# Patient Record
Sex: MALE | Race: WHITE | Employment: OTHER | URBAN - METROPOLITAN AREA
[De-identification: names, ages, dates, MRNs, and addresses within clinical notes are randomized per-mention and may not be internally consistent; named-entity substitution may affect disease eponyms.]

---

## 2017-01-06 DIAGNOSIS — E03.9 HYPOTHYROIDISM, UNSPECIFIED TYPE: Primary | ICD-10-CM

## 2017-01-06 RX ORDER — LEVOTHYROXINE SODIUM 50 UG/1
50 TABLET ORAL DAILY
Qty: 30 TABLET | Refills: 3 | Status: SHIPPED | OUTPATIENT
Start: 2017-01-06 | End: 2017-04-19

## 2017-01-06 NOTE — TELEPHONE ENCOUNTER
levothyroxine     Last Written Prescription Date: 01/14/16  Last Quantity: 30, # refills: 11  Last Office Visit with G, P or [facility] prescribing provider: 11/25/16        TSH   Date Value Ref Range Status   04/19/2016 2.71 0.40 - 4.00 mU/L Final

## 2017-01-14 ENCOUNTER — OFFICE VISIT (OUTPATIENT)
Dept: URGENT CARE | Facility: URGENT CARE | Age: 82
End: 2017-01-14
Payer: COMMERCIAL

## 2017-01-14 VITALS
HEART RATE: 56 BPM | WEIGHT: 217.7 LBS | OXYGEN SATURATION: 98 % | SYSTOLIC BLOOD PRESSURE: 106 MMHG | BODY MASS INDEX: 29.49 KG/M2 | DIASTOLIC BLOOD PRESSURE: 60 MMHG | HEIGHT: 72 IN | TEMPERATURE: 97.7 F

## 2017-01-14 DIAGNOSIS — L29.9 ITCHING: ICD-10-CM

## 2017-01-14 DIAGNOSIS — L20.9 ATOPIC DERMATITIS, UNSPECIFIED TYPE: Primary | ICD-10-CM

## 2017-01-14 PROCEDURE — 99214 OFFICE O/P EST MOD 30 MIN: CPT | Performed by: PHYSICIAN ASSISTANT

## 2017-01-14 RX ORDER — METHYLPREDNISOLONE 4 MG
TABLET, DOSE PACK ORAL
Qty: 21 TABLET | Refills: 0 | Status: SHIPPED | OUTPATIENT
Start: 2017-01-14 | End: 2017-03-20

## 2017-01-14 RX ORDER — TRIAMCINOLONE ACETONIDE 5 MG/G
CREAM TOPICAL
Qty: 30 G | Refills: 0 | Status: SHIPPED | OUTPATIENT
Start: 2017-01-14 | End: 2017-05-30

## 2017-01-14 RX ORDER — CETIRIZINE HYDROCHLORIDE 10 MG/1
10 TABLET ORAL EVERY EVENING
Qty: 30 TABLET | Refills: 1 | Status: SHIPPED | OUTPATIENT
Start: 2017-01-14 | End: 2017-03-20

## 2017-01-14 NOTE — PROGRESS NOTES
SUBJECTIVE:  Oscar Terrazas is a 82 year old male who presents to the clinic today for a rash.  Onset of rash was 3 week(s) ago.   Rash is still present.  Location of the rash: upper arms, back area.  Quality/symptoms of rash: itching and redness   Symptoms are moderate and rash seems to be stable.  Previous history of a similar rash? No  Recent exposure history: nonee  Recent new medications: none  Associated symptoms include: itching and redness.    Past Medical History   Diagnosis Date     Pneumonia, organism unspecified      Allergic rhinitis, cause unspecified      Osteoarthrosis, unspecified whether generalized or localized, unspecified site      IDIOPATHIC CYCLIC EDEMA      Acute myocardial infarction, unspecified site, episode of care unspecified      Essential hypertension, benign      Degeneration of cervical intervertebral disc      Diaphragmatic hernia without mention of obstruction or gangrene      Esophageal reflux      Unspecified hereditary and idiopathic peripheral neuropathy      Sensorineural hearing loss, unspecified      Mild persistent asthma      Unspecified disorder resulting from impaired renal function      Hypothyroidism      Venous insufficiency      CTS (carpal tunnel syndrome)      bilateral     PVD (peripheral vascular disease) (H)      Subarachnoid bleed (H)      Anemia 3/6/2014     Hyperlipidemia LDL goal <70      CKD (chronic kidney disease) stage 3, GFR 30-59 ml/min 3/9/2014     CAD (coronary artery disease)      1996 CABG - LIMA to LAD, SVG to OM, 2007 Cath - KANU to Left main and ostial/prox LAD, 2012 Cath - 80-90% stenosis SVG to OM - KANU placed, EF 50%     Proteinuria 5/17/2014     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 disease (goal A1C<8) 10/17/2015     Hypothyroidism, unspecified hypothyroidism type 1/14/2016        Allergies   Allergen Reactions     Advair [Advair Diskus]      cough;  insomnia, nightmares     Amlodipine Besylate      edema       Azithromycin  GI Disturbance     Clarithromycin      gi     Hydrochlorothiazide      hctz + lisinopril-->inc creat, k+     Lisinopril      lisinopril + hctz --> inc creat, k+     Moxifloxacin Hydrochloride      clostridium diffile colitis     Penicillins      gen swelling     Vioxx      edema     Social History   Substance Use Topics     Smoking status: Former Smoker     Types: Cigarettes     Smokeless tobacco: Never Used      Comment: quit at age 32     Alcohol Use: No       ROS:  CONSTITUTIONAL:NEGATIVE for fever, chills, change in weight  INTEGUMENTARY/SKIN: POSITIVE for rash on arms and back  ENT/MOUTH: NEGATIVE for ear, mouth and throat problems  RESP:NEGATIVE for significant cough or SOB  CV: NEGATIVE for chest pain, palpitations or peripheral edema  MUSCULOSKELETAL: NEGATIVE for significant arthralgias or myalgia  NEURO: NEGATIVE for weakness, dizziness or paresthesias    EXAM:   /60 mmHg  Pulse 56  Temp(Src) 97.7  F (36.5  C) (Oral)  Ht 6' (1.829 m)  Wt 217 lb 11.2 oz (98.748 kg)  BMI 29.52 kg/m2  SpO2 98%  GENERAL: alert, no acute distress.  SKIN: Rash description:    Distribution: localized  Location: arms and back    Color: red,  Lesion type: macular, blotchy with itching  NECK: supple, non-tender to palpation, no adenopathy noted  RESP: lungs clear to auscultation - no rales, rhonchi or wheezes  CV: regular rates and rhythm, normal S1 S2, no murmur noted    ASSESSMENT/PLAN:      ICD-10-CM    1. Atopic dermatitis, unspecified type L20.9 cetirizine (ZYRTEC) 10 MG tablet     methylPREDNISolone (MEDROL DOSEPAK) 4 MG tablet   2. Itching L29.9 cetirizine (ZYRTEC) 10 MG tablet     triamcinolone (KENALOG) 0.5 % cream     methylPREDNISolone (MEDROL DOSEPAK) 4 MG tablet     Follow up with PCP for recheck next week

## 2017-01-14 NOTE — NURSING NOTE
Chief Complaint   Patient presents with     Derm Problem     rash on arm, back, are itchy 3x weeks        Initial /60 mmHg  Pulse 56  Temp(Src) 97.7  F (36.5  C) (Oral)  Ht 6' (1.829 m)  Wt 217 lb 11.2 oz (98.748 kg)  BMI 29.52 kg/m2  SpO2 98% Estimated body mass index is 29.52 kg/(m^2) as calculated from the following:    Height as of this encounter: 6' (1.829 m).    Weight as of this encounter: 217 lb 11.2 oz (98.748 kg).  BP completed using cuff size: large

## 2017-01-15 DIAGNOSIS — I10 ESSENTIAL HYPERTENSION, BENIGN: Primary | ICD-10-CM

## 2017-01-16 RX ORDER — HYDRALAZINE HYDROCHLORIDE 25 MG/1
50 TABLET, FILM COATED ORAL 2 TIMES DAILY
Qty: 360 TABLET | Refills: 2 | Status: SHIPPED | OUTPATIENT
Start: 2017-01-16 | End: 2017-10-09

## 2017-01-16 NOTE — TELEPHONE ENCOUNTER
hydrALAZINE (APRESOLINE) 25 MG tablet      Last Written Prescription Date: 01/14/16  Last Fill Quantity: 360tab, # refills: 3    Last Office Visit with [facility], [facility] or [facility] prescribing provider:  11/25/16   Future Office Visit:        BP Readings from Last 3 Encounters:   01/14/17 106/60   12/24/16 170/85   12/09/16 170/60

## 2017-01-25 DIAGNOSIS — N18.30 CKD (CHRONIC KIDNEY DISEASE) STAGE 3, GFR 30-59 ML/MIN (H): Primary | ICD-10-CM

## 2017-01-25 DIAGNOSIS — M10.9 GOUT, UNSPECIFIED: ICD-10-CM

## 2017-01-25 RX ORDER — CLOPIDOGREL BISULFATE 75 MG/1
75 TABLET ORAL DAILY
Qty: 90 TABLET | Refills: 3 | Status: SHIPPED | OUTPATIENT
Start: 2017-01-25 | End: 2018-01-15

## 2017-01-25 RX ORDER — ALLOPURINOL 100 MG/1
200 TABLET ORAL DAILY
Qty: 180 TABLET | Refills: 3 | Status: SHIPPED | OUTPATIENT
Start: 2017-01-25 | End: 2018-01-08

## 2017-01-25 NOTE — TELEPHONE ENCOUNTER
clopidogrel (PLAVIX) 75 MG tablet           Last Written Prescription Date: 1/04/2016  Last Fill Quantity: 90, # refills: 3    [place] Office Visit with [facility] or [facility] prescribing provider:  11/25/2016   Future Office Visit:       WBC      9.6   3/6/2014  RBC     4.26   3/6/2014  HGB     13.6   3/6/2014  HCT     39.5   3/6/2014  No components found with this name: mct  MCV       93   3/6/2014  MCH     31.9   3/6/2014  MCHC     34.4   3/6/2014  RDW     14.0   3/6/2014  PLT      184   3/6/2014  AST        8   8/23/2016  ALT       21   8/23/2016  CREATININE   Date Value Ref Range Status   12/02/2016 1.91* 0.66 - 1.25 mg/dL Final   ]

## 2017-02-10 DIAGNOSIS — I10 ESSENTIAL HYPERTENSION, BENIGN: Primary | ICD-10-CM

## 2017-02-10 RX ORDER — METOPROLOL TARTRATE 25 MG/1
25 TABLET, FILM COATED ORAL 2 TIMES DAILY
Qty: 180 TABLET | Refills: 2 | Status: SHIPPED | OUTPATIENT
Start: 2017-02-10 | End: 2017-11-13

## 2017-02-14 ENCOUNTER — TELEPHONE (OUTPATIENT)
Dept: CARDIOLOGY | Facility: CLINIC | Age: 82
End: 2017-02-14

## 2017-02-14 NOTE — TELEPHONE ENCOUNTER
"Received call from scheduling that pt's daughter has some concerns and wanted a nurse to call back. I contacted pt's daughter (Yasmin). She is concerned with her father not being compliant with his medications, especially his \"water pill\". He has lower extremity edema but not to the point where his legs have sore or weeping. He is suppose to be taking his Lasix 20 mg daily, however, he only takes it when his legs become more swollen than usual. He doesn't like the way it makes him feel, tired and frequently urinating. Dtr stated he does eat a banana everyday for potassium. He does not over eat or salt his foods. He does eat a small bowl of cottage cheese,fruit and a cheese sandwich daily. We discussed high sodium foods, all questions were answered.. He does wear his compression stockings when he is at home and when he goes bowling. He denies any shortness of breath at this time.    Daughter called today to make an appointment to see  on 3/20/17. She is hoping her father will listen to  about the importance of being compliant with his Lasix. She believes he is taking all his other medication and insulin as instructed. I advised her to take patient to the ED if symptoms worsen and are not relieved by his medications. She was in agreement with the plan of care. I gave her 's nurse line phone number if she should have any other questions or concerns.   "

## 2017-03-15 DIAGNOSIS — J45.30 MILD PERSISTENT ASTHMA WITHOUT COMPLICATION: ICD-10-CM

## 2017-03-15 NOTE — TELEPHONE ENCOUNTER
theophylline 400 MG CP24      Last Written Prescription Date:  6/26/2016  Last Fill Quantity: 90,   # refills: 2  Last Office Visit with [facility], [facility] or [facility] prescribing provider: 11/25/2016  Future Office visit:    Next 5 appointments (look out 90 days)     Mar 20, 2017  1:15 PM CDT   Return Visit with Sebastien Gibson MD   [facility] ([facility])    [address] [place] [id number] [phone]                   Routing refill request to provider for review/approval because:  Drug not on the [facility], [facility] or [facility] refill protocol or controlled substance

## 2017-03-20 ENCOUNTER — OFFICE VISIT (OUTPATIENT)
Dept: CARDIOLOGY | Facility: CLINIC | Age: 82
End: 2017-03-20
Attending: NURSE PRACTITIONER
Payer: COMMERCIAL

## 2017-03-20 VITALS
HEIGHT: 72 IN | SYSTOLIC BLOOD PRESSURE: 159 MMHG | HEART RATE: 71 BPM | BODY MASS INDEX: 30.3 KG/M2 | DIASTOLIC BLOOD PRESSURE: 68 MMHG | WEIGHT: 223.7 LBS

## 2017-03-20 DIAGNOSIS — L20.9 ATOPIC DERMATITIS, UNSPECIFIED TYPE: ICD-10-CM

## 2017-03-20 DIAGNOSIS — I25.10 ATHEROSCLEROSIS OF NATIVE CORONARY ARTERY OF NATIVE HEART WITHOUT ANGINA PECTORIS: Primary | ICD-10-CM

## 2017-03-20 DIAGNOSIS — I25.10 CARDIOVASCULAR DISEASE: ICD-10-CM

## 2017-03-20 DIAGNOSIS — I10 ESSENTIAL HYPERTENSION, BENIGN: ICD-10-CM

## 2017-03-20 DIAGNOSIS — E78.5 HYPERLIPIDEMIA LDL GOAL <70: ICD-10-CM

## 2017-03-20 DIAGNOSIS — L29.9 ITCHING: ICD-10-CM

## 2017-03-20 DIAGNOSIS — I87.2 VENOUS INSUFFICIENCY: ICD-10-CM

## 2017-03-20 PROCEDURE — 99214 OFFICE O/P EST MOD 30 MIN: CPT | Performed by: INTERNAL MEDICINE

## 2017-03-20 NOTE — MR AVS SNAPSHOT
After Visit Summary   3/20/2017    Oscar Terrazas    MRN: [id number]           Patient Information     Date Of Birth          2/2/1934        Visit Information        Provider Department      3/20/2017 1:15 PM Sebastien Gibson MD [facility] [place]        Today's Diagnoses     Atherosclerosis of native coronary artery of native heart without angina pectoris    -  1    Hyperlipidemia LDL goal <70        Essential hypertension, benign        Cardiovascular disease        Venous insufficiency          Care Instructions    If you have not heard from the scheduling office within 2 business days, please call [phone] all locations,        Follow-ups after your visit        Additional Services     LYMPHEDEMA THERAPY REFERRAL       *This therapy referral will be filtered to a centralized scheduling office at [facility] and the patient will receive a call to schedule an appointment at a [place] location most convenient for them. *   If you have not heard from the scheduling office within 2 business days, please call [phone] all locations, with the exception of [place], please call [phone].     Treatment: PT or OT Evaluation & Treatment  Special Instructions: lymphedema wraps and teaching  PT/OT Treatment Diagnosis: Edema    Please be aware that coverage of these services is subject to the terms and limitations of your health insurance plan.  Call member services at your health plan with any benefit or coverage questions.      **Note to Provider:  If you are referring outside of [place] for the therapy appointment, please list the name of the location in the  special instructions  above, print the referral and give to the patient to schedule the appointment.            Follow-Up with Cardiologist                 Future tests that were ordered for you today     Open Future Orders        Priority Expected Expires Ordered     LYMPHEDEMA THERAPY REFERRAL Routine 3/27/2017 3/20/2019 3/20/2017    Follow-Up with Cardiologist Routine 3/20/2018 8/2/2018 3/20/2017    Lipid Profile Routine 3/20/2018 4/24/2018 3/20/2017            Who to contact     If you have questions or need follow up information about today's clinic visit or your schedule please contact [facility] [place] directly at [phone].  Normal or non-critical lab and imaging results will be communicated to you by [employer], letter or phone within 4 business days after the clinic has received the results. If you do not hear from us within 7 days, please contact the clinic through [employer] or phone. If you have a critical or abnormal lab result, we will notify you by phone as soon as possible.  Submit refill requests through [employer] or call your pharmacy and they will forward the refill request to us. Please allow 3 business days for your refill to be completed.          Additional Information About Your Visit        [employer] Information     [employer] gives you secure access to your electronic health record. If you see a primary care provider, you can also send messages to your care team and make appointments. If you have questions, please call your primary care clinic.  If you do not have a primary care provider, please call [phone] and they will assist you.        Care EveryWhere ID     This is your Care EveryWhere ID. This could be used by other organizations to access your [place] medical records  [id number]        Your Vitals Were     Pulse Height BMI (Body Mass Index)             71 1.829 m (6') 30.34 kg/m2          Blood Pressure from Last 3 Encounters:   03/20/17 159/68   01/14/17 106/60   12/24/16 170/85    Weight from Last 3 Encounters:   03/20/17 101.5 kg (223 lb 11.2 oz)   01/14/17 98.7 kg (217 lb 11.2 oz)   12/09/16 99.8 kg (220 lb)              We Performed the Following     Follow-Up with Cardiologist        Primary Care Provider  "Office Phone # Fax #    Jorge Hillman [phone]       [facility] [address]  [facility] [address]        Thank you!     Thank you for choosing [facility] [place]  for your care. Our goal is always to provide you with excellent care. Hearing back from our patients is one way we can continue to improve our services. Please take a few minutes to complete the written survey that you may receive in the mail after your visit with us. Thank you!             Your Updated Medication List - Protect others around you: Learn how to safely use, store and throw away your medicines at www.disposemymeds.org.          This list is accurate as of: 3/20/17  2:01 PM.  Always use your most recent med list.                   Brand Name Dispense Instructions for use    allopurinol 100 MG tablet    ZYLOPRIM    180 tablet    Take 2 tablets (200 mg) by mouth daily       aspirin 81 MG tablet      Take 1 tablet by mouth daily. with food       blood glucose monitoring test strip    ACCU-CHEK COMPACT STRIPS    200 Box    Qd or as directed       cetirizine 10 MG tablet    zyrTEC    30 tablet    Take 1 tablet (10 mg) by mouth every evening       clopidogrel 75 MG tablet    PLAVIX    90 tablet    Take 1 tablet (75 mg) by mouth daily       COD LIVER OIL PO      Take 1 capsule by mouth daily       furosemide 20 MG tablet    LASIX    30 tablet    Take 1 tablet (20 mg) by mouth daily       hydrALAZINE 25 MG tablet    APRESOLINE    360 tablet    Take 2 tablets (50 mg) by mouth 2 times daily       insulin  UNIT/ML injection    HumuLIN N/NovoLIN N    3 Month    18 units at bedtime       insulin syringe-needle U-100 29G X 1/2\" 0.5 ML    BD insulin syringe    200 each    Use one syringe 2 daily or as directed.       levothyroxine 50 MCG tablet    SYNTHROID/LEVOTHROID    30 tablet    Take 1 tablet (50 mcg) by mouth daily       metoprolol 25 MG tablet    LOPRESSOR    180 tablet    Take " 1 tablet (25 mg) by mouth 2 times daily       PEPCID 20 MG tablet   Generic drug:  famotidine     3months    take one tablet daily if needed       pravastatin 80 MG tablet    PRAVACHOL    90 tablet    Take 1 tablet (80 mg) by mouth daily at bedtime.       theophylline 400 MG Cp24     90 capsule    Take 400 mg by mouth daily       triamcinolone 0.5 % cream    KENALOG    30 g    Apply sparingly to affected area three times daily.       VITAMIN B 12 PO      Take 500 mcg by mouth 2 times daily.

## 2017-03-20 NOTE — LETTER
3/20/2017    Jorge Hillman MD  [facility]   [address]  [facility] [address]    RE: Oscar Terrazas       Dear Colleague,    I had the pleasure of seeing Oscar Terrazas in the [facility].    HPI and Plan:   This 81-year-old gentleman is seen in followup of his coronary disease.  He is accompanied by his daughter.  He sounds and appears remaining very pale and active.  He is happy with his functional status.  He has remained stable this last year. He can blow snow and mow his lawn and get no unusual discomfort with activity or any dyspnea, unusual fatigue, arm, neck, or jaw discomfort. He continues to bowl in multiple [employer] leagues a week. He continues to be mainly limited by some carpal tunnel symptoms in his right hand. He denies orthopnea, shortness of breath or unusual limitations or fatigue. He has no palpitations, dizziness, syncope, new myalgias or muscle weakness, and denies claudication symptoms.   He does have chronic venous insufficiency.  I have strongly recommended pressure stockings in the past. He continues to have a fair amount of edema although he is asymptomatic from it.  However he continues to bump his legs and bruise easily and sometimes gets cuts that he'll slowly, and recently had of antibiotics because one of those appeared infected.     He notes his statin was stopped last year because he is having some nocturnal charley horses.  He was not having pain at other times.  He notes he continues to have occasional nocturnal charley horse cramps in his calves even after being off the statin.  I think his pattern is very very unlikely related to statin therapy.    This is a gentleman with a CABG in 1996. In 2007, he required multivessel drug-eluting stenting for recurrent angina. In 2012, he presented with an acute coronary syndrome after his Plavix was stopped for a dental procedure. At that time, his LAD and left main coronary stents were widely  patent. There was a lesion at one end of the stent in the vein graft to the OM that was severe, and possibly acute thrombus, and that was successfully stented with a drug-eluting stent. He has been maintained on Plavix since then.     On a vascular study in 2008, he did have some mild peripheral vascular disease, but his ABIs with exercise were still in the 0.85 to 0.88 range.      Exam -full exam below.  In summary:   Blood pressure-was elevated initially remained elevated.  This is relatively unusual for him.  He states he's been taking them at home and they have been controlled.  Cardiac-, regular rhythm, clear lungs, a soft systolic ejection murmur without gallop P for JVD, and no bruits peripherally.   Extremities-He does continue to have 2+ chronic edema two thirds the way to the knees. There are chronic bronzing and skin changes pretibially but no erythema ulceration skin breakdown.    Impression/plan  1-coronary artery disease. Status post CABG and subsequent multivessel stenting. He has had no recurrence of his previous ischemic symptoms and no new suggestive ischemic symptoms. Ejection fraction around 50-55%. No evidence of volume overload.  2-mild to moderate peripheral vascular disease. No claudication symptoms.  3-dyslipidemia, he has agreed to retry his statin therapy, he was previously an excellent goal on his pravastatin dose and he will retry that.  4-significant peripheral venous insufficiency of the lower extremities.  He has had some difficulty using pressure stockings.  He gave him the lymphedema consult to evaluate for using lymphedema wraps in stead.  I am concerned about his frequent bruising and occasional skin breakdown and slow healing.  5-hypertension.  Blood pressures are not well controlled today.  He is going to take a series of them at home over the next few weeks.  He should follow-up with Dr. Hillman with those results    Orders Placed This Encounter   Procedures     Lipid Profile      "Follow-Up with Cardiologist     LYMPHEDEMA THERAPY REFERRAL       No orders of the defined types were placed in this encounter.      Medications Discontinued During This Encounter   Medication Reason     HYDROcodone-acetaminophen (NORCO) 5-325 MG per tablet Therapy completed     methylPREDNISolone (MEDROL DOSEPAK) 4 MG tablet Therapy completed         Encounter Diagnoses   Name Primary?     Atherosclerosis of native coronary artery of native heart without angina pectoris Yes     Hyperlipidemia LDL goal <70      Essential hypertension, benign      Cardiovascular disease      Venous insufficiency        CURRENT MEDICATIONS:  Current Outpatient Prescriptions   Medication Sig Dispense Refill     theophylline 400 MG CP24 Take 400 mg by mouth daily 90 capsule 2     metoprolol (LOPRESSOR) 25 MG tablet Take 1 tablet (25 mg) by mouth 2 times daily 180 tablet 2     clopidogrel (PLAVIX) 75 MG tablet Take 1 tablet (75 mg) by mouth daily 90 tablet 3     allopurinol (ZYLOPRIM) 100 MG tablet Take 2 tablets (200 mg) by mouth daily 180 tablet 3     hydrALAZINE (APRESOLINE) 25 MG tablet Take 2 tablets (50 mg) by mouth 2 times daily 360 tablet 2     cetirizine (ZYRTEC) 10 MG tablet Take 1 tablet (10 mg) by mouth every evening 30 tablet 1     triamcinolone (KENALOG) 0.5 % cream Apply sparingly to affected area three times daily. 30 g 0     levothyroxine (SYNTHROID/LEVOTHROID) 50 MCG tablet Take 1 tablet (50 mcg) by mouth daily 30 tablet 3     furosemide (LASIX) 20 MG tablet Take 1 tablet (20 mg) by mouth daily 30 tablet 5     insulin NPH (HUMULIN N, NOVOLIN N) 100 UNIT/ML VIAL 18 units at bedtime 3 Month 3     pravastatin (PRAVACHOL) 80 MG tablet Take 1 tablet (80 mg) by mouth daily at bedtime. 90 tablet 1     blood glucose monitoring (ACCU-CHEK COMPACT STRIPS) test strip Qd or as directed 200 Box 3     insulin syringe-needle U-100 (BD INSULIN SYRINGE) 29G X 1/2\" 0.5 ML Use one syringe 2 daily or as directed. 200 each prn     COD LIVER " OIL PO Take 1 capsule by mouth daily        aspirin 81 MG tablet Take 1 tablet by mouth daily. with food       Cyanocobalamin (VITAMIN B 12 PO) Take 500 mcg by mouth 2 times daily.       PEPCID 20 MG OR TABS take one tablet daily if needed 3months 1       ALLERGIES     Allergies   Allergen Reactions     Advair [Advair Diskus]      cough;  insomnia, nightmares     Amlodipine Besylate      edema       Azithromycin GI Disturbance     Clarithromycin      gi     Hydrochlorothiazide      hctz + lisinopril-->inc creat, k+     Lisinopril      lisinopril + hctz --> inc creat, k+     Moxifloxacin Hydrochloride      clostridium diffile colitis     Penicillins      gen swelling     Vioxx      edema       PAST MEDICAL HISTORY:  Past Medical History   Diagnosis Date     Acute myocardial infarction, unspecified site, episode of care unspecified      Allergic rhinitis, cause unspecified      Anemia 3/6/2014     CAD (coronary artery disease)      1996 CABG - LIMA to LAD, SVG to OM, 2007 Cath - KANU to Left main and ostial/prox LAD, 2012 Cath - 80-90% stenosis SVG to OM - KANU placed, EF 50%     CKD (chronic kidney disease) stage 3, GFR 30-59 ml/min 3/9/2014     CTS (carpal tunnel syndrome)      bilateral     Degeneration of cervical intervertebral disc      Diaphragmatic hernia without mention of obstruction or gangrene      Esophageal reflux      Essential hypertension, benign      Hyperlipidemia LDL goal <70      Hypothyroidism      Hypothyroidism, unspecified hypothyroidism type 1/14/2016     IDIOPATHIC CYCLIC EDEMA      Mild persistent asthma      Osteoarthrosis, unspecified whether generalized or localized, unspecified site      Pneumonia, organism unspecified      Proteinuria 5/17/2014     PVD (peripheral vascular disease) (H)      Sensorineural hearing loss, unspecified      Subarachnoid bleed (H)      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 disease (goal A1C<8) 10/17/2015     Unspecified disorder resulting from  impaired renal function      Unspecified hereditary and idiopathic peripheral neuropathy      Venous insufficiency        PAST SURGICAL HISTORY:  Past Surgical History   Procedure Laterality Date     C nonspecific procedure       appendectomy     C nonspecific procedure       hemorrhoids     C nonspecific procedure       cervical strain     C nonspecific procedure       rotator cuff surgery, right shoulder     Coronary artery bypass  1996     LIMA to LAD, SVG to OM     Heart cath stent cor w/wo ptca  2007     lad, left main cor artery stents/drug eluting     C nonspecific procedure  2008     iol os     Nonspecific procedure       iol od     Heart cath stent cor w/wo ptca  2012     80-90% stenosis SVG to OM - KANU placed, EF 50%       FAMILY HISTORY:  Family History   Problem Relation Age of Onset     CANCER Daughter      lung cancer     Jaundice Father      Alcohol/Drug Father        SOCIAL HISTORY:  Social History     Social History     Marital status:      Spouse name: N/A     Number of children: N/A     Years of education: N/A     Social History Main Topics     Smoking status: Former Smoker     Types: Cigarettes     Smokeless tobacco: Never Used      Comment: quit at age 32     Alcohol use No     Drug use: No     Sexual activity: No     Other Topics Concern     Caffeine Concern No     Sleep Concern Yes     Stress Concern No     Weight Concern No     Special Diet No     Exercise Yes     bowling, 5 days week. yard work     Social History Narrative       Review of Systems:  Skin:  Positive for rash left leg   Eyes:  Positive for glasses reading  ENT:  Negative      Respiratory:  Positive for cough     Cardiovascular:    edema;Positive for    Gastroenterology: Negative      Genitourinary:  not assessed      Musculoskeletal:  Negative      Neurologic:  Positive for numbness or tingling of hands carpel tunnel  Psychiatric:  Positive for depression    Heme/Lymph/Imm:  Negative      Endocrine:  Positive for  diabetes;thyroid disorder      Physical Exam:  Vitals: /68  Pulse 71  Ht 1.829 m (6')  Wt 101.5 kg (223 lb 11.2 oz)  BMI 30.34 kg/m2    Constitutional:  cooperative, alert and oriented, well developed, well nourished, in no acute distress        Skin:  warm and dry to the touch, no apparent skin lesions or masses noted        Head:  normocephalic, no masses or lesions        Eyes:  pupils equal and round;sclera white;EOMS intact        ENT:  no pallor or cyanosis        Neck:  carotid pulses are full and equal bilaterally;JVP normal;no carotid bruit        Chest:  normal breath sounds, clear to auscultation, normal A-P diameter, normal symmetry, normal respiratory excursion, no use of accessory muscles;healed median sternotomy scar          Cardiac: regular rhythm;normal S1 and S2;no S3 or S4;apical impulse not displaced       systolic ejection murmur;RUSB;grade 1          Abdomen:  abdomen soft, non-tender, BS normoactive, no mass, no HSM, no bruits        Vascular: pulses full and equal, no bruits auscultated                                        Extremities and Back:      bilateral LE edema;2+          Neurological:  affect appropriate, oriented to time, person and place        Thank you for allowing me to participate in the care of your patient.    Sincerely,     Sebastien Gibson MD     [facility]

## 2017-03-20 NOTE — PROGRESS NOTES
HPI and Plan:   This 81-year-old gentleman is seen in followup of his coronary disease.  He is accompanied by his daughter.  He sounds and appears remaining very pale and active.  He is happy with his functional status.  He has remained stable this last year. He can blow snow and mow his lawn and get no unusual discomfort with activity or any dyspnea, unusual fatigue, arm, neck, or jaw discomfort. He continues to bowl in multiple bowling leagues a week. He continues to be mainly limited by some carpal tunnel symptoms in his right hand. He denies orthopnea, shortness of breath or unusual limitations or fatigue. He has no palpitations, dizziness, syncope, new myalgias or muscle weakness, and denies claudication symptoms.   He does have chronic venous insufficiency.  I have strongly recommended pressure stockings in the past. He continues to have a fair amount of edema although he is asymptomatic from it.  However he continues to bump his legs and bruise easily and sometimes gets cuts that he'll slowly, and recently had of antibiotics because one of those appeared infected.     He notes his statin was stopped last year because he is having some nocturnal charley horses.  He was not having pain at other times.  He notes he continues to have occasional nocturnal charley horse cramps in his calves even after being off the statin.  I think his pattern is very very unlikely related to statin therapy.    This is a gentleman with a CABG in 1996. In 2007, he required multivessel drug-eluting stenting for recurrent angina. In 2012, he presented with an acute coronary syndrome after his Plavix was stopped for a dental procedure. At that time, his LAD and left main coronary stents were widely patent. There was a lesion at one end of the stent in the vein graft to the OM that was severe, and possibly acute thrombus, and that was successfully stented with a drug-eluting stent. He has been maintained on Plavix since then.     On a  vascular study in 2008, he did have some mild peripheral vascular disease, but his ABIs with exercise were still in the 0.85 to 0.88 range.      Exam -full exam below.  In summary:   Blood pressure-was elevated initially remained elevated.  This is relatively unusual for him.  He states he's been taking them at home and they have been controlled.  Cardiac-, regular rhythm, clear lungs, a soft systolic ejection murmur without gallop P for JVD, and no bruits peripherally.   Extremities-He does continue to have 2+ chronic edema two thirds the way to the knees. There are chronic bronzing and skin changes pretibially but no erythema ulceration skin breakdown.    Impression/plan  1-coronary artery disease. Status post CABG and subsequent multivessel stenting. He has had no recurrence of his previous ischemic symptoms and no new suggestive ischemic symptoms. Ejection fraction around 50-55%. No evidence of volume overload.  2-mild to moderate peripheral vascular disease. No claudication symptoms.  3-dyslipidemia, he has agreed to retry his statin therapy, he was previously an excellent goal on his pravastatin dose and he will retry that.  4-significant peripheral venous insufficiency of the lower extremities.  He has had some difficulty using pressure stockings.  He gave him the lymphedema consult to evaluate for using lymphedema wraps in stead.  I am concerned about his frequent bruising and occasional skin breakdown and slow healing.  5-hypertension.  Blood pressures are not well controlled today.  He is going to take a series of them at home over the next few weeks.  He should follow-up with Dr. Hillman with those results    Orders Placed This Encounter   Procedures     Lipid Profile     Follow-Up with Cardiologist     LYMPHEDEMA THERAPY REFERRAL       No orders of the defined types were placed in this encounter.      Medications Discontinued During This Encounter   Medication Reason     HYDROcodone-acetaminophen (NORCO)  "5-325 MG per tablet Therapy completed     methylPREDNISolone (MEDROL DOSEPAK) 4 MG tablet Therapy completed         Encounter Diagnoses   Name Primary?     Atherosclerosis of native coronary artery of native heart without angina pectoris Yes     Hyperlipidemia LDL goal <70      Essential hypertension, benign      Cardiovascular disease      Venous insufficiency        CURRENT MEDICATIONS:  Current Outpatient Prescriptions   Medication Sig Dispense Refill     theophylline 400 MG CP24 Take 400 mg by mouth daily 90 capsule 2     metoprolol (LOPRESSOR) 25 MG tablet Take 1 tablet (25 mg) by mouth 2 times daily 180 tablet 2     clopidogrel (PLAVIX) 75 MG tablet Take 1 tablet (75 mg) by mouth daily 90 tablet 3     allopurinol (ZYLOPRIM) 100 MG tablet Take 2 tablets (200 mg) by mouth daily 180 tablet 3     hydrALAZINE (APRESOLINE) 25 MG tablet Take 2 tablets (50 mg) by mouth 2 times daily 360 tablet 2     cetirizine (ZYRTEC) 10 MG tablet Take 1 tablet (10 mg) by mouth every evening 30 tablet 1     triamcinolone (KENALOG) 0.5 % cream Apply sparingly to affected area three times daily. 30 g 0     levothyroxine (SYNTHROID/LEVOTHROID) 50 MCG tablet Take 1 tablet (50 mcg) by mouth daily 30 tablet 3     furosemide (LASIX) 20 MG tablet Take 1 tablet (20 mg) by mouth daily 30 tablet 5     insulin NPH (HUMULIN N, NOVOLIN N) 100 UNIT/ML VIAL 18 units at bedtime 3 Month 3     pravastatin (PRAVACHOL) 80 MG tablet Take 1 tablet (80 mg) by mouth daily at bedtime. 90 tablet 1     blood glucose monitoring (ACCU-CHEK COMPACT STRIPS) test strip Qd or as directed 200 Box 3     insulin syringe-needle U-100 (BD INSULIN SYRINGE) 29G X 1/2\" 0.5 ML Use one syringe 2 daily or as directed. 200 each prn     COD LIVER OIL PO Take 1 capsule by mouth daily        aspirin 81 MG tablet Take 1 tablet by mouth daily. with food       Cyanocobalamin (VITAMIN B 12 PO) Take 500 mcg by mouth 2 times daily.       PEPCID 20 MG OR TABS take one tablet daily if " needed 3months 1       ALLERGIES     Allergies   Allergen Reactions     Advair [Advair Diskus]      cough;  insomnia, nightmares     Amlodipine Besylate      edema       Azithromycin GI Disturbance     Clarithromycin      gi     Hydrochlorothiazide      hctz + lisinopril-->inc creat, k+     Lisinopril      lisinopril + hctz --> inc creat, k+     Moxifloxacin Hydrochloride      clostridium diffile colitis     Penicillins      gen swelling     Vioxx      edema       PAST MEDICAL HISTORY:  Past Medical History   Diagnosis Date     Acute myocardial infarction, unspecified site, episode of care unspecified      Allergic rhinitis, cause unspecified      Anemia 3/6/2014     CAD (coronary artery disease)      1996 CABG - LIMA to LAD, SVG to OM, 2007 Cath - KANU to Left main and ostial/prox LAD, 2012 Cath - 80-90% stenosis SVG to OM - KANU placed, EF 50%     CKD (chronic kidney disease) stage 3, GFR 30-59 ml/min 3/9/2014     CTS (carpal tunnel syndrome)      bilateral     Degeneration of cervical intervertebral disc      Diaphragmatic hernia without mention of obstruction or gangrene      Esophageal reflux      Essential hypertension, benign      Hyperlipidemia LDL goal <70      Hypothyroidism      Hypothyroidism, unspecified hypothyroidism type 1/14/2016     IDIOPATHIC CYCLIC EDEMA      Mild persistent asthma      Osteoarthrosis, unspecified whether generalized or localized, unspecified site      Pneumonia, organism unspecified      Proteinuria 5/17/2014     PVD (peripheral vascular disease) (H)      Sensorineural hearing loss, unspecified      Subarachnoid bleed (H)      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 disease (goal A1C<8) 10/17/2015     Unspecified disorder resulting from impaired renal function      Unspecified hereditary and idiopathic peripheral neuropathy      Venous insufficiency        PAST SURGICAL HISTORY:  Past Surgical History   Procedure Laterality Date     C nonspecific procedure        appendectomy     C nonspecific procedure       hemorrhoids     C nonspecific procedure       cervical strain     C nonspecific procedure       rotator cuff surgery, right shoulder     Coronary artery bypass  1996     LIMA to LAD, SVG to OM     Heart cath stent cor w/wo ptca  2007     lad, left main cor artery stents/drug eluting     C nonspecific procedure  2008     iol os     Nonspecific procedure       iol od     Heart cath stent cor w/wo ptca  2012     80-90% stenosis SVG to OM - KANU placed, EF 50%       FAMILY HISTORY:  Family History   Problem Relation Age of Onset     CANCER Daughter      lung cancer     Jaundice Father      Alcohol/Drug Father        SOCIAL HISTORY:  Social History     Social History     Marital status:      Spouse name: N/A     Number of children: N/A     Years of education: N/A     Social History Main Topics     Smoking status: Former Smoker     Types: Cigarettes     Smokeless tobacco: Never Used      Comment: quit at age 32     Alcohol use No     Drug use: No     Sexual activity: No     Other Topics Concern     Caffeine Concern No     Sleep Concern Yes     Stress Concern No     Weight Concern No     Special Diet No     Exercise Yes     bowling, 5 days week. yard work     Social History Narrative       Review of Systems:  Skin:  Positive for rash left leg   Eyes:  Positive for glasses reading  ENT:  Negative      Respiratory:  Positive for cough     Cardiovascular:    edema;Positive for    Gastroenterology: Negative      Genitourinary:  not assessed      Musculoskeletal:  Negative      Neurologic:  Positive for numbness or tingling of hands carpel tunnel  Psychiatric:  Positive for depression    Heme/Lymph/Imm:  Negative      Endocrine:  Positive for diabetes;thyroid disorder      Physical Exam:  Vitals: /68  Pulse 71  Ht 1.829 m (6')  Wt 101.5 kg (223 lb 11.2 oz)  BMI 30.34 kg/m2    Constitutional:  cooperative, alert and oriented, well developed, well nourished, in no acute  distress        Skin:  warm and dry to the touch, no apparent skin lesions or masses noted        Head:  normocephalic, no masses or lesions        Eyes:  pupils equal and round;sclera white;EOMS intact        ENT:  no pallor or cyanosis        Neck:  carotid pulses are full and equal bilaterally;JVP normal;no carotid bruit        Chest:  normal breath sounds, clear to auscultation, normal A-P diameter, normal symmetry, normal respiratory excursion, no use of accessory muscles;healed median sternotomy scar          Cardiac: regular rhythm;normal S1 and S2;no S3 or S4;apical impulse not displaced       systolic ejection murmur;RUSB;grade 1          Abdomen:  abdomen soft, non-tender, BS normoactive, no mass, no HSM, no bruits        Vascular: pulses full and equal, no bruits auscultated                                        Extremities and Back:      bilateral LE edema;2+          Neurological:  affect appropriate, oriented to time, person and place              AIDA Vasquez CNP   [facility]  [address]  [place] [id number]

## 2017-03-20 NOTE — TELEPHONE ENCOUNTER
cetirizine (ZYRTEC) 10 MG tablet      Last Written Prescription Date: 11/25/2016  Last Fill Quantity: 30,  # refills: 1   Last Office Visit with BROOKE, AUGUSTIN or [facility] prescribing provider: 01/14/2017                                         Next 5 appointments (look out 90 days)     Mar 20, 2017  1:15 PM CDT   Return Visit with Sebastien Gibson MD   [facility] [place] ([facility])    [address] [place] [id number] [phone]

## 2017-03-20 NOTE — PATIENT INSTRUCTIONS
If you have not heard from the scheduling office within 2 business days, please call [phone] all locations,

## 2017-03-21 RX ORDER — CETIRIZINE HYDROCHLORIDE 10 MG/1
10 TABLET ORAL EVERY EVENING
Qty: 30 TABLET | Refills: 9 | Status: SHIPPED | OUTPATIENT
Start: 2017-03-21 | End: 2018-01-13

## 2017-03-26 DIAGNOSIS — E11.22 TYPE 2 DIABETES MELLITUS WITH STAGE 3 CHRONIC KIDNEY DISEASE, WITH LONG-TERM CURRENT USE OF INSULIN (H): Primary | ICD-10-CM

## 2017-03-26 DIAGNOSIS — Z79.4 TYPE 2 DIABETES MELLITUS WITH STAGE 3 CHRONIC KIDNEY DISEASE, WITH LONG-TERM CURRENT USE OF INSULIN (H): Primary | ICD-10-CM

## 2017-03-26 DIAGNOSIS — N18.30 TYPE 2 DIABETES MELLITUS WITH STAGE 3 CHRONIC KIDNEY DISEASE, WITH LONG-TERM CURRENT USE OF INSULIN (H): Primary | ICD-10-CM

## 2017-03-26 NOTE — TELEPHONE ENCOUNTER
"insulin syringe-needle U-100 (BD INSULIN SYRINGE) 29G X 1/2\" 0.5 ML      Last Written Prescription Date: 01/14/16  Last Fill Quantity: 200 each,  # refills: prn   Last Office Visit with [facility], [facility] or [facility] prescribing provider: 11/25/16                                               "

## 2017-03-27 RX ORDER — SYRINGE-NEEDLE,INSULIN,0.5 ML 27GX1/2"
SYRINGE, EMPTY DISPOSABLE MISCELLANEOUS
Qty: 200 EACH | Refills: 4 | Status: SHIPPED | OUTPATIENT
Start: 2017-03-27

## 2017-04-19 DIAGNOSIS — E03.9 HYPOTHYROIDISM, UNSPECIFIED TYPE: ICD-10-CM

## 2017-04-19 RX ORDER — LEVOTHYROXINE SODIUM 50 UG/1
TABLET ORAL
Qty: 30 TABLET | Refills: 0 | Status: SHIPPED | OUTPATIENT
Start: 2017-04-19 | End: 2017-05-15

## 2017-04-19 NOTE — TELEPHONE ENCOUNTER
Levothyroxine     Last Written Prescription Date: 1/6/17  Last Quantity: 30, # refills: 3  Last Office Visit with G, P or [facility] prescribing provider: 11/25/16        TSH   Date Value Ref Range Status   04/19/2016 2.71 0.40 - 4.00 mU/L Final

## 2017-04-19 NOTE — TELEPHONE ENCOUNTER
Medication is being filled for 1 time refill only due to:  due for fasting labs and office visit

## 2017-05-08 DIAGNOSIS — E11.22 TYPE 2 DIABETES MELLITUS WITH DIABETIC CHRONIC KIDNEY DISEASE (H): ICD-10-CM

## 2017-05-15 DIAGNOSIS — E03.9 HYPOTHYROIDISM, UNSPECIFIED TYPE: ICD-10-CM

## 2017-05-16 DIAGNOSIS — N18.30 TYPE 2 DIABETES MELLITUS WITH STAGE 3 CHRONIC KIDNEY DISEASE, WITH LONG-TERM CURRENT USE OF INSULIN (H): ICD-10-CM

## 2017-05-16 DIAGNOSIS — E11.22 TYPE 2 DIABETES MELLITUS WITH STAGE 3 CHRONIC KIDNEY DISEASE, WITH LONG-TERM CURRENT USE OF INSULIN (H): ICD-10-CM

## 2017-05-16 DIAGNOSIS — Z79.4 TYPE 2 DIABETES MELLITUS WITH STAGE 3 CHRONIC KIDNEY DISEASE, WITH LONG-TERM CURRENT USE OF INSULIN (H): ICD-10-CM

## 2017-05-16 RX ORDER — LEVOTHYROXINE SODIUM 50 UG/1
TABLET ORAL
Qty: 30 TABLET | Refills: 0 | Status: SHIPPED | OUTPATIENT
Start: 2017-05-16 | End: 2017-06-13

## 2017-05-16 NOTE — TELEPHONE ENCOUNTER
Rx done for Novolin NPH insulin with same dosage. Rx called to pharmacy by MD. Pt has appt end of this month. Pt to have fasting labs done at least a couple days prior to the appt with me so can review results at appt. Check sugars twice a day (before breakfast and bedtime) for 4 days prior to the appointment with me and bring the results to the appointment.  Inform pt

## 2017-05-16 NOTE — TELEPHONE ENCOUNTER
Reason for call: Medication   If this is a refill request, has the caller requested the refill from the pharmacy already? No  Will the patient be using a [facility]? No  Name of the pharmacy and phone number for the current request: [facility]    Name of the medication requested: Insurance will not cover Humalin insulin, please call pharmacy with another RX. Pt has 2 days left of insulin then will be out    Other request: Please call patient when a rx has been called in    Phone Number Pt can be reached at: Home number on file [phone] (home)  Best Time: anytime  Can we leave a detailed message on this number? YES

## 2017-05-16 NOTE — TELEPHONE ENCOUNTER
Routing refill request to provider for review/approval because:  Edith given x1 and patient did not follow up, please advise  Labs not current:  TSH  Due for office visit          Synthroid     Last Written Prescription Date: 4/19/17  Last Quantity: 30, # refills: 0  Last Office Visit with [facility] or [facility] prescribing provider: 11/25/16        TSH   Date Value Ref Range Status   04/19/2016 2.71 0.40 - 4.00 mU/L Final

## 2017-05-24 DIAGNOSIS — N18.30 TYPE 2 DIABETES MELLITUS WITH STAGE 3 CHRONIC KIDNEY DISEASE, WITH LONG-TERM CURRENT USE OF INSULIN (H): ICD-10-CM

## 2017-05-24 DIAGNOSIS — N18.30 CKD (CHRONIC KIDNEY DISEASE) STAGE 3, GFR 30-59 ML/MIN (H): ICD-10-CM

## 2017-05-24 DIAGNOSIS — E11.22 TYPE 2 DIABETES MELLITUS WITH STAGE 3 CHRONIC KIDNEY DISEASE, WITH LONG-TERM CURRENT USE OF INSULIN (H): ICD-10-CM

## 2017-05-24 DIAGNOSIS — I25.10 ATHEROSCLEROSIS OF NATIVE CORONARY ARTERY OF NATIVE HEART WITHOUT ANGINA PECTORIS: ICD-10-CM

## 2017-05-24 DIAGNOSIS — Z79.4 TYPE 2 DIABETES MELLITUS WITH STAGE 3 CHRONIC KIDNEY DISEASE, WITH LONG-TERM CURRENT USE OF INSULIN (H): ICD-10-CM

## 2017-05-24 DIAGNOSIS — E78.5 HYPERLIPIDEMIA LDL GOAL <70: ICD-10-CM

## 2017-05-24 DIAGNOSIS — I87.2 VENOUS INSUFFICIENCY: ICD-10-CM

## 2017-05-24 LAB
ALBUMIN SERPL-MCNC: 3.6 G/DL (ref 3.4–5)
ALP SERPL-CCNC: 116 U/L (ref 40–150)
ALT SERPL W P-5'-P-CCNC: 17 U/L (ref 0–70)
ANION GAP SERPL CALCULATED.3IONS-SCNC: 10 MMOL/L (ref 3–14)
AST SERPL W P-5'-P-CCNC: 8 U/L (ref 0–45)
BILIRUB SERPL-MCNC: 0.3 MG/DL (ref 0.2–1.3)
BUN SERPL-MCNC: 46 MG/DL (ref 7–30)
CALCIUM SERPL-MCNC: 8.9 MG/DL (ref 8.5–10.1)
CHLORIDE SERPL-SCNC: 110 MMOL/L (ref 94–109)
CHOLEST SERPL-MCNC: 224 MG/DL
CO2 SERPL-SCNC: 22 MMOL/L (ref 20–32)
CREAT SERPL-MCNC: 2.29 MG/DL (ref 0.66–1.25)
CREAT UR-MCNC: 71 MG/DL
GFR SERPL CREATININE-BSD FRML MDRD: 27 ML/MIN/1.7M2
GLUCOSE SERPL-MCNC: 90 MG/DL (ref 70–99)
HBA1C MFR BLD: 6.1 % (ref 4.3–6)
HDLC SERPL-MCNC: 28 MG/DL
HGB BLD-MCNC: 11.6 G/DL (ref 13.3–17.7)
LDLC SERPL CALC-MCNC: 156 MG/DL
MICROALBUMIN UR-MCNC: 1250 MG/L
MICROALBUMIN/CREAT UR: 1770.54 MG/G CR (ref 0–17)
NONHDLC SERPL-MCNC: 196 MG/DL
POTASSIUM SERPL-SCNC: 4.2 MMOL/L (ref 3.4–5.3)
PROT SERPL-MCNC: 6.6 G/DL (ref 6.8–8.8)
SODIUM SERPL-SCNC: 142 MMOL/L (ref 133–144)
TRIGL SERPL-MCNC: 202 MG/DL
TSH SERPL DL<=0.005 MIU/L-ACNC: 2.81 MU/L (ref 0.4–4)

## 2017-05-24 PROCEDURE — 83036 HEMOGLOBIN GLYCOSYLATED A1C: CPT | Performed by: INTERNAL MEDICINE

## 2017-05-24 PROCEDURE — 36415 COLL VENOUS BLD VENIPUNCTURE: CPT | Performed by: INTERNAL MEDICINE

## 2017-05-24 PROCEDURE — 82043 UR ALBUMIN QUANTITATIVE: CPT | Performed by: INTERNAL MEDICINE

## 2017-05-24 PROCEDURE — 80061 LIPID PANEL: CPT | Performed by: INTERNAL MEDICINE

## 2017-05-24 PROCEDURE — 84443 ASSAY THYROID STIM HORMONE: CPT | Performed by: INTERNAL MEDICINE

## 2017-05-24 PROCEDURE — 80053 COMPREHEN METABOLIC PANEL: CPT | Performed by: INTERNAL MEDICINE

## 2017-05-24 PROCEDURE — 85018 HEMOGLOBIN: CPT | Performed by: INTERNAL MEDICINE

## 2017-05-30 ENCOUNTER — OFFICE VISIT (OUTPATIENT)
Dept: INTERNAL MEDICINE | Facility: CLINIC | Age: 82
End: 2017-05-30
Payer: COMMERCIAL

## 2017-05-30 VITALS
WEIGHT: 220 LBS | BODY MASS INDEX: 29.84 KG/M2 | DIASTOLIC BLOOD PRESSURE: 64 MMHG | SYSTOLIC BLOOD PRESSURE: 132 MMHG | HEART RATE: 55 BPM | TEMPERATURE: 97.7 F | OXYGEN SATURATION: 98 %

## 2017-05-30 DIAGNOSIS — N18.30 CKD (CHRONIC KIDNEY DISEASE) STAGE 3, GFR 30-59 ML/MIN (H): ICD-10-CM

## 2017-05-30 DIAGNOSIS — E11.22 TYPE 2 DIABETES MELLITUS WITH STAGE 3 CHRONIC KIDNEY DISEASE, WITH LONG-TERM CURRENT USE OF INSULIN (H): Primary | ICD-10-CM

## 2017-05-30 DIAGNOSIS — R60.9 EDEMA, UNSPECIFIED TYPE: ICD-10-CM

## 2017-05-30 DIAGNOSIS — E78.5 HYPERLIPIDEMIA LDL GOAL <70: ICD-10-CM

## 2017-05-30 DIAGNOSIS — N18.30 TYPE 2 DIABETES MELLITUS WITH STAGE 3 CHRONIC KIDNEY DISEASE, WITH LONG-TERM CURRENT USE OF INSULIN (H): Primary | ICD-10-CM

## 2017-05-30 DIAGNOSIS — L29.9 ITCHING: ICD-10-CM

## 2017-05-30 DIAGNOSIS — Z79.4 TYPE 2 DIABETES MELLITUS WITH STAGE 3 CHRONIC KIDNEY DISEASE, WITH LONG-TERM CURRENT USE OF INSULIN (H): Primary | ICD-10-CM

## 2017-05-30 DIAGNOSIS — R80.9 PROTEINURIA: ICD-10-CM

## 2017-05-30 PROCEDURE — 99214 OFFICE O/P EST MOD 30 MIN: CPT | Performed by: INTERNAL MEDICINE

## 2017-05-30 RX ORDER — TRIAMCINOLONE ACETONIDE 5 MG/G
CREAM TOPICAL
Qty: 45 G | Refills: 1 | Status: SHIPPED | OUTPATIENT
Start: 2017-05-30 | End: 2019-01-01

## 2017-05-30 NOTE — MR AVS SNAPSHOT
After Visit Summary   5/30/2017    Oscar Terrazas    MRN: [id number]           Patient Information     Date Of Birth          2/2/1934        Visit Information        Provider Department      5/30/2017 2:30 PM Jorge Hillman MD [facility]        Today's Diagnoses     Itching          Care Instructions     Try to use compression stockings (on AM and off later PM) daily as able.  Maintain low salt intake.   Try to start Pravastatin 80mg tab, 1/2 tab every other day (even days) for 2 weeks. If tolerate OK ,then increase to 1/2 tab daily for 2 weeks. If still tolerate, then try to increase to 1 tab daily  Fasting labs in late July if  tolerating Pravastatin OK. If muscle soreness comes back before then, then stop Pravastatin and contact clinic with update [phone]  For fasting labs, please refrain from eating for 8 hours or more.  Be sure to  drink water and take your  medications the day of the test.   Continue other medications. Refill done          Follow-ups after your visit        Who to contact     If you have questions or need follow up information about today's clinic visit or your schedule please contact [facility] directly at [phone].  Normal or non-critical lab and imaging results will be communicated to you by MyChart, letter or phone within 4 business days after the clinic has received the results. If you do not hear from us within 7 days, please contact the clinic through Progressush[employer] or phone. If you have a critical or abnormal lab result, we will notify you by phone as soon as possible.  Submit refill requests through [employer] or call your pharmacy and they will forward the refill request to us. Please allow 3 business days for your refill to be completed.          Additional Information About Your Visit        Progressushart Information     [employer] gives you secure access to your electronic health record. If you see a primary care provider,  you can also send messages to your care team and make appointments. If you have questions, please call your primary care clinic.  If you do not have a primary care provider, please call [phone] and they will assist you.        Care EveryWhere ID     This is your Care EveryWhere ID. This could be used by other organizations to access your [place] medical records  [id number]        Your Vitals Were     Pulse Temperature Pulse Oximetry BMI (Body Mass Index)          55 97.7  F (36.5  C) (Oral) 98% 29.84 kg/m2         Blood Pressure from Last 3 Encounters:   05/30/17 132/64   03/20/17 159/68   01/14/17 106/60    Weight from Last 3 Encounters:   05/30/17 220 lb (99.8 kg)   03/20/17 223 lb 11.2 oz (101.5 kg)   01/14/17 217 lb 11.2 oz (98.7 kg)              Today, you had the following     No orders found for display         Today's Medication Changes          These changes are accurate as of: 5/30/17  3:18 PM.  If you have any questions, ask your nurse or doctor.               These medicines have changed or have updated prescriptions.        Dose/Directions    triamcinolone 0.5 % cream   Commonly known as:  KENALOG   This may have changed:  additional instructions   Used for:  Itching   Changed by:  Jorge Hillman MD        Apply sparingly to affected area two  times daily as needed for rash.   Quantity:  45 g   Refills:  1            Where to get your medicines      These medications were sent to [facility] [id number] [place], [place] [address]E S AT [facility] [address] [facility] [address]     Phone:  [phone]     triamcinolone 0.5 % cream                Primary Care Provider Office Phone # Fax #    Jorge Hillman [phone]       [facility] [address]  [place] [address]        Thank you!     Thank you for choosing [facility]  for your care. Our goal is always to provide you with excellent care. Hearing  "back from our patients is one way we can continue to improve our services. Please take a few minutes to complete the written survey that you may receive in the mail after your visit with us. Thank you!             Your Updated Medication List - Protect others around you: Learn how to safely use, store and throw away your medicines at www.disposemymeds.org.          This list is accurate as of: 5/30/17  3:18 PM.  Always use your most recent med list.                   Brand Name Dispense Instructions for use    ACCU-CHEK COMPACT PLUS test strip   Generic drug:  blood glucose monitoring     204 strip    USE EVERY DAY OR AS DIRECTED       allopurinol 100 MG tablet    ZYLOPRIM    180 tablet    Take 2 tablets (200 mg) by mouth daily       aspirin 81 MG tablet      Take 1 tablet by mouth daily. with food       cetirizine 10 MG tablet    zyrTEC    30 tablet    Take 1 tablet (10 mg) by mouth every evening       clopidogrel 75 MG tablet    PLAVIX    90 tablet    Take 1 tablet (75 mg) by mouth daily       COD LIVER OIL PO      Take 1 capsule by mouth daily       furosemide 20 MG tablet    LASIX    30 tablet    Take 1 tablet (20 mg) by mouth daily       hydrALAZINE 25 MG tablet    APRESOLINE    360 tablet    Take 2 tablets (50 mg) by mouth 2 times daily       insulin  UNIT/ML injection    HumuLIN N/NovoLIN N    3 Month    18 units at bedtime       insulin syringe-needle U-100 29G X 1/2\" 0.5 ML    BD insulin syringe    200 each    Use one syringe 2 daily or as directed.       levothyroxine 50 MCG tablet    SYNTHROID/LEVOTHROID    30 tablet    TAKE 1 TABLET BY MOUTH DAILY       metoprolol 25 MG tablet    LOPRESSOR    180 tablet    Take 1 tablet (25 mg) by mouth 2 times daily       PEPCID 20 MG tablet   Generic drug:  famotidine     3months    take one tablet daily if needed       pravastatin 80 MG tablet    PRAVACHOL    90 tablet    Take 1 tablet (80 mg) by mouth daily at bedtime.       theophylline 400 MG Cp24     90 " capsule    Take 400 mg by mouth daily       triamcinolone 0.5 % cream    KENALOG    45 g    Apply sparingly to affected area two  times daily as needed for rash.       VITAMIN B 12 PO      Take 500 mcg by mouth 2 times daily.

## 2017-05-30 NOTE — PATIENT INSTRUCTIONS
Try to use compression stockings (on AM and off later PM) daily as able.  Maintain low salt intake.   Try to start Pravastatin 80mg tab, 1/2 tab every other day (even days) for 2 weeks. If tolerate OK ,then increase to 1/2 tab daily for 2 weeks. If still tolerate, then try to increase to 1 tab daily  Fasting labs in late July if  tolerating Pravastatin OK. If muscle soreness comes back before then, then stop Pravastatin and contact clinic with update [phone]  For fasting labs, please refrain from eating for 8 hours or more.  Be sure to  drink water and take your  medications the day of the test.   Continue other medications. Refill done

## 2017-05-30 NOTE — NURSING NOTE
Chief Complaint   Patient presents with     Diabetes       Initial /88  Pulse 55  Temp 97.7  F (36.5  C) (Oral)  Wt 220 lb (99.8 kg)  SpO2 98%  BMI 29.84 kg/m2 Estimated body mass index is 29.84 kg/(m^2) as calculated from the following:    Height as of 3/20/17: 6' (1.829 m).    Weight as of this encounter: 220 lb (99.8 kg).  Medication Reconciliation: complete

## 2017-05-30 NOTE — PROGRESS NOTES
SUBJECTIVE:                                                    Oscar Terrazas is a 83 year old male who presents to clinic today for the following health issues:      Diabetes Follow-up    Patient is checking blood sugars: twice daily.  121-158  Blood sugar testing frequency justification: Frequent hypoglycemia  Results are as follows:         am - 121, pm-158    Diabetic concerns: None     Symptoms of hypoglycemia (low blood sugar): none     Paresthesias (numbness or burning in feet) or sores: Yes stinging and cold, radiates to leg     Date of last diabetic eye exam: 2016 care taker will schedule appointment       Amount of exercise or physical activity: 6-7 days/week for an average of 30-45 minutes    Problems taking medications regularly: No    Medication side effects: none    Diet: low salt    Pt's past medical history, family history, habits, medications and allergies were reviewed with the patient today.  See snap shot for  HCM status. Most recent lab results reviewed with pt. Problem list and histories reviewed & adjusted, as indicated.  Additional history as below:    Denies CP, SOB, abdominal pain, polyuria, polydipsia, vision changes. Occ dry rash on LEs that response to  Topical  Steroid therapy. Mild sx now and requests RF. Stable mild distal bilateral lower extremity neuropathy   Patient has chronic lower extremity edema. Has not been using compression stockings recently. Moderate salt intake. Denies orthopnea or PND. Has been off of pravastatin and lipids elevated. Med was stopped because the patient had thought it was contributing to some muscle pains. However, patient lytic medication for a couple days prior to having the symptoms and had been doing a lot of outside yardwork about the same time so not clear if truly due to medication versus other circumstances. Hx CKD and proteinuria. Both a little worse as below. Not on ACEI due to hs elevated potassium with use    Lab Results   Component  Value Date    GLC 90 05/24/2017     Lab Results   Component Value Date    A1C 6.1 05/24/2017     Lab Results   Component Value Date    CHOL 224 05/24/2017     Lab Results   Component Value Date     05/24/2017     Lab Results   Component Value Date    HDL 28 05/24/2017     Lab Results   Component Value Date    TRIG 202 05/24/2017     Lab Results   Component Value Date    CR 2.29 05/24/2017     Lab Results   Component Value Date    ALT 17 05/24/2017     Lab Results   Component Value Date    AST 8 05/24/2017     Lab Results   Component Value Date    MICROL 1250 05/24/2017     Lab Results   Component Value Date    TSH 2.81 05/24/2017          Additional ROS:   Constitutional, HEENT, Cardiovascular, Pulmonary, GI and , Neuro, MSK and Psych review of systems/symptoms are otherwise negative or unchanged from previous, except as noted above.      OBJECTIVE:  /64  Pulse 55  Temp 97.7  F (36.5  C) (Oral)  Wt 220 lb (99.8 kg)  SpO2 98%  BMI 29.84 kg/m2   Estimated body mass index is 29.84 kg/(m^2) as calculated from the following:    Height as of 3/20/17: 6' (1.829 m).    Weight as of this encounter: 220 lb (99.8 kg).  Eye: PERRL, EOMI  HENT: ear canals and TM's normal and nose and mouth without ulcers or lesions   Neck: no adenopathy. Thyroid normal to palpation. No bruits  Pulm: Lungs clear to auscultation   CV: Regular rates and rhythm  GI: Soft, nontender, Normal active bowel sounds, No hepatosplenomegaly or masses palpable  Ext: Peripheral pulses intact. 1 plus BLE edema.  Neuro: Normal strength and tone, sensory exam  mildly reduced light touch sensation distal bilateral lower extremities  Skin: Few small scattered patches dry  nonerythematous skin on BLEs    Assessment/Plan: (See plan discussion below for further details)  1. 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 Future labs as ordered. Continue current medication  -  Hemoglobin A1c; Future  - Comprehensive metabolic panel; Future  - Lipid panel reflex to direct LDL; Future  - Albumin Random Urine Quantitative; Future    2. CKD (chronic kidney disease) stage 3, GFR 30-59 ml/min  Mildly worsened. Patient not using NSAIDs. On diuretic therapy every other day. Repeat future lab is ordered  - Comprehensive metabolic panel; Future  - Lipid panel reflex to direct LDL; Future  - Albumin Random Urine Quantitative; Future    3. Hyperlipidemia LDL goal <70  Restart trial of pravastatin as below. Future labs as ordered  - Comprehensive metabolic panel; Future  - Lipid panel reflex to direct LDL; Future    4. Edema, unspecified type  Continue every other day diuretic therapy. Future labs as ordered. Compression stockings as below  - Comprehensive metabolic panel; Future    5. Proteinuria  Unable to use ACE inhibitor. Future labs as ordered. Continue other medications  - Comprehensive metabolic panel; Future  - Lipid panel reflex to direct LDL; Future  - Albumin Random Urine Quantitative; Future    6. Itching  - triamcinolone (KENALOG) 0.5 % cream; Apply sparingly to affected area two  times daily as needed for rash.  Dispense: 45 g; Refill: 1    Plan discussion:   Try to use compression stockings (on AM and off later PM) daily as able.  Maintain low salt intake.   Try to start Pravastatin 80mg tab, 1/2 tab every other day (even days) for 2 weeks. If tolerate OK ,then increase to 1/2 tab daily for 2 weeks. If still tolerate, then try to increase to 1 tab daily  Fasting labs in late July if  tolerating Pravastatin OK. If muscle soreness comes back before then, then stop Pravastatin and contact clinic with update [phone]  For fasting labs, please refrain from eating for 8 hours or more.  Be sure to  drink water and take your  medications the day of the test.   Continue other medications. Refill done    Jorge Hillman MD  Internal Medicine Department  [facility]

## 2017-06-13 DIAGNOSIS — E03.9 HYPOTHYROIDISM, UNSPECIFIED TYPE: ICD-10-CM

## 2017-06-13 RX ORDER — LEVOTHYROXINE SODIUM 50 UG/1
TABLET ORAL
Qty: 30 TABLET | Refills: 10 | Status: SHIPPED | OUTPATIENT
Start: 2017-06-13 | End: 2018-04-27

## 2017-06-13 NOTE — TELEPHONE ENCOUNTER
levothyroxine     Last Written Prescription Date: 05/16/17  Last Quantity: 30, # refills: 0  Last Office Visit with G, P or [facility] prescribing provider: 05/30/17        TSH   Date Value Ref Range Status   05/24/2017 2.81 0.40 - 4.00 mU/L Final

## 2017-08-25 ENCOUNTER — RADIANT APPOINTMENT (OUTPATIENT)
Dept: GENERAL RADIOLOGY | Facility: CLINIC | Age: 82
End: 2017-08-25
Attending: FAMILY MEDICINE
Payer: COMMERCIAL

## 2017-08-25 ENCOUNTER — OFFICE VISIT (OUTPATIENT)
Dept: URGENT CARE | Facility: URGENT CARE | Age: 82
End: 2017-08-25
Payer: COMMERCIAL

## 2017-08-25 VITALS
WEIGHT: 209.5 LBS | RESPIRATION RATE: 20 BRPM | OXYGEN SATURATION: 96 % | BODY MASS INDEX: 28.41 KG/M2 | DIASTOLIC BLOOD PRESSURE: 80 MMHG | TEMPERATURE: 98.6 F | HEART RATE: 83 BPM | SYSTOLIC BLOOD PRESSURE: 186 MMHG

## 2017-08-25 DIAGNOSIS — J20.9 ACUTE BRONCHITIS WITH COEXISTING CONDITION REQUIRING PROPHYLACTIC TREATMENT: ICD-10-CM

## 2017-08-25 DIAGNOSIS — R09.89 CHEST CONGESTION: ICD-10-CM

## 2017-08-25 DIAGNOSIS — R05.9 COUGH: Primary | ICD-10-CM

## 2017-08-25 DIAGNOSIS — R06.02 SOB (SHORTNESS OF BREATH): ICD-10-CM

## 2017-08-25 LAB
BASOPHILS # BLD AUTO: 0.1 10E9/L (ref 0–0.2)
BASOPHILS NFR BLD AUTO: 0.5 %
DIFFERENTIAL METHOD BLD: ABNORMAL
EOSINOPHIL # BLD AUTO: 0.6 10E9/L (ref 0–0.7)
EOSINOPHIL NFR BLD AUTO: 4.5 %
ERYTHROCYTE [DISTWIDTH] IN BLOOD BY AUTOMATED COUNT: 14.9 % (ref 10–15)
HCT VFR BLD AUTO: 37.5 % (ref 40–53)
HGB BLD-MCNC: 12.2 G/DL (ref 13.3–17.7)
LYMPHOCYTES # BLD AUTO: 1.6 10E9/L (ref 0.8–5.3)
LYMPHOCYTES NFR BLD AUTO: 11.5 %
MCH RBC QN AUTO: 31 PG (ref 26.5–33)
MCHC RBC AUTO-ENTMCNC: 32.5 G/DL (ref 31.5–36.5)
MCV RBC AUTO: 95 FL (ref 78–100)
MONOCYTES # BLD AUTO: 1.1 10E9/L (ref 0–1.3)
MONOCYTES NFR BLD AUTO: 8.2 %
NEUTROPHILS # BLD AUTO: 10.2 10E9/L (ref 1.6–8.3)
NEUTROPHILS NFR BLD AUTO: 75.3 %
PLATELET # BLD AUTO: 193 10E9/L (ref 150–450)
RBC # BLD AUTO: 3.94 10E12/L (ref 4.4–5.9)
WBC # BLD AUTO: 13.5 10E9/L (ref 4–11)

## 2017-08-25 PROCEDURE — 85025 COMPLETE CBC W/AUTO DIFF WBC: CPT | Performed by: FAMILY MEDICINE

## 2017-08-25 PROCEDURE — 36415 COLL VENOUS BLD VENIPUNCTURE: CPT | Performed by: FAMILY MEDICINE

## 2017-08-25 PROCEDURE — 99213 OFFICE O/P EST LOW 20 MIN: CPT | Performed by: FAMILY MEDICINE

## 2017-08-25 PROCEDURE — 71020 XR CHEST 2 VW: CPT

## 2017-08-25 PROCEDURE — 83880 ASSAY OF NATRIURETIC PEPTIDE: CPT | Performed by: FAMILY MEDICINE

## 2017-08-25 RX ORDER — AZITHROMYCIN 250 MG/1
TABLET, FILM COATED ORAL
Qty: 6 TABLET | Refills: 0 | Status: CANCELLED | OUTPATIENT
Start: 2017-08-25

## 2017-08-25 RX ORDER — DOXYCYCLINE 100 MG/1
100 CAPSULE ORAL 2 TIMES DAILY
Qty: 14 CAPSULE | Refills: 0 | Status: SHIPPED | OUTPATIENT
Start: 2017-08-25 | End: 2017-09-07

## 2017-08-25 NOTE — PROGRESS NOTES
Chief Complaint   Patient presents with     Cough     cough, stuffy nose, chest congestion, difficulty breathing   x 4-5 days which seems to get worse.      SUBJECTIVE:  Oscar Terrazas is a 83 year old male who presents to the clinic today with a chief complaint of cough  and chest congestion  for 5 day(s).  His cough is described as persistent and productive of yellow sputum.    The patient's symptoms are moderate and worsening.  Associated symptoms include congestion and shortness of breath. The patient's symptoms are exacerbated by lying down  Patient has been using OTC cough suppressants  to improve symptoms.    Past Medical History:   Diagnosis Date     Acute myocardial infarction, unspecified site, episode of care unspecified      Allergic rhinitis, cause unspecified      Anemia 3/6/2014     CAD (coronary artery disease)     1996 CABG - LIMA to LAD, SVG to OM, 2007 Cath - KANU to Left main and ostial/prox LAD, 2012 Cath - 80-90% stenosis SVG to OM - KANU placed, EF 50%     CKD (chronic kidney disease) stage 3, GFR 30-59 ml/min 3/9/2014     CTS (carpal tunnel syndrome)     bilateral     Degeneration of cervical intervertebral disc      Diaphragmatic hernia without mention of obstruction or gangrene      Esophageal reflux      Essential hypertension, benign      Hyperlipidemia LDL goal <70      Hypothyroidism      Hypothyroidism, unspecified hypothyroidism type 1/14/2016     IDIOPATHIC CYCLIC EDEMA      Mild persistent asthma      Osteoarthrosis, unspecified whether generalized or localized, unspecified site      Pneumonia, organism      Proteinuria 5/17/2014     PVD (peripheral vascular disease) (H)      Sensorineural hearing loss, unspecified      Subarachnoid bleed (H)      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 disease (goal A1C<8) 10/17/2015     Unspecified disorder resulting from impaired renal function      Unspecified hereditary and idiopathic peripheral neuropathy      Venous  "insufficiency        Current Outpatient Prescriptions   Medication Sig Dispense Refill     levothyroxine (SYNTHROID/LEVOTHROID) 50 MCG tablet TAKE 1 TABLET BY MOUTH DAILY 30 tablet 10     triamcinolone (KENALOG) 0.5 % cream Apply sparingly to affected area two  times daily as needed for rash. 45 g 1     cetirizine (ZYRTEC) 10 MG tablet Take 1 tablet (10 mg) by mouth every evening 30 tablet 9     theophylline 400 MG CP24 Take 400 mg by mouth daily 90 capsule 2     metoprolol (LOPRESSOR) 25 MG tablet Take 1 tablet (25 mg) by mouth 2 times daily 180 tablet 2     clopidogrel (PLAVIX) 75 MG tablet Take 1 tablet (75 mg) by mouth daily 90 tablet 3     allopurinol (ZYLOPRIM) 100 MG tablet Take 2 tablets (200 mg) by mouth daily 180 tablet 3     hydrALAZINE (APRESOLINE) 25 MG tablet Take 2 tablets (50 mg) by mouth 2 times daily 360 tablet 2     furosemide (LASIX) 20 MG tablet Take 1 tablet (20 mg) by mouth daily 30 tablet 5     insulin NPH (HUMULIN N, NOVOLIN N) 100 UNIT/ML VIAL 18 units at bedtime 3 Month 3     pravastatin (PRAVACHOL) 80 MG tablet Take 1 tablet (80 mg) by mouth daily at bedtime. 90 tablet 1     COD LIVER OIL PO Take 1 capsule by mouth daily        aspirin 81 MG tablet Take 1 tablet by mouth daily. with food       Cyanocobalamin (VITAMIN B 12 PO) Take 500 mcg by mouth 2 times daily.       PEPCID 20 MG OR TABS take one tablet daily if needed 3months 1     ACCU-CHEK COMPACT PLUS test strip USE EVERY DAY OR AS DIRECTED 204 strip 0     insulin syringe-needle U-100 (BD INSULIN SYRINGE) 29G X 1/2\" 0.5 ML Use one syringe 2 daily or as directed. 200 each 4       Social History   Substance Use Topics     Smoking status: Former Smoker     Types: Cigarettes     Smokeless tobacco: Never Used      Comment: quit at age 32     Alcohol use No       ROS  Review of systems negative except as stated above.    OBJECTIVE:  /80  Pulse 83  Temp 98.6  F (37  C) (Oral)  Resp 20  Wt 209 lb 8 oz (95 kg)  SpO2 96%  BMI 28.41 " kg/m2  GENERAL APPEARANCE: healthy, alert and no distress  EYES: EOMI,  PERRL, conjunctiva clear  HENT: ear canals and TM's normal.  Nose and mouth without ulcers, erythema or lesions  NECK: supple, nontender, no lymphadenopathy  RESP: lungs clear to auscultation - on the left lung , rales noted on the right side with wheezes  CV: regular rates and rhythm, normal S1 S2, no murmur noted  ABDOMEN:  soft, nontender, no HSM or masses and bowel sounds normal  SKIN: no suspicious lesions or rashes  Ext - bilateral pedal edema which is not new     Results for orders placed or performed in visit on 08/25/17   XR Chest 2 Views    Narrative    CHEST TWO VIEWS  8/25/2017 5:45 PM     COMPARISON: Two-view chest x-ray 2/27/2012    HISTORY: Cough. Other specified symptoms and signs involving the  circulatory and respiratory systems.    FINDINGS: Median sternotomy changes again noted. The cardiac  silhouette, pulmonary vasculature, lungs and pleural spaces are within  normal limits.      Impression    IMPRESSION: Clear lungs.    HILARIO CHUNG MD   CBC with platelets differential   Result Value Ref Range    WBC 13.5 (H) 4.0 - 11.0 10e9/L    RBC Count 3.94 (L) 4.4 - 5.9 10e12/L    Hemoglobin 12.2 (L) 13.3 - 17.7 g/dL    Hematocrit 37.5 (L) 40.0 - 53.0 %    MCV 95 78 - 100 fl    MCH 31.0 26.5 - 33.0 pg    MCHC 32.5 31.5 - 36.5 g/dL    RDW 14.9 10.0 - 15.0 %    Platelet Count 193 150 - 450 10e9/L    Diff Method Automated Method     % Neutrophils 75.3 %    % Lymphocytes 11.5 %    % Monocytes 8.2 %    % Eosinophils 4.5 %    % Basophils 0.5 %    Absolute Neutrophil 10.2 (H) 1.6 - 8.3 10e9/L    Absolute Lymphocytes 1.6 0.8 - 5.3 10e9/L    Absolute Monocytes 1.1 0.0 - 1.3 10e9/L    Absolute Eosinophils 0.6 0.0 - 0.7 10e9/L    Absolute Basophils 0.1 0.0 - 0.2 10e9/L       ASSESSMENT:    Oscar was seen today for cough.    Diagnoses and all orders for this visit:    Cough  -     XR Chest 2 Views  -     doxycycline (VIBRAMYCIN) 100 MG  capsule; Take 1 capsule (100 mg) by mouth 2 times daily    Chest congestion  -     XR Chest 2 Views  -     CBC with platelets differential  -     doxycycline (VIBRAMYCIN) 100 MG capsule; Take 1 capsule (100 mg) by mouth 2 times daily    SOB (shortness of breath)  -     BNP-N terminal pro    Acute bronchitis with coexisting condition requiring prophylactic treatment  -     doxycycline (VIBRAMYCIN) 100 MG capsule; Take 1 capsule (100 mg) by mouth 2 times daily    Other orders  -     Cancel: azithromycin (ZITHROMAX) 250 MG tablet; Two tablets first day, then one tablet daily for four days.           PLAN:  See orders in Epic  Symptomatic measures encouraged, humidified air, plenty of fluids.  Follow up if  symptoms fail to improve or worsens   Pt understood and agreed with plan

## 2017-08-25 NOTE — MR AVS SNAPSHOT
After Visit Summary   8/25/2017    Oscar Terrazas    MRN: [id number]           Patient Information     Date Of Birth          2/2/1934        Visit Information        Provider Department      8/25/2017 5:10 PM Justina Morales MD [facility]        Today's Diagnoses     Cough    -  1    Chest congestion        SOB (shortness of breath)        Acute bronchitis with coexisting condition requiring prophylactic treatment           Follow-ups after your visit        Who to contact     If you have questions or need follow up information about today's clinic visit or your schedule please contact [facility] directly at [phone].  Normal or non-critical lab and imaging results will be communicated to you by discoapihart, letter or phone within 4 business days after the clinic has received the results. If you do not hear from us within 7 days, please contact the clinic through discoapihart or phone. If you have a critical or abnormal lab result, we will notify you by phone as soon as possible.  Submit refill requests through KnowRe or call your pharmacy and they will forward the refill request to us. Please allow 3 business days for your refill to be completed.          Additional Information About Your Visit        MyChart Information     KnowRe gives you secure access to your electronic health record. If you see a primary care provider, you can also send messages to your care team and make appointments. If you have questions, please call your primary care clinic.  If you do not have a primary care provider, please call [phone] and they will assist you.        Care EveryWhere ID     This is your Care EveryWhere ID. This could be used by other organizations to access your [facility] medical records  [id number]        Your Vitals Were     Pulse Temperature Respirations Pulse Oximetry BMI (Body Mass Index)       83 98.6  F (37  C) (Oral) 20 96% 28.41 kg/m2         Blood Pressure from Last 3 Encounters:   08/25/17 186/80   05/30/17 132/64   03/20/17 159/68    Weight from Last 3 Encounters:   08/25/17 209 lb 8 oz (95 kg)   05/30/17 220 lb (99.8 kg)   03/20/17 223 lb 11.2 oz (101.5 kg)              We Performed the Following     BNP-N terminal pro     CBC with platelets differential     XR Chest 2 Views          Today's Medication Changes          These changes are accurate as of: 8/25/17  8:45 PM.  If you have any questions, ask your nurse or doctor.               Start taking these medicines.        Dose/Directions    doxycycline 100 MG capsule   Commonly known as:  VIBRAMYCIN   Used for:  Cough, Chest congestion, Acute bronchitis with coexisting condition requiring prophylactic treatment   Started by:  Justina Morales MD        Dose:  100 mg   Take 1 capsule (100 mg) by mouth 2 times daily   Quantity:  14 capsule   Refills:  0            Where to get your medicines      These medications were sent to [facility] [address] [address]     Phone:  [phone]     doxycycline 100 MG capsule                Primary Care Provider Office Phone # Fax #    Jorge Hillman [phone]       [address] [place] [address]        Equal Access to Services     RANJAN JONES AH: Vladimir aldridgeo Sojayjayali, waaxda luqadaha, qaybta kaalmada adeegyada, nicole lord. So [facility] [phone].    ATENCIÓN: Si habla español, tiene a fernandez disposición servicios gratuitos de asistencia lingüística. Llame al [phone].    We comply with applicable federal civil rights laws and [place] laws. We do not discriminate on the basis of race, color, national origin, age, disability sex, sexual orientation or gender identity.            Thank you!     Thank you for choosing [facility]  for your care. Our goal is always to provide you with excellent care.  Hearing back from our patients is one way we can continue to improve our services. Please take a few minutes to complete the written survey that you may receive in the mail after your visit with us. Thank you!             Your Updated Medication List - Protect others around you: Learn how to safely use, store and throw away your medicines at www.disposemymeds.org.          This list is accurate as of: 8/25/17  8:45 PM.  Always use your most recent med list.                   Brand Name Dispense Instructions for use Diagnosis    ACCU-CHEK COMPACT PLUS test strip   Generic drug:  blood glucose monitoring     204 strip    USE EVERY DAY OR AS DIRECTED    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 disease (H)       allopurinol 100 MG tablet    ZYLOPRIM    180 tablet    Take 2 tablets (200 mg) by mouth daily    Gout, unspecified       aspirin 81 MG tablet      Take 1 tablet by mouth daily. with food        cetirizine 10 MG tablet    zyrTEC    30 tablet    Take 1 tablet (10 mg) by mouth every evening    Atopic dermatitis, unspecified type, Itching       clopidogrel 75 MG tablet    PLAVIX    90 tablet    Take 1 tablet (75 mg) by mouth daily    CKD (chronic kidney disease) stage 3, GFR 30-59 ml/min       COD LIVER OIL PO      Take 1 capsule by mouth daily        doxycycline 100 MG capsule    VIBRAMYCIN    14 capsule    Take 1 capsule (100 mg) by mouth 2 times daily    Cough, Chest congestion, Acute bronchitis with coexisting condition requiring prophylactic treatment       furosemide 20 MG tablet    LASIX    30 tablet    Take 1 tablet (20 mg) by mouth daily    Edema, unspecified type       hydrALAZINE 25 MG tablet    APRESOLINE    360 tablet    Take 2 tablets (50 mg) by mouth 2 times daily    Essential hypertension, benign       insulin  UNIT/ML injection    HumuLIN N/NovoLIN N    3 Month    18 units at bedtime    Type 2 diabetes mellitus with stage 3 chronic kidney disease, with long-term current use of  "insulin (H)       insulin syringe-needle U-100 29G X 1/2\" 0.5 ML    BD insulin syringe    200 each    Use one syringe 2 daily or as directed.    Type 2 diabetes mellitus with stage 3 chronic kidney disease, with long-term current use of insulin (H)       levothyroxine 50 MCG tablet    SYNTHROID/LEVOTHROID    30 tablet    TAKE 1 TABLET BY MOUTH DAILY    Hypothyroidism, unspecified type       metoprolol 25 MG tablet    LOPRESSOR    180 tablet    Take 1 tablet (25 mg) by mouth 2 times daily    Essential hypertension, benign       PEPCID 20 MG tablet   Generic drug:  famotidine     3months    take one tablet daily if needed    Esophageal reflux       pravastatin 80 MG tablet    PRAVACHOL    90 tablet    Take 1 tablet (80 mg) by mouth daily at bedtime.    Hyperlipidemia LDL goal <70       theophylline 400 MG Cp24     90 capsule    Take 400 mg by mouth daily    Mild persistent asthma without complication       triamcinolone 0.5 % cream    KENALOG    45 g    Apply sparingly to affected area two  times daily as needed for rash.    Itching       VITAMIN B 12 PO      Take 500 mcg by mouth 2 times daily.          "

## 2017-08-25 NOTE — NURSING NOTE
Chief Complaint   Patient presents with     Cough     cough, stuffy nose, chest congestion, difficulty breathing   x 4-5 days which seems to get worse.        Initial /80  Pulse 83  Temp 98.6  F (37  C) (Oral)  Resp 20  Wt 209 lb 8 oz (95 kg)  SpO2 96%  BMI 28.41 kg/m2 Estimated body mass index is 28.41 kg/(m^2) as calculated from the following:    Height as of 3/20/17: 6' (1.829 m).    Weight as of this encounter: 209 lb 8 oz (95 kg).  Medication Reconciliation: complete

## 2017-08-26 LAB — NT-PROBNP SERPL-MCNC: 1903 PG/ML (ref 0–450)

## 2017-09-07 ENCOUNTER — OFFICE VISIT (OUTPATIENT)
Dept: INTERNAL MEDICINE | Facility: CLINIC | Age: 82
End: 2017-09-07
Payer: COMMERCIAL

## 2017-09-07 DIAGNOSIS — N18.30 TYPE 2 DIABETES MELLITUS WITH STAGE 3 CHRONIC KIDNEY DISEASE, WITH LONG-TERM CURRENT USE OF INSULIN (H): ICD-10-CM

## 2017-09-07 DIAGNOSIS — G56.03 BILATERAL CARPAL TUNNEL SYNDROME: ICD-10-CM

## 2017-09-07 DIAGNOSIS — Z79.4 TYPE 2 DIABETES MELLITUS WITH STAGE 3 CHRONIC KIDNEY DISEASE, WITH LONG-TERM CURRENT USE OF INSULIN (H): ICD-10-CM

## 2017-09-07 DIAGNOSIS — G62.9 NEUROPATHY: ICD-10-CM

## 2017-09-07 DIAGNOSIS — E11.22 TYPE 2 DIABETES MELLITUS WITH STAGE 3 CHRONIC KIDNEY DISEASE, WITH LONG-TERM CURRENT USE OF INSULIN (H): ICD-10-CM

## 2017-09-07 DIAGNOSIS — L30.9 ECZEMA, UNSPECIFIED TYPE: ICD-10-CM

## 2017-09-07 DIAGNOSIS — E78.5 HYPERLIPIDEMIA LDL GOAL <70: ICD-10-CM

## 2017-09-07 PROCEDURE — 99214 OFFICE O/P EST MOD 30 MIN: CPT | Performed by: INTERNAL MEDICINE

## 2017-09-07 ASSESSMENT — PATIENT HEALTH QUESTIONNAIRE - PHQ9
SUM OF ALL RESPONSES TO PHQ QUESTIONS 1-9: 6
SUM OF ALL RESPONSES TO PHQ QUESTIONS 1-9: 6

## 2017-09-07 ASSESSMENT — ANXIETY QUESTIONNAIRES: GAD7 TOTAL SCORE: INCOMPLETE

## 2017-09-07 NOTE — PROGRESS NOTES
"  SUBJECTIVE:   Oscar Terrazas is a 83 year old male who presents to clinic today for the following health issues:      ED/UC Followup:    Facility:  [facility]  Date of visit: 087/25/2017  Reason for visit: Cold   Current Status: Stable     Chief Complaint   Patient presents with     NEUROPATHY     in feet and legs     Follow up for UC visit     Check feet       Answers for HPI/ROS submitted by the patient on 9/7/2017   PHQ9 TOTAL SCORE: 6  LAURA 7 TOTAL SCORE: Incomplete    Pt's past medical history, family history, habits, medications and allergies were reviewed with the patient today.  See snap shot for  HCM status. Most recent lab results reviewed with pt. Problem list and histories reviewed & adjusted, as indicated.  Additional history as below:    Was treated for URI with Doxycycline. Cough very rare now. CXR was negative. No F/C. No shortness of breath.   Itching in  Legs and back low. Not on chest. Slight on distal arms but much less there. Has been puitting on moisturizer that helps a little  Pt is taking Pravastatin 80mg tab, 1/2 tab every other day. No myalgias with that dose.  Patient is not tolerated higher doses.  Due for repeat FLP with that dose  Had eye exam in Feb 2017.  Not checking blood sugars recently. History of chronic neuropathy. Previous EMGs have shown both bilateral carpal tunnel issues and peripheral neuropathy thought secondary to diabetes. Patient has declined surgery for carpal tunnel    Pt had PHQ done. Answered a \"1\" on question of suicidal ideation. MD discussed this with pt further.  Pt has pleasure in doing things but feels down at times due to chronic medical issues. Pt denies actually feeling depressed to me and states it is due to having issues that will not get better like his chronic neuropathy and has had the thought that things would be easier if not having to live with chronic medical issues  but pt denies any actual suicidal ideation/plan and states he " would never kill himself and declines need for any counselling or medication therapy    Lab Results   Component Value Date    GLC 90 05/24/2017     Lab Results   Component Value Date    A1C 6.1 05/24/2017     Lab Results   Component Value Date    CHOL 224 05/24/2017     Lab Results   Component Value Date     05/24/2017     Lab Results   Component Value Date    HDL 28 05/24/2017     Lab Results   Component Value Date    TRIG 202 05/24/2017     Lab Results   Component Value Date    CR 2.29 05/24/2017     Lab Results   Component Value Date    ALT 17 05/24/2017     Lab Results   Component Value Date    AST 8 05/24/2017     Lab Results   Component Value Date    MICROL 1250 05/24/2017     Lab Results   Component Value Date    TSH 2.81 05/24/2017          Additional ROS:   Constitutional, HEENT, Cardiovascular, Pulmonary, GI and , Neuro, MSK and Psych review of systems/symptoms are otherwise negative or unchanged from previous, except as noted above.      OBJECTIVE:  /84  Pulse 80  Temp 97.9  F (36.6  C) (Oral)  Wt 207 lb (93.9 kg)  SpO2 95%  BMI 28.07 kg/m2   Estimated body mass index is 28.07 kg/(m^2) as calculated from the following:    Height as of 3/20/17: 6' (1.829 m).    Weight as of this encounter: 207 lb (93.9 kg).  Eye: PERRL, EOMI  HENT: ear canals and TM's normal and nose and mouth without ulcers or lesions   Neck: no adenopathy. Thyroid normal to palpation. No bruits  Pulm: Lungs clear to auscultation   CV: Regular rates and rhythm  GI: Soft, nontender, Normal active bowel sounds, No hepatosplenomegaly or masses palpable  Ext: Peripheral pulses intact. Minimal chronic edema. Arches of feet flat bilaterally  Neuro: Normal strength and tone, sensory exam reduced to LTS distal BLE. Positive Phalen's bilaterally. Equivocal Tinel's  Skin: Few scratch scabs on forearms  From where got scratched from  Puppy toenails. No erythema or excess warmth. Dry eczematous patches on  BLEs. Fungal thickening  changes to toenails.   No foot ulcerations    Assessment/Plan: (See plan discussion below for further details)  1. Neuropathy (H)  Secondary to DM along with CTS. Pt declines CTS surgery. Counselled to observe feet daily to be sure no infection/callus build-up, etc    2. Hyperlipidemia LDL goal <70  Due for FLP. LDL likely will be above goal but does not tolerate higher statin dosage    3. Eczema, unspecified type  See plan below    4. Type 2 diabetes mellitus with stage 3 chronic kidney disease, with long-term current use of insulin (H)  Controlled. Future nonfasting lab in November    5. Bilateral carpal tunnel syndrome  Pt declines surgery. Will inform MD in future if willing. Strength in hands OK    Plan discussion:  Put triamcinolone steroid cream on rash patches at bedtime. Use moisturizer like Vanicream, or Lubriderm daily in AM to keep skin moist.  Use DOVE soap  for bathing. Stop [place] Spring soap as too drying  Fasting blood test in the next 1-2 weeks for lipids  Nonfasting A1C  lab for diabetes in late November  Flu shot in early October  Good arch support with shoes like new tennis shoe or stiffer leather shoes  Pt to inform MD if mood worsens in future       Jorge Hillman MD  Internal Medicine Department  [facility]

## 2017-09-07 NOTE — NURSING NOTE
Chief Complaint   Patient presents with     NEUROPATHY     in feet and legs     Follow up for UC visit     Check feet       Initial /88  Pulse 80  Temp 97.9  F (36.6  C) (Oral)  Wt 207 lb (93.9 kg)  SpO2 95%  BMI 28.07 kg/m2 Estimated body mass index is 28.07 kg/(m^2) as calculated from the following:    Height as of 3/20/17: 6' (1.829 m).    Weight as of this encounter: 207 lb (93.9 kg).  Medication Reconciliation: complete

## 2017-09-07 NOTE — PATIENT INSTRUCTIONS
Put triamcinolone steroid cream on rash patches at bedtime. Use moisturizer like Vanicream, or Lubriderm daily in AM to keep skin moist.  Use DOVE soap  for bathing. Stop [place] Spring soap as too drying  Fasting blood test in the next 1-2 weeks for lipids  Nonfasting A1C  lab for diabetes in late November  Flu shot in early October  Good arch support with shoes like new tennis shoe or stiffer leather shoes  Pt to inform MD if mood worsens in future

## 2017-09-07 NOTE — MR AVS SNAPSHOT
After Visit Summary   9/7/2017    Oscar Terrazas    MRN: [id number]           Patient Information     Date Of Birth          2/2/1934        Visit Information        Provider Department      9/7/2017 3:30 PM Jorge Hillman MD [facility]        Care Instructions     Put some antibiotic ointment on right 1st toes skin near nail if  redness. If worsens, inform MD  Put triamcinolone steroid cream on rash patches at bedtime. Use moisturizer like Vanicream, or Lubriderm daily in AM to keep skin moist.  Use DOVE soap  for bathing. Stop [facility] Spring soap as too drying  Fasting blood test in the next 1-2 weeks  Flu shot in early October  Good arch support with shoes like new tennis shoe or stiffer leather shoes               Follow-ups after your visit        Who to contact     If you have questions or need follow up information about today's clinic visit or your schedule please contact [facility] directly at [phone].  Normal or non-critical lab and imaging results will be communicated to you by PolyActivahart, letter or phone within 4 business days after the clinic has received the results. If you do not hear from us within 7 days, please contact the clinic through [employer] or phone. If you have a critical or abnormal lab result, we will notify you by phone as soon as possible.  Submit refill requests through [employer] or call your pharmacy and they will forward the refill request to us. Please allow 3 business days for your refill to be completed.          Additional Information About Your Visit        PolyActivahart Information     [employer] gives you secure access to your electronic health record. If you see a primary care provider, you can also send messages to your care team and make appointments. If you have questions, please call your primary care clinic.  If you do not have a primary care provider, please call [phone] and they will assist you.        Care  EveryWhere ID     This is your Care EveryWhere ID. This could be used by other organizations to access your [facility] medical records  [id number]        Your Vitals Were     Pulse Temperature Pulse Oximetry BMI (Body Mass Index)          80 97.9  F (36.6  C) (Oral) 95% 28.07 kg/m2         Blood Pressure from Last 3 Encounters:   09/07/17 144/88   08/25/17 186/80   05/30/17 132/64    Weight from Last 3 Encounters:   09/07/17 207 lb (93.9 kg)   08/25/17 209 lb 8 oz (95 kg)   05/30/17 220 lb (99.8 kg)              Today, you had the following     No orders found for display       Primary Care Provider Office Phone # Fax #    Jorge Hillman [phone]       [address] [facility] [address]        Equal Access to Services     FRANKIE [facility] HospitalCHERRY : Hadii narayan tuttle hadasho Soomaali, waaxda luqadaha, qaybta kaalmada adeegyada, nicole mccain haydomenica pantoja . So [facility] [phone].    ATENCIÓN: Si habla español, tiene a fernandez disposición servicios gratuitos de asistencia lingüística. Llame al [phone].    We comply with applicable federal civil rights laws and [place] laws. We do not discriminate on the basis of race, color, national origin, age, disability sex, sexual orientation or gender identity.            Thank you!     Thank you for choosing [facility]  for your care. Our goal is always to provide you with excellent care. Hearing back from our patients is one way we can continue to improve our services. Please take a few minutes to complete the written survey that you may receive in the mail after your visit with us. Thank you!             Your Updated Medication List - Protect others around you: Learn how to safely use, store and throw away your medicines at www.disposemymeds.org.          This list is accurate as of: 9/7/17  4:12 PM.  Always use your most recent med list.                   Brand Name Dispense Instructions for use Diagnosis    ACCU-CHEK COMPACT PLUS  "test strip   Generic drug:  blood glucose monitoring     204 strip    USE EVERY DAY OR AS DIRECTED    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 disease (H)       allopurinol 100 MG tablet    ZYLOPRIM    180 tablet    Take 2 tablets (200 mg) by mouth daily    Gout, unspecified       aspirin 81 MG tablet      Take 1 tablet by mouth daily. with food        cetirizine 10 MG tablet    zyrTEC    30 tablet    Take 1 tablet (10 mg) by mouth every evening    Atopic dermatitis, unspecified type, Itching       clopidogrel 75 MG tablet    PLAVIX    90 tablet    Take 1 tablet (75 mg) by mouth daily    CKD (chronic kidney disease) stage 3, GFR 30-59 ml/min       COD LIVER OIL PO      Take 1 capsule by mouth daily        furosemide 20 MG tablet    LASIX    30 tablet    Take 1 tablet (20 mg) by mouth daily    Edema, unspecified type       hydrALAZINE 25 MG tablet    APRESOLINE    360 tablet    Take 2 tablets (50 mg) by mouth 2 times daily    Essential hypertension, benign       insulin  UNIT/ML injection    HumuLIN N/NovoLIN N    3 Month    18 units at bedtime    Type 2 diabetes mellitus with stage 3 chronic kidney disease, with long-term current use of insulin (H)       insulin syringe-needle U-100 29G X 1/2\" 0.5 ML    BD insulin syringe    200 each    Use one syringe 2 daily or as directed.    Type 2 diabetes mellitus with stage 3 chronic kidney disease, with long-term current use of insulin (H)       levothyroxine 50 MCG tablet    SYNTHROID/LEVOTHROID    30 tablet    TAKE 1 TABLET BY MOUTH DAILY    Hypothyroidism, unspecified type       metoprolol 25 MG tablet    LOPRESSOR    180 tablet    Take 1 tablet (25 mg) by mouth 2 times daily    Essential hypertension, benign       PEPCID 20 MG tablet   Generic drug:  famotidine     3months    take one tablet daily if needed    Esophageal reflux       pravastatin 80 MG tablet    PRAVACHOL    90 tablet    Take 1 tablet (80 mg) by mouth daily at bedtime.    " Hyperlipidemia LDL goal <70       theophylline 400 MG Cp24     90 capsule    Take 400 mg by mouth daily    Mild persistent asthma without complication       triamcinolone 0.5 % cream    KENALOG    45 g    Apply sparingly to affected area two  times daily as needed for rash.    Itching       VITAMIN B 12 PO      Take 500 mcg by mouth 2 times daily.

## 2017-09-08 VITALS
HEART RATE: 80 BPM | TEMPERATURE: 97.9 F | OXYGEN SATURATION: 95 % | WEIGHT: 207 LBS | BODY MASS INDEX: 28.07 KG/M2 | SYSTOLIC BLOOD PRESSURE: 136 MMHG | DIASTOLIC BLOOD PRESSURE: 84 MMHG

## 2017-09-08 PROBLEM — G62.9 NEUROPATHY: Status: ACTIVE | Noted: 2017-09-08

## 2017-09-08 ASSESSMENT — PATIENT HEALTH QUESTIONNAIRE - PHQ9: SUM OF ALL RESPONSES TO PHQ QUESTIONS 1-9: 6

## 2017-10-09 DIAGNOSIS — I10 ESSENTIAL HYPERTENSION, BENIGN: ICD-10-CM

## 2017-10-10 RX ORDER — HYDRALAZINE HYDROCHLORIDE 25 MG/1
TABLET, FILM COATED ORAL
Qty: 360 TABLET | Refills: 3 | Status: SHIPPED | OUTPATIENT
Start: 2017-10-10 | End: 2018-03-29

## 2017-10-10 NOTE — TELEPHONE ENCOUNTER
hydrALAZINE (APRESOLINE) 25 MG tablet      Last Written Prescription Date: 1/06/2017  Last Fill Quantity: 360, # refills: 2    Last Office Visit with [facility], [facility] or [facility] prescribing provider:  9/07/2017   Future Office Visit:        BP Readings from Last 3 Encounters:   09/07/17 136/84   08/25/17 186/80   05/30/17 132/64

## 2017-10-27 DIAGNOSIS — E11.22 TYPE 2 DIABETES MELLITUS WITH STAGE 3 CHRONIC KIDNEY DISEASE, WITH LONG-TERM CURRENT USE OF INSULIN (H): Primary | ICD-10-CM

## 2017-10-27 DIAGNOSIS — N18.30 TYPE 2 DIABETES MELLITUS WITH STAGE 3 CHRONIC KIDNEY DISEASE, WITH LONG-TERM CURRENT USE OF INSULIN (H): Primary | ICD-10-CM

## 2017-10-27 DIAGNOSIS — Z79.4 TYPE 2 DIABETES MELLITUS WITH STAGE 3 CHRONIC KIDNEY DISEASE, WITH LONG-TERM CURRENT USE OF INSULIN (H): Primary | ICD-10-CM

## 2017-10-27 NOTE — TELEPHONE ENCOUNTER
LAST OFFICE VISIT: 9/7/17.    ACCU-CHEK COMPACT PLUS test strip      Last Written Prescription Date:  5/9/17  Last Fill Quantity: 204,   # refills: 0  Future Office visit:       Routing refill request to provider for review/approval because:  Drug not on the [facility], [facility] or [facility] refill protocol or controlled substance

## 2017-11-03 ENCOUNTER — TRANSFERRED RECORDS (OUTPATIENT)
Dept: HEALTH INFORMATION MANAGEMENT | Facility: CLINIC | Age: 82
End: 2017-11-03

## 2017-11-06 ENCOUNTER — TELEPHONE (OUTPATIENT)
Dept: INTERNAL MEDICINE | Facility: CLINIC | Age: 82
End: 2017-11-06

## 2017-11-06 NOTE — TELEPHONE ENCOUNTER
Reason for call:  Form   Our goal is to have forms completed within 72 hours, however some forms may require a visit or additional information.     Who is the form from? [place] [employer]  Where did the form come from? Patient or family brought in     What clinic location was the form placed at? [facility]  Where was the form placed? Pcp's Folder  What number is listed as a contact on the form? [phone] Pts home number    Phone call message - patient request for a letter, form or note:     Date needed: as soon as possible  Please fax to [phone]  Has the patient signed a consent form for release of information? Not Applicable    Additional comments:     Type of letter, form or note: Insulin-Treated Diabetes Mellitus Report      Phone number to reach patient:  Home number on file [phone] (home)    Best Time:  Anytime    Can we leave a detailed message on this number?  YES

## 2017-11-09 NOTE — TELEPHONE ENCOUNTER
Form faxed to [place] [employer] @ [phone]. Form sent to be scanned into pts chart.

## 2017-11-13 DIAGNOSIS — I10 ESSENTIAL HYPERTENSION, BENIGN: ICD-10-CM

## 2017-11-14 RX ORDER — METOPROLOL TARTRATE 25 MG/1
TABLET, FILM COATED ORAL
Qty: 180 TABLET | Refills: 2 | Status: ON HOLD | OUTPATIENT
Start: 2017-11-14 | End: 2018-03-16

## 2017-12-04 DIAGNOSIS — J45.30 MILD PERSISTENT ASTHMA WITHOUT COMPLICATION: ICD-10-CM

## 2017-12-05 RX ORDER — THEOPHYLLINE 400 MG/1
TABLET, EXTENDED RELEASE ORAL
Qty: 90 TABLET | Refills: 3 | Status: SHIPPED | OUTPATIENT
Start: 2017-12-05 | End: 2018-11-30

## 2017-12-05 NOTE — TELEPHONE ENCOUNTER
theophyline      Last Written Prescription Date:  3/15/17  Last Fill Quantity: 90,   # refills: 2  Last Office Visit: 9/7/17  Future Office visit:       Routing refill request to provider for review/approval because:  Drug not on the [facility], [facility] or [facility] refill protocol or controlled substance

## 2017-12-13 DIAGNOSIS — N18.30 TYPE 2 DIABETES MELLITUS WITH STAGE 3 CHRONIC KIDNEY DISEASE, WITH LONG-TERM CURRENT USE OF INSULIN (H): ICD-10-CM

## 2017-12-13 DIAGNOSIS — R60.9 EDEMA, UNSPECIFIED TYPE: ICD-10-CM

## 2017-12-13 DIAGNOSIS — E11.22 TYPE 2 DIABETES MELLITUS WITH STAGE 3 CHRONIC KIDNEY DISEASE, WITH LONG-TERM CURRENT USE OF INSULIN (H): ICD-10-CM

## 2017-12-13 DIAGNOSIS — R80.9 PROTEINURIA: ICD-10-CM

## 2017-12-13 DIAGNOSIS — E78.5 HYPERLIPIDEMIA LDL GOAL <70: ICD-10-CM

## 2017-12-13 DIAGNOSIS — N18.30 CKD (CHRONIC KIDNEY DISEASE) STAGE 3, GFR 30-59 ML/MIN (H): ICD-10-CM

## 2017-12-13 DIAGNOSIS — Z79.4 TYPE 2 DIABETES MELLITUS WITH STAGE 3 CHRONIC KIDNEY DISEASE, WITH LONG-TERM CURRENT USE OF INSULIN (H): ICD-10-CM

## 2017-12-13 LAB
ALBUMIN SERPL-MCNC: 3.4 G/DL (ref 3.4–5)
ALP SERPL-CCNC: 96 U/L (ref 40–150)
ALT SERPL W P-5'-P-CCNC: 16 U/L (ref 0–70)
ANION GAP SERPL CALCULATED.3IONS-SCNC: 6 MMOL/L (ref 3–14)
AST SERPL W P-5'-P-CCNC: 3 U/L (ref 0–45)
BILIRUB SERPL-MCNC: 0.4 MG/DL (ref 0.2–1.3)
BUN SERPL-MCNC: 47 MG/DL (ref 7–30)
CALCIUM SERPL-MCNC: 9.1 MG/DL (ref 8.5–10.1)
CHLORIDE SERPL-SCNC: 113 MMOL/L (ref 94–109)
CHOLEST SERPL-MCNC: 209 MG/DL
CO2 SERPL-SCNC: 24 MMOL/L (ref 20–32)
CREAT SERPL-MCNC: 2.4 MG/DL (ref 0.66–1.25)
CREAT UR-MCNC: 84 MG/DL
GFR SERPL CREATININE-BSD FRML MDRD: 26 ML/MIN/1.7M2
GLUCOSE SERPL-MCNC: 101 MG/DL (ref 70–99)
HBA1C MFR BLD: 6.5 % (ref 4.3–6)
HDLC SERPL-MCNC: 33 MG/DL
LDLC SERPL CALC-MCNC: 144 MG/DL
MICROALBUMIN UR-MCNC: 1580 MG/L
MICROALBUMIN/CREAT UR: 1889.95 MG/G CR (ref 0–17)
NONHDLC SERPL-MCNC: 176 MG/DL
POTASSIUM SERPL-SCNC: 4.7 MMOL/L (ref 3.4–5.3)
PROT SERPL-MCNC: 6.9 G/DL (ref 6.8–8.8)
SODIUM SERPL-SCNC: 143 MMOL/L (ref 133–144)
TRIGL SERPL-MCNC: 160 MG/DL

## 2017-12-13 PROCEDURE — 83036 HEMOGLOBIN GLYCOSYLATED A1C: CPT | Performed by: INTERNAL MEDICINE

## 2017-12-13 PROCEDURE — 36415 COLL VENOUS BLD VENIPUNCTURE: CPT | Performed by: INTERNAL MEDICINE

## 2017-12-13 PROCEDURE — 80053 COMPREHEN METABOLIC PANEL: CPT | Performed by: INTERNAL MEDICINE

## 2017-12-13 PROCEDURE — 82043 UR ALBUMIN QUANTITATIVE: CPT | Performed by: INTERNAL MEDICINE

## 2017-12-13 PROCEDURE — 80061 LIPID PANEL: CPT | Performed by: INTERNAL MEDICINE

## 2017-12-18 NOTE — TELEPHONE ENCOUNTER
Form was never received, patient was mailed a letter stating his license would be revoked by 12/21/2017 if form is not sent in. A new form has been printed out for patient and placed in doctors folder. Please call patient ASAP when form is completed so that he could come up here to sign his portion, and we will fax it to them after.

## 2017-12-19 ENCOUNTER — TELEPHONE (OUTPATIENT)
Dept: INTERNAL MEDICINE | Facility: CLINIC | Age: 82
End: 2017-12-19

## 2017-12-20 NOTE — TELEPHONE ENCOUNTER
Form was redone again 2 days ago and has been faxed. See other tele message from yesterday in chart

## 2017-12-20 NOTE — TELEPHONE ENCOUNTER
Faxed form to [place] [employer] at [phone], sent to be scanned into pts chart.

## 2018-01-08 DIAGNOSIS — M1A.9XX0 CHRONIC GOUT WITHOUT TOPHUS, UNSPECIFIED CAUSE, UNSPECIFIED SITE: Primary | ICD-10-CM

## 2018-01-08 NOTE — TELEPHONE ENCOUNTER
"Requested Prescriptions   Pending Prescriptions Disp Refills     allopurinol (ZYLOPRIM) 100 MG tablet [Pharmacy Med Name: ALLOPURINOL 100MG TABLETS]  Last Written Prescription Date:  1/25/2017  Last Fill Quantity: 180,  # refills: 3   Last Office Visit with G, P or [facility] prescribing provider:  9/07/2017   Future Office Visit:      180 tablet 0     Sig: TAKE 2 TABLETS(200 MG) BY MOUTH DAILY    Gout Agents Protocol Failed    1/8/2018 12:32 PM       Failed - Uric acid greater than or equal to 6 on file in past 12 months    Recent Labs   Lab Test  01/08/16   1005   URIC  5.8     If level is 6mg/dL or greater, ok to refill one time and refer to provider.          Failed - Normal serum creatinine on file in the past 12 months    Recent Labs   Lab Test  12/13/17   0910   CR  2.40*            Passed - CBC on file in past 12 months    Recent Labs   Lab Test  08/25/17   1758   WBC  13.5*   RBC  3.94*   HGB  12.2*   HCT  37.5*   PLT  193            Passed - ALT on file in past 12 months    Recent Labs   Lab Test  12/13/17   0910   ALT  16            Passed - Recent or future visit with authorizing provider's specialty    Patient had office visit in the last year or has a visit in the next 30 days with authorizing provider.  See \"Choose Columns\" in Meds & Orders section of the refill encounter.              Passed - Patient is age 18 or older          "

## 2018-01-09 NOTE — TELEPHONE ENCOUNTER
Routing refill request to provider for review/approval because:  Labs out of range:  Creat, CBC  Labs not current: uric acid

## 2018-01-10 PROBLEM — M1A.9XX0 CHRONIC GOUT WITHOUT TOPHUS, UNSPECIFIED CAUSE, UNSPECIFIED SITE: Status: ACTIVE | Noted: 2018-01-10

## 2018-01-10 RX ORDER — ALLOPURINOL 100 MG/1
TABLET ORAL
Qty: 180 TABLET | Refills: 3 | Status: SHIPPED | OUTPATIENT
Start: 2018-01-10 | End: 2018-12-29

## 2018-01-13 DIAGNOSIS — L29.9 ITCHING: ICD-10-CM

## 2018-01-13 DIAGNOSIS — L20.9 ATOPIC DERMATITIS, UNSPECIFIED TYPE: ICD-10-CM

## 2018-01-14 NOTE — TELEPHONE ENCOUNTER
"Requested Prescriptions   Pending Prescriptions Disp Refills     cetirizine (ZYRTEC) 10 MG tablet [Pharmacy Med Name: CETIRIZINE 10MG TABLETS]  Last Written Prescription Date:  3/21/17  Last Fill Quantity: 30 TABLET,  # refills: 9   Last Office Visit with G, P or [facility] prescribing provider:  9/7/17   Future Office Visit:      30 tablet 0     Sig: TAKE 1 TABLET(10 MG) BY MOUTH EVERY EVENING    Antihistamines Protocol Passed    1/13/2018  8:47 AM       Passed - Recent or future visit with authorizing provider's specialty    Patient had office visit in the last year or has a visit in the next 30 days with authorizing provider.  See \"Patient Info\" tab in inbasket, or \"Choose Columns\" in Meds & Orders section of the refill encounter.              Passed - Patient is age 3 or older    Apply age and/or weight-based dosing for peds patients age 3 and older.    Forward request to provider for patients under the age of 3.            "

## 2018-01-15 DIAGNOSIS — N18.30 CKD (CHRONIC KIDNEY DISEASE) STAGE 3, GFR 30-59 ML/MIN (H): ICD-10-CM

## 2018-01-15 DIAGNOSIS — I25.10 CARDIOVASCULAR DISEASE: Primary | ICD-10-CM

## 2018-01-15 NOTE — TELEPHONE ENCOUNTER
"Requested Prescriptions   Pending Prescriptions Disp Refills     clopidogrel (PLAVIX) 75 MG tablet [Pharmacy Med Name: CLOPIDOGREL 75MG TABLETS]  Last Written Prescription Date:  1/25/2017  Last Fill Quantity: 90,  # refills: 3   Last Office Visit with G, P or [facility] prescribing provider:  9/07/2017   Future Office Visit:      90 tablet 0     Sig: TAKE 1 TABLET(75 MG) BY MOUTH DAILY    Plavix Passed    1/15/2018 11:00 AM       Passed - No active PPI on record unless is Protonix       Passed - Normal HGB on file in past 12 months    Recent Labs   Lab Test  08/25/17   1758   HGB  12.2*              Passed - Normal Platelets on file in past 12 months    Recent Labs   Lab Test  08/25/17   1758   PLT  193              Passed - Recent or future visit with authorizing provider's specialty    Patient had office visit in the last year or has a visit in the next 30 days with authorizing provider.  See \"Patient Info\" tab in inbasket, or \"Choose Columns\" in Meds & Orders section of the refill encounter.              Passed - Patient is age 18 or older          "

## 2018-01-16 RX ORDER — CLOPIDOGREL BISULFATE 75 MG/1
TABLET ORAL
Qty: 90 TABLET | Refills: 3 | Status: SHIPPED | OUTPATIENT
Start: 2018-01-16 | End: 2019-01-25

## 2018-01-16 RX ORDER — CETIRIZINE HYDROCHLORIDE 10 MG/1
TABLET ORAL
Qty: 30 TABLET | Refills: 8 | Status: SHIPPED | OUTPATIENT
Start: 2018-01-16 | End: 2019-01-13

## 2018-02-16 ENCOUNTER — TRANSFERRED RECORDS (OUTPATIENT)
Dept: HEALTH INFORMATION MANAGEMENT | Facility: CLINIC | Age: 83
End: 2018-02-16

## 2018-03-13 ENCOUNTER — APPOINTMENT (OUTPATIENT)
Dept: GENERAL RADIOLOGY | Facility: CLINIC | Age: 83
DRG: 291 | End: 2018-03-13
Attending: EMERGENCY MEDICINE
Payer: MEDICARE

## 2018-03-13 ENCOUNTER — HOSPITAL ENCOUNTER (INPATIENT)
Facility: CLINIC | Age: 83
LOS: 3 days | Discharge: HOME | DRG: 291 | End: 2018-03-16
Attending: EMERGENCY MEDICINE | Admitting: INTERNAL MEDICINE
Payer: MEDICARE

## 2018-03-13 DIAGNOSIS — I21.4 NSTEMI (NON-ST ELEVATED MYOCARDIAL INFARCTION) (H): ICD-10-CM

## 2018-03-13 DIAGNOSIS — N18.4 CKD (CHRONIC KIDNEY DISEASE) STAGE 4, GFR 15-29 ML/MIN (H): Primary | ICD-10-CM

## 2018-03-13 PROBLEM — I24.9 ACS (ACUTE CORONARY SYNDROME) (H): Status: ACTIVE | Noted: 2018-03-13

## 2018-03-13 LAB
ANION GAP SERPL CALCULATED.3IONS-SCNC: 8 MMOL/L (ref 3–14)
BASOPHILS # BLD AUTO: 0.1 10E9/L (ref 0–0.2)
BASOPHILS NFR BLD AUTO: 0.7 %
BUN SERPL-MCNC: 51 MG/DL (ref 7–30)
CALCIUM SERPL-MCNC: 8.8 MG/DL (ref 8.5–10.1)
CHLORIDE SERPL-SCNC: 113 MMOL/L (ref 94–109)
CO2 SERPL-SCNC: 22 MMOL/L (ref 20–32)
CREAT SERPL-MCNC: 2.57 MG/DL (ref 0.66–1.25)
DIFFERENTIAL METHOD BLD: ABNORMAL
EOSINOPHIL # BLD AUTO: 0.4 10E9/L (ref 0–0.7)
EOSINOPHIL NFR BLD AUTO: 4 %
ERYTHROCYTE [DISTWIDTH] IN BLOOD BY AUTOMATED COUNT: 15.2 % (ref 10–15)
ERYTHROCYTE [DISTWIDTH] IN BLOOD BY AUTOMATED COUNT: 15.3 % (ref 10–15)
GFR SERPL CREATININE-BSD FRML MDRD: 24 ML/MIN/1.7M2
GLUCOSE BLDC GLUCOMTR-MCNC: 164 MG/DL (ref 70–99)
GLUCOSE SERPL-MCNC: 135 MG/DL (ref 70–99)
HBA1C MFR BLD: 6.3 % (ref 4.3–6)
HCT VFR BLD AUTO: 31.3 % (ref 40–53)
HCT VFR BLD AUTO: 32.3 % (ref 40–53)
HGB BLD-MCNC: 10.4 G/DL (ref 13.3–17.7)
HGB BLD-MCNC: 10.7 G/DL (ref 13.3–17.7)
IMM GRANULOCYTES # BLD: 0 10E9/L (ref 0–0.4)
IMM GRANULOCYTES NFR BLD: 0.3 %
INTERPRETATION ECG - MUSE: NORMAL
INTERPRETATION ECG - MUSE: NORMAL
LMWH PPP CHRO-ACNC: 0.25 IU/ML
LYMPHOCYTES # BLD AUTO: 1.8 10E9/L (ref 0.8–5.3)
LYMPHOCYTES NFR BLD AUTO: 18.5 %
MCH RBC QN AUTO: 30.7 PG (ref 26.5–33)
MCH RBC QN AUTO: 31 PG (ref 26.5–33)
MCHC RBC AUTO-ENTMCNC: 33.1 G/DL (ref 31.5–36.5)
MCHC RBC AUTO-ENTMCNC: 33.2 G/DL (ref 31.5–36.5)
MCV RBC AUTO: 93 FL (ref 78–100)
MCV RBC AUTO: 93 FL (ref 78–100)
MONOCYTES # BLD AUTO: 0.7 10E9/L (ref 0–1.3)
MONOCYTES NFR BLD AUTO: 7.4 %
NEUTROPHILS # BLD AUTO: 6.6 10E9/L (ref 1.6–8.3)
NEUTROPHILS NFR BLD AUTO: 69.1 %
NRBC # BLD AUTO: 0 10*3/UL
NRBC BLD AUTO-RTO: 0 /100
NT-PROBNP SERPL-MCNC: ABNORMAL PG/ML (ref 0–1800)
PLATELET # BLD AUTO: 213 10E9/L (ref 150–450)
PLATELET # BLD AUTO: 216 10E9/L (ref 150–450)
POTASSIUM SERPL-SCNC: 4.2 MMOL/L (ref 3.4–5.3)
RBC # BLD AUTO: 3.36 10E12/L (ref 4.4–5.9)
RBC # BLD AUTO: 3.48 10E12/L (ref 4.4–5.9)
SODIUM SERPL-SCNC: 143 MMOL/L (ref 133–144)
TROPONIN I SERPL-MCNC: 0.65 UG/L (ref 0–0.04)
TROPONIN I SERPL-MCNC: 0.77 UG/L (ref 0–0.04)
WBC # BLD AUTO: 9.1 10E9/L (ref 4–11)
WBC # BLD AUTO: 9.5 10E9/L (ref 4–11)

## 2018-03-13 PROCEDURE — 00000146 ZZHCL STATISTIC GLUCOSE BY METER IP

## 2018-03-13 PROCEDURE — 25000131 ZZH RX MED GY IP 250 OP 636 PS 637: Mod: GY | Performed by: EMERGENCY MEDICINE

## 2018-03-13 PROCEDURE — 99223 1ST HOSP IP/OBS HIGH 75: CPT | Mod: AI | Performed by: INTERNAL MEDICINE

## 2018-03-13 PROCEDURE — 93005 ELECTROCARDIOGRAM TRACING: CPT | Mod: 76

## 2018-03-13 PROCEDURE — 84484 ASSAY OF TROPONIN QUANT: CPT | Performed by: EMERGENCY MEDICINE

## 2018-03-13 PROCEDURE — 25000132 ZZH RX MED GY IP 250 OP 250 PS 637: Mod: GY | Performed by: INTERNAL MEDICINE

## 2018-03-13 PROCEDURE — 85025 COMPLETE CBC W/AUTO DIFF WBC: CPT | Performed by: EMERGENCY MEDICINE

## 2018-03-13 PROCEDURE — 96366 THER/PROPH/DIAG IV INF ADDON: CPT

## 2018-03-13 PROCEDURE — 84484 ASSAY OF TROPONIN QUANT: CPT | Performed by: INTERNAL MEDICINE

## 2018-03-13 PROCEDURE — 83036 HEMOGLOBIN GLYCOSYLATED A1C: CPT | Performed by: INTERNAL MEDICINE

## 2018-03-13 PROCEDURE — 21000001 ZZH R&B HEART CARE

## 2018-03-13 PROCEDURE — 71046 X-RAY EXAM CHEST 2 VIEWS: CPT

## 2018-03-13 PROCEDURE — 96365 THER/PROPH/DIAG IV INF INIT: CPT

## 2018-03-13 PROCEDURE — 85027 COMPLETE CBC AUTOMATED: CPT | Performed by: INTERNAL MEDICINE

## 2018-03-13 PROCEDURE — 25000128 H RX IP 250 OP 636: Performed by: EMERGENCY MEDICINE

## 2018-03-13 PROCEDURE — 99285 EMERGENCY DEPT VISIT HI MDM: CPT | Mod: 25

## 2018-03-13 PROCEDURE — 80048 BASIC METABOLIC PNL TOTAL CA: CPT | Performed by: EMERGENCY MEDICINE

## 2018-03-13 PROCEDURE — 25000131 ZZH RX MED GY IP 250 OP 636 PS 637: Mod: GY | Performed by: INTERNAL MEDICINE

## 2018-03-13 PROCEDURE — A9270 NON-COVERED ITEM OR SERVICE: HCPCS | Mod: GY | Performed by: INTERNAL MEDICINE

## 2018-03-13 PROCEDURE — 83880 ASSAY OF NATRIURETIC PEPTIDE: CPT | Performed by: EMERGENCY MEDICINE

## 2018-03-13 PROCEDURE — 36415 COLL VENOUS BLD VENIPUNCTURE: CPT | Performed by: INTERNAL MEDICINE

## 2018-03-13 PROCEDURE — 85520 HEPARIN ASSAY: CPT | Performed by: INTERNAL MEDICINE

## 2018-03-13 PROCEDURE — 93005 ELECTROCARDIOGRAM TRACING: CPT

## 2018-03-13 PROCEDURE — 96375 TX/PRO/DX INJ NEW DRUG ADDON: CPT

## 2018-03-13 RX ORDER — HYDRALAZINE HYDROCHLORIDE 50 MG/1
50 TABLET, FILM COATED ORAL 2 TIMES DAILY
Status: DISCONTINUED | OUTPATIENT
Start: 2018-03-13 | End: 2018-03-16 | Stop reason: HOSPADM

## 2018-03-13 RX ORDER — ACETAMINOPHEN 325 MG/1
650 TABLET ORAL EVERY 4 HOURS PRN
Status: DISCONTINUED | OUTPATIENT
Start: 2018-03-13 | End: 2018-03-16 | Stop reason: HOSPADM

## 2018-03-13 RX ORDER — ACETAMINOPHEN 650 MG/1
650 SUPPOSITORY RECTAL EVERY 4 HOURS PRN
Status: DISCONTINUED | OUTPATIENT
Start: 2018-03-13 | End: 2018-03-16 | Stop reason: HOSPADM

## 2018-03-13 RX ORDER — ALUMINA, MAGNESIA, AND SIMETHICONE 2400; 2400; 240 MG/30ML; MG/30ML; MG/30ML
30 SUSPENSION ORAL EVERY 4 HOURS PRN
Status: DISCONTINUED | OUTPATIENT
Start: 2018-03-13 | End: 2018-03-16 | Stop reason: HOSPADM

## 2018-03-13 RX ORDER — ONDANSETRON 2 MG/ML
4 INJECTION INTRAMUSCULAR; INTRAVENOUS EVERY 6 HOURS PRN
Status: DISCONTINUED | OUTPATIENT
Start: 2018-03-13 | End: 2018-03-16 | Stop reason: HOSPADM

## 2018-03-13 RX ORDER — PROCHLORPERAZINE MALEATE 5 MG
5 TABLET ORAL EVERY 6 HOURS PRN
Status: DISCONTINUED | OUTPATIENT
Start: 2018-03-13 | End: 2018-03-16 | Stop reason: HOSPADM

## 2018-03-13 RX ORDER — NALOXONE HYDROCHLORIDE 0.4 MG/ML
.1-.4 INJECTION, SOLUTION INTRAMUSCULAR; INTRAVENOUS; SUBCUTANEOUS
Status: DISCONTINUED | OUTPATIENT
Start: 2018-03-13 | End: 2018-03-16 | Stop reason: HOSPADM

## 2018-03-13 RX ORDER — UREA 10 %
500 LOTION (ML) TOPICAL 2 TIMES DAILY
Status: DISCONTINUED | OUTPATIENT
Start: 2018-03-13 | End: 2018-03-16 | Stop reason: HOSPADM

## 2018-03-13 RX ORDER — NICOTINE POLACRILEX 4 MG
15-30 LOZENGE BUCCAL
Status: DISCONTINUED | OUTPATIENT
Start: 2018-03-13 | End: 2018-03-16 | Stop reason: HOSPADM

## 2018-03-13 RX ORDER — PRAVASTATIN SODIUM 40 MG
80 TABLET ORAL AT BEDTIME
Status: DISCONTINUED | OUTPATIENT
Start: 2018-03-13 | End: 2018-03-16 | Stop reason: HOSPADM

## 2018-03-13 RX ORDER — ONDANSETRON 4 MG/1
4 TABLET, ORALLY DISINTEGRATING ORAL EVERY 6 HOURS PRN
Status: DISCONTINUED | OUTPATIENT
Start: 2018-03-13 | End: 2018-03-16 | Stop reason: HOSPADM

## 2018-03-13 RX ORDER — ASPIRIN 81 MG/1
81 TABLET ORAL DAILY
Status: DISCONTINUED | OUTPATIENT
Start: 2018-03-14 | End: 2018-03-16 | Stop reason: HOSPADM

## 2018-03-13 RX ORDER — AMOXICILLIN 250 MG
2 CAPSULE ORAL 2 TIMES DAILY PRN
Status: DISCONTINUED | OUTPATIENT
Start: 2018-03-13 | End: 2018-03-16 | Stop reason: HOSPADM

## 2018-03-13 RX ORDER — POLYETHYLENE GLYCOL 3350 17 G/17G
17 POWDER, FOR SOLUTION ORAL DAILY PRN
Status: DISCONTINUED | OUTPATIENT
Start: 2018-03-13 | End: 2018-03-16 | Stop reason: HOSPADM

## 2018-03-13 RX ORDER — DEXTROSE MONOHYDRATE 25 G/50ML
25-50 INJECTION, SOLUTION INTRAVENOUS
Status: DISCONTINUED | OUTPATIENT
Start: 2018-03-13 | End: 2018-03-16 | Stop reason: HOSPADM

## 2018-03-13 RX ORDER — PROCHLORPERAZINE 25 MG
12.5 SUPPOSITORY, RECTAL RECTAL EVERY 12 HOURS PRN
Status: DISCONTINUED | OUTPATIENT
Start: 2018-03-13 | End: 2018-03-16 | Stop reason: HOSPADM

## 2018-03-13 RX ORDER — ASPIRIN 81 MG/1
324 TABLET, CHEWABLE ORAL ONCE
Status: DISCONTINUED | OUTPATIENT
Start: 2018-03-13 | End: 2018-03-15

## 2018-03-13 RX ORDER — LABETALOL HYDROCHLORIDE 5 MG/ML
20 INJECTION, SOLUTION INTRAVENOUS ONCE
Status: COMPLETED | OUTPATIENT
Start: 2018-03-13 | End: 2018-03-13

## 2018-03-13 RX ORDER — LIDOCAINE 40 MG/G
CREAM TOPICAL
Status: DISCONTINUED | OUTPATIENT
Start: 2018-03-13 | End: 2018-03-16 | Stop reason: HOSPADM

## 2018-03-13 RX ORDER — THEOPHYLLINE 400 MG/1
400 TABLET, EXTENDED RELEASE ORAL DAILY
Status: DISCONTINUED | OUTPATIENT
Start: 2018-03-14 | End: 2018-03-13

## 2018-03-13 RX ORDER — BISACODYL 10 MG
10 SUPPOSITORY, RECTAL RECTAL DAILY PRN
Status: DISCONTINUED | OUTPATIENT
Start: 2018-03-13 | End: 2018-03-16 | Stop reason: HOSPADM

## 2018-03-13 RX ORDER — METOPROLOL TARTRATE 25 MG/1
25 TABLET, FILM COATED ORAL 2 TIMES DAILY
Status: DISCONTINUED | OUTPATIENT
Start: 2018-03-13 | End: 2018-03-14

## 2018-03-13 RX ORDER — CLOPIDOGREL BISULFATE 75 MG/1
75 TABLET ORAL DAILY
Status: DISCONTINUED | OUTPATIENT
Start: 2018-03-14 | End: 2018-03-16 | Stop reason: HOSPADM

## 2018-03-13 RX ORDER — AMOXICILLIN 250 MG
1 CAPSULE ORAL 2 TIMES DAILY PRN
Status: DISCONTINUED | OUTPATIENT
Start: 2018-03-13 | End: 2018-03-16 | Stop reason: HOSPADM

## 2018-03-13 RX ORDER — NITROGLYCERIN 0.4 MG/1
0.4 TABLET SUBLINGUAL EVERY 5 MIN PRN
Status: DISCONTINUED | OUTPATIENT
Start: 2018-03-13 | End: 2018-03-16 | Stop reason: HOSPADM

## 2018-03-13 RX ORDER — LEVOTHYROXINE SODIUM 50 UG/1
50 TABLET ORAL DAILY
Status: DISCONTINUED | OUTPATIENT
Start: 2018-03-14 | End: 2018-03-16 | Stop reason: HOSPADM

## 2018-03-13 RX ADMIN — METOPROLOL TARTRATE 25 MG: 25 TABLET ORAL at 20:41

## 2018-03-13 RX ADMIN — Medication 1 LOZENGE: at 22:51

## 2018-03-13 RX ADMIN — HYDRALAZINE HYDROCHLORIDE 50 MG: 50 TABLET ORAL at 20:41

## 2018-03-13 RX ADMIN — CYANOCOBALAMIN TAB 500 MCG 500 MCG: 500 TAB at 20:41

## 2018-03-13 RX ADMIN — HEPARIN SODIUM 1000 UNITS/HR: 10000 INJECTION, SOLUTION INTRAVENOUS at 16:28

## 2018-03-13 RX ADMIN — Medication 5000 UNITS: at 16:28

## 2018-03-13 RX ADMIN — PRAVASTATIN SODIUM 80 MG: 40 TABLET ORAL at 20:42

## 2018-03-13 RX ADMIN — INSULIN HUMAN 18 UNITS: 100 INJECTION, SUSPENSION SUBCUTANEOUS at 22:42

## 2018-03-13 RX ADMIN — LABETALOL HYDROCHLORIDE 20 MG: 5 INJECTION, SOLUTION INTRAVENOUS at 16:56

## 2018-03-13 ASSESSMENT — ENCOUNTER SYMPTOMS
WOUND: 1
SHORTNESS OF BREATH: 1
CHEST TIGHTNESS: 1

## 2018-03-13 NOTE — ED NOTES
DATE:  3/13/2018   TIME OF RECEIPT FROM LAB:  9999  LAB TEST:  Troponin  LAB VALUE:  0.772  RESULTS GIVEN WITH READ-BACK TO (PROVIDER):  Dr. Trierweiler  TIME LAB VALUE REPORTED TO PROVIDER:   [id number]

## 2018-03-13 NOTE — ED NOTES
"[facility]  ED Nurse Handoff Report    ED Chief complaint: Chest Pain (chest pressure and sob started 3-4 days ago, \"couldn't even lay flat because i couldn't breathe\". BLE edema \"not new\". )      ED Diagnosis:   Final diagnoses:   NSTEMI (non-ST elevated myocardial infarction) (H)       Code Status: Full Code    Allergies:   Allergies   Allergen Reactions     Advair [Advair Diskus]      cough;  insomnia, nightmares     Amlodipine Besylate      edema       Azithromycin GI Disturbance     Clarithromycin      gi     Hydrochlorothiazide      hctz + lisinopril-->inc creat, k+     Lisinopril      lisinopril + hctz --> inc creat, k+     Moxifloxacin Hydrochloride      clostridium diffile colitis     Penicillins      gen swelling     Vioxx      edema       Activity level - Baseline/Home:  Independent    Activity Level - Current:   Stand with Assist     Needed?: No    Isolation: No  Infection: Not Applicable    Bariatric?: No    Vital Signs:   Vitals:    03/13/18 1530 03/13/18 1545 03/13/18 1600 03/13/18 1628   BP: 183/83 (!) 175/94 (!) 184/98 173/80   Pulse:       Resp: 15 14 17 16   Temp:       TempSrc:       SpO2: 95% 96%     Weight:       Height:           Cardiac Rhythm: ,   Cardiac  Cardiac Rhythm: Normal sinus rhythm    Pain level:      Is this patient confused?: No     Patient Report: Initial Complaint: Patient presents to ED via EMS from home after having increase chest pressure and sob especially while laying flat. Patient states that 2 nights ago it was so bad, had to get up because he couldn't breathe laying down. Patient states that the edema in BLE is normal for him but LLE is greater than RLE. Reddened and wound on L shin from the dog. Draining serous drainage.   Focused Assessment: patient having small amount of chest pressure just moving in bed but no SOB. NSTEMI on labs  Tests Performed: Labs, EKG, CXR  Abnormal Results: Trop 0.779, BNP 28765  Treatments provided: Heparin gtt " started    Family Comments: Daughter at bedside.     OBS brochure/video discussed/provided to patient: N/A    ED Medications:   Medications   aspirin chewable tablet 324 mg (324 mg Oral Not Given 3/13/18 1526)   heparin infusion 25,000 units in 0.45% NaCl 250 mL (1,000 Units/hr Intravenous New Bag 3/13/18 1628)   heparin Loading Dose bolus dose from infusion pump 5,000 Units (5,000 Units Intravenous Given 3/13/18 1628)       Drips infusing?:  Yes    For the majority of the shift this patient was Green.   Interventions performed were .    Severe Sepsis OR Septic Shock Diagnosis Present: No      ED NURSE PHONE NUMBER: [phone]

## 2018-03-13 NOTE — IP AVS SNAPSHOT
MRN:[id number]                      After Visit Summary   3/13/2018    Oscar Terrazas    MRN: [id number]           Thank you!     Thank you for choosing [facility] for your care. Our goal is always to provide you with excellent care. Hearing back from our patients is one way we can continue to improve our services. Please take a few minutes to complete the written survey that you may receive in the mail after you visit with us. Thank you!        Patient Information     Date Of Birth          2/2/1934        Designated Caregiver       Most Recent Value    Caregiver    Will someone help with your care after discharge? no      About your hospital stay     You were admitted on:  March 13, 2018 You last received care in the:  [facility]    You were discharged on:  March 16, 2018        Reason for your hospital stay       Acute diastolic heart failure exacerbation                  Who to Call     For medical emergencies, please call 911.  For non-urgent questions about your medical care, please call your primary care provider or clinic, [phone]          Attending Provider     Provider Specialty    Trierweiler, Chad A, MD Emergency Medicine    Lorri Cat DO Internal Medicine       Primary Care Provider Office Phone # Fax #    Jorge Hillman [phone]      After Care Instructions     Activity       Your activity upon discharge: activity as tolerated            Diet       Follow this diet upon discharge: Orders Placed This Encounter      Combination Diet Low Saturated Fat Na <2400mg Diet            Monitor and record       blood glucose 4 times a day, before meals and at bedtime and report findings to PCP  blood pressure/heart rate daily and readings to PCP for any medication adjustment  fluid intake and output daily  Limit intake to 2 L per day   weight every day and inform PCP/cardiology if weight gain more than 2 pounds per day and/or  more than 5 pounds per week                  Follow-up Appointments     Follow-up and recommended labs and tests        Follow up with primary care provider, Jorge Hillman, within 7 days for hospital follow- up.  The following labs/tests are recommended: BMP.  Follow-up with cardiology ALVARO in 1 week next available.                  Your next 10 appointments already scheduled     Mar 20, 2018  2:00 PM CDT   Office Visit with Jorge Hillman MD   [facility]    [address] [place] [id number] [phone]           Bring a current list of meds and any records pertaining to this visit. For Physicals, please bring immunization records and any forms needing to be filled out. Please arrive 10 minutes early to complete paperwork.            Mar 29, 2018  1:40 PM CDT   LAB with [facility]   [facility] [place] ([facility])    [address] [place] [id number] [phone]           Please do not eat 10-12 hours before your appointment if you are coming in fasting for labs on lipids, cholesterol, or glucose (sugar). This does not apply to pregnant women. Water, hot tea and black coffee (with nothing added) are okay. Do not drink other fluids, diet soda or chew gum.            Mar 29, 2018  2:40 PM CDT   CORE Enrollment with AIDA Santacruz CNP   [facility] ([facility])    [address] [place] [id number] [phone]            May 03, 2018  2:45 PM CDT   Return Visit with Sebastien Gibson MD   [facility] ([facility])    [address] [place] [id number] [phone]              Future tests that were ordered for you     Basic metabolic panel                 General Recommendations To Control Heart Failure When You Get Home       Heart Failure  Instructions for Patients and Families: Please read and check off each of these important instructions as you do them when you get home.          Weight and Symptoms       ___ Put a scale in your bathroom.    ___ Post a weight chart or calendar next to your scale.    ___ Weigh yourself everyday as soon as you get up in the morning (before breakfast).  You should only be wearing your pajamas.  Write your weight on the chart/calendar.  ___ Bring your weight chart/calendar with you to all appointments.  ___ Call your doctor or nurse practitioner if you gain 2 pounds (in 1 day) or 5 pounds in (1 week) from your goal  good  weight.  Your good weight is also called your  dry  weight.  Your doctor or nurse will tell you what your good weight should be.    ___ Call your doctor or nurse practitioner if you have shortness of breath that gets worse over time, leg swelling or fatigue.       Medications and Diet       ___ Make sure to take your medication as prescribed.    ___ Bring a current list of your medication and all of your medicine bottles with you to all appointments.    ___ Limit fluids if you still have swelling or shortness of breath, or if your doctor tells you to do so.    ___ Eat less than 2000 mg of sodium (salt) every day. Read food labels, and do not add salt to meals. Remember, if you eat less salt you retain less fluid.  ___ Follow a heart healthy diet that is low in saturated fat.        Activity and Suggested Lifestyle Changes      ___ Stay active. Talk to your doctor about an exercise program that is safe for your heart.  ___ Stop smoking. Reduce alcohol use.      ___ Lose weight if you are overweight. Extra weight puts a lot of stress on the heart.                 Control for Leg Swelling     ___ Keep your legs elevated (raised) as needed for swelling. If swelling is uncomfortable or elevation doesn t help, ask your doctor about using ACE wraps or support stockings.        What is the C.O.R.E.  Clinic?  Cardiomyopathy  Optimization  Rehabilitation  Education    The [facility].O.R.E. Clinic is a heart failure specialty clinic within [facility].  It is an outpatient disease management program that is based on a phase-by-phase approach, which is tailored to each patient s individual needs.  The cardiologist, nurse practitioner, physician assistant and nurses provide an ongoing outpatient care and treatment plan that guides heart failure and cardiomyopathy patients from evaluation and education to stabilization. This team works with your current primary care doctor and cardiologist to help you:    - Avoid hospitalizations  - Slow the progression of the disease  - Improve length and quality of life  - Know who and when to call if heart failure symptoms appear  - Receive easy access to quality health care and advice  - Better understand your condition and treatment  - Decrease the tremendous cost burden of heart failure care  - Detect future heart problems before they become life threatening                                 Follow-up Appointments     ___ An appointment with the [facility] Clinic may already have been made for you (see section   Your next appointments already scheduled ).  If you have a question about your appointment, would like to speak with a C.O.R.E. nurse, or would like to become a [facility].O.R.E. Clinic patient, please call one of the following locations:     - [facility]):                                             [phone]  - [facility]):                                            [phone]  - [facility] ([place]):                 [phone]      ___ Your C.O.R.E. Clinic Team will continue to educate you on your heart failure and may adjust medications based on your vital signs, lab work, and how you are feeling.  Therefore, it is very important to bring the following to all C.O.R.E. appointments:    - An  accurate list of your medications  - Your medicine bottles  - Your weight chart/calendar                   Pending Results     No orders found from 3/11/2018 to 3/14/2018.            Statement of Approval     Ordered          03/16/18 1329  I have reviewed and agree with all the recommendations and orders detailed in this document.  EFFECTIVE NOW     Approved and electronically signed by:  Brittany Stanley MD             Admission Information     Date & Time Provider Department Dept. Phone    3/13/2018 Lorri Cat DO [facility] [phone]      Your Vitals Were     Blood Pressure Pulse Temperature Respirations Height Weight    125/61 (BP Location: Left arm) 87 98.4  F (36.9  C) (Oral) 18 1.829 m (6') 93.1 kg (205 lb 3.2 oz)    Pulse Oximetry BMI (Body Mass Index)                98% 27.83 kg/m2          MyChart Information     [employer] gives you secure access to your electronic health record. If you see a primary care provider, you can also send messages to your care team and make appointments. If you have questions, please call your primary care clinic.  If you do not have a primary care provider, please call [phone] and they will assist you.        Care EveryWhere ID     This is your Care EveryWhere ID. This could be used by other organizations to access your [facility] medical records  [id number]        Equal Access to Services     RANJAN JONES : Hadii narayan Plata, waaxda luqadaha, qaybta kaalmckinley carmona, nicole lord. So [facility] [phone].    ATENCIÓN: Si habla español, tiene a fernandez disposición servicios gratuitos de asistencia lingüística. Llame al [phone].    We comply with applicable federal civil rights laws and [place] laws. We do not discriminate on the basis of race, color, national origin, age, disability, sex, sexual orientation, or gender identity.               Review of your medicines      START taking         Dose / Directions    furosemide 20 MG tablet   Commonly known as:  LASIX   Used for:  NSTEMI (non-ST elevated myocardial infarction) (H)        Dose:  20 mg   Take 1 tablet (20 mg) by mouth daily   Quantity:  30 tablet   Refills:  0       isosorbide mononitrate 60 MG 24 hr tablet   Commonly known as:  IMDUR   Used for:  NSTEMI (non-ST elevated myocardial infarction) (H)        Dose:  60 mg   Start taking on:  3/17/2018   Take 1 tablet (60 mg) by mouth daily   Quantity:  30 tablet   Refills:  0         CONTINUE these medicines which may have CHANGED, or have new prescriptions. If we are uncertain of the size of tablets/capsules you have at home, strength may be listed as something that might have changed.        Dose / Directions    metoprolol tartrate 50 MG tablet   Commonly known as:  LOPRESSOR   This may have changed:  See the new instructions.   Used for:  NSTEMI (non-ST elevated myocardial infarction) (H)        Dose:  50 mg   Take 1 tablet (50 mg) by mouth 2 times daily   Quantity:  60 tablet   Refills:  0         CONTINUE these medicines which have NOT CHANGED        Dose / Directions    allopurinol 100 MG tablet   Commonly known as:  ZYLOPRIM   Used for:  Chronic gout without tophus, unspecified cause, unspecified site        TAKE 2 TABLETS(200 MG) BY MOUTH DAILY   Quantity:  180 tablet   Refills:  3       aspirin 81 MG tablet        Dose:  81 mg   Take 1 tablet by mouth daily. with food   Refills:  0       blood glucose monitoring test strip   Commonly known as:  ACCU-CHEK COMPACT PLUS   Used for:  Type 2 diabetes mellitus with stage 3 chronic kidney disease, with long-term current use of insulin (H)        Check blood sugar 2-3 times a day   Quantity:  200 strip   Refills:  3       cetirizine 10 MG tablet   Commonly known as:  zyrTEC   Used for:  Atopic dermatitis, unspecified type, Itching        TAKE 1 TABLET(10 MG) BY MOUTH EVERY EVENING   Quantity:  30 tablet   Refills:  8       clopidogrel 75 MG  "tablet   Commonly known as:  PLAVIX   Used for:  CKD (chronic kidney disease) stage 3, GFR 30-59 ml/min, Cardiovascular disease        TAKE 1 TABLET(75 MG) BY MOUTH DAILY   Quantity:  90 tablet   Refills:  3       COD LIVER OIL PO        Dose:  1 capsule   Take 1 capsule by mouth daily   Refills:  0       hydrALAZINE 25 MG tablet   Commonly known as:  APRESOLINE   Used for:  Essential hypertension, benign        TAKE 2 TABLETS(50 MG) BY MOUTH TWICE DAILY   Quantity:  360 tablet   Refills:  3       insulin  UNIT/ML injection   Commonly known as:  HumuLIN N/NovoLIN N   Used for:  Type 2 diabetes mellitus with stage 3 chronic kidney disease, with long-term current use of insulin (H)        18 units at bedtime   Quantity:  3 Month   Refills:  3       insulin syringe-needle U-100 29G X 1/2\" 0.5 ML   Commonly known as:  BD insulin syringe   Used for:  Type 2 diabetes mellitus with stage 3 chronic kidney disease, with long-term current use of insulin (H)        Use one syringe 2 daily or as directed.   Quantity:  200 each   Refills:  4       levothyroxine 50 MCG tablet   Commonly known as:  SYNTHROID/LEVOTHROID   Used for:  Hypothyroidism, unspecified type        TAKE 1 TABLET BY MOUTH DAILY   Quantity:  30 tablet   Refills:  10       pravastatin 80 MG tablet   Commonly known as:  PRAVACHOL   Used for:  Hyperlipidemia LDL goal <70        Dose:  80 mg   Take 1 tablet (80 mg) by mouth daily at bedtime.   Quantity:  90 tablet   Refills:  1       theophylline 400 MG 24 hr tablet   Commonly known as:  UNIPHYL   Used for:  Mild persistent asthma without complication        TAKE 1 TABLET BY MOUTH EVERY DAY   Quantity:  90 tablet   Refills:  3       triamcinolone 0.5 % cream   Commonly known as:  KENALOG   Used for:  Itching        Apply sparingly to affected area two  times daily as needed for rash.   Quantity:  45 g   Refills:  1       VITAMIN B 12 PO        Dose:  500 mcg   Take 500 mcg by mouth 2 times daily.   Refills: "  0            Where to get your medicines      These medications were sent to [facility] [place], [place] - [address]  [address], [place] [address]     Phone:  [phone]     furosemide 20 MG tablet    isosorbide mononitrate 60 MG 24 hr tablet    metoprolol tartrate 50 MG tablet                Protect others around you: Learn how to safely use, store and throw away your medicines at www.disposemymeds.org.             Medication List: This is a list of all your medications and when to take them. Check marks below indicate your daily home schedule. Keep this list as a reference.      Medications           Morning Afternoon Evening Bedtime As Needed    allopurinol 100 MG tablet   Commonly known as:  ZYLOPRIM   TAKE 2 TABLETS(200 MG) BY MOUTH DAILY   Next Dose Due:  3/17/2018 9 am                                   aspirin 81 MG tablet   Take 1 tablet by mouth daily. with food   Next Dose Due:  3/17/2018 9 am                                     blood glucose monitoring test strip   Commonly known as:  ACCU-CHEK COMPACT PLUS   Check blood sugar 2-3 times a day                                cetirizine 10 MG tablet   Commonly known as:  zyrTEC   TAKE 1 TABLET(10 MG) BY MOUTH EVERY EVENING   Next Dose Due:  3/16/2018 9 pm                                     clopidogrel 75 MG tablet   Commonly known as:  PLAVIX   TAKE 1 TABLET(75 MG) BY MOUTH DAILY   Last time this was given:  75 mg on 3/16/2018  8:53 AM   Next Dose Due:  3/17/2018 9 am                                     COD LIVER OIL PO   Take 1 capsule by mouth daily   Next Dose Due:  3/17/2018 9 am                                     furosemide 20 MG tablet   Commonly known as:  LASIX   Take 1 tablet (20 mg) by mouth daily   Last time this was given:  40 mg on 3/16/2018  8:53 AM   Next Dose Due:  3/17/2018 9 am                                     hydrALAZINE 25 MG tablet   Commonly known as:  APRESOLINE   TAKE 2 TABLETS(50 MG) BY MOUTH  "TWICE DAILY   Last time this was given:  50 mg on 3/16/2018  8:52 AM   Next Dose Due:  3/16/2018 9 pm                                        insulin  UNIT/ML injection   Commonly known as:  HumuLIN N/NovoLIN N   18 units at bedtime   Next Dose Due:  3/16/2018 10 pm                                     insulin syringe-needle U-100 29G X 1/2\" 0.5 ML   Commonly known as:  BD insulin syringe   Use one syringe 2 daily or as directed.                                isosorbide mononitrate 60 MG 24 hr tablet   Commonly known as:  IMDUR   Take 1 tablet (60 mg) by mouth daily   Start taking on:  3/17/2018   Last time this was given:  60 mg on 3/16/2018  8:53 AM   Next Dose Due:  3/17/2018 9 am                                     levothyroxine 50 MCG tablet   Commonly known as:  SYNTHROID/LEVOTHROID   TAKE 1 TABLET BY MOUTH DAILY   Last time this was given:  50 mcg on 3/16/2018  8:52 AM   Next Dose Due:  3/17/2018 9 am                                     metoprolol tartrate 50 MG tablet   Commonly known as:  LOPRESSOR   Take 1 tablet (50 mg) by mouth 2 times daily   Last time this was given:  50 mg on 3/16/2018  8:53 AM   Next Dose Due:  3/16/2018 9 pm                                        pravastatin 80 MG tablet   Commonly known as:  PRAVACHOL   Take 1 tablet (80 mg) by mouth daily at bedtime.   Last time this was given:  80 mg on 3/15/2018  9:10 PM   Next Dose Due:  3/16/2018 10 pm                                     theophylline 400 MG 24 hr tablet   Commonly known as:  UNIPHYL   TAKE 1 TABLET BY MOUTH EVERY DAY   Last time this was given:  400 mg on 3/16/2018  8:52 AM   Next Dose Due:  3/17/2018 9 am                                     triamcinolone 0.5 % cream   Commonly known as:  KENALOG   Apply sparingly to affected area two  times daily as needed for rash.   Last time this was given:  3/14/2018  5:20 PM                                   VITAMIN B 12 PO   Take 500 mcg by mouth 2 times daily.   Last time this " was given:  3/16/2018 9 am   Next Dose Due:  3/16/2018 9 pm                                                  More Information        Patient Education    Isosorbide Mononitrate Oral tablet    Isosorbide Mononitrate Oral tablet, extended-release  Isosorbide Mononitrate Oral tablet  What is this medicine?  ISOSORBIDE MONONITRATE (eye luis a SOR bide mon oh LETICIA trate) is a type of vasodilator. It relaxes blood vessels, increasing the blood and oxygen supply to your heart. This medicine is used to prevent chest pain caused by angina. It will not help to stop an episode of chest pain.  This medicine may be used for other purposes; ask your health care provider or pharmacist if you have questions.  What should I tell my health care provider before I take this medicine?  They need to know if you have any of these conditions:    previous heart attack or heart failure    an unusual or allergic reaction to isosorbide mononitrate, nitrates, other medicines, foods, dyes, or preservatives    pregnant or trying to get pregnant    breast-feeding  How should I use this medicine?  Take this medicine by mouth with a glass of water. Follow the directions on the prescription label. Take your medicine at regular intervals. Do not take your medicine more often than directed. Do not stop taking this medicine suddenly or your symptoms may get worse. Ask your doctor or health care professional how to gradually reduce the dose.  Talk to your pediatrician regarding the use of this medicine in children. Special care may be needed.  Overdosage: If you think you have taken too much of this medicine contact a poison control center or emergency room at once.  NOTE: This medicine is only for you. Do not share this medicine with others.  What if I miss a dose?  If you miss a dose, take it as soon as you can. If it is almost time for your next dose, take only that dose. Do not take double or extra doses.  What may interact with this medicine?  Do not  take this medicine with any of the following medications:    medicines used to treat erectile dysfunction (ED) like avanafil, sildenafil, tadalafil, and vardenafil    riociguat  This medicine may also interact with the following medications:    medicines for high blood pressure    other medicines for angina or heart failure  This list may not describe all possible interactions. Give your health care provider a list of all the medicines, herbs, non-prescription drugs, or dietary supplements you use. Also tell them if you smoke, drink alcohol, or use illegal drugs. Some items may interact with your medicine.  What should I watch for while using this medicine?  Check your heart rate and blood pressure regularly while you are taking this medicine. Ask your doctor or health care professional what your heart rate and blood pressure should be and when you should contact him or her. Tell your doctor or health care professional if you feel your medicine is no longer working.  You may get dizzy. Do not drive, use machinery, or do anything that needs mental alertness until you know how this medicine affects you. To reduce the risk of dizzy or fainting spells, do not sit or stand up quickly, especially if you are an older patient. Alcohol can make you more dizzy, and increase flushing and rapid heartbeats. Avoid alcoholic drinks.  Do not treat yourself for coughs, colds, or pain while you are taking this medicine without asking your doctor or health care professional for advice. Some ingredients may increase your blood pressure.  What side effects may I notice from receiving this medicine?  Side effects that you should report to your doctor or health care professional as soon as possible:    bluish discoloration of lips, fingernails, or palms of hands    irregular heartbeat, palpitations    low blood pressure    nausea, vomiting    persistent headache    unusually weak or tired  Side effects that usually do not require medical  attention (report to your doctor or health care professional if they continue or are bothersome):    flushing of the face or neck    rash  This list may not describe all possible side effects. Call your doctor for medical advice about side effects. You may report side effects to FDA at 1-685-KWN-7545.  Where should I keep my medicine?  Keep out of the reach of children.  Store between 15 and 30 degrees C (59 and 86 degrees F). Keep container tightly closed. Throw away any unused medicine after the expiration date.  NOTE:This sheet is a summary. It may not cover all possible information. If you have questions about this medicine, talk to your doctor, pharmacist, or health care provider. Copyright  2016 [employer]              Weight Chart  For Patients with Heart Failure  Instructions: Weigh yourself every morning at the same time, on the same scale and in the same clothing. Write the amount on the weight chart. Bring the chart with you to your clinic visits.  Call your doctor if you:    Gain more than 2 pounds in one day or 5 pounds in one week.    Have increased shortness of breath.    Wake up short of breath or cannot sleep lying down.    Have increased swelling in your legs, ankles or abdomen (belly).   SUN MON TUES WED THURS FRI SAT   Date Weight                    Date Weight                    Date Weight                    Date Weight                    Date Weight                    Date Weight                    Date Weight                    Date Weight                    Date Weight                    Date Weight                    Date Weight                    Instructions: Weigh yourself every morning at the same time, on the same scale and in the same clothing. Write the amount on the weight chart. Bring the chart with you to your clinic visits.   SUN MON TUES WED THURS FRI SAT   Date Weight                    Date Weight                    Date Weight                    Date Weight                     Date Weight                    Date Weight                    Date Weight                    Date Weight                    Date Weight                    Date Weight                    Date Weight                    Date Weight                    Date Weight                    Date Weight                    Date Weight                    For informational purposes only. Not to replace the advice of your health care provider. Copyright   2014 [facility]. All rights reserved. [employer] 058300 - REV 03/16.            Discharge Instructions for Heart Failure  The heart is a muscle that pumps oxygen-rich blood to all parts of the body. When you have heart failure, the heart is not able to pump as well as it should. Blood and fluid may back up into the lungs (congestive heart failure), and some parts of the body don t get enough oxygen-rich blood to work normally. These problems lead to the symptoms of heart failure. Heart failure can occur due to an injury to the heart or from natural processes.  You can control symptoms of heart failure with some lifestyle changes and by following your doctor's advice.  Home care  Activity  Ask your healthcare provider about an exercise program. You can benefit from simple activities such as walking or gardening. Exercising most days of the week can make you feel better. Don't be discouraged if your progress is slow at first. Rest as needed. Stop activity if you develop symptoms such as chest pain, lightheadedness, or significant shortness of breath. Find activities that you enjoy, such as brisk walking, dancing, swimming, or gardening. These will help you stay active and strengthen your heart.  Diet  Follow a heart healthy diet. And make sure to limit the salt (sodium) in your diet. Salt causes your body to hold water. This makes your heart work harder as there is more fluid for the heart to pump. Limit your salt by doing the following:    Limit canned, dried,  packaged, and fast foods.    Don't add salt to your food.    Season foods with herbs instead of salt.    Watch how much liquids you drink. Drinking too much can make heart failure worse. Talk with your health care provider about how much you should drink each day.    Limit the amount of alcohol you drink. It may harm your heart. Women should have no more than 1 drink a day and men should have no more than 2.    When you eat out, request that your meals have no added salt.  Tobacco  If you smoke, it's very important to quit. Smoking increases your chances of having a heart attack by harming the blood vessels that provide oxygen to your heart. This makes heart failure worse. Quitting smoking is the number one thing you can do to improve your health. Enroll in a stop-smoking program to improve your chances of success. Talk with your healthcare provider about medicines or nicotine replacement therapy to help you quit smoking. Ask your healthcare provider about smoking cessation support groups.  Medicine  Take your medicines exactly as prescribed. Learn the names and purpose of each of your medicines. Keep an accurate medicine list and current dosages with you at all times. Don't skip doses. If you miss a dose of your medicine, take it as soon as you remember. If you miss a dose and it's almost time for your next dose, just wait and take your next dose at the normal time. Don't take a double dose. If you are unsure, call your doctor's office. Make sure not to mix up your medicines or forget what you've taken the same day.  Weight monitoring  Weigh yourself every day. A sudden weight gain can mean your heart failure is getting worse. Weigh yourself at the same time of day and in the same kind of clothes. Ideally, weigh yourself first thing in the morning after you empty your bladder, but before you eat breakfast. Your healthcare provider will show you how to track your weight. He or she will also discuss with you when you  should call if you have a sudden, unexpected increase in your weight.  In general, your healthcare provider may ask you to report if your weight goes up by more than 2 pounds in 1 day,  5 pounds in 1 week, or whatever weight gain you were told by your doctor. This is a sign that you are retaining more fluid than you should be. Clues to weight gain include checking your ankles for swelling, or noticing you are short of breath when you lie down.  Follow-up care  Make a follow-up appointment as directed. Depending on the type and severity of heart failure you have, you may need follow-up as early as 7 days from hospital discharge. Keep appointments for checkups and lab tests that are needed to check your medicines and condition.  Recognize that your health and even survival depend on your following medical recommendations.  Symptoms  Heart failure can cause a variety of symptoms, including:    Shortness of breath    Trouble breathing at night, especially when you lie down    Swelling in the legs and feet or in the belly (abdomen)    Becoming easily fatigued    Irregular or rapid heartbeat    Weakness or lightheadedness    Swelling of the neck veins  It is important to know what to do if symptoms get worse or if you develop signs of worsening heart failure.     When to see your healthcare provider  Call your doctor right away if you have any of these signs of worsening heart failure:    Sudden weight gain (more than 2 pounds in 1 day or 5 pounds in 1 week, or whatever weight gain you were told to report by your doctor)    Trouble breathing not related to being active    New or increased swelling of your legs or ankles    Swelling or pain in your abdomen    Breathing trouble at night (waking up short of breath, needing more pillows to breathe)    Frequent coughing that doesn't go away    Feeling much more tired than usual  Call 911  Call 911 right away if you have:    Severe shortness of breath, such that you can't catch  your breath even while resting    Severe chest pain that does not resolve with rest or nitroglycerin    Pink, foamy mucus with cough and shortness of breath    A continuous rapid or irregular heartbeat    Passing out or fainting    Stroke symptoms such as sudden numbness or weakness on one side of your face, arm, or leg or sudden confusion, trouble speaking or vision changes   Date Last Reviewed: 3/21/2016    9922-6931 [employer]. [address] [place] [address], [place] [id number]. All rights reserved. This information is not intended as a substitute for professional medical care. Always follow your healthcare professional's instructions.                Heart Failure: Being Active  You have a condition called heart failure. Being active doesn t mean that you have to wear yourself out. Even a little movement each day helps to strengthen your heart. If you can t get out to exercise, you can do simple stretching and strengthening exercises at home. These are good ways to keep you well-conditioned and prevent you and your heart from becoming excessively weak.    Ideas to get you started    Add a little movement to things you do now. Walk to mail letters. Park your car at the far end of the parking lot and walk to the store. Walk up a flight of stairs instead of taking the elevator.    Choose activities you enjoy. You might walk, swim, or ride an exercise bike. Things like gardening and washing the car count, too. Other possibilities include: washing dishes, walking the dog, walking around the mall, and doing aerobic activities with friends.    Join a group exercise program at a [facility] or [facility], a senior center, or a community center. Or look into a hospital cardiac rehabilitation program. Ask your doctor if you qualify.  Tips to keep you going    Get up and get dressed each day. Go to a coffee shop and read a newspaper or go somewhere that you'll be in the presence of other active people. You ll feel more like being  active.    Make a plan. Choose one or more activities that you enjoy and that you can easily do. Then plan to do at least one each day. You might write your plan on a calendar.    Go with a friend or a group if you like company. This can help you feel supported and stay motivated, too.    Plan social events that you enjoy. This will keep you mentally engaged as well as physically motivated to do things you find pleasure in.  For your safety    Talk with your healthcare provider before starting an exercise program.    Exercise indoors when it s too hot or too cold outside, or when the air quality is poor. Try walking at a shopping mall.    Wear socks and sturdy shoes to maintain your balance and prevent falls.    Start slowly. Do a few minutes several times a day at first. Increase your time and speed little by little.    Stop and rest whenever you feel tired or get short of breath.    Don t push yourself on days when you don t feel well.  Date Last Reviewed: 3/20/2016    2223-4692 [employer]. [address] [place] [address]. All rights reserved. This information is not intended as a substitute for professional medical care. Always follow your healthcare professional's instructions.                Heart Failure: Making Changes to Your Diet  You have a condition called heart failure. When you have heart failure, excess fluid is more likely to build up in your body because your heart isn't working well. This makes the heart work harder to pump blood. Fluid buildup causes symptoms such as shortness of breath and swelling (edema). This is often referred to as congestive heart failure or CHF. Controlling the amount of salt (sodium) you eat may help stop fluid from building up. Your doctor may also tell you to reduce the amount of fluid you drink.  Reading food labels    Your healthcare provider will tell you how much sodium you can eat each day. Read food labels to keep track. Keep in mind that  certain foods are high in salt. These include canned, frozen, and processed foods. Check the amount of sodium in each serving. Watch out for high-sodium ingredients. These include MSG (monosodium glutamate), baking soda, and sodium phosphate.   Eating less salt  Give yourself time to get used to eating less salt. It may take a little while. Here are some tips to help:    Take the saltshaker off the table. Replace it with salt-free herb mixes and spices.    Eat fresh or plain frozen vegetables. These have much less salt than canned vegetables.    Choose low-sodium snacks like sodium-free pretzels, crackers, or air-popped popcorn.    Don t add salt to your food when you re cooking. Instead, season your foods with pepper, lemon, garlic, or onion.    When you eat out, ask that your food be cooked without added salt.    Avoid eating fried foods as these often have a great deal of salt.  If you re told to limit fluids  You may need to limit how much fluid you have to help prevent swelling. This includes anything that is liquid at room temperature, such as ice cream and soup. If your doctor tells you to limit fluid, try these tips:    Measure drinks in a measuring cup before you drink them. This will help you meet daily goals.    Chill drinks to make them more refreshing.    Suck on frozen lemon wedges to quench thirst.    Only drink when you re thirsty.    Chew sugarless gum or suck on hard candy to keep your mouth moist.    Weigh yourself daily to know if your body's fluid content is rising.  My sodium goal  Your healthcare provider may give you a sodium goal to meet each day. This includes sodium found in food as well as salt that you add. My goal is to eat no more than ___________ mg of sodium per day.     When to call your doctor  Call your doctor right away if you have any symptoms of worsening heart failure. These can include:    Sudden weight gain    Increased swelling of your legs or ankles    Trouble breathing  when you re resting or at night    Increase in the number of pillows you have to sleep on    Chest pain, pressure, discomfort, or pain in the jaw, neck, or back   Date Last Reviewed: 3/21/2016    6841-7201 [employer]. [address] [place] [address], [place] [address]. All rights reserved. This information is not intended as a substitute for professional medical care. Always follow your healthcare professional's instructions.                Heart Failure: Tracking Your Weight  You have a condition called heart failure. When you have heart failure, a sudden weight gain or a steady rise in weight is a warning sign that your body is retaining too much water and salt. This could mean your heart failure is getting worse. If left untreated, it can cause problems for your lungs and result in shortness of breath. Weighing yourself each day is the best way to know if you re retaining water. If your weight goes up quickly, call your doctor. You will be given instructions on how to get rid of the excess water. You will likely need medicines and to avoid salt. This will help your heart work better.  Call your doctor if you gain more than 2 pounds in 1 day, more than 5 pounds in 1 week, or whatever weight gain you were told to report by your doctor. This is often a sign of worsening heart failure and needs to be evaluated and treated. Your doctor will tell you what to do next.   Tips for weighing yourself      Weigh yourself at the same time each morning, wearing the same clothes. Weigh yourself after urinating and before eating.    Use the same scale each day. Make sure the numbers are easy to read. Put the scale on a flat, hard surface -- not on a rug or carpet.    Do not stop weighing yourself. If you forget one day, weigh again the next morning.  How to use your weight chart    Keep your weight chart near the scale. Write your weight on the chart as soon as you get off the scale.    Fill in the month and the start date  on the chart. Then write down your weight each day. Your chart will look like this:      If you miss a day, leave the space blank. Weigh yourself the next day and write your weight in the next space.    Take your weight chart with you when you go to see your doctor.  Date Last Reviewed: 3/20/2016    0656-9491 [employer]. [address] [place] [address], [place] [id number]. All rights reserved. This information is not intended as a substitute for professional medical care. Always follow your healthcare professional's instructions.                Heart Failure: Warning Signs of a Flare-Up  You have a condition called heart failure. Once you have heart failure, flare-ups can happen. Below are signs that can mean your heart failure is getting worse. If you notice any of these warning signs, call your healthcare provider.  Swelling      Your feet, ankles, or lower legs get puffier.    You notice skin changes on your lower legs.    Your shoes feel too tight.    Your clothes are tighter in the waist.    You have trouble getting rings on or off your fingers.  Shortness of breath    You have to breathe harder even when you re doing your normal activities or when you re resting.    You are short of breath walking up stairs or even short distances.    You wake up at night short of breath or coughing.    You need to use more pillows or sit up to sleep.    You wake up tired or restless.  Other warning signs    You feel weaker, dizzy, or more tired.    You have chest pain or changes in your heartbeat.    You have a cough that won t go away.    You can t remember things or don t feel like eating.  Tracking your weight  Gaining weight is often the first warning sign that heart failure is getting worse. Gaining even a few pounds can be a sign that your body is retaining excess water and salt. Weighing yourself each day in the morning after you urinate and before you eat, is the best way to know if you're retaining water. Get a  scale that is easy to read and make sure you wear the same clothes and use the same scale every time you weigh. Your healthcare provider will show you how to track your weight. Call your doctor if you gain more than 2 pounds in 1 day, 5 pounds in 1 week, or whatever weight gain you were told to report by your doctor. This is often a sign of worsening heart failure and needs to be evaluated and treated before it compromises your breathing. Your doctor will tell you what to do next.    Date Last Reviewed: 3/15/2016    7445-6050 [employer]. [address] [place] [id number]. All rights reserved. This information is not intended as a substitute for professional medical care. Always follow your healthcare professional's instructions.

## 2018-03-13 NOTE — PHARMACY-ADMISSION MEDICATION HISTORY
Admission medication history interview status for the 3/13/2018  admission is complete. See EPIC admission navigator for prior to admission medications     Medication history source reliability:Good    Actions taken by pharmacist (provider contacted, etc):Reviewed Sure script    Additional medication history information not noted on PTA med list :None    Medication reconciliation/reorder completed by provider prior to medication history? No    Time spent in this activity: 15 min    Prior to Admission medications    Medication Sig Last Dose Taking? Auth Provider   cetirizine (ZYRTEC) 10 MG tablet TAKE 1 TABLET(10 MG) BY MOUTH EVERY EVENING 3/12/2018 at Unknown time Yes Jorge Hillman MD   clopidogrel (PLAVIX) 75 MG tablet TAKE 1 TABLET(75 MG) BY MOUTH DAILY 3/13/2018 at Unknown time Yes Jorge Hillman MD   allopurinol (ZYLOPRIM) 100 MG tablet TAKE 2 TABLETS(200 MG) BY MOUTH DAILY 3/13/2018 at Unknown time Yes Jorge Hillman MD   theophylline (UNIPHYL) 400 MG 24 hr tablet TAKE 1 TABLET BY MOUTH EVERY DAY 3/13/2018 at Unknown time Yes Jorge Hillman MD   metoprolol (LOPRESSOR) 25 MG tablet TAKE 1 TABLET(25 MG) BY MOUTH TWICE DAILY 3/13/2018 at am Yes Jorge Hillman MD   hydrALAZINE (APRESOLINE) 25 MG tablet TAKE 2 TABLETS(50 MG) BY MOUTH TWICE DAILY 3/13/2018 at am Yes Jorge Hillman MD   levothyroxine (SYNTHROID/LEVOTHROID) 50 MCG tablet TAKE 1 TABLET BY MOUTH DAILY 3/13/2018 at Unknown time Yes Jorge Hillman MD   triamcinolone (KENALOG) 0.5 % cream Apply sparingly to affected area two  times daily as needed for rash.  Yes Jorge Hillman MD   insulin NPH (HUMULIN N, NOVOLIN N) 100 UNIT/ML VIAL 18 units at bedtime 3/12/2018 at Unknown time Yes Jorge Hillman MD   pravastatin (PRAVACHOL) 80 MG tablet Take 1 tablet (80 mg) by mouth daily at bedtime. 3/12/2018 at Unknown time Yes Jorge Hillman MD   COD LIVER OIL PO Take 1 capsule by mouth daily  3/13/2018 at Unknown time Yes Reported, Patient   aspirin 81 MG tablet Take 1 tablet  "by mouth daily. with food 3/13/2018 at Unknown time Yes Joe Aguilar MD   Cyanocobalamin (VITAMIN B 12 PO) Take 500 mcg by mouth 2 times daily. 3/13/2018 at am Yes Unknown, Entered By History   blood glucose monitoring (ACCU-CHEK COMPACT PLUS) test strip Check blood sugar 2-3 times a day   Jorge Hillman MD   insulin syringe-needle U-100 (BD INSULIN SYRINGE) 29G X 1/2\" 0.5 ML Use one syringe 2 daily or as directed.   Jorge Hillman MD         "

## 2018-03-13 NOTE — IP AVS SNAPSHOT
[facility]    [address] [place], [place] [address]., [address]    [place] [address]    Phone:  [phone]                                       After Visit Summary   3/13/2018    Oscar Terrazas    MRN: [id number]           After Visit Summary Signature Page     I have received my discharge instructions, and my questions have been answered. I have discussed any challenges I see with this plan with the nurse or doctor.    ..........................................................................................................................................  Patient/Patient Representative Signature      ..........................................................................................................................................  Patient Representative Print Name and Relationship to Patient    ..................................................               ................................................  Date                                            Time    ..........................................................................................................................................  Reviewed by Signature/Title    ...................................................              ..............................................  Date                                                            Time

## 2018-03-13 NOTE — ED NOTES
Bed: ED30  Expected date:   Expected time:   Means of arrival:   Comments:  patience - 511 - 84 M diff. Breathing/chest pressure eta 1500

## 2018-03-13 NOTE — PROGRESS NOTES
RECEIVING UNIT ED HANDOFF REVIEW    ED Nurse Handoff Report was reviewed by: Karla Platt on March 13, 2018 at 5:14 PM

## 2018-03-13 NOTE — H&P
Admitted:     03/13/2018      DATE OF SERVICE:   03/13/2018.      PRIMARY CARE PHYSICIAN:  Jorge Hillman MD      CHIEF COMPLAINT:  Chest pressure and shortness of breath.      HISTORY OF PRESENT ILLNESS:  Oscar Terrazas is a very pleasant 84-year-old male with complex past medical history which includes coronary artery disease with remote history of CABG and subsequent stent placement, hypertension, hyperlipidemia, peripheral vascular disease, stage III CKD, type 2 diabetes, hypothyroidism, peripheral neuropathy and GERD among other medical problems who presents to the emergency room today for evaluation of progressive chest discomfort.  History is obtained per discussions with the patient, his daughter Yasmin at bedside and ED physician, Dr. Trierweiler.      The patient states he was in his usual state of health up until the other week.  His daughter notes he began to complain of some mild chest discomfort with exertional activities including shoveling the driveway.  Since that time he has continued to endorse intermittent episodes of chest discomfort and associated shortness of breath.  Onset was initially noted after periods of exertion, though patient states in the preceding few days he has began to notice symptoms with minimal exertion including walking around his house.  He describes feelings of chest pressure and heaviness in the middle of his chest without radiation with associated shortness of breath.  He has no dizziness or lightheadedness.  He says his symptoms have typically resolved with rest.  He endorsed having had some orthopnea several nights ago but does not relayed that complaint today.  He has been compliant with his home medications.  He has chronic bilateral lower extremity edema and has been intolerant to use of Jonathan hose.  Per his and his daughter's report, his chronic lower extremity edema is no worse than usual.  He denies any recent illness including fevers or chills.  He has had no cough.  He  does endorse some chronic constipation but no other abdominal complaints.  He has been eating and drinking normally and compliant with his medications.      In regard to his cardiac history, he has a history of 2 vessel CABG in 1996 and then subsequent stent placement in 2007 and 2012.  His last echocardiogram that I can see was in December 2010 which showed normal left ventricular systolic function.  He follows with [facility] and was last seen by Dr. Gibson on 03/30/2017.  At that time he did not endorse any symptoms concerning for ischemia.  No changes were made to his medications at that time.  He is due for his annual followup exam this coming May.       In the emergency room, he was seen and evaluated by Dr. Trierweiler.  He was initially hypertensive with a blood pressure of 175/94, his heart rate was mildly tachycardic at 107.  He is afebrile.  O2 sats were in the upper 90s on room air.  His physical exam was notable for bilateral lower extremity edema to the knees.  Labs were notable for creatinine of 2.57 which is essentially around his baseline.  His ProBNP was 10,000.  His troponin was initially elevated at 0.772.  His initial EKG showed no acute ischemic changes.  His chest x-ray showed some mixed interstitial and airspace opacities which were more pronounced in the bases on either concerning for an atypical infection versus edema.  In the emergency room, he was initiated on treatment for an NSTEMI.  He was given a full-dose aspirin and placed on a heparin drip.  He was also given 20 mg of IV labetalol for management of his hypertension.  Throughout the duration of his stay in the emergency room he has not endorsed any chest discomfort or shortness of breath.  He will be admitted to the [facility] for ongoing evaluation and care.      PAST MEDICAL HISTORY:   1.  Coronary artery disease with history of CABG in 1996 and multivessel stenting in 2007 and again in 2012.    2.  Hypertension.   3.   Hyperlipidemia.   4.  Peripheral vascular disease.   5.  Stage III chronic kidney disease.  His baseline creatinine over the course of the past year seems to be around 2.3-2.5.  6.  Type 2 diabetes.  Well managed with insulin.  Hemoglobin A1c was 6.5 in December 2017.       7.  Hypothyroidism.     8.  GERD.     9.  Peripheral neuropathy.     10.  Chronic constipation.     11.  Gout.   12.  Mild persistent asthma.      PAST SURGICAL HISTORY:     1.  Two-vessel CABG in 1996.   2.  Remote appendectomy.   3.  Hemorrhoid surgery.   4.  Surgery following a cervical strain.   5.  Right shoulder rotator cuff repair.   6.  Eye surgery, not further specified.      FAMILY HISTORY:  His father had a history of alcohol and drug use.  There was no mention of a family history of cardiovascular disease.      SOCIAL HISTORY:  The patient has a remote history of tobacco use, though quit smoking cigarettes at the age of 32.  He does not drink alcohol.  He lives at home with his daughter, Yasmin.      HOME MEDICATIONS:    Prior to Admission medications    Medication Sig Last Dose Taking? Auth Provider   cetirizine (ZYRTEC) 10 MG tablet TAKE 1 TABLET(10 MG) BY MOUTH EVERY EVENING 3/12/2018 at Unknown time Yes Jorge Hillman MD   clopidogrel (PLAVIX) 75 MG tablet TAKE 1 TABLET(75 MG) BY MOUTH DAILY 3/13/2018 at Unknown time Yes Jorge Hillman MD   allopurinol (ZYLOPRIM) 100 MG tablet TAKE 2 TABLETS(200 MG) BY MOUTH DAILY 3/13/2018 at Unknown time Yes Jorge Hillman MD   theophylline (UNIPHYL) 400 MG 24 hr tablet TAKE 1 TABLET BY MOUTH EVERY DAY 3/13/2018 at Unknown time Yes Jorge Hillman MD   metoprolol (LOPRESSOR) 25 MG tablet TAKE 1 TABLET(25 MG) BY MOUTH TWICE DAILY 3/13/2018 at am Yes Jorge Hillman MD   hydrALAZINE (APRESOLINE) 25 MG tablet TAKE 2 TABLETS(50 MG) BY MOUTH TWICE DAILY 3/13/2018 at am Yes Jorge Hillman MD   levothyroxine (SYNTHROID/LEVOTHROID) 50 MCG tablet TAKE 1 TABLET BY MOUTH DAILY 3/13/2018 at Unknown time Yes Kady  "Jorge GIL MD   triamcinolone (KENALOG) 0.5 % cream Apply sparingly to affected area two  times daily as needed for rash.   Yes Jorge Hillman MD   insulin NPH (HUMULIN N, NOVOLIN N) 100 UNIT/ML VIAL 18 units at bedtime 3/12/2018 at Unknown time Yes Jorge Hillman MD   pravastatin (PRAVACHOL) 80 MG tablet Take 1 tablet (80 mg) by mouth daily at bedtime. 3/12/2018 at Unknown time Yes Jorge Hillman MD   COD LIVER OIL PO Take 1 capsule by mouth daily  3/13/2018 at Unknown time Yes Reported, Patient   aspirin 81 MG tablet Take 1 tablet by mouth daily. with food 3/13/2018 at Unknown time Yes Joe Aguilar MD   Cyanocobalamin (VITAMIN B 12 PO) Take 500 mcg by mouth 2 times daily. 3/13/2018 at am Yes Unknown, Entered By History   blood glucose monitoring (ACCU-CHEK COMPACT PLUS) test strip Check blood sugar 2-3 times a day     Jorge Hillman MD   insulin syringe-needle U-100 (BD INSULIN SYRINGE) 29G X 1/2\" 0.5 ML Use one syringe 2 daily or as directed.     Jorge Hillman MD       ALLERGIES:  Advair, reportedly causes a cough, insomnia and nightmares.  Amlodipine causes edema, azithromycin and clarithromycin cause a GI disturbance, lisinopril and hydrochlorothiazide have resulted in worsening renal function.  Moxifloxacin caused C. diff.  Penicillin causes generalized swelling and Vioxx results and edema.      REVIEW OF SYSTEMS:  A full 10-point review of systems was discussed with the patient and negative unless otherwise stated per HPI.      PHYSICAL EXAMINATION:   VITAL SIGNS:  Temperature 97.7, pulse is 61, respirations 16, blood pressure 149/98, O2 sat is 96% on room air.   GENERAL:  The patient is a well-nourished, well-developed male who appears stated age, is lying quietly on gurney.  Alert, conversing appropriately.  Appears to be in no acute distress.   HEENT:  Pupils equal, round and accommodate, extra movements are intact.  Mucous membranes are moist.   CARDIOVASCULAR:  Heart rhythm regular, no murmurs, gallops or " rubs.  Pulses are +2 and symmetric in bilateral upper extremities.  There is +1 pitting edema of the bilateral lower extremities to the level of the knee.   RESPIRATORY:  Faint crackles are appreciated at the bilateral lung bases.  I do not appreciate any wheezes or rhonchi.  No increased work of breathing or accessory muscle use.   ABDOMEN:  Soft, nontender, nondistended.  Positive bowel sounds.   INTEGUMENTARY:  There is a superficial skin tear in his left anterior shin with serous drainage.  No purulence or erythema or signs of infection.  He also has chronic venous stasis changes bilateral lower extremities.      LABORATORY DATA AND IMAGING:  BMP shows sodium of 143, potassium 4.2, creatinine 2.57, calcium of 8.8, glucose 135.  ProBNP was 10,026.  Troponin 0.772.  CBC showed a white count 9.5, hemoglobin 10.7, platelet count 216.      IMAGING:  Chest x-ray showed mixed interstitial airspace opacities over both lungs, most pronounced in lung bases concerning for atypical infection versus edema and trace bilateral pleural effusions.      EKG:  Normal sinus rhythm with no acute ST or T-wave changes.      ASSESSMENT AND PLAN:  Oscar Terrazas is a very pleasant 84-year-old male with complex past medical history significant for coronary artery disease with history of 2 vessel CABG and multivessel disease requiring subsequent stent placement, hypertension, hyperlipidemia, peripheral vascular disease, stage III CKD, type 2 diabetes, peripheral neuropathy, GERD, hypothyroidism and asthma who presents to the emergency room today for progressive symptoms of exertional chest pain and shortness of breath with clinical picture concerning for unstable angina.  He was noted to have an elevated troponin.  He is being admitted tonight for management of an NSTEMI.   1.  Coronary artery disease, now with NSTEMI.  The patient's complex cardiac history is outlined as above.  He presents with progressive exertional chest discomfort  and dyspnea over the preceding weeks.  He notes he notes his symptoms are now developing after periods of minimal exertion.  That being said, he does not specifically endorse any chest discomfort or shortness of breath while in the emergency room today.  His initial troponin was mildly elevated at 0.772.  His EKG shows no acute ischemic changes.  In the emergency room he was given a full-dose aspirin and started on heparin drip.  Will continue heparin drip.  He is on a good regimen of medications given his history of coronary artery disease and at this juncture I will have him continue his metoprolol, aspirin, Plavix and his statin.  Will monitor on telemetry and obtain serial troponins.  An echocardiogram has been ordered.  Cardiology has been consulted for ongoing assistance with evaluation and their help is greatly appreciated.   2.  Hypertension.  Blood pressures were initially elevated on arrival despite having taken his antihypertensives this morning.  He was given 20 mg of IV labetalol x1 in the emergency room with noted improvement.  Will have him continue his metoprolol and hydralazine.  He is not on an ACE or ARB given his history of renal dysfunction.   3.  Hyperlipidemia.  His pravastatin has been continued.  We will check a repeat fasting lipid panel in the morning.   4.  Stage III chronic kidney disease.  His renal function today appears to be at his baseline.  Will monitor closely during his hospitalization.   5.  Peripheral edema.  Per his daughter's report, this is a chronic problem for him.  It sounds as though he may have been managed with diuretics in the past but these were ultimately stopped, I presume secondary to issues with his chronic renal insufficiency.  He also is noncompliant with use of Jonathan hose per his daughter's report.  He does exhibit notable peripheral edema on today's exam.  Additionally, he does endorse some orthopnea and has crackles on exam with findings of mild pulmonary  edema on his chest x-ray today.  That being said, he is not presently requiring any supplemental O2 and denies feeling short of breath.  I think we are okay to hold on initiating Lasix for now so as to avoid possibly exacerbating his renal function in the event that he needs evaluation with an angiogram, though would consider diuresis if he endorses increased work of breathing, persistent orthopnea or if he requires supplemental O2.     6.  Type 2 diabetes.  Well managed, his A1c was 6.5 in 2017.  Will have him continue his NPH 18 units at bedtime.  I have also added medium dose sliding scale insulin.   7.  Hypothyroidism.  Chronic and stable on levothyroxine which has been continued.   8.  Gout.  Will hold his allopurinol for now.  He does not exhibit any signs of a flare at this time.   9.  Asthma.  Chronic and stable.  Will hold his theophylline for now.   10.  Deep venous thrombosis prophylaxis.  He has been initiated on a heparin drip as above.      CODE STATUS:  I discussed this with patient and his daughter Yasmin at bedside today.  Per their conversations patient has expressed his desire to be DNR/DNI.  These orders have been placed.         ANAY DUMAS DO             D: 2018   T: 2018   MT: AURELIO      Name:     AKILAH BARILLAS   MRN:      [id number]        Account:      [id number]   :      1934        Admitted:     2018                   Document: [id number]       cc: Jorge Hillman MD

## 2018-03-13 NOTE — PLAN OF CARE
Problem: Cardiac: Heart Failure (Adult)  Goal: Signs and Symptoms of Listed Potential Problems Will be Absent, Minimized or Managed (Cardiac: Heart Failure)  Signs and symptoms of listed potential problems will be absent, minimized or managed by discharge/transition of care (reference Cardiac: Heart Failure (Adult) CPG).   Heart Failure Care Pathway  GOALS TO BE MET BEFORE DISCHARGE:    1. Decrease congestion and/or edema with diuretic therapy to achieve near      optimal volume status.            Dyspnea improved:  No, please explain: pt new admit             Edema improved:     No, please explain: new admit        Net I/O and Weights since admission:                       Vitals:    03/13/18 1512   Weight: 92.5 kg (204 lb)       2.  O2 sats > 92% on RA or at prior home O2 therapy level.          Current oxygenation status:       SpO2: 96 %          ,            Able to wean O2 this shift to keep sats > 92%:  Yes       Does patient use Home O2? No    3.  Tolerates ambulation and mobility near baseline: No, please explain: new admit, very weak and fatigued        How many times did the patient ambulate with nursing staff this shift? 1    Please review the Heart Failure Care Pathway for additional HF goal outcomes.    Pt newly admitted this evening with SOB and chest pressure-heparin infusing, trops elevated, BNP elevated, no c/o pain currently, left shin wound noted, pre-existing pressure ulcer noted on buttocks--WOC consulted, plan for cards to see in am, continue to monitor closely.     Karla Platt RN  3/13/2018

## 2018-03-13 NOTE — ED PROVIDER NOTES
History     Chief Complaint:    Chest Pressure    HPI   Oscar Terrazas is a 84 year old male with a complex history including coronary artery disease, acute myocardial infarction, multiple stent placements, hypertension, and diabetes, who presents to the emergency department today for evaluation of chest pressure. The patient states that a couple nights ago, he noticed some chest pressure and shortness of breath when he was getting ready for bed. He notes that he got up, walked around and then had some relief of his chest pressure. He states that when he got up this morning, he had an incredibly amount of chest pressure and tightness. He states that this pressure and tightness did not radiate anywhere else, but he did have some shortness of breath with this. He reports that when laying down, and here in the ED, he is having no chest pressure or shortness of breath. The patient is also concerned about a wound that he received to his left lower extremity about 3 days ago. He states that his daughter's new puppy jumped on him and caused a small wound on his leg that seems to be seeping more. He is concerned about the fact that this wound is not healing well.     Allergies:  Advair  Amlodipine Besylate  Azithromycin  Clarithromycin  Hydrochlorothiazide  Lisinopril  Moxifloxacin Hydrochloride  Penicillins  Vioxx      Medications:    cetirizine   clopidogrel   allopurinol   theophylline   metoprolol   hydralazine   levothyroxine   triamcinolone  Furosemide  insulin NPH   pravastatin     Past Medical History:    Acute myocardial infarction  Allergic rhinitis  Anemia   CAD   CKD   CTS   Degeneration of cervical intervertebral disc   Diaphragmatic hernia without mention of obstruction or gangrene   Esophageal reflux   Essential hypertension, benign   IDIOPATHIC CYCLIC EDEMA   Mild persistent asthma   Osteoarthrosis  Pneumonia, organism   Proteinuria   PVD    Sensorineural hearing loss  Subarachnoid bleed    Type 2  diabetes mellitus  Unspecified disorder resulting from impaired renal function   Unspecified hereditary and idiopathic peripheral neuropathy   Venous insufficiency     Past Surgical History:    Appendectomy  Hemorrhoids  Cervical Strain  Right shoulder rotator cuff surgery    Family History:    Lung Cancer - Daughter  Jaundice - Father  Alcohol/Drug - Father    Social History:  Smoking Status: Former Smoker  Smokeless Tobacco: Never Used  Alcohol Use: Negative   Marital Status:       Review of Systems   Respiratory: Positive for chest tightness and shortness of breath.    Skin: Positive for wound.   All other systems reviewed and are negative.      Physical Exam     Patient Vitals for the past 24 hrs:   BP Temp Temp src Pulse Heart Rate Resp SpO2 Height Weight   03/13/18 1730 (!) 149/98 - - - 67 - - - -   03/13/18 1715 147/74 - - - 63 - - - -   03/13/18 1700 141/64 - - - 69 - - - -   03/13/18 1645 (!) 173/98 - - - 73 - - - -   03/13/18 1628 173/80 - - - 67 16 - - -   03/13/18 1600 (!) 184/98 - - - 68 17 - - -   03/13/18 1545 (!) 175/94 - - - 107 14 96 % - -   03/13/18 1530 183/83 - - - 71 15 95 % - -   03/13/18 1515 (!) 188/94 - - - - - - - -   03/13/18 1512 (!) 188/94 97.7  F (36.5  C) Oral 77 75 18 97 % 1.829 m (6') 92.5 kg (204 lb)      Physical Exam    Eye:  Pupils are equal, round, and reactive.  Extraocular movements intact.    ENT:  No rhinorrhea.  Moist mucus membranes.  Normal tongue and tonsil.    Cardiac:  Regular rate and rhythm.  No murmurs, gallops, or rubs.    Pulmonary:  Clear to auscultation bilaterally.  No wheezes, rales, or rhonchi.    Abdomen:  Positive bowel sounds.  Abdomen is soft and non-distended, without focal tenderness.    Musculoskeletal:  Normal movement of all extremities without evidence for deficit. There is 3+ pitting edema bilaterally and symmetrically.     Skin:  Warm and dry without rashes. There is a 4 cm by 3 cm skin tear over the left shin that is weeping serous fluid.  There is mild pink blanching and surround discoloration without overt redness, heat, or purulent drainage.     Neurologic:  Non-focal exam without asymmetric weakness or numbness.     Psychiatric:  Normal affect with appropriate interaction with examiner.   Emergency Department Course   ECG:  @ 1523  Indication: shortness of breath, chest pressure   Vent. Rate 73 bpm. ME interval 152 ms. QRS duration 110 ms. QT/QTc 428/471 ms. P-R-T axis 0 -54 60.   Normal sinus rhythm. Left axis deviation. Incomplete right bundle branch block. Anteroseptal infarct, age undermined. Abnormal ECG.  No significant change when compared to previous ECG from 2/29/12   Read @ 1536 by Dr. Trierweiler.     @ 1653  Indication: Repeat EKG   Vent. Rate 109 bpm. ME interval 194 ms. QRS duration 116 ms. QT/QTc 360/484 ms. P-R-T axis * -54 93.   Sinus tachycardia with occasional premature ventricular complexes. Inferior infarct, age undetermined. Anteroseptal infarct, age undetermined Abnormal ECG.    Read @ 1653 by Dr. Trierweiler.     Imaging:  Xray chest, PA & LAT  Mixed interstitial and airspace opacities over both lungs,  most pronounced in the bases, concerning for atypical infection or  edema. Trace bilateral pleural effusions. No pneumothorax seen on  either side. Median sternotomy wires appear intact.  Report per radiology.   Radiology findings were communicated with the patient who voiced understanding of the findings.    Laboratory:  CBC:  WBC 9.5, HGB 10.7 (L),   BMP: chloride 113 (H), glucose 135 (H), BUN 51 (H), creatinine 2.57 (H), GFR estimate 24 (L), otherwise WNL  Troponin I: 0.772 (HH)  BNP: 04299 (H)    Interventions:  (1626) Heparin loading dose 5,000 units, IV    (1626) Heparin infusion 1,000 units/hr, IV   (1656) Labetalol 20 mg, IV     Emergency Department Course:  Nursing notes and vitals reviewed.  (7211) I performed an exam of the patient as documented above.    EKG was done, interpretation as above.   A  peripheral IV was established. Blood was drawn from the patient. This was sent for laboratory testing, findings above.    The patient was sent for a chest xray while in the emergency department, findings above.    (1637) I rechecked on the patient and updated them on results.   Findings and plan explained to the patient who consents to admission.   (1637) I discussed the patient with Dr. Cat of the hospitalist service, who will admit the patient to a [facility] bed for further monitoring, evaluation, and treatment.    Repeat EKG was done, interpretation as above.   Impression & Plan    Medical Decision Making:  Oscar Terrazas is a 84 year old male who presents to the emergency department today for evaluation of exertional chest pain.  The patient has a significant history of coronary artery disease.  He notes he felt well yesterday, able to go bowling.  However, he describes having chest pressure and increasing shortness of breath with any exertion today, completely resolving when he rests.  He took a full strength aspirin before coming in.  He has no complaints of discomfort while resting on the bed.  EKG was obtained which does not show any evidence of obvious ischemia.  However, his troponin does return elevated.  He was started on a heparin drip.  Chest x-ray shows some increased vasculature, but otherwise no evidence of acute intrathoracic pathology.  I feel this is unlikely to represent pulmonary embolism.  At this juncture, I spoke with Dr. Cat of the hospitalist service who agrees to admit the patient for cardiology involvement.  We are in agreement that this is most worrisome for exertional and crescendo angina and that angiogram will likely need to be performed.  The patient's questions are answered and he is comfortable with the plan for admission.    Diagnosis:    ICD-10-CM    1. NSTEMI (non-ST elevated myocardial infarction) (H) I21.4         Disposition:   Admitted to [facility].     Susanna  Disclosure:  I, Ana Laura Santana, am serving as a scribe on 3/13/2018 at 3:44 PM to personally document services performed by Dr. Trierweiler based on my observations and the provider's statements to me.      EMERGENCY DEPARTMENT       Trierweiler, Chad A, MD  03/14/18 0800

## 2018-03-14 ENCOUNTER — APPOINTMENT (OUTPATIENT)
Dept: CARDIOLOGY | Facility: CLINIC | Age: 83
DRG: 291 | End: 2018-03-14
Attending: INTERNAL MEDICINE
Payer: MEDICARE

## 2018-03-14 LAB
ANION GAP SERPL CALCULATED.3IONS-SCNC: 5 MMOL/L (ref 3–14)
BUN SERPL-MCNC: 50 MG/DL (ref 7–30)
CALCIUM SERPL-MCNC: 8.1 MG/DL (ref 8.5–10.1)
CHLORIDE SERPL-SCNC: 116 MMOL/L (ref 94–109)
CHOLEST SERPL-MCNC: 167 MG/DL
CO2 SERPL-SCNC: 24 MMOL/L (ref 20–32)
CREAT SERPL-MCNC: 2.58 MG/DL (ref 0.66–1.25)
GFR SERPL CREATININE-BSD FRML MDRD: 24 ML/MIN/1.7M2
GLUCOSE BLDC GLUCOMTR-MCNC: 124 MG/DL (ref 70–99)
GLUCOSE BLDC GLUCOMTR-MCNC: 74 MG/DL (ref 70–99)
GLUCOSE BLDC GLUCOMTR-MCNC: 90 MG/DL (ref 70–99)
GLUCOSE SERPL-MCNC: 89 MG/DL (ref 70–99)
HDLC SERPL-MCNC: 32 MG/DL
LDLC SERPL CALC-MCNC: 115 MG/DL
LMWH PPP CHRO-ACNC: 0.15 IU/ML
NONHDLC SERPL-MCNC: 135 MG/DL
POTASSIUM SERPL-SCNC: 4.2 MMOL/L (ref 3.4–5.3)
SODIUM SERPL-SCNC: 145 MMOL/L (ref 133–144)
TRIGL SERPL-MCNC: 99 MG/DL
TROPONIN I SERPL-MCNC: 0.72 UG/L (ref 0–0.04)

## 2018-03-14 PROCEDURE — 93306 TTE W/DOPPLER COMPLETE: CPT | Mod: 26 | Performed by: INTERNAL MEDICINE

## 2018-03-14 PROCEDURE — A9270 NON-COVERED ITEM OR SERVICE: HCPCS | Mod: GY | Performed by: INTERNAL MEDICINE

## 2018-03-14 PROCEDURE — 25500064 ZZH RX 255 OP 636: Performed by: INTERNAL MEDICINE

## 2018-03-14 PROCEDURE — 80061 LIPID PANEL: CPT | Performed by: INTERNAL MEDICINE

## 2018-03-14 PROCEDURE — 85520 HEPARIN ASSAY: CPT | Performed by: INTERNAL MEDICINE

## 2018-03-14 PROCEDURE — 80048 BASIC METABOLIC PNL TOTAL CA: CPT | Performed by: INTERNAL MEDICINE

## 2018-03-14 PROCEDURE — 25000128 H RX IP 250 OP 636: Performed by: INTERNAL MEDICINE

## 2018-03-14 PROCEDURE — 93306 TTE W/DOPPLER COMPLETE: CPT

## 2018-03-14 PROCEDURE — 99232 SBSQ HOSP IP/OBS MODERATE 35: CPT | Performed by: INTERNAL MEDICINE

## 2018-03-14 PROCEDURE — 99222 1ST HOSP IP/OBS MODERATE 55: CPT | Mod: 25 | Performed by: INTERNAL MEDICINE

## 2018-03-14 PROCEDURE — 25000132 ZZH RX MED GY IP 250 OP 250 PS 637: Mod: GY | Performed by: INTERNAL MEDICINE

## 2018-03-14 PROCEDURE — G0463 HOSPITAL OUTPT CLINIC VISIT: HCPCS

## 2018-03-14 PROCEDURE — 00000146 ZZHCL STATISTIC GLUCOSE BY METER IP

## 2018-03-14 PROCEDURE — 21000001 ZZH R&B HEART CARE

## 2018-03-14 PROCEDURE — 36415 COLL VENOUS BLD VENIPUNCTURE: CPT | Performed by: INTERNAL MEDICINE

## 2018-03-14 PROCEDURE — 84484 ASSAY OF TROPONIN QUANT: CPT | Performed by: INTERNAL MEDICINE

## 2018-03-14 RX ORDER — METOPROLOL TARTRATE 25 MG/1
25 TABLET, FILM COATED ORAL ONCE
Status: COMPLETED | OUTPATIENT
Start: 2018-03-14 | End: 2018-03-14

## 2018-03-14 RX ORDER — THEOPHYLLINE 400 MG/1
400 TABLET, EXTENDED RELEASE ORAL DAILY
Status: DISCONTINUED | OUTPATIENT
Start: 2018-03-14 | End: 2018-03-16 | Stop reason: HOSPADM

## 2018-03-14 RX ORDER — METOPROLOL TARTRATE 50 MG
50 TABLET ORAL 2 TIMES DAILY
Status: DISCONTINUED | OUTPATIENT
Start: 2018-03-14 | End: 2018-03-16 | Stop reason: HOSPADM

## 2018-03-14 RX ORDER — FUROSEMIDE 10 MG/ML
40 INJECTION INTRAMUSCULAR; INTRAVENOUS ONCE
Status: COMPLETED | OUTPATIENT
Start: 2018-03-14 | End: 2018-03-14

## 2018-03-14 RX ORDER — ISOSORBIDE MONONITRATE 30 MG/1
30 TABLET, EXTENDED RELEASE ORAL DAILY
Status: DISCONTINUED | OUTPATIENT
Start: 2018-03-14 | End: 2018-03-15

## 2018-03-14 RX ORDER — TRIAMCINOLONE ACETONIDE 5 MG/G
CREAM TOPICAL 2 TIMES DAILY PRN
Status: DISCONTINUED | OUTPATIENT
Start: 2018-03-14 | End: 2018-03-16 | Stop reason: HOSPADM

## 2018-03-14 RX ORDER — HYDRALAZINE HYDROCHLORIDE 20 MG/ML
10 INJECTION INTRAMUSCULAR; INTRAVENOUS EVERY 6 HOURS PRN
Status: DISCONTINUED | OUTPATIENT
Start: 2018-03-14 | End: 2018-03-16 | Stop reason: HOSPADM

## 2018-03-14 RX ADMIN — ISOSORBIDE MONONITRATE 30 MG: 30 TABLET, EXTENDED RELEASE ORAL at 13:18

## 2018-03-14 RX ADMIN — METOPROLOL TARTRATE 25 MG: 25 TABLET ORAL at 16:47

## 2018-03-14 RX ADMIN — LEVOTHYROXINE SODIUM 50 MCG: 50 TABLET ORAL at 08:24

## 2018-03-14 RX ADMIN — TRIAMCINOLONE ACETONIDE: 5 CREAM TOPICAL at 17:20

## 2018-03-14 RX ADMIN — HYDRALAZINE HYDROCHLORIDE 50 MG: 50 TABLET ORAL at 21:37

## 2018-03-14 RX ADMIN — SULFUR HEXAFLUORIDE 5 ML: KIT at 14:06

## 2018-03-14 RX ADMIN — HEPARIN SODIUM 1000 UNITS/HR: 10000 INJECTION, SOLUTION INTRAVENOUS at 10:11

## 2018-03-14 RX ADMIN — PRAVASTATIN SODIUM 80 MG: 40 TABLET ORAL at 21:37

## 2018-03-14 RX ADMIN — CYANOCOBALAMIN TAB 500 MCG 500 MCG: 500 TAB at 08:24

## 2018-03-14 RX ADMIN — METOPROLOL TARTRATE 50 MG: 50 TABLET ORAL at 21:37

## 2018-03-14 RX ADMIN — SENNOSIDES AND DOCUSATE SODIUM 2 TABLET: 8.6; 5 TABLET ORAL at 21:37

## 2018-03-14 RX ADMIN — ASPIRIN 81 MG: 81 TABLET, COATED ORAL at 08:24

## 2018-03-14 RX ADMIN — HYDRALAZINE HYDROCHLORIDE 10 MG: 20 INJECTION INTRAMUSCULAR; INTRAVENOUS at 16:47

## 2018-03-14 RX ADMIN — METOPROLOL TARTRATE 25 MG: 25 TABLET ORAL at 08:24

## 2018-03-14 RX ADMIN — CYANOCOBALAMIN TAB 500 MCG 500 MCG: 500 TAB at 21:37

## 2018-03-14 RX ADMIN — CLOPIDOGREL 75 MG: 75 TABLET, FILM COATED ORAL at 08:24

## 2018-03-14 RX ADMIN — Medication 1 LOZENGE: at 06:56

## 2018-03-14 RX ADMIN — HYDRALAZINE HYDROCHLORIDE 50 MG: 50 TABLET ORAL at 08:24

## 2018-03-14 RX ADMIN — FUROSEMIDE 40 MG: 10 INJECTION, SOLUTION INTRAVENOUS at 13:17

## 2018-03-14 NOTE — CONSULTS
[facility]    Cardiology Consultation     Date of Admission:  3/13/2018  Date of Consult (When I saw the patient): 03/14/18    Assessment & Plan   Oscar Terrazas is a 84 year old male who was admitted on 3/13/2018.    1-chest pressure and dyspnea mainly with exertion.  Unstable angina/acute coronary syndrome versus possible heart failure component.  He has extensive coronary disease history.  He has had multiple stenting was done following his CABG in 1997, last time was in 2012.   However, he has progressive chronic renal insufficiency, now stage IV and is therefore high risk candidate for any contrast associated imaging studies.  After long discussion with him and his daughter I have recommended initially trying medical therapy with several days of anticoagulation on top of his dual antiplatelet therapy, better control of blood pressure and heart rate, and addition further antianginals.  They are in agreement with this.  We will also do trial of diuresis, as his complaints are really more shortness of breath and chest discomfort.  Ejection fraction in 2012 was 50%.  Not been recently checked as he has been stable.    Plan-  -I will increase his beta-blocker  -Hydralazine has been added for blood pressure control and should be continued  -I will add isosorbide mononitrate  -We will give him some IV diuretics today and see his response to that.    2-stage IV chronic renal insufficiency.  Creatinine fairly stable the last year but is definitely higher in the last 2-3 years.    3-hypertension.  Not adequately controlled at presentation and spiking again here today.  This is sometimes aggravated by his anxiety.  We are escalating blood pressure control as noted above.    4-chronic venous insufficiency and lymphedema.  He recently got a scratch from his daughter's dog on his left heel area and that has broken down and is weeping.  Bandages in place.  He denies spreading erythema fevers or chills or  new pain there.    Sebastien Gibson M.D.    Primary Care Physician   Jorge Hillman    Reason for Consult   Reason for consult: I was asked by Dr. Cat to evaluate this patient for chest pressure and shortness of breath.    History of Present Illness   Oscar Terrazas is a 84 year old male who presents with a week of worsening chest pressure and dyspnea on exertion.  He states he is not really having this a month ago.  He is mainly noticed at the last week and it is gotten worse so it is occurring even just going to the bathroom at home.  He describes it as upper chest pressure but not discomfort, no radiation, no diaphoresis or nausea or other associated symptoms other than shortness of breath.  Resolves with rest.  Has not had at rest.  Has been without orthopnea or PND.  Has chronic venous insufficiency and does not use pressure stockings because he has difficult time with them.  He thinks that swelling has been worse.  He has not had fever chills or cough.  No flulike symptoms.  Denies palpitations dizziness or syncope.  No bleeding episodes on dual antiplatelet therapy and no falls.  His daughter is with him to help confirm the history.  A year ago he was able to work in the yard and do some lawn mowing and similar activities without dyspnea.  Troponin was abnormal initially 0.77, then fell to 0.65, but back up to 0.71 this morning without any intervening pain.  He has been hypertensive.  His flat pattern may be less specific therefore    He has chronic and worsening renal insufficiency, now stage IV, hypertension which has not always been under good control and was quite significant when he arrived here yesterday requiring IV labetalol, history of asthma for which he takes theophylline and will not use inhalers.  Does not really describe wheezing or tightness right now with his current symptoms.  He does have chronic venous insufficiency.  I have strongly recommended pressure stockings in the past. He continues  to have a fair amount of edema although he is asymptomatic from it.  However he continues to bump his legs and bruise easily and sometimes gets cuts that heal slowly.         This is a gentleman with a CABG in 1996. In 2007, he required multivessel drug-eluting stenting for recurrent angina. In 2012, he presented with an acute coronary syndrome after his Plavix was stopped for a dental procedure. At that time, his LAD and left main coronary stents were widely patent. There was a lesion at one end of the stent in the vein graft to the OM that was severe, and possibly acute thrombus, and that was successfully stented with a drug-eluting stent. He has been maintained on Plavix since then.      On a vascular study in 2008, he did have some mild peripheral vascular disease, but his ABIs with exercise were still in the 0.85 to 0.88 range.       Patient Active Problem List   Diagnosis     Allergic rhinitis     Osteoarthritis     IDIOPATHIC CYCLIC EDEMA     Essential hypertension, benign     Degeneration of cervical intervertebral disc     Diaphragmatic hernia     Esophageal reflux     Sensorineural hearing loss     Mild persistent asthma     Cardiovascular disease     Hypothyroidism     Venous insufficiency     ACP (advance care planning)     CTS (carpal tunnel syndrome)     PVD (peripheral vascular disease) (H)     TBI (traumatic brain injury) (H)     Hyperlipidemia LDL goal <70     CKD (chronic kidney disease) stage 4, GFR 15-29 ml/min (H)     CAD (coronary artery disease)     Proteinuria     Hypothyroidism, unspecified hypothyroidism type     Type 2 diabetes mellitus with stage 3 chronic kidney disease, with long-term current use of insulin (H)     Neuropathy     Chronic gout without tophus, unspecified cause, unspecified site     ACS (acute coronary syndrome) (H)       Past Medical History   I have reviewed this patient's medical history and updated it with pertinent information if needed.   Past Medical History:    Diagnosis Date     Acute myocardial infarction, unspecified site, episode of care unspecified      Allergic rhinitis, cause unspecified      Anemia 3/6/2014     CAD (coronary artery disease)     1996 CABG - LIMA to LAD, SVG to OM, 2007 Cath - KANU to Left main and ostial/prox LAD, 2012 Cath - 80-90% stenosis SVG to OM - KANU placed, EF 50%     CKD (chronic kidney disease) stage 3, GFR 30-59 ml/min 3/9/2014     CTS (carpal tunnel syndrome)     bilateral     Degeneration of cervical intervertebral disc      Diaphragmatic hernia without mention of obstruction or gangrene      Esophageal reflux      Essential hypertension, benign      Hyperlipidemia LDL goal <70      Hypothyroidism      Hypothyroidism, unspecified hypothyroidism type 1/14/2016     IDIOPATHIC CYCLIC EDEMA      Mild persistent asthma      Osteoarthrosis, unspecified whether generalized or localized, unspecified site      Pneumonia, organism unspecified(486)      Proteinuria 5/17/2014     PVD (peripheral vascular disease) (H)      Sensorineural hearing loss, unspecified      Subarachnoid bleed (H)      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 disease (goal A1C<8) 10/17/2015     Unspecified disorder resulting from impaired renal function      Unspecified hereditary and idiopathic peripheral neuropathy      Venous insufficiency        Past Surgical History   I have reviewed this patient's surgical history and updated it with pertinent information if needed.  Past Surgical History:   Procedure Laterality Date     C NONSPECIFIC PROCEDURE      appendectomy     C NONSPECIFIC PROCEDURE      hemorrhoids     C NONSPECIFIC PROCEDURE      cervical strain     C NONSPECIFIC PROCEDURE      rotator cuff surgery, right shoulder     C NONSPECIFIC PROCEDURE  2008    iol os     CORONARY ARTERY BYPASS  1996    LIMA to LAD, SVG to OM     HEART CATH STENT COR W/WO PTCA  2007    lad, left main cor artery stents/drug eluting     HEART CATH STENT COR W/WO PTCA  2012  "   80-90% stenosis SVG to OM - KANU placed, EF 50%     NONSPECIFIC PROCEDURE      iol od       Prior to Admission Medications   Prior to Admission Medications   Prescriptions Last Dose Informant Patient Reported? Taking?   COD LIVER OIL PO 3/13/2018 at Unknown time Self Yes Yes   Sig: Take 1 capsule by mouth daily    Cyanocobalamin (VITAMIN B 12 PO) 3/13/2018 at am Self Yes Yes   Sig: Take 500 mcg by mouth 2 times daily.   allopurinol (ZYLOPRIM) 100 MG tablet 3/13/2018 at Unknown time Self No Yes   Sig: TAKE 2 TABLETS(200 MG) BY MOUTH DAILY   aspirin 81 MG tablet 3/13/2018 at Unknown time Self Yes Yes   Sig: Take 1 tablet by mouth daily. with food   blood glucose monitoring (ACCU-CHEK COMPACT PLUS) test strip  Self No No   Sig: Check blood sugar 2-3 times a day   cetirizine (ZYRTEC) 10 MG tablet 3/12/2018 at Unknown time Self No Yes   Sig: TAKE 1 TABLET(10 MG) BY MOUTH EVERY EVENING   clopidogrel (PLAVIX) 75 MG tablet 3/13/2018 at Unknown time Self No Yes   Sig: TAKE 1 TABLET(75 MG) BY MOUTH DAILY   hydrALAZINE (APRESOLINE) 25 MG tablet 3/13/2018 at am Self No Yes   Sig: TAKE 2 TABLETS(50 MG) BY MOUTH TWICE DAILY   insulin NPH (HUMULIN N, NOVOLIN N) 100 UNIT/ML VIAL 3/12/2018 at Unknown time Self No Yes   Si units at bedtime   insulin syringe-needle U-100 (BD INSULIN SYRINGE) 29G X 1/2\" 0.5 ML  Self No No   Sig: Use one syringe 2 daily or as directed.   levothyroxine (SYNTHROID/LEVOTHROID) 50 MCG tablet 3/13/2018 at Unknown time Self No Yes   Sig: TAKE 1 TABLET BY MOUTH DAILY   metoprolol (LOPRESSOR) 25 MG tablet 3/13/2018 at am Self No Yes   Sig: TAKE 1 TABLET(25 MG) BY MOUTH TWICE DAILY   pravastatin (PRAVACHOL) 80 MG tablet 3/12/2018 at Unknown time Self No Yes   Sig: Take 1 tablet (80 mg) by mouth daily at bedtime.   theophylline (UNIPHYL) 400 MG 24 hr tablet 3/13/2018 at Unknown time Self No Yes   Sig: TAKE 1 TABLET BY MOUTH EVERY DAY   triamcinolone (KENALOG) 0.5 % cream  Self No Yes   Sig: Apply sparingly " to affected area two  times daily as needed for rash.      Facility-Administered Medications: None     Current Facility-Administered Medications   Medication Dose Route Frequency     theophylline  400 mg Oral Daily     metoprolol tartrate  50 mg Oral BID     isosorbide mononitrate  30 mg Oral Daily     furosemide  40 mg Intravenous Once     aspirin  324 mg Oral Once     pravastatin  80 mg Oral At Bedtime     levothyroxine  50 mcg Oral Daily     hydrALAZINE  50 mg Oral BID     clopidogrel  75 mg Oral Daily     insulin isophane human  18 Units Subcutaneous At Bedtime     cyanocolbalamin  500 mcg Oral BID     sodium chloride (PF)  3 mL Intracatheter Q8H     aspirin EC  81 mg Oral Daily     insulin aspart  1-7 Units Subcutaneous TID AC     insulin aspart  1-5 Units Subcutaneous At Bedtime     Current Facility-Administered Medications   Medication Last Rate     - MEDICATION INSTRUCTIONS -       - MEDICATION INSTRUCTIONS -       - MEDICATION INSTRUCTIONS -       Reason ACE/ARB/ARNI order not selected       - MEDICATION INSTRUCTIONS -       - MEDICATION INSTRUCTIONS -       HEParin 1,000 Units/hr (03/14/18 1011)     Allergies   Allergies   Allergen Reactions     Advair [Advair Diskus]      cough;  insomnia, nightmares     Amlodipine Besylate      edema       Azithromycin GI Disturbance     Clarithromycin      gi     Hydrochlorothiazide      hctz + lisinopril-->inc creat, k+     Lisinopril      lisinopril + hctz --> inc creat, k+     Moxifloxacin Hydrochloride      clostridium diffile colitis     Penicillins      gen swelling     Vioxx      edema       Social History    reports that he has quit smoking. His smoking use included Cigarettes. He has never used smokeless tobacco. He reports that he does not drink alcohol or use illicit drugs.    Family History   Family History   Problem Relation Age of Onset     CANCER Daughter      lung cancer     Jaundice Father      Alcohol/Drug Father        Review of Systems   The 10 point  Review of Systems is negative other than noted in the HPI or here.       Physical Exam   Vital Signs with Ranges  Temp:  [97.5  F (36.4  C)-98.1  F (36.7  C)] 98.1  F (36.7  C)  Pulse:  [77] 77  Heart Rate:  [] 103  Resp:  [14-21] 18  BP: (129-188)/(63-99) 154/94  SpO2:  [92 %-97 %] 97 %  Vitals:    03/13/18 1512 03/14/18 0630   Weight: 92.5 kg (204 lb) 95.7 kg (211 lb)     I/O last 3 completed shifts:  In: 280 [P.O.:280]  Out: 700 [Urine:700]    Vitals: BP (!) 154/94 (BP Location: Left arm)  Pulse 77  Temp 98.1  F (36.7  C) (Oral)  Resp 18  Ht 1.829 m (6')  Wt 95.7 kg (211 lb)  SpO2 97%  BMI 28.62 kg/m2    Constitutional: Normally developed, no acute distress, normal weight and appearance    Skin: There is a bandage over the erythematous area in his left pretibial region.  There are chronic stasis changes left and right pretibial region.  Surrounding the bandage there is no evidence of cellulitis.  Otherwise skin clear    Head/Eyes/ENT:   No trauma, cyanosis, pallor.  Throat clear.  No icterus    Neck:   Supple.  Grossly normal carotid upstrokes without bruits.  No masses    Chest:   Moderately diffusely decreased air movement.  A few basilar crackles mainly on the right.  No wheeze.  No increased effort    Cardiac: Regular rhythm.  Faint systolic murmur base and left sternal border.  JVD is elevated at 7 cm with prominent HJR.  No heave    Abdomen:   Nondistended, nontender, no masses or bruit    Vascular: Carotid and femoral pulses are normal without bruits.  Pedal pulses somewhat diminished but there is edema present overlying    Extremities and Back:   No marked deformities.  Chronic stasis changes almost to the knee bilaterally.  Pitting as well as hard 2+ edema three quarters of the way to the knees bilaterally.    Neurological:   Alert and oriented ×3.  Extra ocular motions intact.  Symmetrical feces.  Good historian and fluent speech.  Upper and lower motor strength and tone symmetrical and  grossly intact without focal findings      Recent Labs  Lab 03/14/18  0300 03/13/18  2210 03/13/18  1515   TROPI 0.716* 0.653* 0.772*         Recent Labs  Lab 03/14/18  0525 03/14/18  0300 03/13/18  2210 03/13/18  1925 03/13/18  1515   WBC  --   --   --  9.1 9.5   HGB  --   --   --  10.4* 10.7*   MCV  --   --   --  93 93   PLT  --   --   --  213 216   *  --   --   --  143   POTASSIUM 4.2  --   --   --  4.2   CHLORIDE 116*  --   --   --  113*   CO2 24  --   --   --  22   BUN 50*  --   --   --  51*   CR 2.58*  --   --   --  2.57*   GFRESTIMATED 24*  --   --   --  24*   GFRESTBLACK 29*  --   --   --  29*   ANIONGAP 5  --   --   --  8   LEV 8.1*  --   --   --  8.8   GLC 89  --   --   --  135*   TROPI  --  0.716* 0.653*  --  0.772*       Imaging:  Recent Results (from the past 48 hour(s))   Chest XR,  PA & LAT    Narrative    XR CHEST 2 VW 3/13/2018 4:10 PM    COMPARISON: 8/25/2017    HISTORY: Chest pain.       Impression    IMPRESSION: Mixed interstitial and airspace opacities over both lungs,  most pronounced in the bases, concerning for atypical infection or  edema. Trace bilateral pleural effusions. No pneumothorax seen on  either side. Median sternotomy wires appear intact.    FRANCINE MEADOWS MD       Echo:  No results found for this or any previous visit (from the past 4320 hour(s)).

## 2018-03-14 NOTE — PLAN OF CARE
Problem: Patient Care Overview  Goal: Plan of Care/Patient Progress Review  Outcome: Improving  Vs overnight recorded, hypertensive in the early evening complaining of sob. Repositioned and stated he felt better. bp returned to normal limits and breathing was fine. No o2 required sats above 94%. Hep gtt continues at 10ml/hr no adjustments made overnight after 10 a. Due recheck this am again. WOC ordered for pressure ulcer on left shin and coccyx, both pre existing. Tele overnight sr with bbb, will continue to monitor. Plan to be seen by cards today and an echo. trops 0.772, 0.653, 0.716. Dr Webb contacted regarding elevated trop, continue to monitor pt asymptomatic. NPO as a precaution.

## 2018-03-14 NOTE — PLAN OF CARE
Problem: Cardiac: Heart Failure (Adult)  Goal: Signs and Symptoms of Listed Potential Problems Will be Absent, Minimized or Managed (Cardiac: Heart Failure)  Signs and symptoms of listed potential problems will be absent, minimized or managed by discharge/transition of care (reference Cardiac: Heart Failure (Adult) CPG).   Outcome: No Change  C/o SOB with activity. Received IV lasix this afternoon with good diuresis. Cont to monitor

## 2018-03-14 NOTE — PROVIDER NOTIFICATION
Dr Webb notified of rise in troponin 0.716  Pt asymptomatic.    Spoke to DR Mena , continue to monitor.

## 2018-03-14 NOTE — PROGRESS NOTES
[facility] Nurse Inpatient Adult Pressure Injury (PI) Assessment     Initial Assessment of PI(s) on pt's:   Medial, right gluteal tissue, just above gluteal fold    Data:   Patient History:      per MD note(s): -year-old male with complex past medical history which includes coronary artery disease with remote history of CABG and subsequent stent placement, hypertension, hyperlipidemia, peripheral vascular disease, stage III CKD, type 2 diabetes, hypothyroidism, peripheral neuropathy and GERD among other medical problems who presents to the emergency room today for evaluation of progressive chest discomfort     Naman Assessment and sub scores:   Naman Score  Av  Min: 19  Max: 19    Positioning: Pillows,     Mattress:  Standard , Atmos Air mattress    Moisture Management:  N/a, pt continent of urine and stool    Current Diet / Nutrition:           Active Diet Order      Combination Diet Low Saturated Fat Na <2400mg Diet    Labs:   Recent Labs   Lab Test  18   1925   17   0910   13   1305   ALBUMIN   --    --   3.4   < >   --    HGB  10.4*   < >   --    < >  13.9   INR   --    --    --    --   1.06   WBC  9.1   < >   --    < >  9.1   A1C  6.3*   --   6.5*   < >   --     < > = values in this interval not displayed.                                                                                                                          Pressure Injury Assessment  (location):    Medial, right gluteal tissue, just above gluteal fold    Wound History:   Pt reports has had a little wound on his butt for years.   Said he tries to lay on his right side in bed, when I told him the wound was on the right side he was surprised    Wound Base:  Small oval of dry , red dermal tissue, 0.7cm x 0.3cm x 0.2cm    Tunneling:  N/A    Undermining: N/A    Palpation of the wound bed:  normal    Slough appearance:  none    Eschar appearance:  none    Periwound Skin: intact,      Color: normal and  "consistent with surrounding tissue    Temperature  normal     Drainage:  none         Odor: none    Pain:  absent ,          Intervention:     Patient's chart evaluated.      Naman Interventions:  Current Naman Interventions and Care Plan reviewed and updated, appropriate at this time.    Wound assessed as noted above    Orders  Written    Discussed plan of care with Patient, Family and Nurse           Assessment:     Pressure Injury (PI) located on right fleshy buttock just above gluteal fold, near midline: community acquired stage 2 pressure injury appears clean without s/s of infection.  No dressing necessary.  Discussed with pt importance of vigilant PIP measures.            Plan:     Nursing to notify the Provider(s) and re-consult the [facility] Nurse if wound(s) deteriorate(s)or if the wound care plan needs reevaluation.    If pt is refusing to turn or reposition they must be educated on the  potential injury from not off loading pressure.  Then this \"educated refusal\" needs to be documented as an \"educated refusal to turn/ reposition\" and document if alert, etc.  Pressure INJURY Prevention Measures (PIP):  If pt is refusing to turn or reposition they must be educated on the  potential injury from not off loading pressure.  Then this \"educated refusal\" needs to be documented as an \"educated refusal to turn/ reposition\" and document if alert, etc.    1. Repositioning:  Pt must be repositioned for good skin health.  If pt refusing or this is not being done then the charge nurse, nurse manager and md need to be notified to help intervene and to know that there is an issue    Bed:  Reposition pt MINIMALLY every 1-2 hours in bed to relieve pressure and promote perfusion to tissue. Also try to position to get airflow to pt s backside, etc. If necessary consider use of Fluidized Positioner Pads ((259435 small/ [id number] med/ [id number] large) to help maintain pt in good offloading-position.               Use pillows in the " "lower back and upper thighs to support postioning but keep pillows off butt to minimize any pressure.    Chair:  When up to chair pt should not sit for longer than one hour total before either standing or returning to bed for at least 10 minutes, again to relieve pressure and promote perfusion to tissue.     o Pt should also sit on a chair cushion (#[id number]) when up to the chair.  o Do NOT use a donut to sit on.  This concentrates pressure in a smaller area and creates a higher potential for injury where the cushion is.   o When pt returns to bed he/she should be positioned on side  o Do not sit on a Z-Flow Pad.  This is not a chair cushion, it is for positioning  If pt is refusing to turn or reposition they must be educated on the  potential injury from not off loading pressure.  Then this \"educated refusal\" needs to be documented as an \"educated refusal to turn/ reposition\" and document if alert, etc.  2.  Patient's skin must be kept clean and dry- the areas are only clean when you see the base of the skin fold, especially the perineal area.  Moist, macerated tissue is more susceptible to injury.           Follow Incontinence Protocol found in [employer].  Dust with baby powder BID to help with chaffing, decrease warmth from friction and increase comfort.         NO BRIEFS WITH CATHETERS  3.  Follow the bed algorithm to consider a specialty mattress  4.  If able keep head of bed below 30 degrees.   5.  Follow Naman Risk recommendations  6.  Be aware of the bony prominences!    Elevate heels at all times, consider using heel lift boots, Jozef or Rooke    Apply skin prep to bony prominences such as elbows, heels, hips- and consider wearing long sleeves and/or pants at al times  7.  Remember to perform full skin inspection 2 x / day- unless ordered NOT to order an area skin must be assessed.  If not able to do a full skin inspection then document full inspection along with the exception of what was not assessed. "         WOC Nurse will return: weekly and prn

## 2018-03-14 NOTE — PROGRESS NOTES
[facility]    Hospitalist Progress Note    Assessment & Plan   Oscar Terrazas is a 84 year old male who was admitted on 3/13/2018.      Oscar Terrazas is a very pleasant 84-year-old male with complex past medical history significant for coronary artery disease with history of 2 vessel CABG and multivessel disease requiring subsequent stent placement, hypertension, hyperlipidemia, peripheral vascular disease, stage III CKD, type 2 diabetes, peripheral neuropathy, GERD, hypothyroidism and asthma who presented to the emergency room 3/13/18  for progressive symptoms of exertional chest pain and shortness of breath with clinical picture concerning for unstable angina.  He was noted to have an elevated troponin    Coronary artery disease, with exertional chest pressure and dyspnea  -appreciate cardiology seeing pt.  Pt is followed by Dr. Rand and has a history of 2v Cabg in 1996, stents in 2007, 2012  -   progressive exertional chest discomfort and dyspnea over the preceding weeks.  He notes he notes his symptoms are now developing after periods of minimal exertion.     initial troponin was mildly elevated at 0.772, follow-up troponins 0.716<0.653   -EKG shows no acute ischemic changes.  In the emergency room he was given a full-dose aspirin and started on heparin drip.  Will continue heparin drip.    - continue his metoprolol, aspirin, Plavix and his statin.    -cards has seen and given his renal function is planning on a few days of medical management, including better blood pressure control  -cards has increasedbeta blocker, added hydralazine and isosorbid mononitrate  -cards has also added iv diuretics in case CHF is also contributing  -cards is also continuing the heparin for now  -echo result pending    2.  Hypertension.  Blood pressures were initially elevated on arrival despite having taken his antihypertensives in the morning.  He was given 20 mg of IV labetalol x1 in the emergency room with  noted improvement.  Will have him continue his metoprolol and hydralazine.  He is not on an ACE or ARB given his history of renal dysfunction.  -as above cardiology has inc beta blocker and added hydralazine     3.  Hyperlipidemia.  His pravastatin has been continued.   -cholesterol 167, HDL 32, , trig 99    4.  Stage III chronic kidney disease.  His renal function today appears to be at his baseline.  Will monitor closely during his hospitalization.   -  5.  Peripheral edema.  Per his daughter's report, this is a chronic problem for him.  It sounds as though he may have been managed with diuretics in the past but these were ultimately stopped,  presume secondary to issues with his chronic renal insufficiency.  He also is noncompliant with use of Jonathan hose per his daughter's report.     6.  Type 2 diabetes.  Well managed, his A1c was 6.5 in December 2017.  Will have him continue his NPH 18 units at bedtime.    -medium dose sliding scale insulin.   -glucoses today 74, 90    7.  Hypothyroidism.  Chronic and stable on levothyroxine which has been continued.     8.  Gout.   hold  allopurinol for now.  He does not exhibit any signs of a flare at this time.   9.  Asthma.  Chronic and stable  -pt asked that theophylline be continued and cardiology is ok with restarting this  10.  Deep venous thrombosis prophylaxis.  He has been initiated on a heparin drip as above.         # Pain Assessment:   No pain currently       Code Status: DNR/DNI    Disposition: Expected discharge next 2-3 days    Text Page (7am - 6pm)  Loretta Culp MD    Interval History   Pt wants theophylline started.  No chest pain currently, resting comfortably.    -Data reviewed today: I reviewed all new labs and imaging results over the last 24 hours. I personally reviewed no images or EKG's today.    Physical Exam   Temp: 98.1  F (36.7  C) Temp src: Oral BP: (!) 154/94 Pulse: 77 Heart Rate: 103 Resp: 18 SpO2: 97 % O2 Device: None (Room air)     Vitals:    03/13/18 1512 03/14/18 0630   Weight: 92.5 kg (204 lb) 95.7 kg (211 lb)     Vital Signs with Ranges  Temp:  [97.5  F (36.4  C)-98.1  F (36.7  C)] 98.1  F (36.7  C)  Pulse:  [77] 77  Heart Rate:  [] 103  Resp:  [14-21] 18  BP: (129-188)/(63-99) 154/94  SpO2:  [92 %-97 %] 97 %  I/O last 3 completed shifts:  In: 280 [P.O.:280]  Out: 700 [Urine:700]    Constitutional: alert  Respiratory: clear to auscultation, no wheezing or rhonchi   Cardiovascular: regular rate and rhythm without murmer or rub   GI:positive bowel sounds, non-tender   Skin/Integumen: no rashes    Other:        Medications     - MEDICATION INSTRUCTIONS -       - MEDICATION INSTRUCTIONS -       - MEDICATION INSTRUCTIONS -       Reason ACE/ARB/ARNI order not selected       - MEDICATION INSTRUCTIONS -       - MEDICATION INSTRUCTIONS -       HEParin 1,000 Units/hr (03/14/18 0654)       aspirin  324 mg Oral Once     pravastatin  80 mg Oral At Bedtime     metoprolol tartrate  25 mg Oral BID     levothyroxine  50 mcg Oral Daily     hydrALAZINE  50 mg Oral BID     clopidogrel  75 mg Oral Daily     insulin isophane human  18 Units Subcutaneous At Bedtime     cyanocolbalamin  500 mcg Oral BID     sodium chloride (PF)  3 mL Intracatheter Q8H     aspirin EC  81 mg Oral Daily     insulin aspart  1-7 Units Subcutaneous TID AC     insulin aspart  1-5 Units Subcutaneous At Bedtime       Data     Recent Labs  Lab 03/14/18  0525 03/14/18  0300 03/13/18  2210 03/13/18  1925 03/13/18  1515   WBC  --   --   --  9.1 9.5   HGB  --   --   --  10.4* 10.7*   MCV  --   --   --  93 93   PLT  --   --   --  213 216   *  --   --   --  143   POTASSIUM 4.2  --   --   --  4.2   CHLORIDE 116*  --   --   --  113*   CO2 24  --   --   --  22   BUN 50*  --   --   --  51*   CR 2.58*  --   --   --  2.57*   ANIONGAP 5  --   --   --  8   LEV 8.1*  --   --   --  8.8   GLC 89  --   --   --  135*   TROPI  --  0.716* 0.653*  --  0.772*       Recent Results (from the past 24  hour(s))   Chest XR,  PA & LAT    Narrative    XR CHEST 2 VW 3/13/2018 4:10 PM    COMPARISON: 8/25/2017    HISTORY: Chest pain.       Impression    IMPRESSION: Mixed interstitial and airspace opacities over both lungs,  most pronounced in the bases, concerning for atypical infection or  edema. Trace bilateral pleural effusions. No pneumothorax seen on  either side. Median sternotomy wires appear intact.    FRANCINE MEADOWS MD

## 2018-03-14 NOTE — PLAN OF CARE
Problem: Patient Care Overview  Goal: Plan of Care/Patient Progress Review  OT/CR: Orders received and chart reviewed. Pt yet to see cardiologist, Will hold evaluation until after and until plan of care established.

## 2018-03-15 ENCOUNTER — APPOINTMENT (OUTPATIENT)
Dept: OCCUPATIONAL THERAPY | Facility: CLINIC | Age: 83
DRG: 291 | End: 2018-03-15
Attending: INTERNAL MEDICINE
Payer: MEDICARE

## 2018-03-15 LAB
ANION GAP SERPL CALCULATED.3IONS-SCNC: 8 MMOL/L (ref 3–14)
BUN SERPL-MCNC: 46 MG/DL (ref 7–30)
CALCIUM SERPL-MCNC: 8.7 MG/DL (ref 8.5–10.1)
CHLORIDE SERPL-SCNC: 108 MMOL/L (ref 94–109)
CO2 SERPL-SCNC: 24 MMOL/L (ref 20–32)
CREAT SERPL-MCNC: 2.53 MG/DL (ref 0.66–1.25)
GFR SERPL CREATININE-BSD FRML MDRD: 24 ML/MIN/1.7M2
GLUCOSE BLDC GLUCOMTR-MCNC: 125 MG/DL (ref 70–99)
GLUCOSE BLDC GLUCOMTR-MCNC: 125 MG/DL (ref 70–99)
GLUCOSE BLDC GLUCOMTR-MCNC: 165 MG/DL (ref 70–99)
GLUCOSE BLDC GLUCOMTR-MCNC: 173 MG/DL (ref 70–99)
GLUCOSE BLDC GLUCOMTR-MCNC: 197 MG/DL (ref 70–99)
GLUCOSE SERPL-MCNC: 148 MG/DL (ref 70–99)
LMWH PPP CHRO-ACNC: 0.18 IU/ML
POTASSIUM SERPL-SCNC: 4.3 MMOL/L (ref 3.4–5.3)
SODIUM SERPL-SCNC: 140 MMOL/L (ref 133–144)

## 2018-03-15 PROCEDURE — 25000132 ZZH RX MED GY IP 250 OP 250 PS 637: Mod: GY | Performed by: INTERNAL MEDICINE

## 2018-03-15 PROCEDURE — 25000131 ZZH RX MED GY IP 250 OP 636 PS 637: Mod: GY | Performed by: INTERNAL MEDICINE

## 2018-03-15 PROCEDURE — 97535 SELF CARE MNGMENT TRAINING: CPT | Mod: GO | Performed by: OCCUPATIONAL THERAPIST

## 2018-03-15 PROCEDURE — 21000001 ZZH R&B HEART CARE

## 2018-03-15 PROCEDURE — 99232 SBSQ HOSP IP/OBS MODERATE 35: CPT | Performed by: INTERNAL MEDICINE

## 2018-03-15 PROCEDURE — A9270 NON-COVERED ITEM OR SERVICE: HCPCS | Mod: GY | Performed by: INTERNAL MEDICINE

## 2018-03-15 PROCEDURE — 36415 COLL VENOUS BLD VENIPUNCTURE: CPT | Performed by: INTERNAL MEDICINE

## 2018-03-15 PROCEDURE — 85520 HEPARIN ASSAY: CPT | Performed by: INTERNAL MEDICINE

## 2018-03-15 PROCEDURE — 97110 THERAPEUTIC EXERCISES: CPT | Mod: GO | Performed by: OCCUPATIONAL THERAPIST

## 2018-03-15 PROCEDURE — 80048 BASIC METABOLIC PNL TOTAL CA: CPT | Performed by: INTERNAL MEDICINE

## 2018-03-15 PROCEDURE — 25000128 H RX IP 250 OP 636: Performed by: INTERNAL MEDICINE

## 2018-03-15 PROCEDURE — 99207 ZZC CDG-CODE CATEGORY CHANGED: CPT | Performed by: INTERNAL MEDICINE

## 2018-03-15 PROCEDURE — 00000146 ZZHCL STATISTIC GLUCOSE BY METER IP

## 2018-03-15 PROCEDURE — 97165 OT EVAL LOW COMPLEX 30 MIN: CPT | Mod: GO | Performed by: OCCUPATIONAL THERAPIST

## 2018-03-15 PROCEDURE — 40000133 ZZH STATISTIC OT WARD VISIT: Performed by: OCCUPATIONAL THERAPIST

## 2018-03-15 RX ORDER — ISOSORBIDE MONONITRATE 60 MG/1
60 TABLET, EXTENDED RELEASE ORAL DAILY
Status: DISCONTINUED | OUTPATIENT
Start: 2018-03-16 | End: 2018-03-16 | Stop reason: HOSPADM

## 2018-03-15 RX ORDER — ISOSORBIDE MONONITRATE 30 MG/1
30 TABLET, EXTENDED RELEASE ORAL ONCE
Status: COMPLETED | OUTPATIENT
Start: 2018-03-15 | End: 2018-03-15

## 2018-03-15 RX ORDER — FUROSEMIDE 40 MG
40 TABLET ORAL DAILY
Status: DISCONTINUED | OUTPATIENT
Start: 2018-03-15 | End: 2018-03-16 | Stop reason: HOSPADM

## 2018-03-15 RX ADMIN — ISOSORBIDE MONONITRATE 30 MG: 30 TABLET, EXTENDED RELEASE ORAL at 15:44

## 2018-03-15 RX ADMIN — HYDRALAZINE HYDROCHLORIDE 50 MG: 50 TABLET ORAL at 21:17

## 2018-03-15 RX ADMIN — CLOPIDOGREL 75 MG: 75 TABLET, FILM COATED ORAL at 08:00

## 2018-03-15 RX ADMIN — PRAVASTATIN SODIUM 80 MG: 40 TABLET ORAL at 21:10

## 2018-03-15 RX ADMIN — METOPROLOL TARTRATE 50 MG: 50 TABLET ORAL at 08:00

## 2018-03-15 RX ADMIN — Medication 2.5 MG: at 22:42

## 2018-03-15 RX ADMIN — ASPIRIN 81 MG: 81 TABLET, COATED ORAL at 08:00

## 2018-03-15 RX ADMIN — METOPROLOL TARTRATE 50 MG: 50 TABLET ORAL at 21:17

## 2018-03-15 RX ADMIN — HYDRALAZINE HYDROCHLORIDE 50 MG: 50 TABLET ORAL at 08:00

## 2018-03-15 RX ADMIN — Medication 2.5 MG: at 01:53

## 2018-03-15 RX ADMIN — ISOSORBIDE MONONITRATE 30 MG: 30 TABLET, EXTENDED RELEASE ORAL at 08:00

## 2018-03-15 RX ADMIN — CYANOCOBALAMIN TAB 500 MCG 500 MCG: 500 TAB at 21:10

## 2018-03-15 RX ADMIN — ACETAMINOPHEN 650 MG: 325 TABLET, FILM COATED ORAL at 22:42

## 2018-03-15 RX ADMIN — LEVOTHYROXINE SODIUM 50 MCG: 50 TABLET ORAL at 08:00

## 2018-03-15 RX ADMIN — INSULIN ASPART 2 UNITS: 100 INJECTION, SOLUTION INTRAVENOUS; SUBCUTANEOUS at 16:58

## 2018-03-15 RX ADMIN — HEPARIN SODIUM 1000 UNITS/HR: 10000 INJECTION, SOLUTION INTRAVENOUS at 11:05

## 2018-03-15 RX ADMIN — CYANOCOBALAMIN TAB 500 MCG 500 MCG: 500 TAB at 08:01

## 2018-03-15 RX ADMIN — THEOPHYLLINE 400 MG: 400 TABLET, EXTENDED RELEASE ORAL at 08:00

## 2018-03-15 RX ADMIN — FUROSEMIDE 40 MG: 40 TABLET ORAL at 15:43

## 2018-03-15 NOTE — PROGRESS NOTES
[facility]    Hospitalist Progress Note    Assessment & Plan        Oscar Terrazas is a very pleasant 84-year-old male with complex past medical history significant for coronary artery disease with history of 2 vessel CABG and multivessel disease requiring subsequent stent placement, hypertension, hyperlipidemia, peripheral vascular disease, stage III CKD, type 2 diabetes, peripheral neuropathy, GERD, hypothyroidism and asthma who presented to the emergency room 3/13/18  for progressive symptoms of exertional chest pain and shortness of breath with clinical picture concerning for unstable angina.  He was noted to have an elevated troponin     Acute coronary syndrome :   acute on chronic diastolic congestive heart failure  -Patient has extensive cardiac history with history of coronary artery bypass graft in 1996 and stents in 2007 and 2012.  Being followed by Dr. Rand   -   Presented with progressive exertional chest discomfort and dyspnea over the preceding weeks.    initial troponin was mildly elevated at 0.772, follow-up troponins 0.716<0.653   -EKG shows no acute ischemic changes.  Initiated on  heparin drip.    - Currently on Imdur, metoprolol, aspirin, Plavix and his statin.  Oral Lasix   -Cardiology following and planning for medical management given his renal dysfunction and risk of his cardiac medication.  - the heparin for now.   - Echo with EF of 55 -60% and moderate hypokinesis of the basal and inferior lateral wall  -Further recommendation per cardiology     Hypertension.    Blood pressures were initially elevated   Currently on beta-blocker,  Imdur , Oral Lasix.  Currently holding ACE or ARB given his history of renal dysfunction.  Blood pressure reasonable       Hyperlipidemia.   -Continue PTA pravastatin     Stage III chronic kidney disease.  -Renal function stable.  Continue to monitor while diuresing.  Avoid nephrotoxins       Peripheral edema.    Per his daughter's report, this  is a chronic problem for him.  It sounds as though he may have been managed with diuretics in the past but these were ultimately stopped,  presume secondary to issues with his chronic renal insufficiency.  He also is noncompliant with use of Jonathan hose per his daughter's report.       Type 2 diabetes.     A1c was 6.5 in December 2017.     PTA on NPH 18 units at bedtime Which is continued.  Continue with medium dose sliding scale.  Blood sugar mainly less than 180   Monitor and adjust medication as needed       Hypothyroidism.  Chronic and stable on levothyroxine which has been continued.      Gout.    PTA allopurinol currently on hold   -.  No evidence of flare at this point     Asthma.  Chronic and stable  -PTA theophylline resumed once cleared by cardiology    Decubitus ulcer present on admission  -Wound care following, he also has a nonpressure/venous stasis ulcer on his lower extremity which is being monitored  Pain assessment :  Current Pain Score 3/15/2018 3/15/2018 3/14/2018   Patient currently in pain? denies denies denies   Pain score (0-10) - - -   Pain location - - -   Oscar ogden pain level was assessed and he currently denies pain.      DVT Prophylaxis: Heparin drip  Code Status: DNR/DNI    Disposition: Expected discharge in 2-3 days once cleared by cardiology patient reports feeling better.  No new nursing concerns..    Brittany Stanley MD  Text page (7am - 6pm)    Interval History   Patient reports feeling better.  No new nursing concerns.    -Data reviewed today: I reviewed all new labs and imaging results over the last 24 hours. I personally reviewed no images or EKG's today.    Physical Exam   Temp: 98.4  F (36.9  C) Temp src: Oral BP: 144/82 Pulse: 74 Heart Rate: 68 Resp: 18 SpO2: 95 % O2 Device: None (Room air)    Vitals:    03/13/18 1512 03/14/18 0630 03/15/18 0500   Weight: 92.5 kg (204 lb) 95.7 kg (211 lb) 94.4 kg (208 lb 3.2 oz)     Vital Signs with Ranges  Temp:  [97.7  F (36.5  C)-98.6  F (37  C)]  98.4  F (36.9  C)  Pulse:  [74] 74  Heart Rate:  [68-87] 68  Resp:  [18] 18  BP: (144-182)/(71-92) 144/82  SpO2:  [93 %-98 %] 95 %  I/O last 3 completed shifts:  In: 440 [P.O.:440]  Out: 2500 [Urine:2500]    Exam:  Constitutional: Awake, alert and no distress. Appears comfortable  Head: Normocephalic. No masses, lesions, tenderness or abnormalities  ENT: no neck nodes or sinus tenderness  Cardiovascular: RRR.  2+ murmurs, no rub or gallop no JVD  Respiratory: Normal WOB, no wheezes or crackles   Gastrointestinal: Abdomen soft, non-tender. BS normal. No masses, organomegaly  : Deferred  Extremities: 1-2+ edema, there is about 3 cm lesion on his left seen, no clubbing /cyanosis   Skin: Warm and dry, no rash        Medications     - MEDICATION INSTRUCTIONS -       - MEDICATION INSTRUCTIONS -       - MEDICATION INSTRUCTIONS -       Reason ACE/ARB/ARNI order not selected       - MEDICATION INSTRUCTIONS -       - MEDICATION INSTRUCTIONS -       HEParin 1,000 Units/hr (03/15/18 1105)       furosemide  40 mg Oral Daily     [START ON 3/16/2018] isosorbide mononitrate  60 mg Oral Daily     isosorbide mononitrate  30 mg Oral Once     theophylline  400 mg Oral Daily     metoprolol tartrate  50 mg Oral BID     pravastatin  80 mg Oral At Bedtime     levothyroxine  50 mcg Oral Daily     hydrALAZINE  50 mg Oral BID     clopidogrel  75 mg Oral Daily     insulin isophane human  18 Units Subcutaneous At Bedtime     cyanocolbalamin  500 mcg Oral BID     sodium chloride (PF)  3 mL Intracatheter Q8H     aspirin EC  81 mg Oral Daily     insulin aspart  1-7 Units Subcutaneous TID AC     insulin aspart  1-5 Units Subcutaneous At Bedtime       Data     Recent Labs  Lab 03/15/18  1309 03/14/18  0525 03/14/18  0300 03/13/18  2210 03/13/18  1925 03/13/18  1515   WBC  --   --   --   --  9.1 9.5   HGB  --   --   --   --  10.4* 10.7*   MCV  --   --   --   --  93 93   PLT  --   --   --   --  213 216    145*  --   --   --  143   POTASSIUM  4.3 4.2  --   --   --  4.2   CHLORIDE 108 116*  --   --   --  113*   CO2 24 24  --   --   --  22   BUN 46* 50*  --   --   --  51*   CR 2.53* 2.58*  --   --   --  2.57*   ANIONGAP 8 5  --   --   --  8   LEV 8.7 8.1*  --   --   --  8.8   * 89  --   --   --  135*   TROPI  --   --  0.716* 0.653*  --  0.772*       Imaging:  No results found for this or any previous visit (from the past 24 hour(s)).

## 2018-03-15 NOTE — PROGRESS NOTES
[facility]    Cardiology Progress Note    Date of Service (when I saw the patient): 03/15/2018     Assessment & Plan   Oscar Terrazas is a 84 year old male who was admitted on 3/13/2018.     1-chest pressure and dyspnea mainly with exertion.  Unstable angina/acute coronary syndrome versus possible heart failure component.   Symptoms significantly improved after chest diuresis and better blood pressure control.  Therefore we'll continue this approach to see if we can resolve his symptoms.       He has extensive coronary disease history.  He has had multiple stenting was done following his CABG in 1997, last time was in 2012.   However, he has progressive chronic renal insufficiency, now stage IV and is therefore high risk candidate for any contrast associated imaging studies.  After long discussion with him and his daughter I have recommended initially trying medical therapy with several days of anticoagulation on top of his dual antiplatelet therapy, better control of blood pressure and heart rate, and addition further antianginals.  They are in agreement with this.  We will also do trial of diuresis, as his complaints are really more shortness of breath and chest discomfort.  Ejection fraction in 2012 was 50%.  Not been recently checked as he has been stable.     Plan-  -changed to oral furosemide and continue diuresis  -Continue physical therapy  -Escalate blood pressure medications as needed control his blood pressure.     2-stage IV chronic renal insufficiency.  Creatinine fairly stable the last year but is definitely higher in the last 2-3 years.  Renal function today after substantial overnight diuresis is a same to slightly improved.     3-hypertension.   Better, not at optimal.  However changes medication just made yesterday.   This is sometimes aggravated by his anxiety.  We are escalating blood pressure control as noted above.     4-chronic venous insufficiency and lymphedema.  Left shin  scratch site stable.  Slightly better edema but still significant lymphedema of the lower third of the legs.     He recently got a scratch from his daughter's dog on his left heel area and that has broken down and is weeping.  Bandages in place.      Sebastien Gibson M.D.    Interval History   He had a lot of diuresis with his IV Lasix dose.  He was able to ambulate 300 feet and rehabilitation this morning and only stopped because of shortness of breath.  He and he had no chest discomfort with this.  He states he is almost back to where [facility], as were his day of admission he cannot even consider that.  No chest pain today.  No palpitations orthostasis or new symptoms.  Weight down about 1-1/2 kg this admission.    Physical Exam   Vital Signs with Ranges  Temp:  [97.7  F (36.5  C)-98.6  F (37  C)] 98.4  F (36.9  C)  Pulse:  [74] 74  Heart Rate:  [68-87] 68  Resp:  [18] 18  BP: (144-182)/(71-92) 144/82  SpO2:  [93 %-98 %] 95 %  Vitals:    03/13/18 1512 03/14/18 0630 03/15/18 0500   Weight: 92.5 kg (204 lb) 95.7 kg (211 lb) 94.4 kg (208 lb 3.2 oz)     I/O last 3 completed shifts:  In: 1040 [P.O.:1040]  Out: 2750 [Urine:2750]    Vitals: /82 (BP Location: Left arm)  Pulse 74  Temp 98.4  F (36.9  C) (Oral)  Resp 18  Ht 1.829 m (6')  Wt 94.4 kg (208 lb 3.2 oz)  SpO2 95%  BMI 28.24 kg/m2    Constitutional: Looks comfortable, no respiratory distress.    Chest:   Better air movement.  Still an occasional crackle at the base but improved.    Cardiac: Regular rhythm.  A grade 2 to 3/6 systolic murmur at the base and sternal border.  JVD now normal still slight HJR.    Abdomen:  Obese but nondistended nontender    Vascular/Extremities: Angina place left shin.  Lower third of left and right shin still have 2+ pitting edema and lymphedema.  Trace or not above that.  Stasis changes otherwise without change.  No erythema      Recent Labs  Lab 03/14/18  0300 03/13/18  2210 03/13/18  1515   TROPI 0.716*  0.653* 0.772*         Recent Labs  Lab 03/15/18  1309 03/14/18  0525 03/14/18  0300 03/13/18  2210 03/13/18  1925 03/13/18  1515   WBC  --   --   --   --  9.1 9.5   HGB  --   --   --   --  10.4* 10.7*   MCV  --   --   --   --  93 93   PLT  --   --   --   --  213 216    145*  --   --   --  143   POTASSIUM 4.3 4.2  --   --   --  4.2   CHLORIDE 108 116*  --   --   --  113*   CO2 24 24  --   --   --  22   BUN 46* 50*  --   --   --  51*   CR 2.53* 2.58*  --   --   --  2.57*   GFRESTIMATED 24* 24*  --   --   --  24*   GFRESTBLACK 30* 29*  --   --   --  29*   ANIONGAP 8 5  --   --   --  8   LEV 8.7 8.1*  --   --   --  8.8   * 89  --   --   --  135*   TROPI  --   --  0.716* 0.653*  --  0.772*       Recent Results (from the past 48 hour(s))   Chest XR,  PA & LAT    Narrative    XR CHEST 2 VW 3/13/2018 4:10 PM    COMPARISON: 8/25/2017    HISTORY: Chest pain.       Impression    IMPRESSION: Mixed interstitial and airspace opacities over both lungs,  most pronounced in the bases, concerning for atypical infection or  edema. Trace bilateral pleural effusions. No pneumothorax seen on  either side. Median sternotomy wires appear intact.    FRANCINE MEADOWS MD       No results found for this or any previous visit (from the past 4320 hour(s)).    Medications     Current Facility-Administered Medications   Medication Dose Route Frequency     furosemide  40 mg Oral Daily     [START ON 3/16/2018] isosorbide mononitrate  60 mg Oral Daily     isosorbide mononitrate  30 mg Oral Once     theophylline  400 mg Oral Daily     metoprolol tartrate  50 mg Oral BID     pravastatin  80 mg Oral At Bedtime     levothyroxine  50 mcg Oral Daily     hydrALAZINE  50 mg Oral BID     clopidogrel  75 mg Oral Daily     insulin isophane human  18 Units Subcutaneous At Bedtime     cyanocolbalamin  500 mcg Oral BID     sodium chloride (PF)  3 mL Intracatheter Q8H     aspirin EC  81 mg Oral Daily     insulin aspart  1-7 Units Subcutaneous TID AC      insulin aspart  1-5 Units Subcutaneous At Bedtime         Current Facility-Administered Medications   Medication Last Rate     - MEDICATION INSTRUCTIONS -       - MEDICATION INSTRUCTIONS -       - MEDICATION INSTRUCTIONS -       Reason ACE/ARB/ARNI order not selected       - MEDICATION INSTRUCTIONS -       - MEDICATION INSTRUCTIONS -       HEParin 1,000 Units/hr (03/15/18 7085)

## 2018-03-15 NOTE — PROGRESS NOTES
03/15/18 0923   Quick Adds   Type of Visit Initial Occupational Therapy Evaluation   Living Environment   Lives With child(ligia), adult  (dtr)   Living Arrangements house   Number of Stairs Within Home 1   Transportation Available car   Self-Care   Dominant Hand right   Usual Activity Tolerance good   Current Activity Tolerance moderate   Regular Exercise (does some walking with the dog, yardwork, snowblowing)   Activity/Exercise/Self-Care Comment enjoys bowling 5x/wk   Functional Level Prior   Ambulation 0-->independent   Transferring 0-->independent   Toileting 0-->independent   Bathing 0-->independent   Dressing 0-->independent   Eating 0-->independent   Communication 0-->understands/communicates without difficulty   Swallowing 0-->swallows foods/liquids without difficulty   Cognition 0 - no cognition issues reported   Prior Functional Level Comment Pt I with ADL/IADL's. pt manages own breakfast, lunch and dtr makes dinner, pt drives and manages own meds.    General Information   Onset of Illness/Injury or Date of Surgery - Date 03/13/18   Referring Physician Lorri Cat,    Patient/Family Goals Statement return home   Additional Occupational Profile Info/Pertinent History of Current Problem chest pressure and dyspnea mainly with exertion.  Unstable angina/acute coronary syndrome versus possible heart failure component.  He has extensive coronary disease history.  He has had multiple stenting was done following his CABG in 1997, last time was in 2012.   However, he has progressive chronic renal insufficiency, now stage IV and is therefore high risk candidate for any contrast associated imaging studies.     Precautions/Limitations fall precautions   Heart Disease Risk Factors Diabetes;High blood pressure;Dislipidemia;Overweight;Family history;Medical history;Gender;Age   Cognitive Status Examination   Orientation orientation to person, place and time   Level of Consciousness alert   Able to Follow  Commands WNL/WFL   Personal Safety (Cognitive) WNL/WFL   Cognitive Comment Will continue to monitor cognition, appears WFL.   Sensory Examination   Sensory Quick Adds (some tingling in L hand due to carpal tunnel, pt reports)   Pain Assessment   Patient Currently in Pain (wound on L shin area irriated.)   Range of Motion (ROM)   ROM Comment B UE AROM WFL   Transfer Skill: Bed to Chair/Chair to Bed   Level of [place]: Bed to Chair stand-by assist   Transfer Skill: Sit to Stand   Level of [place]: Sit/Stand stand-by assist   Upper Body Dressing   Level of [place]: Dress Upper Body independent   Lower Body Dressing   Physical Assist/Nonphysical Assist: Dress Lower Body supervision   Eating/Self Feeding   Level of [place]: Eating independent   Instrumental Activities of Daily Living (IADL)   Previous Responsibilities meal prep;medication management;yardwork;driving;finances   Activities of Daily Living Analysis   Impairments Contributing to Impaired Activities of Daily Living sensation decreased  (baseline balance deficits, decreased activity tolerance)   General Therapy Interventions   Planned Therapy Interventions home program guidelines;progressive activity/exercise;risk factor education;transfer training   Clinical Impression   Criteria for Skilled Therapeutic Interventions Met yes, treatment indicated   OT Diagnosis decreased ADL/IADL's   Influenced by the following impairments impaired activity tolerance, SOB with exertion   Assessment of Occupational Performance 1-3 Performance Deficits   Identified Performance Deficits impaired IADL's ie yard work, cooking, bowling   Clinical Decision Making (Complexity) Low complexity   Therapy Frequency daily   Predicted Duration of Therapy Intervention (days/wks) 3 days   Anticipated Discharge Disposition Home with Assist  (A with IADL's as needed ie  yardwork, etec)   Risks and Benefits of Treatment have been explained. Yes   Patient, Family & other staff  "in agreement with plan of care Yes   [facility] AM-PAC  \"6 Clicks\" Daily Activity Inpatient Short Form   1. Putting on and taking off regular lower body clothing? 4 - None   2. Bathing (including washing, rinsing, drying)? 3 - A Little   3. Toileting, which includes using toilet, bedpan or urinal? 4 - None   4. Putting on and taking off regular upper body clothing? 4 - None   5. Taking care of personal grooming such as brushing teeth? 4 - None   6. Eating meals? 4 - None   Daily Activity Raw Score (Score out of 24.Lower scores equate to lower levels of function) 23   Total Evaluation Time   Total Evaluation Time (Minutes) 10     "

## 2018-03-15 NOTE — PLAN OF CARE
Problem: Patient Care Overview  Goal: Plan of Care/Patient Progress Review  OT/CR: eval completed and treatment initiated. Pt lives in a house with his adult daughter. Pt I with ADL/IADL's and does not use an AD for ambulation. Pt admitted due to chest pressure and dyspnea on exertion, diagnosed with unstable angina.  Discharge Planner OT   Patient plan for discharge: home  Current status: pt ambulated approx 300 ft but needed a seated rest break at half way point due to fatigue and dyspnea, cues for PLB, O2 sats 94% upon return from walk RA, BP hypertensive, see vital sign flow sheet for details. Pt and dtr educated in CAD risk factors with focus on low cholesterol and sodium diet, exercise. Pt enjoys bowling 5x/wk and yardwork and feels that this is enough exercise.   Barriers to return to prior living situation: decreased activity tolerance, SOB with exertion.   Recommendations for discharge: home with dtr A with IADL's as needed ie yard work, etc.  Rationale for recommendations: home with walking program and dtr A with IADl's as needed yard work, etc.        Entered by: Teresita Carver 03/15/2018 10:04 AM

## 2018-03-16 ENCOUNTER — APPOINTMENT (OUTPATIENT)
Dept: OCCUPATIONAL THERAPY | Facility: CLINIC | Age: 83
DRG: 291 | End: 2018-03-16
Payer: MEDICARE

## 2018-03-16 ENCOUNTER — TELEPHONE (OUTPATIENT)
Dept: INTERNAL MEDICINE | Facility: CLINIC | Age: 83
End: 2018-03-16

## 2018-03-16 VITALS
HEIGHT: 72 IN | DIASTOLIC BLOOD PRESSURE: 61 MMHG | RESPIRATION RATE: 18 BRPM | OXYGEN SATURATION: 98 % | BODY MASS INDEX: 27.79 KG/M2 | HEART RATE: 87 BPM | WEIGHT: 205.2 LBS | SYSTOLIC BLOOD PRESSURE: 125 MMHG | TEMPERATURE: 98.4 F

## 2018-03-16 LAB
ANION GAP SERPL CALCULATED.3IONS-SCNC: 7 MMOL/L (ref 3–14)
BUN SERPL-MCNC: 53 MG/DL (ref 7–30)
CALCIUM SERPL-MCNC: 8.6 MG/DL (ref 8.5–10.1)
CHLORIDE SERPL-SCNC: 109 MMOL/L (ref 94–109)
CO2 SERPL-SCNC: 25 MMOL/L (ref 20–32)
CREAT SERPL-MCNC: 2.6 MG/DL (ref 0.66–1.25)
ERYTHROCYTE [DISTWIDTH] IN BLOOD BY AUTOMATED COUNT: 14.9 % (ref 10–15)
GFR SERPL CREATININE-BSD FRML MDRD: 24 ML/MIN/1.7M2
GLUCOSE BLDC GLUCOMTR-MCNC: 129 MG/DL (ref 70–99)
GLUCOSE BLDC GLUCOMTR-MCNC: 133 MG/DL (ref 70–99)
GLUCOSE BLDC GLUCOMTR-MCNC: 138 MG/DL (ref 70–99)
GLUCOSE BLDC GLUCOMTR-MCNC: 138 MG/DL (ref 70–99)
GLUCOSE BLDC GLUCOMTR-MCNC: 145 MG/DL (ref 70–99)
GLUCOSE BLDC GLUCOMTR-MCNC: 169 MG/DL (ref 70–99)
GLUCOSE SERPL-MCNC: 126 MG/DL (ref 70–99)
HCT VFR BLD AUTO: 29.7 % (ref 40–53)
HGB BLD-MCNC: 9.9 G/DL (ref 13.3–17.7)
LMWH PPP CHRO-ACNC: 0.17 IU/ML
MCH RBC QN AUTO: 30.6 PG (ref 26.5–33)
MCHC RBC AUTO-ENTMCNC: 33.3 G/DL (ref 31.5–36.5)
MCV RBC AUTO: 92 FL (ref 78–100)
PLATELET # BLD AUTO: 193 10E9/L (ref 150–450)
POTASSIUM SERPL-SCNC: 4.1 MMOL/L (ref 3.4–5.3)
RBC # BLD AUTO: 3.24 10E12/L (ref 4.4–5.9)
SODIUM SERPL-SCNC: 141 MMOL/L (ref 133–144)
WBC # BLD AUTO: 8.7 10E9/L (ref 4–11)

## 2018-03-16 PROCEDURE — 25000128 H RX IP 250 OP 636: Performed by: INTERNAL MEDICINE

## 2018-03-16 PROCEDURE — 99232 SBSQ HOSP IP/OBS MODERATE 35: CPT | Performed by: INTERNAL MEDICINE

## 2018-03-16 PROCEDURE — 85027 COMPLETE CBC AUTOMATED: CPT | Performed by: INTERNAL MEDICINE

## 2018-03-16 PROCEDURE — 00000146 ZZHCL STATISTIC GLUCOSE BY METER IP

## 2018-03-16 PROCEDURE — A9270 NON-COVERED ITEM OR SERVICE: HCPCS | Mod: GY | Performed by: INTERNAL MEDICINE

## 2018-03-16 PROCEDURE — 80048 BASIC METABOLIC PNL TOTAL CA: CPT | Performed by: INTERNAL MEDICINE

## 2018-03-16 PROCEDURE — 25000132 ZZH RX MED GY IP 250 OP 250 PS 637: Mod: GY | Performed by: INTERNAL MEDICINE

## 2018-03-16 PROCEDURE — 85520 HEPARIN ASSAY: CPT | Performed by: INTERNAL MEDICINE

## 2018-03-16 PROCEDURE — 97110 THERAPEUTIC EXERCISES: CPT | Mod: GO | Performed by: OCCUPATIONAL THERAPIST

## 2018-03-16 PROCEDURE — 99239 HOSP IP/OBS DSCHRG MGMT >30: CPT | Performed by: INTERNAL MEDICINE

## 2018-03-16 PROCEDURE — 40000133 ZZH STATISTIC OT WARD VISIT: Performed by: OCCUPATIONAL THERAPIST

## 2018-03-16 PROCEDURE — 36415 COLL VENOUS BLD VENIPUNCTURE: CPT | Performed by: INTERNAL MEDICINE

## 2018-03-16 RX ORDER — FUROSEMIDE 20 MG
20 TABLET ORAL DAILY
Qty: 30 TABLET | Refills: 0 | Status: SHIPPED | OUTPATIENT
Start: 2018-03-16 | End: 2018-03-29

## 2018-03-16 RX ORDER — METOPROLOL TARTRATE 50 MG
50 TABLET ORAL 2 TIMES DAILY
Qty: 60 TABLET | Refills: 0 | Status: SHIPPED | OUTPATIENT
Start: 2018-03-16 | End: 2018-08-09

## 2018-03-16 RX ORDER — ISOSORBIDE MONONITRATE 60 MG/1
60 TABLET, EXTENDED RELEASE ORAL DAILY
Qty: 30 TABLET | Refills: 0 | Status: SHIPPED | OUTPATIENT
Start: 2018-03-17 | End: 2018-04-16

## 2018-03-16 RX ADMIN — FUROSEMIDE 40 MG: 40 TABLET ORAL at 08:53

## 2018-03-16 RX ADMIN — ISOSORBIDE MONONITRATE 60 MG: 60 TABLET, EXTENDED RELEASE ORAL at 08:53

## 2018-03-16 RX ADMIN — METOPROLOL TARTRATE 50 MG: 50 TABLET ORAL at 08:53

## 2018-03-16 RX ADMIN — HEPARIN SODIUM 1000 UNITS/HR: 10000 INJECTION, SOLUTION INTRAVENOUS at 10:05

## 2018-03-16 RX ADMIN — HYDRALAZINE HYDROCHLORIDE 50 MG: 50 TABLET ORAL at 08:52

## 2018-03-16 RX ADMIN — CLOPIDOGREL 75 MG: 75 TABLET, FILM COATED ORAL at 08:53

## 2018-03-16 RX ADMIN — ACETAMINOPHEN 650 MG: 325 TABLET, FILM COATED ORAL at 10:43

## 2018-03-16 RX ADMIN — CYANOCOBALAMIN TAB 500 MCG 500 MCG: 500 TAB at 08:52

## 2018-03-16 RX ADMIN — ASPIRIN 81 MG: 81 TABLET, COATED ORAL at 08:51

## 2018-03-16 RX ADMIN — THEOPHYLLINE 400 MG: 400 TABLET, EXTENDED RELEASE ORAL at 08:52

## 2018-03-16 RX ADMIN — LEVOTHYROXINE SODIUM 50 MCG: 50 TABLET ORAL at 08:52

## 2018-03-16 ASSESSMENT — PAIN DESCRIPTION - DESCRIPTORS
DESCRIPTORS: BURNING
DESCRIPTORS: BURNING

## 2018-03-16 NOTE — DISCHARGE SUMMARY
[facility]    Discharge Summary  Hospitalist    Date of Admission:  3/13/2018  Date of Discharge:  3/16/2018  Discharging Provider: Brittany Stanley MD    Discharge Diagnoses    Acute on chronic diastolic congestive heart failure  Elevated troponin in the setting of coronary artery disease  Hypertension  Hypertension  Stage III chronic kidney disease  Peripheral edema  Type 2 diabetes  Hypothyroidism  Gout  Asthma    History of Present Illness    Oscar Terrazas is a very pleasant 84-year-old male with complex past medical history significant for coronary artery disease with history of 2 vessel CABG and multivessel disease requiring subsequent stent placement, hypertension, hyperlipidemia, peripheral vascular disease, stage III CKD, type 2 diabetes, peripheral neuropathy, GERD, hypothyroidism and asthma who presented to the emergency room 3/13/18  for progressive symptoms of exertional chest pain and shortness of breath .  He was noted to have an elevated troponin.  Please see admission H&P for details    Hospital Course   Oscar Terrazas was admitted on 3/13/2018.  The following problems were addressed during his hospitalization:    Acute coronary syndrome :   acute on chronic diastolic congestive heart failure  -Patient has extensive cardiac history with history of coronary artery bypass graft in 1996 and stents in 2007 and 2012.  Being followed by Dr. Rand who is his primary cardiologist  -   Presented with progressive exertional chest discomfort and dyspnea over the preceding weeks.   - initial troponin was mildly elevated at 0.772, follow-up troponins 0.716<0.653   -EKG shows no acute ischemic changes.  Initiated on  heparin drip.    - And started on Imdur, metoprolol, aspirin, Plavix and his statin.    IV Lasix.  Given his renal dysfunction and possibility of CHF contributing to his symptoms medical management and aggressive diuresis was initiated.  Gradually he continued to improve.  He was  almost back to baseline.  Cardiology recommended medical management for now and holding off on coronary angiogram.  Patient and family agreeable and so will be discharged with oral Lasix and his new medication  - Echo with EF of 55 -60% and moderate hypokinesis of the basal and inferior lateral wall      Hypertension.    Blood pressures were initially elevated   PTA beta-blocker dose changed,  Imdur and oral Lasix initiated.  Blood pressure reasonable prior to discharge. Currently holding ACE or ARB given his history of renal dysfunction.       Hyperlipidemia.   -Continued PTA pravastatin with no changes     Stage III chronic kidney disease.  -Renal function stable.    Monitored while diuresing.          Peripheral edema.    Per his daughter's report, this is a chronic problem for him.  It sounds as though he may have been managed with diuretics in the past but these were ultimately stopped,  presume secondary to issues with his chronic renal insufficiency.  He also is noncompliant with use of Jonathan hose per his daughter's report.        Type 2 diabetes.     A1c was 6.5 in December 2017.     PTA on NPH 18 units at bedtime Which is continued.  Continue with medium dose sliding scale.  Blood sugar mainly less than 180   Monitored while in-house        Hypothyroidism.  Chronic and stable on levothyroxine which has been continued.       Gout.    PTA allopurinol currently on hold   -.  No evidence of flare at this point      Asthma.  Chronic and stable  -PTA theophylline resumed once cleared by cardiology     Decubitus ulcer present on admission  -Wound care following, he also has a nonpressure/venous stasis ulcer on his lower extremity which is being monitored  # Discharge Pain Plan:   - Patient currently has NO PAIN and is not being prescribed pain medications on discharge.    Active Problems:    ACS (acute coronary syndrome) (H)      Brittany Stanley MD    Significant Results and Procedures   Echocardiogram    Pending  Results   These results will be followed up by none   Unresulted Labs Ordered in the Past 30 Days of this Admission     No orders found from 1/12/2018 to 3/14/2018.          Code Status   DNR / DNI       Primary Care Physician   Jorge Hillman    Physical Exam   Temp: 98.4  F (36.9  C) Temp src: Oral BP: 125/61 Pulse: 87 Heart Rate: 62 Resp: 18 SpO2: 98 % O2 Device: None (Room air)    Vitals:    03/14/18 0630 03/15/18 0500 03/16/18 0515   Weight: 95.7 kg (211 lb) 94.4 kg (208 lb 3.2 oz) 93.1 kg (205 lb 3.2 oz)     Vital Signs with Ranges  Temp:  [98  F (36.7  C)-98.4  F (36.9  C)] 98.4  F (36.9  C)  Pulse:  [87] 87  Heart Rate:  [56-79] 62  Resp:  [18] 18  BP: (122-192)/(61-89) 125/61  SpO2:  [96 %-98 %] 98 %  I/O last 3 completed shifts:  In: 950 [P.O.:900; I.V.:50]  Out: 1895 [Urine:1895]    Constitutional: Awake, alert and no distress. Appears comfortable  Head: Normocephalic. No masses, lesions, tenderness or abnormalities  ENT: no neck nodes or sinus tenderness  Cardiovascular: RRR.  2+ murmurs, no rub or gallop no JVD  Respiratory: Normal WOB, no wheezes or crackles   Gastrointestinal: Abdomen soft, non-tender. BS normal. No masses, organomegaly  : Deferred  Extremities: 1-2+ edema, there is about 3 cm lesion on his left shin, no clubbing /cyanosis   Skin: Warm and dry, no rash    Discharge Disposition   Discharged to home  Condition at discharge: Stable    Consultations This Hospital Stay   PHARMACY TO DOSE HEPARIN  CARDIAC REHAB IP CONSULT  CARDIOLOGY IP CONSULT  PHARMACY TO DOSE HEPARIN  WOUND OSTOMY CONTINENCE NURSE  IP CONSULT  SMOKING CESSATION PROGRAM IP CONSULT    Time Spent on this Encounter   Brittany HERNANDEZ, personally saw the patient today and spent greater than 30 minutes discharging this patient.    Discharge Orders     Basic metabolic panel     Reason for your hospital stay   Acute systolic heart failure exacerbation     Follow-up and recommended labs and tests    Follow up with primary care  provider, Jorge Hillman, within 7 days for hospital follow- up.  The following labs/tests are recommended: BMP.  Follow-up with cardiology ALVARO in 1 week next available.     Activity   Your activity upon discharge: activity as tolerated     Monitor and record   blood glucose 4 times a day, before meals and at bedtime and report findings to PCP  blood pressure/heart rate daily and readings to PCP for any medication adjustment  fluid intake and output daily  Limit intake to 2 L per day   weight every day and inform PCP/cardiology if weight gain more than 2 pounds per day and/or more than 5 pounds per week     DNR/DNI     Diet   Follow this diet upon discharge: Orders Placed This Encounter     Combination Diet Low Saturated Fat Na <2400mg Diet       Discharge Medications   Current Discharge Medication List      START taking these medications    Details   isosorbide mononitrate (IMDUR) 60 MG 24 hr tablet Take 1 tablet (60 mg) by mouth daily  Qty: 30 tablet, Refills: 0    Associated Diagnoses: NSTEMI (non-ST elevated myocardial infarction) (H)      furosemide (LASIX) 20 MG tablet Take 1 tablet (20 mg) by mouth daily  Qty: 30 tablet, Refills: 0    Associated Diagnoses: NSTEMI (non-ST elevated myocardial infarction) (H)         CONTINUE these medications which have CHANGED    Details   metoprolol tartrate (LOPRESSOR) 50 MG tablet Take 1 tablet (50 mg) by mouth 2 times daily  Qty: 60 tablet, Refills: 0    Associated Diagnoses: NSTEMI (non-ST elevated myocardial infarction) (H)         CONTINUE these medications which have NOT CHANGED    Details   cetirizine (ZYRTEC) 10 MG tablet TAKE 1 TABLET(10 MG) BY MOUTH EVERY EVENING  Qty: 30 tablet, Refills: 8    Associated Diagnoses: Atopic dermatitis, unspecified type; Itching      clopidogrel (PLAVIX) 75 MG tablet TAKE 1 TABLET(75 MG) BY MOUTH DAILY  Qty: 90 tablet, Refills: 3    Associated Diagnoses: CKD (chronic kidney disease) stage 3, GFR 30-59 ml/min; Cardiovascular disease     "  allopurinol (ZYLOPRIM) 100 MG tablet TAKE 2 TABLETS(200 MG) BY MOUTH DAILY  Qty: 180 tablet, Refills: 3    Associated Diagnoses: Chronic gout without tophus, unspecified cause, unspecified site      theophylline (UNIPHYL) 400 MG 24 hr tablet TAKE 1 TABLET BY MOUTH EVERY DAY  Qty: 90 tablet, Refills: 3    Associated Diagnoses: Mild persistent asthma without complication      hydrALAZINE (APRESOLINE) 25 MG tablet TAKE 2 TABLETS(50 MG) BY MOUTH TWICE DAILY  Qty: 360 tablet, Refills: 3    Associated Diagnoses: Essential hypertension, benign      levothyroxine (SYNTHROID/LEVOTHROID) 50 MCG tablet TAKE 1 TABLET BY MOUTH DAILY  Qty: 30 tablet, Refills: 10    Associated Diagnoses: Hypothyroidism, unspecified type      triamcinolone (KENALOG) 0.5 % cream Apply sparingly to affected area two  times daily as needed for rash.  Qty: 45 g, Refills: 1    Associated Diagnoses: Itching      insulin NPH (HUMULIN N, NOVOLIN N) 100 UNIT/ML VIAL 18 units at bedtime  Qty: 3 Month, Refills: 3    Comments: Profile only for dosage change. Pt doesn't require refill at this time  Associated Diagnoses: Type 2 diabetes mellitus with stage 3 chronic kidney disease, with long-term current use of insulin (H)      pravastatin (PRAVACHOL) 80 MG tablet Take 1 tablet (80 mg) by mouth daily at bedtime.  Qty: 90 tablet, Refills: 1    Associated Diagnoses: Hyperlipidemia LDL goal <70      COD LIVER OIL PO Take 1 capsule by mouth daily       aspirin 81 MG tablet Take 1 tablet by mouth daily. with food      Cyanocobalamin (VITAMIN B 12 PO) Take 500 mcg by mouth 2 times daily.      blood glucose monitoring (ACCU-CHEK COMPACT PLUS) test strip Check blood sugar 2-3 times a day  Qty: 200 strip, Refills: 3    Associated Diagnoses: Type 2 diabetes mellitus with stage 3 chronic kidney disease, with long-term current use of insulin (H)      insulin syringe-needle U-100 (BD INSULIN SYRINGE) 29G X 1/2\" 0.5 ML Use one syringe 2 daily or as directed.  Qty: 200 each, " Refills: 4    Associated Diagnoses: Type 2 diabetes mellitus with stage 3 chronic kidney disease, with long-term current use of insulin (H)           Allergies   Allergies   Allergen Reactions     Advair [Advair Diskus]      cough;  insomnia, nightmares     Amlodipine Besylate      edema       Azithromycin GI Disturbance     Clarithromycin      gi     Hydrochlorothiazide      hctz + lisinopril-->inc creat, k+     Lisinopril      lisinopril + hctz --> inc creat, k+     Moxifloxacin Hydrochloride      clostridium diffile colitis     Penicillins      gen swelling     Vioxx      edema     Data   Most Recent 3 CBC's:  Recent Labs   Lab Test  03/16/18   0544  03/13/18   1925  03/13/18   1515   WBC  8.7  9.1  9.5   HGB  9.9*  10.4*  10.7*   MCV  92  93  93   PLT  193  213  216      Most Recent 3 BMP's:  Recent Labs   Lab Test  03/16/18   0544  03/15/18   1309  03/14/18   0525   NA  141  140  145*   POTASSIUM  4.1  4.3  4.2   CHLORIDE  109  108  116*   CO2  25  24  24   BUN  53*  46*  50*   CR  2.60*  2.53*  2.58*   ANIONGAP  7  8  5   LEV  8.6  8.7  8.1*   GLC  126*  148*  89     Most Recent 2 LFT's:  Recent Labs   Lab Test  12/13/17   0910  05/24/17   0844   AST  3  8   ALT  16  17   ALKPHOS  96  116   BILITOTAL  0.4  0.3     Most Recent INR's and Anticoagulation Dosing History:  Anticoagulation Dose History     Recent Dosing and Labs Latest Ref Rng & Units 4/13/2005 11/24/2006 1/23/2007 7/16/2007 12/8/2008 3/12/2013    INR 0.86 - 1.14 1.10 1.09 1.08 1.13 1.19(H) 1.06        Most Recent 3 Troponin's:  Recent Labs   Lab Test  03/14/18   0300  03/13/18   2210  03/13/18   1515   TROPI  0.716*  0.653*  0.772*     Most Recent Cholesterol Panel:  Recent Labs   Lab Test  03/14/18   0525   CHOL  167   LDL  115*   HDL  32*   TRIG  99     Most Recent 6 Bacteria Isolates From Any Culture (See EPIC Reports for Culture Details):No lab results found.  Most Recent TSH, T4 and A1c Labs:  Recent Labs   Lab Test  03/13/18   9791    05/24/17   0844   01/08/16   1005   TSH   --    --   2.81   < >  7.01*   T4   --    --    --    --   0.77   A1C  6.3*   < >  6.1*   < >  6.8*    < > = values in this interval not displayed.     Results for orders placed or performed during the hospital encounter of 03/13/18   Chest XR,  PA & LAT    Narrative    XR CHEST 2 VW 3/13/2018 4:10 PM    COMPARISON: 8/25/2017    HISTORY: Chest pain.       Impression    IMPRESSION: Mixed interstitial and airspace opacities over both lungs,  most pronounced in the bases, concerning for atypical infection or  edema. Trace bilateral pleural effusions. No pneumothorax seen on  either side. Median sternotomy wires appear intact.    FRANCINE MEADOWS MD

## 2018-03-16 NOTE — DISCHARGE SUMMARY
VS stable. Tele monitor shows patient is in SR, HR of 84. Medications discussed, information reviewed, questions and concerns addressed, follow-up instructions reviewed. Pt belongings accounted for and sent home with them. Pt left via wheel chair and driven by his daughter.

## 2018-03-16 NOTE — PLAN OF CARE
Problem: Cardiac: Heart Failure (Adult)  Goal: Signs and Symptoms of Listed Potential Problems Will be Absent, Minimized or Managed (Cardiac: Heart Failure)  Signs and symptoms of listed potential problems will be absent, minimized or managed by discharge/transition of care (reference Cardiac: Heart Failure (Adult) CPG).   Outcome: Improving  A/o x4. VSS ex slight HTN, no PRNS needed. RA. IVF Heparin. Tele NSR. Denies chest pain. BLE edematous +3. Elevated while up in chair. Cyr wound dressing CDI. Pressure injury monitored, continue to wt shift. PRN tylenol and ambien given at bedtime to help sleep. Up SBA to bathroom. Voiding in urinal. Plan pending progress.   Heart Failure Care Pathway  GOALS TO BE MET BEFORE DISCHARGE:    1. Decrease congestion and/or edema with diuretic therapy to achieve near      optimal volume status.            Dyspnea improved:  Yes            Edema improved:     No, please explain:  Continue to elevate extremities        Net I/O and Weights since admission:          03/10 2300 - 03/15 2259  In: 2270 [P.O.:2220; I.V.:50]  Out: 5345 [Urine:5345]  Net: -3075            Vitals:    03/13/18 1512 03/14/18 0630 03/15/18 0500   Weight: 92.5 kg (204 lb) 95.7 kg (211 lb) 94.4 kg (208 lb 3.2 oz)       2.  O2 sats > 92% on RA or at prior home O2 therapy level.          Current oxygenation status:       SpO2: 96 %         O2 Device: None (Room air),            Able to wean O2 this shift to keep sats > 92%:  Yes       Does patient use Home O2? No    3.  Tolerates ambulation and mobility near baseline: Yes        How many times did the patient ambulate with nursing staff this shift? 1    Please review the Heart Failure Care Pathway for additional HF goal outcomes.    Eunice Madrid RN  3/15/2018

## 2018-03-16 NOTE — TELEPHONE ENCOUNTER
For some reason he is no longer on your schedule at this time.  Made appt for him in this slot, Tuesday 3/20 at 2pm.  Left message for pt to call back to advise him of appt.

## 2018-03-16 NOTE — TELEPHONE ENCOUNTER
Then scheduled  Pt in the hospita f/u appt slot in my schedule next week on Tuesday. Not sure why this message needs to be sent to me

## 2018-03-16 NOTE — PLAN OF CARE
Problem: Patient Care Overview  Goal: Plan of Care/Patient Progress Review  Discharge Planner OT   Patient plan for discharge: home  Current status: pt ambulated approx 300 ft with supervision pt reports B feet bothering him and pt crossing is R foot inwardly, reports fatigue and some SOB with walk, pt's CV response stable, see vital sign flow sheet for details. Pt educated in OP PT for balance, but declines, reports he has a ww and quad cane at home if needed. Pt also declined AE for LE dress as reports some difficulty with LE dress but is able to do without AE.   Barriers to return to prior living situation: none with dtr A with IADl's as needed. Decreased activity tolerance and balance (baseline balance).   Recommendations for discharge: home with dtr A as needed ie yardwork, heavier cleaning, etc. Recommend OP PT for balance but pt declines.  Rationale for recommendations: dtr A with IADL's as above to insure safety and risk for falls.        Entered by: Teresita Carver 03/16/2018 9:57 AM         Occupational Therapy Discharge Summary    Reason for therapy discharge:    Discharged to home.    Progress towards therapy goal(s). See goals on Care Plan in [facility] health record for goal details.  Goals partially met.  Barriers to achieving goals:   discharge from facility.    Therapy recommendation(s):    No further therapy is recommended. recommend OP PT but pt declines at this time, recommend dtr A with IADL's as needed ie yardwork, heavier cleaning, etc.

## 2018-03-16 NOTE — PROGRESS NOTES
Pt's plan is to discharge to home today. Pt's PCP Dr. Hillman is booked out until April. Requested the  contact Dr. Hillman to see if he can fit him in on his schedule. The pt has only seen Dr. Hillman.

## 2018-03-16 NOTE — TELEPHONE ENCOUNTER
Patient is being discharge from the hospital today and needs a hospital follow up 7-10 days with his PCP. Please follow up with patient.

## 2018-03-19 ENCOUNTER — CARE COORDINATION (OUTPATIENT)
Dept: CARDIOLOGY | Facility: CLINIC | Age: 83
End: 2018-03-19

## 2018-03-19 NOTE — PROGRESS NOTES
Patient was evaluated by cardiology while inpatient for CHF. Called patient to discuss any post hospital d/c questions he may have, review medication changes, and confirm f/u appts.Patient denied any questions regarding new medications or changes to some of his current medications that he was taking prior to admission. Patient denied any increased SOB, chest pain, or light headedness. RN reminded patient to weigh himself daily, record weight, and bring record of weights to apt. RN also reminded patient to call clinic with a weight gain of >2lb overnight or 5lb in a week. RN confirmed with patient that he has an apt scheduled on 3/29/18 with ALVARO Preethi Smiley in our [facility] (1:40pm lab and 2:40pm with ALVARO). Patient advised to call clinic with any cardiac related questions or concerns prior to his apt on 3/29/18. Patient verbalized understanding and agreed with plan.

## 2018-03-20 ENCOUNTER — OFFICE VISIT (OUTPATIENT)
Dept: INTERNAL MEDICINE | Facility: CLINIC | Age: 83
End: 2018-03-20
Payer: COMMERCIAL

## 2018-03-20 VITALS
HEART RATE: 65 BPM | SYSTOLIC BLOOD PRESSURE: 136 MMHG | OXYGEN SATURATION: 99 % | WEIGHT: 209.2 LBS | BODY MASS INDEX: 28.37 KG/M2 | TEMPERATURE: 98.3 F | RESPIRATION RATE: 16 BRPM | DIASTOLIC BLOOD PRESSURE: 60 MMHG

## 2018-03-20 DIAGNOSIS — E11.22 TYPE 2 DIABETES MELLITUS WITH STAGE 3 CHRONIC KIDNEY DISEASE, WITH LONG-TERM CURRENT USE OF INSULIN (H): ICD-10-CM

## 2018-03-20 DIAGNOSIS — I25.110 CORONARY ARTERY DISEASE INVOLVING NATIVE CORONARY ARTERY OF NATIVE HEART WITH UNSTABLE ANGINA PECTORIS (H): ICD-10-CM

## 2018-03-20 DIAGNOSIS — Z79.4 TYPE 2 DIABETES MELLITUS WITH STAGE 3 CHRONIC KIDNEY DISEASE, WITH LONG-TERM CURRENT USE OF INSULIN (H): ICD-10-CM

## 2018-03-20 DIAGNOSIS — R60.9 EDEMA, UNSPECIFIED TYPE: ICD-10-CM

## 2018-03-20 DIAGNOSIS — N18.30 TYPE 2 DIABETES MELLITUS WITH STAGE 3 CHRONIC KIDNEY DISEASE, WITH LONG-TERM CURRENT USE OF INSULIN (H): ICD-10-CM

## 2018-03-20 DIAGNOSIS — R25.2 CRAMP OF LIMB: ICD-10-CM

## 2018-03-20 DIAGNOSIS — L98.491 SKIN ULCER, LIMITED TO BREAKDOWN OF SKIN (H): ICD-10-CM

## 2018-03-20 LAB
ANION GAP SERPL CALCULATED.3IONS-SCNC: 9 MMOL/L (ref 3–14)
BUN SERPL-MCNC: 71 MG/DL (ref 7–30)
CALCIUM SERPL-MCNC: 8.8 MG/DL (ref 8.5–10.1)
CHLORIDE SERPL-SCNC: 107 MMOL/L (ref 94–109)
CO2 SERPL-SCNC: 23 MMOL/L (ref 20–32)
CREAT SERPL-MCNC: 3.56 MG/DL (ref 0.66–1.25)
FERRITIN SERPL-MCNC: 107 NG/ML (ref 26–388)
GFR SERPL CREATININE-BSD FRML MDRD: 16 ML/MIN/1.7M2
GLUCOSE SERPL-MCNC: 157 MG/DL (ref 70–99)
IRON SATN MFR SERPL: 11 % (ref 15–46)
IRON SERPL-MCNC: 25 UG/DL (ref 35–180)
MAGNESIUM SERPL-MCNC: 2.5 MG/DL (ref 1.6–2.3)
POTASSIUM SERPL-SCNC: 4.4 MMOL/L (ref 3.4–5.3)
SODIUM SERPL-SCNC: 139 MMOL/L (ref 133–144)
TIBC SERPL-MCNC: 230 UG/DL (ref 240–430)

## 2018-03-20 PROCEDURE — 83540 ASSAY OF IRON: CPT | Performed by: INTERNAL MEDICINE

## 2018-03-20 PROCEDURE — 83550 IRON BINDING TEST: CPT | Performed by: INTERNAL MEDICINE

## 2018-03-20 PROCEDURE — 99214 OFFICE O/P EST MOD 30 MIN: CPT | Performed by: INTERNAL MEDICINE

## 2018-03-20 PROCEDURE — 80048 BASIC METABOLIC PNL TOTAL CA: CPT | Performed by: INTERNAL MEDICINE

## 2018-03-20 PROCEDURE — 36415 COLL VENOUS BLD VENIPUNCTURE: CPT | Performed by: INTERNAL MEDICINE

## 2018-03-20 PROCEDURE — 82728 ASSAY OF FERRITIN: CPT | Performed by: INTERNAL MEDICINE

## 2018-03-20 PROCEDURE — 83735 ASSAY OF MAGNESIUM: CPT | Performed by: INTERNAL MEDICINE

## 2018-03-20 ASSESSMENT — PAIN SCALES - GENERAL: PAINLEVEL: MODERATE PAIN (5)

## 2018-03-20 NOTE — NURSING NOTE
Chief Complaint   Patient presents with     Hospital F/U       Initial /60  Pulse 65  Temp 98.3  F (36.8  C) (Oral)  Resp 16  Wt 209 lb 3.2 oz (94.9 kg)  SpO2 99%  BMI 28.37 kg/m2 Estimated body mass index is 28.37 kg/(m^2) as calculated from the following:    Height as of 3/13/18: 6' (1.829 m).    Weight as of this encounter: 209 lb 3.2 oz (94.9 kg).  Medication Reconciliation: complete   Linda Lynn, CMA

## 2018-03-20 NOTE — PROGRESS NOTES
SUBJECTIVE:   Oscar Terrazas is a 84 year old male who presents to clinic today for the following health issues:    Chief Complaint   Patient presents with     Hospital F/U     Hospital Follow-up Visit:    Hospital/Nursing Home/IP Rehab Facility: [facility]  Date of Admission: 03/13/2018  Date of Discharge: 03/16/2018  Tele contact 3/19/18 (first business day after discharge)  Reason(s) for Admission: Acute Coronary Syndrome            Problems taking medications regularly:  None       Medication changes since discharge: None       Problems adhering to non-medication therapy:  None      Summary of hospitalization:  [facility] discharge summary reviewed  Diagnostic Tests/Treatments reviewed.  Follow up needed: labs  Other Healthcare Providers Involved in Patient s Care:         cardiology  Update since discharge: improved.     Post Discharge Medication Reconciliation: discharge medications reconciled, continue medications without change.  Plan of care communicated with patient and daughter     Coding guidelines for this visit:  Type of Medical   Decision Making Face-to-Face Visit       within 7 Days of discharge Face-to-Face Visit        within 14 days of discharge   Moderate Complexity [id number]   High Complexity [id number]            Hospital discharge summary reviewed.  Part of the summary as below:    Discharge Summary  Hospitalist     Date of Admission:  3/13/2018  Date of Discharge:  3/16/2018  Discharging Provider: Brittany Stanley MD        Discharge Diagnoses      Acute on chronic diastolic congestive heart failure  Elevated troponin in the setting of coronary artery disease  Hypertension  Hypertension  Stage III chronic kidney disease  Peripheral edema  Type 2 diabetes  Hypothyroidism  Gout  Asthma        History of Present Illness       Oscar Terrazas is a very pleasant 84-year-old male with complex past medical history significant for coronary artery disease with history of 2  vessel CABG and multivessel disease requiring subsequent stent placement, hypertension, hyperlipidemia, peripheral vascular disease, stage III CKD, type 2 diabetes, peripheral neuropathy, GERD, hypothyroidism and asthma who presented to the emergency room 3/13/18  for progressive symptoms of exertional chest pain and shortness of breath .  He was noted to have an elevated troponin.  Please see admission H&P for details        Hospital Course     Oscar Terrazas was admitted on 3/13/2018.  The following problems were addressed during his hospitalization:     Acute coronary syndrome :   acute on chronic diastolic congestive heart failure  -Patient has extensive cardiac history with history of coronary artery bypass graft in 1996 and stents in 2007 and 2012.  Being followed by Dr. Rand who is his primary cardiologist  -   Presented with progressive exertional chest discomfort and dyspnea over the preceding weeks.   - initial troponin was mildly elevated at 0.772, follow-up troponins 0.716<0.653   -EKG shows no acute ischemic changes.  Initiated on  heparin drip.    - And started on Imdur, metoprolol, aspirin, Plavix and his statin.    IV Lasix.  Given his renal dysfunction and possibility of CHF contributing to his symptoms medical management and aggressive diuresis was initiated.  Gradually he continued to improve.  He was almost back to baseline.  Cardiology recommended medical management for now and holding off on coronary angiogram.  Patient and family agreeable and so will be discharged with oral Lasix and his new medication  - Echo with EF of 55 -60% and moderate hypokinesis of the basal and inferior lateral wall      Hypertension.    Blood pressures were initially elevated   PTA beta-blocker dose changed,  Imdur and oral Lasix initiated.  Blood pressure reasonable prior to discharge. Currently holding ACE or ARB given his history of renal dysfunction.        Hyperlipidemia.   -Continued PTA pravastatin with no  changes      Stage III chronic kidney disease.  -Renal function stable.    Monitored while diuresing.            Peripheral edema.    Per his daughter's report, this is a chronic problem for him.  It sounds as though he may have been managed with diuretics in the past but these were ultimately stopped,  presume secondary to issues with his chronic renal insufficiency.  He also is noncompliant with use of Jonathan hose per his daughter's report.        Type 2 diabetes.     A1c was 6.5 in December 2017.     PTA on NPH 18 units at bedtime Which is continued.  Continue with medium dose sliding scale.  Blood sugar mainly less than 180   Monitored while in-house        Hypothyroidism.  Chronic and stable on levothyroxine which has been continued.       Gout.    PTA allopurinol currently on hold   -.  No evidence of flare at this point       Asthma.  Chronic and stable  -PTA theophylline resumed once cleared by cardiology      Decubitus ulcer present on admission  -Wound care following, he also has a nonpressure/venous stasis ulcer on his lower extremity which is being monitored  # Discharge Pain Plan:   - Patient currently has NO PAIN and is not being prescribed pain medications on discharge.         Pt's past medical history, family history, habits, medications and allergies were reviewed with the patient today.  See snap shot for  HCM status. Most recent lab results reviewed with pt. Problem list and histories reviewed & adjusted, as indicated.  Additional history as below:    Pt seen with his daughter. Refuses to wear compression stockings. Has small healing skin ulcer left calf. Improved per pt. Denies chest pain since addition of Imdur and change BB. Now on Lasix also. Weight still up 4 pounds from admission. Breathing comfortably and denies chest pain or abd pain. Not checking sugars at home. Overall DM control good as above. No orthopnea, PND  Getting some leg cramps. Wonders if related to his statin vs other. Sx  bilateral     Additional ROS:   Constitutional, HEENT, Cardiovascular, Pulmonary, GI and , Neuro, MSK and Psych review of systems/symptoms are otherwise negative or unchanged from previous, except as noted above.      OBJECTIVE:  /60  Pulse 65  Temp 98.3  F (36.8  C) (Oral)  Resp 16  Wt 209 lb 3.2 oz (94.9 kg)  SpO2 99%  BMI 28.37 kg/m2   Estimated body mass index is 28.37 kg/(m^2) as calculated from the following:    Height as of 3/13/18: 6' (1.829 m).    Weight as of this encounter: 209 lb 3.2 oz (94.9 kg).  \Eye: PERRL, EOMI  HENT: ear canals and TM's normal and nose and mouth without ulcers or lesions   Neck: no adenopathy. Thyroid normal to palpation. No bruits  Pulm: Lungs clear to auscultation   CV: Regular rates and rhythm  GI: Soft, nontender, Normal active bowel sounds, No hepatosplenomegaly or masses palpable  Ext: Peripheral pulses intact. 1 plus BLE edema. LLE calf with minimal depth skin ulceration approx 2cm diameter with good granulation tissue. No drainage or erythema/warmth. Neg Max's  Neuro: Normal strength and tone, sensory exam grossly normal    Assessment/Plan: (See plan discussion below for further details)  1. Coronary artery disease involving native coronary artery of native heart with unstable angina pectoris (H)  Without chest pain currently since adding Imdur and BB dose adjustment. Continue current management and f/u with cardiology as scheduled    2. Type 2 diabetes mellitus with stage 3 chronic kidney disease, with long-term current use of insulin (H)  Controlled. A1C 6.3 on 3/13/18. Repeat A1C 6 mos  - Hemoglobin A1c; Future  - Basic metabolic panel; Future    3. Edema, unspecified type  Continue diuretic therapy. Refuses compression stockings. Has some ACE wraps at home that encouraged pt to use daily in AM. Off later PM when not out and about. Recheck BMP with hx CKD  - Basic metabolic panel    4. Skin ulcer, limited to breakdown of skin (H)  Related to #3.  Previous  Albumin levels OK. Compression and diuretic therapy. If increases or has drainage/warmth or not healed in 3 weeks, then pt to inform MD    5. Cramp of limb  ? Related to statin vs IDALMIS vs other. Cramps occur at night and not with ambulation. Labs as ordered. If neg, will consider holding statin but, with recent ACS episode, prefer to keep statin going, even if need lower dose  - Basic metabolic panel  - Iron and iron binding capacity  - Ferritin  - Magnesium           Jorge Hillman MD  Internal Medicine Department  [facility]

## 2018-03-20 NOTE — MR AVS SNAPSHOT
After Visit Summary   3/20/2018    Oscar Terrazas    MRN: [id number]           Patient Information     Date Of Birth          2/2/1934        Visit Information        Provider Department      3/20/2018 2:00 PM Jorge Hillman MD [facility]        Today's Diagnoses     Coronary artery disease involving native coronary artery of native heart with unstable angina pectoris (H)        Type 2 diabetes mellitus with stage 3 chronic kidney disease, with long-term current use of insulin (H)        Edema, unspecified type        Skin ulcer, limited to breakdown of skin (H)        Cramp of limb           Follow-ups after your visit        Your next 10 appointments already scheduled     May 03, 2018  2:45 PM CDT   Return Visit with Sebastien Gibson MD   [facility] ([facility])    [address] [place] [id number] [phone] OPT 2              Who to contact     If you have questions or need follow up information about today's clinic visit or your schedule please contact [facility] directly at [phone].  Normal or non-critical lab and imaging results will be communicated to you by Couplehart, letter or phone within 4 business days after the clinic has received the results. If you do not hear from us within 7 days, please contact the clinic through Couplehart or phone. If you have a critical or abnormal lab result, we will notify you by phone as soon as possible.  Submit refill requests through CloudShare or call your pharmacy and they will forward the refill request to us. Please allow 3 business days for your refill to be completed.          Additional Information About Your Visit        MyChart Information     CloudShare gives you secure access to your electronic health record. If you see a primary care provider, you can also send messages to your care team and make appointments. If you have  questions, please call your primary care clinic.  If you do not have a primary care provider, please call [phone] and they will assist you.        Care EveryWhere ID     This is your Care EveryWhere ID. This could be used by other organizations to access your [place] medical records  [id number]        Your Vitals Were     Pulse Temperature Respirations Pulse Oximetry BMI (Body Mass Index)       65 98.3  F (36.8  C) (Oral) 16 99% 28.37 kg/m2        Blood Pressure from Last 3 Encounters:   03/29/18 144/58   03/20/18 136/60   03/16/18 125/61    Weight from Last 3 Encounters:   03/29/18 211 lb 3.2 oz (95.8 kg)   03/20/18 209 lb 3.2 oz (94.9 kg)   03/16/18 205 lb 3.2 oz (93.1 kg)              We Performed the Following     Basic metabolic panel     Ferritin     Iron and iron binding capacity     Magnesium        Primary Care Provider Office Phone # Fax #    Jorge Hillman [phone]       [address] [place] [id number]        Equal Access to Services     FRANKIE JONES : Hadii aad ku hadasho Soomaali, waaxda luqadaha, qaybta kaalmada kristine, nicole pantoja . So [facility] [phone].    ATENCIÓN: Si habla español, tiene a fernandez disposición servicios gratuitos de asistencia lingüística. Ll[facility] [phone].    We comply with applicable federal civil rights laws and [place] laws. We do not discriminate on the basis of race, color, national origin, age, disability, sex, sexual orientation, or gender identity.            Thank you!     Thank you for choosing [facility]  for your care. Our goal is always to provide you with excellent care. Hearing back from our patients is one way we can continue to improve our services. Please take a few minutes to complete the written survey that you may receive in the mail after your visit with us. Thank you!             Your Updated Medication List - Protect others around you: Learn how to safely use, store and  "throw away your medicines at www.disposemymeds.org.          This list is accurate as of 3/20/18 11:59 PM.  Always use your most recent med list.                   Brand Name Dispense Instructions for use Diagnosis    allopurinol 100 MG tablet    ZYLOPRIM    180 tablet    TAKE 2 TABLETS(200 MG) BY MOUTH DAILY    Chronic gout without tophus, unspecified cause, unspecified site       aspirin 81 MG tablet      Take 1 tablet by mouth daily. with food        blood glucose monitoring test strip    ACCU-CHEK COMPACT PLUS    200 strip    Check blood sugar 2-3 times a day    Type 2 diabetes mellitus with stage 3 chronic kidney disease, with long-term current use of insulin (H)       cetirizine 10 MG tablet    zyrTEC    30 tablet    TAKE 1 TABLET(10 MG) BY MOUTH EVERY EVENING    Atopic dermatitis, unspecified type, Itching       clopidogrel 75 MG tablet    PLAVIX    90 tablet    TAKE 1 TABLET(75 MG) BY MOUTH DAILY    CKD (chronic kidney disease) stage 3, GFR 30-59 ml/min, Cardiovascular disease       COD LIVER OIL PO      Take 1 capsule by mouth daily        furosemide 20 MG tablet    LASIX    30 tablet    Take 1 tablet (20 mg) by mouth daily    NSTEMI (non-ST elevated myocardial infarction) (H)       insulin  UNIT/ML injection    HumuLIN N/NovoLIN N    3 Month    18 units at bedtime    Type 2 diabetes mellitus with stage 3 chronic kidney disease, with long-term current use of insulin (H)       insulin syringe-needle U-100 29G X 1/2\" 0.5 ML    BD insulin syringe    200 each    Use one syringe 2 daily or as directed.    Type 2 diabetes mellitus with stage 3 chronic kidney disease, with long-term current use of insulin (H)       isosorbide mononitrate 60 MG 24 hr tablet    IMDUR    30 tablet    Take 1 tablet (60 mg) by mouth daily    NSTEMI (non-ST elevated myocardial infarction) (H)       levothyroxine 50 MCG tablet    SYNTHROID/LEVOTHROID    30 tablet    TAKE 1 TABLET BY MOUTH DAILY    Hypothyroidism, unspecified type "       metoprolol tartrate 50 MG tablet    LOPRESSOR    60 tablet    Take 1 tablet (50 mg) by mouth 2 times daily    NSTEMI (non-ST elevated myocardial infarction) (H)       theophylline 400 MG 24 hr tablet    UNIPHYL    90 tablet    TAKE 1 TABLET BY MOUTH EVERY DAY    Mild persistent asthma without complication       triamcinolone 0.5 % cream    KENALOG    45 g    Apply sparingly to affected area two  times daily as needed for rash.    Itching       VITAMIN B 12 PO      Take 500 mcg by mouth 2 times daily.

## 2018-03-29 ENCOUNTER — OFFICE VISIT (OUTPATIENT)
Dept: CARDIOLOGY | Facility: CLINIC | Age: 83
End: 2018-03-29
Payer: COMMERCIAL

## 2018-03-29 VITALS
HEIGHT: 72 IN | SYSTOLIC BLOOD PRESSURE: 144 MMHG | HEART RATE: 62 BPM | WEIGHT: 211.2 LBS | BODY MASS INDEX: 28.61 KG/M2 | DIASTOLIC BLOOD PRESSURE: 58 MMHG | OXYGEN SATURATION: 100 %

## 2018-03-29 DIAGNOSIS — N18.4 CKD (CHRONIC KIDNEY DISEASE) STAGE 4, GFR 15-29 ML/MIN (H): ICD-10-CM

## 2018-03-29 DIAGNOSIS — I10 ESSENTIAL HYPERTENSION, BENIGN: ICD-10-CM

## 2018-03-29 DIAGNOSIS — E78.5 HYPERLIPIDEMIA LDL GOAL <70: ICD-10-CM

## 2018-03-29 LAB
ANION GAP SERPL CALCULATED.3IONS-SCNC: 16.7 MMOL/L (ref 6–17)
BUN SERPL-MCNC: 68 MG/DL (ref 7–30)
CALCIUM SERPL-MCNC: 8.9 MG/DL (ref 8.5–10.5)
CHLORIDE SERPL-SCNC: 105 MMOL/L (ref 98–107)
CO2 SERPL-SCNC: 23 MMOL/L (ref 23–29)
CREAT SERPL-MCNC: 3.49 MG/DL (ref 0.7–1.3)
GFR SERPL CREATININE-BSD FRML MDRD: 17 ML/MIN/1.7M2
GLUCOSE SERPL-MCNC: 162 MG/DL (ref 70–105)
POTASSIUM SERPL-SCNC: 4.7 MMOL/L (ref 3.5–5.1)
SODIUM SERPL-SCNC: 140 MMOL/L (ref 136–145)

## 2018-03-29 PROCEDURE — 99214 OFFICE O/P EST MOD 30 MIN: CPT | Performed by: NURSE PRACTITIONER

## 2018-03-29 PROCEDURE — 36415 COLL VENOUS BLD VENIPUNCTURE: CPT | Performed by: NURSE PRACTITIONER

## 2018-03-29 PROCEDURE — 80048 BASIC METABOLIC PNL TOTAL CA: CPT | Performed by: NURSE PRACTITIONER

## 2018-03-29 RX ORDER — PRAVASTATIN SODIUM 80 MG/1
40 TABLET ORAL EVERY OTHER DAY
Qty: 90 TABLET | Refills: 1 | Status: SHIPPED | OUTPATIENT
Start: 2018-03-29 | End: 2018-06-06

## 2018-03-29 RX ORDER — HYDRALAZINE HYDROCHLORIDE 25 MG/1
75 TABLET, FILM COATED ORAL 3 TIMES DAILY
Qty: 360 TABLET | Refills: 3 | Status: ON HOLD | COMMUNITY
Start: 2018-03-29 | End: 2018-06-15

## 2018-03-29 NOTE — NURSING NOTE
The After Visit Summary was reviewed with the patient following their office visit with Preethi Smiley CNP. Patient was educated about any medication changes, the importance of following a low sodium diet, importance of recording daily weights, and when to call [facility]. Reiterated to patient the importance of restarting his Pravastatin 1/2 tab (40 mg) every other day, discussed the benefits. Patient verbalized understanding of the information and agrees to call with any questions or concerns.     Labs: Lab results from today were reviewed with the patient during the office visit. A copy of the results were provided on the After Visit Summary. Pt will have BMP done in one week at [facility] office. Daughter to schedule appointment.     Return appointment: Patient was given instructions on when and how to schedule their next office visit with the [facility] clinic.     Medication changes: 1. Stop furosemide 2. Increase HYdralazine to three times per day (2) 25 mg tablets three times per day.3.Restart pravastatin 80 mg size tablet, 1/2 tablet every other day. 4. BMP in one week.      Loretta Donovna RN 3:22 PM 03/29/18  [facility] RN Care Coordinator  [facility]  [phone]

## 2018-03-29 NOTE — PROGRESS NOTES
HPI and Plan:   See dictation    Orders Placed This Encounter   Procedures     Basic metabolic panel     Orders Placed This Encounter   Medications     hydrALAZINE (APRESOLINE) 25 MG tablet     Sig: Take 2 tablets (50 mg) by mouth 3 times daily     Dispense:  360 tablet     Refill:  3     pravastatin (PRAVACHOL) 80 MG tablet     Sig: Take 0.5 tablets (40 mg) by mouth every other day at bedtime.     Dispense:  90 tablet     Refill:  1     Medications Discontinued During This Encounter   Medication Reason     pravastatin (PRAVACHOL) 80 MG tablet Discontinued by another Health Care Provider     furosemide (LASIX) 20 MG tablet      hydrALAZINE (APRESOLINE) 25 MG tablet Reorder         Encounter Diagnoses   Name Primary?     BENIGN HYPERTENSION      Hyperlipidemia LDL goal <70        CURRENT MEDICATIONS:  Current Outpatient Prescriptions   Medication Sig Dispense Refill     hydrALAZINE (APRESOLINE) 25 MG tablet Take 2 tablets (50 mg) by mouth 3 times daily 360 tablet 3     pravastatin (PRAVACHOL) 80 MG tablet Take 0.5 tablets (40 mg) by mouth every other day at bedtime. 90 tablet 1     isosorbide mononitrate (IMDUR) 60 MG 24 hr tablet Take 1 tablet (60 mg) by mouth daily 30 tablet 0     metoprolol tartrate (LOPRESSOR) 50 MG tablet Take 1 tablet (50 mg) by mouth 2 times daily 60 tablet 0     cetirizine (ZYRTEC) 10 MG tablet TAKE 1 TABLET(10 MG) BY MOUTH EVERY EVENING 30 tablet 8     clopidogrel (PLAVIX) 75 MG tablet TAKE 1 TABLET(75 MG) BY MOUTH DAILY 90 tablet 3     allopurinol (ZYLOPRIM) 100 MG tablet TAKE 2 TABLETS(200 MG) BY MOUTH DAILY 180 tablet 3     theophylline (UNIPHYL) 400 MG 24 hr tablet TAKE 1 TABLET BY MOUTH EVERY DAY 90 tablet 3     blood glucose monitoring (ACCU-CHEK COMPACT PLUS) test strip Check blood sugar 2-3 times a day 200 strip 3     levothyroxine (SYNTHROID/LEVOTHROID) 50 MCG tablet TAKE 1 TABLET BY MOUTH DAILY 30 tablet 10     triamcinolone (KENALOG) 0.5 % cream Apply sparingly to affected area  "two  times daily as needed for rash. 45 g 1     insulin syringe-needle U-100 (BD INSULIN SYRINGE) 29G X 1/2\" 0.5 ML Use one syringe 2 daily or as directed. 200 each 4     insulin NPH (HUMULIN N, NOVOLIN N) 100 UNIT/ML VIAL 18 units at bedtime 3 Month 3     COD LIVER OIL PO Take 1 capsule by mouth daily        aspirin 81 MG tablet Take 1 tablet by mouth daily. with food       Cyanocobalamin (VITAMIN B 12 PO) Take 500 mcg by mouth 2 times daily.       [DISCONTINUED] hydrALAZINE (APRESOLINE) 25 MG tablet TAKE 2 TABLETS(50 MG) BY MOUTH TWICE DAILY 360 tablet 3     [DISCONTINUED] pravastatin (PRAVACHOL) 80 MG tablet Take 1 tablet (80 mg) by mouth daily at bedtime. 90 tablet 1       ALLERGIES     Allergies   Allergen Reactions     Advair [Advair Diskus]      cough;  insomnia, nightmares     Amlodipine Besylate      edema       Azithromycin GI Disturbance     Clarithromycin      gi     Hydrochlorothiazide      hctz + lisinopril-->inc creat, k+     Lisinopril      lisinopril + hctz --> inc creat, k+     Moxifloxacin Hydrochloride      clostridium diffile colitis     Penicillins      gen swelling     Pravastatin Cramps     Muscle cramps/spasm.  Stopped 2017     Vioxx      edema       PAST MEDICAL HISTORY:  Past Medical History:   Diagnosis Date     Acute myocardial infarction, unspecified site, episode of care unspecified      Allergic rhinitis, cause unspecified      Anemia 3/6/2014     CAD (coronary artery disease)     1996 CABG - LIMA to LAD, SVG to OM, 2007 Cath - KANU to Left main and ostial/prox LAD, 2012 Cath - 80-90% stenosis SVG to OM - KANU placed, EF 50%     CKD (chronic kidney disease) stage 3, GFR 30-59 ml/min 3/9/2014     CTS (carpal tunnel syndrome)     bilateral     Degeneration of cervical intervertebral disc      Diaphragmatic hernia without mention of obstruction or gangrene      Esophageal reflux      Essential hypertension, benign      Hyperlipidemia LDL goal <70      Hypothyroidism      Hypothyroidism, " unspecified hypothyroidism type 1/14/2016     IDIOPATHIC CYCLIC EDEMA      Mild persistent asthma      Osteoarthrosis, unspecified whether generalized or localized, unspecified site      Pneumonia, organism unspecified(486)      Proteinuria 5/17/2014     PVD (peripheral vascular disease) (H)      Sensorineural hearing loss, unspecified      Subarachnoid bleed (H)      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 disease (goal A1C<8) 10/17/2015     Unspecified disorder resulting from impaired renal function      Unspecified hereditary and idiopathic peripheral neuropathy      Venous insufficiency        PAST SURGICAL HISTORY:  Past Surgical History:   Procedure Laterality Date     C NONSPECIFIC PROCEDURE      appendectomy     C NONSPECIFIC PROCEDURE      hemorrhoids     C NONSPECIFIC PROCEDURE      cervical strain     C NONSPECIFIC PROCEDURE      rotator cuff surgery, right shoulder     C NONSPECIFIC PROCEDURE  2008    iol os     CORONARY ARTERY BYPASS  1996    LIMA to LAD, SVG to OM     HEART CATH STENT COR W/WO PTCA  2007    lad, left main cor artery stents/drug eluting     HEART CATH STENT COR W/WO PTCA  2012    80-90% stenosis SVG to OM - KANU placed, EF 50%     NONSPECIFIC PROCEDURE      iol od       FAMILY HISTORY:  Family History   Problem Relation Age of Onset     Lung Cancer Daughter      Family History Negative Mother      broken hip     Jaundice Father      Alcohol/Drug Father      Ovarian Cancer Daughter      Family History Negative Daughter      Family History Negative Son        SOCIAL HISTORY:  Social History     Social History     Marital status:      Spouse name: N/A     Number of children: N/A     Years of education: N/A     Social History Main Topics     Smoking status: Former Smoker     Types: Cigarettes     Smokeless tobacco: Never Used      Comment: quit at age 32     Alcohol use No     Drug use: No     Sexual activity: No     Other Topics Concern     Caffeine Concern No     Sleep  Concern Yes     Stress Concern No     Weight Concern No     Special Diet No     Exercise Yes     bowling, 5 days week. yard work     Social History Narrative       Review of Systems:  Skin:  Positive for rash   Eyes:  Positive for glasses  ENT:  Negative    Respiratory:  Positive for dyspnea on exertion;cough;wheezing  Cardiovascular:    edema;Positive for  Gastroenterology: Negative    Genitourinary:  Negative    Musculoskeletal:  Negative    Neurologic:  Positive for numbness or tingling of hands;numbness or tingling of feet  Psychiatric:  Positive for depression  Heme/Lymph/Imm:       Endocrine:         Physical Exam:  Vitals: /58  Pulse 62  Ht 1.829 m (6')  Wt 95.8 kg (211 lb 3.2 oz)  SpO2 100%  BMI 28.64 kg/m2    Constitutional:  cooperative, alert and oriented, well developed, well nourished, in no acute distress        Skin:  warm and dry to the touch, no apparent skin lesions or masses noted          Head:  normocephalic, no masses or lesions        Eyes:  pupils equal and round;sclera white;EOMS intact        Lymph:      ENT:  no pallor or cyanosis        Neck:  carotid pulses are full and equal bilaterally;JVP normal;no carotid bruit        Respiratory:  normal breath sounds, clear to auscultation, normal A-P diameter, normal symmetry, normal respiratory excursion, no use of accessory muscles;healed median sternotomy scar         Cardiac: regular rhythm;normal S1 and S2;no S3 or S4;apical impulse not displaced       systolic ejection murmur;RUSB;grade 1        pulses full and equal, no bruits auscultated                               right femoral bruit (-) left femoral bruit (-)      GI:  abdomen soft, non-tender, BS normoactive, no mass, no HSM, no bruits surgical scars      Extremities and Muscular Skeletal:      bilateral LE edema;2+          Neurological:  no gross motor deficits        Psych:  Alert and Oriented x 3        Recent Lab Results:  LIPID RESULTS:  Lab Results   Component Value  Date    CHOL 167 03/14/2018    HDL 32 (L) 03/14/2018     (H) 03/14/2018    TRIG 99 03/14/2018    CHOLHDLRATIO 3.8 09/11/2014       LIVER ENZYME RESULTS:  Lab Results   Component Value Date    AST 3 12/13/2017    ALT 16 12/13/2017       CBC RESULTS:  Lab Results   Component Value Date    WBC 8.7 03/16/2018    RBC 3.24 (L) 03/16/2018    HGB 9.9 (L) 03/16/2018    HCT 29.7 (L) 03/16/2018    MCV 92 03/16/2018    MCH 30.6 03/16/2018    MCHC 33.3 03/16/2018    RDW 14.9 03/16/2018     03/16/2018       BMP RESULTS:  Lab Results   Component Value Date     03/29/2018    POTASSIUM 4.7 03/29/2018    CHLORIDE 105 03/29/2018    CO2 23 03/29/2018    ANIONGAP 16.7 03/29/2018     (H) 03/29/2018    BUN 68 (H) 03/29/2018    CR 3.49 (H) 03/29/2018    GFRESTIMATED 17 (L) 03/29/2018    GFRESTBLACK 20 (L) 03/29/2018    LEV 8.9 03/29/2018        A1C RESULTS:  Lab Results   Component Value Date    A1C 6.3 (H) 03/13/2018       INR RESULTS:  Lab Results   Component Value Date    INR 1.06 03/12/2013    INR 1.19 (H) 12/08/2008           CC  No referring provider defined for this encounter.

## 2018-03-29 NOTE — LETTER
3/29/2018      Jorge Hillman MD  [address] [facility] [address]      RE: Oscar Terrazas       Dear Colleague,    I had the pleasure of seeing Oscar Terrazas in the [facility].    Service Date: 03/29/2018      HISTORY OF PRESENT ILLNESS:  Oscar Terrazas is an 84-year-old gentleman whom I am seeing for post-hospital followup.  He was recently in the hospital for acute on chronic heart failure.  He has an extensive cardiac history that includes coronary artery bypass surgery in 1999 with multiple stenting done following this.  The last time was in 2012.  His past medical history includes progressive chronic renal insufficiency stage IV, therefore, avoidance of the Cath Lab as he is a high risk for nephrotoxicity.  When he was admitted to the hospital, he had elevated troponins and medical management was recommended.  He was started on isosorbide mononitrate and hydralazine.  His symptoms improved after IV diuretics and a lower blood pressure.  He was changed from IV diuretic to p.o. Lasix 40 mg, and prior to discharge his furosemide dose was decreased to 20 mg per day.  The patient also has a history of chronic venous insufficiency and lymphedema.  He uses Ace wraps as his chronic venous insufficiency makes wearing compression stockings difficult.  His admitting weight to the hospital was 211 pounds, and he was discharged at 205 pounds.  He was instructed to check his weight daily.  Today he reports he has done that, but he has not recorded his weight, nor did he bring in a weight chart.  He says his weight varies at home.  Some days he can lose 2 pounds.  His weight at home today, he reported, was 205.  Blood pressure is elevated at 144/58 today.      This gentleman has a history of hyperlipidemia and in the past has not tolerated statins because of muscle cramps.  His last statin was pravastatin 80 mg per day.  He did reduce that dose to 40 mg per day, but because of some  leg cramping he stopped the pravastatin altogether.      While he was in the hospital, his creatinine was closely monitored.  At discharge it was 2.6.   Today's Labs:  Glucose 162 (H) 70 - 105   Comment:   Non Fasting   Urea Nitrogen 68 (H) 7 - 30   Creatinine 3.49 (H) 0.70 - 1.30   GFR Estimate 17 (L) >60   GFR Estimate If Black 20 (L) >60   Calcium 8.9  8.5 - 10.5       PHYSICAL EXAMINATION:   GENERAL:  The patient is alert and oriented.   SKIN:  Warm and dry.  He is in no acute distress.   CARDIAC:  Heart tones are regular with an S1, S2 without an S3, S4.   LUNGS:  Clear without crackles or wheezes.   ABDOMEN:  Soft.   EXTREMITIES:  Lower extremities with 2 to 3+ pitting edema without wearing DESTINI stockings today.  He has some evidence of chronic stasis with brownish skin tones especially on his left leg.      IMPRESSION AND PLAN:   1.  This is an 84-year-old gentleman with extensive coronary artery disease with recent hospitalization for acute coronary syndrome.  He did have elevated troponins.  Due to his history of chronic renal insufficiency, medical management was recommended.  He was started on isosorbide mononitrate and hydralazine.  Today he reports no recurrence of angina.  He has returned to his regular activities to include bowling.   2.  Chronic renal insufficiency with evidence of worsening kidney function.  Today I did review with him over-the-counter medications.  He does occasionally take ibuprofen a couple times a week.  I have asked him to stop that completely.  Today I am also going to stop his furosemide.  We will repeat a BMP next week.  The C.O.R.E. nurse will follow up with his lab, with his daily weights, and we will add back for furosemide if he has any weight gain.   3.  Chronic diastolic heart failure with a preserved ejection fraction.  His home weight is down 3 pounds from his discharge weight.  He appears euvolemic.  Because of his worsening kidney function, I am stopping his  furosemide today.  Today we spent time talking about the importance of daily weights, signs and symptoms to call the clinic, and a 2-gram sodium diet.  He has a followup appointment in 1 month with Dr. Gibson.  We will bring him in sooner if we have to add on furosemide.   4.  Peripheral vascular disease.  He is going to start using his Ace wraps again.  We discussed elevating his legs a couple times a day.   5.  Hypertension.  His blood pressure is elevated.  I have asked him to increase his hydralazine, 50 mg 3 times a day.      It has been my pleasure to see Oscar again.      Preethi Smiley, MS, ANP         AIDA TAVERAS, CNP             D: 2018   T: 2018   MT: BRUNO      Name:     OSCAR BARILLAS   MRN:      [id number]        Account:      [id number]   :      1934           Service Date: 2018      Document: [id number]           Outpatient Encounter Prescriptions as of 3/29/2018   Medication Sig Dispense Refill     hydrALAZINE (APRESOLINE) 25 MG tablet Take 2 tablets (50 mg) by mouth 3 times daily 360 tablet 3     pravastatin (PRAVACHOL) 80 MG tablet Take 0.5 tablets (40 mg) by mouth every other day at bedtime. 90 tablet 1     isosorbide mononitrate (IMDUR) 60 MG 24 hr tablet Take 1 tablet (60 mg) by mouth daily 30 tablet 0     metoprolol tartrate (LOPRESSOR) 50 MG tablet Take 1 tablet (50 mg) by mouth 2 times daily 60 tablet 0     cetirizine (ZYRTEC) 10 MG tablet TAKE 1 TABLET(10 MG) BY MOUTH EVERY EVENING 30 tablet 8     clopidogrel (PLAVIX) 75 MG tablet TAKE 1 TABLET(75 MG) BY MOUTH DAILY 90 tablet 3     allopurinol (ZYLOPRIM) 100 MG tablet TAKE 2 TABLETS(200 MG) BY MOUTH DAILY 180 tablet 3     theophylline (UNIPHYL) 400 MG 24 hr tablet TAKE 1 TABLET BY MOUTH EVERY DAY 90 tablet 3     blood glucose monitoring (ACCU-CHEK COMPACT PLUS) test strip Check blood sugar 2-3 times a day 200 strip 3     levothyroxine (SYNTHROID/LEVOTHROID) 50 MCG tablet TAKE 1 TABLET BY MOUTH DAILY 30 tablet  "10     triamcinolone (KENALOG) 0.5 % cream Apply sparingly to affected area two  times daily as needed for rash. 45 g 1     insulin syringe-needle U-100 (BD INSULIN SYRINGE) 29G X 1/2\" 0.5 ML Use one syringe 2 daily or as directed. 200 each 4     insulin NPH (HUMULIN N, NOVOLIN N) 100 UNIT/ML VIAL 18 units at bedtime 3 Month 3     COD LIVER OIL PO Take 1 capsule by mouth daily        aspirin 81 MG tablet Take 1 tablet by mouth daily. with food       Cyanocobalamin (VITAMIN B 12 PO) Take 500 mcg by mouth 2 times daily.       [DISCONTINUED] furosemide (LASIX) 20 MG tablet Take 1 tablet (20 mg) by mouth daily 30 tablet 0     [DISCONTINUED] hydrALAZINE (APRESOLINE) 25 MG tablet TAKE 2 TABLETS(50 MG) BY MOUTH TWICE DAILY 360 tablet 3     [DISCONTINUED] pravastatin (PRAVACHOL) 80 MG tablet Take 1 tablet (80 mg) by mouth daily at bedtime. 90 tablet 1     No facility-administered encounter medications on file as of 3/29/2018.        Again, thank you for allowing me to participate in the care of your patient.      Sincerely,    AIDA Yee CNP     [facility]    "

## 2018-03-29 NOTE — PATIENT INSTRUCTIONS
Call [facility] nurse for any questions or concerns:  [phone]   *If you have concerns after hours, please call [phone], option 2 to speak with on call Cardiologist.    1. Medication changes from today:    1. Stop furosemide  2. Increase HYdralazine to three times per day (2) 25 mg tablets three times per day  3.Restart pravastatin 80 mg size tablet, 1/2 tablet every other day.     2. Weigh yourself daily and write it down.     3. Call [facility] nurse if your weight is up more than 2 pounds in one day or 5 pounds in one week.     4. Call [facility] nurse if you feel more short of breath, have more abdominal bloating, or leg swelling.     5. Continue low sodium diet (less than 2000 mg daily). If you eat less salt, you will retain less fluid.     6. Alcohol can weaken your heart further. You should avoid alcohol or limit its use to special times, such as a holiday or birthday.      7. Do NOT take Aleve or ibuprofen without talking to your doctor first.      8. Lab Results: **     Component      Latest Ref Rng & Units 3/20/2018 3/29/2018   Sodium      136 - 145 mmol/L 139 140   Potassium      3.5 - 5.1 mmol/L 4.4 4.7   Chloride      98 - 107 mmol/L 107 105   Carbon Dioxide      23 - 29 mmol/L 23 23   Anion Gap      6 - 17 mmol/L 9 16.7   Glucose      70 - 105 mg/dL 157 (H) 162 (H)   Urea Nitrogen      7 - 30 mg/dL 71 (H) 68 (H)   Creatinine      0.70 - 1.30 mg/dL 3.56 (H) 3.49 (H)   GFR Estimate      >60 mL/min/1.7m2 16 (L) 17 (L)   GFR Estimate If Black      >60 mL/min/1.7m2 20 (L) 20 (L)   Calcium      8.5 - 10.5 mg/dL 8.8 8.9     [facility]: Cardiomyopathy, Optimization, Rehabilitation, Education  The [facility] is a heart failure specialty clinic within the [facility] where you will work with specialized nurse practitioners, physician assistants, doctors, and registered nurses. They are dedicated to helping patients with heart failure to carefully adjust medications, receive education, and learn who and when  to call if symptoms develop. They specialize in helping you better understand your condition, slow the progression of your disease, improve the length and quality of your life, help you detect future heart problems before they become life threatening, and avoid hospitalizations.

## 2018-03-29 NOTE — MR AVS SNAPSHOT
After Visit Summary   3/29/2018    Oscar Terrazas    MRN: [id number]           Patient Information     Date Of Birth          2/2/1934        Visit Information        Provider Department      3/29/2018 2:40 PM Preethi Smiley, [facility]        Today's Diagnoses     BENIGN HYPERTENSION        Hyperlipidemia LDL goal <70          Care Instructions    Call CORE nurse for any questions or concerns:  [phone]   *If you have concerns after hours, please call [phone], option 2 to speak with on call Cardiologist.    1. Medication changes from today:    1. Stop furosemide  2. Increase HYdralazine to three times per day (2) 25 mg tablets three times per day  3.Restart pravastatin 80 mg size tablet, 1/2 tablet every other day.     2. Weigh yourself daily and write it down.     3. Call CORE nurse if your weight is up more than 2 pounds in one day or 5 pounds in one week.     4. Call CORE nurse if you feel more short of breath, have more abdominal bloating, or leg swelling.     5. Continue low sodium diet (less than 2000 mg daily). If you eat less salt, you will retain less fluid.     6. Alcohol can weaken your heart further. You should avoid alcohol or limit its use to special times, such as a holiday or birthday.      7. Do NOT take Aleve or ibuprofen without talking to your doctor first.      8. Lab Results: **     Component      Latest Ref Rng & Units 3/20/2018 3/29/2018   Sodium      136 - 145 mmol/L 139 140   Potassium      3.5 - 5.1 mmol/L 4.4 4.7   Chloride      98 - 107 mmol/L 107 105   Carbon Dioxide      23 - 29 mmol/L 23 23   Anion Gap      6 - 17 mmol/L 9 16.7   Glucose      70 - 105 mg/dL 157 (H) 162 (H)   Urea Nitrogen      7 - 30 mg/dL 71 (H) 68 (H)   Creatinine      0.70 - 1.30 mg/dL 3.56 (H) 3.49 (H)   GFR Estimate      >60 mL/min/1.7m2 16 (L) 17 (L)   GFR Estimate If Black      >60 mL/min/1.7m2 20 (L) 20 (L)   Calcium      8.5 - 10.5  mg/dL 8.8 8.9     [facility]: Cardiomyopathy, Optimization, Rehabilitation, Education  The [facility] is a heart failure specialty clinic within the [facility] where you will work with specialized nurse practitioners, physician assistants, doctors, and registered nurses. They are dedicated to helping patients with heart failure to carefully adjust medications, receive education, and learn who and when to call if symptoms develop. They specialize in helping you better understand your condition, slow the progression of your disease, improve the length and quality of your life, help you detect future heart problems before they become life threatening, and avoid hospitalizations.              Follow-ups after your visit        Your next 10 appointments already scheduled     May 03, 2018  2:45 PM CDT   Return Visit with Sebastien Gibson MD   [facility] ([facility])    [address] [place] [address] [id number] [phone] OPT 2              Future tests that were ordered for you today     Open Future Orders        Priority Expected Expires Ordered    Basic metabolic panel Routine 4/5/2018 3/29/2019 3/29/2018            Who to contact     If you have questions or need follow up information about today's clinic visit or your schedule please contact [facility] directly at [phone].  Normal or non-critical lab and imaging results will be communicated to you by MyChart, letter or phone within 4 business days after the clinic has received the results. If you do not hear from us within 7 days, please contact the clinic through MyChart or phone. If you have a critical or abnormal lab result, we will notify you by phone as soon as possible.  Submit refill requests through [employer] or call your pharmacy and they will forward the refill request to us. Please allow 3 business days for your refill to be completed.           Additional Information About Your Visit        [employer] Information     [employer] gives you secure access to your electronic health record. If you see a primary care provider, you can also send messages to your care team and make appointments. If you have questions, please call your primary care clinic.  If you do not have a primary care provider, please call [phone] and they will assist you.        Care EveryWhere ID     This is your Care EveryWhere ID. This could be used by other organizations to access your [facility] medical records  [id number]        Your Vitals Were     Pulse Height Pulse Oximetry BMI (Body Mass Index)          62 1.829 m (6') 100% 28.64 kg/m2         Blood Pressure from Last 3 Encounters:   03/29/18 144/58   03/20/18 136/60   03/16/18 125/61    Weight from Last 3 Encounters:   03/29/18 95.8 kg (211 lb 3.2 oz)   03/20/18 94.9 kg (209 lb 3.2 oz)   03/16/18 93.1 kg (205 lb 3.2 oz)                 Today's Medication Changes          These changes are accurate as of 3/29/18  3:29 PM.  If you have any questions, ask your nurse or doctor.               These medicines have changed or have updated prescriptions.        Dose/Directions    hydrALAZINE 25 MG tablet   Commonly known as:  APRESOLINE   This may have changed:  See the new instructions.   Used for:  Essential hypertension, benign   Changed by:  Preethi Smiley APRN CNP        Dose:  50 mg   Take 2 tablets (50 mg) by mouth 3 times daily   Quantity:  360 tablet   Refills:  3       pravastatin 80 MG tablet   Commonly known as:  PRAVACHOL   This may have changed:    - how much to take  - when to take this   Used for:  Hyperlipidemia LDL goal <70   Changed by:  Preethi Smiley APRN CNP        Dose:  40 mg   Take 0.5 tablets (40 mg) by mouth every other day at bedtime.   Quantity:  90 tablet   Refills:  1         Stop taking these medicines if you haven't already. Please contact your care team if you have questions.     furosemide 20  MG tablet   Commonly known as:  LASIX   Stopped by:  Preethi Smiley, APRN CNP                Where to get your medicines      [facility] [place] [id number] AT [facility] [address] [facility] [address]     Phone:  [phone]     pravastatin 80 MG tablet                Primary Care Provider Office Phone # Fax #    Jorge Hillman [phone]       [address] [facility] [address]        Equal Access to Services     [facility]: Hadii aad ku hadasho Soomaali, waaxda luqadaha, qaybta kaalmada adeegyada, waxay idiin hayaan adeeg jett pantoja . So [facility] [phone].    ATENCIÓN: Si habla español, tiene a fernandez disposición servicios gratuitos de asistencia lingüística. Llame al [phone].    We comply with applicable federal civil rights laws and [place] laws. We do not discriminate on the basis of race, color, national origin, age, disability, sex, sexual orientation, or gender identity.            Thank you!     Thank you for choosing [facility]  for your care. Our goal is always to provide you with excellent care. Hearing back from our patients is one way we can continue to improve our services. Please take a few minutes to complete the written survey that you may receive in the mail after your visit with us. Thank you!             Your Updated Medication List - Protect others around you: Learn how to safely use, store and throw away your medicines at www.disposemymeds.org.          This list is accurate as of 3/29/18  3:29 PM.  Always use your most recent med list.                   Brand Name Dispense Instructions for use Diagnosis    allopurinol 100 MG tablet    ZYLOPRIM    180 tablet    TAKE 2 TABLETS(200 MG) BY MOUTH DAILY    Chronic gout without tophus, unspecified cause, unspecified site       aspirin 81 MG tablet      Take 1 tablet by mouth daily. with  "food        blood glucose monitoring test strip    ACCU-CHEK COMPACT PLUS    200 strip    Check blood sugar 2-3 times a day    Type 2 diabetes mellitus with stage 3 chronic kidney disease, with long-term current use of insulin (H)       cetirizine 10 MG tablet    zyrTEC    30 tablet    TAKE 1 TABLET(10 MG) BY MOUTH EVERY EVENING    Atopic dermatitis, unspecified type, Itching       clopidogrel 75 MG tablet    PLAVIX    90 tablet    TAKE 1 TABLET(75 MG) BY MOUTH DAILY    CKD (chronic kidney disease) stage 3, GFR 30-59 ml/min, Cardiovascular disease       COD LIVER OIL PO      Take 1 capsule by mouth daily        hydrALAZINE 25 MG tablet    APRESOLINE    360 tablet    Take 2 tablets (50 mg) by mouth 3 times daily    Essential hypertension, benign       insulin  UNIT/ML injection    HumuLIN N/NovoLIN N    3 Month    18 units at bedtime    Type 2 diabetes mellitus with stage 3 chronic kidney disease, with long-term current use of insulin (H)       insulin syringe-needle U-100 29G X 1/2\" 0.5 ML    BD insulin syringe    200 each    Use one syringe 2 daily or as directed.    Type 2 diabetes mellitus with stage 3 chronic kidney disease, with long-term current use of insulin (H)       isosorbide mononitrate 60 MG 24 hr tablet    IMDUR    30 tablet    Take 1 tablet (60 mg) by mouth daily    NSTEMI (non-ST elevated myocardial infarction) (H)       levothyroxine 50 MCG tablet    SYNTHROID/LEVOTHROID    30 tablet    TAKE 1 TABLET BY MOUTH DAILY    Hypothyroidism, unspecified type       metoprolol tartrate 50 MG tablet    LOPRESSOR    60 tablet    Take 1 tablet (50 mg) by mouth 2 times daily    NSTEMI (non-ST elevated myocardial infarction) (H)       pravastatin 80 MG tablet    PRAVACHOL    90 tablet    Take 0.5 tablets (40 mg) by mouth every other day at bedtime.    Hyperlipidemia LDL goal <70       theophylline 400 MG 24 hr tablet    UNIPHYL    90 tablet    TAKE 1 TABLET BY MOUTH EVERY DAY    Mild persistent asthma " without complication       triamcinolone 0.5 % cream    KENALOG    45 g    Apply sparingly to affected area two  times daily as needed for rash.    Itching       VITAMIN B 12 PO      Take 500 mcg by mouth 2 times daily.

## 2018-03-29 NOTE — LETTER
3/29/2018    Jorge Hillman MD  [address] [facility] [address]    RE: Oscar Terrazas       Dear Colleague,    I had the pleasure of seeing Oscar Terrazas in the [facility].    HPI and Plan:   See dictation    Orders Placed This Encounter   Procedures     Basic metabolic panel     Orders Placed This Encounter   Medications     hydrALAZINE (APRESOLINE) 25 MG tablet     Sig: Take 2 tablets (50 mg) by mouth 3 times daily     Dispense:  360 tablet     Refill:  3     pravastatin (PRAVACHOL) 80 MG tablet     Sig: Take 0.5 tablets (40 mg) by mouth every other day at bedtime.     Dispense:  90 tablet     Refill:  1     Medications Discontinued During This Encounter   Medication Reason     pravastatin (PRAVACHOL) 80 MG tablet Discontinued by another Health Care Provider     furosemide (LASIX) 20 MG tablet      hydrALAZINE (APRESOLINE) 25 MG tablet Reorder         Encounter Diagnoses   Name Primary?     BENIGN HYPERTENSION      Hyperlipidemia LDL goal <70        CURRENT MEDICATIONS:  Current Outpatient Prescriptions   Medication Sig Dispense Refill     hydrALAZINE (APRESOLINE) 25 MG tablet Take 2 tablets (50 mg) by mouth 3 times daily 360 tablet 3     pravastatin (PRAVACHOL) 80 MG tablet Take 0.5 tablets (40 mg) by mouth every other day at bedtime. 90 tablet 1     isosorbide mononitrate (IMDUR) 60 MG 24 hr tablet Take 1 tablet (60 mg) by mouth daily 30 tablet 0     metoprolol tartrate (LOPRESSOR) 50 MG tablet Take 1 tablet (50 mg) by mouth 2 times daily 60 tablet 0     cetirizine (ZYRTEC) 10 MG tablet TAKE 1 TABLET(10 MG) BY MOUTH EVERY EVENING 30 tablet 8     clopidogrel (PLAVIX) 75 MG tablet TAKE 1 TABLET(75 MG) BY MOUTH DAILY 90 tablet 3     allopurinol (ZYLOPRIM) 100 MG tablet TAKE 2 TABLETS(200 MG) BY MOUTH DAILY 180 tablet 3     theophylline (UNIPHYL) 400 MG 24 hr tablet TAKE 1 TABLET BY MOUTH EVERY DAY 90 tablet 3     blood glucose monitoring (ACCU-CHEK COMPACT PLUS) test strip  "Check blood sugar 2-3 times a day 200 strip 3     levothyroxine (SYNTHROID/LEVOTHROID) 50 MCG tablet TAKE 1 TABLET BY MOUTH DAILY 30 tablet 10     triamcinolone (KENALOG) 0.5 % cream Apply sparingly to affected area two  times daily as needed for rash. 45 g 1     insulin syringe-needle U-100 (BD INSULIN SYRINGE) 29G X 1/2\" 0.5 ML Use one syringe 2 daily or as directed. 200 each 4     insulin NPH (HUMULIN N, NOVOLIN N) 100 UNIT/ML VIAL 18 units at bedtime 3 Month 3     COD LIVER OIL PO Take 1 capsule by mouth daily        aspirin 81 MG tablet Take 1 tablet by mouth daily. with food       Cyanocobalamin (VITAMIN B 12 PO) Take 500 mcg by mouth 2 times daily.       [DISCONTINUED] hydrALAZINE (APRESOLINE) 25 MG tablet TAKE 2 TABLETS(50 MG) BY MOUTH TWICE DAILY 360 tablet 3     [DISCONTINUED] pravastatin (PRAVACHOL) 80 MG tablet Take 1 tablet (80 mg) by mouth daily at bedtime. 90 tablet 1       ALLERGIES     Allergies   Allergen Reactions     Advair [Advair Diskus]      cough;  insomnia, nightmares     Amlodipine Besylate      edema       Azithromycin GI Disturbance     Clarithromycin      gi     Hydrochlorothiazide      hctz + lisinopril-->inc creat, k+     Lisinopril      lisinopril + hctz --> inc creat, k+     Moxifloxacin Hydrochloride      clostridium diffile colitis     Penicillins      gen swelling     Pravastatin Cramps     Muscle cramps/spasm.  Stopped 2017     Vioxx      edema       PAST MEDICAL HISTORY:  Past Medical History:   Diagnosis Date     Acute myocardial infarction, unspecified site, episode of care unspecified      Allergic rhinitis, cause unspecified      Anemia 3/6/2014     CAD (coronary artery disease)     1996 CABG - LIMA to LAD, SVG to OM, 2007 Cath - KANU to Left main and ostial/prox LAD, 2012 Cath - 80-90% stenosis SVG to OM - KANU placed, EF 50%     CKD (chronic kidney disease) stage 3, GFR 30-59 ml/min 3/9/2014     CTS (carpal tunnel syndrome)     bilateral     Degeneration of cervical " intervertebral disc      Diaphragmatic hernia without mention of obstruction or gangrene      Esophageal reflux      Essential hypertension, benign      Hyperlipidemia LDL goal <70      Hypothyroidism      Hypothyroidism, unspecified hypothyroidism type 1/14/2016     IDIOPATHIC CYCLIC EDEMA      Mild persistent asthma      Osteoarthrosis, unspecified whether generalized or localized, unspecified site      Pneumonia, organism unspecified(486)      Proteinuria 5/17/2014     PVD (peripheral vascular disease) (H)      Sensorineural hearing loss, unspecified      Subarachnoid bleed (H)      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 disease (goal A1C<8) 10/17/2015     Unspecified disorder resulting from impaired renal function      Unspecified hereditary and idiopathic peripheral neuropathy      Venous insufficiency        PAST SURGICAL HISTORY:  Past Surgical History:   Procedure Laterality Date     C NONSPECIFIC PROCEDURE      appendectomy     C NONSPECIFIC PROCEDURE      hemorrhoids     C NONSPECIFIC PROCEDURE      cervical strain     C NONSPECIFIC PROCEDURE      rotator cuff surgery, right shoulder     C NONSPECIFIC PROCEDURE  2008    iol os     CORONARY ARTERY BYPASS  1996    LIMA to LAD, SVG to OM     HEART CATH STENT COR W/WO PTCA  2007    lad, left main cor artery stents/drug eluting     HEART CATH STENT COR W/WO PTCA  2012    80-90% stenosis SVG to OM - KANU placed, EF 50%     NONSPECIFIC PROCEDURE      iol od       FAMILY HISTORY:  Family History   Problem Relation Age of Onset     Lung Cancer Daughter      Family History Negative Mother      broken hip     Jaundice Father      Alcohol/Drug Father      Ovarian Cancer Daughter      Family History Negative Daughter      Family History Negative Son        SOCIAL HISTORY:  Social History     Social History     Marital status:      Spouse name: N/A     Number of children: N/A     Years of education: N/A     Social History Main Topics     Smoking  status: Former Smoker     Types: Cigarettes     Smokeless tobacco: Never Used      Comment: quit at age 32     Alcohol use No     Drug use: No     Sexual activity: No     Other Topics Concern     Caffeine Concern No     Sleep Concern Yes     Stress Concern No     Weight Concern No     Special Diet No     Exercise Yes     bowling, 5 days week. yard work     Social History Narrative       Review of Systems:  Skin:  Positive for rash   Eyes:  Positive for glasses  ENT:  Negative    Respiratory:  Positive for dyspnea on exertion;cough;wheezing  Cardiovascular:    edema;Positive for  Gastroenterology: Negative    Genitourinary:  Negative    Musculoskeletal:  Negative    Neurologic:  Positive for numbness or tingling of hands;numbness or tingling of feet  Psychiatric:  Positive for depression  Heme/Lymph/Imm:       Endocrine:         Physical Exam:  Vitals: /58  Pulse 62  Ht 1.829 m (6')  Wt 95.8 kg (211 lb 3.2 oz)  SpO2 100%  BMI 28.64 kg/m2    Constitutional:  cooperative, alert and oriented, well developed, well nourished, in no acute distress        Skin:  warm and dry to the touch, no apparent skin lesions or masses noted          Head:  normocephalic, no masses or lesions        Eyes:  pupils equal and round;sclera white;EOMS intact        Lymph:      ENT:  no pallor or cyanosis        Neck:  carotid pulses are full and equal bilaterally;JVP normal;no carotid bruit        Respiratory:  normal breath sounds, clear to auscultation, normal A-P diameter, normal symmetry, normal respiratory excursion, no use of accessory muscles;healed median sternotomy scar         Cardiac: regular rhythm;normal S1 and S2;no S3 or S4;apical impulse not displaced       systolic ejection murmur;RUSB;grade 1        pulses full and equal, no bruits auscultated                               right femoral bruit (-) left femoral bruit (-)      GI:  abdomen soft, non-tender, BS normoactive, no mass, no HSM, no bruits surgical scars       Extremities and Muscular Skeletal:      bilateral LE edema;2+          Neurological:  no gross motor deficits        Psych:  Alert and Oriented x 3        Recent Lab Results:  LIPID RESULTS:  Lab Results   Component Value Date    CHOL 167 03/14/2018    HDL 32 (L) 03/14/2018     (H) 03/14/2018    TRIG 99 03/14/2018    CHOLHDLRATIO 3.8 09/11/2014       LIVER ENZYME RESULTS:  Lab Results   Component Value Date    AST 3 12/13/2017    ALT 16 12/13/2017       CBC RESULTS:  Lab Results   Component Value Date    WBC 8.7 03/16/2018    RBC 3.24 (L) 03/16/2018    HGB 9.9 (L) 03/16/2018    HCT 29.7 (L) 03/16/2018    MCV 92 03/16/2018    MCH 30.6 03/16/2018    MCHC 33.3 03/16/2018    RDW 14.9 03/16/2018     03/16/2018       BMP RESULTS:  Lab Results   Component Value Date     03/29/2018    POTASSIUM 4.7 03/29/2018    CHLORIDE 105 03/29/2018    CO2 23 03/29/2018    ANIONGAP 16.7 03/29/2018     (H) 03/29/2018    BUN 68 (H) 03/29/2018    CR 3.49 (H) 03/29/2018    GFRESTIMATED 17 (L) 03/29/2018    GFRESTBLACK 20 (L) 03/29/2018    LEV 8.9 03/29/2018        A1C RESULTS:  Lab Results   Component Value Date    A1C 6.3 (H) 03/13/2018       INR RESULTS:  Lab Results   Component Value Date    INR 1.06 03/12/2013    INR 1.19 (H) 12/08/2008           CC  No referring provider defined for this encounter.                  Thank you for allowing me to participate in the care of your patient.      Sincerely,     AIDA [facility]ee [facility]    cc:   No referring provider defined for this encounter.

## 2018-03-30 NOTE — PROGRESS NOTES
Service Date: 03/29/2018      HISTORY OF PRESENT ILLNESS:  Oscar Terrazas is an 84-year-old gentleman whom I am seeing for post-hospital followup.  He was recently in the hospital for acute on chronic heart failure.  He has an extensive cardiac history that includes coronary artery bypass surgery in 1999 with multiple stenting done following this.  The last time was in 2012.  His past medical history includes progressive chronic renal insufficiency stage IV, therefore, avoidance of the Cath Lab as he is a high risk for nephrotoxicity.  When he was admitted to the hospital, he had elevated troponins and medical management was recommended.  He was started on isosorbide mononitrate and hydralazine.  His symptoms improved after IV diuretics and a lower blood pressure.  He was changed from IV diuretic to p.o. Lasix 40 mg, and prior to discharge his furosemide dose was decreased to 20 mg per day.  The patient also has a history of chronic venous insufficiency and lymphedema.  He uses Ace wraps as his chronic venous insufficiency makes wearing compression stockings difficult.  His admitting weight to the hospital was 211 pounds, and he was discharged at 205 pounds.  He was instructed to check his weight daily.  Today he reports he has done that, but he has not recorded his weight, nor did he bring in a weight chart.  He says his weight varies at home.  Some days he can lose 2 pounds.  His weight at home today, he reported, was 205.  Blood pressure is elevated at 144/58 today.      This gentleman has a history of hyperlipidemia and in the past has not tolerated statins because of muscle cramps.  His last statin was pravastatin 80 mg per day.  He did reduce that dose to 40 mg per day, but because of some leg cramping he stopped the pravastatin altogether.      While he was in the hospital, his creatinine was closely monitored.  At discharge it was 2.6.   Today's Labs:  Glucose 162 (H) 70 - 105   Comment:   Non Fasting    Urea Nitrogen 68 (H) 7 - 30   Creatinine 3.49 (H) 0.70 - 1.30   GFR Estimate 17 (L) >60   GFR Estimate If Black 20 (L) >60   Calcium 8.9  8.5 - 10.5       PHYSICAL EXAMINATION:   GENERAL:  The patient is alert and oriented.   SKIN:  Warm and dry.  He is in no acute distress.   CARDIAC:  Heart tones are regular with an S1, S2 without an S3, S4.   LUNGS:  Clear without crackles or wheezes.   ABDOMEN:  Soft.   EXTREMITIES:  Lower extremities with 2 to 3+ pitting edema without wearing DESTINI stockings today.  He has some evidence of chronic stasis with brownish skin tones especially on his left leg.      IMPRESSION AND PLAN:   1.  This is an 84-year-old gentleman with extensive coronary artery disease with recent hospitalization for acute coronary syndrome.  He did have elevated troponins.  Due to his history of chronic renal insufficiency, medical management was recommended.  He was started on isosorbide mononitrate and hydralazine.  Today he reports no recurrence of angina.  He has returned to his regular activities to include bowling.   2.  Chronic renal insufficiency with evidence of worsening kidney function.  Today I did review with him over-the-counter medications.  He does occasionally take ibuprofen a couple times a week.  I have asked him to stop that completely.  Today I am also going to stop his furosemide.  We will repeat a BMP next week.  The C.O.R.E. nurse will follow up with his lab, with his daily weights, and we will add back for furosemide if he has any weight gain.   3.  Chronic diastolic heart failure with a preserved ejection fraction.  His home weight is down 3 pounds from his discharge weight.  He appears euvolemic.  Because of his worsening kidney function, I am stopping his furosemide today.  Today we spent time talking about the importance of daily weights, signs and symptoms to call the clinic, and a 2-gram sodium diet.  He has a followup appointment in 1 month with Dr. Gibson.  We will bring  him in sooner if we have to add on furosemide.   4.  Peripheral vascular disease.  He is going to start using his Ace wraps again.  We discussed elevating his legs a couple times a day.   5.  Hypertension.  His blood pressure is elevated.  I have asked him to increase his hydralazine, 50 mg 3 times a day.      It has been my pleasure to see Oscar again.      Preethi Smiley MS, ANP         AIDA TAVERAS, CNP             D: 2018   T: 2018   MT: BRUNO      Name:     OSCAR BARILLAS   MRN:      [id number]        Account:      [id number]   :      1934           Service Date: 2018      Document: [id number]

## 2018-04-05 DIAGNOSIS — I10 ESSENTIAL HYPERTENSION, BENIGN: ICD-10-CM

## 2018-04-05 DIAGNOSIS — E78.5 HYPERLIPIDEMIA LDL GOAL <70: ICD-10-CM

## 2018-04-05 LAB
ANION GAP SERPL CALCULATED.3IONS-SCNC: 6 MMOL/L (ref 3–14)
BUN SERPL-MCNC: 55 MG/DL (ref 7–30)
CALCIUM SERPL-MCNC: 9 MG/DL (ref 8.5–10.1)
CHLORIDE SERPL-SCNC: 111 MMOL/L (ref 94–109)
CO2 SERPL-SCNC: 25 MMOL/L (ref 20–32)
CREAT SERPL-MCNC: 3.09 MG/DL (ref 0.66–1.25)
GFR SERPL CREATININE-BSD FRML MDRD: 19 ML/MIN/1.7M2
GLUCOSE SERPL-MCNC: 108 MG/DL (ref 70–99)
POTASSIUM SERPL-SCNC: 4.2 MMOL/L (ref 3.4–5.3)
SODIUM SERPL-SCNC: 142 MMOL/L (ref 133–144)

## 2018-04-05 PROCEDURE — 36415 COLL VENOUS BLD VENIPUNCTURE: CPT | Performed by: INTERNAL MEDICINE

## 2018-04-05 PROCEDURE — 80048 BASIC METABOLIC PNL TOTAL CA: CPT | Performed by: INTERNAL MEDICINE

## 2018-04-06 ENCOUNTER — CARE COORDINATION (OUTPATIENT)
Dept: CARDIOLOGY | Facility: CLINIC | Age: 83
End: 2018-04-06

## 2018-04-06 NOTE — PROGRESS NOTES
Daughter and pt returned call. Reviewed improved lab results off diuretics. Pt is weighing daily. Wt's are relatively stable per pt, wt's as follows:   3/27- 203#  3/28- 204#   3/29- 205#  4/1- 205#  4/2- 205#  4/3- 201#  4/4- 204#  4/5- 203#  4/6- 204#  Pt reports he is feeling well without concerns. Denies SOB or swelling. Reviewed with pt to continue of diuretics and follow up as planned in May with Dr. Gibson. Asked pt to call if wt gain of >2# in a day or 5# in a week. Update sent to [facility]. ANDRE Desai 3:09 PM 04/06/18

## 2018-04-06 NOTE — PROGRESS NOTES
"    Per GERMAINE Kerr's OV note from 3/29/18:  \"Chronic diastolic heart failure with a preserved ejection fraction.  His home weight is down 3 pounds from his discharge weight.  He appears euvolemic.  Because of his worsening kidney function, I am stopping his furosemide today.  Today we spent time talking about the importance of daily weights, signs and symptoms to call the clinic, and a 2-gram sodium diet.  He has a followup appointment in 1 month with Dr. Gibson.  We will bring him in sooner if we have to add on furosemide.\"    Home wt on 3/29/18 was 205#    Called pt, no answer, LVM requesting return call to review BMP results from 4/5 and to get wt/sx update, per GERMAINE Kerr's request.      ANDRE Lagunas 9:03 AM 4/6/2018    "

## 2018-04-12 PROBLEM — I24.9 ACS (ACUTE CORONARY SYNDROME) (H): Status: RESOLVED | Noted: 2018-03-13 | Resolved: 2018-04-12

## 2018-04-16 DIAGNOSIS — I21.4 NSTEMI (NON-ST ELEVATED MYOCARDIAL INFARCTION) (H): ICD-10-CM

## 2018-04-16 RX ORDER — ISOSORBIDE MONONITRATE 60 MG/1
60 TABLET, EXTENDED RELEASE ORAL DAILY
Qty: 90 TABLET | Refills: 3 | Status: ON HOLD | OUTPATIENT
Start: 2018-04-16 | End: 2018-06-15

## 2018-04-16 RX ORDER — ISOSORBIDE MONONITRATE 60 MG/1
TABLET, EXTENDED RELEASE ORAL
Qty: 30 TABLET | Refills: 0 | Status: CANCELLED | OUTPATIENT
Start: 2018-04-16

## 2018-04-27 DIAGNOSIS — E03.9 HYPOTHYROIDISM, UNSPECIFIED TYPE: ICD-10-CM

## 2018-04-28 NOTE — TELEPHONE ENCOUNTER
"Requested Prescriptions   Pending Prescriptions Disp Refills     levothyroxine (SYNTHROID/LEVOTHROID) 50 MCG tablet [Pharmacy Med Name: LEVOTHYROXINE 0.05MG (50MCG) TAB]  Last Written Prescription Date:  6/13/17  Last Fill Quantity: 30 TABLET,  # refills: 10   Last office visit: 3/20/2018 with prescribing provider:  TERRY   Future Office Visit:   Next 5 appointments (look out 90 days)     May 03, 2018  2:45 PM CDT   Return Visit with Sebastien Gibson MD   [facility] ([facility])    [address] [place] [id number]   [phone] OPT 2                  30 tablet 0     Sig: TAKE 1 TABLET BY MOUTH DAILY    Thyroid Protocol Passed    4/27/2018  7:00 PM       Passed - Patient is 12 years or older       Passed - Recent (12 mo) or future (30 days) visit within the authorizing provider's specialty    Patient had office visit in the last 12 months or has a visit in the next 30 days with authorizing provider or within the authorizing provider's specialty.  See \"Patient Info\" tab in inbasket, or \"Choose Columns\" in Meds & Orders section of the refill encounter.           Passed - Normal TSH on file in past 12 months    Recent Labs   Lab Test  05/24/17   0844   TSH  2.81                "

## 2018-04-30 RX ORDER — LEVOTHYROXINE SODIUM 50 UG/1
TABLET ORAL
Qty: 30 TABLET | Refills: 0 | Status: SHIPPED | OUTPATIENT
Start: 2018-04-30 | End: 2018-05-28

## 2018-05-03 ENCOUNTER — OFFICE VISIT (OUTPATIENT)
Dept: CARDIOLOGY | Facility: CLINIC | Age: 83
End: 2018-05-03
Attending: INTERNAL MEDICINE
Payer: COMMERCIAL

## 2018-05-03 VITALS
DIASTOLIC BLOOD PRESSURE: 56 MMHG | SYSTOLIC BLOOD PRESSURE: 150 MMHG | HEIGHT: 72 IN | WEIGHT: 211.1 LBS | HEART RATE: 64 BPM | BODY MASS INDEX: 28.59 KG/M2

## 2018-05-03 DIAGNOSIS — I21.4 NSTEMI (NON-ST ELEVATED MYOCARDIAL INFARCTION) (H): Primary | ICD-10-CM

## 2018-05-03 DIAGNOSIS — I50.31 ACUTE DIASTOLIC CONGESTIVE HEART FAILURE (H): ICD-10-CM

## 2018-05-03 DIAGNOSIS — N18.4 CKD (CHRONIC KIDNEY DISEASE) STAGE 4, GFR 15-29 ML/MIN (H): ICD-10-CM

## 2018-05-03 DIAGNOSIS — I25.10 ATHEROSCLEROSIS OF NATIVE CORONARY ARTERY OF NATIVE HEART WITHOUT ANGINA PECTORIS: ICD-10-CM

## 2018-05-03 DIAGNOSIS — I87.2 VENOUS INSUFFICIENCY: ICD-10-CM

## 2018-05-03 DIAGNOSIS — I10 ESSENTIAL HYPERTENSION, BENIGN: ICD-10-CM

## 2018-05-03 PROCEDURE — 99214 OFFICE O/P EST MOD 30 MIN: CPT | Performed by: INTERNAL MEDICINE

## 2018-05-03 NOTE — LETTER
5/3/2018    Jorge Hillman MD  [address] [address]    RE: Oscar Terrazas       Dear Colleague,    I had the pleasure of seeing Oscar Terrazas in the [facility].    HPI and Plan:   This 84-year-old gentleman is seen, accompanied by his daughter, in followup of his recent acute diastolic heart failure episode, with background history of coronary disease, CABG, subsequent multivessel stenting, stage IV chronic renal insufficiency, and resistant hypertension .   He was hospitalized in March of this year because of acute heart failure.  He diuresed about 6 pounds with resolution of symptoms.  He went from being short of breath at rest and hardly able to walk to the bathroom to be able to walk over 300 feet without desaturating.  Since returning home his weight himself daily and his weights have been stable at home without significant change.  A month ago it was noted that his creatinine had increased significantly and his diuretic was stopped.  Since that time his renal function is improved on re-evaluation.  In addition his weight has remained stable and he's had no new dyspnea on exertion, new/worsening edema, orthopnea, PND.  He works hard in the yard he does get some shortness of breath and chest pressure that goes away with resting and he feels like his functional status is acceptable.  He still bowls 3 times a week and does not get any dyspnea or chest pressure with bowling.   He has no palpitations, dizziness, syncope, new myalgias or muscle weakness, and denies claudication symptoms.  He again has been unable to tolerate statins due to cramping and has declined to continue them and is back off of them.  He does have chronic venous insufficiency.  . He continues to have a fair amount of edema although he is asymptomatic from it.  He declined previous lymphedema clinic referral.  He states they are stable, he is having no skin breakdown bleeding or cellulitis and  wants to continue using his pressure stockings which he is doing reliably.     This is a gentleman with a CABG in 1996. In 2007, he required multivessel drug-eluting stenting for recurrent angina. In 2012, he presented with an acute coronary syndrome after his Plavix was stopped for a dental procedure. At that time, his LAD and left main coronary stents were widely patent. There was a lesion at one end of the stent in the vein graft to the OM that was severe, and possibly acute thrombus, and that was successfully stented with a drug-eluting stent. He has been maintained on Plavix since then.     Exam -full exam below.  In summary:   Blood pressure-was elevated initially remained elevated-he actually had not taken his antihypertensive until less than hour ago today..    Cardiac-, regular rhythm, clear lungs, a soft systolic ejection murmur without gallop.  JVD is 6-7 cm without HJR and no bruits peripherally.   Extremities-He does continue to have 2+ chronic edema two thirds the way to the knees. There are chronic bronzing and skin changes pretibially but no erythema ulceration skin breakdown.     Impression/plan  1-recent acute diastolic heart failure episode.  Normal ejection fraction on echo in March.  This resolved with diuresis.  It has remained resolve even off diuretics as month.  He is watching his sodium much more carefully.  He will continue to weigh himself daily and let us know if there is a change in symptoms or weight.  Therefore I'll keep him off the diuretic, as was becoming actually prerenal from it.    2-coronary artery disease. Status post CABG and subsequent multivessel stenting. He has had no recurrence of his previous ischemic symptoms and no new suggestive ischemic symptoms. Ejection fraction  normal at recent echo.   2-mild to moderate peripheral vascular disease. No claudication symptoms.  3-dyslipidemia, -however he is convinced against statin side effects and this not wanting to take these  at this time.  4-significant peripheral venous insufficiency of the lower extremities.    now using pressure stockings reliably and is willing to continue this, but no further evaluation or intervention currently.  5-hypertension, resistant.  Blood pressures are not well controlled today.    I'm going to have him taken daily for the next month varying between early in the day and late day.  I discussed hepatitis properly with him and his daughter.  I'll have him back in a month to reevaluate his control and any need for additional antihypertensive therapy   6-stage IV chronic renal insufficiency.  Has some improvement in GFR off of diuretics.        Orders Placed This Encounter   Procedures     Follow-Up with Cardiac Advanced Practice Provider       No orders of the defined types were placed in this encounter.      There are no discontinued medications.      Encounter Diagnoses   Name Primary?     Atherosclerosis of native coronary artery of native heart without angina pectoris      Venous insufficiency      NSTEMI (non-ST elevated myocardial infarction) (H) Yes     Acute diastolic congestive heart failure (H)      Essential hypertension, benign      CKD (chronic kidney disease) stage 4, GFR 15-29 ml/min (H)        CURRENT MEDICATIONS:  Current Outpatient Prescriptions   Medication Sig Dispense Refill     aspirin 81 MG tablet Take 1 tablet by mouth daily. with food       COD LIVER OIL PO Take 1 capsule by mouth daily        Cyanocobalamin (VITAMIN B 12 PO) Take 500 mcg by mouth 2 times daily.       insulin NPH (HUMULIN N, NOVOLIN N) 100 UNIT/ML VIAL 18 units at bedtime 3 Month 3     allopurinol (ZYLOPRIM) 100 MG tablet TAKE 2 TABLETS(200 MG) BY MOUTH DAILY 180 tablet 3     blood glucose monitoring (ACCU-CHEK COMPACT PLUS) test strip Check blood sugar 2-3 times a day 200 strip 3     cetirizine (ZYRTEC) 10 MG tablet TAKE 1 TABLET(10 MG) BY MOUTH EVERY EVENING 30 tablet 8     clopidogrel (PLAVIX) 75 MG tablet TAKE 1  "TABLET(75 MG) BY MOUTH DAILY 90 tablet 3     hydrALAZINE (APRESOLINE) 25 MG tablet Take 2 tablets (50 mg) by mouth 3 times daily 360 tablet 3     insulin syringe-needle U-100 (BD INSULIN SYRINGE) 29G X 1/2\" 0.5 ML Use one syringe 2 daily or as directed. 200 each 4     isosorbide mononitrate (IMDUR) 60 MG 24 hr tablet Take 1 tablet (60 mg) by mouth daily 90 tablet 3     levothyroxine (SYNTHROID/LEVOTHROID) 50 MCG tablet TAKE 1 TABLET BY MOUTH DAILY 30 tablet 0     metoprolol tartrate (LOPRESSOR) 50 MG tablet Take 1 tablet (50 mg) by mouth 2 times daily 60 tablet 0     pravastatin (PRAVACHOL) 80 MG tablet Take 0.5 tablets (40 mg) by mouth every other day at bedtime. (Patient not taking: Reported on 5/3/2018) 90 tablet 1     theophylline (UNIPHYL) 400 MG 24 hr tablet TAKE 1 TABLET BY MOUTH EVERY DAY 90 tablet 3     triamcinolone (KENALOG) 0.5 % cream Apply sparingly to affected area two  times daily as needed for rash. 45 g 1       ALLERGIES     Allergies   Allergen Reactions     Advair [Advair Diskus]      cough;  insomnia, nightmares     Amlodipine Besylate      edema       Azithromycin GI Disturbance     Clarithromycin      gi     Hydrochlorothiazide      hctz + lisinopril-->inc creat, k+     Lisinopril      lisinopril + hctz --> inc creat, k+     Moxifloxacin Hydrochloride      clostridium diffile colitis     Penicillins      gen swelling     Pravastatin Cramps     Muscle cramps/spasm.  Stopped 2017     Vioxx      edema       PAST MEDICAL HISTORY:  Past Medical History:   Diagnosis Date     Acute myocardial infarction, unspecified site, episode of care unspecified      Allergic rhinitis, cause unspecified      Anemia 3/6/2014     CAD (coronary artery disease)     1996 CABG - LIMA to LAD, SVG to OM, 2007 Cath - KANU to Left main and ostial/prox LAD, 2012 Cath - 80-90% stenosis SVG to OM - KANU placed, EF 50%     CKD (chronic kidney disease) stage 3, GFR 30-59 ml/min 3/9/2014     CTS (carpal tunnel syndrome)     " bilateral     Degeneration of cervical intervertebral disc      Diaphragmatic hernia without mention of obstruction or gangrene      Esophageal reflux      Essential hypertension, benign      Hyperlipidemia LDL goal <70      Hypothyroidism      Hypothyroidism, unspecified hypothyroidism type 1/14/2016     IDIOPATHIC CYCLIC EDEMA      Mild persistent asthma      Osteoarthrosis, unspecified whether generalized or localized, unspecified site      Pneumonia, organism unspecified(486)      Proteinuria 5/17/2014     PVD (peripheral vascular disease) (H)      Sensorineural hearing loss, unspecified      Subarachnoid bleed (H)      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 disease (goal A1C<8) 10/17/2015     Unspecified disorder resulting from impaired renal function      Unspecified hereditary and idiopathic peripheral neuropathy      Venous insufficiency        PAST SURGICAL HISTORY:  Past Surgical History:   Procedure Laterality Date     C NONSPECIFIC PROCEDURE      appendectomy     C NONSPECIFIC PROCEDURE      hemorrhoids     C NONSPECIFIC PROCEDURE      cervical strain     C NONSPECIFIC PROCEDURE      rotator cuff surgery, right shoulder     C NONSPECIFIC PROCEDURE  2008    iol os     CORONARY ARTERY BYPASS  1996    LIMA to LAD, SVG to OM     HEART CATH STENT COR W/WO PTCA  2007    lad, left main cor artery stents/drug eluting     HEART CATH STENT COR W/WO PTCA  2012    80-90% stenosis SVG to OM - KANU placed, EF 50%     NONSPECIFIC PROCEDURE      iol od       FAMILY HISTORY:  Family History   Problem Relation Age of Onset     Lung Cancer Daughter      Family History Negative Mother      broken hip     Jaundice Father      Alcohol/Drug Father      Ovarian Cancer Daughter      Family History Negative Daughter      Family History Negative Son        SOCIAL HISTORY:  Social History     Social History     Marital status:      Spouse name: N/A     Number of children: N/A     Years of education: N/A  "    Social History Main Topics     Smoking status: Former Smoker     Packs/day: 1.00     Years: 14.00     Types: Cigarettes     Start date: 1952     Quit date: 1966     Smokeless tobacco: Never Used     Alcohol use No     Drug use: No     Sexual activity: No     Other Topics Concern     Caffeine Concern No     Sleep Concern Yes     Stress Concern No     Weight Concern No     Special Diet No     Exercise Yes     bowling, 5 days week. yard work     Social History Narrative       Review of Systems:  Skin:  Negative       Eyes:  Positive for glasses reading  ENT:  Positive for nasal congestion    Respiratory:  Positive for dyspnea on exertion     Cardiovascular:    Positive for chest tightness with exertion  Gastroenterology: Negative      Genitourinary:  Negative      Musculoskeletal:  Positive for arthritis;joint pain    Neurologic:  Positive for numbness or tingling of hands;numbness or tingling of feet carpal tunnel, feet feel \"funny\" occ.  Psychiatric:  Negative      Heme/Lymph/Imm:  Negative      Endocrine:  Positive for diabetes;thyroid disorder      Physical Exam:  Vitals: /56  Pulse 64  Ht 1.829 m (6')  Wt 95.8 kg (211 lb 1.6 oz)  BMI 28.63 kg/m2    Constitutional:  cooperative, alert and oriented, well developed, well nourished, in no acute distress        Skin:  warm and dry to the touch, no apparent skin lesions or masses noted          Head:  normocephalic, no masses or lesions        Eyes:  pupils equal and round;sclera white;EOMS intact        Lymph:      ENT:  no pallor or cyanosis        Neck:  carotid pulses are full and equal bilaterally;JVP normal;no carotid bruit        Respiratory:  normal breath sounds, clear to auscultation, normal A-P diameter, normal symmetry, normal respiratory excursion, no use of accessory muscles;healed median sternotomy scar         Cardiac: regular rhythm;normal S1 and S2;no S3 or S4;apical impulse not displaced       systolic ejection murmur;RUSB;grade 1      "   not assessed this visit                               right femoral bruit (-) left femoral bruit (-)      GI:  abdomen soft, non-tender, BS normoactive, no mass, no HSM, no bruits surgical scars      Extremities and Muscular Skeletal:    stasis pigmentation bilateral LE edema;2+          Neurological:  no gross motor deficits        Psych:  Alert and Oriented x 3        Thank you for allowing me to participate in the care of your patient.    Sincerely,     Sebastien Gibson MD     [facility]

## 2018-05-03 NOTE — LETTER
5/3/2018    Jorge Hillman MD  [address] [facility] [address]    RE: Oscar Terrazas       Dear Colleague,    I had the pleasure of seeing Oscar Terrazas in the [facility].    HPI and Plan:   This 84-year-old gentleman is seen, accompanied by his daughter, in followup of his recent acute diastolic heart failure episode, with background history of coronary disease, CABG, subsequent multivessel stenting, stage IV chronic renal insufficiency, and resistant hypertension .   He was hospitalized in March of this year because of acute heart failure.  He diuresed about 6 pounds with resolution of symptoms.  He went from being short of breath at rest and hardly able to walk to the bathroom to be able to walk over 300 feet without desaturating.  Since returning home his weight himself daily and his weights have been stable at home without significant change.  A month ago it was noted that his creatinine had increased significantly and his diuretic was stopped.  Since that time his renal function is improved on re-evaluation.  In addition his weight has remained stable and he's had no new dyspnea on exertion, new/worsening edema, orthopnea, PND.  He works hard in the yard he does get some shortness of breath and chest pressure that goes away with resting and he feels like his functional status is acceptable.  He still bowls 3 times a week and does not get any dyspnea or chest pressure with bowling.   He has no palpitations, dizziness, syncope, new myalgias or muscle weakness, and denies claudication symptoms.  He again has been unable to tolerate statins due to cramping and has declined to continue them and is back off of them.  He does have chronic venous insufficiency.  . He continues to have a fair amount of edema although he is asymptomatic from it.  He declined previous lymphedema clinic referral.  He states they are stable, he is having no skin breakdown bleeding or cellulitis and  wants to continue using his pressure stockings which he is doing reliably.     This is a gentleman with a CABG in 1996. In 2007, he required multivessel drug-eluting stenting for recurrent angina. In 2012, he presented with an acute coronary syndrome after his Plavix was stopped for a dental procedure. At that time, his LAD and left main coronary stents were widely patent. There was a lesion at one end of the stent in the vein graft to the OM that was severe, and possibly acute thrombus, and that was successfully stented with a drug-eluting stent. He has been maintained on Plavix since then.     Exam -full exam below.  In summary:   Blood pressure-was elevated initially remained elevated-he actually had not taken his antihypertensive until less than hour ago today..    Cardiac-, regular rhythm, clear lungs, a soft systolic ejection murmur without gallop.  JVD is 6-7 cm without HJR and no bruits peripherally.   Extremities-He does continue to have 2+ chronic edema two thirds the way to the knees. There are chronic bronzing and skin changes pretibially but no erythema ulceration skin breakdown.     Impression/plan  1-recent acute diastolic heart failure episode.  Normal ejection fraction on echo in March.  This resolved with diuresis.  It has remained resolve even off diuretics as month.  He is watching his sodium much more carefully.  He will continue to weigh himself daily and let us know if there is a change in symptoms or weight.  Therefore I'll keep him off the diuretic, as was becoming actually prerenal from it.    2-coronary artery disease. Status post CABG and subsequent multivessel stenting. He has had no recurrence of his previous ischemic symptoms and no new suggestive ischemic symptoms. Ejection fraction  normal at recent echo.   2-mild to moderate peripheral vascular disease. No claudication symptoms.  3-dyslipidemia, -however he is convinced against statin side effects and this not wanting to take these  at this time.  4-significant peripheral venous insufficiency of the lower extremities.    now using pressure stockings reliably and is willing to continue this, but no further evaluation or intervention currently.  5-hypertension, resistant.  Blood pressures are not well controlled today.    I'm going to have him taken daily for the next month varying between early in the day and late day.  I discussed hepatitis properly with him and his daughter.  I'll have him back in a month to reevaluate his control and any need for additional antihypertensive therapy   6-stage IV chronic renal insufficiency.  Has some improvement in GFR off of diuretics.        Orders Placed This Encounter   Procedures     Follow-Up with Cardiac Advanced Practice Provider       No orders of the defined types were placed in this encounter.      There are no discontinued medications.      Encounter Diagnoses   Name Primary?     Atherosclerosis of native coronary artery of native heart without angina pectoris      Venous insufficiency      NSTEMI (non-ST elevated myocardial infarction) (H) Yes     Acute diastolic congestive heart failure (H)      Essential hypertension, benign      CKD (chronic kidney disease) stage 4, GFR 15-29 ml/min (H)        CURRENT MEDICATIONS:  Current Outpatient Prescriptions   Medication Sig Dispense Refill     aspirin 81 MG tablet Take 1 tablet by mouth daily. with food       COD LIVER OIL PO Take 1 capsule by mouth daily        Cyanocobalamin (VITAMIN B 12 PO) Take 500 mcg by mouth 2 times daily.       insulin NPH (HUMULIN N, NOVOLIN N) 100 UNIT/ML VIAL 18 units at bedtime 3 Month 3     allopurinol (ZYLOPRIM) 100 MG tablet TAKE 2 TABLETS(200 MG) BY MOUTH DAILY 180 tablet 3     blood glucose monitoring (ACCU-CHEK COMPACT PLUS) test strip Check blood sugar 2-3 times a day 200 strip 3     cetirizine (ZYRTEC) 10 MG tablet TAKE 1 TABLET(10 MG) BY MOUTH EVERY EVENING 30 tablet 8     clopidogrel (PLAVIX) 75 MG tablet TAKE 1  "TABLET(75 MG) BY MOUTH DAILY 90 tablet 3     hydrALAZINE (APRESOLINE) 25 MG tablet Take 2 tablets (50 mg) by mouth 3 times daily 360 tablet 3     insulin syringe-needle U-100 (BD INSULIN SYRINGE) 29G X 1/2\" 0.5 ML Use one syringe 2 daily or as directed. 200 each 4     isosorbide mononitrate (IMDUR) 60 MG 24 hr tablet Take 1 tablet (60 mg) by mouth daily 90 tablet 3     levothyroxine (SYNTHROID/LEVOTHROID) 50 MCG tablet TAKE 1 TABLET BY MOUTH DAILY 30 tablet 0     metoprolol tartrate (LOPRESSOR) 50 MG tablet Take 1 tablet (50 mg) by mouth 2 times daily 60 tablet 0     pravastatin (PRAVACHOL) 80 MG tablet Take 0.5 tablets (40 mg) by mouth every other day at bedtime. (Patient not taking: Reported on 5/3/2018) 90 tablet 1     theophylline (UNIPHYL) 400 MG 24 hr tablet TAKE 1 TABLET BY MOUTH EVERY DAY 90 tablet 3     triamcinolone (KENALOG) 0.5 % cream Apply sparingly to affected area two  times daily as needed for rash. 45 g 1       ALLERGIES     Allergies   Allergen Reactions     Advair [Advair Diskus]      cough;  insomnia, nightmares     Amlodipine Besylate      edema       Azithromycin GI Disturbance     Clarithromycin      gi     Hydrochlorothiazide      hctz + lisinopril-->inc creat, k+     Lisinopril      lisinopril + hctz --> inc creat, k+     Moxifloxacin Hydrochloride      clostridium diffile colitis     Penicillins      gen swelling     Pravastatin Cramps     Muscle cramps/spasm.  Stopped 2017     Vioxx      edema       PAST MEDICAL HISTORY:  Past Medical History:   Diagnosis Date     Acute myocardial infarction, unspecified site, episode of care unspecified      Allergic rhinitis, cause unspecified      Anemia 3/6/2014     CAD (coronary artery disease)     1996 CABG - LIMA to LAD, SVG to OM, 2007 Cath - KANU to Left main and ostial/prox LAD, 2012 Cath - 80-90% stenosis SVG to OM - KANU placed, EF 50%     CKD (chronic kidney disease) stage 3, GFR 30-59 ml/min 3/9/2014     CTS (carpal tunnel syndrome)     " bilateral     Degeneration of cervical intervertebral disc      Diaphragmatic hernia without mention of obstruction or gangrene      Esophageal reflux      Essential hypertension, benign      Hyperlipidemia LDL goal <70      Hypothyroidism      Hypothyroidism, unspecified hypothyroidism type 1/14/2016     IDIOPATHIC CYCLIC EDEMA      Mild persistent asthma      Osteoarthrosis, unspecified whether generalized or localized, unspecified site      Pneumonia, organism unspecified(486)      Proteinuria 5/17/2014     PVD (peripheral vascular disease) (H)      Sensorineural hearing loss, unspecified      Subarachnoid bleed (H)      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 disease (goal A1C<8) 10/17/2015     Unspecified disorder resulting from impaired renal function      Unspecified hereditary and idiopathic peripheral neuropathy      Venous insufficiency        PAST SURGICAL HISTORY:  Past Surgical History:   Procedure Laterality Date     C NONSPECIFIC PROCEDURE      appendectomy     C NONSPECIFIC PROCEDURE      hemorrhoids     C NONSPECIFIC PROCEDURE      cervical strain     C NONSPECIFIC PROCEDURE      rotator cuff surgery, right shoulder     C NONSPECIFIC PROCEDURE  2008    iol os     CORONARY ARTERY BYPASS  1996    LIMA to LAD, SVG to OM     HEART CATH STENT COR W/WO PTCA  2007    lad, left main cor artery stents/drug eluting     HEART CATH STENT COR W/WO PTCA  2012    80-90% stenosis SVG to OM - KANU placed, EF 50%     NONSPECIFIC PROCEDURE      iol od       FAMILY HISTORY:  Family History   Problem Relation Age of Onset     Lung Cancer Daughter      Family History Negative Mother      broken hip     Jaundice Father      Alcohol/Drug Father      Ovarian Cancer Daughter      Family History Negative Daughter      Family History Negative Son        SOCIAL HISTORY:  Social History     Social History     Marital status:      Spouse name: N/A     Number of children: N/A     Years of education: N/A  "    Social History Main Topics     Smoking status: Former Smoker     Packs/day: 1.00     Years: 14.00     Types: Cigarettes     Start date: 1952     Quit date: 1966     Smokeless tobacco: Never Used     Alcohol use No     Drug use: No     Sexual activity: No     Other Topics Concern     Caffeine Concern No     Sleep Concern Yes     Stress Concern No     Weight Concern No     Special Diet No     Exercise Yes     bowling, 5 days week. yard work     Social History Narrative       Review of Systems:  Skin:  Negative       Eyes:  Positive for glasses reading  ENT:  Positive for nasal congestion    Respiratory:  Positive for dyspnea on exertion     Cardiovascular:    Positive for chest tightness with exertion  Gastroenterology: Negative      Genitourinary:  Negative      Musculoskeletal:  Positive for arthritis;joint pain    Neurologic:  Positive for numbness or tingling of hands;numbness or tingling of feet carpal tunnel, feet feel \"funny\" occ.  Psychiatric:  Negative      Heme/Lymph/Imm:  Negative      Endocrine:  Positive for diabetes;thyroid disorder      Physical Exam:  Vitals: /56  Pulse 64  Ht 1.829 m (6')  Wt 95.8 kg (211 lb 1.6 oz)  BMI 28.63 kg/m2    Constitutional:  cooperative, alert and oriented, well developed, well nourished, in no acute distress        Skin:  warm and dry to the touch, no apparent skin lesions or masses noted          Head:  normocephalic, no masses or lesions        Eyes:  pupils equal and round;sclera white;EOMS intact        Lymph:      ENT:  no pallor or cyanosis        Neck:  carotid pulses are full and equal bilaterally;JVP normal;no carotid bruit        Respiratory:  normal breath sounds, clear to auscultation, normal A-P diameter, normal symmetry, normal respiratory excursion, no use of accessory muscles;healed median sternotomy scar         Cardiac: regular rhythm;normal S1 and S2;no S3 or S4;apical impulse not displaced       systolic ejection murmur;RUSB;grade 1      "   not assessed this visit                               right femoral bruit (-) left femoral bruit (-)      GI:  abdomen soft, non-tender, BS normoactive, no mass, no HSM, no bruits surgical scars      Extremities and Muscular Skeletal:    stasis pigmentation bilateral LE edema;2+          Neurological:  no gross motor deficits        Psych:  Alert and Oriented x 3        CC  Sebastien Gibson MD  [address]  [place] [address]                Thank you for allowing me to participate in the care of your patient.      Sincerely,     Sebastien Gibson MD     [facility]    cc:   Sebastien Gibson MD  [address] [place]  [place] [address]

## 2018-05-03 NOTE — MR AVS SNAPSHOT
After Visit Summary   5/3/2018    Oscar Terrazas    MRN: [id number]           Patient Information     Date Of Birth          2/2/1934        Visit Information        Provider Department      5/3/2018 2:45 PM Sebastien Gibson MD [facility]        Today's Diagnoses     NSTEMI (non-ST elevated myocardial infarction) (H)    -  1    Atherosclerosis of native coronary artery of native heart without angina pectoris        Venous insufficiency        Acute diastolic congestive heart failure (H)        Essential hypertension, benign        CKD (chronic kidney disease) stage 4, GFR 15-29 ml/min (H)           Follow-ups after your visit        Additional Services     Follow-Up with Cardiac Advanced Practice Provider                 Your next 10 appointments already scheduled     Jun 06, 2018  1:10 PM CDT   Return Visit with AIDA Santacruz CNP   [facility] ([facility])    [address] [place] [id number] [phone] OPT 2              Future tests that were ordered for you today     Open Future Orders        Priority Expected Expires Ordered    Follow-Up with Cardiac Advanced Practice Provider Routine 6/2/2018 5/3/2019 5/3/2018            Who to contact     If you have questions or need follow up information about today's clinic visit or your schedule please contact [facility] directly at [phone].  Normal or non-critical lab and imaging results will be communicated to you by MyChart, letter or phone within 4 business days after the clinic has received the results. If you do not hear from us within 7 days, please contact the clinic through MyChart or phone. If you have a critical or abnormal lab result, we will notify you by phone as soon as possible.  Submit refill requests through [facility] or call your pharmacy and they will forward the refill  request to us. Please allow 3 business days for your refill to be completed.          Additional Information About Your Visit        MyChart Information     [employer] gives you secure access to your electronic health record. If you see a primary care provider, you can also send messages to your care team and make appointments. If you have questions, please call your primary care clinic.  If you do not have a primary care provider, please call [phone] and they will assist you.        Care EveryWhere ID     This is your Care EveryWhere ID. This could be used by other organizations to access your [place] medical records  [id number]        Your Vitals Were     Pulse Height BMI (Body Mass Index)             64 1.829 m (6') 28.63 kg/m2          Blood Pressure from Last 3 Encounters:   05/03/18 150/56   03/29/18 144/58   03/20/18 136/60    Weight from Last 3 Encounters:   05/03/18 95.8 kg (211 lb 1.6 oz)   03/29/18 95.8 kg (211 lb 3.2 oz)   03/20/18 94.9 kg (209 lb 3.2 oz)              We Performed the Following     Follow-Up with Cardiologist        Primary Care Provider Office Phone # Fax #    Jorge Hillman [phone]       [address] [place] [id number]        Equal Access to Services     RANJAN JONES AH: Hadii aad ku hadasho Soomaali, waaxda luqadaha, qaybta kaalmada adeegyada, waxay idiin hayaan skip lord. So [facility] [phone].    ATENCIÓN: Si habla español, tiene a fernandez disposición servicios gratuitos de asistencia lingüística. Llame al [phone].    We comply with applicable federal civil rights laws and [place] laws. We do not discriminate on the basis of race, color, national origin, age, disability, sex, sexual orientation, or gender identity.            Thank you!     Thank you for choosing [facility]  for your care. Our goal is always to provide you with excellent care. Hearing back from our patients is one way we can continue to  "improve our services. Please take a few minutes to complete the written survey that you may receive in the mail after your visit with us. Thank you!             Your Updated Medication List - Protect others around you: Learn how to safely use, store and throw away your medicines at www.disposemymeds.org.          This list is accurate as of 5/3/18  3:39 PM.  Always use your most recent med list.                   Brand Name Dispense Instructions for use Diagnosis    allopurinol 100 MG tablet    ZYLOPRIM    180 tablet    TAKE 2 TABLETS(200 MG) BY MOUTH DAILY    Chronic gout without tophus, unspecified cause, unspecified site       aspirin 81 MG tablet      Take 1 tablet by mouth daily. with food        blood glucose monitoring test strip    ACCU-CHEK COMPACT PLUS    200 strip    Check blood sugar 2-3 times a day    Type 2 diabetes mellitus with stage 3 chronic kidney disease, with long-term current use of insulin (H)       cetirizine 10 MG tablet    zyrTEC    30 tablet    TAKE 1 TABLET(10 MG) BY MOUTH EVERY EVENING    Atopic dermatitis, unspecified type, Itching       clopidogrel 75 MG tablet    PLAVIX    90 tablet    TAKE 1 TABLET(75 MG) BY MOUTH DAILY    CKD (chronic kidney disease) stage 3, GFR 30-59 ml/min, Cardiovascular disease       COD LIVER OIL PO      Take 1 capsule by mouth daily        hydrALAZINE 25 MG tablet    APRESOLINE    360 tablet    Take 2 tablets (50 mg) by mouth 3 times daily    Essential hypertension, benign       insulin  UNIT/ML injection    HumuLIN N/NovoLIN N    3 Month    18 units at bedtime    Type 2 diabetes mellitus with stage 3 chronic kidney disease, with long-term current use of insulin (H)       insulin syringe-needle U-100 29G X 1/2\" 0.5 ML    BD insulin syringe    200 each    Use one syringe 2 daily or as directed.    Type 2 diabetes mellitus with stage 3 chronic kidney disease, with long-term current use of insulin (H)       isosorbide mononitrate 60 MG 24 hr tablet    " IMDUR    90 tablet    Take 1 tablet (60 mg) by mouth daily    NSTEMI (non-ST elevated myocardial infarction) (H)       levothyroxine 50 MCG tablet    SYNTHROID/LEVOTHROID    30 tablet    TAKE 1 TABLET BY MOUTH DAILY    Hypothyroidism, unspecified type       metoprolol tartrate 50 MG tablet    LOPRESSOR    60 tablet    Take 1 tablet (50 mg) by mouth 2 times daily    NSTEMI (non-ST elevated myocardial infarction) (H)       pravastatin 80 MG tablet    PRAVACHOL    90 tablet    Take 0.5 tablets (40 mg) by mouth every other day at bedtime.    Hyperlipidemia LDL goal <70       theophylline 400 MG 24 hr tablet    UNIPHYL    90 tablet    TAKE 1 TABLET BY MOUTH EVERY DAY    Mild persistent asthma without complication       triamcinolone 0.5 % cream    KENALOG    45 g    Apply sparingly to affected area two  times daily as needed for rash.    Itching       VITAMIN B 12 PO      Take 500 mcg by mouth 2 times daily.

## 2018-05-03 NOTE — PROGRESS NOTES
HPI and Plan:   This 84-year-old gentleman is seen, accompanied by his daughter, in followup of his recent acute diastolic heart failure episode, with background history of coronary disease, CABG, subsequent multivessel stenting, stage IV chronic renal insufficiency, and resistant hypertension .   He was hospitalized in March of this year because of acute heart failure.  He diuresed about 6 pounds with resolution of symptoms.  He went from being short of breath at rest and hardly able to walk to the bathroom to be able to walk over 300 feet without desaturating.  Since returning home his weight himself daily and his weights have been stable at home without significant change.  A month ago it was noted that his creatinine had increased significantly and his diuretic was stopped.  Since that time his renal function is improved on re-evaluation.  In addition his weight has remained stable and he's had no new dyspnea on exertion, new/worsening edema, orthopnea, PND.  He works hard in the yard he does get some shortness of breath and chest pressure that goes away with resting and he feels like his functional status is acceptable.  He still bowls 3 times a week and does not get any dyspnea or chest pressure with bowling.   He has no palpitations, dizziness, syncope, new myalgias or muscle weakness, and denies claudication symptoms.  He again has been unable to tolerate statins due to cramping and has declined to continue them and is back off of them.  He does have chronic venous insufficiency.  . He continues to have a fair amount of edema although he is asymptomatic from it.  He declined previous lymphedema clinic referral.  He states they are stable, he is having no skin breakdown bleeding or cellulitis and wants to continue using his pressure stockings which he is doing reliably.     This is a gentleman with a CABG in 1996. In 2007, he required multivessel drug-eluting stenting for recurrent angina. In 2012, he  presented with an acute coronary syndrome after his Plavix was stopped for a dental procedure. At that time, his LAD and left main coronary stents were widely patent. There was a lesion at one end of the stent in the vein graft to the OM that was severe, and possibly acute thrombus, and that was successfully stented with a drug-eluting stent. He has been maintained on Plavix since then.     Exam -full exam below.  In summary:   Blood pressure-was elevated initially remained elevated-he actually had not taken his antihypertensive until less than hour ago today..    Cardiac-, regular rhythm, clear lungs, a soft systolic ejection murmur without gallop.  JVD is 6-7 cm without HJR and no bruits peripherally.   Extremities-He does continue to have 2+ chronic edema two thirds the way to the knees. There are chronic bronzing and skin changes pretibially but no erythema ulceration skin breakdown.     Impression/plan  1-recent acute diastolic heart failure episode.  Normal ejection fraction on echo in March.  This resolved with diuresis.  It has remained resolve even off diuretics as month.  He is watching his sodium much more carefully.  He will continue to weigh himself daily and let us know if there is a change in symptoms or weight.  Therefore I'll keep him off the diuretic, as was becoming actually prerenal from it.    2-coronary artery disease. Status post CABG and subsequent multivessel stenting. He has had no recurrence of his previous ischemic symptoms and no new suggestive ischemic symptoms. Ejection fraction  normal at recent echo.   2-mild to moderate peripheral vascular disease. No claudication symptoms.  3-dyslipidemia, -however he is convinced against statin side effects and this not wanting to take these at this time.  4-significant peripheral venous insufficiency of the lower extremities.   now using pressure stockings reliably and is willing to continue this, but no further evaluation or intervention  "currently.  5-hypertension, resistant.  Blood pressures are not well controlled today.   I'm going to have him taken daily for the next month varying between early in the day and late day.  I discussed hepatitis properly with him and his daughter.  I'll have him back in a month to reevaluate his control and any need for additional antihypertensive therapy   6-stage IV chronic renal insufficiency.  Has some improvement in GFR off of diuretics.        Orders Placed This Encounter   Procedures     Follow-Up with Cardiac Advanced Practice Provider       No orders of the defined types were placed in this encounter.      There are no discontinued medications.      Encounter Diagnoses   Name Primary?     Atherosclerosis of native coronary artery of native heart without angina pectoris      Venous insufficiency      NSTEMI (non-ST elevated myocardial infarction) (H) Yes     Acute diastolic congestive heart failure (H)      Essential hypertension, benign      CKD (chronic kidney disease) stage 4, GFR 15-29 ml/min (H)        CURRENT MEDICATIONS:  Current Outpatient Prescriptions   Medication Sig Dispense Refill     aspirin 81 MG tablet Take 1 tablet by mouth daily. with food       COD LIVER OIL PO Take 1 capsule by mouth daily        Cyanocobalamin (VITAMIN B 12 PO) Take 500 mcg by mouth 2 times daily.       insulin NPH (HUMULIN N, NOVOLIN N) 100 UNIT/ML VIAL 18 units at bedtime 3 Month 3     allopurinol (ZYLOPRIM) 100 MG tablet TAKE 2 TABLETS(200 MG) BY MOUTH DAILY 180 tablet 3     blood glucose monitoring (ACCU-CHEK COMPACT PLUS) test strip Check blood sugar 2-3 times a day 200 strip 3     cetirizine (ZYRTEC) 10 MG tablet TAKE 1 TABLET(10 MG) BY MOUTH EVERY EVENING 30 tablet 8     clopidogrel (PLAVIX) 75 MG tablet TAKE 1 TABLET(75 MG) BY MOUTH DAILY 90 tablet 3     hydrALAZINE (APRESOLINE) 25 MG tablet Take 2 tablets (50 mg) by mouth 3 times daily 360 tablet 3     insulin syringe-needle U-100 (BD INSULIN SYRINGE) 29G X 1/2\" " 0.5 ML Use one syringe 2 daily or as directed. 200 each 4     isosorbide mononitrate (IMDUR) 60 MG 24 hr tablet Take 1 tablet (60 mg) by mouth daily 90 tablet 3     levothyroxine (SYNTHROID/LEVOTHROID) 50 MCG tablet TAKE 1 TABLET BY MOUTH DAILY 30 tablet 0     metoprolol tartrate (LOPRESSOR) 50 MG tablet Take 1 tablet (50 mg) by mouth 2 times daily 60 tablet 0     pravastatin (PRAVACHOL) 80 MG tablet Take 0.5 tablets (40 mg) by mouth every other day at bedtime. (Patient not taking: Reported on 5/3/2018) 90 tablet 1     theophylline (UNIPHYL) 400 MG 24 hr tablet TAKE 1 TABLET BY MOUTH EVERY DAY 90 tablet 3     triamcinolone (KENALOG) 0.5 % cream Apply sparingly to affected area two  times daily as needed for rash. 45 g 1       ALLERGIES     Allergies   Allergen Reactions     Advair [Advair Diskus]      cough;  insomnia, nightmares     Amlodipine Besylate      edema       Azithromycin GI Disturbance     Clarithromycin      gi     Hydrochlorothiazide      hctz + lisinopril-->inc creat, k+     Lisinopril      lisinopril + hctz --> inc creat, k+     Moxifloxacin Hydrochloride      clostridium diffile colitis     Penicillins      gen swelling     Pravastatin Cramps     Muscle cramps/spasm.  Stopped 2017     Vioxx      edema       PAST MEDICAL HISTORY:  Past Medical History:   Diagnosis Date     Acute myocardial infarction, unspecified site, episode of care unspecified      Allergic rhinitis, cause unspecified      Anemia 3/6/2014     CAD (coronary artery disease)     1996 CABG - LIMA to LAD, SVG to OM, 2007 Cath - KANU to Left main and ostial/prox LAD, 2012 Cath - 80-90% stenosis SVG to OM - KANU placed, EF 50%     CKD (chronic kidney disease) stage 3, GFR 30-59 ml/min 3/9/2014     CTS (carpal tunnel syndrome)     bilateral     Degeneration of cervical intervertebral disc      Diaphragmatic hernia without mention of obstruction or gangrene      Esophageal reflux      Essential hypertension, benign      Hyperlipidemia LDL  goal <70      Hypothyroidism      Hypothyroidism, unspecified hypothyroidism type 1/14/2016     IDIOPATHIC CYCLIC EDEMA      Mild persistent asthma      Osteoarthrosis, unspecified whether generalized or localized, unspecified site      Pneumonia, organism unspecified(486)      Proteinuria 5/17/2014     PVD (peripheral vascular disease) (H)      Sensorineural hearing loss, unspecified      Subarachnoid bleed (H)      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 disease (goal A1C<8) 10/17/2015     Unspecified disorder resulting from impaired renal function      Unspecified hereditary and idiopathic peripheral neuropathy      Venous insufficiency        PAST SURGICAL HISTORY:  Past Surgical History:   Procedure Laterality Date     C NONSPECIFIC PROCEDURE      appendectomy     C NONSPECIFIC PROCEDURE      hemorrhoids     C NONSPECIFIC PROCEDURE      cervical strain     C NONSPECIFIC PROCEDURE      rotator cuff surgery, right shoulder     C NONSPECIFIC PROCEDURE  2008    iol os     CORONARY ARTERY BYPASS  1996    LIMA to LAD, SVG to OM     HEART CATH STENT COR W/WO PTCA  2007    lad, left main cor artery stents/drug eluting     HEART CATH STENT COR W/WO PTCA  2012    80-90% stenosis SVG to OM - KANU placed, EF 50%     NONSPECIFIC PROCEDURE      iol od       FAMILY HISTORY:  Family History   Problem Relation Age of Onset     Lung Cancer Daughter      Family History Negative Mother      broken hip     Jaundice Father      Alcohol/Drug Father      Ovarian Cancer Daughter      Family History Negative Daughter      Family History Negative Son        SOCIAL HISTORY:  Social History     Social History     Marital status:      Spouse name: N/A     Number of children: N/A     Years of education: N/A     Social History Main Topics     Smoking status: Former Smoker     Packs/day: 1.00     Years: 14.00     Types: Cigarettes     Start date: 1952     Quit date: 1966     Smokeless tobacco: Never Used     Alcohol use No  "    Drug use: No     Sexual activity: No     Other Topics Concern     Caffeine Concern No     Sleep Concern Yes     Stress Concern No     Weight Concern No     Special Diet No     Exercise Yes     bowling, 5 days week. yard work     Social History Narrative       Review of Systems:  Skin:  Negative       Eyes:  Positive for glasses reading  ENT:  Positive for nasal congestion    Respiratory:  Positive for dyspnea on exertion     Cardiovascular:    Positive for chest tightness with exertion  Gastroenterology: Negative      Genitourinary:  Negative      Musculoskeletal:  Positive for arthritis;joint pain    Neurologic:  Positive for numbness or tingling of hands;numbness or tingling of feet carpal tunnel, feet feel \"funny\" occ.  Psychiatric:  Negative      Heme/Lymph/Imm:  Negative      Endocrine:  Positive for diabetes;thyroid disorder      Physical Exam:  Vitals: /56  Pulse 64  Ht 1.829 m (6')  Wt 95.8 kg (211 lb 1.6 oz)  BMI 28.63 kg/m2    Constitutional:  cooperative, alert and oriented, well developed, well nourished, in no acute distress        Skin:  warm and dry to the touch, no apparent skin lesions or masses noted          Head:  normocephalic, no masses or lesions        Eyes:  pupils equal and round;sclera white;EOMS intact        Lymph:      ENT:  no pallor or cyanosis        Neck:  carotid pulses are full and equal bilaterally;JVP normal;no carotid bruit        Respiratory:  normal breath sounds, clear to auscultation, normal A-P diameter, normal symmetry, normal respiratory excursion, no use of accessory muscles;healed median sternotomy scar         Cardiac: regular rhythm;normal S1 and S2;no S3 or S4;apical impulse not displaced       systolic ejection murmur;RUSB;grade 1        not assessed this visit                               right femoral bruit (-) left femoral bruit (-)      GI:  abdomen soft, non-tender, BS normoactive, no mass, no HSM, no bruits surgical scars      Extremities and " Muscular Skeletal:    stasis pigmentation bilateral LE edema;2+          Neurological:  no gross motor deficits        Psych:  Alert and Oriented x 3        CC  Sebastien Gibson MD  [id number]  [place] [id number]

## 2018-05-28 DIAGNOSIS — E03.9 HYPOTHYROIDISM, UNSPECIFIED TYPE: ICD-10-CM

## 2018-05-28 NOTE — LETTER
[facility]  [address] [id number] [phone]            Oscar Terrazas  [address] [place] [id number]        May 30, 2018    Dear Oscar,    While refilling your prescription today, we noticed that you are due to have labs drawn.  We will refill your prescription for 30 days, but a follow-up appointment must be made before any additional refills can be approved.     Taking care of your health is important to us and we look forward to seeing you in the near future.  Please call us at [phone] or [phone] (or use [facility]) to schedule an appointment.     Please disregard this notice if you have already made an appointment.    Sincerely,        [facility]

## 2018-05-30 DIAGNOSIS — E03.9 HYPOTHYROIDISM, UNSPECIFIED TYPE: ICD-10-CM

## 2018-05-30 RX ORDER — LEVOTHYROXINE SODIUM 50 UG/1
TABLET ORAL
Qty: 90 TABLET | Refills: 0 | OUTPATIENT
Start: 2018-05-30

## 2018-05-30 RX ORDER — LEVOTHYROXINE SODIUM 50 UG/1
TABLET ORAL
Qty: 30 TABLET | Refills: 0 | Status: ON HOLD | OUTPATIENT
Start: 2018-05-30 | End: 2018-06-15

## 2018-05-30 NOTE — TELEPHONE ENCOUNTER
"Requested Prescriptions   Pending Prescriptions Disp Refills     levothyroxine (SYNTHROID/LEVOTHROID) 50 MCG tablet [Pharmacy Med Name: LEVOTHYROXINE 0.05MG (50MCG) TAB] 30 tablet 0     Sig: TAKE 1 TABLET BY MOUTH DAILY    Thyroid Protocol Failed    5/28/2018  6:04 PM       Failed - Normal TSH on file in past 12 months    Recent Labs   Lab Test  05/24/17   0844   TSH  2.81             Passed - Patient is 12 years or older       Passed - Recent (12 mo) or future (30 days) visit within the authorizing provider's specialty    Patient had office visit in the last 12 months or has a visit in the next 30 days with authorizing provider or within the authorizing provider's specialty.  See \"Patient Info\" tab in inbasket, or \"Choose Columns\" in Meds & Orders section of the refill encounter.              Last Written Prescription Date:  4/30/2018  Last Fill Quantity: 30,  # refills: 0   Last office visit: 3/20/2018 with prescribing provider:  3/20/2018   Future Office Visit:   Next 5 appointments (look out 90 days)     Jun 06, 2018  1:10 PM CDT   Return Visit with AIDA Santacruz CNP   [facility] ([facility])    [address] [place] [id number] [phone] OPT 2                   "

## 2018-06-05 DIAGNOSIS — E03.9 HYPOTHYROIDISM, UNSPECIFIED TYPE: ICD-10-CM

## 2018-06-05 LAB
T4 FREE SERPL-MCNC: 0.9 NG/DL (ref 0.76–1.46)
TSH SERPL DL<=0.005 MIU/L-ACNC: 5.24 MU/L (ref 0.4–4)

## 2018-06-05 PROCEDURE — 36415 COLL VENOUS BLD VENIPUNCTURE: CPT | Performed by: INTERNAL MEDICINE

## 2018-06-05 PROCEDURE — 84443 ASSAY THYROID STIM HORMONE: CPT | Performed by: INTERNAL MEDICINE

## 2018-06-05 PROCEDURE — 84439 ASSAY OF FREE THYROXINE: CPT | Performed by: INTERNAL MEDICINE

## 2018-06-06 ENCOUNTER — OFFICE VISIT (OUTPATIENT)
Dept: CARDIOLOGY | Facility: CLINIC | Age: 83
End: 2018-06-06
Attending: INTERNAL MEDICINE
Payer: COMMERCIAL

## 2018-06-06 VITALS
WEIGHT: 209.4 LBS | DIASTOLIC BLOOD PRESSURE: 68 MMHG | HEART RATE: 56 BPM | BODY MASS INDEX: 28.36 KG/M2 | HEIGHT: 72 IN | SYSTOLIC BLOOD PRESSURE: 164 MMHG

## 2018-06-06 DIAGNOSIS — I25.10 CORONARY ARTERY DISEASE INVOLVING NATIVE CORONARY ARTERY OF NATIVE HEART WITHOUT ANGINA PECTORIS: ICD-10-CM

## 2018-06-06 DIAGNOSIS — I73.9 PVD (PERIPHERAL VASCULAR DISEASE) (H): ICD-10-CM

## 2018-06-06 DIAGNOSIS — I10 ESSENTIAL HYPERTENSION, BENIGN: ICD-10-CM

## 2018-06-06 DIAGNOSIS — I25.10 CARDIOVASCULAR DISEASE: Primary | ICD-10-CM

## 2018-06-06 DIAGNOSIS — I50.31 ACUTE DIASTOLIC CONGESTIVE HEART FAILURE (H): ICD-10-CM

## 2018-06-06 PROCEDURE — 99214 OFFICE O/P EST MOD 30 MIN: CPT | Performed by: NURSE PRACTITIONER

## 2018-06-06 RX ORDER — PRAVASTATIN SODIUM 20 MG
10 TABLET ORAL
Qty: 30 TABLET | Refills: 1 | COMMUNITY
Start: 2018-06-06 | End: 2019-06-21 | Stop reason: SINTOL

## 2018-06-06 NOTE — PROGRESS NOTES
Service Date: 06/06/2018      HISTORY OF PRESENT ILLNESS:  Mr. Terrazas is a pleasant 84-year-old gentleman who is here today for hypertension management.  He has a history of coronary artery disease status post coronary artery bypass surgery followed up with multivessel stenting.  He also has hypertension, stage IV chronic renal insufficiency, chronic diastolic heart failure with preserved ejection fraction, peripheral vascular disease and diabetes mellitus.  He was seen 1 month ago by his primary cardiologist for an annual exam.  At that time, his blood pressure was elevated at 150/56.  Due to Mr. Terrazas's renal insufficiency, we are limited with blood pressure medications.  Currently he is on hydralazine 50 mg t.i.d., metoprolol tartrate 50 mg b.i.d., isosorbide mononitrate 60 mg per day.  In the past, he has not tolerated amlodipine, hydralazine, lisinopril.  This gentleman has a history of chronic renal insufficiency.  His creatinine back in 2014 was in the 1.8 range.  In 2018, he has had several creatinines in the 3 range.  He remains off of any diuretic.  His weight has been very stable, however, his kidney function has not improved since he has come off of diuretics.  Today, he brings in a weight chart.  His weight at home has been stable, anywhere from 203 to 205 pounds.  He also brings in a record of blood pressures done at home.  He uses a wrist blood pressure machine.  His systolic blood pressures have been at home generally in the 130s up to the 150s.  Today he reports he has a good urine stream.  He feels he voids adequately.  He has persistent lower leg swelling.  He does report he gets tired if he does repetitive work like yard work, but he is able to bowl several games in a row without any chest pain, fatigue or shortness of breath.      He has a history of intolerance of statin due to cramping.  In the past, he has been on 40 mg of pravastatin.      In the past, he has declined Lymphedema Clinic  referral.  He continues to use his pressure stockings.  He says his legs feel better when he wears them.  The patient is on Plavix indefinitely due to his history of multivessel drug-eluting stenting.      PHYSICAL EXAMINATION:   GENERAL:  The patient is alert and oriented.  Skin warm and dry.  He is in no acute distress.   VITAL SIGNS:  Blood pressure 164/68, pulse is 56.  Weight in our clinic today is 209 pounds.   CARDIAC:  Heart tones are regular with a soft systolic ejection murmur.  JVP is elevated.   LUNGS:  Clear without crackles or wheezes.     NECK:  Negative for bruits.   EXTREMITIES:  1-2+ chronic edema two-thirds of the way to the knees.  He has chronic bronzing and skin changes, but no ulcerations or skin breakdown.      IMPRESSION AND PLAN:     1.  Hypertension.  The patient's blood pressure remains elevated.  Today I am going to increase his hydralazine to 75 mg 3 times a day.  Because of his pulse rate in the 50s, I am unable to increase his metoprolol.  He will come back and see me in 6 weeks for another blood pressure check.   2.  Chronic kidney insufficiency.  His creatinine remains elevated with an elevated GFR.  I did review lab work from the last 4 years.  He does not recall if he has ever seen a kidney specialist.  He says he would never want to be on dialysis.  I have asked him to follow up with Dr. Hillman.  The last time his creatinine was checked was 2 months ago.   3.  Coronary artery disease, status post coronary artery bypass surgery, subsequent multivessel stenting.  He has no recurrence of his previous ischemic symptoms.  His ejection fraction is normal.     4.  Hyperlipidemia.  Today we talked about trying to reintroduce statins, but low dose pravastatin 1 tablet weekly.  The patient is in agreement to do this.  We will re-evaluate his tolerance when he comes back to see me in 6 weeks.        It has been my pleasure to work with Oscar today.         AIDA TAVERAS, CNP              D: 2018   T: 2018   MT: THIEN      Name:     AKILAH BARILLAS   MRN:      [id number]        Account:      [id number]   :      1934           Service Date: 2018      Document: [id number]

## 2018-06-06 NOTE — LETTER
6/6/2018      Jorge Hillman MD  [address] [facility] [address]      RE: Oscar Terrazas       Dear Colleague,    I had the pleasure of seeing Oscar Terrazas in the [facility].    Service Date: 06/06/2018      HISTORY OF PRESENT ILLNESS:  Mr. Terrazas is a pleasant 84-year-old gentleman who is here today for hypertension management.  He has a history of coronary artery disease status post coronary artery bypass surgery followed up with multivessel stenting.  He also has hypertension, stage IV chronic renal insufficiency, chronic diastolic heart failure with preserved ejection fraction, peripheral vascular disease and diabetes mellitus.  He was seen 1 month ago by his primary cardiologist for an annual exam.  At that time, his blood pressure was elevated at 150/56.  Due to Mr. Terrazas's renal insufficiency, we are limited with blood pressure medications.  Currently he is on hydralazine 50 mg t.i.d., metoprolol tartrate 50 mg b.i.d., isosorbide mononitrate 60 mg per day.  In the past, he has not tolerated amlodipine, hydralazine, lisinopril.  This gentleman has a history of chronic renal insufficiency.  His creatinine back in 2014 was in the 1.8 range.  In 2018, he has had several creatinines in the 3 range.  He remains off of any diuretic.  His weight has been very stable, however, his kidney function has not improved since he has come off of diuretics.  Today, he brings in a weight chart.  His weight at home has been stable, anywhere from 203 to 205 pounds.  He also brings in a record of blood pressures done at home.  He uses a wrist blood pressure machine.  His systolic blood pressures have been at home generally in the 130s up to the 150s.  Today he reports he has a good urine stream.  He feels he voids adequately.  He has persistent lower leg swelling.  He does report he gets tired if he does repetitive work like yard work, but he is able to bowl several games in a row without  any chest pain, fatigue or shortness of breath.      He has a history of intolerance of statin due to cramping.  In the past, he has been on 40 mg of pravastatin.      In the past, he has declined [facility] referral.  He continues to use his pressure stockings.  He says his legs feel better when he wears them.  The patient is on Plavix indefinitely due to his history of multivessel drug-eluting stenting.      PHYSICAL EXAMINATION:   GENERAL:  The patient is alert and oriented.  Skin warm and dry.  He is in no acute distress.   VITAL SIGNS:  Blood pressure 164/68, pulse is 56.  Weight in our clinic today is 209 pounds.   CARDIAC:  Heart tones are regular with a soft systolic ejection murmur.  JVP is elevated.   LUNGS:  Clear without crackles or wheezes.     NECK:  Negative for bruits.   EXTREMITIES:  1-2+ chronic edema two-thirds of the way to the knees.  He has chronic bronzing and skin changes, but no ulcerations or skin breakdown.      IMPRESSION AND PLAN:     1.  Hypertension.  The patient's blood pressure remains elevated.  Today I am going to increase his hydralazine to 75 mg 3 times a day.  Because of his pulse rate in the 50s, I am unable to increase his metoprolol.  He will come back and see me in 6 weeks for another blood pressure check.   2.  Chronic kidney insufficiency.  His creatinine remains elevated with an elevated GFR.  I did review lab work from the last 4 years.  He does not recall if he has ever seen a kidney specialist.  He says he would never want to be on dialysis.  I have asked him to follow up with Dr. Hillman.  The last time his creatinine was checked was 2 months ago.   3.  Coronary artery disease, status post coronary artery bypass surgery, subsequent multivessel stenting.  He has no recurrence of his previous ischemic symptoms.  His ejection fraction is normal.     4.  Hyperlipidemia.  Today we talked about trying to reintroduce statins, but low dose pravastatin 1 tablet weekly.   "The patient is in agreement to do this.  We will re-evaluate his tolerance when he comes back to see me in 6 weeks.        It has been my pleasure to work with Akilah today.         AIDA TAVERAS CNP             D: 2018   T: 2018   MT: THIEN      Name:     AKILAH BARILLAS   MRN:      [id number]        Account:      [id number]   :      1934           Service Date: 2018      Document: [id number]           Outpatient Encounter Prescriptions as of 2018   Medication Sig Dispense Refill     allopurinol (ZYLOPRIM) 100 MG tablet TAKE 2 TABLETS(200 MG) BY MOUTH DAILY 180 tablet 3     aspirin 81 MG tablet Take 1 tablet by mouth daily. with food       blood glucose monitoring (ACCU-CHEK COMPACT PLUS) test strip Check blood sugar 2-3 times a day 200 strip 3     cetirizine (ZYRTEC) 10 MG tablet TAKE 1 TABLET(10 MG) BY MOUTH EVERY EVENING 30 tablet 8     clopidogrel (PLAVIX) 75 MG tablet TAKE 1 TABLET(75 MG) BY MOUTH DAILY 90 tablet 3     COD LIVER OIL PO Take 1 capsule by mouth daily        Cyanocobalamin (VITAMIN B 12 PO) Take 500 mcg by mouth 2 times daily.       diphenhydrAMINE-acetaminophen (TYLENOL PM)  MG tablet Take 1 tablet by mouth nightly as needed for sleep       hydrALAZINE (APRESOLINE) 25 MG tablet Take 75 mg by mouth 3 times daily 360 tablet 3     insulin NPH (HUMULIN N, NOVOLIN N) 100 UNIT/ML VIAL 18 units at bedtime 3 Month 3     insulin syringe-needle U-100 (BD INSULIN SYRINGE) 29G X 1/2\" 0.5 ML Use one syringe 2 daily or as directed. 200 each 4     isosorbide mononitrate (IMDUR) 60 MG 24 hr tablet Take 1 tablet (60 mg) by mouth daily 90 tablet 3     levothyroxine (SYNTHROID/LEVOTHROID) 50 MCG tablet TAKE 1 TABLET BY MOUTH DAILY 30 tablet 0     metoprolol tartrate (LOPRESSOR) 50 MG tablet Take 1 tablet (50 mg) by mouth 2 times daily 60 tablet 0     pravastatin (PRAVACHOL) 20 MG tablet Take 1 tablet (20 mg) by mouth once a week 30 tablet 1     theophylline (UNIPHYL) 400 MG " 24 hr tablet TAKE 1 TABLET BY MOUTH EVERY DAY 90 tablet 3     triamcinolone (KENALOG) 0.5 % cream Apply sparingly to affected area two  times daily as needed for rash. 45 g 1     [DISCONTINUED] pravastatin (PRAVACHOL) 80 MG tablet Take 0.5 tablets (40 mg) by mouth every other day at bedtime. (Patient not taking: Reported on 5/3/2018) 90 tablet 1     No facility-administered encounter medications on file as of 6/6/2018.        Again, thank you for allowing me to participate in the care of your patient.      Sincerely,    AIDA Yee [facility]

## 2018-06-06 NOTE — LETTER
6/6/2018    Jorge Hillman MD  [address] [facility] [address]    RE: Oscar Terrazas       Dear Colleague,    I had the pleasure of seeing Oscar Terrazas in the [facility].    HPI and Plan:   See dictation    Orders Placed This Encounter   Procedures     Follow-Up with Cardiac Advanced Practice Provider     Orders Placed This Encounter   Medications     diphenhydrAMINE-acetaminophen (TYLENOL PM)  MG tablet     Sig: Take 1 tablet by mouth nightly as needed for sleep     pravastatin (PRAVACHOL) 20 MG tablet     Sig: Take 1 tablet (20 mg) by mouth once a week     Dispense:  30 tablet     Refill:  1     Medications Discontinued During This Encounter   Medication Reason     pravastatin (PRAVACHOL) 80 MG tablet Stopped by Patient         Encounter Diagnoses   Name Primary?     Acute diastolic congestive heart failure (H)      Essential hypertension, benign      Cardiovascular disease Yes     PVD (peripheral vascular disease) (H)      Coronary artery disease involving native coronary artery of native heart without angina pectoris        CURRENT MEDICATIONS:  Current Outpatient Prescriptions   Medication Sig Dispense Refill     allopurinol (ZYLOPRIM) 100 MG tablet TAKE 2 TABLETS(200 MG) BY MOUTH DAILY 180 tablet 3     aspirin 81 MG tablet Take 1 tablet by mouth daily. with food       blood glucose monitoring (ACCU-CHEK COMPACT PLUS) test strip Check blood sugar 2-3 times a day 200 strip 3     cetirizine (ZYRTEC) 10 MG tablet TAKE 1 TABLET(10 MG) BY MOUTH EVERY EVENING 30 tablet 8     clopidogrel (PLAVIX) 75 MG tablet TAKE 1 TABLET(75 MG) BY MOUTH DAILY 90 tablet 3     COD LIVER OIL PO Take 1 capsule by mouth daily        Cyanocobalamin (VITAMIN B 12 PO) Take 500 mcg by mouth 2 times daily.       diphenhydrAMINE-acetaminophen (TYLENOL PM)  MG tablet Take 1 tablet by mouth nightly as needed for sleep       hydrALAZINE (APRESOLINE) 25 MG tablet Take 75 mg by mouth 3 times daily  "360 tablet 3     insulin NPH (HUMULIN N, NOVOLIN N) 100 UNIT/ML VIAL 18 units at bedtime 3 Month 3     insulin syringe-needle U-100 (BD INSULIN SYRINGE) 29G X 1/2\" 0.5 ML Use one syringe 2 daily or as directed. 200 each 4     isosorbide mononitrate (IMDUR) 60 MG 24 hr tablet Take 1 tablet (60 mg) by mouth daily 90 tablet 3     levothyroxine (SYNTHROID/LEVOTHROID) 50 MCG tablet TAKE 1 TABLET BY MOUTH DAILY 30 tablet 0     metoprolol tartrate (LOPRESSOR) 50 MG tablet Take 1 tablet (50 mg) by mouth 2 times daily 60 tablet 0     pravastatin (PRAVACHOL) 20 MG tablet Take 1 tablet (20 mg) by mouth once a week 30 tablet 1     theophylline (UNIPHYL) 400 MG 24 hr tablet TAKE 1 TABLET BY MOUTH EVERY DAY 90 tablet 3     triamcinolone (KENALOG) 0.5 % cream Apply sparingly to affected area two  times daily as needed for rash. 45 g 1     [DISCONTINUED] pravastatin (PRAVACHOL) 80 MG tablet Take 0.5 tablets (40 mg) by mouth every other day at bedtime. (Patient not taking: Reported on 5/3/2018) 90 tablet 1       ALLERGIES     Allergies   Allergen Reactions     Advair [Fluticasone-Salmeterol]      cough;  insomnia, nightmares     Amlodipine Besylate      edema       Azithromycin GI Disturbance     Clarithromycin      gi     Hydrochlorothiazide      hctz + lisinopril-->inc creat, k+     Lisinopril      lisinopril + hctz --> inc creat, k+     Moxifloxacin Hydrochloride      clostridium diffile colitis     Penicillins      gen swelling     Pravastatin Cramps     Muscle cramps/spasm.  Stopped 2017     Vioxx      edema       PAST MEDICAL HISTORY:  Past Medical History:   Diagnosis Date     Acute myocardial infarction, unspecified site, episode of care unspecified      Allergic rhinitis, cause unspecified      Anemia 3/6/2014     CAD (coronary artery disease)     1996 CABG - LIMA to LAD, SVG to OM, 2007 Cath - KANU to Left main and ostial/prox LAD, 2012 Cath - 80-90% stenosis SVG to OM - KANU placed, EF 50%     CKD (chronic kidney disease) " stage 3, GFR 30-59 ml/min 3/9/2014     CTS (carpal tunnel syndrome)     bilateral     Degeneration of cervical intervertebral disc      Diaphragmatic hernia without mention of obstruction or gangrene      Esophageal reflux      Essential hypertension, benign      Hyperlipidemia LDL goal <70      Hypothyroidism      Hypothyroidism, unspecified hypothyroidism type 1/14/2016     IDIOPATHIC CYCLIC EDEMA      Mild persistent asthma      Osteoarthrosis, unspecified whether generalized or localized, unspecified site      Pneumonia, organism unspecified(486)      Proteinuria 5/17/2014     PVD (peripheral vascular disease) (H)      Sensorineural hearing loss, unspecified      Subarachnoid bleed (H)      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 disease (goal A1C<8) 10/17/2015     Unspecified disorder resulting from impaired renal function      Unspecified hereditary and idiopathic peripheral neuropathy      Venous insufficiency        PAST SURGICAL HISTORY:  Past Surgical History:   Procedure Laterality Date     C NONSPECIFIC PROCEDURE      appendectomy     C NONSPECIFIC PROCEDURE      hemorrhoids     C NONSPECIFIC PROCEDURE      cervical strain     C NONSPECIFIC PROCEDURE      rotator cuff surgery, right shoulder     C NONSPECIFIC PROCEDURE  2008    iol os     CORONARY ARTERY BYPASS  1996    LIMA to LAD, SVG to OM     HEART CATH STENT COR W/WO PTCA  2007    lad, left main cor artery stents/drug eluting     HEART CATH STENT COR W/WO PTCA  2012    80-90% stenosis SVG to OM - KANU placed, EF 50%     NONSPECIFIC PROCEDURE      iol od       FAMILY HISTORY:  Family History   Problem Relation Age of Onset     Lung Cancer Daughter      Family History Negative Mother      broken hip     Jaundice Father      Alcohol/Drug Father      Ovarian Cancer Daughter      Family History Negative Daughter      Family History Negative Son        SOCIAL HISTORY:  Social History     Social History     Marital status:      Spouse  name: N/A     Number of children: N/A     Years of education: N/A     Social History Main Topics     Smoking status: Former Smoker     Packs/day: 1.00     Years: 14.00     Types: Cigarettes     Start date: 1952     Quit date: 1966     Smokeless tobacco: Never Used     Alcohol use No     Drug use: No     Sexual activity: No     Other Topics Concern     Caffeine Concern No     Sleep Concern Yes     Stress Concern No     Weight Concern No     Special Diet No     Exercise Yes     bowling, 5 days week. yard work     Social History Narrative       Review of Systems:  Skin:  Positive for bruising   Eyes:  Negative    ENT:  Negative    Respiratory:  Positive for    Cardiovascular:  Negative;syncope or near-syncope;exercise intolerance;cyanosis;lightheadedness;dizziness Positive for;edema  Gastroenterology: Positive for constipation  Genitourinary:  Positive for urinary frequency  Musculoskeletal:  Positive for joint pain  Neurologic:  Positive for numbness or tingling of hands  Psychiatric:  Negative    Heme/Lymph/Imm:  Positive for easy bruising  Endocrine:  Positive for diabetes;thyroid disorder    Physical Exam:  Vitals: /68  Pulse 56  Ht 1.829 m (6')  Wt 95 kg (209 lb 6.4 oz)  BMI 28.4 kg/m2    Constitutional:           Skin:             Head:           Eyes:           Lymph:      ENT:           Neck:           Respiratory:            Cardiac:                                                           GI:           Extremities and Muscular Skeletal:                 Neurological:           Psych:           Recent Lab Results:  LIPID RESULTS:  Lab Results   Component Value Date    CHOL 167 03/14/2018    HDL 32 (L) 03/14/2018     (H) 03/14/2018    TRIG 99 03/14/2018    CHOLHDLRATIO 3.8 09/11/2014       LIVER ENZYME RESULTS:  Lab Results   Component Value Date    AST 3 12/13/2017    ALT 16 12/13/2017       CBC RESULTS:  Lab Results   Component Value Date    WBC 8.7 03/16/2018    RBC 3.24 (L) 03/16/2018     HGB 9.9 (L) 03/16/2018    HCT 29.7 (L) 03/16/2018    MCV 92 03/16/2018    MCH 30.6 03/16/2018    MCHC 33.3 03/16/2018    RDW 14.9 03/16/2018     03/16/2018       BMP RESULTS:  Lab Results   Component Value Date     04/05/2018    POTASSIUM 4.2 04/05/2018    CHLORIDE 111 (H) 04/05/2018    CO2 25 04/05/2018    ANIONGAP 6 04/05/2018     (H) 04/05/2018    BUN 55 (H) 04/05/2018    CR 3.09 (H) 04/05/2018    GFRESTIMATED 19 (L) 04/05/2018    GFRESTBLACK 23 (L) 04/05/2018    LEV 9.0 04/05/2018        A1C RESULTS:  Lab Results   Component Value Date    A1C 6.3 (H) 03/13/2018       INR RESULTS:  Lab Results   Component Value Date    INR 1.06 03/12/2013    INR 1.19 (H) 12/08/2008           CC  Sebastien Gibson MD  [address]  [place] [address]                  Thank you for allowing me to participate in the care of your patient.      Sincerely,     AIDA Yee [facility]    cc:   Sebastien Gibson MD  [id number]  [place] [address]

## 2018-06-06 NOTE — PATIENT INSTRUCTIONS
Increase the hydralazine to 75 mg three times per day    Monitor your blood pressure once a week  Restart pravastatin 20 mg once a week  Continue with daily weights  Follow-up with Dr. Hillman about your kidney function    I will see you back in 6 weeks

## 2018-06-12 ENCOUNTER — APPOINTMENT (OUTPATIENT)
Dept: GENERAL RADIOLOGY | Facility: CLINIC | Age: 83
DRG: 280 | End: 2018-06-12
Attending: EMERGENCY MEDICINE
Payer: MEDICARE

## 2018-06-12 ENCOUNTER — HOSPITAL ENCOUNTER (INPATIENT)
Facility: CLINIC | Age: 83
LOS: 2 days | Discharge: HOME | DRG: 280 | End: 2018-06-15
Attending: EMERGENCY MEDICINE | Admitting: INTERNAL MEDICINE
Payer: MEDICARE

## 2018-06-12 DIAGNOSIS — I50.9 ACUTE ON CHRONIC CONGESTIVE HEART FAILURE, UNSPECIFIED CONGESTIVE HEART FAILURE TYPE: ICD-10-CM

## 2018-06-12 DIAGNOSIS — I21.4 NSTEMI (NON-ST ELEVATED MYOCARDIAL INFARCTION) (H): Primary | ICD-10-CM

## 2018-06-12 DIAGNOSIS — R79.89 ELEVATED TROPONIN: ICD-10-CM

## 2018-06-12 DIAGNOSIS — I10 ESSENTIAL HYPERTENSION, BENIGN: ICD-10-CM

## 2018-06-12 DIAGNOSIS — E03.9 HYPOTHYROIDISM, UNSPECIFIED TYPE: ICD-10-CM

## 2018-06-12 DIAGNOSIS — R07.89 CHEST PRESSURE: ICD-10-CM

## 2018-06-12 DIAGNOSIS — R06.02 SHORTNESS OF BREATH: ICD-10-CM

## 2018-06-12 LAB
ALBUMIN SERPL-MCNC: 4 G/DL (ref 3.4–5)
ALP SERPL-CCNC: 117 U/L (ref 40–150)
ALT SERPL W P-5'-P-CCNC: 20 U/L (ref 0–70)
ANION GAP SERPL CALCULATED.3IONS-SCNC: 7 MMOL/L (ref 3–14)
AST SERPL W P-5'-P-CCNC: 9 U/L (ref 0–45)
BASOPHILS # BLD AUTO: 0.1 10E9/L (ref 0–0.2)
BASOPHILS NFR BLD AUTO: 0.8 %
BILIRUB SERPL-MCNC: 0.3 MG/DL (ref 0.2–1.3)
BUN SERPL-MCNC: 48 MG/DL (ref 7–30)
CALCIUM SERPL-MCNC: 9 MG/DL (ref 8.5–10.1)
CHLORIDE SERPL-SCNC: 111 MMOL/L (ref 94–109)
CO2 SERPL-SCNC: 24 MMOL/L (ref 20–32)
CREAT SERPL-MCNC: 2.95 MG/DL (ref 0.66–1.25)
DIFFERENTIAL METHOD BLD: ABNORMAL
EOSINOPHIL # BLD AUTO: 0.5 10E9/L (ref 0–0.7)
EOSINOPHIL NFR BLD AUTO: 5.5 %
ERYTHROCYTE [DISTWIDTH] IN BLOOD BY AUTOMATED COUNT: 15.8 % (ref 10–15)
GFR SERPL CREATININE-BSD FRML MDRD: 20 ML/MIN/1.7M2
GLUCOSE SERPL-MCNC: 250 MG/DL (ref 70–99)
HCT VFR BLD AUTO: 32.4 % (ref 40–53)
HGB BLD-MCNC: 10.5 G/DL (ref 13.3–17.7)
IMM GRANULOCYTES # BLD: 0 10E9/L (ref 0–0.4)
IMM GRANULOCYTES NFR BLD: 0.2 %
LYMPHOCYTES # BLD AUTO: 1.4 10E9/L (ref 0.8–5.3)
LYMPHOCYTES NFR BLD AUTO: 16.6 %
MCH RBC QN AUTO: 30 PG (ref 26.5–33)
MCHC RBC AUTO-ENTMCNC: 32.4 G/DL (ref 31.5–36.5)
MCV RBC AUTO: 93 FL (ref 78–100)
MONOCYTES # BLD AUTO: 0.7 10E9/L (ref 0–1.3)
MONOCYTES NFR BLD AUTO: 8.4 %
NEUTROPHILS # BLD AUTO: 5.7 10E9/L (ref 1.6–8.3)
NEUTROPHILS NFR BLD AUTO: 68.5 %
NRBC # BLD AUTO: 0 10*3/UL
NRBC BLD AUTO-RTO: 0 /100
NT-PROBNP SERPL-MCNC: ABNORMAL PG/ML (ref 0–1800)
PLATELET # BLD AUTO: 184 10E9/L (ref 150–450)
POTASSIUM SERPL-SCNC: 4.4 MMOL/L (ref 3.4–5.3)
PROT SERPL-MCNC: 7.7 G/DL (ref 6.8–8.8)
RBC # BLD AUTO: 3.5 10E12/L (ref 4.4–5.9)
SODIUM SERPL-SCNC: 142 MMOL/L (ref 133–144)
TROPONIN I SERPL-MCNC: 0.37 UG/L (ref 0–0.04)
WBC # BLD AUTO: 8.4 10E9/L (ref 4–11)

## 2018-06-12 PROCEDURE — 99285 EMERGENCY DEPT VISIT HI MDM: CPT | Mod: 25

## 2018-06-12 PROCEDURE — 84484 ASSAY OF TROPONIN QUANT: CPT | Performed by: EMERGENCY MEDICINE

## 2018-06-12 PROCEDURE — 71046 X-RAY EXAM CHEST 2 VIEWS: CPT

## 2018-06-12 PROCEDURE — 80053 COMPREHEN METABOLIC PANEL: CPT | Performed by: EMERGENCY MEDICINE

## 2018-06-12 PROCEDURE — 96375 TX/PRO/DX INJ NEW DRUG ADDON: CPT

## 2018-06-12 PROCEDURE — 25000128 H RX IP 250 OP 636: Performed by: EMERGENCY MEDICINE

## 2018-06-12 PROCEDURE — 85025 COMPLETE CBC W/AUTO DIFF WBC: CPT | Performed by: EMERGENCY MEDICINE

## 2018-06-12 PROCEDURE — 83880 ASSAY OF NATRIURETIC PEPTIDE: CPT | Performed by: EMERGENCY MEDICINE

## 2018-06-12 PROCEDURE — 25000132 ZZH RX MED GY IP 250 OP 250 PS 637: Mod: GY | Performed by: EMERGENCY MEDICINE

## 2018-06-12 PROCEDURE — 93005 ELECTROCARDIOGRAM TRACING: CPT

## 2018-06-12 PROCEDURE — 96374 THER/PROPH/DIAG INJ IV PUSH: CPT

## 2018-06-12 PROCEDURE — 25000125 ZZHC RX 250: Performed by: EMERGENCY MEDICINE

## 2018-06-12 PROCEDURE — A9270 NON-COVERED ITEM OR SERVICE: HCPCS | Mod: GY | Performed by: EMERGENCY MEDICINE

## 2018-06-12 RX ORDER — FUROSEMIDE 10 MG/ML
40 INJECTION INTRAMUSCULAR; INTRAVENOUS ONCE
Status: COMPLETED | OUTPATIENT
Start: 2018-06-12 | End: 2018-06-12

## 2018-06-12 RX ORDER — METOPROLOL TARTRATE 25 MG/1
50 TABLET, FILM COATED ORAL ONCE
Status: COMPLETED | OUTPATIENT
Start: 2018-06-12 | End: 2018-06-12

## 2018-06-12 RX ORDER — NITROGLYCERIN 0.4 MG/1
0.4 TABLET SUBLINGUAL EVERY 5 MIN PRN
Status: DISCONTINUED | OUTPATIENT
Start: 2018-06-12 | End: 2018-06-15 | Stop reason: HOSPADM

## 2018-06-12 RX ORDER — METOPROLOL TARTRATE 1 MG/ML
5 INJECTION, SOLUTION INTRAVENOUS ONCE
Status: COMPLETED | OUTPATIENT
Start: 2018-06-12 | End: 2018-06-12

## 2018-06-12 RX ADMIN — NITROGLYCERIN 0.4 MG: 0.4 TABLET SUBLINGUAL at 23:14

## 2018-06-12 RX ADMIN — METOPROLOL TARTRATE 5 MG: 5 INJECTION, SOLUTION INTRAVENOUS at 22:45

## 2018-06-12 RX ADMIN — FUROSEMIDE 40 MG: 10 INJECTION, SOLUTION INTRAVENOUS at 23:15

## 2018-06-12 RX ADMIN — METOPROLOL TARTRATE 50 MG: 25 TABLET ORAL at 22:45

## 2018-06-12 ASSESSMENT — ENCOUNTER SYMPTOMS
VOMITING: 0
NAUSEA: 0
SHORTNESS OF BREATH: 1
ABDOMINAL PAIN: 1
DIARRHEA: 0

## 2018-06-12 NOTE — IP AVS SNAPSHOT
[facility]    [address] [place], [place] [address]., [address]    [place] [address]    Phone:  [phone]                                       After Visit Summary   6/12/2018    Oscar Terrazas    MRN: [id number]           After Visit Summary Signature Page     I have received my discharge instructions, and my questions have been answered. I have discussed any challenges I see with this plan with the nurse or doctor.    ..........................................................................................................................................  Patient/Patient Representative Signature      ..........................................................................................................................................  Patient Representative Print Name and Relationship to Patient    ..................................................               ................................................  Date                                            Time    ..........................................................................................................................................  Reviewed by Signature/Title    ...................................................              ..............................................  Date                                                            Time

## 2018-06-12 NOTE — LETTER
Transition Communication Hand-off for Care Transitions to Next Level of Care Provider    Name: Oscar Terrazas  : 1934  MRN #: [id number]  Primary Care Provider: Jorge Hillman     Primary Clinic: [address] [facility] [address]     Reason for Hospitalization:  Shortness of breath [R06.02]  Chest pressure [R07.89]  Elevated troponin [R74.8]  Acute on chronic congestive heart failure, unspecified congestive heart failure type (H) [I50.9]  Admit Date/Time: 2018 10:09 PM  Discharge Date: 6/15/18  Payor Source: Payor: BCBS / Plan: BCBS PLATINUM BLUE / Product Type: PPO /     Readmission Assessment Measure (LUAN) Risk Score/category: Average         Reason for Communication Hand-off Referral: Admission diagnoses: CHF  Avoidable readmission within 30 days    Discharge Plan: Discharge to home.       Concern for non-adherence with plan of care:   Y/N No  Discharge Needs Assessment:  Needs       Most Recent Value    Equipment Currently Used at Home none    Transportation Available car        Follow-up specialty is recommended: Yes    Follow-up plan:  Future Appointments  Date Time Provider Department Center   2018 12:10 PM [facility]   2018 1:10 PM Preethi Smiley APRN CNP [facility]   2018 2:15 PM Sebastien Gibson MD [facility]   2018 1:00 PM Jorge Hillman MD [facility]   2018 12:30 PM Preethi Smiley APRN CNP [facility]       Any outstanding tests or procedures:        Referrals     Future Labs/Procedures    Cardiac Rehab Referral     Comments:    *This therapy referral will be filtered to a centralized scheduling office at [facility] and the patient will receive a call to schedule an appointment at a [place] location most convenient for them.*     If you have not heard from the scheduling office within 2 business days, please call [phone] for all locations, with the exception of [place], please call  "[phone].    Please be aware that coverage of these services is subject to the terms and limitations of your health insurance plan.  Call member services at your health plan with any benefit or coverage questions.      **Note to Provider:  If you are referring outside of [place] for the therapy appointment, please list the name of the location in the \"special instructions\" above, print the referral and give to the patient to schedule the appointment.           CARDIAC REHAB REFERRAL     Comments:    *This therapy referral will be filtered to a centralized scheduling office at [facility] and the patient will receive a call to schedule an appointment at a [place] location most convenient for them.*     If you have not heard from the scheduling office within 2 business days, please call [phone] for all locations, with the exception of [place], please call [phone].    Please be aware that coverage of these services is subject to the terms and limitations of your health insurance plan.  Call member services at your health plan with any benefit or coverage questions.      **Note to Provider:  If you are referring outside of [place] for the therapy appointment, please list the name of the location in the \"special instructions\" above, print the referral and give to the patient to schedule the appointment.                   Key Recommendations:  Pt was admitted for NSTEMI and CHF.  He has follow ups with cardiology.  He was started on torsemide.  Will also follow up with Dr. Ryder, Nephrology on 6/18/18 for CKD stage IV.    Leslee Jasso    AVS/Discharge Summary is the source of truth; this is a helpful guide for improved communication of patient story          "

## 2018-06-12 NOTE — IP AVS SNAPSHOT
MRN:[id number]                      After Visit Summary   6/12/2018    Oscar Terrazas    MRN: [id number]           Thank you!     Thank you for choosing [facility] for your care. Our goal is always to provide you with excellent care. Hearing back from our patients is one way we can continue to improve our services. Please take a few minutes to complete the written survey that you may receive in the mail after you visit with us. Thank you!        Patient Information     Date Of Birth          2/2/1934        About your hospital stay     You were admitted on:  June 13, 2018 You last received care in the:  [facility]    You were discharged on:  Cordelia 15, 2018        Reason for your hospital stay       Chest pain and shortness of breath due to possible heart attack - we avoided an angiogram, and you were treated with medications alone                  Who to Call     For medical emergencies, please call 911.  For non-urgent questions about your medical care, please call your primary care provider or clinic, [phone]          Attending Provider     Provider Specialty    Nick Scott MD Emergency Medicine    Rodriguez, Dillon Murillo MD Internal Medicine       Primary Care Provider Office Phone # Fax #    Jorge Hillman [phone]      After Care Instructions     Activity       Your activity upon discharge: activity as tolerated            Diet       Follow this diet upon discharge: Low carb, low cholesterol, low saturated fat, low salt                  Follow-up Appointments     Follow-up and recommended labs and tests        Follow up with primary care provider, Jorge Hillman, within 2 weeks for hospital follow- up.  The following labs/tests are recommended: repeat thyroid studies in 4-6 weeks.      Follow up with Cardiology as scheduled    Follow up with Nephrology (Dr. Ryder) next month as scheduled  Wednesday July 18 at 1:30 PM  Alberto Canseco  [facility]  [address], [address]  [place] [address]  [phone]                  Your next 10 appointments already scheduled     Jun 19, 2018 12:10 PM CDT   LAB with [facility]   [facility] [place] ([facility])    [address] [place], [place] [id number]  [facility] [id number] [phone]           Please do not eat 10-12 hours before your appointment if you are coming in fasting for labs on lipids, cholesterol, or glucose (sugar). This does not apply to pregnant women. Water, hot tea and black coffee (with nothing added) are okay. Do not drink other fluids, diet soda or chew gum.            Jun 19, 2018  1:10 PM CDT   CORE Enrollment with AIDA Santacruz CNP   [facility] ([facility])    [address] [place] [id number] [phone] OPT 2            Jun 26, 2018  2:15 PM CDT   Return Visit with Sebastien [place] MD   [facility] ([facility])    [address] [place] [id number] [phone] OPT 2            Jun 28, 2018  1:00 PM CDT   Office Visit with Jorge Hillman MD   [facility] ([facility]    [address] [place] [id number] [phone]           Bring a current list of meds and any records pertaining to this visit. For Physicals, please bring immunization records and any forms needing to be filled out. Please arrive 10 minutes early to complete paperwork.            Jul 18, 2018 12:30 PM CDT   Return Visit with AIDA Santacruz CNP   [facility] ([facility])    [address] [place] [id number] [phone] OPT 2              Additional Services     Cardiac Rehab Referral       *This therapy referral will be filtered to a centralized scheduling office at [place]  [facility] and the patient will receive a call to schedule an appointment at a [place] location most convenient for them.*     If you have not heard from the scheduling office within 2 business days, please call [phone] for all locations, with the exception of [place], please call [phone].    Please be aware that coverage of these services is subject to the terms and limitations of your health insurance plan.  Call member services at your health plan with any benefit or coverage questions.      **Note to Provider:  If you are referring outside of [place] for the therapy appointment, please list the name of the location in the \"special instructions\" above, print the referral and give to the patient to schedule the appointment.                         General Recommendations To Control Heart Failure When You Get Home       Heart Failure Instructions for Patients and Families: Please read and check off each of these important instructions as you do them when you get home.          Weight and Symptoms       ___ Put a scale in your bathroom.    ___ Post a weight chart or calendar next to your scale.    ___ Weigh yourself everyday as soon as you get up in the morning (before breakfast).  You should only be wearing your pajamas.  Write your weight on the chart/calendar.  ___ Bring your weight chart/calendar with you to all appointments.  ___ Call your doctor or nurse practitioner if you gain 2 pounds (in 1 day) or 5 pounds in (1 week) from your goal  good  weight.  Your good weight is also called your  dry  weight.  Your doctor or nurse will tell you what your good weight should be.    ___ Call your doctor or nurse practitioner if you have shortness of breath that gets worse over time, leg swelling or fatigue.       Medications and Diet       ___ Make sure to take your medication as prescribed.    ___ Bring a current list of your medication and all of your medicine bottles with you to all " appointments.    ___ Limit fluids if you still have swelling or shortness of breath, or if your doctor tells you to do so.    ___ Eat less than 2000 mg of sodium (salt) every day. Read food labels, and do not add salt to meals. Remember, if you eat less salt you retain less fluid.  ___ Follow a heart healthy diet that is low in saturated fat.        Activity and Suggested Lifestyle Changes      ___ Stay active. Talk to your doctor about an exercise program that is safe for your heart.  ___ Stop smoking. Reduce alcohol use.      ___ Lose weight if you are overweight. Extra weight puts a lot of stress on the heart.                 Control for Leg Swelling     ___ Keep your legs elevated (raised) as needed for swelling. If swelling is uncomfortable or elevation doesn t help, ask your doctor about using ACE wraps or support stockings.        What is the [facility]?  Cardiomyopathy  Optimization  Rehabilitation  Education    The [facility] Clinic is a heart failure specialty clinic within [facility].  It is an outpatient disease management program that is based on a phase-by-phase approach, which is tailored to each patient s individual needs.  The cardiologist, nurse practitioner, physician assistant and nurses provide an ongoing outpatient care and treatment plan that guides heart failure and cardiomyopathy patients from evaluation and education to stabilization. This team works with your current primary care doctor and cardiologist to help you:    - Avoid hospitalizations  - Slow the progression of the disease  - Improve length and quality of life  - Know who and when to call if heart failure symptoms appear  - Receive easy access to quality health care and advice  - Better understand your condition and treatment  - Decrease the tremendous cost burden of heart failure care  - Detect future heart problems before they become life threatening                                 Follow-up Appointments     ___ An  appointment with the [facility] may already have been made for you (see section   Your next appointments already scheduled ).  If you have a question about your appointment, would like to speak with a C.O.R.E. nurse, or would like to become a [facility] Clinic patient, please call one of the following locations:     - [facility] ([place]):                                             [phone]  - [facility] ([place]):                                            [phone]  - [facility] ([place]):                 [phone]      ___ Your C.O.R.E. Clinic Team will continue to educate you on your heart failure and may adjust medications based on your vital signs, lab work, and how you are feeling.  Therefore, it is very important to bring the following to all C.O.R.E. appointments:    - An accurate list of your medications  - Your medicine bottles  - Your weight chart/calendar                   Pending Results     Date and Time Order Name Status Description    6/14/2018 1358 EKG 12-lead, tracing only Preliminary     6/14/2018 0700 EKG 12-lead, tracing only Preliminary             Statement of Approval     Ordered          06/15/18 0947  I have reviewed and agree with all the recommendations and orders detailed in this document.  EFFECTIVE NOW     Approved and electronically signed by:  Josefina Rodriguez MD             Admission Information     Date & Time Provider Department Dept. Phone    6/12/2018 Diloln Rodriguez MD [facility] [phone]      Your Vitals Were     Blood Pressure Pulse Temperature Respirations Height Weight    148/68 71 97.8  F (36.6  C) (Oral) 18 1.829 m (6') 91.4 kg (201 lb 8 oz)    Pulse Oximetry BMI (Body Mass Index)                99% 27.33 kg/m2          MyChart Information     [employer] gives you secure access to your electronic health record. If you see a primary care provider, you can  also send messages to your care team and make appointments. If you have questions, please call your primary care clinic.  If you do not have a primary care provider, please call [phone] and they will assist you.        Care EveryWhere ID     This is your Care EveryWhere ID. This could be used by other organizations to access your [facility] medical records  [id number]        Equal Access to Services     [facility] : Hadii aad ku hadasho Soomaali, waaxda luqadaha, qaybta kaalmada adeegyada, waxay idiin hayricardoshantell freemansunupur pantoja . So [facility] [phone].    ATENCIÓN: Si habla español, tiene a fernandez disposición servicios gratuitos de asistencia lingüística. Alex al [phone].    We comply with applicable federal civil rights laws and [place] laws. We do not discriminate on the basis of race, color, national origin, age, disability, sex, sexual orientation, or gender identity.               Review of your medicines      START taking        Dose / Directions    nitroGLYcerin 0.4 MG sublingual tablet   Commonly known as:  NITROSTAT   Used for:  NSTEMI (non-ST elevated myocardial infarction) (H)        For chest pain place 1 tablet under the tongue every 5 minutes for 3 doses. If symptoms persist 5 minutes after 1st dose call 911.   Quantity:  25 tablet   Refills:  1       torsemide 20 MG tablet   Commonly known as:  DEMADEX   Used for:  Acute on chronic congestive heart failure, unspecified congestive heart failure type (H)        Dose:  40 mg   Start taking on:  6/16/2018   Take 2 tablets (40 mg) by mouth daily   Quantity:  30 tablet   Refills:  1         CONTINUE these medicines which may have CHANGED, or have new prescriptions. If we are uncertain of the size of tablets/capsules you have at home, strength may be listed as something that might have changed.        Dose / Directions    hydrALAZINE 25 MG tablet   Commonly known as:  APRESOLINE   This may have changed:  how much to take   Used for:  Essential  hypertension, benign        Dose:  25 mg   Take 1 tablet (25 mg) by mouth 3 times daily   Quantity:  90 tablet   Refills:  1       Isosorbide Mononitrate  MG Tb24   This may have changed:    - medication strength  - how much to take  - These instructions start on 6/17/2018. If you are unsure what to do until then, ask your doctor or other care provider.   Used for:  NSTEMI (non-ST elevated myocardial infarction) (H)        Dose:  120 mg   Start taking on:  6/17/2018   Take 1 tablet (120 mg) by mouth daily   Quantity:  30 tablet   Refills:  1       levothyroxine 75 MCG tablet   Commonly known as:  SYNTHROID/LEVOTHROID   This may have changed:    - medication strength  - See the new instructions.   Used for:  Hypothyroidism, unspecified type        Dose:  75 mcg   Take 1 tablet (75 mcg) by mouth daily   Quantity:  30 tablet   Refills:  1         CONTINUE these medicines which have NOT CHANGED        Dose / Directions    allopurinol 100 MG tablet   Commonly known as:  ZYLOPRIM   Used for:  Chronic gout without tophus, unspecified cause, unspecified site        TAKE 2 TABLETS(200 MG) BY MOUTH DAILY   Quantity:  180 tablet   Refills:  3       aspirin 81 MG tablet        Dose:  81 mg   Take 1 tablet by mouth daily. with food   Refills:  0       blood glucose monitoring test strip   Commonly known as:  ACCU-CHEK COMPACT PLUS   Used for:  Type 2 diabetes mellitus with stage 3 chronic kidney disease, with long-term current use of insulin (H)        Check blood sugar 2-3 times a day   Quantity:  200 strip   Refills:  3       cetirizine 10 MG tablet   Commonly known as:  zyrTEC   Used for:  Atopic dermatitis, unspecified type, Itching        TAKE 1 TABLET(10 MG) BY MOUTH EVERY EVENING   Quantity:  30 tablet   Refills:  8       clopidogrel 75 MG tablet   Commonly known as:  PLAVIX   Used for:  CKD (chronic kidney disease) stage 3, GFR 30-59 ml/min, Cardiovascular disease        TAKE 1 TABLET(75 MG) BY MOUTH DAILY  "  Quantity:  90 tablet   Refills:  3       COD LIVER OIL PO        Dose:  1 capsule   Take 1 capsule by mouth daily   Refills:  0       diphenhydrAMINE-acetaminophen  MG tablet   Commonly known as:  TYLENOL PM        Dose:  1 tablet   Take 1 tablet by mouth nightly as needed for sleep   Refills:  0       insulin  UNIT/ML injection   Commonly known as:  HumuLIN N/NovoLIN N   Used for:  Type 2 diabetes mellitus with stage 3 chronic kidney disease, with long-term current use of insulin (H)        18 units at bedtime   Quantity:  3 Month   Refills:  3       insulin syringe-needle U-100 29G X 1/2\" 0.5 ML   Commonly known as:  BD insulin syringe   Used for:  Type 2 diabetes mellitus with stage 3 chronic kidney disease, with long-term current use of insulin (H)        Use one syringe 2 daily or as directed.   Quantity:  200 each   Refills:  4       metoprolol tartrate 50 MG tablet   Commonly known as:  LOPRESSOR   Used for:  NSTEMI (non-ST elevated myocardial infarction) (H)        Dose:  50 mg   Take 1 tablet (50 mg) by mouth 2 times daily   Quantity:  60 tablet   Refills:  0       pravastatin 20 MG tablet   Commonly known as:  PRAVACHOL   Used for:  Essential hypertension, benign, Cardiovascular disease   Notes to Patient:  Resume as before admission to hospital        Dose:  20 mg   Take 1 tablet (20 mg) by mouth once a week   Quantity:  30 tablet   Refills:  1       theophylline 400 MG 24 hr tablet   Commonly known as:  UNIPHYL   Used for:  Mild persistent asthma without complication        TAKE 1 TABLET BY MOUTH EVERY DAY   Quantity:  90 tablet   Refills:  3       triamcinolone 0.5 % cream   Commonly known as:  KENALOG   Used for:  Itching        Apply sparingly to affected area two  times daily as needed for rash.   Quantity:  45 g   Refills:  1       VITAMIN B 12 PO        Dose:  500 mcg   Take 500 mcg by mouth 2 times daily.   Refills:  0            Where to get your medicines      These medications " were sent to [email or web address] - [place], [place] - [address] AT [facility] [address], [facility] [address]     Phone:  [phone]     hydrALAZINE 25 MG tablet    Isosorbide Mononitrate  MG Tb24    levothyroxine 75 MCG tablet    nitroGLYcerin 0.4 MG sublingual tablet    torsemide 20 MG tablet                Protect others around you: Learn how to safely use, store and throw away your medicines at www.disposemymeds.org.             Medication List: This is a list of all your medications and when to take them. Check marks below indicate your daily home schedule. Keep this list as a reference.      Medications           Morning Afternoon Evening Bedtime As Needed    allopurinol 100 MG tablet   Commonly known as:  ZYLOPRIM   TAKE 2 TABLETS(200 MG) BY MOUTH DAILY   Last time this was given:  200 mg on 6/15/2018  8:51 AM                                   aspirin 81 MG tablet   Take 1 tablet by mouth daily. with food                                   blood glucose monitoring test strip   Commonly known as:  ACCU-CHEK COMPACT PLUS   Check blood sugar 2-3 times a day                                         cetirizine 10 MG tablet   Commonly known as:  zyrTEC   TAKE 1 TABLET(10 MG) BY MOUTH EVERY EVENING   Last time this was given:  10 mg on 6/14/2018  8:55 PM                                   clopidogrel 75 MG tablet   Commonly known as:  PLAVIX   TAKE 1 TABLET(75 MG) BY MOUTH DAILY   Last time this was given:  75 mg on 6/15/2018  8:51 AM                                   COD LIVER OIL PO   Take 1 capsule by mouth daily                                   diphenhydrAMINE-acetaminophen  MG tablet   Commonly known as:  TYLENOL PM   Take 1 tablet by mouth nightly as needed for sleep                                   hydrALAZINE 25 MG tablet   Commonly known as:  APRESOLINE   Take 1 tablet (25 mg) by mouth 3 times daily   Last time this was given:  25 mg on 6/15/2018   "9:55 AM                                         insulin  UNIT/ML injection   Commonly known as:  HumuLIN N/NovoLIN N   18 units at bedtime   Last time this was given:  10 Units on 6/14/2018  8:54 PM                                   insulin syringe-needle U-100 29G X 1/2\" 0.5 ML   Commonly known as:  BD insulin syringe   Use one syringe 2 daily or as directed.                                Isosorbide Mononitrate  MG Tb24   Take 1 tablet (120 mg) by mouth daily   Start taking on:  6/17/2018   Last time this was given:  120 mg on 6/15/2018  8:51 AM                                   levothyroxine 75 MCG tablet   Commonly known as:  SYNTHROID/LEVOTHROID   Take 1 tablet (75 mcg) by mouth daily   Last time this was given:  75 mcg on 6/15/2018  8:51 AM                                   metoprolol tartrate 50 MG tablet   Commonly known as:  LOPRESSOR   Take 1 tablet (50 mg) by mouth 2 times daily   Last time this was given:  50 mg on 6/15/2018  8:51 AM                                      nitroGLYcerin 0.4 MG sublingual tablet   Commonly known as:  NITROSTAT   For chest pain place 1 tablet under the tongue every 5 minutes for 3 doses. If symptoms persist 5 minutes after 1st dose call 911.   Last time this was given:  0.4 mg on 6/12/2018 11:14 PM                                   pravastatin 20 MG tablet   Commonly known as:  PRAVACHOL   Take 1 tablet (20 mg) by mouth once a week   Notes to Patient:  Resume as before admission to hospital                                theophylline 400 MG 24 hr tablet   Commonly known as:  UNIPHYL   TAKE 1 TABLET BY MOUTH EVERY DAY   Last time this was given:  400 mg on 6/15/2018  8:51 AM                                   torsemide 20 MG tablet   Commonly known as:  DEMADEX   Take 2 tablets (40 mg) by mouth daily   Start taking on:  6/16/2018   Last time this was given:  40 mg on 6/15/2018  8:51 AM                                   triamcinolone 0.5 % cream   Commonly known as: "  KENALOG   Apply sparingly to affected area two  times daily as needed for rash.                                   VITAMIN B 12 PO   Take 500 mcg by mouth 2 times daily.

## 2018-06-13 PROBLEM — I50.9 CHF (CONGESTIVE HEART FAILURE) (H): Status: ACTIVE | Noted: 2018-06-13

## 2018-06-13 LAB
ANION GAP SERPL CALCULATED.3IONS-SCNC: 8 MMOL/L (ref 3–14)
BUN SERPL-MCNC: 48 MG/DL (ref 7–30)
CALCIUM SERPL-MCNC: 8.8 MG/DL (ref 8.5–10.1)
CHLORIDE SERPL-SCNC: 109 MMOL/L (ref 94–109)
CO2 SERPL-SCNC: 24 MMOL/L (ref 20–32)
CREAT SERPL-MCNC: 2.8 MG/DL (ref 0.66–1.25)
GFR SERPL CREATININE-BSD FRML MDRD: 22 ML/MIN/1.7M2
GLUCOSE BLDC GLUCOMTR-MCNC: 101 MG/DL (ref 70–99)
GLUCOSE BLDC GLUCOMTR-MCNC: 116 MG/DL (ref 70–99)
GLUCOSE BLDC GLUCOMTR-MCNC: 119 MG/DL (ref 70–99)
GLUCOSE BLDC GLUCOMTR-MCNC: 142 MG/DL (ref 70–99)
GLUCOSE BLDC GLUCOMTR-MCNC: 179 MG/DL (ref 70–99)
GLUCOSE BLDC GLUCOMTR-MCNC: 79 MG/DL (ref 70–99)
GLUCOSE SERPL-MCNC: 165 MG/DL (ref 70–99)
HBA1C MFR BLD: 5.9 % (ref 0–5.6)
INTERPRETATION ECG - MUSE: NORMAL
LMWH PPP CHRO-ACNC: 0.29 IU/ML
LMWH PPP CHRO-ACNC: 0.35 IU/ML
POTASSIUM SERPL-SCNC: 4.5 MMOL/L (ref 3.4–5.3)
SODIUM SERPL-SCNC: 141 MMOL/L (ref 133–144)
TROPONIN I SERPL-MCNC: 1.14 UG/L (ref 0–0.04)
TROPONIN I SERPL-MCNC: 1.72 UG/L (ref 0–0.04)
TROPONIN I SERPL-MCNC: 1.86 UG/L (ref 0–0.04)

## 2018-06-13 PROCEDURE — 99207 ZZC APP CREDIT; MD BILLING SHARED VISIT: CPT | Performed by: INTERNAL MEDICINE

## 2018-06-13 PROCEDURE — 99223 1ST HOSP IP/OBS HIGH 75: CPT | Mod: AI | Performed by: INTERNAL MEDICINE

## 2018-06-13 PROCEDURE — 80048 BASIC METABOLIC PNL TOTAL CA: CPT | Performed by: INTERNAL MEDICINE

## 2018-06-13 PROCEDURE — 36415 COLL VENOUS BLD VENIPUNCTURE: CPT | Performed by: INTERNAL MEDICINE

## 2018-06-13 PROCEDURE — 25000128 H RX IP 250 OP 636: Performed by: INTERNAL MEDICINE

## 2018-06-13 PROCEDURE — 25000132 ZZH RX MED GY IP 250 OP 250 PS 637: Mod: GY | Performed by: NURSE PRACTITIONER

## 2018-06-13 PROCEDURE — 84484 ASSAY OF TROPONIN QUANT: CPT | Performed by: INTERNAL MEDICINE

## 2018-06-13 PROCEDURE — 83036 HEMOGLOBIN GLYCOSYLATED A1C: CPT | Performed by: INTERNAL MEDICINE

## 2018-06-13 PROCEDURE — 00000146 ZZHCL STATISTIC GLUCOSE BY METER IP

## 2018-06-13 PROCEDURE — 25000132 ZZH RX MED GY IP 250 OP 250 PS 637: Mod: GY | Performed by: INTERNAL MEDICINE

## 2018-06-13 PROCEDURE — A9270 NON-COVERED ITEM OR SERVICE: HCPCS | Mod: GY | Performed by: NURSE PRACTITIONER

## 2018-06-13 PROCEDURE — A9270 NON-COVERED ITEM OR SERVICE: HCPCS | Mod: GY | Performed by: INTERNAL MEDICINE

## 2018-06-13 PROCEDURE — 25000131 ZZH RX MED GY IP 250 OP 636 PS 637: Mod: GY | Performed by: INTERNAL MEDICINE

## 2018-06-13 PROCEDURE — 21000001 ZZH R&B HEART CARE

## 2018-06-13 PROCEDURE — 85520 HEPARIN ASSAY: CPT | Performed by: INTERNAL MEDICINE

## 2018-06-13 PROCEDURE — 99223 1ST HOSP IP/OBS HIGH 75: CPT | Performed by: INTERNAL MEDICINE

## 2018-06-13 RX ORDER — NICOTINE POLACRILEX 4 MG
15-30 LOZENGE BUCCAL
Status: DISCONTINUED | OUTPATIENT
Start: 2018-06-13 | End: 2018-06-15 | Stop reason: HOSPADM

## 2018-06-13 RX ORDER — ASPIRIN 81 MG/1
81 TABLET ORAL DAILY
Status: DISCONTINUED | OUTPATIENT
Start: 2018-06-13 | End: 2018-06-13

## 2018-06-13 RX ORDER — METOPROLOL TARTRATE 50 MG
50 TABLET ORAL 2 TIMES DAILY
Status: DISCONTINUED | OUTPATIENT
Start: 2018-06-13 | End: 2018-06-15 | Stop reason: HOSPADM

## 2018-06-13 RX ORDER — NALOXONE HYDROCHLORIDE 0.4 MG/ML
.1-.4 INJECTION, SOLUTION INTRAMUSCULAR; INTRAVENOUS; SUBCUTANEOUS
Status: DISCONTINUED | OUTPATIENT
Start: 2018-06-13 | End: 2018-06-15 | Stop reason: HOSPADM

## 2018-06-13 RX ORDER — LORAZEPAM 2 MG/ML
0.5 INJECTION INTRAMUSCULAR
Status: DISCONTINUED | OUTPATIENT
Start: 2018-06-14 | End: 2018-06-15 | Stop reason: HOSPADM

## 2018-06-13 RX ORDER — CETIRIZINE HYDROCHLORIDE 10 MG/1
10 TABLET ORAL AT BEDTIME
Status: DISCONTINUED | OUTPATIENT
Start: 2018-06-13 | End: 2018-06-15 | Stop reason: HOSPADM

## 2018-06-13 RX ORDER — POTASSIUM CHLORIDE 1500 MG/1
20 TABLET, EXTENDED RELEASE ORAL
Status: DISCONTINUED | OUTPATIENT
Start: 2018-06-14 | End: 2018-06-15 | Stop reason: HOSPADM

## 2018-06-13 RX ORDER — LABETALOL HYDROCHLORIDE 5 MG/ML
10 INJECTION, SOLUTION INTRAVENOUS
Status: DISCONTINUED | OUTPATIENT
Start: 2018-06-13 | End: 2018-06-15 | Stop reason: HOSPADM

## 2018-06-13 RX ORDER — SODIUM CHLORIDE 9 MG/ML
INJECTION, SOLUTION INTRAVENOUS CONTINUOUS
Status: DISCONTINUED | OUTPATIENT
Start: 2018-06-14 | End: 2018-06-14

## 2018-06-13 RX ORDER — PROCHLORPERAZINE MALEATE 5 MG
5 TABLET ORAL EVERY 6 HOURS PRN
Status: DISCONTINUED | OUTPATIENT
Start: 2018-06-13 | End: 2018-06-15 | Stop reason: HOSPADM

## 2018-06-13 RX ORDER — ONDANSETRON 4 MG/1
4 TABLET, ORALLY DISINTEGRATING ORAL EVERY 6 HOURS PRN
Status: DISCONTINUED | OUTPATIENT
Start: 2018-06-13 | End: 2018-06-15 | Stop reason: HOSPADM

## 2018-06-13 RX ORDER — LEVOTHYROXINE SODIUM 50 UG/1
50 TABLET ORAL DAILY
Status: DISCONTINUED | OUTPATIENT
Start: 2018-06-13 | End: 2018-06-14

## 2018-06-13 RX ORDER — AMOXICILLIN 250 MG
1 CAPSULE ORAL 2 TIMES DAILY PRN
Status: DISCONTINUED | OUTPATIENT
Start: 2018-06-13 | End: 2018-06-15 | Stop reason: HOSPADM

## 2018-06-13 RX ORDER — LIDOCAINE 40 MG/G
CREAM TOPICAL
Status: DISCONTINUED | OUTPATIENT
Start: 2018-06-14 | End: 2018-06-15 | Stop reason: HOSPADM

## 2018-06-13 RX ORDER — AMOXICILLIN 250 MG
2 CAPSULE ORAL 2 TIMES DAILY PRN
Status: DISCONTINUED | OUTPATIENT
Start: 2018-06-13 | End: 2018-06-15 | Stop reason: HOSPADM

## 2018-06-13 RX ORDER — THEOPHYLLINE 400 MG/1
400 TABLET, EXTENDED RELEASE ORAL DAILY
Status: DISCONTINUED | OUTPATIENT
Start: 2018-06-13 | End: 2018-06-15 | Stop reason: HOSPADM

## 2018-06-13 RX ORDER — ALLOPURINOL 100 MG/1
200 TABLET ORAL DAILY
Status: DISCONTINUED | OUTPATIENT
Start: 2018-06-13 | End: 2018-06-15 | Stop reason: HOSPADM

## 2018-06-13 RX ORDER — ACETAMINOPHEN 325 MG/1
650 TABLET ORAL EVERY 4 HOURS PRN
Status: DISCONTINUED | OUTPATIENT
Start: 2018-06-13 | End: 2018-06-15 | Stop reason: HOSPADM

## 2018-06-13 RX ORDER — DEXTROSE MONOHYDRATE 25 G/50ML
25-50 INJECTION, SOLUTION INTRAVENOUS
Status: DISCONTINUED | OUTPATIENT
Start: 2018-06-13 | End: 2018-06-15 | Stop reason: HOSPADM

## 2018-06-13 RX ORDER — BISACODYL 10 MG
10 SUPPOSITORY, RECTAL RECTAL DAILY PRN
Status: DISCONTINUED | OUTPATIENT
Start: 2018-06-13 | End: 2018-06-15 | Stop reason: HOSPADM

## 2018-06-13 RX ORDER — ONDANSETRON 2 MG/ML
4 INJECTION INTRAMUSCULAR; INTRAVENOUS EVERY 6 HOURS PRN
Status: DISCONTINUED | OUTPATIENT
Start: 2018-06-13 | End: 2018-06-15 | Stop reason: HOSPADM

## 2018-06-13 RX ORDER — TORSEMIDE 20 MG/1
20 TABLET ORAL DAILY
Status: DISCONTINUED | OUTPATIENT
Start: 2018-06-13 | End: 2018-06-14

## 2018-06-13 RX ORDER — HYDRALAZINE HYDROCHLORIDE 50 MG/1
100 TABLET, FILM COATED ORAL 3 TIMES DAILY
Status: DISCONTINUED | OUTPATIENT
Start: 2018-06-13 | End: 2018-06-14

## 2018-06-13 RX ORDER — AMOXICILLIN 250 MG
2 CAPSULE ORAL 2 TIMES DAILY
Status: DISCONTINUED | OUTPATIENT
Start: 2018-06-13 | End: 2018-06-15 | Stop reason: HOSPADM

## 2018-06-13 RX ORDER — HYDRALAZINE HYDROCHLORIDE 20 MG/ML
10 INJECTION INTRAMUSCULAR; INTRAVENOUS EVERY 4 HOURS PRN
Status: DISCONTINUED | OUTPATIENT
Start: 2018-06-13 | End: 2018-06-15 | Stop reason: HOSPADM

## 2018-06-13 RX ORDER — LORAZEPAM 0.5 MG/1
0.5 TABLET ORAL
Status: DISCONTINUED | OUTPATIENT
Start: 2018-06-14 | End: 2018-06-15 | Stop reason: HOSPADM

## 2018-06-13 RX ORDER — POLYETHYLENE GLYCOL 3350 17 G/17G
17 POWDER, FOR SOLUTION ORAL DAILY PRN
Status: DISCONTINUED | OUTPATIENT
Start: 2018-06-13 | End: 2018-06-15 | Stop reason: HOSPADM

## 2018-06-13 RX ORDER — AMOXICILLIN 250 MG
1 CAPSULE ORAL 2 TIMES DAILY
Status: DISCONTINUED | OUTPATIENT
Start: 2018-06-13 | End: 2018-06-15 | Stop reason: HOSPADM

## 2018-06-13 RX ORDER — CLOPIDOGREL BISULFATE 75 MG/1
75 TABLET ORAL DAILY
Status: DISCONTINUED | OUTPATIENT
Start: 2018-06-13 | End: 2018-06-15 | Stop reason: HOSPADM

## 2018-06-13 RX ORDER — PROCHLORPERAZINE 25 MG
12.5 SUPPOSITORY, RECTAL RECTAL EVERY 12 HOURS PRN
Status: DISCONTINUED | OUTPATIENT
Start: 2018-06-13 | End: 2018-06-15 | Stop reason: HOSPADM

## 2018-06-13 RX ADMIN — THEOPHYLLINE 400 MG: 400 TABLET, EXTENDED RELEASE ORAL at 08:25

## 2018-06-13 RX ADMIN — CLOPIDOGREL 75 MG: 75 TABLET, FILM COATED ORAL at 08:25

## 2018-06-13 RX ADMIN — METOPROLOL TARTRATE 50 MG: 50 TABLET ORAL at 08:25

## 2018-06-13 RX ADMIN — Medication 5000 UNITS: at 04:09

## 2018-06-13 RX ADMIN — ACETAMINOPHEN 650 MG: 325 TABLET ORAL at 02:44

## 2018-06-13 RX ADMIN — METOPROLOL TARTRATE 50 MG: 50 TABLET ORAL at 20:17

## 2018-06-13 RX ADMIN — CETIRIZINE HYDROCHLORIDE 10 MG: 10 TABLET, FILM COATED ORAL at 21:37

## 2018-06-13 RX ADMIN — SENNOSIDES AND DOCUSATE SODIUM 1 TABLET: 8.6; 5 TABLET ORAL at 20:17

## 2018-06-13 RX ADMIN — Medication 75 MG: at 08:25

## 2018-06-13 RX ADMIN — ASPIRIN 81 MG: 81 TABLET, COATED ORAL at 08:25

## 2018-06-13 RX ADMIN — HYDRALAZINE HYDROCHLORIDE 100 MG: 50 TABLET ORAL at 08:26

## 2018-06-13 RX ADMIN — HYDRALAZINE HYDROCHLORIDE 100 MG: 50 TABLET ORAL at 15:30

## 2018-06-13 RX ADMIN — ALLOPURINOL 200 MG: 100 TABLET ORAL at 08:26

## 2018-06-13 RX ADMIN — INSULIN HUMAN 10 UNITS: 100 INJECTION, SUSPENSION SUBCUTANEOUS at 21:38

## 2018-06-13 RX ADMIN — TORSEMIDE 20 MG: 20 TABLET ORAL at 15:30

## 2018-06-13 RX ADMIN — LEVOTHYROXINE SODIUM 50 MCG: 50 TABLET ORAL at 08:25

## 2018-06-13 RX ADMIN — HYDRALAZINE HYDROCHLORIDE 100 MG: 50 TABLET ORAL at 21:37

## 2018-06-13 RX ADMIN — Medication 1 LOZENGE: at 20:17

## 2018-06-13 RX ADMIN — SENNOSIDES AND DOCUSATE SODIUM 1 TABLET: 8.6; 5 TABLET ORAL at 08:25

## 2018-06-13 RX ADMIN — HEPARIN SODIUM 1000 UNITS/HR: 10000 INJECTION, SOLUTION INTRAVENOUS at 04:09

## 2018-06-13 NOTE — CONSULTS
"REASON FOR ASSESSMENT:  CHF Consult for 2 gm NA Diet Education    NUTRITION HISTORY:  Visited with pt this morning    Living situation:   Pt lives with his dtr    Grocery shopping:  Pt and his dtr will grocery shop together  Pt doesn't really read food labels, but dtr will    Meal preparation:  Pt makes breakfast and lunch, dtr makes dinner  Pt does not add salt  Notes that his dtr is aware that he needs to limit Na    Breakfast:  Frozen EGGO-type waffle or pancake with sugar-free syrup  Glass of cran/pom juice    ** 2 waffles = ~360 mg Na      3 pancakes = ~590 mg Na    Lunch:  PB or cheese sandwich, stacia cracker, glass of juice    Dinner:   Dtr usually cooks meat and plain rice with margarine  \"My dtr is gluten-free, so we eat a lot of rice\"    Not much of a snacker - might have a few orange bottle caps or a few puffed popcorn    \"My one bad habit is orange chicken\"  Pt states he knows that he needs to limit Na and his dtr tells him that he shouldn't be eating the orange chicken - \"but it is my favorite food\"  He buys the [employer] Orange Chicken (850 mg Na per meal)  Pt eats this ~2-3 times per week      CURRENT DIET:  2400 mg Na, LSF    NUTRITION DIAGNOSIS:  No nutrition diagnosis at this time    INTERVENTIONS:    Nutrition Prescription:  2gm na    Implementation:    Assessed learning needs, learning preferences, and willingness to learn    Nutrition Education (Content):  a) Provided handouts:  1) Tips for Low Na Diet  2) Low Na Foods/Drinks  3) Low Na Recipe Booklet  b) Discussed rational for limiting Na for CHF and stressed importance of following 2 gm Na guidelines   c) Encouraged patient to keep a daily food record - told pt that he has to \"budget\" his Na and keep it under 2000 mg per day.  He is not interested in giving up orange chicken, so told him he needs to balance the rest of the day's intake.    Nutrition Education (Application):  a) Discussed current eating habits and recommended alternative " food choices    Anticipated good compliance    Diet Education - refer to Education Flowsheet    Goals:    Patient verbalizes understanding of diet     All of the above goals met during the education session    Follow Up:    Provided RD contact information for future questions.    Recommend Out-Patient Nutrition Referral, if further diet instructions are needed.

## 2018-06-13 NOTE — PROGRESS NOTES
RECEIVING UNIT ED HANDOFF REVIEW    ED Nurse Handoff Report was reviewed by: Laverne Mcnamara on June 13, 2018 at 12:28 AM

## 2018-06-13 NOTE — H&P
[facility]    History and Physical  Hospitalist       Date of Admission:  6/12/2018    Assessment & Plan   Oscar Terrazas is a 84 year old male who presents with orthopnea, dyspnea, central chest discomfort     Acute diastolic heart failure exacerbation secondary to hypertensive urgency versus malignant hypertension: Suspect patient with flash pulmonary edema/acute diastolic heart failure exacerbation in the setting of significant salt load via diet earlier today (orange chicken from Chinese restaurant for both lunch and dinner today).  Known grade 2 diastolic heart failure.  At home, with onset of chest discomfort and increased shortness of breath, patient noted that his systolic blood pressure was 212.  Baseline blood pressure is typically in the 140-160 range.  Approximately 1 week ago follows up with cardiology clinic and hydralazine was increased from 50 mg 3 times daily to 75 mg 3 times daily.  Symptom improvement following Lasix administration in the emergency department and improvement in hypertension.  -Imdur increased to 75 mg daily from 60 daily  -Hydralazine increased from 75 mg 3 times daily to 100 milligrams 3 times daily  -IV hydralazine and labetalol available as needed for systolic blood pressure greater than 180  -Received 1 dose IV Lasix in the emergency department.  Repeat creatinine in a.m.  Patient historically had been on Lasix, though this was discontinued for chronic renal failure.  -Last TTE in March 2018 with normal ejection fraction, grade 2 diastolic dysfunction.  No repeat echocardiogram ordered at this time  -Core clinic referral.  Stressed importance of daily weights to patient, though he states that his weights have not changed  -Stressed importance of low-salt diet to patient.  Cardiac diet currently.    Non-ST elevation myocardial infarction: At this time, uncertain if patient had transient hypoxia or tachycardia resulting in troponin elevation.  Has a  persistently elevated troponin in the setting of chronic renal failure, though also with known coronary artery disease and what appears to be in acute diastolic heart failure exacerbation.  Coronary artery disease: Last coronary artery in the angiography appears to have taken place in 2012 with patent SUAREZ to LAD, patent left main and LAD stenting, restenosis and probable thrombosis of circumflex vein graft to stent.  KANU placed to proximal left circumflex vein graft at that time.  More historically with coronary artery bypass grafting in 1996, 2007 multivessel stenting.  -Heparin drip initiated  -Cardiology consulted  -Defer repeat TTE to cardiology.  Last study March 2018 with moderate hypokinesis of basal inferior and inferior lateral walls as well as diastolic dysfunction and proximal septal thickening.  -Completing troponin trend  -Continue aspirin 81 mg daily as well as Plavix 75 milligrams daily.  Maintained on lifelong Plavix in the setting of NSTEMI following discontinuation for dental procedure in 2012.  -As an outpatient, continue pravastatin weekly.  Otherwise of history of statin myopathy.  -Imdur increased to 75 mg daily from 60 daily  -Hydralazine increased from 75 mg 3 times daily to 100 mill grams 3 times daily  -Continue metoprolol 50 mg twice daily    Type 2 diabetes:  -Hemoglobin A1c pending  -Medium dose sliding scale insulin with 1 unit per 20 g carbohydrate prandial insulin  -Humulin 10 units at bedtime.  This is a dose reduction from prior to admission 18 units at bedtime.    # Pain Assessment:  Current Pain Score 6/13/2018   Patient currently in pain? denies   Pain score (0-10) 0   Pain location -   Pain descriptors -   Oscar ogden pain level was assessed and he currently denies pain.        DVT Prophylaxis: Heparin drip at this time    Code Status: Full Code.  Confirmed with the patient on admission    Disposition: Expected discharge in 1-2 days pending cardiology evaluation, improved blood  "pressure controlled.    [facility]    Primary Care Physician   Jorge Hillman    Chief Complaint   Shortness of breath, central chest pressure    History is obtained from the patient, chart review, discussion with Dr. Scott in the emergency department.    History of Present Illness   Oscar Terrazas is a 84 year old male who presents with chest pressure, dyspnea at rest, hypertensive into the 200 systolic range.    Patient with a known history of coronary artery disease status post two-vessel coronary bypass grafting in , multiple drug-eluting stent placement since with most recent angiogram in .  Patient with chronic renal failure with creatinine in the 3 range.  Does not tolerate ACE inhibitor and had recently been taken off of diuretic therapy in the past 3 months given chronic renal failure with increasing creatinine.  Discussion regarding dialysis has been had in the past, and patient declines as his wife  on dialysis.    Patient has close follow-up with his cardiologist, was seen in early May as well as early .  Most recently was noted to have blood pressures in the 140s range and hydralazine was increased to 75 3 times daily from 50 3 times daily.  Patient denies any orthostatic symptoms.    Patient has a history of stable angina.  States that he does not take any nitroglycerin, though it appears that he is on 60 mg of Imdur daily.  Patient does not know the names of his medications necessarily, I believe he describes this as \"the white pill.\"    patient describes the onset of his symptoms late this afternoon as Feeling unwell, developing a central chest pressure, but most notably, feeling as though he was having trouble breathing.  Patient states that He checked his blood pressure when he was feeling generally unwell and noted his blood pressure to be elevated into the 200 systolic range.  Subsequently noted he was feeling more short of breath and asked his daughter to transport him " "to the emergency department.  Patient states that he has shortness of breath intermittently which he attributes to chronic sinus congestion.  Occasionally, patient does describe orthopnea/PND at night and find himself getting out of bed to try to catch his breath.  Symptoms resolving after a few minutes of standing or sitting.  Patient states that his weight has been stable.  Has chronic lower extremity edema which he also believes is stable.  As above, historically on Lasix, though this was discontinued given chronic renal failure.  Patient had not taken his evening antihypertensive medications prior to arrival in the emergency department, though significant systolic blood pressure elevation at home occurred prior to when he typically would be scheduled to take his medications.    States that his typical blood pressures in the 140-160 range.  This is consistent with recent care visits.  Has not noted palpitations. chest discomfort is now resolved by arrival to floor with resolution following improved control of blood pressure.  Chest pressure was a secondary symptom as compared to patient's shortness of breath.  Shortness of breath is also improved.  Patient believes that IV Lasix administered in the emergency department was most palliative.    Other than recent increase in hydralazine, patient notes no other significant events.  Was not exerting himself significantly this afternoon, and bowls 3 times weekly without any difficulty or chest discomfort other than his chronic hip pain associated with arthritis.  In further discussion with patient, however, it appears that he has had a significant salt load with his diet.  States that he \"love[s] orange chicken,\" and had Chinese food/shortness chicken for both lunch and dinner today.  Discussed patient's risk of diastolic heart failure exacerbation in the setting of known diastolic dysfunction, significantly impaired renal function, chronic edema,  Chronic " hypertension.  I suspect that patient's abrupt hypertension was actually secondary to an acute salt load related to dietary indiscretion.    Initial troponin noted to be elevated in the emergency department, though lower than historical values in the setting of renal failure.  As above, unfortunately,  subsequent troponin elevated to greater than 1.    Past Medical History    I have reviewed this patient's medical history and updated it with pertinent information if needed.   Past Medical History:   Diagnosis Date     Acute myocardial infarction, unspecified site, episode of care unspecified      Allergic rhinitis, cause unspecified      Anemia 3/6/2014     CAD (coronary artery disease)     1996 CABG - LIMA to LAD, SVG to OM, 2007 Cath - KANU to Left main and ostial/prox LAD, 2012 Cath - 80-90% stenosis SVG to OM - KANU placed, EF 50%     CKD (chronic kidney disease) stage 3, GFR 30-59 ml/min 3/9/2014     CTS (carpal tunnel syndrome)     bilateral     Degeneration of cervical intervertebral disc      Diaphragmatic hernia without mention of obstruction or gangrene      Esophageal reflux      Essential hypertension, benign      Hyperlipidemia LDL goal <70      Hypothyroidism      Hypothyroidism, unspecified hypothyroidism type 1/14/2016     IDIOPATHIC CYCLIC EDEMA      Mild persistent asthma      Osteoarthrosis, unspecified whether generalized or localized, unspecified site      Pneumonia, organism unspecified(486)      Proteinuria 5/17/2014     PVD (peripheral vascular disease) (H)      Sensorineural hearing loss, unspecified      Subarachnoid bleed (H)      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 disease (goal A1C<8) 10/17/2015     Unspecified disorder resulting from impaired renal function      Unspecified hereditary and idiopathic peripheral neuropathy      Venous insufficiency        Past Surgical History   I have reviewed this patient's surgical history and updated it with pertinent information if  "needed.  Past Surgical History:   Procedure Laterality Date     C NONSPECIFIC PROCEDURE      appendectomy     C NONSPECIFIC PROCEDURE      hemorrhoids     C NONSPECIFIC PROCEDURE      cervical strain     C NONSPECIFIC PROCEDURE      rotator cuff surgery, right shoulder     C NONSPECIFIC PROCEDURE  2008    iol os     CORONARY ARTERY BYPASS      LIMA to LAD, SVG to OM     HEART CATH STENT COR W/WO PTCA      lad, left main cor artery stents/drug eluting     HEART CATH STENT COR W/WO PTCA      80-90% stenosis SVG to OM - KANU placed, EF 50%     NONSPECIFIC PROCEDURE      iol od       Prior to Admission Medications   Prior to Admission Medications   Prescriptions Last Dose Informant Patient Reported? Taking?   COD LIVER OIL PO  Self Yes No   Sig: Take 1 capsule by mouth daily    Cyanocobalamin (VITAMIN B 12 PO)  Self Yes No   Sig: Take 500 mcg by mouth 2 times daily.   allopurinol (ZYLOPRIM) 100 MG tablet  Self No No   Sig: TAKE 2 TABLETS(200 MG) BY MOUTH DAILY   aspirin 81 MG tablet  Self Yes No   Sig: Take 1 tablet by mouth daily. with food   blood glucose monitoring (ACCU-CHEK COMPACT PLUS) test strip  Self No No   Sig: Check blood sugar 2-3 times a day   cetirizine (ZYRTEC) 10 MG tablet  Self No No   Sig: TAKE 1 TABLET(10 MG) BY MOUTH EVERY EVENING   clopidogrel (PLAVIX) 75 MG tablet  Self No No   Sig: TAKE 1 TABLET(75 MG) BY MOUTH DAILY   diphenhydrAMINE-acetaminophen (TYLENOL PM)  MG tablet   Yes No   Sig: Take 1 tablet by mouth nightly as needed for sleep   hydrALAZINE (APRESOLINE) 25 MG tablet   Yes No   Sig: Take 75 mg by mouth 3 times daily   insulin NPH (HUMULIN N, NOVOLIN N) 100 UNIT/ML VIAL  Self No No   Si units at bedtime   insulin syringe-needle U-100 (BD INSULIN SYRINGE) 29G X 1/2\" 0.5 ML  Self No No   Sig: Use one syringe 2 daily or as directed.   isosorbide mononitrate (IMDUR) 60 MG 24 hr tablet   No No   Sig: Take 1 tablet (60 mg) by mouth daily   levothyroxine " (SYNTHROID/LEVOTHROID) 50 MCG tablet   No No   Sig: TAKE 1 TABLET BY MOUTH DAILY   metoprolol tartrate (LOPRESSOR) 50 MG tablet   No No   Sig: Take 1 tablet (50 mg) by mouth 2 times daily   pravastatin (PRAVACHOL) 20 MG tablet   Yes No   Sig: Take 1 tablet (20 mg) by mouth once a week   theophylline (UNIPHYL) 400 MG 24 hr tablet  Self No No   Sig: TAKE 1 TABLET BY MOUTH EVERY DAY   triamcinolone (KENALOG) 0.5 % cream  Self No No   Sig: Apply sparingly to affected area two  times daily as needed for rash.      Facility-Administered Medications: None     Allergies   Allergies   Allergen Reactions     Advair [Fluticasone-Salmeterol]      cough;  insomnia, nightmares     Amlodipine Besylate      edema       Azithromycin GI Disturbance     Clarithromycin      gi     Hydrochlorothiazide      hctz + lisinopril-->inc creat, k+     Lisinopril      lisinopril + hctz --> inc creat, k+     Moxifloxacin Hydrochloride      clostridium diffile colitis     Penicillins      gen swelling     Pravastatin Cramps     Muscle cramps/spasm.  Stopped 2017     Vioxx      edema       Social History   I have reviewed this patient's social history and updated it with pertinent information if needed. Oscar Terrazas  reports that he quit smoking about 52 years ago. His smoking use included Cigarettes. He started smoking about 66 years ago. He has a 14.00 pack-year smoking history. He has never used smokeless tobacco. He reports that he does not drink alcohol or use illicit drugs.      Family History   I have reviewed this patient's family history and updated it with pertinent information if needed.   Family History   Problem Relation Age of Onset     Lung Cancer Daughter      Family History Negative Mother      broken hip     Jaundice Father      Alcohol/Drug Father      Ovarian Cancer Daughter      Family History Negative Daughter      Family History Negative Son        Review of Systems   The 10 point Review of Systems is negative other than  noted in the HPI or here.   No fevers or chills    Physical Exam   Temp: 98  F (36.7  C) Temp src: Oral BP: 155/69 Pulse: 85 Heart Rate: 59 Resp: 18 SpO2: 97 % O2 Device: Nasal cannula Oxygen Delivery: 4 LPM  Vital Signs with Ranges  Temp:  [98  F (36.7  C)-98.4  F (36.9  C)] 98  F (36.7  C)  Pulse:  [85] 85  Heart Rate:  [56-90] 59  Resp:  [16-24] 18  BP: (155-212)/() 155/69  SpO2:  [93 %-99 %] 97 %  205 lbs 3.2 oz    Constitutional: no acute distress, alert, conversant, Robust appearing 84-year-old male  Eyes: no scleral icterus or injection  HEENT: moist mucous membranes  Respiratory: breath sounds clear bilaterally to auscultation, no wheezes, no crackles.  I believe this is an improvement from auscultation noted in the emergency department.  Cardiovascular: regular rate and rhythm, no murmur.  Heart sounds are somewhat distant.  GI: abdomen soft, non-tender, normoactive bowel sounds, no masses, obese  Lymph/Hematologic 2+ pitting bilateral lower extremity edema to the knee.  Patient states that this is stable and unchanged  Genitourinary: not examined  Skin: Chronic hyperpigmentation of bilateral lower extremities associated with venous stasis.  Neurologic: mental status grossly intact, no focal deficits, alert  Psychiatric: normal affect, very pleasant    Data   Data reviewed today:  I personally reviewed the chest x-ray image(s) showing Vascular prominence.    Recent Labs  Lab 06/13/18  0200 06/12/18  2203   WBC  --  8.4   HGB  --  10.5*   MCV  --  93   PLT  --  184   NA  --  142   POTASSIUM  --  4.4   CHLORIDE  --  111*   CO2  --  24   BUN  --  48*   CR  --  2.95*   ANIONGAP  --  7   LEV  --  9.0   GLC  --  250*   ALBUMIN  --  4.0   PROTTOTAL  --  7.7   BILITOTAL  --  0.3   ALKPHOS  --  117   ALT  --  20   AST  --  9   TROPI 1.143* 0.372*       Recent Results (from the past 24 hour(s))   XR Chest 2 Views    Narrative    CHEST 2 VIEWS  6/12/2018 11:04 PM     HISTORY: Shortness of breath.    COMPARISON:  3/13/2018.    FINDINGS: Hyperinflated lungs. Mildly increased interstitial opacities  throughout bilateral mid and lower lungs. Cardiomegaly. Prior median  sternotomy.      Impression    IMPRESSION:  1. Mildly increased interstitial opacities within both lungs. This  could relate to interstitial pulmonary edema or an atypical infection.  2. No other findings suspicious for active cardiopulmonary disease.  3. Hyperinflated lungs, suggestive of chronic obstructive pulmonary  disease.    REBEKAH BRIGGS MD

## 2018-06-13 NOTE — PROGRESS NOTES
The patient was seen and examined.  He is resting comfortably in bed.  Awaiting recommendations from Cardiology.

## 2018-06-13 NOTE — CONSULTS
"[facility]    RENAL CONSULTATION NOTE    REFERRING MD:  Preethi Smiley CNP    REASON FOR CONSULTATION:  CKD, NSTEMI, need for angio?    DATE OF CONSULTATION: 06/13/18    SHORTHAND KEY FOR MY NOTES:  c = with, s = without, p = after, a = before, x = except, asx = asymptomatic, tx = transplant or treatment, sx = symptoms or symptomatic, cx = canceled or culture, rxn = reaction, yday = yesterday, nl = normal, abx = antibiotics, fxn = function, dx = diagnosis, dz = disease, m/h = melena/hematochezia, c/d/l/ha = cramping/dizziness/lightheadedness/headache, d/c = discharge or diarrhea/constipation, f/c/n/v = fevers/chills/nausea/vomiting, cp/sob = chest pain/shortness of breath.    HPI: Oscar Terrazas is a 84 year old male c CKD IV 2 multifactorial etiology (DM2/HTN/cardiorenal), CAD who was admitted on 6/12/2018 c cp/sob and found to have an NSTEMI.     Pt has an extensive cardiac hx and follows closely c Dr. Gibson at [facility].  He was doing fine all day Monday and did yard work s probs.  He slept well on Monday night and didn't have any cp/sob/orthopnea/PND.  Tuesday morning he was ok.  However, on Tuesday afternoon he developed sudden onset chest pressure and sob.  Earlier in the day, he ate a high salt meal.  He checked his BP and it was very high.  As a result, he woke up his dtr and EMS was activated.  As soon as EMS gave him o2, he felt better.  He did not have any radiation of the sx to his jaw or arm.  No f/c/n/v/itching.    In the ER, the pt's BP was quite high.  He was anxious, which may have played a role.  His labs showed an elevated troponin, which has peaked at 1.8.  His cr was stable ~3.  A CXR showed some pulm edema and he was given IV furos.  He responded nicely to it and has urinated multiple times as a result.  His breathing has improved and he feels better now.  No chest pressure or sob at this time.      Pt has known re his CKD for a while.  \"Dr. Hillman and Arthur have told me that my " "kidneys are a bit weak.\"  His baseline cr is now ~3.  No uremic sx of n/v/itching.  Appetite has been ok at home, no metallic taste.    ROS:  A complete review of systems was performed and is x as noted above.    PMH:    Past Medical History:   Diagnosis Date     Acute myocardial infarction, unspecified site, episode of care unspecified      Allergic rhinitis, cause unspecified      Anemia 3/6/2014     CAD (coronary artery disease)     1996 CABG - LIMA to LAD, SVG to OM, 2007 Cath - KANU to Left main and ostial/prox LAD, 2012 Cath - 80-90% stenosis SVG to OM - KANU placed, EF 50%     CKD (chronic kidney disease) stage 3, GFR 30-59 ml/min 3/9/2014     CTS (carpal tunnel syndrome)     bilateral     Degeneration of cervical intervertebral disc      Diaphragmatic hernia without mention of obstruction or gangrene      Esophageal reflux      Essential hypertension, benign      Hyperlipidemia LDL goal <70      Hypothyroidism      Hypothyroidism, unspecified hypothyroidism type 1/14/2016     IDIOPATHIC CYCLIC EDEMA      Mild persistent asthma      Osteoarthrosis, unspecified whether generalized or localized, unspecified site      Pneumonia, organism unspecified(486)      Proteinuria 5/17/2014     PVD (peripheral vascular disease) (H)      Sensorineural hearing loss, unspecified      Subarachnoid bleed (H)      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 disease (goal A1C<8) 10/17/2015     Unspecified disorder resulting from impaired renal function      Unspecified hereditary and idiopathic peripheral neuropathy      Venous insufficiency      PSH:    Past Surgical History:   Procedure Laterality Date     C NONSPECIFIC PROCEDURE      appendectomy     C NONSPECIFIC PROCEDURE      hemorrhoids     C NONSPECIFIC PROCEDURE      cervical strain     C NONSPECIFIC PROCEDURE      rotator cuff surgery, right shoulder     C NONSPECIFIC PROCEDURE  2008    iol os     CORONARY ARTERY BYPASS  1996    LIMA to LAD, SVG to OM     HEART " CATH STENT COR W/WO PTCA  2007    lad, left main cor artery stents/drug eluting     HEART CATH STENT COR W/WO PTCA  2012    80-90% stenosis SVG to OM - KANU placed, EF 50%     NONSPECIFIC PROCEDURE      iol od     MEDICATIONS:      allopurinol  200 mg Oral Daily     aspirin  81 mg Oral Daily     cetirizine  10 mg Oral At Bedtime     clopidogrel  75 mg Oral Daily     hydrALAZINE  100 mg Oral TID     insulin aspart  1-7 Units Subcutaneous TID AC     insulin aspart  1-5 Units Subcutaneous At Bedtime     insulin aspart   Subcutaneous TID w/meals     insulin isophane human  10 Units Subcutaneous At Bedtime     isosorbide mononitrate  75 mg Oral Daily     levothyroxine  50 mcg Oral Daily     metoprolol tartrate  50 mg Oral BID     senna-docusate  1 tablet Oral BID    Or     senna-docusate  2 tablet Oral BID     theophylline  400 mg Oral Daily     torsemide  20 mg Oral Daily     ALLERGIES:    Allergies as of 06/12/2018 - Beny as Reviewed 06/12/2018   Allergen Reaction Noted     Advair [fluticasone-salmeterol]  01/04/2007     Amlodipine besylate  10/31/2012     Azithromycin GI Disturbance 04/11/2012     Clarithromycin  08/16/2002     Hydrochlorothiazide  05/13/2006     Lisinopril  05/13/2006     Moxifloxacin hydrochloride  02/26/2004     Penicillins  08/16/2002     Pravastatin Cramps 03/29/2018     Vioxx  08/16/2002     FH:    Family History   Problem Relation Age of Onset     Lung Cancer Daughter      Family History Negative Mother      broken hip     Jaundice Father      Alcohol/Drug Father      Ovarian Cancer Daughter      Family History Negative Daughter      Family History Negative Son      SH:    Social History     Social History     Marital status:      Spouse name: N/A     Number of children: N/A     Years of education: N/A     Occupational History     Not on file.     Social History Main Topics     Smoking status: Former Smoker     Packs/day: 1.00     Years: 14.00     Types: Cigarettes     Start date: 1952     " Quit date: 1966     Smokeless tobacco: Never Used     Alcohol use No     Drug use: No     Sexual activity: No     Other Topics Concern     Caffeine Concern No     Sleep Concern Yes     Stress Concern No     Weight Concern No     Special Diet No     Exercise Yes     bowling, 5 days week. yard work     Social History Narrative     PHYSICAL EXAM:    /49 (BP Location: Right arm)  Pulse 85  Temp 98.2  F (36.8  C) (Oral)  Resp 18  Ht 1.829 m (6')  Wt 93.1 kg (205 lb 3.2 oz)  SpO2 96%  BMI 27.83 kg/m2    GENERAL: awake, alert, NAD, sitting up in bed  HEENT:  Normocephalic. No gross abnormalities.  MMM.  Dentition - missing multiple bottom teeth; edentulous on top.  Pupils equal.  EOMI.  No scleral icterus.  CV: RRR c 2/6 murmur, no clicks, gallops, or rubs, 1+ ble edema.  RESP: Few crackles at bases bilaterally with good efforts  GI: Abdomen o/s/nt/nd, BS present. No masses, organomegaly  MUSCULOSKELETAL: extremities nl - no gross deformities noted  SKIN: no suspicious lesions or rashes, dry to touch  NEURO:  Strength normal and symmetric.   PSYCH: mood good, affect appropriate  LYMPH: No palpable ant/post cervical and supraclavicular adenopathy    LABS:      CBC RESULTS:     Recent Labs  Lab 06/12/18  2203   WBC 8.4   RBC 3.50*   HGB 10.5*   HCT 32.4*        BMP RESULTS:    Recent Labs  Lab 06/12/18  2203      POTASSIUM 4.4   CHLORIDE 111*   CO2 24   BUN 48*   CR 2.95*   *   LEV 9.0     INRNo lab results found in last 7 days.     DIAGNOSTICS:  Personally reviewed CXR - cardiomegaly, looks wet    A/P:  Oscar Terrazas is a 84 year old male c CKD IV, CHF and NSTEMI.    1.  CKD IV.  Pt's baseline cr is ~3-3.5.  He has been off diuretics bc the cr den, but will need them moving forward to manage his CHF.  The focus of diuretic adjustment should be on good sx (breathing ok, less swelling, etc) rather than the cr number since his \"dry\" cr is the important one.  He does not have any uremic sx, " but is TBV up.  He is very clear that he does not want dialysis ever.  We discussed acute instances, and he still said no.  His wife was on HD, so he is very familiar c it.  His dtr is also aware of his choice, per the pt.  A.  Follow labs, uo, sx.  Check BMP today.  B.  Avoid nephrotoxics, incl dye given his decision re not having HD.  C.  Continue oral tors 20 qd - first dose was just given.    2.  CHF/STEMI.  Pt responded well to IV furos and is now on oral tors.  He does not want dialysis ever, so would not do an angiogram on him.  He is at high risk for dye nephropathy or atheroemboli.    A.  Agree c stress test and med mgmt.  B.  No angiogram.  C.  Diuretics as above.    3.  HTN.  Pt's initial BP was quite elevated, but it has improved c med adjustments and diuresis.  He remains TBV up, so will benefit from scheduled oral diuretics.  Tolerating all of the meds.  A.  Continue same meds/doses.    4.  DM2.  Pt is on insulin.  Sugars are ok.  If his renal fxn worsens, insulin requirements may decrease.  A.  Continue insulin.    5.  Fe def anemia.  Pt has a h/o fe def.  He will benefit from replacement if he remains fe def today.  Hb is 10.5 and should improve c diuresis.  A.  Check fe studies.  B.  Follow hb.    6.  FEN.  Electrolytes are ok.  On low Na diet.    Thank you for this consultation. We will follow c you.  Please call if any questions.    Attestation:   I have reviewed today's relevant vital signs, notes, medications, labs and imaging.    Jese Ryder MD  [facility] - Nephrology  [phone]

## 2018-06-13 NOTE — ED PROVIDER NOTES
History     Chief Complaint:    Shortness of Breath  and Chest Pain      HPI   Oscar Terrazas is an 84 year old male who with a history of myocardial infarction, coronary artery disease, and idiopathic cyclic edema who has had coronary artery bypass surgery and a heart cath stent placed who presents to the ED today with shortness of breath and chest pain. The patient states that he started to feel short of breath earlier this evening. He notes that this is exacerbated with movement. He reports that he called EMS because of this and felt better after receiving oxygen from them. The patient also reports to having some chest pain that he describes as a heaviness. He notes that he has had this heaviness for a while and this is usually only present with strenuous activity. He notes that today, he did not have any strenuous activity, but still had this chest pain and notes that it was worse than it usually is. He also reports that he has had a lot of phlegm and congestion, which is why he believes he feels so short of breath. The patient reports to having some abdominal discomfort, but denies any nausea, vomiting, diarrhea, or fever. The patient does have some leg swelling, but states that this is not a new symptom for him.  The patient is on Plavix and his wife notes that she gave him 4 baby Asprins before coming in to the ED.      CARDIAC RISK FACTORS  SEX:                  Male  Tobacco:             Positive - former smoker   Hypertension:        Positive   Diabetes:             Positive - Type 2   Lipids:                Positive - hyperlipidemia   Personal History:  Positive - CAD and myocardial infarction   Family History:       Negative       Allergies:  Advair   Amlodipine   Azithromycin   Clarithromycin   Hydrochlorothiazide   Lisinopril   Moxifloxacin + Hydrochloride   Penicillins   Pravastatin   Vioxx      Medications:    Zyloprim   Asprin   Plavix   Apresoline   Humulin, Novolin   Imdur   Levothyroxine    Metoprolol tartrate   Pravachol   Uniphyl   Kenalog      Past Medical History:    Acute MI   Anemia   CAD   CKD   CTS   Degeneration of cervical disk   Diaphragmatic hernia   Esophageal reflux   Hypertension   Hyperlipidemia   Hypothyroidism   Idiopathic cyclic edema   Asthma   osteoarthroses   pneumonia   proteuria   PVD   Subarachnoid bleed   Type 2 diabetes   Impaired renal function   Venous insufficiency     Past Surgical History:    Appendectomy   Hemorrhoidectomy   Rotator cuff surgery   Coronary artery bypass   Heart cath stent     Family History:    Jaundice   Alcohol/drug   Cancer     Social History:  Marital Status:    Presents to the ED with wife   Tobacco Use: former smoker   Alcohol Use: no   PCP: Jorge Hillman     Review of Systems   HENT: Positive for congestion.    Respiratory: Positive for shortness of breath.    Cardiovascular: Positive for chest pain.   Gastrointestinal: Positive for abdominal pain. Negative for diarrhea, nausea and vomiting.   All other systems reviewed and are negative.      Physical Exam     Patient Vitals for the past 24 hrs:   BP Temp Temp src Pulse Heart Rate Resp SpO2 Height Weight   06/13/18 0059 - - - - - - - - 93.1 kg (205 lb 3.2 oz)   06/13/18 0030 176/83 - - - 56 16 99 % - -   06/13/18 0015 (!) 180/92 - - - 63 20 99 % - -   06/13/18 0005 (!) 195/95 - - - 79 22 98 % - -   06/12/18 2345 188/75 - - - 65 16 99 % - -   06/12/18 2330 195/89 - - - 73 23 97 % - -   06/12/18 2321 - - - - 73 19 97 % - -   06/12/18 2315 (!) 197/101 - - - 78 21 98 % - -   06/12/18 2311 (!) 197/93 - - - 82 24 95 % - -   06/12/18 2253 - - - - 79 20 96 % - -   06/12/18 2245 (!) 212/101 - - - 90 19 96 % - -   06/12/18 2230 (!) 206/98 - - - 84 - 93 % - -   06/12/18 2151 (!) 194/108 98.4  F (36.9  C) Oral 85 - 19 95 % 1.829 m (6') 92.1 kg (203 lb)        Physical Exam  Nursing note and vitals reviewed.  Constitutional:  Oriented to person, place, and time. Cooperative.   HENT:   Nose:    Nose  normal.   Mouth/Throat:   Mucous membranes are normal.   Eyes:    Conjunctivae normal and EOM are normal.      Pupils are equal, round, and reactive to light.   Neck:    Trachea normal.   Cardiovascular:  Normal rate, regular rhythm, normal heart sounds and normal pulses. No murmur heard.  Pulmonary/Chest:  Diminished breath sounds throughout.  Abdominal:   Soft. Normal appearance and bowel sounds are normal.      There is no tenderness.      There is no rebound and no CVA tenderness.   Musculoskeletal:  Extremities atraumatic x 4. 2+ bilateral lower extremity pitting edema.  Lymphadenopathy:  No cervical adenopathy.   Neurological:   Alert and oriented to person, place, and time. Normal strength.      No cranial nerve deficit or sensory deficit. GCS eye subscore is 4. GCS verbal subscore is 5. GCS motor subscore is 6.   Skin:    Skin is intact. No rash noted.   Psychiatric:   Normal mood and affect.     Emergency Department Course   ECG:  @ 2154  Indication: shortness of breath and chest pain   Vent. Rate 84 bpm. WV interval 174 ms. QRS duration 134 ms. QT/QTc 412/486 ms. P-R-T axis 3 -50 77.   Normal sinus rhythm. Left axis deviation. Nonspecific intraventricular block. Cannot rule out anteroseptal infarct, age undetermined. Abnormal ECG.  No significant change when compared to previous ECG from 3/13/18   Read @ 6807 by Dr. Scott.     Imaging:  Xray chest, 2 views   1. Mildly increased interstitial opacities within both lungs. This  could relate to interstitial pulmonary edema or an atypical infection.  2. No other findings suspicious for active cardiopulmonary disease.  3. Hyperinflated lungs, suggestive of chronic obstructive pulmonary  disease.  Preliminary radiology read.     Radiographic findings were communicated with the patient and family who voiced understanding of the findings.    Laboratory:  Troponin I: 0.372 ()    CBC:  WBC 8.4, HGB 10.5 (L),   CMP: chloride 111 (H), glucose 250 (H), BUN  48 (H), creatinine 2.95 (H), GFR estimate 20 (L), otherwise WNL   BNP: 03646 (H)    Interventions:  (2245) Metoprolol 5 mg, IV   (2245) Metoprolol tartrate 50 mg, PO  (2314) Nitrostat 0.4 mg, sublingual   (2315) Lasix 40 mg, IV      Emergency Department Course:  Nursing notes and vitals reviewed.  (2229) I performed an exam of the patient as documented above.    EKG was done, interpretation as above.   A peripheral IV was established. Blood was drawn from the patient. This was sent for laboratory testing, findings above.    The patient was sent for a chest xray while in the emergency department, findings above.    (2311) I rechecked on the patient and updated them on results.    Findings and plan explained to the patient who consents to admission.   (2332) I discussed the patient with Dr. Rodriguez of the hospitalist service, who will admit the patient to a [facility] bed for further monitoring, evaluation, and treatment.   Impression & Plan    Medical Decision Making:  Oscar Terrazas is an 84 year old male who presents with chest discomfort and shortness of breath.  He was extremely hypertensive here as well.  However his EKG was unchanged from previous.  I proceeded with the above workup, including the blood work and chest x-ray.  He was provided IV and oral metoprolol followed by IV Lasix and sublingual nitroglycerin.  I suspect that he has congestive heart failure as the main etiology of his symptoms.  However acute coronary syndrome also is a possibility, as well as a hypertensive urgency.  He was not hypoxic here, and not in any respiratory distress.  However he does appear to be worse with regards to his CHF than previous, as compared with his previous chest x-ray.  His troponin is actually better than it was in March, and I suspect that might be chronically elevated due to his renal insufficiency and cardiac disease.  Regardless, I think it is best that he be admitted to the hospital for further evaluation and  management.  I subsequently spoke with Dr. Rodriguez, who will be admitting him.      Diagnosis:    ICD-10-CM    1. Chest pressure R07.89    2. Shortness of breath R06.02    3. Acute on chronic congestive heart failure, unspecified congestive heart failure type (H) I50.9    4. Elevated troponin R74.8        Disposition:  Admitted to hospitalist.       I, Ana Laura Santana, am serving as a scribe on 6/12/2018 at 10:29 PM to personally document services performed by Dr. Scott based on my observations and the provider's statements to me.  6/12/2018    EMERGENCY DEPARTMENT       Nick Scott MD  06/13/18 0613

## 2018-06-13 NOTE — PHARMACY-ADMISSION MEDICATION HISTORY
Admission medication history interview status for the 6/12/2018  admission is complete. See EPIC admission navigator for prior to admission medications     Medication history source reliability:Good    Actions taken by pharmacist (provider contacted, etc):None     Additional medication history information not noted on PTA med list :None    Medication reconciliation/reorder completed by provider prior to medication history? No    Time spent in this activity: 20min    Prior to Admission medications    Medication Sig Last Dose Taking? Auth Provider   allopurinol (ZYLOPRIM) 100 MG tablet TAKE 2 TABLETS(200 MG) BY MOUTH DAILY 6/12/2018 at Unknown time Yes Jorge Hillman MD   aspirin 81 MG tablet Take 1 tablet by mouth daily. with food 6/12/2018 at Unknown time Yes Joe Aguilar MD   cetirizine (ZYRTEC) 10 MG tablet TAKE 1 TABLET(10 MG) BY MOUTH EVERY EVENING 6/12/2018 at Unknown time Yes Jorge Hillman MD   clopidogrel (PLAVIX) 75 MG tablet TAKE 1 TABLET(75 MG) BY MOUTH DAILY 6/12/2018 at Unknown time Yes Jorge Hillman MD   COD LIVER OIL PO Take 1 capsule by mouth daily  6/12/2018 at Unknown time Yes Reported, Patient   Cyanocobalamin (VITAMIN B 12 PO) Take 500 mcg by mouth 2 times daily. 6/12/2018 at Unknown time Yes Unknown, Entered By History   diphenhydrAMINE-acetaminophen (TYLENOL PM)  MG tablet Take 1 tablet by mouth nightly as needed for sleep prn Yes Reported, Patient   hydrALAZINE (APRESOLINE) 25 MG tablet Take 75 mg by mouth 3 times daily 6/12/2018 at Unknown time Yes Preethi Smiley, APRN CNP   insulin NPH (HUMULIN N, NOVOLIN N) 100 UNIT/ML VIAL 18 units at bedtime 6/12/2018 at Unknown time Yes Jorge Hillman MD   isosorbide mononitrate (IMDUR) 60 MG 24 hr tablet Take 1 tablet (60 mg) by mouth daily 6/12/2018 at Unknown time Yes Sebastien Gibson MD   levothyroxine (SYNTHROID/LEVOTHROID) 50 MCG tablet TAKE 1 TABLET BY MOUTH DAILY 6/12/2018 at Unknown time Yes Jorge Hillman MD   metoprolol tartrate  "(LOPRESSOR) 50 MG tablet Take 1 tablet (50 mg) by mouth 2 times daily 6/12/2018 at Unknown time Yes Brittany Stanley MD   pravastatin (PRAVACHOL) 20 MG tablet Take 1 tablet (20 mg) by mouth once a week 6/6/2018 Yes Preethi Smiley, APRN CNP   theophylline (UNIPHYL) 400 MG 24 hr tablet TAKE 1 TABLET BY MOUTH EVERY DAY 6/12/2018 at Unknown time Yes Jorge Hillman MD   triamcinolone (KENALOG) 0.5 % cream Apply sparingly to affected area two  times daily as needed for rash. prn Yes Jorge Hillman MD   blood glucose monitoring (ACCU-CHEK COMPACT PLUS) test strip Check blood sugar 2-3 times a day   Jorge Hillman MD   insulin syringe-needle U-100 (BD INSULIN SYRINGE) 29G X 1/2\" 0.5 ML Use one syringe 2 daily or as directed.   Jorge Hillman MD         "

## 2018-06-13 NOTE — PLAN OF CARE
Problem: Diabetes Comorbidity  Goal: Diabetes  Patient comorbidity will be monitored for signs and symptoms of hyperglycemia or hypoglycemia. Problems will be absent, minimized or managed by discharge/transition of care.   Outcome: No Change  Blood sugars stable

## 2018-06-13 NOTE — CONSULTS
[facility]    Cardiology Consultation     Date of Admission:  6/12/2018  Date of Service (when I saw the patient): 06/13/18    Reason for Consultation= Acute Decompensated heart failure     ASSESSMENT  1. Acute Diastolic Heart Failure-due to high sodium diet and cessation of diuretics due to his chronic kidney disease. Admitting pro BNP equals 10,378  2. Chest pressure-in the setting of known coronary artery disease. Previously he only had chest discomfort with activity however now it occurred at rest prior to admission. Troponins upon admission equal 0.372, 1.864, 1.7-0  3 Hypertension recent medication changes with better control  4.Peripheral venous insufficiency stable  5. Dietary non compliance   6. Chronic renal insufficiency stage IV admitting creatinine 2.95    PLAN  1. Start torsemide 20  mg daily   2. Monitor BMP closely  3. Consider renal consultation  4. Nutrition consultation for 2 g sodium diet  5. May benefit from coronary angiogram however his current renal function may prohibit  this. Input from nephrology would be helpful. In the meanwhile consideration for a Lexiscan.  6. Continue with Plavix 75 mg daily,  aspirin 81 mg daily,  hydralazine 100 mg t.i.d. and Imdur 75 mg daily      AIDA Yee,CNP        Primary Care Physician   Jorge Hillman    Chief Complaint   Acute onset of dyspnea and chest pressure    History is obtained from the patient    History of Present Illness   Oscar Terrazas is a 84 year old male who presents with Oscar Terrazas is a 84 year old male who presents with chest pressure orthopnea and dyspnea at rest. He was at home alone sitting after having a high sodium meal  of Orange chicken. He developed central chest pressure rated at 6 out of a scale of 0-10. He also had discomfort towards his neck shoulders and high back area. He developed shortness of breath. He admitted some diaphoresis. He took his blood pressure at home and it was 129/94. He called  his daughter who drove him to the emergency room. He received 2 doses of furosemide 40 mg IV  in the hospital and now reports his breathing is back at baseline.    Patient is followed at the Cardiology office by Dr. Sebastien Gibson and Preethi Smiley nurse practitioner. He has remained off diuretic for several months with stable home weights running between 203-205 pounds.  He reports chest discomfort doing yard work which resolves with rest. He is able to bowl several games in a row without any chest pain, fatigue or shortness of breath.   Recently we have seen him for hypertension management.     His past medical history includes the following;    1. Coronary Artery Disease- with a CABG with saphenous vein graft to the OM and a LIMAto the distal LAD. in 1996. In 2007, he required multivessel drug-eluting stenting for recurrent angina. In 2012, he presented with an acute coronary syndrome after his Plavix was stopped for a dental procedure. At that time, his LAD and left main coronary stents were widely patent. There was a lesion at one end of the stent in the vein graft to the OM that was severe, and possibly acute thrombus, and that was successfully stented with a drug-eluting stent. He has been maintained on Plavix since then.  2. Hypertension resistant suboptimal control with recent  medication changes. Multiple allergies to medication makes it challenging.  3. Chronic renal insufficiency stage IV-recent creatinines in the 3 range with improvement when diuretics are held.  4. Chronic diastolic heart failure with preserved ejection fraction-with one hospitalization in March 2018 for acute diastolic failure.   5. Peripheral venous insufficiency of the lower extremities-uses compression stockings-declined [facility] referrals in the past.  6. Diabetes mellitus -present A1c equals 5.9           Past Medical History    I have reviewed this patient's medical history and updated it with pertinent information if  needed.   Past Medical History:   Diagnosis Date     Acute myocardial infarction, unspecified site, episode of care unspecified      Allergic rhinitis, cause unspecified      Anemia 3/6/2014     CAD (coronary artery disease)     1996 CABG - LIMA to LAD, SVG to OM, 2007 Cath - KANU to Left main and ostial/prox LAD, 2012 Cath - 80-90% stenosis SVG to OM - KANU placed, EF 50%     CKD (chronic kidney disease) stage 3, GFR 30-59 ml/min 3/9/2014     CTS (carpal tunnel syndrome)     bilateral     Degeneration of cervical intervertebral disc      Diaphragmatic hernia without mention of obstruction or gangrene      Esophageal reflux      Essential hypertension, benign      Hyperlipidemia LDL goal <70      Hypothyroidism      Hypothyroidism, unspecified hypothyroidism type 1/14/2016     IDIOPATHIC CYCLIC EDEMA      Mild persistent asthma      Osteoarthrosis, unspecified whether generalized or localized, unspecified site      Pneumonia, organism unspecified(486)      Proteinuria 5/17/2014     PVD (peripheral vascular disease) (H)      Sensorineural hearing loss, unspecified      Subarachnoid bleed (H)      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 disease (goal A1C<8) 10/17/2015     Unspecified disorder resulting from impaired renal function      Unspecified hereditary and idiopathic peripheral neuropathy      Venous insufficiency        Past Surgical History   I have reviewed this patient's surgical history and updated it with pertinent information if needed.  Past Surgical History:   Procedure Laterality Date     C NONSPECIFIC PROCEDURE      appendectomy     C NONSPECIFIC PROCEDURE      hemorrhoids     C NONSPECIFIC PROCEDURE      cervical strain     C NONSPECIFIC PROCEDURE      rotator cuff surgery, right shoulder     C NONSPECIFIC PROCEDURE  2008    iol os     CORONARY ARTERY BYPASS  1996    LIMA to LAD, SVG to OM     HEART CATH STENT COR W/WO PTCA  2007    lad, left main cor artery stents/drug eluting     HEART  "CATH STENT COR W/WO PTCA      80-90% stenosis SVG to OM - KANU placed, EF 50%     NONSPECIFIC PROCEDURE      iol od       Prior to Admission Medications   Prior to Admission Medications   Prescriptions Last Dose Informant Patient Reported? Taking?   COD LIVER OIL PO 2018 at Unknown time Self Yes Yes   Sig: Take 1 capsule by mouth daily    Cyanocobalamin (VITAMIN B 12 PO) 2018 at Unknown time Self Yes Yes   Sig: Take 500 mcg by mouth 2 times daily.   allopurinol (ZYLOPRIM) 100 MG tablet 2018 at Unknown time Self No Yes   Sig: TAKE 2 TABLETS(200 MG) BY MOUTH DAILY   aspirin 81 MG tablet 2018 at Unknown time Self Yes Yes   Sig: Take 1 tablet by mouth daily. with food   blood glucose monitoring (ACCU-CHEK COMPACT PLUS) test strip  Self No No   Sig: Check blood sugar 2-3 times a day   cetirizine (ZYRTEC) 10 MG tablet 2018 at Unknown time Self No Yes   Sig: TAKE 1 TABLET(10 MG) BY MOUTH EVERY EVENING   clopidogrel (PLAVIX) 75 MG tablet 2018 at Unknown time Self No Yes   Sig: TAKE 1 TABLET(75 MG) BY MOUTH DAILY   diphenhydrAMINE-acetaminophen (TYLENOL PM)  MG tablet prn  Yes Yes   Sig: Take 1 tablet by mouth nightly as needed for sleep   hydrALAZINE (APRESOLINE) 25 MG tablet 2018 at Unknown time  Yes Yes   Sig: Take 75 mg by mouth 3 times daily   insulin NPH (HUMULIN N, NOVOLIN N) 100 UNIT/ML VIAL 2018 at Unknown time Self No Yes   Si units at bedtime   insulin syringe-needle U-100 (BD INSULIN SYRINGE) 29G X 1/2\" 0.5 ML  Self No No   Sig: Use one syringe 2 daily or as directed.   isosorbide mononitrate (IMDUR) 60 MG 24 hr tablet 2018 at Unknown time  No Yes   Sig: Take 1 tablet (60 mg) by mouth daily   levothyroxine (SYNTHROID/LEVOTHROID) 50 MCG tablet 2018 at Unknown time  No Yes   Sig: TAKE 1 TABLET BY MOUTH DAILY   metoprolol tartrate (LOPRESSOR) 50 MG tablet 2018 at Unknown time  No Yes   Sig: Take 1 tablet (50 mg) by mouth 2 times daily   pravastatin " (PRAVACHOL) 20 MG tablet 6/6/2018  Yes Yes   Sig: Take 1 tablet (20 mg) by mouth once a week   theophylline (UNIPHYL) 400 MG 24 hr tablet 6/12/2018 at Unknown time Self No Yes   Sig: TAKE 1 TABLET BY MOUTH EVERY DAY   triamcinolone (KENALOG) 0.5 % cream prn Self No Yes   Sig: Apply sparingly to affected area two  times daily as needed for rash.      Facility-Administered Medications: None     Allergies   Allergies   Allergen Reactions     Advair [Fluticasone-Salmeterol]      cough;  insomnia, nightmares     Amlodipine Besylate      edema       Azithromycin GI Disturbance     Clarithromycin      gi     Hydrochlorothiazide      hctz + lisinopril-->inc creat, k+     Lisinopril      lisinopril + hctz --> inc creat, k+     Moxifloxacin Hydrochloride      clostridium diffile colitis     Penicillins      gen swelling     Pravastatin Cramps     Muscle cramps/spasm.  Stopped 2017     Vioxx      edema       Social History   I have reviewed this patient's social history and updated it with pertinent information if needed. Oscar Terrazas  reports that he quit smoking about 52 years ago. His smoking use included Cigarettes. He started smoking about 66 years ago. He has a 14.00 pack-year smoking history. He has never used smokeless tobacco. He reports that he does not drink alcohol or use illicit drugs.    Family History   I have reviewed this patient's family history and updated it with pertinent information if needed.   Family History   Problem Relation Age of Onset     Lung Cancer Daughter      Family History Negative Mother      broken hip     Jaundice Father      Alcohol/Drug Father      Ovarian Cancer Daughter      Family History Negative Daughter      Family History Negative Son        Review of Systems   The 10 point Review of Systems is negative other than noted in the HPI:    Physical Exam   Temp: 98.2  F (36.8  C) Temp src: Oral BP: 119/49 Pulse: 85 Heart Rate: 59 Resp: 18 SpO2: 96 % O2 Device: Nasal cannula Oxygen  Delivery: 1 LPM  Vital Signs with Ranges  Temp:  [98  F (36.7  C)-98.4  F (36.9  C)] 98.2  F (36.8  C)  Pulse:  [85] 85  Heart Rate:  [56-90] 59  Resp:  [16-24] 18  BP: (119-212)/() 119/49  SpO2:  [93 %-99 %] 96 %  205 lbs 3.2 oz      Constitutional:   Alert and oriented, well developed, in no acute distress.   Skin:   Warm and dry to touch; no apparent skin lesions.   Head:   Normocephalic, atraumatic.   Eyes:   Pupils equal and round, conjunctivae and lids unremarkable, no xanthalasma.   Neck:   cartoid pulses are full and equalbilaterally, JVP normal, no carotid bruit.   Chest:   No tenderness to palpation.   Lungs:   No increased work of breathing, good air exchange, clear to auscultation bilaterally.   Cardiovascular:   Regular rhythm, S1 normal, S2 normal, No S3 or S4, no murmurs or rubs detected.   Abdomen:   Abdomen soft, bowel sounds normoactive, no hepatosplenomegaly, non-tender.   Peripheral Pulses:   Pulses full and equal in all extremities, no bruits auscultated.   Extremities and Back:   1+ pitting edema to knees with chronic hyperpigmentation associated with venous stasis    Neurological:   No gross motor deficits noted, affect appropriate, oriented to time, person and place. Mental status grossly intact       Data   I personally reviewed Sinus rhythmLeft axis deviationNon-specific intra-ventricular conduction block  Results for orders placed or performed during the hospital encounter of 06/12/18 (from the past 24 hour(s))   EKG 12 lead   Result Value Ref Range    Interpretation ECG Click View Image link to view waveform and result    Comprehensive metabolic panel   Result Value Ref Range    Sodium 142 133 - 144 mmol/L    Potassium 4.4 3.4 - 5.3 mmol/L    Chloride 111 (H) 94 - 109 mmol/L    Carbon Dioxide 24 20 - 32 mmol/L    Anion Gap 7 3 - 14 mmol/L    Glucose 250 (H) 70 - 99 mg/dL    Urea Nitrogen 48 (H) 7 - 30 mg/dL    Creatinine 2.95 (H) 0.66 - 1.25 mg/dL    GFR Estimate 20 (L) >60  mL/min/1.7m2    GFR Estimate If Black 25 (L) >60 mL/min/1.7m2    Calcium 9.0 8.5 - 10.1 mg/dL    Bilirubin Total 0.3 0.2 - 1.3 mg/dL    Albumin 4.0 3.4 - 5.0 g/dL    Protein Total 7.7 6.8 - 8.8 g/dL    Alkaline Phosphatase 117 40 - 150 U/L    ALT 20 0 - 70 U/L    AST 9 0 - 45 U/L   Troponin I   Result Value Ref Range    Troponin I ES 0.372 (HH) 0.000 - 0.045 ug/L   CBC with platelets differential   Result Value Ref Range    WBC 8.4 4.0 - 11.0 10e9/L    RBC Count 3.50 (L) 4.4 - 5.9 10e12/L    Hemoglobin 10.5 (L) 13.3 - 17.7 g/dL    Hematocrit 32.4 (L) 40.0 - 53.0 %    MCV 93 78 - 100 fl    MCH 30.0 26.5 - 33.0 pg    MCHC 32.4 31.5 - 36.5 g/dL    RDW 15.8 (H) 10.0 - 15.0 %    Platelet Count 184 150 - 450 10e9/L    Diff Method Automated Method     % Neutrophils 68.5 %    % Lymphocytes 16.6 %    % Monocytes 8.4 %    % Eosinophils 5.5 %    % Basophils 0.8 %    % Immature Granulocytes 0.2 %    Nucleated RBCs 0 0 /100    Absolute Neutrophil 5.7 1.6 - 8.3 10e9/L    Absolute Lymphocytes 1.4 0.8 - 5.3 10e9/L    Absolute Monocytes 0.7 0.0 - 1.3 10e9/L    Absolute Eosinophils 0.5 0.0 - 0.7 10e9/L    Absolute Basophils 0.1 0.0 - 0.2 10e9/L    Abs Immature Granulocytes 0.0 0 - 0.4 10e9/L    Absolute Nucleated RBC 0.0    Nt probnp inpatient   Result Value Ref Range    N-Terminal Pro BNP Inpatient 60265 (H) 0 - 1800 pg/mL   XR Chest 2 Views    Narrative    CHEST 2 VIEWS  6/12/2018 11:04 PM     HISTORY: Shortness of breath.    COMPARISON: 3/13/2018.    FINDINGS: Hyperinflated lungs. Mildly increased interstitial opacities  throughout bilateral mid and lower lungs. Cardiomegaly. Prior median  sternotomy.      Impression    IMPRESSION:  1. Mildly increased interstitial opacities within both lungs. This  could relate to interstitial pulmonary edema or an atypical infection.  2. No other findings suspicious for active cardiopulmonary disease.  3. Hyperinflated lungs, suggestive of chronic obstructive pulmonary  disease.    REBEKAH  MD BRIGITTE   Glucose by meter   Result Value Ref Range    Glucose 179 (H) 70 - 99 mg/dL   Troponin I   Result Value Ref Range    Troponin I ES 1.143 (HH) 0.000 - 0.045 ug/L   Troponin I   Result Value Ref Range    Troponin I ES 1.864 (HH) 0.000 - 0.045 ug/L   Hemoglobin A1c   Result Value Ref Range    Hemoglobin A1C 5.9 (H) 0 - 5.6 %   Glucose by meter   Result Value Ref Range    Glucose 101 (H) 70 - 99 mg/dL   Troponin I   Result Value Ref Range    Troponin I ES 1.720 (HH) 0.000 - 0.045 ug/L   Heparin 10a Level   Result Value Ref Range    Heparin 10A Level 0.35 IU/mL

## 2018-06-13 NOTE — PLAN OF CARE
Problem: Patient Care Overview  Goal: Plan of Care/Patient Progress Review  Outcome: No Change  Heart Failure Care Pathway  GOALS TO BE MET BEFORE DISCHARGE:    1. Decrease congestion and/or edema with diuretic therapy to achieve near      optimal volume status.            Dyspnea improved:  Yes            Edema improved:     Yes        Net I/O and Weights since admission:          05/14 0700 - 06/13 0659  In: -   Out: 1225 [Urine:1225]  Net: -1225            Vitals:    06/12/18 2151 06/13/18 0059   Weight: 92.1 kg (203 lb) 93.1 kg (205 lb 3.2 oz)       2.  O2 sats > 92% on RA or at prior home O2 therapy level.          Current oxygenation status:       SpO2: 97 %         O2 Device: Nasal cannula,  Oxygen Delivery: 1 LPM         Able to wean O2 this shift to keep sats > 92%:  No, please explain: on 4L       Does patient use Home O2? No    3.  Tolerates ambulation and mobility near baseline: Yes        How many times did the patient ambulate with nursing staff this shift? 4    Please review the Heart Failure Care Pathway for additional HF goal outcomes.    Laverne Mcnamara RN  6/13/2018     A&O. Up SBA. VSS, except elevated BP. 1L O2 NC for comfort. Heparin gtt. No complaints of pain or SOB. Tele shows SR/SB HR 50-60s. Pt slept. Plan for cardiology consult.

## 2018-06-13 NOTE — ED NOTES
[facility]  ED Nurse Handoff Report    ED Chief complaint: Shortness of Breath (pt. reports SOB, progressing tonight with chest pressure.  admits to weakness the past few days.) and Chest Pain      ED Diagnosis:   Final diagnoses:   Chest pressure   Shortness of breath   Acute on chronic congestive heart failure, unspecified congestive heart failure type (H)   Elevated troponin       Code Status: Full Code    Allergies:   Allergies   Allergen Reactions     Advair [Fluticasone-Salmeterol]      cough;  insomnia, nightmares     Amlodipine Besylate      edema       Azithromycin GI Disturbance     Clarithromycin      gi     Hydrochlorothiazide      hctz + lisinopril-->inc creat, k+     Lisinopril      lisinopril + hctz --> inc creat, k+     Moxifloxacin Hydrochloride      clostridium diffile colitis     Penicillins      gen swelling     Pravastatin Cramps     Muscle cramps/spasm.  Stopped 2017     Vioxx      edema       Activity level - Baseline/Home:  Independent    Activity Level - Current:   Stand with Assist     Needed?: No    Isolation: No  Infection: Not Applicable    Bariatric?: No    Vital Signs:   Vitals:    06/12/18 2151 06/12/18 2230 06/12/18 2245 06/12/18 2253   BP: (!) 194/108 (!) 206/98 (!) 212/101    Pulse: 85      Resp: 19  19 20   Temp: 98.4  F (36.9  C)      TempSrc: Oral      SpO2: 95% 93% 96% 96%   Weight: 92.1 kg (203 lb)      Height: 1.829 m (6')          Cardiac Rhythm: ,        Pain level:      Is this patient confused?: No   Suicidality: Screened negative    Patient Report: Initial Complaint: Shortness of breath and chest pain  Focused Assessment: Patient with a history of myocardial infarction, coronary artery disease, and idiopathic cyclic edema who has had coronary artery bypass surgery and a heart cath stent placed presents to ED for worsening shortness of breath from baseline and chest pain and pressure. Patient reports shortness of breath without exertion  prompted him to seek help.  Tests Performed: labs, CXR  Abnormal Results:   Results for orders placed or performed during the hospital encounter of 06/12/18   XR Chest 2 Views    Narrative    CHEST 2 VIEWS  6/12/2018 11:04 PM     HISTORY: Shortness of breath.    COMPARISON: 3/13/2018.    FINDINGS: Hyperinflated lungs. Mildly increased interstitial opacities  throughout bilateral mid and lower lungs. Cardiomegaly. Prior median  sternotomy.      Impression    IMPRESSION:  1. Mildly increased interstitial opacities within both lungs. This  could relate to interstitial pulmonary edema or an atypical infection.  2. No other findings suspicious for active cardiopulmonary disease.  3. Hyperinflated lungs, suggestive of chronic obstructive pulmonary  disease.   Comprehensive metabolic panel   Result Value Ref Range    Sodium 142 133 - 144 mmol/L    Potassium 4.4 3.4 - 5.3 mmol/L    Chloride 111 (H) 94 - 109 mmol/L    Carbon Dioxide 24 20 - 32 mmol/L    Anion Gap 7 3 - 14 mmol/L    Glucose 250 (H) 70 - 99 mg/dL    Urea Nitrogen 48 (H) 7 - 30 mg/dL    Creatinine 2.95 (H) 0.66 - 1.25 mg/dL    GFR Estimate 20 (L) >60 mL/min/1.7m2    GFR Estimate If Black 25 (L) >60 mL/min/1.7m2    Calcium 9.0 8.5 - 10.1 mg/dL    Bilirubin Total 0.3 0.2 - 1.3 mg/dL    Albumin 4.0 3.4 - 5.0 g/dL    Protein Total 7.7 6.8 - 8.8 g/dL    Alkaline Phosphatase 117 40 - 150 U/L    ALT 20 0 - 70 U/L    AST 9 0 - 45 U/L   Troponin I   Result Value Ref Range    Troponin I ES 0.372 (HH) 0.000 - 0.045 ug/L   CBC with platelets differential   Result Value Ref Range    WBC 8.4 4.0 - 11.0 10e9/L    RBC Count 3.50 (L) 4.4 - 5.9 10e12/L    Hemoglobin 10.5 (L) 13.3 - 17.7 g/dL    Hematocrit 32.4 (L) 40.0 - 53.0 %    MCV 93 78 - 100 fl    MCH 30.0 26.5 - 33.0 pg    MCHC 32.4 31.5 - 36.5 g/dL    RDW 15.8 (H) 10.0 - 15.0 %    Platelet Count 184 150 - 450 10e9/L    Diff Method Automated Method     % Neutrophils 68.5 %    % Lymphocytes 16.6 %    % Monocytes 8.4 %    %  Eosinophils 5.5 %    % Basophils 0.8 %    % Immature Granulocytes 0.2 %    Nucleated RBCs 0 0 /100    Absolute Neutrophil 5.7 1.6 - 8.3 10e9/L    Absolute Lymphocytes 1.4 0.8 - 5.3 10e9/L    Absolute Monocytes 0.7 0.0 - 1.3 10e9/L    Absolute Eosinophils 0.5 0.0 - 0.7 10e9/L    Absolute Basophils 0.1 0.0 - 0.2 10e9/L    Abs Immature Granulocytes 0.0 0 - 0.4 10e9/L    Absolute Nucleated RBC 0.0    Nt probnp inpatient   Result Value Ref Range    N-Terminal Pro BNP Inpatient 12590 (H) 0 - 1800 pg/mL       Treatments provided: Metoprolol IV, Metoprolol po, Lasix, Nitro x1.     Family Comments: daughter at bedside    OBS brochure/video discussed/provided to patient: N/A    ED Medications:   Medications   nitroGLYcerin (NITROSTAT) sublingual tablet 0.4 mg (0.4 mg Sublingual Given 6/12/18 2314)   metoprolol (LOPRESSOR) injection 5 mg (5 mg Intravenous Given 6/12/18 2245)   metoprolol tartrate (LOPRESSOR) tablet 50 mg (50 mg Oral Given 6/12/18 2245)   furosemide (LASIX) injection 40 mg (40 mg Intravenous Given 6/12/18 2315)       Drips infusing?:  No    For the majority of the shift this patient was Green.   Interventions performed were monitor and evaluate.    Severe Sepsis OR Septic Shock Diagnosis Present: No      ED NURSE PHONE NUMBER: [phone]

## 2018-06-14 ENCOUNTER — APPOINTMENT (OUTPATIENT)
Dept: CARDIOLOGY | Facility: CLINIC | Age: 83
DRG: 280 | End: 2018-06-14
Attending: NURSE PRACTITIONER
Payer: MEDICARE

## 2018-06-14 LAB
ANION GAP SERPL CALCULATED.3IONS-SCNC: 8 MMOL/L (ref 3–14)
BUN SERPL-MCNC: 52 MG/DL (ref 7–30)
CALCIUM SERPL-MCNC: 8.2 MG/DL (ref 8.5–10.1)
CHLORIDE SERPL-SCNC: 112 MMOL/L (ref 94–109)
CO2 SERPL-SCNC: 24 MMOL/L (ref 20–32)
CREAT SERPL-MCNC: 2.95 MG/DL (ref 0.66–1.25)
ERYTHROCYTE [DISTWIDTH] IN BLOOD BY AUTOMATED COUNT: 15.7 % (ref 10–15)
FERRITIN SERPL-MCNC: 98 NG/ML (ref 26–388)
FOLATE SERPL-MCNC: 39.5 NG/ML
GFR SERPL CREATININE-BSD FRML MDRD: 20 ML/MIN/1.7M2
GLUCOSE BLDC GLUCOMTR-MCNC: 113 MG/DL (ref 70–99)
GLUCOSE BLDC GLUCOMTR-MCNC: 126 MG/DL (ref 70–99)
GLUCOSE BLDC GLUCOMTR-MCNC: 208 MG/DL (ref 70–99)
GLUCOSE BLDC GLUCOMTR-MCNC: 74 MG/DL (ref 70–99)
GLUCOSE BLDC GLUCOMTR-MCNC: 91 MG/DL (ref 70–99)
GLUCOSE BLDC GLUCOMTR-MCNC: 99 MG/DL (ref 70–99)
GLUCOSE SERPL-MCNC: 79 MG/DL (ref 70–99)
HCT VFR BLD AUTO: 28.3 % (ref 40–53)
HGB BLD-MCNC: 9.4 G/DL (ref 13.3–17.7)
IRON SATN MFR SERPL: 45 % (ref 15–46)
IRON SERPL-MCNC: 77 UG/DL (ref 35–180)
LMWH PPP CHRO-ACNC: 0.16 IU/ML
MCH RBC QN AUTO: 30.5 PG (ref 26.5–33)
MCHC RBC AUTO-ENTMCNC: 33.2 G/DL (ref 31.5–36.5)
MCV RBC AUTO: 92 FL (ref 78–100)
PLATELET # BLD AUTO: 167 10E9/L (ref 150–450)
POTASSIUM SERPL-SCNC: 4.1 MMOL/L (ref 3.4–5.3)
RBC # BLD AUTO: 3.08 10E12/L (ref 4.4–5.9)
SODIUM SERPL-SCNC: 144 MMOL/L (ref 133–144)
TIBC SERPL-MCNC: 170 UG/DL (ref 240–430)
VIT B12 SERPL-MCNC: 1460 PG/ML (ref 193–986)
WBC # BLD AUTO: 8.8 10E9/L (ref 4–11)

## 2018-06-14 PROCEDURE — 82746 ASSAY OF FOLIC ACID SERUM: CPT | Performed by: INTERNAL MEDICINE

## 2018-06-14 PROCEDURE — 99233 SBSQ HOSP IP/OBS HIGH 50: CPT | Performed by: INTERNAL MEDICINE

## 2018-06-14 PROCEDURE — 36415 COLL VENOUS BLD VENIPUNCTURE: CPT | Performed by: INTERNAL MEDICINE

## 2018-06-14 PROCEDURE — 93010 ELECTROCARDIOGRAM REPORT: CPT | Mod: 76 | Performed by: INTERNAL MEDICINE

## 2018-06-14 PROCEDURE — 93005 ELECTROCARDIOGRAM TRACING: CPT

## 2018-06-14 PROCEDURE — 85520 HEPARIN ASSAY: CPT | Performed by: INTERNAL MEDICINE

## 2018-06-14 PROCEDURE — 83550 IRON BINDING TEST: CPT | Performed by: INTERNAL MEDICINE

## 2018-06-14 PROCEDURE — 82607 VITAMIN B-12: CPT | Performed by: INTERNAL MEDICINE

## 2018-06-14 PROCEDURE — A9270 NON-COVERED ITEM OR SERVICE: HCPCS | Mod: GY | Performed by: NURSE PRACTITIONER

## 2018-06-14 PROCEDURE — 80048 BASIC METABOLIC PNL TOTAL CA: CPT | Performed by: INTERNAL MEDICINE

## 2018-06-14 PROCEDURE — 25000132 ZZH RX MED GY IP 250 OP 250 PS 637: Mod: GY | Performed by: INTERNAL MEDICINE

## 2018-06-14 PROCEDURE — 21000001 ZZH R&B HEART CARE

## 2018-06-14 PROCEDURE — 85027 COMPLETE CBC AUTOMATED: CPT | Performed by: INTERNAL MEDICINE

## 2018-06-14 PROCEDURE — A9270 NON-COVERED ITEM OR SERVICE: HCPCS | Mod: GY | Performed by: INTERNAL MEDICINE

## 2018-06-14 PROCEDURE — 25000128 H RX IP 250 OP 636

## 2018-06-14 PROCEDURE — 25000132 ZZH RX MED GY IP 250 OP 250 PS 637: Mod: GY | Performed by: NURSE PRACTITIONER

## 2018-06-14 PROCEDURE — 82728 ASSAY OF FERRITIN: CPT | Performed by: INTERNAL MEDICINE

## 2018-06-14 PROCEDURE — 83540 ASSAY OF IRON: CPT | Performed by: INTERNAL MEDICINE

## 2018-06-14 PROCEDURE — 00000146 ZZHCL STATISTIC GLUCOSE BY METER IP

## 2018-06-14 RX ORDER — TORSEMIDE 20 MG/1
40 TABLET ORAL DAILY
Status: DISCONTINUED | OUTPATIENT
Start: 2018-06-15 | End: 2018-06-15 | Stop reason: HOSPADM

## 2018-06-14 RX ORDER — ISOSORBIDE MONONITRATE 30 MG/1
30 TABLET, EXTENDED RELEASE ORAL ONCE
Status: DISCONTINUED | OUTPATIENT
Start: 2018-06-14 | End: 2018-06-15

## 2018-06-14 RX ORDER — TORSEMIDE 20 MG/1
20 TABLET ORAL ONCE
Status: COMPLETED | OUTPATIENT
Start: 2018-06-14 | End: 2018-06-14

## 2018-06-14 RX ORDER — ANALGESIC BALM 1.74; 4.06 G/29G; G/29G
28 OINTMENT TOPICAL EVERY 6 HOURS PRN
Status: DISCONTINUED | OUTPATIENT
Start: 2018-06-14 | End: 2018-06-15 | Stop reason: HOSPADM

## 2018-06-14 RX ORDER — FLUORIDE TOOTHPASTE
15 TOOTHPASTE DENTAL 4 TIMES DAILY PRN
Status: DISCONTINUED | OUTPATIENT
Start: 2018-06-14 | End: 2018-06-15 | Stop reason: HOSPADM

## 2018-06-14 RX ORDER — LEVOTHYROXINE SODIUM 75 UG/1
75 TABLET ORAL
Status: DISCONTINUED | OUTPATIENT
Start: 2018-06-15 | End: 2018-06-15 | Stop reason: HOSPADM

## 2018-06-14 RX ORDER — ASPIRIN 81 MG/1
81 TABLET, CHEWABLE ORAL DAILY
Status: DISCONTINUED | OUTPATIENT
Start: 2018-06-15 | End: 2018-06-15 | Stop reason: HOSPADM

## 2018-06-14 RX ORDER — ISOSORBIDE MONONITRATE 60 MG/1
120 TABLET, EXTENDED RELEASE ORAL DAILY
Status: DISCONTINUED | OUTPATIENT
Start: 2018-06-15 | End: 2018-06-15 | Stop reason: HOSPADM

## 2018-06-14 RX ADMIN — HEPARIN SODIUM 1000 UNITS/HR: 10000 INJECTION, SOLUTION INTRAVENOUS at 00:17

## 2018-06-14 RX ADMIN — TORSEMIDE 20 MG: 20 TABLET ORAL at 09:35

## 2018-06-14 RX ADMIN — HYDRALAZINE HYDROCHLORIDE 100 MG: 50 TABLET ORAL at 09:35

## 2018-06-14 RX ADMIN — ASPIRIN 325 MG: 325 TABLET, DELAYED RELEASE ORAL at 09:35

## 2018-06-14 RX ADMIN — LEVOTHYROXINE SODIUM 50 MCG: 50 TABLET ORAL at 09:34

## 2018-06-14 RX ADMIN — TORSEMIDE 20 MG: 20 TABLET ORAL at 12:45

## 2018-06-14 RX ADMIN — THEOPHYLLINE 400 MG: 400 TABLET, EXTENDED RELEASE ORAL at 12:45

## 2018-06-14 RX ADMIN — CLOPIDOGREL 75 MG: 75 TABLET, FILM COATED ORAL at 09:35

## 2018-06-14 RX ADMIN — SENNOSIDES AND DOCUSATE SODIUM 1 TABLET: 8.6; 5 TABLET ORAL at 20:55

## 2018-06-14 RX ADMIN — SENNOSIDES AND DOCUSATE SODIUM 1 TABLET: 8.6; 5 TABLET ORAL at 09:35

## 2018-06-14 RX ADMIN — ACETAMINOPHEN 650 MG: 325 TABLET ORAL at 00:17

## 2018-06-14 RX ADMIN — ANALGESIC BALM 28 G: 1.74; 4.06 OINTMENT TOPICAL at 04:05

## 2018-06-14 RX ADMIN — METOPROLOL TARTRATE 50 MG: 50 TABLET ORAL at 09:34

## 2018-06-14 RX ADMIN — Medication 1 MG: at 00:17

## 2018-06-14 RX ADMIN — ISOSORBIDE MONONITRATE 120 MG: 60 TABLET, EXTENDED RELEASE ORAL at 12:44

## 2018-06-14 RX ADMIN — METOPROLOL TARTRATE 50 MG: 50 TABLET ORAL at 20:55

## 2018-06-14 RX ADMIN — SENNOSIDES AND DOCUSATE SODIUM 1 TABLET: 8.6; 5 TABLET ORAL at 12:42

## 2018-06-14 RX ADMIN — CETIRIZINE HYDROCHLORIDE 10 MG: 10 TABLET, FILM COATED ORAL at 20:55

## 2018-06-14 RX ADMIN — INSULIN HUMAN 10 UNITS: 100 INJECTION, SUSPENSION SUBCUTANEOUS at 20:54

## 2018-06-14 RX ADMIN — ALLOPURINOL 200 MG: 100 TABLET ORAL at 09:34

## 2018-06-14 ASSESSMENT — PAIN DESCRIPTION - DESCRIPTORS
DESCRIPTORS: TINGLING

## 2018-06-14 NOTE — PLAN OF CARE
Problem: Patient Care Overview  Goal: Plan of Care/Patient Progress Review  OT/CR: orders rec'd and chart reviewed. Cardiology is recommending angio today, however, pt is very hesitant to proceed with angiogram due to the risk of need dialysis. Will hold OT/CR today, until pt and cardiologist decide POC to proceed with angio or not.

## 2018-06-14 NOTE — PLAN OF CARE
Problem: Cardiac: Heart Failure (Adult)  Goal: Signs and Symptoms of Listed Potential Problems Will be Absent, Minimized or Managed (Cardiac: Heart Failure)  Signs and symptoms of listed potential problems will be absent, minimized or managed by discharge/transition of care (reference Cardiac: Heart Failure (Adult) CPG).   Outcome: No Change  VSS, no pain but does continue to have numbness/tingling in hands and feet. Could be neuropathy or related to thyroid. Synthroid dose adjusted. Heparin continues to infuse. Angio declined today but pt is not 100% confident in his decision. Blood sugar stable, up in room and halls with SBA. RA. Pt likes to color in his free time which helps keep him calm and relaxed. Medication dosages adjusted by cardiology today. Pt has had many short frequent runs of narrow regular tachycardia, perhaps A tach. Cardiology is aware-asymptomatic, monitor.

## 2018-06-14 NOTE — PLAN OF CARE
"Problem: Patient Care Overview  Goal: Plan of Care/Patient Progress Review  Outcome: No Change  A/o4. VSS on 1L. LS Bi-lat fine crackles. BLE edema +2. NPO at 000. C/o \"tingling\" in left hand, feet.  Dr Michael pham, ordered/given Bengay. Hep 1000/hr. Tyl & melatonin given HS, pt did NOT sleep most of night. Plan possible angio 6/14.       "

## 2018-06-14 NOTE — PROGRESS NOTES
Pt is having frequent, short runs of Atach vs SVT rate in the 110s-120's. Pt is asymptomatic. Hospitalist and cardiology PA notified. No changes at this time. Continue close monitoring.

## 2018-06-14 NOTE — CONSULTS
[facility] referral received from .     Patient not currently established in the [facility], but has seen Preethi Smiley ([facility]) previously. Pt's primary cardiologist is       [facility] appointment made for:  6/19 [facility] enrollment appointment with Preethi Smiley @ 1:10, with labs prior to appointment @ 12:10  6/26 Appointment with           We look forward to seeing Oscar in the clinic.   Please call with questions.     Willow Nelson RN, BSN  [facility] Care Coordinator  [phone]      [facility]: Cardiomyopathy, Optimization, Rehabilitation, Education   The [facility] is a heart failure specialty clinic within the [facility] where you will work with your cardiologist, nurse practitioners, physician assistants and registered nurses who specialize in heart failure care. They are dedicated to helping patients with heart failure to carefully adjust medications, receive education, and learn who and when to call if symptoms develop. They specialize in helping you better understand your condition, slow the progression of your disease, improve the length and quality of your life, help you detect future heart problems before they become life threatening, and avoid hospitalizations.

## 2018-06-14 NOTE — PROGRESS NOTES
[facility]  Cardiology Progress Note    Outpatient cardiologist: Dr. Gibson    Date of Service (when I saw the patient): 06/14/2018    Summary: Oscar Terrazas is a 84 year old male with history of chronic diastolic congestive heart failure, CAD with two vessel CABG in 1996 and multiple drug eluting stents placed since then, CKD stage III who was admitted on 6/12/2018 with worsening shortness of breath and chest discomfort.   Interval History   No chest discomfort. He feel his breathing has improved. We discussed coronary angiography. His daughter is at the bedside. He is hesitant to proceed. Weight is down 1 lb from yesterday.  Assessment & Plan   1.  NSTEMI. Troponin elevated, 1.8. He has had ongoing chest discomfort with exertion the past several weeks.   2.  Acute on chronic diastolic congestive heart failure. Last echocardiogram in March showed LVEF of 55 - 60%, grade II diastolic dysfunction, moderate inferior hypokinesis.   -  Diuretics have been held the last several months due to rising creatinine.   -  Diuresed with IV lasix on admission.   3.  CAD with prior CABG in 1996 and multiple stents placed since then. Last coronary angiogram was in 2012. Continues on aspirin and statin.    4.  CKD stage IV. Baseline creatinine now around 3.   5.  Hypertension. This has historically not been well controlled. Currently on hydralazine 100 mg TID, metoprolol 50 mg twice daily, and imdur 75 mg daily.   6.  Chronic anemia. Hemoglobin 9.    Plan.   1.  Discussed coronary angiography today. Appreciate nephrology consult yesterday. He is at a high risk for contrast nephropathy with angiogram. He is understandably very hesitant to proceed with angiogram due to the risk of need dialysis. If he remains unsure, will cancel coronary angiogram. Will continue to discuss this today.   2.  Continue Torsemide. May need higher dose given his GFR.   3.  PT consult.  4.  Will need close follow up in the cardiology  clinic.    Marie Gil PA-C    Physical Exam   Temp: 97.6  F (36.4  C) Temp src: Oral BP: 145/74 Pulse: 71 Heart Rate: 64 Resp: 18 SpO2: 99 % O2 Device: Nasal cannula Oxygen Delivery: 1 LPM  Vitals:    06/12/18 2151 06/13/18 0059 06/14/18 0135   Weight: 92.1 kg (203 lb) 93.1 kg (205 lb 3.2 oz) 92.7 kg (204 lb 6.4 oz)     Vital Signs with Ranges  Temp:  [97.6  F (36.4  C)-98.2  F (36.8  C)] 97.6  F (36.4  C)  Pulse:  [71] 71  Heart Rate:  [59-66] 64  Resp:  [18-20] 18  BP: (110-158)/(49-74) 145/74  SpO2:  [93 %-99 %] 99 %  06/09 0700 - 06/14 0659  In: 480 [P.O.:480]  Out: 3295 [Urine:3295]  Net: -2815    Constitutional: NAD.  Respiratory: breath sounds with few crackles at the bases otherwise clear.  Cardiovascular: RRR, s1s2,  Soft systolic murmur.  GI: soft, BS+  Skin: warm, no rashes  Musculoskeletal: Moving all extremities. No significant bilateral LE edema. Chronic venous stasis changes.  Neurologic: Alert, oriented x 3.   Neuropsychiatric: Normal affect       Data     Recent Labs  Lab 06/14/18  0555 06/13/18  1805 06/13/18  1015 06/13/18  0600 06/13/18  0200 06/12/18  2203   WBC 8.8  --   --   --   --  8.4   HGB 9.4*  --   --   --   --  10.5*   MCV 92  --   --   --   --  93     --   --   --   --  184    141  --   --   --  142   POTASSIUM 4.1 4.5  --   --   --  4.4   CHLORIDE 112* 109  --   --   --  111*   CO2 24 24  --   --   --  24   BUN 52* 48*  --   --   --  48*   CR 2.95* 2.80*  --   --   --  2.95*   ANIONGAP 8 8  --   --   --  7   LEV 8.2* 8.8  --   --   --  9.0   GLC 79 165*  --   --   --  250*   ALBUMIN  --   --   --   --   --  4.0   PROTTOTAL  --   --   --   --   --  7.7   BILITOTAL  --   --   --   --   --  0.3   ALKPHOS  --   --   --   --   --  117   ALT  --   --   --   --   --  20   AST  --   --   --   --   --  9   TROPI  --   --  1.720* 1.864* 1.143* 0.372*     Tele NSR    Medications     - MEDICATION INSTRUCTIONS -       HEParin 1,000 Units/hr (06/14/18 0017)     - MEDICATION  INSTRUCTIONS -       Reason ACE/ARB/ARNI order not selected       sodium chloride         allopurinol  200 mg Oral Daily     aspirin  325 mg Oral Daily     cetirizine  10 mg Oral At Bedtime     clopidogrel  75 mg Oral Daily     hydrALAZINE  100 mg Oral TID     insulin aspart  1-7 Units Subcutaneous TID AC     insulin aspart  1-5 Units Subcutaneous At Bedtime     insulin aspart   Subcutaneous TID w/meals     insulin isophane human  10 Units Subcutaneous At Bedtime     isosorbide mononitrate  75 mg Oral Daily     levothyroxine  50 mcg Oral Daily     metoprolol tartrate  50 mg Oral BID     senna-docusate  1 tablet Oral BID    Or     senna-docusate  2 tablet Oral BID     sodium chloride (PF)  3 mL Intracatheter Q8H     theophylline  400 mg Oral Daily     torsemide  20 mg Oral Daily

## 2018-06-14 NOTE — PROGRESS NOTES
[facility]     Renal Progress Note       SHORTHAND KEY FOR MY NOTES:  c = with, s = without, p = after, a = before, x = except, asx = asymptomatic, tx = transplant or treatment, sx = symptoms or symptomatic, cx = canceled or culture, rxn = reaction, yday = yesterday, nl = normal, abx = antibiotics, fxn = function, dx = diagnosis, dz = disease, m/h = melena/hematochezia, c/d/l/ha = cramping/dizziness/lightheadedness/headache, d/c = discharge or diarrhea/constipation, f/c/n/v = fevers/chills/nausea/vomiting, cp/sob = chest pain/shortness of breath.         Assessment/Plan:     1.  CKD IV.  Pt's cr is stable.  He is still TBV up, so diuretics were increased today.  A.  Follow labs, uo, sx.  B.  F/u c me in a month.    2.  CAD.  Pt's sx have improved.  Iso mono was increased. Asx at present.  A.  Agree c med mgmt.    3.  FEN.  Electrolytes are ok.    4.  Code Status.  Pt told me he doesn't want resuscitation, and states his dtr and he discussed it c someone today.  He is still listed as Full Code.  A.  Clarify code status c pt/dtr.        Interval History:     Pt is feeling well.  No cp/sob.  Swelling in legs has improved.  He states that his dtr and he discussed code status and the decision was made to be DNR/DNI, however, he is still listed as Full Code here.          Medications and Allergies:       allopurinol  200 mg Oral Daily     [START ON 6/15/2018] aspirin  81 mg Oral Daily     cetirizine  10 mg Oral At Bedtime     clopidogrel  75 mg Oral Daily     insulin aspart  1-7 Units Subcutaneous TID AC     insulin aspart  1-5 Units Subcutaneous At Bedtime     insulin aspart   Subcutaneous TID w/meals     insulin isophane human  10 Units Subcutaneous At Bedtime     [START ON 6/15/2018] isosorbide mononitrate  120 mg Oral Daily     isosorbide mononitrate  30 mg Oral Once     [START ON 6/15/2018] levothyroxine  75 mcg Oral QAM AC     metoprolol tartrate  50 mg Oral BID     senna-docusate  1 tablet Oral BID     Or     senna-docusate  2 tablet Oral BID     sodium chloride (PF)  3 mL Intracatheter Q8H     theophylline  400 mg Oral Daily     [START ON 6/15/2018] torsemide  40 mg Oral Daily      Allergies   Allergen Reactions     Advair [Fluticasone-Salmeterol]      cough;  insomnia, nightmares     Amlodipine Besylate      edema       Azithromycin GI Disturbance     Clarithromycin      gi     Hydrochlorothiazide      hctz + lisinopril-->inc creat, k+     Lisinopril      lisinopril + hctz --> inc creat, k+     Moxifloxacin Hydrochloride      clostridium diffile colitis     Penicillins      gen swelling     Pravastatin Cramps     Muscle cramps/spasm.  Stopped 2017     Vioxx      edema          Physical Exam:     Vitals were reviewed    Heart Rate: 64, Blood pressure 156/78, pulse 71, temperature 97.4  F (36.3  C), temperature source Oral, resp. rate 16, height 1.829 m (6'), weight 92.7 kg (204 lb 6.4 oz), SpO2 98 %.  Wt Readings from Last 3 Encounters:   06/14/18 92.7 kg (204 lb 6.4 oz)   06/06/18 95 kg (209 lb 6.4 oz)   05/03/18 95.8 kg (211 lb 1.6 oz)     Intake/Output Summary (Last 24 hours) at 06/14/18 1624  Last data filed at 06/14/18 1612   Gross per 24 hour   Intake              490 ml   Output             2295 ml   Net            -1805 ml     GENERAL APPEARANCE: pleasant, NAD, alert  HEENT:  Eyes/ears/nose/neck grossly normal  RESP: lungs cta b c good efforts, no crackles  CV: RRR c 2/6 m, nl S1/S2   ABDOMEN: o/s/nt/nd  EXTREMITIES/SKIN: tr  ble edema         Data:     CBC RESULTS:     Recent Labs  Lab 06/14/18  0555 06/12/18  2203   WBC 8.8 8.4   RBC 3.08* 3.50*   HGB 9.4* 10.5*   HCT 28.3* 32.4*    184     Basic Metabolic Panel:    Recent Labs  Lab 06/14/18  0555 06/13/18  1805 06/12/18  2203    141 142   POTASSIUM 4.1 4.5 4.4   CHLORIDE 112* 109 111*   CO2 24 24 24   BUN 52* 48* 48*   CR 2.95* 2.80* 2.95*   GLC 79 165* 250*   LEV 8.2* 8.8 9.0     INRNo lab results found in last 7 days.   Attestation:    I have reviewed today's relevant vital signs, notes, medications, labs and imaging.    Jese Ryder MD  [facility] - Nephrology  [phone]

## 2018-06-14 NOTE — PROGRESS NOTES
[facility]    Hospitalist Progress Note    Date of Service (when I saw the patient): 06/14/2018    Assessment & Plan   Oscar Terrazas is a 84 year old male with hx of CAD s/p CABG/PCI, dCHF, HTN, DM2, and CKD who was admitted on 6/12/2018 for NSTEMI and acute CHF exacerbation.     NSTEMI  History of CAD s/p 1-v CABG and subsequent PCI  Presented with exertional chest pain and shortness of breath. EKG on arrival showed old LBBB and serial troponins were elevated from 1.143>1.864>1.720. On admission, he was loaded with aspirin and started on a heparin infusion. Coronary angiogram was considered, but was precluded by patient's renal function. Nephrology was consulted, and recommended against angiogram given high risk for contrast nephropathy and patient's wishes NOT to pursue dialysis if this were ever indicated. Stress test was also deferred because it would not . Because the patient has remained chest pain free, and is otherwise clinically improving, the decision was made to optimize medical treatment.    - Continues on heparin gtt through today  - Continues on PTA aspirin, Plavix, and metoprolol  - PTA Imdur has been increased from 60 to 120 mg daily  - Holding PTA hydralazine to allow room to titrate Imdur, per Cardiology  - Cardiac rehab  - Cardiology following, appreciate assistance    Acute exacerbation of chronic diastolic CHF  Hypertensive emergency  CXR on admission showed pulmonary edema. NTproBNP >10,000 in the context of dietary indiscretion and marked hypertension to 212/101 on arrival. TTE (3/14) showed LVEF 55-60% with grade II diastolic dysfunction and hypokinesis of the basal inferior and inferolateral walls. On admission, he was initiated on IV Lasix  - Net neg 3L since admission  - Torsemide increased from 20 to 40 mg daily today by Cardiology  - BP remains acceptable over last 24 hours    CKD stage IV  Baseline Cr ~2.9-3.5. He does not have a primary  "nephrologist. Nephrology was consulted on admission.  - Cr 2.95 today, will follow  - He will establish care with Dr. Ryder at [facility], and has an appt next month    Chronic anemia  Hgb 9-10 range. Likely due to renal disease. 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 May be paresthesias from loop diuretic vs diabetic neuropathy vs hypothyroidism.  - Check B12  - See hypothyroidism below  - Patient has been advised to monitor his symptoms, and if persistent, could consider empiric initiation of renally-dosed gabapentin as per PCP    Hypothyroidism  [PTA: levothyroxine 50 mcg daily] TSH 5.24, T4 0.90 on 6/5  - Increase levothyroxine to 75 mcg daily  - Repeat thyroid studies in 4-6 weeks    Gout  Chronic and stable on allopurinol    # Pain Assessment:  Current Pain Score 6/14/2018   Patient currently in pain? yes   Pain score (0-10) 8   Pain location Hand   Pain descriptors Tingling   - Oscar is experiencing pain due to possible neuropathy. Pain management was discussed and the plan was created in a collaborative fashion.  Oscar's response to the current recommendations: engaged  - Pharmacologic adjuvants: Acetaminophen    FEN: Cardiac diet  DVT Prophylaxis: Heparin gtt  Code Status: Full Code    Disposition: Expected discharge tomorrow if he remains on room air and renal function is stable. Needs to work with PT/OT still    Josefina Rodriguez    Interval History   No events overnight. Feels better. Off oxygen today. Denies chest pain. Shortness of breath and edema improving. Cardiology recs reviewed with patient and daughter. Discussed concerns re:neuropathy with patient's daughter over the phone. Telemetry reviewed with RN: " has been having non-sustained atach vs SVT  - Medication adjustments by Cardiology reviewed  - Check B12    -Data reviewed today: I reviewed all new labs and imaging results over the last 24 hours. I personally reviewed no images or EKG's today.    Physical Exam   Temp: 97.2  F (36.2  C) Temp src: Oral BP: 115/55 Pulse: 71 Heart Rate: 63 Resp: 16 SpO2: 97 % O2 Device: None (Room air) Oxygen Delivery: 1 LPM  Vitals:    06/12/18 2151 06/13/18 0059 06/14/18 0135   Weight: 92.1 kg (203 lb) 93.1 kg (205 lb 3.2 oz) 92.7 kg (204 lb 6.4 oz)     Vital Signs with Ranges  Temp:  [97.2  F (36.2  C)-98.4  F (36.9  C)] 97.2  F (36.2  C)  Pulse:  [71] 71  Heart Rate:  [61-66] 63  Resp:  [16-20] 16  BP: (109-158)/(44-74) 115/55  SpO2:  [93 %-99 %] 97 %  I/O last 3 completed shifts:  In: 480 [P.O.:480]  Out: 1870 [Urine:1870]    Constitutional: Appears comfortable, NAD  Respiratory: Breathing non-labored. Lungs CTAB - no wheezes, crackles, or rhonchi  Cardiovascular: Heart RRR, no m/r/g. 1+ BLE edema  GI: +BS, abd soft/NT  Skin/Integumen: No rash  Neuro: Alert and appropriate, CHOUDHURY  Psych: Calm and cooperative    Medications     - MEDICATION INSTRUCTIONS -       HEParin 1,000 Units/hr (06/14/18 0017)     - MEDICATION INSTRUCTIONS -       Reason ACE/ARB/ARNI order not selected         allopurinol  200 mg Oral Daily     [START ON 6/15/2018] aspirin  81 mg Oral Daily     cetirizine  10 mg Oral At Bedtime     clopidogrel  75 mg Oral Daily     insulin aspart  1-7 Units Subcutaneous TID AC     insulin aspart  1-5 Units Subcutaneous At Bedtime     insulin aspart   Subcutaneous TID w/meals     insulin isophane human  10 Units Subcutaneous At Bedtime     [START ON 6/15/2018] isosorbide mononitrate  120 mg Oral Daily     isosorbide mononitrate  30 mg Oral Once     levothyroxine  50 mcg Oral Daily     metoprolol tartrate  50 mg Oral BID     senna-docusate  1 tablet Oral BID    Or     senna-docusate  2 tablet Oral BID     sodium chloride (PF)  3  mL Intracatheter Q8H     theophylline  400 mg Oral Daily     [START ON 6/15/2018] torsemide  40 mg Oral Daily     Data     Recent Labs  Lab 06/14/18  0555 06/13/18  1805 06/13/18  1015 06/13/18  0600 06/13/18  0200 06/12/18  2203   WBC 8.8  --   --   --   --  8.4   HGB 9.4*  --   --   --   --  10.5*   MCV 92  --   --   --   --  93     --   --   --   --  184    141  --   --   --  142   POTASSIUM 4.1 4.5  --   --   --  4.4   CHLORIDE 112* 109  --   --   --  111*   CO2 24 24  --   --   --  24   BUN 52* 48*  --   --   --  48*   CR 2.95* 2.80*  --   --   --  2.95*   ANIONGAP 8 8  --   --   --  7   LEV 8.2* 8.8  --   --   --  9.0   GLC 79 165*  --   --   --  250*   ALBUMIN  --   --   --   --   --  4.0   PROTTOTAL  --   --   --   --   --  7.7   BILITOTAL  --   --   --   --   --  0.3   ALKPHOS  --   --   --   --   --  117   ALT  --   --   --   --   --  20   AST  --   --   --   --   --  9   TROPI  --   --  1.720* 1.864* 1.143* 0.372*       No results found for this or any previous visit (from the past 24 hour(s)).

## 2018-06-14 NOTE — PROVIDER NOTIFICATION
Brief update:    Pt complains of neuropathic type tingling in LEs.     Menthol to LEs; could consider renal dosing of gabepentin, but unlikely to provide immediate symptom relief.     If persistent, consider ABG to rule out hypercarbia.    Dillon Rodriguez MD   3:33 AM

## 2018-06-15 ENCOUNTER — APPOINTMENT (OUTPATIENT)
Dept: OCCUPATIONAL THERAPY | Facility: CLINIC | Age: 83
DRG: 280 | End: 2018-06-15
Payer: MEDICARE

## 2018-06-15 VITALS
TEMPERATURE: 97.8 F | HEIGHT: 72 IN | OXYGEN SATURATION: 99 % | SYSTOLIC BLOOD PRESSURE: 148 MMHG | WEIGHT: 201.5 LBS | DIASTOLIC BLOOD PRESSURE: 68 MMHG | HEART RATE: 71 BPM | BODY MASS INDEX: 27.29 KG/M2 | RESPIRATION RATE: 18 BRPM

## 2018-06-15 LAB
ANION GAP SERPL CALCULATED.3IONS-SCNC: 9 MMOL/L (ref 3–14)
BUN SERPL-MCNC: 51 MG/DL (ref 7–30)
CALCIUM SERPL-MCNC: 8.4 MG/DL (ref 8.5–10.1)
CHLORIDE SERPL-SCNC: 110 MMOL/L (ref 94–109)
CO2 SERPL-SCNC: 25 MMOL/L (ref 20–32)
CREAT SERPL-MCNC: 3 MG/DL (ref 0.66–1.25)
GFR SERPL CREATININE-BSD FRML MDRD: 20 ML/MIN/1.7M2
GLUCOSE BLDC GLUCOMTR-MCNC: 104 MG/DL (ref 70–99)
GLUCOSE BLDC GLUCOMTR-MCNC: 162 MG/DL (ref 70–99)
GLUCOSE BLDC GLUCOMTR-MCNC: 90 MG/DL (ref 70–99)
GLUCOSE SERPL-MCNC: 83 MG/DL (ref 70–99)
LMWH PPP CHRO-ACNC: 0.2 IU/ML
POTASSIUM SERPL-SCNC: 3.8 MMOL/L (ref 3.4–5.3)
SODIUM SERPL-SCNC: 144 MMOL/L (ref 133–144)

## 2018-06-15 PROCEDURE — A9270 NON-COVERED ITEM OR SERVICE: HCPCS | Mod: GY | Performed by: INTERNAL MEDICINE

## 2018-06-15 PROCEDURE — 97166 OT EVAL MOD COMPLEX 45 MIN: CPT | Mod: GO | Performed by: OCCUPATIONAL THERAPIST

## 2018-06-15 PROCEDURE — 99232 SBSQ HOSP IP/OBS MODERATE 35: CPT | Performed by: NURSE PRACTITIONER

## 2018-06-15 PROCEDURE — A9270 NON-COVERED ITEM OR SERVICE: HCPCS | Mod: GY | Performed by: NURSE PRACTITIONER

## 2018-06-15 PROCEDURE — 99239 HOSP IP/OBS DSCHRG MGMT >30: CPT | Performed by: INTERNAL MEDICINE

## 2018-06-15 PROCEDURE — 25000132 ZZH RX MED GY IP 250 OP 250 PS 637: Mod: GY | Performed by: NURSE PRACTITIONER

## 2018-06-15 PROCEDURE — 97110 THERAPEUTIC EXERCISES: CPT | Mod: GO | Performed by: OCCUPATIONAL THERAPIST

## 2018-06-15 PROCEDURE — 97535 SELF CARE MNGMENT TRAINING: CPT | Mod: GO | Performed by: OCCUPATIONAL THERAPIST

## 2018-06-15 PROCEDURE — 25000132 ZZH RX MED GY IP 250 OP 250 PS 637: Mod: GY | Performed by: INTERNAL MEDICINE

## 2018-06-15 PROCEDURE — 80048 BASIC METABOLIC PNL TOTAL CA: CPT | Performed by: INTERNAL MEDICINE

## 2018-06-15 PROCEDURE — 25000128 H RX IP 250 OP 636: Performed by: INTERNAL MEDICINE

## 2018-06-15 PROCEDURE — 85520 HEPARIN ASSAY: CPT | Performed by: INTERNAL MEDICINE

## 2018-06-15 PROCEDURE — 36415 COLL VENOUS BLD VENIPUNCTURE: CPT | Performed by: INTERNAL MEDICINE

## 2018-06-15 PROCEDURE — 00000146 ZZHCL STATISTIC GLUCOSE BY METER IP

## 2018-06-15 PROCEDURE — 40000133 ZZH STATISTIC OT WARD VISIT: Performed by: OCCUPATIONAL THERAPIST

## 2018-06-15 RX ORDER — ISOSORBIDE MONONITRATE 120 MG/1
120 TABLET, EXTENDED RELEASE ORAL DAILY
Qty: 30 TABLET | Refills: 1 | Status: SHIPPED | OUTPATIENT
Start: 2018-06-17 | End: 2018-09-05

## 2018-06-15 RX ORDER — TORSEMIDE 20 MG/1
40 TABLET ORAL DAILY
Qty: 30 TABLET | Refills: 1 | Status: SHIPPED | OUTPATIENT
Start: 2018-06-16 | End: 2018-06-19

## 2018-06-15 RX ORDER — LEVOTHYROXINE SODIUM 75 UG/1
75 TABLET ORAL DAILY
Qty: 30 TABLET | Refills: 1 | Status: SHIPPED | OUTPATIENT
Start: 2018-06-15 | End: 2018-08-06

## 2018-06-15 RX ORDER — HYDRALAZINE HYDROCHLORIDE 25 MG/1
25 TABLET, FILM COATED ORAL EVERY 8 HOURS SCHEDULED
Status: DISCONTINUED | OUTPATIENT
Start: 2018-06-15 | End: 2018-06-15 | Stop reason: HOSPADM

## 2018-06-15 RX ORDER — NITROGLYCERIN 0.4 MG/1
TABLET SUBLINGUAL
Qty: 25 TABLET | Refills: 1 | Status: SHIPPED | OUTPATIENT
Start: 2018-06-15

## 2018-06-15 RX ORDER — HYDRALAZINE HYDROCHLORIDE 25 MG/1
25 TABLET, FILM COATED ORAL 3 TIMES DAILY
Qty: 90 TABLET | Refills: 1 | Status: SHIPPED | OUTPATIENT
Start: 2018-06-15 | End: 2018-12-19 | Stop reason: DRUGHIGH

## 2018-06-15 RX ADMIN — METOPROLOL TARTRATE 50 MG: 50 TABLET ORAL at 08:51

## 2018-06-15 RX ADMIN — HEPARIN SODIUM 1000 UNITS/HR: 10000 INJECTION, SOLUTION INTRAVENOUS at 00:15

## 2018-06-15 RX ADMIN — HYDRALAZINE HYDROCHLORIDE 25 MG: 25 TABLET ORAL at 09:55

## 2018-06-15 RX ADMIN — ALLOPURINOL 200 MG: 100 TABLET ORAL at 08:51

## 2018-06-15 RX ADMIN — TORSEMIDE 40 MG: 20 TABLET ORAL at 08:51

## 2018-06-15 RX ADMIN — SENNOSIDES AND DOCUSATE SODIUM 1 TABLET: 8.6; 5 TABLET ORAL at 08:51

## 2018-06-15 RX ADMIN — THEOPHYLLINE 400 MG: 400 TABLET, EXTENDED RELEASE ORAL at 08:51

## 2018-06-15 RX ADMIN — CLOPIDOGREL 75 MG: 75 TABLET, FILM COATED ORAL at 08:51

## 2018-06-15 RX ADMIN — ASPIRIN 81 MG 81 MG: 81 TABLET ORAL at 08:51

## 2018-06-15 RX ADMIN — ISOSORBIDE MONONITRATE 120 MG: 60 TABLET, EXTENDED RELEASE ORAL at 08:51

## 2018-06-15 RX ADMIN — LEVOTHYROXINE SODIUM 75 MCG: 75 TABLET ORAL at 08:51

## 2018-06-15 ASSESSMENT — ACTIVITIES OF DAILY LIVING (ADL): PREVIOUS_RESPONSIBILITIES: MEAL PREP;DRIVING;MEDICATION MANAGEMENT

## 2018-06-15 NOTE — PLAN OF CARE
Problem: Cardiac: Heart Failure (Adult)  Goal: Signs and Symptoms of Listed Potential Problems Will be Absent, Minimized or Managed (Cardiac: Heart Failure)  Signs and symptoms of listed potential problems will be absent, minimized or managed by discharge/transition of care (reference Cardiac: Heart Failure (Adult) CPG).   Outcome: Improving  Heart Failure Care Pathway  GOALS TO BE MET BEFORE DISCHARGE:    1. Decrease congestion and/or edema with diuretic therapy to achieve near      optimal volume status.            Dyspnea improved:  Yes            Edema improved:     Yes        Net I/O and Weights since admission:          05/16 1500 - 06/15 1459  In: 2788.5 [P.O.:1890; I.V.:898.5]  Out: 5695 [Urine:5695]  Net: -2906.5            Vitals:    06/12/18 2151 06/13/18 0059 06/14/18 0135 06/15/18 0155   Weight: 92.1 kg (203 lb) 93.1 kg (205 lb 3.2 oz) 92.7 kg (204 lb 6.4 oz) 91.4 kg (201 lb 8 oz)       2.  O2 sats > 92% on RA or at prior home O2 therapy level.          Current oxygenation status:       SpO2: 99 %         O2 Device: None (Room air),            Able to wean O2 this shift to keep sats > 92%:  Yes       Does patient use Home O2? No    3.  Tolerates ambulation and mobility near baseline: Yes        How many times did the patient ambulate with nursing staff this shift? 1    Please review the Heart Failure Care Pathway for additional HF goal outcomes.    A/O but forgetful, VSS ex /70 before meds, currently 148/68. O2sats 99% on RA. Tele SB c BBB, HR 50s-60s. Plan for discharge this afternoon if BP improved, goal at least 140s, on increased meds.     Anitra Mcguire RN  6/15/2018

## 2018-06-15 NOTE — PLAN OF CARE
Problem: Patient Care Overview  Goal: Plan of Care/Patient Progress Review  Discharge Planner PT   Patient plan for discharge: see OT note; patient discharged  Current status: Patient declining any need for PT intervention; see OT evaluation for current status; Patient noted to have discharged  Barriers to return to prior living situation: see OT note  Recommendations for discharge: defer to OT  Rationale for recommendations: defer to OT       Entered by: Gissel Paulino 06/15/2018 1:37 PM

## 2018-06-15 NOTE — PLAN OF CARE
"Problem: Patient Care Overview  Goal: Plan of Care/Patient Progress Review  Discharge Planner OT   Patient plan for discharge: home  Current status: Eval completed and treatment initiated. Pt lives in a house with his adult dtr who works during the day. Pt reports I with ADL/IADLs and does not use an AD for ambulation. Pt reports dtr A\"s with med set up and he does drive, pt does make his own breakfast and lunch and his dtr makes dinner. Pt admitted due to SOB and chest discomfort and diagnosed with acute CHF exacerbation and NSTEMI, pt and family decided on conservative approach and medically managed. Pt ambulated with SBA/supervision approx 400 ft, pt limping due to R hip pain and reports this is baseline and declines AD or PT while inpatient or outpatient, pt noted to have some SOB and fatigue, reports this it baseline, denies any chest pressure and BP hypertensive at rest and then blunted and just took BP meds, see vital sign flow sheet for details. Pt educated in CHF self care concepts and signs and symptoms of MI/angina and CAD risk factors, pt repetitive at times and oriented to self, place and current year and reports [place] of May. Pt denies any cognitive/memory impairments, however, would recommend further assessment as OP basis if pt willing.   Barriers to return to prior living situation: decreased balance, chronic R hip pain, noted pt to be repetitive during eval and decreased activity tolerance.   Recommendations for discharge: home with A with IADL's ie dari reyes MD to address community mobility and OP CR at [facility], PT for balance and OT for cognitive assessment. Pt not sure he will want to attend OP CR and declines OP PT, did not address OP OT with pt.  Rationale for recommendations: A with IADL's to insure safety with IADL's and OP CR for monitored progressive exercise and risk factor education and modification and PT for balance and OT for cognition.     Occupational Therapy Discharge Summary    Reason " for therapy discharge:    Discharged to home with outpatient therapy.    Progress towards therapy goal(s). See goals on Care Plan in [facility] electronic health record for goal details.  Goals not met.  Barriers to achieving goals:   discharge from facility and discharge on same date as initial evaluation.    Therapy recommendation(s):    Continued therapy is recommended.  Rationale/Recommendations:  home with A with IADL's to insure safety, OP CR for monitored progressive exercise and risk factor education and modification, OP PT for balance and OT for cognition/IADL safety. .           Entered by: Teresita Carver 06/15/2018 12:30 PM

## 2018-06-15 NOTE — PROGRESS NOTES
[facility]  Cardiology Progress Note    Outpatient cardiologist: Dr. Gibson    Date of Service (when I saw the patient): 06/15/2018    Summary: Oscar Terrazas is a 84 year old male with history of chronic diastolic congestive heart failure, CAD with two vessel CABG in  and multiple drug eluting stents placed since then, CKD stage III who was admitted on 2018 with worsening shortness of breath and chest discomfort.   Interval History   Weight down 4 pounds since admission, ambulated around the unit last night without SOB, legs feel better after keeping them elevated most of the day.  Assessment & Plan   1.  NSTEMI. Troponin elevated, 1.8. He has had ongoing chest discomfort with exertion the past several weeks- no further chest pain  2.  Acute on chronic diastolic congestive heart failure. Last echocardiogram in March showed LVEF of 55 - 60%, grade II diastolic dysfunction, moderate inferior hypokinesis.   -  Diuretics have been held the last several months due to rising creatinine.   -  Diuresed with IV lasix on admission   3.  CAD with prior CABG in  and multiple stents placed since then. Last coronary angiogram was in . Continues on aspirin and statin.    4.  CKD stage IV. Baseline creatinine now around 3.   5.  Hypertension. This has historically not been well controlled.  Imdur increased to 120 mg per day, Currently  hydralazine 100 mg TID is on hold to allow room for Blood pressure with increased imdur,  BP up into the 160's, metoprolol 50 mg twice daily continues.  6.  Chronic anemia. Hemoglobin 9.    Plan.   1. Arranged follow-up with primary Cardiologist Dr. Gibson-pt hesitate to undergo coronary angio as patient's wife  in  and prior to that she was on dialysis.  Mr. Terrazas did not think dialysis gave her a good quality of life and is very hesitant to undertake this possibility if he were to go for a coronary angiogram. The coronary angiogram for today has been  canceled.  This can be re-visited as an outpatient with his established cardiologist Dr. Gibson, if he continues to have symptoms concerning for angina after medication optimization.  Because we are not proceeding to an angiogram, I do not think there is much benefit in doing a stress test.  Optimize medical treatment for heart failure and angina. Scheduled to see Dr. Gibson 6/26,[facility] appt 6/19  2. Daily weights and close follow-up of BMP  3. Pt monitors BP at home, may need some amount of hydralazine for Blood pressure, will review add 25 mg TID today and evaluate prior to discharge.    Preethi Smiley, APRN     Physical Exam   Temp: 97.7  F (36.5  C) Temp src: Oral BP: 151/74   Heart Rate: 62 Resp: 18 SpO2: 98 % O2 Device: None (Room air) Oxygen Delivery: 1 LPM  Vitals:    06/12/18 2151 06/13/18 0059 06/14/18 0135 06/15/18 0155   Weight: 92.1 kg (203 lb) 93.1 kg (205 lb 3.2 oz) 92.7 kg (204 lb 6.4 oz) 91.4 kg (201 lb 8 oz)     Vital Signs with Ranges  Temp:  [97.2  F (36.2  C)-98.4  F (36.9  C)] 97.7  F (36.5  C)  Heart Rate:  [61-74] 62  Resp:  [16-18] 18  BP: (109-156)/(44-78) 151/74  SpO2:  [95 %-99 %] 98 %  06/10 0700 - 06/15 0659  In: 2428.5 [P.O.:1530; I.V.:898.5]  Out: 5395 [Urine:5395]  Net: -2966.5    Constitutional: NAD.  Respiratory: breath sounds clear   Cardiovascular: RRR, s1s2,  Soft systolic murmur.  GI: soft, BS+  Skin: warm, no rashes  Musculoskeletal: Moving all extremities. No significant bilateral LE edema. Chronic venous stasis changes.  Neurologic: Alert, oriented x 3.   Neuropsychiatric: Normal affect       Data     Recent Labs  Lab 06/15/18  0540 06/14/18  0555 06/13/18  1805 06/13/18  1015 06/13/18  0600 06/13/18  0200 06/12/18  2203   WBC  --  8.8  --   --   --   --  8.4   HGB  --  9.4*  --   --   --   --  10.5*   MCV  --  92  --   --   --   --  93   PLT  --  167  --   --   --   --  184    144 141  --   --   --  142   POTASSIUM 3.8 4.1 4.5  --   --   --  4.4   CHLORIDE 110* 112*  109  --   --   --  111*   CO2 25 24 24  --   --   --  24   BUN 51* 52* 48*  --   --   --  48*   CR 3.00* 2.95* 2.80*  --   --   --  2.95*   ANIONGAP 9 8 8  --   --   --  7   LEV 8.4* 8.2* 8.8  --   --   --  9.0   GLC 83 79 165*  --   --   --  250*   ALBUMIN  --   --   --   --   --   --  4.0   PROTTOTAL  --   --   --   --   --   --  7.7   BILITOTAL  --   --   --   --   --   --  0.3   ALKPHOS  --   --   --   --   --   --  117   ALT  --   --   --   --   --   --  20   AST  --   --   --   --   --   --  9   TROPI  --   --   --  1.720* 1.864* 1.143* 0.372*     Tele NSR    Medications     - MEDICATION INSTRUCTIONS -       HEParin 1,000 Units/hr (06/15/18 0015)     - MEDICATION INSTRUCTIONS -       Reason ACE/ARB/ARNI order not selected         allopurinol  200 mg Oral Daily     aspirin  81 mg Oral Daily     cetirizine  10 mg Oral At Bedtime     clopidogrel  75 mg Oral Daily     insulin aspart  1-7 Units Subcutaneous TID AC     insulin aspart  1-5 Units Subcutaneous At Bedtime     insulin aspart   Subcutaneous TID w/meals     insulin isophane human  10 Units Subcutaneous At Bedtime     isosorbide mononitrate  120 mg Oral Daily     isosorbide mononitrate  30 mg Oral Once     levothyroxine  75 mcg Oral QAM AC     metoprolol tartrate  50 mg Oral BID     senna-docusate  1 tablet Oral BID    Or     senna-docusate  2 tablet Oral BID     sodium chloride (PF)  3 mL Intracatheter Q8H     theophylline  400 mg Oral Daily     torsemide  40 mg Oral Daily

## 2018-06-15 NOTE — PROGRESS NOTES
[facility] DISCHARGE NOTE    Patient discharged to home at 1:49 PM via wheel chair. Accompanied by daughter and staff. Discharge instructions reviewed with patient, opportunity offered to ask questions. Prescriptions sent to patients preferred pharmacy. All belongings sent with patient.    Lila Lopez

## 2018-06-15 NOTE — DISCHARGE SUMMARY
[facility]    Discharge Summary  Hospitalist    Date of Admission:  6/12/2018  Date of Discharge:  6/15/2018  Discharging Provider: Josefina Rodriguez  Date of Service (when I saw the patient): 06/15/18    Discharge Diagnoses     1. NSTEMI  2. History of CAD s/p 1-v CABG and subsequent PCI  3. Acute exacerbation of chronic diastolic CHF  4. Hypertensive emergency  5. CKD stage IV  6. Chronic anemia  7. 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 Possible peripheral neuropathy  9. Hypothyroidism  10. Gout    History of Present Illness   Please see H&P by Dr. Rodriguez on 6/13/2018    Hospital Course   Oscar Terrazas is a 84 year old male with hx of CAD s/p CABG/PCI, dCHF, HTN, DM2, and CKD who was admitted on 6/12/2018 for NSTEMI and acute CHF exacerbation. He was treated medically with improvement, and is discharging home with outpatient cardiac rehab.  The following problems were addressed during his hospitalization:    NSTEMI  History of CAD s/p 1-v CABG and subsequent PCI  Presented with exertional chest pain and shortness of breath. EKG on arrival showed old LBBB and serial troponins were elevated from 1.143>1.864>1.720. On admission, he was loaded with aspirin and started on a heparin infusion. Coronary angiogram was considered, but was precluded by patient's renal function. Nephrology was consulted, and recommended against angiogram given high risk for contrast nephropathy and patient's wishes NOT to pursue dialysis if this were ever indicated. Stress test was also deferred because it would not . Because the patient has remained chest pain free, and was otherwise clinically improving, the decision was made to optimize medical treatment. He was treated with a heparin infusion for 48 hours and his cardiac medications were adjusted.  - PTA Imdur was increased from 60 to 120 mg daily  - PTA hydralazine was decreased from 75 to 25 mg TID  - He continues on his PTA aspirin, Plavix,  "and metoprolol at the time of discharge  - He will continue outpatient cardiac rehab and follow up in [facility] after discharge     Acute exacerbation of chronic diastolic CHF, Improving  Hypertensive emergency  CXR on admission showed pulmonary edema. NTproBNP >10,000 in the context of dietary indiscretion and marked hypertension to 212/101 on arrival. TTE (3/14) showed LVEF 55-60% with grade II diastolic dysfunction and hypokinesis of the basal inferior and inferolateral walls. On admission, he was initiated on IV Lasix and eventually transitioned to torsemide.  - Net neg ~3L since admission  - He will be discharged on torsemide 40 mg daily      CKD stage IV  Baseline Cr ~2.9-3.5. His renal function remained stable within baseline throughout his hospitalization. He does not have a primary nephrologist, and has an appointment to establish care with Dr. Ryder at [facility] next month     Chronic anemia  Hgb 9-10 range. Likely due to renal disease. 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 Continues on PTA NPH 18 units qhs at discharge.    Hypothyroidism  [PTA: levothyroxine 50 mcg daily]   - TSH 5.24, T4 0.90 on 6/5  - PTA levothyroxine was increased from 50 to 75 mcg daily during this hospitalization  - Recommend repeat thyroid studies in 4-6 weeks    Possible peripheral neuropathy  Patient reported \"pins and needles\" sensation in his hands and feet during admission. May be paresthesias from loop diuretic vs diabetic neuropathy vs hypothyroidism. B12 level during admission was normal  - Levothyroxine dose was increased as noted above  - Patient has been advised to monitor his symptoms, and if persistent over the next few weeks, could consider empiric initiation of renally-dosed gabapentin as per PCP     Gout  Chronic and stable on allopurinol    # Discharge Pain Plan  - Patient currently has NO PAIN and is not being prescribed pain medications on " discharge.    Josefina Ny Michael    Significant Results and Procedures   As noted above    Pending Results   None    Code Status   DNR / DNI       Primary Care Physician   Jorge Hillman    Physical Exam   Temp: 97.8  F (36.6  C) Temp src: Oral BP: 148/68 (post ex OT/CR)   Heart Rate: 110 Resp: 18 SpO2: 99 % O2 Device: None (Room air)    Vitals:    06/13/18 0059 06/14/18 0135 06/15/18 0155   Weight: 93.1 kg (205 lb 3.2 oz) 92.7 kg (204 lb 6.4 oz) 91.4 kg (201 lb 8 oz)     Vital Signs with Ranges  Temp:  [97.2  F (36.2  C)-98.4  F (36.9  C)] 97.8  F (36.6  C)  Heart Rate:  [] 110  Resp:  [16-18] 18  BP: (109-173)/(44-78) 148/68  SpO2:  [95 %-100 %] 99 %  I/O last 3 completed shifts:  In: 1948.5 [P.O.:1050; I.V.:898.5]  Out: 2100 [Urine:2100]    Constitutional: Appears comfortable, NAD  Respiratory: Breathing non-labored. Lungs CTAB - no wheezes/crackles/rhonchi  Cardiovascular: Heart RRR, no m/r/g. 1+ BLE edema  GI: +BS. Abd soft/NT  Skin: Warm and dry. No rash.  Musculoskeletal: Normal muscle bulk and tone  Neurologic: Alert and appropriate. CHOUDHURY  Psychiatric: Calm and cooperative    Discharge Disposition   Discharged to home  Condition at discharge: Satisfactory    Consultations This Hospital Stay     1. CARDIOLOGY IP CONSULT  2. NEPHROLOGY IP CONSULT  3. CARDIAC REHAB IP CONSULT  4. NUTRITION SERVICES ADULT IP CONSULT  5. [facility] EVALUATION IP CONSULT    Time Spent on this Encounter   I, Josefina Rodriguez, personally saw the patient today and spent 45 minutes discharging this patient.    Discharge Orders     Cardiac Rehab Referral     Reason for your hospital stay   Chest pain and shortness of breath due to possible heart attack - we avoided an angiogram, and you were treated with medications alone     Activity   Your activity upon discharge: activity as tolerated     Follow-up and recommended labs and tests    Follow up with primary care provider, Jorge Hillman, within 2 weeks for hospital follow- up.   The following labs/tests are recommended: repeat thyroid studies in 4-6 weeks.      Follow up with Cardiology as scheduled    Follow up with Nephrology (Dr. Ryder) next month as scheduled  Wednesday July 18 at 1:30 PM  ELIZABETH Perez  [facility]  [address], [address]  [place] [address]  [phone]     Diet   Follow this diet upon discharge: Low carb, low cholesterol, low saturated fat, low salt       Discharge Medications   Current Discharge Medication List      START taking these medications    Details   nitroGLYcerin (NITROSTAT) 0.4 MG sublingual tablet For chest pain place 1 tablet under the tongue every 5 minutes for 3 doses. If symptoms persist 5 minutes after 1st dose call 911.  Qty: 25 tablet, Refills: 1    Associated Diagnoses: NSTEMI (non-ST elevated myocardial infarction) (H)      torsemide (DEMADEX) 20 MG tablet Take 2 tablets (40 mg) by mouth daily  Qty: 30 tablet, Refills: 1    Associated Diagnoses: Acute on chronic congestive heart failure, unspecified congestive heart failure type (H)         CONTINUE these medications which have CHANGED    Details   hydrALAZINE (APRESOLINE) 25 MG tablet Take 1 tablet (25 mg) by mouth 3 times daily  Qty: 90 tablet, Refills: 1    Associated Diagnoses: Essential hypertension, benign      isosorbide mononitrate 120 MG TB24 Take 1 tablet (120 mg) by mouth daily  Qty: 30 tablet, Refills: 1    Comments: Future refills by PCP Dr. Jorge Hillman with phone number [phone].  Associated Diagnoses: NSTEMI (non-ST elevated myocardial infarction) (H)      levothyroxine (SYNTHROID/LEVOTHROID) 75 MCG tablet Take 1 tablet (75 mcg) by mouth daily  Qty: 30 tablet, Refills: 1    Associated Diagnoses: Hypothyroidism, unspecified type         CONTINUE these medications which have NOT CHANGED    Details   allopurinol (ZYLOPRIM) 100 MG tablet TAKE 2 TABLETS(200 MG) BY MOUTH DAILY  Qty: 180 tablet, Refills: 3    Associated Diagnoses: Chronic gout without tophus,  unspecified cause, unspecified site      aspirin 81 MG tablet Take 1 tablet by mouth daily. with food      cetirizine (ZYRTEC) 10 MG tablet TAKE 1 TABLET(10 MG) BY MOUTH EVERY EVENING  Qty: 30 tablet, Refills: 8    Associated Diagnoses: Atopic dermatitis, unspecified type; Itching      clopidogrel (PLAVIX) 75 MG tablet TAKE 1 TABLET(75 MG) BY MOUTH DAILY  Qty: 90 tablet, Refills: 3    Associated Diagnoses: CKD (chronic kidney disease) stage 3, GFR 30-59 ml/min; Cardiovascular disease      COD LIVER OIL PO Take 1 capsule by mouth daily       Cyanocobalamin (VITAMIN B 12 PO) Take 500 mcg by mouth 2 times daily.      diphenhydrAMINE-acetaminophen (TYLENOL PM)  MG tablet Take 1 tablet by mouth nightly as needed for sleep      insulin NPH (HUMULIN N, NOVOLIN N) 100 UNIT/ML VIAL 18 units at bedtime  Qty: 3 Month, Refills: 3    Comments: Profile only for dosage change. Pt doesn't require refill at this time  Associated Diagnoses: Type 2 diabetes mellitus with stage 3 chronic kidney disease, with long-term current use of insulin (H)      metoprolol tartrate (LOPRESSOR) 50 MG tablet Take 1 tablet (50 mg) by mouth 2 times daily  Qty: 60 tablet, Refills: 0    Associated Diagnoses: NSTEMI (non-ST elevated myocardial infarction) (H)      pravastatin (PRAVACHOL) 20 MG tablet Take 1 tablet (20 mg) by mouth once a week  Qty: 30 tablet, Refills: 1    Associated Diagnoses: Essential hypertension, benign; Cardiovascular disease      theophylline (UNIPHYL) 400 MG 24 hr tablet TAKE 1 TABLET BY MOUTH EVERY DAY  Qty: 90 tablet, Refills: 3    Associated Diagnoses: Mild persistent asthma without complication      triamcinolone (KENALOG) 0.5 % cream Apply sparingly to affected area two  times daily as needed for rash.  Qty: 45 g, Refills: 1    Associated Diagnoses: Itching      blood glucose monitoring (ACCU-CHEK COMPACT PLUS) test strip Check blood sugar 2-3 times a day  Qty: 200 strip, Refills: 3    Associated Diagnoses: Type 2  "diabetes mellitus with stage 3 chronic kidney disease, with long-term current use of insulin (H)      insulin syringe-needle U-100 (BD INSULIN SYRINGE) 29G X 1/2\" 0.5 ML Use one syringe 2 daily or as directed.  Qty: 200 each, Refills: 4    Associated Diagnoses: Type 2 diabetes mellitus with stage 3 chronic kidney disease, with long-term current use of insulin (H)           Allergies   Allergies   Allergen Reactions     Advair [Fluticasone-Salmeterol]      cough;  insomnia, nightmares     Amlodipine Besylate      edema       Azithromycin GI Disturbance     Clarithromycin      gi     Hydrochlorothiazide      hctz + lisinopril-->inc creat, k+     Lisinopril      lisinopril + hctz --> inc creat, k+     Moxifloxacin Hydrochloride      clostridium diffile colitis     Penicillins      gen swelling     Pravastatin Cramps     Muscle cramps/spasm.  Stopped 2017     Vioxx      edema     Data   Most Recent 3 CBC's:  Recent Labs   Lab Test  06/14/18   0555  06/12/18   2203  03/16/18   0544   WBC  8.8  8.4  8.7   HGB  9.4*  10.5*  9.9*   MCV  92  93  92   PLT  167  184  193      Most Recent 3 BMP's:  Recent Labs   Lab Test  06/15/18   0540  06/14/18   0555  06/13/18   1805   NA  144  144  141   POTASSIUM  3.8  4.1  4.5   CHLORIDE  110*  112*  109   CO2  25  24  24   BUN  51*  52*  48*   CR  3.00*  2.95*  2.80*   ANIONGAP  9  8  8   LEV  8.4*  8.2*  8.8   GLC  83  79  165*     Most Recent 2 LFT's:  Recent Labs   Lab Test  06/12/18   2203  12/13/17   0910   AST  9  3   ALT  20  16   ALKPHOS  117  96   BILITOTAL  0.3  0.4     Most Recent INR's and Anticoagulation Dosing History:  Anticoagulation Dose History     Recent Dosing and Labs Latest Ref Rng & Units 4/13/2005 11/24/2006 1/23/2007 7/16/2007 12/8/2008 3/12/2013    INR 0.86 - 1.14 1.10 1.09 1.08 1.13 1.19(H) 1.06        Most Recent 3 Troponin's:  Recent Labs   Lab Test  06/13/18   1015  06/13/18   0600  06/13/18   0200   TROPI  1.720*  1.864*  1.143*     Most Recent Cholesterol " Panel:  Recent Labs   Lab Test  03/14/18   0525   CHOL  167   LDL  115*   HDL  32*   TRIG  99     Most Recent 6 Bacteria Isolates From Any Culture (See EPIC Reports for Culture Details):No lab results found.  Most Recent TSH, T4 and A1c Labs:  Recent Labs   Lab Test  06/13/18   0600  06/05/18   1009   TSH   --   5.24*   T4   --   0.90   A1C  5.9*   --      Results for orders placed or performed during the hospital encounter of 06/12/18   XR Chest 2 Views    Narrative    CHEST 2 VIEWS  6/12/2018 11:04 PM     HISTORY: Shortness of breath.    COMPARISON: 3/13/2018.    FINDINGS: Hyperinflated lungs. Mildly increased interstitial opacities  throughout bilateral mid and lower lungs. Cardiomegaly. Prior median  sternotomy.      Impression    IMPRESSION:  1. Mildly increased interstitial opacities within both lungs. This  could relate to interstitial pulmonary edema or an atypical infection.  2. No other findings suspicious for active cardiopulmonary disease.  3. Hyperinflated lungs, suggestive of chronic obstructive pulmonary  disease.    REBEKAH BRIGGS MD

## 2018-06-15 NOTE — PROGRESS NOTES
06/15/18 1136   Quick Adds   Type of Visit Initial Occupational Therapy Evaluation   Living Environment   Lives With child(ligia), adult  (dtr)   Living Arrangements house   Home Accessibility no concerns   Number of Stairs Within Home 1   Transportation Available car   Self-Care   Dominant Hand right   Usual Activity Tolerance moderate   Current Activity Tolerance moderate   Regular Exercise yes   Activity/Exercise Type walking   Exercise Amount/Frequency (daily, walks the dog or if too hot or cold will walk inside)   Equipment Currently Used at Home none   Functional Level Prior   Ambulation 0-->independent   Transferring 0-->independent   Toileting 0-->independent   Bathing 0-->independent   Dressing 0-->independent   Eating 0-->independent   Communication 0-->understands/communicates without difficulty   Swallowing 0-->swallows foods/liquids without difficulty   Fall history within last six months no   Prior Functional Level Comment Pt I with ADL/IADL's. pt manages own breakfast, lunch and dtr makes dinner, pt drives and reports dtr helps with med set up.    General Information   Onset of Illness/Injury or Date of Surgery - Date 06/12/18   Referring Physician Rodriguez, Dillon Murillo MD   Patient/Family Goals Statement return home   Additional Occupational Profile Info/Pertinent History of Current Problem Oscar Terrazas is a 84 year old male with history of chronic diastolic congestive heart failure, CAD with two vessel CABG in 1996 and multiple drug eluting stents placed since then, CKD stage III who was admitted on 6/12/2018 with worsening shortness of breath and chest discomfort. NSTEMI. Troponin elevated, 1.8. decided on medical mgmt vs angio and Acute on chronic diastolic congestive heart failure. Last echocardiogram in March showed LVEF of 55 - 60%   Precautions/Limitations fall precautions  (MI precautions)   Heart Disease Risk Factors Diabetes;High blood pressure;Medical history;Age;Gender   Cognitive  Status Examination   Orientation person;place  (oriented to correct year, off by 1 month)   Level of Consciousness alert   Able to Follow Commands WNL/WFL   Personal Safety (Cognitive) mild impairment   Cognitive Comment Pt denies any memory impairments, but did notice some repetition.   Sensory Examination   Sensory Quick Adds No deficits were identified   Pain Assessment   Patient Currently in Pain Yes, see Vital Sign flowsheet  (3/10 chronic R hip pain)   Range of Motion (ROM)   ROM Comment B UE AROM WFL   Transfer Skills   Transfer Comments Pt superivison with functional mobility and I with ADL's   Instrumental Activities of Daily Living (IADL)   Previous Responsibilities meal prep;driving;medication management   Activities of Daily Living Analysis   Impairments Contributing to Impaired Activities of Daily Living balance impaired;cognition impaired;pain  (decreased activity tolerance, MI precautions)   General Therapy Interventions   Planned Therapy Interventions risk factor education;progressive activity/exercise;home program guidelines;cognition;transfer training;ADL retraining   Clinical Impression   Criteria for Skilled Therapeutic Interventions Met yes, treatment indicated   OT Diagnosis decreased IADL's and functional mobility   Influenced by the following impairments impaired balance, R hip pain, decreased activity tolerance, MI precautions   Assessment of Occupational Performance 3-5 Performance Deficits   Identified Performance Deficits impaired I with IADL's and functional mobility, ie med mgmt, driving, shower/tub transfer   Clinical Decision Making (Complexity) Moderate complexity   Therapy Frequency (Eval and treatment, pt discharging today)   Anticipated Discharge Disposition Home with Assist;Home with Outpatient Therapy  (A with IADL's ie med mgmt, etc and OP CR, PT, OT)   Risks and Benefits of Treatment have been explained. Yes   Patient, Family & other staff in agreement with plan of care Yes  "  [facility] AM-PAC  \"6 Clicks\" Daily Activity Inpatient Short Form   1. Putting on and taking off regular lower body clothing? 4 - None   2. Bathing (including washing, rinsing, drying)? 3 - A Little   3. Toileting, which includes using toilet, bedpan or urinal? 4 - None   4. Putting on and taking off regular upper body clothing? 4 - None   5. Taking care of personal grooming such as brushing teeth? 4 - None   6. Eating meals? 4 - None   Daily Activity Raw Score (Score out of 24.Lower scores equate to lower levels of function) 23   Total Evaluation Time   Total Evaluation Time (Minutes) 10     "

## 2018-06-15 NOTE — PLAN OF CARE
Problem: Cardiac: Heart Failure (Adult)  Goal: Signs and Symptoms of Listed Potential Problems Will be Absent, Minimized or Managed (Cardiac: Heart Failure)  Signs and symptoms of listed potential problems will be absent, minimized or managed by discharge/transition of care (reference Cardiac: Heart Failure (Adult) CPG).   Outcome: Improving  VSS. Pt on RA. Denies pain. Tele SB with BBB. Transfers/ambulating with SBA. Heparin gtt continues to infuse. Diurestics given. Continue to monitor.     Heart Failure Care Pathway  GOALS TO BE MET BEFORE DISCHARGE:    1. Decrease congestion and/or edema with diuretic therapy to achieve near      optimal volume status.            Dyspnea improved:  Yes            Edema improved:     Yes        Net I/O and Weights since admission:          05/16 1500 - 06/15 1459  In: 2428.5 [P.O.:1530; I.V.:898.5]  Out: 5395 [Urine:5395]  Net: -2966.5            Vitals:    06/12/18 2151 06/13/18 0059 06/14/18 0135 06/15/18 0155   Weight: 92.1 kg (203 lb) 93.1 kg (205 lb 3.2 oz) 92.7 kg (204 lb 6.4 oz) 91.4 kg (201 lb 8 oz)       2.  O2 sats > 92% on RA or at prior home O2 therapy level.          Current oxygenation status:       SpO2: 98 %         O2 Device: None (Room air),  Oxygen Delivery: 1 LPM         Able to wean O2 this shift to keep sats > 92%:  Yes       Does patient use Home O2? No    3.  Tolerates ambulation and mobility near baseline: No, please explain: WEBB          How many times did the patient ambulate with nursing staff this shift? 2    Please review the Heart Failure Care Pathway for additional HF goal outcomes.    MARSHALL STRE[address] RN  6/15/2018

## 2018-06-15 NOTE — PROGRESS NOTES
Follow up scheduled with Nephrology for July 18 at 1:30 pm.  He will be seeing ELIZABETH Perez.   will check with Dr. Ryder to ensure it is ok that patient sees PA and will reschedule if needed.

## 2018-06-16 LAB
INTERPRETATION ECG - MUSE: NORMAL
INTERPRETATION ECG - MUSE: NORMAL

## 2018-06-18 ENCOUNTER — TELEPHONE (OUTPATIENT)
Dept: CARDIOLOGY | Facility: CLINIC | Age: 83
End: 2018-06-18

## 2018-06-18 ENCOUNTER — TELEPHONE (OUTPATIENT)
Dept: INTERNAL MEDICINE | Facility: CLINIC | Age: 83
End: 2018-06-18

## 2018-06-18 DIAGNOSIS — I50.9 CHF (CONGESTIVE HEART FAILURE) (H): Primary | ICD-10-CM

## 2018-06-18 DIAGNOSIS — I21.4 NSTEMI (NON-ST ELEVATED MYOCARDIAL INFARCTION) (H): Primary | ICD-10-CM

## 2018-06-18 NOTE — TELEPHONE ENCOUNTER
"NSTEMI. Troponin elevated, 1.8. He has had ongoing chest discomfort with exertion the past several weeks. Pt declined angiogram during this admission. Will be managed on outpatient basis. Called patient to discuss any post hospital d/c questions he may have, review medication changes, and confirm f/u appts. Patient denied any questions regarding new medications or changes to some of his current medications that he was taking prior to admission. Patient denied any SOB or light headedness. States has an intermittent \"twinge of hardness in my chest.\" States has not had to take any NTG. Reviewed how to take NTG and when to call 911. RN confirmed with patient that he has an apt scheduled with Preethi Smiley  on 6/19/18, with lab prior.  Instructed patient to take daily weights and call clinic for a weight gain of 2 lbs overnight or 5 lbs in a week. Patient advised to call clinic with any cardiac related questions or concerns prior to his appt. Patient verbalized understanding and agreed with plan. MEHDI Gonsalez RN.        "

## 2018-06-18 NOTE — TELEPHONE ENCOUNTER
ED / Discharge Outreach Protocol    Patient Contact    Attempt # 1    Was call answered?  No.  Left message on voicemail with information to call me back.  Pt has an appt on 6/28/18 .Samantha Stack RN

## 2018-06-19 ENCOUNTER — OFFICE VISIT (OUTPATIENT)
Dept: CARDIOLOGY | Facility: CLINIC | Age: 83
End: 2018-06-19
Payer: COMMERCIAL

## 2018-06-19 VITALS
WEIGHT: 200.6 LBS | BODY MASS INDEX: 27.17 KG/M2 | DIASTOLIC BLOOD PRESSURE: 64 MMHG | SYSTOLIC BLOOD PRESSURE: 126 MMHG | OXYGEN SATURATION: 100 % | HEIGHT: 72 IN | HEART RATE: 58 BPM

## 2018-06-19 DIAGNOSIS — I50.9 CHF (CONGESTIVE HEART FAILURE) (H): ICD-10-CM

## 2018-06-19 DIAGNOSIS — I50.9 ACUTE ON CHRONIC CONGESTIVE HEART FAILURE, UNSPECIFIED CONGESTIVE HEART FAILURE TYPE: ICD-10-CM

## 2018-06-19 LAB
ANION GAP SERPL CALCULATED.3IONS-SCNC: 16.1 MMOL/L (ref 6–17)
BUN SERPL-MCNC: 64 MG/DL (ref 7–30)
CALCIUM SERPL-MCNC: 9.5 MG/DL (ref 8.5–10.5)
CHLORIDE SERPL-SCNC: 104 MMOL/L (ref 98–107)
CO2 SERPL-SCNC: 24 MMOL/L (ref 23–29)
CREAT SERPL-MCNC: 3.22 MG/DL (ref 0.7–1.3)
GFR SERPL CREATININE-BSD FRML MDRD: 18 ML/MIN/1.7M2
GLUCOSE SERPL-MCNC: 185 MG/DL (ref 70–105)
NT-PROBNP SERPL-MCNC: 6858 PG/ML (ref 0–450)
POTASSIUM SERPL-SCNC: 4.1 MMOL/L (ref 3.5–5.1)
SODIUM SERPL-SCNC: 140 MMOL/L (ref 136–145)

## 2018-06-19 PROCEDURE — 99214 OFFICE O/P EST MOD 30 MIN: CPT | Performed by: NURSE PRACTITIONER

## 2018-06-19 PROCEDURE — 36415 COLL VENOUS BLD VENIPUNCTURE: CPT | Performed by: NURSE PRACTITIONER

## 2018-06-19 PROCEDURE — 80048 BASIC METABOLIC PNL TOTAL CA: CPT | Performed by: NURSE PRACTITIONER

## 2018-06-19 PROCEDURE — 83880 ASSAY OF NATRIURETIC PEPTIDE: CPT | Performed by: NURSE PRACTITIONER

## 2018-06-19 RX ORDER — TORSEMIDE 20 MG/1
20 TABLET ORAL DAILY
Qty: 60 TABLET | Refills: 1 | Status: SHIPPED | OUTPATIENT
Start: 2018-06-19 | End: 2018-07-01

## 2018-06-19 NOTE — LETTER
"6/19/2018    Jorge Hillman MD  [address] [facility] [address]    RE: Oscar Terrazas       Dear Colleague,    I had the pleasure of seeing Oscar Terrazas in the [facility].    HPI and Plan:   See dictation    Orders Placed This Encounter   Procedures     Basic metabolic panel     Orders Placed This Encounter   Medications     torsemide (DEMADEX) 20 MG tablet     Sig: Take 1 tablet (20 mg) by mouth daily     Dispense:  60 tablet     Refill:  1     Medications Discontinued During This Encounter   Medication Reason     torsemide (DEMADEX) 20 MG tablet Reorder         Encounter Diagnosis   Name Primary?     Acute on chronic congestive heart failure, unspecified congestive heart failure type (H)        CURRENT MEDICATIONS:  Current Outpatient Prescriptions   Medication Sig Dispense Refill     allopurinol (ZYLOPRIM) 100 MG tablet TAKE 2 TABLETS(200 MG) BY MOUTH DAILY 180 tablet 3     aspirin 81 MG tablet Take 1 tablet by mouth daily. with food       blood glucose monitoring (ACCU-CHEK COMPACT PLUS) test strip Check blood sugar 2-3 times a day 200 strip 3     clopidogrel (PLAVIX) 75 MG tablet TAKE 1 TABLET(75 MG) BY MOUTH DAILY 90 tablet 3     COD LIVER OIL PO Take 1 capsule by mouth daily        Cyanocobalamin (VITAMIN B 12 PO) Take 500 mcg by mouth 2 times daily.       diphenhydrAMINE-acetaminophen (TYLENOL PM)  MG tablet Take 1 tablet by mouth nightly as needed for sleep       hydrALAZINE (APRESOLINE) 25 MG tablet Take 1 tablet (25 mg) by mouth 3 times daily 90 tablet 1     insulin NPH (HUMULIN N, NOVOLIN N) 100 UNIT/ML VIAL 18 units at bedtime 3 Month 3     insulin syringe-needle U-100 (BD INSULIN SYRINGE) 29G X 1/2\" 0.5 ML Use one syringe 2 daily or as directed. 200 each 4     isosorbide mononitrate 120 MG TB24 Take 1 tablet (120 mg) by mouth daily 30 tablet 1     levothyroxine (SYNTHROID/LEVOTHROID) 75 MCG tablet Take 1 tablet (75 mcg) by mouth daily 30 tablet 1     " metoprolol tartrate (LOPRESSOR) 50 MG tablet Take 1 tablet (50 mg) by mouth 2 times daily 60 tablet 0     nitroGLYcerin (NITROSTAT) 0.4 MG sublingual tablet For chest pain place 1 tablet under the tongue every 5 minutes for 3 doses. If symptoms persist 5 minutes after 1st dose call 911. 25 tablet 1     pravastatin (PRAVACHOL) 20 MG tablet Take 1 tablet (20 mg) by mouth once a week 30 tablet 1     theophylline (UNIPHYL) 400 MG 24 hr tablet TAKE 1 TABLET BY MOUTH EVERY DAY 90 tablet 3     torsemide (DEMADEX) 20 MG tablet Take 1 tablet (20 mg) by mouth daily 60 tablet 1     triamcinolone (KENALOG) 0.5 % cream Apply sparingly to affected area two  times daily as needed for rash. 45 g 1     cetirizine (ZYRTEC) 10 MG tablet TAKE 1 TABLET(10 MG) BY MOUTH EVERY EVENING (Patient not taking: Reported on 6/19/2018) 30 tablet 8     [DISCONTINUED] torsemide (DEMADEX) 20 MG tablet Take 2 tablets (40 mg) by mouth daily 30 tablet 1       ALLERGIES     Allergies   Allergen Reactions     Advair [Fluticasone-Salmeterol]      cough;  insomnia, nightmares     Amlodipine Besylate      edema       Azithromycin GI Disturbance     Clarithromycin      gi     Hydrochlorothiazide      hctz + lisinopril-->inc creat, k+     Lisinopril      lisinopril + hctz --> inc creat, k+     Moxifloxacin Hydrochloride      clostridium diffile colitis     Penicillins      gen swelling     Pravastatin Cramps     Muscle cramps/spasm.  Stopped 2017     Vioxx      edema       PAST MEDICAL HISTORY:  Past Medical History:   Diagnosis Date     Acute myocardial infarction, unspecified site, episode of care unspecified      Allergic rhinitis, cause unspecified      Anemia 3/6/2014     CAD (coronary artery disease)     1996 CABG - LIMA to LAD, SVG to OM, 2007 Cath - KANU to Left main and ostial/prox LAD, 2012 Cath - 80-90% stenosis SVG to OM - KANU placed, EF 50%     CKD (chronic kidney disease) stage 3, GFR 30-59 ml/min 3/9/2014     CTS (carpal tunnel syndrome)      bilateral     Degeneration of cervical intervertebral disc      Diaphragmatic hernia without mention of obstruction or gangrene      Esophageal reflux      Essential hypertension, benign      Hyperlipidemia LDL goal <70      Hypothyroidism      Hypothyroidism, unspecified hypothyroidism type 1/14/2016     IDIOPATHIC CYCLIC EDEMA      Mild persistent asthma      Osteoarthrosis, unspecified whether generalized or localized, unspecified site      Pneumonia, organism unspecified(486)      Proteinuria 5/17/2014     PVD (peripheral vascular disease) (H)      Sensorineural hearing loss, unspecified      Subarachnoid bleed (H)      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 disease (goal A1C<8) 10/17/2015     Unspecified disorder resulting from impaired renal function      Unspecified hereditary and idiopathic peripheral neuropathy      Venous insufficiency        PAST SURGICAL HISTORY:  Past Surgical History:   Procedure Laterality Date     C NONSPECIFIC PROCEDURE      appendectomy     C NONSPECIFIC PROCEDURE      hemorrhoids     C NONSPECIFIC PROCEDURE      cervical strain     C NONSPECIFIC PROCEDURE      rotator cuff surgery, right shoulder     C NONSPECIFIC PROCEDURE  2008    iol os     CORONARY ARTERY BYPASS  1996    LIMA to LAD, SVG to OM     HEART CATH STENT COR W/WO PTCA  2007    lad, left main cor artery stents/drug eluting     HEART CATH STENT COR W/WO PTCA  2012    80-90% stenosis SVG to OM - KANU placed, EF 50%     NONSPECIFIC PROCEDURE      iol od       FAMILY HISTORY:  Family History   Problem Relation Age of Onset     Lung Cancer Daughter      Family History Negative Mother      broken hip     Jaundice Father      Alcohol/Drug Father      Ovarian Cancer Daughter      Family History Negative Daughter      Family History Negative Son        SOCIAL HISTORY:  Social History     Social History     Marital status:      Spouse name: N/A     Number of children: N/A     Years of education: N/A      Social History Main Topics     Smoking status: Former Smoker     Packs/day: 1.00     Years: 14.00     Types: Cigarettes     Start date: 1952     Quit date: 1966     Smokeless tobacco: Never Used     Alcohol use No     Drug use: No     Sexual activity: No     Other Topics Concern     Caffeine Concern No     Sleep Concern Yes     Stress Concern No     Weight Concern No     Special Diet No     Exercise Yes     bowling, 5 days week. yard work     Social History Narrative       Review of Systems:  Skin:  Positive for bruising   Eyes:  Negative    ENT:  Negative    Respiratory:  Negative    Cardiovascular:    edema;Positive for  Gastroenterology: Positive for constipation  Genitourinary:  Positive for urinary frequency  Musculoskeletal:  Positive for joint pain  Neurologic:  Positive for numbness or tingling of hands  Psychiatric:  Negative    Heme/Lymph/Imm:       Endocrine:         Physical Exam:  Vitals: /64  Pulse 58  Ht 1.829 m (6')  Wt 91 kg (200 lb 9.6 oz)  SpO2 100%  BMI 27.21 kg/m2    Constitutional:  cooperative, alert and oriented, well developed, well nourished, in no acute distress        Skin:  warm and dry to the touch, no apparent skin lesions or masses noted          Head:  normocephalic, no masses or lesions        Eyes:  pupils equal and round;sclera white;EOMS intact        Lymph:      ENT:  no pallor or cyanosis        Neck:  carotid pulses are full and equal bilaterally;JVP normal;no carotid bruit        Respiratory:  normal breath sounds, clear to auscultation, normal A-P diameter, normal symmetry, normal respiratory excursion, no use of accessory muscles;healed median sternotomy scar         Cardiac: regular rhythm;normal S1 and S2;no S3 or S4;apical impulse not displaced       systolic ejection murmur;RUSB;grade 1        not assessed this visit                               right femoral bruit (-) left femoral bruit (-)      GI:  abdomen soft, non-tender, BS normoactive, no mass, no  HSM, no bruits surgical scars      Extremities and Muscular Skeletal:    stasis pigmentation bilateral LE edema;1+          Neurological:  no gross motor deficits        Psych:  Alert and Oriented x 3        Recent Lab Results:  LIPID RESULTS:  Lab Results   Component Value Date    CHOL 167 03/14/2018    HDL 32 (L) 03/14/2018     (H) 03/14/2018    TRIG 99 03/14/2018    CHOLHDLRATIO 3.8 09/11/2014       LIVER ENZYME RESULTS:  Lab Results   Component Value Date    AST 9 06/12/2018    ALT 20 06/12/2018       CBC RESULTS:  Lab Results   Component Value Date    WBC 8.8 06/14/2018    RBC 3.08 (L) 06/14/2018    HGB 9.4 (L) 06/14/2018    HCT 28.3 (L) 06/14/2018    MCV 92 06/14/2018    MCH 30.5 06/14/2018    MCHC 33.2 06/14/2018    RDW 15.7 (H) 06/14/2018     06/14/2018       BMP RESULTS:  Lab Results   Component Value Date     06/19/2018    POTASSIUM 4.1 06/19/2018    CHLORIDE 104 06/19/2018    CO2 24 06/19/2018    ANIONGAP 16.1 06/19/2018     (H) 06/19/2018    BUN 64 (H) 06/19/2018    CR 3.22 (H) 06/19/2018    GFRESTIMATED 18 (L) 06/19/2018    GFRESTBLACK 22 (L) 06/19/2018    LEV 9.5 06/19/2018        A1C RESULTS:  Lab Results   Component Value Date    A1C 5.9 (H) 06/13/2018       INR RESULTS:  Lab Results   Component Value Date    INR 1.06 03/12/2013    INR 1.19 (H) 12/08/2008           CC  No referring provider defined for this encounter.                  Service Date: 06/19/2018      HISTORY OF PRESENT ILLNESS:  Mr. Terrazas is an 84-year-old gentleman who is here today in followup from a recent hospitalization where he suffered a non-ST elevation myocardial infarction and decompensated systolic heart failure.  He has a complicated cardiovascular history that includes:     1.  CAD with Coronary artery bypass surgery in 1996 with multivessel drug-eluting stents placed in his LAD and              left main in 2007.  He also underwent stenting to the vein graft of his OM in 2012.  He has been on  chronic dual  antiplatelet therapy with aspirin and Plavix without any bleeding issues.    2.  Chronic kidney disease with baseline Cr around 2-3.   3. HFpEF   4 Chronic Anemia from chronic diseases with baseline hemoglobin around 9-10.   5.Thyroid disease   6. Hypertension   7. Hyperlipidemia-intol of statins    When he presented to [facility], he experienced angina at rest with dyspnea, sitting and watching TV that lasted approximately 10 minutes.  His troponins elevated to 1.86.  .  His baseline troponins run about 0.6-0.7.  During his hospitalization, Nephrology was consulted, who recommended medical management of his CAD due to his chronic kidney disease.  His creatinine in the hospital ran around 3.  The patient's wife was on dialysis prior to her death and she did not feel well while she was on dialysis.  During his hospitalization, his medications were adjusted for tighter blood pressure control.  A cardiac echo was done which showed his left ventricle was normal in size.  There was proximal septal thickening.  The left ventricular systolic function was normal.  His ejection fraction was 55%-60%.  There was grade II or moderate diastolic dysfunction.  The diastolic Doppler findings E/E' suggested left ventricular filling pressures were increased.  His right ventricle was normal in size and function.  There was moderate hypokinesis of the basal inferior and inferolateral walls.  The patient's Imdur was increased from 60 mg per day to 120 mg per day.  The patient was admitted with an elevated NT-proBNP greater than 10,000.  He was treated with IV Lasix and transitioned to torsemide.  He lost 3 liters on admission.  The patient reported dietary indiscretion with food high in sodium.  His favorite foods include orange chicken.  He buys it premade and keeps it in his freezer and he believes there is a lot of sodium in it.      The patient also has chronic anemia.  His hemoglobin runs into the  9-10 range.  It is thought to be due to anemia from chronic diseases.  His iron studies have been normal.  His levothyroxine was adjusted during this hospitalization.  He reports no chest pain at rest or with exertion.  He has checked his blood pressure at home every day.  Generally, his systolic blood pressure runs around 130-135.  His weights at home have been very stable, running anywhere from 201 up to 204.  He denies lightheadedness or dizziness.  Blood pressure is 126/64.  Today, his pulse runs 58.  Today patient is with his daughter, Yasmin; they live together.  The patient does set of his own medications.      Please see physical exam.      LABWORK:  Sodium 140, potassium 4.1, BUN 64, creatinine 3.22, GFR 18, hemoglobin 9.4 (ran last week).      IMPRESSION AND PLAN:   1.  The patient is an 84-year-old gentleman with a history of heart failure with preserved ejection fraction, hypertension, coronary artery disease, and hyperlipidemia, who presented with chest pressure and progressive shortness of breath.  His troponins were positive for non-ST elevation myocardial infarction.  Coronary angiogram was presented to the patient.  Nephrology was also involved due to his chronic kidney disease.  A decision was made during his hospitalization to hold off on a coronary angiogram.  Dr. Izquierdo, who saw the patient in the hospital, requested that a coronary angiogram be discussed with the patient by his primary cardiologist, Dr. Gibson.  The patient has been set up to see Dr. Gibson.  Today, the patient appears stable.  He reports no new angina.  He denies any headache from the high doses of isosorbide mononitrate.  His blood pressure looks very stable today.  He and his daughter continue to work on a 2-gram sodium diet.  They now make their own Orange Chicken instead of buying it frozen.  Danny says it tastes pretty good.  He is doing an excellent job checking his weight every day and his blood pressure.   2.  Chronic  kidney disease.  His creatinine is higher today.  I am going to reduce his torsemide to 20 mg once a day.  We will recheck a BMP next week before he sees Dr. Gibson.  I reinforced signs and symptoms to call the [facility] to include 2-3 pound weight gain overnight, inability to lie flat or worsening lower leg swelling.      It was my pleasure to see Danny again.      Preethi Smiley MS, CNP         AIDA YEE, CNP             D: 2018   T: 2018   MT: al      Name:     AKILAH BARILLAS   MRN:      [id number]        Account:      [id number]   :      1934           Service Date: 2018      Document: [id number]        Thank you for allowing me to participate in the care of your patient.      Sincerely,     AIDA Yee CNP     [facility]    cc:   No referring provider defined for this encounter.

## 2018-06-19 NOTE — PROGRESS NOTES
Service Date: 06/19/2018      HISTORY OF PRESENT ILLNESS:  Mr. Terrazas is an 84-year-old gentleman who is here today in followup from a recent hospitalization where he suffered a non-ST elevation myocardial infarction and decompensated systolic heart failure.  He has a complicated cardiovascular history that includes:     1.  CAD with Coronary artery bypass surgery in 1996 with multivessel drug-eluting stents placed in his LAD and              left main in 2007.  He also underwent stenting to the vein graft of his OM in 2012.  He has been on chronic dual  antiplatelet therapy with aspirin and Plavix without any bleeding issues.    2.  Chronic kidney disease with baseline Cr around 2-3.   3. HFpEF   4 Chronic Anemia from chronic diseases with baseline hemoglobin around 9-10.   5.Thyroid disease   6. Hypertension   7. Hyperlipidemia-intol of statins    When he presented to [facility], he experienced angina at rest with dyspnea, sitting and watching TV that lasted approximately 10 minutes.  His troponins elevated to 1.86.  .  His baseline troponins run about 0.6-0.7.  During his hospitalization, Nephrology was consulted, who recommended medical management of his CAD due to his chronic kidney disease.  His creatinine in the hospital ran around 3.  The patient's wife was on dialysis prior to her death and she did not feel well while she was on dialysis.  During his hospitalization, his medications were adjusted for tighter blood pressure control.  A cardiac echo was done which showed his left ventricle was normal in size.  There was proximal septal thickening.  The left ventricular systolic function was normal.  His ejection fraction was 55%-60%.  There was grade II or moderate diastolic dysfunction.  The diastolic Doppler findings E/E' suggested left ventricular filling pressures were increased.  His right ventricle was normal in size and function.  There was moderate hypokinesis of the basal inferior  and inferolateral walls.  The patient's Imdur was increased from 60 mg per day to 120 mg per day.  The patient was admitted with an elevated NT-proBNP greater than 10,000.  He was treated with IV Lasix and transitioned to torsemide.  He lost 3 liters on admission.  The patient reported dietary indiscretion with food high in sodium.  His favorite foods include orange chicken.  He buys it premade and keeps it in his freezer and he believes there is a lot of sodium in it.      The patient also has chronic anemia.  His hemoglobin runs into the 9-10 range.  It is thought to be due to anemia from chronic diseases.  His iron studies have been normal.  His levothyroxine was adjusted during this hospitalization.  He reports no chest pain at rest or with exertion.  He has checked his blood pressure at home every day.  Generally, his systolic blood pressure runs around 130-135.  His weights at home have been very stable, running anywhere from 201 up to 204.  He denies lightheadedness or dizziness.  Blood pressure is 126/64.  Today, his pulse runs 58.  Today patient is with his daughter, Yasmin; they live together.  The patient does set of his own medications.      Please see physical exam.      LABWORK:  Sodium 140, potassium 4.1, BUN 64, creatinine 3.22, GFR 18, hemoglobin 9.4 (ran last week).      IMPRESSION AND PLAN:   1.  The patient is an 84-year-old gentleman with a history of heart failure with preserved ejection fraction, hypertension, coronary artery disease, and hyperlipidemia, who presented with chest pressure and progressive shortness of breath.  His troponins were positive for non-ST elevation myocardial infarction.  Coronary angiogram was presented to the patient.  Nephrology was also involved due to his chronic kidney disease.  A decision was made during his hospitalization to hold off on a coronary angiogram.  Dr. Izquierdo, who saw the patient in the hospital, requested that a coronary angiogram be discussed with the  patient by his primary cardiologist, Dr. Gibson.  The patient has been set up to see Dr. Gibson.  Today, the patient appears stable.  He reports no new angina.  He denies any headache from the high doses of isosorbide mononitrate.  His blood pressure looks very stable today.  He and his daughter continue to work on a 2-gram sodium diet.  They now make their own Orange Chicken instead of buying it frozen.  Danny says it tastes pretty good.  He is doing an excellent job checking his weight every day and his blood pressure.   2.  Chronic kidney disease.  His creatinine is higher today.  I am going to reduce his torsemide to 20 mg once a day.  We will recheck a BMP next week before he sees Dr. Gibson.  I reinforced signs and symptoms to call the [facility] to include 2-3 pound weight gain overnight, inability to lie flat or worsening lower leg swelling.      It was my pleasure to see Danny again.      Preethi Smiley MS, CNP         AIDA TAVERAS, CNP             D: 2018   T: 2018   MT: al      Name:     AKILAH BARILLAS   MRN:      [id number]        Account:      [id number]   :      1934           Service Date: 2018      Document: [id number]

## 2018-06-19 NOTE — TELEPHONE ENCOUNTER
"Hospital/TCU/ED for chronic condition Discharge Protocol    \"Hi, my name is Nolviaeleonora Sierra, a registered nurse, and I am calling from [facility].  I am calling to follow up and see how things are going for you after your recent emergency visit/hospital/TCU stay.\"    Tell me how you are doing now that you are home?\" \"doing real well\"       Discharge Instructions    \"Let's review your discharge instructions.  What is/are the follow-up recommendations?  Pt. Response: see Cardiology, [facility] for kidney issues and PCP    \"Has an appointment with your primary care provider been scheduled?\"   Yes. (confirm)    \"When you see the provider, I would recommend that you bring your medications with you.\"    Medications    \"Tell me what changed about your medicines when you discharged?\"    Changes to chronic meds?    0-1    \"What questions do you have about your medications?\"    None     New diagnoses of heart failure, COPD, diabetes, or MI?    Yes - Care Coordination Referral needed     On insulin: \"Did you start on insulin in the hospital or did you have your insulin dose changed?\"  No         Medication reconciliation completed? Yes  Was MTM referral placed (*Make sure to put transitions as reason for referral)?   No    Call Summary    \"What questions or concerns do you have about your recent visit and your follow-up care?\"     none    \"If you have questions or things don't continue to improve, we encourage you contact us through the main clinic number (give number).  Even if the clinic is not open, triage nurses are available 24/7 to help you.     We would like you to know that our clinic has extended hours (provide information).  We also have urgent care (provide details on closest location and hours/contact info)\"      \"Thank you for your time and take care!\"             "

## 2018-06-19 NOTE — PROGRESS NOTES
"HPI and Plan:   See dictation    Orders Placed This Encounter   Procedures     Basic metabolic panel     Orders Placed This Encounter   Medications     torsemide (DEMADEX) 20 MG tablet     Sig: Take 1 tablet (20 mg) by mouth daily     Dispense:  60 tablet     Refill:  1     Medications Discontinued During This Encounter   Medication Reason     torsemide (DEMADEX) 20 MG tablet Reorder         Encounter Diagnosis   Name Primary?     Acute on chronic congestive heart failure, unspecified congestive heart failure type (H)        CURRENT MEDICATIONS:  Current Outpatient Prescriptions   Medication Sig Dispense Refill     allopurinol (ZYLOPRIM) 100 MG tablet TAKE 2 TABLETS(200 MG) BY MOUTH DAILY 180 tablet 3     aspirin 81 MG tablet Take 1 tablet by mouth daily. with food       blood glucose monitoring (ACCU-CHEK COMPACT PLUS) test strip Check blood sugar 2-3 times a day 200 strip 3     clopidogrel (PLAVIX) 75 MG tablet TAKE 1 TABLET(75 MG) BY MOUTH DAILY 90 tablet 3     COD LIVER OIL PO Take 1 capsule by mouth daily        Cyanocobalamin (VITAMIN B 12 PO) Take 500 mcg by mouth 2 times daily.       diphenhydrAMINE-acetaminophen (TYLENOL PM)  MG tablet Take 1 tablet by mouth nightly as needed for sleep       hydrALAZINE (APRESOLINE) 25 MG tablet Take 1 tablet (25 mg) by mouth 3 times daily 90 tablet 1     insulin NPH (HUMULIN N, NOVOLIN N) 100 UNIT/ML VIAL 18 units at bedtime 3 Month 3     insulin syringe-needle U-100 (BD INSULIN SYRINGE) 29G X 1/2\" 0.5 ML Use one syringe 2 daily or as directed. 200 each 4     isosorbide mononitrate 120 MG TB24 Take 1 tablet (120 mg) by mouth daily 30 tablet 1     levothyroxine (SYNTHROID/LEVOTHROID) 75 MCG tablet Take 1 tablet (75 mcg) by mouth daily 30 tablet 1     metoprolol tartrate (LOPRESSOR) 50 MG tablet Take 1 tablet (50 mg) by mouth 2 times daily 60 tablet 0     nitroGLYcerin (NITROSTAT) 0.4 MG sublingual tablet For chest pain place 1 tablet under the tongue every 5 minutes " for 3 doses. If symptoms persist 5 minutes after 1st dose call 911. 25 tablet 1     pravastatin (PRAVACHOL) 20 MG tablet Take 1 tablet (20 mg) by mouth once a week 30 tablet 1     theophylline (UNIPHYL) 400 MG 24 hr tablet TAKE 1 TABLET BY MOUTH EVERY DAY 90 tablet 3     torsemide (DEMADEX) 20 MG tablet Take 1 tablet (20 mg) by mouth daily 60 tablet 1     triamcinolone (KENALOG) 0.5 % cream Apply sparingly to affected area two  times daily as needed for rash. 45 g 1     cetirizine (ZYRTEC) 10 MG tablet TAKE 1 TABLET(10 MG) BY MOUTH EVERY EVENING (Patient not taking: Reported on 6/19/2018) 30 tablet 8     [DISCONTINUED] torsemide (DEMADEX) 20 MG tablet Take 2 tablets (40 mg) by mouth daily 30 tablet 1       ALLERGIES     Allergies   Allergen Reactions     Advair [Fluticasone-Salmeterol]      cough;  insomnia, nightmares     Amlodipine Besylate      edema       Azithromycin GI Disturbance     Clarithromycin      gi     Hydrochlorothiazide      hctz + lisinopril-->inc creat, k+     Lisinopril      lisinopril + hctz --> inc creat, k+     Moxifloxacin Hydrochloride      clostridium diffile colitis     Penicillins      gen swelling     Pravastatin Cramps     Muscle cramps/spasm.  Stopped 2017     Vioxx      edema       PAST MEDICAL HISTORY:  Past Medical History:   Diagnosis Date     Acute myocardial infarction, unspecified site, episode of care unspecified      Allergic rhinitis, cause unspecified      Anemia 3/6/2014     CAD (coronary artery disease)     1996 CABG - LIMA to LAD, SVG to OM, 2007 Cath - KANU to Left main and ostial/prox LAD, 2012 Cath - 80-90% stenosis SVG to OM - KANU placed, EF 50%     CKD (chronic kidney disease) stage 3, GFR 30-59 ml/min 3/9/2014     CTS (carpal tunnel syndrome)     bilateral     Degeneration of cervical intervertebral disc      Diaphragmatic hernia without mention of obstruction or gangrene      Esophageal reflux      Essential hypertension, benign      Hyperlipidemia LDL goal <70       Hypothyroidism      Hypothyroidism, unspecified hypothyroidism type 1/14/2016     IDIOPATHIC CYCLIC EDEMA      Mild persistent asthma      Osteoarthrosis, unspecified whether generalized or localized, unspecified site      Pneumonia, organism unspecified(486)      Proteinuria 5/17/2014     PVD (peripheral vascular disease) (H)      Sensorineural hearing loss, unspecified      Subarachnoid bleed (H)      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 disease (goal A1C<8) 10/17/2015     Unspecified disorder resulting from impaired renal function      Unspecified hereditary and idiopathic peripheral neuropathy      Venous insufficiency        PAST SURGICAL HISTORY:  Past Surgical History:   Procedure Laterality Date     C NONSPECIFIC PROCEDURE      appendectomy     C NONSPECIFIC PROCEDURE      hemorrhoids     C NONSPECIFIC PROCEDURE      cervical strain     C NONSPECIFIC PROCEDURE      rotator cuff surgery, right shoulder     C NONSPECIFIC PROCEDURE  2008    iol os     CORONARY ARTERY BYPASS  1996    LIMA to LAD, SVG to OM     HEART CATH STENT COR W/WO PTCA  2007    lad, left main cor artery stents/drug eluting     HEART CATH STENT COR W/WO PTCA  2012    80-90% stenosis SVG to OM - KANU placed, EF 50%     NONSPECIFIC PROCEDURE      iol od       FAMILY HISTORY:  Family History   Problem Relation Age of Onset     Lung Cancer Daughter      Family History Negative Mother      broken hip     Jaundice Father      Alcohol/Drug Father      Ovarian Cancer Daughter      Family History Negative Daughter      Family History Negative Son        SOCIAL HISTORY:  Social History     Social History     Marital status:      Spouse name: N/A     Number of children: N/A     Years of education: N/A     Social History Main Topics     Smoking status: Former Smoker     Packs/day: 1.00     Years: 14.00     Types: Cigarettes     Start date: 1952     Quit date: 1966     Smokeless tobacco: Never Used     Alcohol use No     Drug use:  No     Sexual activity: No     Other Topics Concern     Caffeine Concern No     Sleep Concern Yes     Stress Concern No     Weight Concern No     Special Diet No     Exercise Yes     bowling, 5 days week. yard work     Social History Narrative       Review of Systems:  Skin:  Positive for bruising   Eyes:  Negative    ENT:  Negative    Respiratory:  Negative    Cardiovascular:    edema;Positive for  Gastroenterology: Positive for constipation  Genitourinary:  Positive for urinary frequency  Musculoskeletal:  Positive for joint pain  Neurologic:  Positive for numbness or tingling of hands  Psychiatric:  Negative    Heme/Lymph/Imm:       Endocrine:         Physical Exam:  Vitals: /64  Pulse 58  Ht 1.829 m (6')  Wt 91 kg (200 lb 9.6 oz)  SpO2 100%  BMI 27.21 kg/m2    Constitutional:  cooperative, alert and oriented, well developed, well nourished, in no acute distress        Skin:  warm and dry to the touch, no apparent skin lesions or masses noted          Head:  normocephalic, no masses or lesions        Eyes:  pupils equal and round;sclera white;EOMS intact        Lymph:      ENT:  no pallor or cyanosis        Neck:  carotid pulses are full and equal bilaterally;JVP normal;no carotid bruit        Respiratory:  normal breath sounds, clear to auscultation, normal A-P diameter, normal symmetry, normal respiratory excursion, no use of accessory muscles;healed median sternotomy scar         Cardiac: regular rhythm;normal S1 and S2;no S3 or S4;apical impulse not displaced       systolic ejection murmur;RUSB;grade 1        not assessed this visit                               right femoral bruit (-) left femoral bruit (-)      GI:  abdomen soft, non-tender, BS normoactive, no mass, no HSM, no bruits surgical scars      Extremities and Muscular Skeletal:    stasis pigmentation bilateral LE edema;1+          Neurological:  no gross motor deficits        Psych:  Alert and Oriented x 3        Recent Lab  Results:  LIPID RESULTS:  Lab Results   Component Value Date    CHOL 167 03/14/2018    HDL 32 (L) 03/14/2018     (H) 03/14/2018    TRIG 99 03/14/2018    CHOLHDLRATIO 3.8 09/11/2014       LIVER ENZYME RESULTS:  Lab Results   Component Value Date    AST 9 06/12/2018    ALT 20 06/12/2018       CBC RESULTS:  Lab Results   Component Value Date    WBC 8.8 06/14/2018    RBC 3.08 (L) 06/14/2018    HGB 9.4 (L) 06/14/2018    HCT 28.3 (L) 06/14/2018    MCV 92 06/14/2018    MCH 30.5 06/14/2018    MCHC 33.2 06/14/2018    RDW 15.7 (H) 06/14/2018     06/14/2018       BMP RESULTS:  Lab Results   Component Value Date     06/19/2018    POTASSIUM 4.1 06/19/2018    CHLORIDE 104 06/19/2018    CO2 24 06/19/2018    ANIONGAP 16.1 06/19/2018     (H) 06/19/2018    BUN 64 (H) 06/19/2018    CR 3.22 (H) 06/19/2018    GFRESTIMATED 18 (L) 06/19/2018    GFRESTBLACK 22 (L) 06/19/2018    LEV 9.5 06/19/2018        A1C RESULTS:  Lab Results   Component Value Date    A1C 5.9 (H) 06/13/2018       INR RESULTS:  Lab Results   Component Value Date    INR 1.06 03/12/2013    INR 1.19 (H) 12/08/2008           CC  No referring provider defined for this encounter.

## 2018-06-19 NOTE — TELEPHONE ENCOUNTER
ED / Discharge Outreach Protocol    Patient Contact    Attempt # 2    Was call answered?  Yes.  Patient on way to appointment at heart clinic. Requests to be called back later this afternoon.

## 2018-06-19 NOTE — PATIENT INSTRUCTIONS
Call [facility] nurse for any questions or concerns:  [phone]   *If you have concerns after hours, please call [phone], option 2 to speak with on call Cardiologist.    1. Medication changes from today:  Reduce the torsemide to 20 mg once a day.    Lab work next Tuesday before you see Dr. Gibson    Results for orders placed or performed in visit on 06/19/18 (from the past 24 hour(s))   Basic metabolic panel   Result Value Ref Range    Sodium 140 136 - 145 mmol/L    Potassium 4.1 3.5 - 5.1 mmol/L    Chloride 104 98 - 107 mmol/L    Carbon Dioxide 24 23 - 29 mmol/L    Anion Gap 16.1 6 - 17 mmol/L    Glucose 185 (H) 70 - 105 mg/dL    Urea Nitrogen 64 (H) 7 - 30 mg/dL    Creatinine 3.22 (H) 0.70 - 1.30 mg/dL    GFR Estimate 18 (L) >60 mL/min/1.7m2    GFR Estimate If Black 22 (L) >60 mL/min/1.7m2    Calcium 9.5 8.5 - 10.5 mg/dL        2. Weigh yourself daily and write it down.     3. Call [facility] nurse if your weight is up more than 2 pounds in one day or 5 pounds in one week.     4. Call [facility] nurse if you feel more short of breath, have more abdominal bloating, or leg swelling.     5. Continue low sodium diet (less than 2000 mg daily). If you eat less salt, you will retain less fluid.     6. Alcohol can weaken your heart further. You should avoid alcohol or limit its use to special times, such as a holiday or birthday.      7. Do NOT take Aleve or ibuprofen without talking to your doctor first.      8. Lab Results: **     [facility]: Cardiomyopathy, Optimization, Rehabilitation, Education  The [facility] is a heart failure specialty clinic within the [facility] where you will work with specialized nurse practitioners, physician assistants, doctors, and registered nurses. They are dedicated to helping patients with heart failure to carefully adjust medications, receive education, and learn who and when to call if symptoms develop. They specialize in helping you better understand your condition, slow the  progression of your disease, improve the length and quality of your life, help you detect future heart problems before they become life threatening, and avoid hospitalizations.

## 2018-06-19 NOTE — LETTER
6/19/2018      Jorge Hillman MD  [address] [facility] [address]      RE: Oscar Terrazas       Dear Colleague,    I had the pleasure of seeing Oscar Terrazas in the [facility].    Service Date: 06/19/2018      HISTORY OF PRESENT ILLNESS:  Mr. Terrazas is an 84-year-old gentleman who is here today in followup from a recent hospitalization where he suffered a non-ST elevation myocardial infarction and decompensated systolic heart failure.  He has a complicated cardiovascular history that includes:     1.  CAD with Coronary artery bypass surgery in 1996 with multivessel drug-eluting stents placed in his LAD and              left main in 2007.  He also underwent stenting to the vein graft of his OM in 2012.  He has been on chronic dual  antiplatelet therapy with aspirin and Plavix without any bleeding issues.    2.  Chronic kidney disease with baseline Cr around 2-3.   3. HFpEF   4 Chronic Anemia from chronic diseases with baseline hemoglobin around 9-10.   5.Thyroid disease   6. Hypertension   7. Hyperlipidemia-intol of statins    When he presented to [facility], he experienced angina at rest with dyspnea, sitting and watching TV that lasted approximately 10 minutes.  His troponins elevated to 1.86.  .  His baseline troponins run about 0.6-0.7.  During his hospitalization, Nephrology was consulted, who recommended medical management of his CAD due to his chronic kidney disease.  His creatinine in the hospital ran around 3.  The patient's wife was on dialysis prior to her death and she did not feel well while she was on dialysis.  During his hospitalization, his medications were adjusted for tighter blood pressure control.  A cardiac echo was done which showed his left ventricle was normal in size.  There was proximal septal thickening.  The left ventricular systolic function was normal.  His ejection fraction was 55%-60%.  There was grade II or moderate diastolic  dysfunction.  The diastolic Doppler findings E/E' suggested left ventricular filling pressures were increased.  His right ventricle was normal in size and function.  There was moderate hypokinesis of the basal inferior and inferolateral walls.  The patient's Imdur was increased from 60 mg per day to 120 mg per day.  The patient was admitted with an elevated NT-proBNP greater than 10,000.  He was treated with IV Lasix and transitioned to torsemide.  He lost 3 liters on admission.  The patient reported dietary indiscretion with food high in sodium.  His favorite foods include orange chicken.  He buys it premade and keeps it in his freezer and he believes there is a lot of sodium in it.      The patient also has chronic anemia.  His hemoglobin runs into the 9-10 range.  It is thought to be due to anemia from chronic diseases.  His iron studies have been normal.  His levothyroxine was adjusted during this hospitalization.  He reports no chest pain at rest or with exertion.  He has checked his blood pressure at home every day.  Generally, his systolic blood pressure runs around 130-135.  His weights at home have been very stable, running anywhere from 201 up to 204.  He denies lightheadedness or dizziness.  Blood pressure is 126/64.  Today, his pulse runs 58.  Today patient is with his daughter, Yasmin; they live together.  The patient does set of his own medications.      Please see physical exam.      LABWORK:  Sodium 140, potassium 4.1, BUN 64, creatinine 3.22, GFR 18, hemoglobin 9.4 (ran last week).      IMPRESSION AND PLAN:   1.  The patient is an 84-year-old gentleman with a history of heart failure with preserved ejection fraction, hypertension, coronary artery disease, and hyperlipidemia, who presented with chest pressure and progressive shortness of breath.  His troponins were positive for non-ST elevation myocardial infarction.  Coronary angiogram was presented to the patient.  Nephrology was also involved due to  his chronic kidney disease.  A decision was made during his hospitalization to hold off on a coronary angiogram.  Dr. Izquierdo, who saw the patient in the hospital, requested that a coronary angiogram be discussed with the patient by his primary cardiologist, Dr. Gibson.  The patient has been set up to see Dr. Gibson.  Today, the patient appears stable.  He reports no new angina.  He denies any headache from the high doses of isosorbide mononitrate.  His blood pressure looks very stable today.  He and his daughter continue to work on a 2-gram sodium diet.  They now make their own Orange Chicken instead of buying it frozen.  Danny says it tastes pretty good.  He is doing an excellent job checking his weight every day and his blood pressure.   2.  Chronic kidney disease.  His creatinine is higher today.  I am going to reduce his torsemide to 20 mg once a day.  We will recheck a BMP next week before he sees Dr. Gibson.  I reinforced signs and symptoms to call the [facility] to include 2-3 pound weight gain overnight, inability to lie flat or worsening lower leg swelling.      It was my pleasure to see Danny again.      Preethi Smiley MS, CNP         AIDA TAVERAS, PAGE             D: 2018   T: 2018   MT: al      Name:     AKILAH BARILLAS   MRN:      [id number]        Account:      [id number]   :      1934           Service Date: 2018      Document: [id number]           Outpatient Encounter Prescriptions as of 2018   Medication Sig Dispense Refill     allopurinol (ZYLOPRIM) 100 MG tablet TAKE 2 TABLETS(200 MG) BY MOUTH DAILY 180 tablet 3     aspirin 81 MG tablet Take 1 tablet by mouth daily. with food       blood glucose monitoring (ACCU-CHEK COMPACT PLUS) test strip Check blood sugar 2-3 times a day 200 strip 3     clopidogrel (PLAVIX) 75 MG tablet TAKE 1 TABLET(75 MG) BY MOUTH DAILY 90 tablet 3     COD LIVER OIL PO Take 1 capsule by mouth daily        Cyanocobalamin (VITAMIN B 12  "PO) Take 500 mcg by mouth 2 times daily.       diphenhydrAMINE-acetaminophen (TYLENOL PM)  MG tablet Take 1 tablet by mouth nightly as needed for sleep       hydrALAZINE (APRESOLINE) 25 MG tablet Take 1 tablet (25 mg) by mouth 3 times daily 90 tablet 1     insulin NPH (HUMULIN N, NOVOLIN N) 100 UNIT/ML VIAL 18 units at bedtime 3 Month 3     insulin syringe-needle U-100 (BD INSULIN SYRINGE) 29G X 1/2\" 0.5 ML Use one syringe 2 daily or as directed. 200 each 4     isosorbide mononitrate 120 MG TB24 Take 1 tablet (120 mg) by mouth daily 30 tablet 1     levothyroxine (SYNTHROID/LEVOTHROID) 75 MCG tablet Take 1 tablet (75 mcg) by mouth daily 30 tablet 1     metoprolol tartrate (LOPRESSOR) 50 MG tablet Take 1 tablet (50 mg) by mouth 2 times daily 60 tablet 0     nitroGLYcerin (NITROSTAT) 0.4 MG sublingual tablet For chest pain place 1 tablet under the tongue every 5 minutes for 3 doses. If symptoms persist 5 minutes after 1st dose call 911. 25 tablet 1     pravastatin (PRAVACHOL) 20 MG tablet Take 1 tablet (20 mg) by mouth once a week 30 tablet 1     theophylline (UNIPHYL) 400 MG 24 hr tablet TAKE 1 TABLET BY MOUTH EVERY DAY 90 tablet 3     torsemide (DEMADEX) 20 MG tablet Take 1 tablet (20 mg) by mouth daily 60 tablet 1     triamcinolone (KENALOG) 0.5 % cream Apply sparingly to affected area two  times daily as needed for rash. 45 g 1     cetirizine (ZYRTEC) 10 MG tablet TAKE 1 TABLET(10 MG) BY MOUTH EVERY EVENING (Patient not taking: Reported on 6/19/2018) 30 tablet 8     [DISCONTINUED] torsemide (DEMADEX) 20 MG tablet Take 2 tablets (40 mg) by mouth daily 30 tablet 1     No facility-administered encounter medications on file as of 6/19/2018.        Again, thank you for allowing me to participate in the care of your patient.      Sincerely,    AIDA Yee [facility]    "

## 2018-06-19 NOTE — MR AVS SNAPSHOT
After Visit Summary   6/19/2018    Oscar Terrazas    MRN: [id number]           Patient Information     Date Of Birth          2/2/1934        Visit Information        Provider Department      6/19/2018 1:10 PM Preethi Smiley, A[facility]        Today's Diagnoses     Acute on chronic congestive heart failure, unspecified congestive heart failure type (H)          Care Instructions    Call CORE nurse for any questions or concerns:  [phone]   *If you have concerns after hours, please call [phone], option 2 to speak with on call Cardiologist.    1. Medication changes from today:  Reduce the torsemide to 20 mg once a day.    Lab work next Tuesday before you see Dr. Gibson    Results for orders placed or performed in visit on 06/19/18 (from the past 24 hour(s))   Basic metabolic panel   Result Value Ref Range    Sodium 140 136 - 145 mmol/L    Potassium 4.1 3.5 - 5.1 mmol/L    Chloride 104 98 - 107 mmol/L    Carbon Dioxide 24 23 - 29 mmol/L    Anion Gap 16.1 6 - 17 mmol/L    Glucose 185 (H) 70 - 105 mg/dL    Urea Nitrogen 64 (H) 7 - 30 mg/dL    Creatinine 3.22 (H) 0.70 - 1.30 mg/dL    GFR Estimate 18 (L) >60 mL/min/1.7m2    GFR Estimate If Black 22 (L) >60 mL/min/1.7m2    Calcium 9.5 8.5 - 10.5 mg/dL        2. Weigh yourself daily and write it down.     3. Call CORE nurse if your weight is up more than 2 pounds in one day or 5 pounds in one week.     4. Call CORE nurse if you feel more short of breath, have more abdominal bloating, or leg swelling.     5. Continue low sodium diet (less than 2000 mg daily). If you eat less salt, you will retain less fluid.     6. Alcohol can weaken your heart further. You should avoid alcohol or limit its use to special times, such as a holiday or birthday.      7. Do NOT take Aleve or ibuprofen without talking to your doctor first.      8. Lab Results: **     [facility]: Cardiomyopathy, Optimization, Rehabilitation,  Education  The [facility] is a heart failure specialty clinic within the [facility] where you will work with specialized nurse practitioners, physician assistants, doctors, and registered nurses. They are dedicated to helping patients with heart failure to carefully adjust medications, receive education, and learn who and when to call if symptoms develop. They specialize in helping you better understand your condition, slow the progression of your disease, improve the length and quality of your life, help you detect future heart problems before they become life threatening, and avoid hospitalizations.              Follow-ups after your visit        Your next 10 appointments already scheduled     Jun 26, 2018  2:15 PM CDT   Return Visit with Sebastien Gibson MD   [facility] ([facility])    [address] [place] [address] [phone] OPT 2            Jun 28, 2018  1:00 PM CDT   Office Visit with Jorge Hillman MD   [facility]    [address] [address] [phone]           Bring a current list of meds and any records pertaining to this visit. For Physicals, please bring immunization records and any forms needing to be filled out. Please arrive 10 minutes early to complete paperwork.            Jul 18, 2018 12:30 PM CDT   Return Visit with AIDA Santacruz CNP   [facility] ([facility])    [address] [place], [place] [address]  [facility] [address]   [phone] OPT 2              Future tests that were ordered for you today     Open Future Orders        Priority Expected Expires Ordered    Basic metabolic panel Routine 6/26/2018 6/19/2019 6/19/2018            Who to contact     If you have questions or need follow up information about today's clinic visit or your schedule please contact [facility] directly at [phone].  Normal or non-critical lab and imaging results will be communicated to you by MyChart, letter or phone within 4 business days after the clinic has received the results. If you do not hear from us within 7 days, please contact the clinic through [email or web address] or phone. If you have a critical or abnormal lab result, we will notify you by phone as soon as possible.  Submit refill requests through Traxer or call your pharmacy and they will forward the refill request to us. Please allow 3 business days for your refill to be completed.          Additional Information About Your Visit        LOG607harContext Matters Information     Traxer gives you secure access to your electronic health record. If you see a primary care provider, you can also send messages to your care team and make appointments. If you have questions, please call your primary care clinic.  If you do not have a primary care provider, please call [phone] and they will assist you.        Care EveryWhere ID     This is your Care EveryWhere ID. This could be used by other organizations to access your [facility] medical records  [id number]        Your Vitals Were     Pulse Height Pulse Oximetry BMI (Body Mass Index)          58 1.829 m (6') 100% 27.21 kg/m2         Blood Pressure from Last 3 Encounters:   06/19/18 126/64   06/15/18 148/68   06/06/18 164/68    Weight from Last 3 Encounters:   06/19/18 91 kg (200 lb 9.6 oz)   06/15/18 91.4 kg (201 lb 8 oz)   06/06/18 95 kg (209 lb 6.4 oz)                 Today's Medication Changes          These changes are accurate as of 6/19/18  1:49 PM.  If you have any questions, ask your nurse or doctor.               These medicines have changed or have updated prescriptions.        Dose/Directions    torsemide 20 MG tablet   Commonly known as:  DEMADEX   This may have changed:  how much to take   Used for:  Acute on chronic congestive heart failure, unspecified congestive  heart failure type (H)   Changed by:  Preethi Smiley, APRN CNP        Dose:  20 mg   Take 1 tablet (20 mg) by mouth daily   Quantity:  60 tablet   Refills:  1            Where to get your medicines      These medications were sent to [facility] - [place], [place] - [address] AT [facility] [address], [place] [address]     Phone:  [phone]     torsemide 20 MG tablet                Primary Care Provider Office Phone # Fax #    Jorge Hillman [phone]       [address] [facility] [address]        Equal Access to Services     RANJAN JONES : Hadii narayan tuttle hadasho Soomaali, waaxda luqadaha, qaybta kaalmada adeegyada, nicole lord. So [facility] [phone].    ATENCIÓN: Si habla español, tiene a fernandez disposición servicios gratuitos de asistencia lingüística. Llame al [phone].    We comply with applicable federal civil rights laws and [place] laws. We do not discriminate on the basis of race, color, national origin, age, disability, sex, sexual orientation, or gender identity.            Thank you!     Thank you for choosing [facility]  for your care. Our goal is always to provide you with excellent care. Hearing back from our patients is one way we can continue to improve our services. Please take a few minutes to complete the written survey that you may receive in the mail after your visit with us. Thank you!             Your Updated Medication List - Protect others around you: Learn how to safely use, store and throw away your medicines at www.disposemymeds.org.          This list is accurate as of 6/19/18  1:49 PM.  Always use your most recent med list.                   Brand Name Dispense Instructions for use Diagnosis    allopurinol 100 MG tablet    ZYLOPRIM    180 tablet    TAKE 2 TABLETS(200 MG) BY MOUTH DAILY    Chronic gout without tophus, unspecified cause,  "unspecified site       aspirin 81 MG tablet      Take 1 tablet by mouth daily. with food        blood glucose monitoring test strip    ACCU-CHEK COMPACT PLUS    200 strip    Check blood sugar 2-3 times a day    Type 2 diabetes mellitus with stage 3 chronic kidney disease, with long-term current use of insulin (H)       cetirizine 10 MG tablet    zyrTEC    30 tablet    TAKE 1 TABLET(10 MG) BY MOUTH EVERY EVENING    Atopic dermatitis, unspecified type, Itching       clopidogrel 75 MG tablet    PLAVIX    90 tablet    TAKE 1 TABLET(75 MG) BY MOUTH DAILY    CKD (chronic kidney disease) stage 3, GFR 30-59 ml/min, Cardiovascular disease       COD LIVER OIL PO      Take 1 capsule by mouth daily        diphenhydrAMINE-acetaminophen  MG tablet    TYLENOL PM     Take 1 tablet by mouth nightly as needed for sleep        hydrALAZINE 25 MG tablet    APRESOLINE    90 tablet    Take 1 tablet (25 mg) by mouth 3 times daily    Essential hypertension, benign       insulin  UNIT/ML injection    HumuLIN N/NovoLIN N    3 Month    18 units at bedtime    Type 2 diabetes mellitus with stage 3 chronic kidney disease, with long-term current use of insulin (H)       insulin syringe-needle U-100 29G X 1/2\" 0.5 ML    BD insulin syringe    200 each    Use one syringe 2 daily or as directed.    Type 2 diabetes mellitus with stage 3 chronic kidney disease, with long-term current use of insulin (H)       Isosorbide Mononitrate  MG Tb24     30 tablet    Take 1 tablet (120 mg) by mouth daily    NSTEMI (non-ST elevated myocardial infarction) (H)       levothyroxine 75 MCG tablet    SYNTHROID/LEVOTHROID    30 tablet    Take 1 tablet (75 mcg) by mouth daily    Hypothyroidism, unspecified type       metoprolol tartrate 50 MG tablet    LOPRESSOR    60 tablet    Take 1 tablet (50 mg) by mouth 2 times daily    NSTEMI (non-ST elevated myocardial infarction) (H)       nitroGLYcerin 0.4 MG sublingual tablet    NITROSTAT    25 tablet    For " chest pain place 1 tablet under the tongue every 5 minutes for 3 doses. If symptoms persist 5 minutes after 1st dose call 911.    NSTEMI (non-ST elevated myocardial infarction) (H)       pravastatin 20 MG tablet    PRAVACHOL    30 tablet    Take 1 tablet (20 mg) by mouth once a week    Essential hypertension, benign, Cardiovascular disease       theophylline 400 MG 24 hr tablet    UNIPHYL    90 tablet    TAKE 1 TABLET BY MOUTH EVERY DAY    Mild persistent asthma without complication       torsemide 20 MG tablet    DEMADEX    60 tablet    Take 1 tablet (20 mg) by mouth daily    Acute on chronic congestive heart failure, unspecified congestive heart failure type (H)       triamcinolone 0.5 % cream    KENALOG    45 g    Apply sparingly to affected area two  times daily as needed for rash.    Itching       VITAMIN B 12 PO      Take 500 mcg by mouth 2 times daily.

## 2018-06-20 ENCOUNTER — PATIENT OUTREACH (OUTPATIENT)
Dept: CARE COORDINATION | Facility: CLINIC | Age: 83
End: 2018-06-20

## 2018-06-20 NOTE — LETTER
[facility] COORDIN[facility]  [address] [facility] [address]    June 20, 2018    Oscar SAUL Mk  [address] [facility] [address]      Dear Oscar,    I am a clinic care coordinator who works with Jorge Hillman MD at [facility]. I wanted to thank you for spending the time to talk with me.  I wanted to introduce myself and provide you with my contact information so that you can call me with questions or concerns about your health care. Below is a description of clinic care coordination and how I can further assist you.     The clinic care coordinator is a registered nurse and/or  who understand the health care system. The goal of clinic care coordination is to help you manage your health and improve access to the [facility] system in the most efficient manner. The registered nurse can assist you in meeting your health care goals by providing education, coordinating services, and strengthening the communication among your providers. The  can assist you with financial, behavioral, psychosocial, chemical dependency, counseling, and/or psychiatric resources.    Please feel free to contact me at [phone], with any questions or concerns. We at [facility] are focused on providing you with the highest-quality healthcare experience possible and that all starts with you.     Sincerely,     Isela Dawson    Enclosed: I have enclosed a copy of a 24 Hour Access Plan. This has helpful phone numbers for you to call when needed. Please keep this in an easy to access place to use as needed.

## 2018-06-20 NOTE — LETTER
Health Care Home - Access Care Plan    About Me  Patient Name:  Oscar Terrazas    YOB: 1934  Age:                             84 year old   [place] MRN:            [id number] Telephone Information:     Home Phone [phone]   Mobile Not on file.       Address:    [address] [place] [address] Email address:  [email or web address]      Emergency Contact(s)  Name Relationship Lgl Grd Work Phone Home Phone Mobile Phone   JAMEY FUCHS Daughter   [phone]             Health Maintenance:      My Access Plan  Medical Emergency 911   Questions or concerns during clinic hours Primary Clinic Line, I will call the clinic directly: [place] [phone]   24 Hour Appointment Line [phone] or  [phone]) (toll free)   24 Hour Nurse Line [phone] (toll free)   Questions or concerns outside clinic hours 24 Hour Appointment Line, I will call the after-hours on-call line:   [place] [phone] or [phone]) (toll-free)   Preferred Urgent Care [facility], [phone]   Preferred Hospital [facility]  [phone]   Preferred Pharmacy [facility] [place] [id number] AT [facility]     Behavioral Health Crisis Line The National Suicide Prevention Lifeline at 1-919.736.2706 or 911     My Care Team Members  Patient Care Team       Relationship Specialty Notifications Start End    Veum, Jorge GIL MD PCP - General Internal Medicine  3/[birth year]     Phone: [phone] Fax: [phone]         [address] [facility] [address]           My Medical and Care Information  Problem List   Patient Active Problem List   Diagnosis     Allergic rhinitis     Osteoarthritis     IDIOPATHIC CYCLIC EDEMA     Essential hypertension, benign     Degeneration of cervical intervertebral disc     Diaphragmatic hernia     Esophageal  reflux     Sensorineural hearing loss     Mild persistent asthma     Cardiovascular disease     Hypothyroidism     Venous insufficiency     ACP (advance care planning)     CTS (carpal tunnel syndrome)     PVD (peripheral vascular disease) (H)     TBI (traumatic brain injury) (H)     Hyperlipidemia LDL goal <70     CKD (chronic kidney disease) stage 4, GFR 15-29 ml/min (H)     CAD (coronary artery disease)     Proteinuria     Hypothyroidism, unspecified hypothyroidism type     Type 2 diabetes mellitus with stage 3 chronic kidney disease, with long-term current use of insulin (H)     Neuropathy     Chronic gout without tophus, unspecified cause, unspecified site     CHF (congestive heart failure) (H)      Current Medications and Allergies:  See printed Medication Report

## 2018-06-20 NOTE — PROGRESS NOTES
Clinic Care Coordination Contact    Clinic Care Coordination Contact  OUTREACH    Referral Information:  Referral Source: IP Handoff         Chief Complaint   Patient presents with     Clinic Care Coordination - [facility]     CTS handoff         Universal Utilization:      Utilization    Last refreshed: 6/20/2018  1:00 PM:  No Show Count (past year) 0       Last refreshed: 6/20/2018  1:00 PM:  ED visits 0       Last refreshed: 6/20/2018  1:00 PM:  Hospital admissions 2          Current as of: 6/20/2018  1:00 PM         Reason for Hospitalization:  Shortness of breath [R06.02]  Chest pressure [R07.89]  Elevated troponin [R74.8]  Acute on chronic congestive heart failure, unspecified congestive heart failure type (H) [I50.9]  Admit Date/Time: 6/12/2018 10:09 PM  Discharge Date: 6/15/18Discharge to home.    Clinical Concerns:  RN CC outreach to introduce self and care coordination.Spoke with daughter. Daughter notes that patient is followed with cardiac rehab for care coordination.   Daughter notes the medical complexity of patient and limiting factors for patient long term outcomes. Reviewed ACP and DNR/DNI status.    Daughter has had FMLA paperwork filled out by PCP to take patient to medical visit and notes that frequency of these visit had increased and would like PCP to consider revision of original orders to reflect the additional time needed. Daughter will bring this to follow up visit to discuss.         Patient/Caregiver understanding: Patient daughter verbalized understanding, engaged in AIDET communication behavior during encounter.         Future Appointments              In 6 days [facility] PHYSICI[facility] HEART AT [facility] PSA CLIN    In 6 days Sebastien Gibson MD [facility] PSA CLIN    In 1 week Jorge Hillman MD [facility],     In 4 weeks Preethi Smiley, APRN CNP [facility]  [facility] PSA CLIN          Plan: Patient will attend scheduled  evaluation and visit with specialist and follow up with PCP as recommended.Daughter will review FMLA paperwork with PCP if appropriate to consider update.   Cardiac rehab to take lead in care coordination. RN CC will be available in future if needed, contact information given, no further outreach at this time.       Isela Dawson RN  Clinic Care Coordinator  Mobile: [phone]  [email or web address]

## 2018-06-26 ENCOUNTER — OFFICE VISIT (OUTPATIENT)
Dept: CARDIOLOGY | Facility: CLINIC | Age: 83
End: 2018-06-26
Payer: COMMERCIAL

## 2018-06-26 VITALS
DIASTOLIC BLOOD PRESSURE: 66 MMHG | SYSTOLIC BLOOD PRESSURE: 139 MMHG | WEIGHT: 201.8 LBS | BODY MASS INDEX: 27.33 KG/M2 | HEIGHT: 72 IN | HEART RATE: 64 BPM

## 2018-06-26 DIAGNOSIS — I25.10 CORONARY ARTERY DISEASE INVOLVING NATIVE CORONARY ARTERY OF NATIVE HEART, ANGINA PRESENCE UNSPECIFIED: ICD-10-CM

## 2018-06-26 DIAGNOSIS — I50.9 ACUTE ON CHRONIC CONGESTIVE HEART FAILURE, UNSPECIFIED CONGESTIVE HEART FAILURE TYPE: ICD-10-CM

## 2018-06-26 DIAGNOSIS — I21.4 NSTEMI (NON-ST ELEVATED MYOCARDIAL INFARCTION) (H): Primary | ICD-10-CM

## 2018-06-26 DIAGNOSIS — N18.4 CKD (CHRONIC KIDNEY DISEASE) STAGE 4, GFR 15-29 ML/MIN (H): ICD-10-CM

## 2018-06-26 DIAGNOSIS — E78.5 HYPERLIPIDEMIA LDL GOAL <70: ICD-10-CM

## 2018-06-26 DIAGNOSIS — I50.31 ACUTE DIASTOLIC CONGESTIVE HEART FAILURE (H): ICD-10-CM

## 2018-06-26 LAB
ANION GAP SERPL CALCULATED.3IONS-SCNC: 15.4 MMOL/L (ref 6–17)
BUN SERPL-MCNC: 62 MG/DL (ref 7–30)
CALCIUM SERPL-MCNC: 9.5 MG/DL (ref 8.5–10.5)
CHLORIDE SERPL-SCNC: 107 MMOL/L (ref 98–107)
CO2 SERPL-SCNC: 26 MMOL/L (ref 23–29)
CREAT SERPL-MCNC: 3.39 MG/DL (ref 0.7–1.3)
GFR SERPL CREATININE-BSD FRML MDRD: 17 ML/MIN/1.7M2
GLUCOSE SERPL-MCNC: 197 MG/DL (ref 70–105)
POTASSIUM SERPL-SCNC: 4.4 MMOL/L (ref 3.5–5.1)
SODIUM SERPL-SCNC: 144 MMOL/L (ref 136–145)

## 2018-06-26 PROCEDURE — 99214 OFFICE O/P EST MOD 30 MIN: CPT | Performed by: INTERNAL MEDICINE

## 2018-06-26 PROCEDURE — 36415 COLL VENOUS BLD VENIPUNCTURE: CPT | Performed by: NURSE PRACTITIONER

## 2018-06-26 PROCEDURE — 80048 BASIC METABOLIC PNL TOTAL CA: CPT | Performed by: NURSE PRACTITIONER

## 2018-06-26 NOTE — LETTER
6/26/2018    Jorge Hillman MD  [address] [facility] [address]    RE: Oscar Terrazas       Dear Colleague,    I had the pleasure of seeing Oscar Terrazas in the [facility].    HPI and Plan:   This 84-year-old gentleman is seen, accompanied by his daughter, in followup of his recent non-ST elevation MI complicated by acute diastolic heart failure episode, with background history of coronary disease, CABG, subsequent multivessel stenting, stage IV chronic renal insufficiency, and resistant hypertension .   He was hospitalized in June of this year after he developed acute profound dyspnea which improved with oxygen.  He is found to have a mild troponin elevation and high NT proBNP.  Because of his very significant renal dysfunction we elected not to do angiography and treat medically.  He was diuresed about 9 pounds.   Since returning home his weight himself daily and his weights have been stable at home without significant change at around 198 pounds.    He has had no recurrence of the acute dyspnea or other ischemic symptoms.  He is able to get back to bowling and walking around the house and is not having significant dyspnea on exertion.  He denies orthopnea or PND.  He is wearing his pressure stockings every day and has only trace edema.   He has no palpitations, dizziness, syncope, new myalgias or muscle weakness, and denies claudication symptoms.     In 2017 he again had been unable to tolerate statins due to cramping and initially declined to continue them, but is now back on them once a week.  I have asked him to titrate this up to twice a week for a month and then 3 times a week for a month and we will see a is tolerating it.  He does have chronic venous insufficiency.  . He continues to have a fair amount of edema although he is asymptomatic from it.  He declined previous lymphedema clinic referral.  He states they are stable, he is having no skin breakdown bleeding or  cellulitis and wants to continue using his pressure stockings which he is doing reliably.      This is a gentleman with a CABG in 1996. In 2007, he required multivessel drug-eluting stenting for recurrent angina. In 2012, he presented with an acute coronary syndrome after his Plavix was stopped for a dental procedure. At that time, his LAD and left main coronary stents were widely patent. There was a lesion at one end of the stent in the vein graft to the OM that was severe, and possibly acute thrombus, and that was successfully stented with a drug-eluting stent. He has been maintained on Plavix since then.  He also has diabetes mellitus and severe renal insufficiency.      Exam -full exam below.  In summary:   Blood pressure-139/66.  Pulse 64.  Weight on our scales 201 pounds, 198 pounds at home.   Cardiac-, regular rhythm, a soft systolic ejection murmur without gallop.  JVD is 6-7 cm without HJR   Lungs-clear  Extremities-trace bilateral pretibial edema underneath pressure stockings.  This is significantly improved from earlier this spring      Impression/plan  1-recent non-STEMI possibly related to acute diastolic heart failure episode.  Normal ejection fraction on echo in March.     Episode symptoms are not recurred after increasing isosorbide and further diuresis.   At this time I would not consider pursuing angiography as he has been stable for a while, episode may be related to diastolic heart failure episode, and high risk of precipitating need for dialysis with angiography.   He will continue to weigh himself daily and let us know if there is a change in symptoms or weight.  When his isosorbide was increased and he was diuresed, his hydralazine was cut back.  If his blood pressure was up anymore at home we will need to increase his hydralazine dose.    2-acute diastolic heart failure episode - as per #1 above.     3-coronary artery disease. Status post CABG and subsequent multivessel stenting.  Ejection  fraction  normal at recent echo.  Continue dual antiplatelet therapy    4-dyslipidemia, -tolerating reduced dose statin so far.  I will have him increase to twice a week for a month and then 3 times a week for a month.    5-significant peripheral venous insufficiency of the lower extremities.   now using pressure stockings reliably and is willing to continue this, but no further evaluation or intervention currently.    6-hypertension, resistant.  He will continue daily blood pressures.  Goal blood pressures are peak systolics in the 130s.  We may need to increase his hydralazine to maintain this.  He will let us know how they are going.  His daughter will keep track with him as she does very good care for him.    7-stage IV chronic renal insufficiency-as above will not consider angiography at this time because of this.    I have asked him to return in 3 months.  They will also call with any issue of the blood pressure or weight gain or breathing.    Orders Placed This Encounter   Procedures     Basic metabolic panel     Follow-Up with Cardiac Advanced Practice Provider       No orders of the defined types were placed in this encounter.      There are no discontinued medications.      Encounter Diagnoses   Name Primary?     Coronary artery disease involving native coronary artery of native heart, angina presence unspecified      Acute diastolic congestive heart failure (H)      NSTEMI (non-ST elevated myocardial infarction) (H) Yes     CKD (chronic kidney disease) stage 4, GFR 15-29 ml/min (H)      Hyperlipidemia LDL goal <70        CURRENT MEDICATIONS:  Current Outpatient Prescriptions   Medication Sig Dispense Refill     allopurinol (ZYLOPRIM) 100 MG tablet TAKE 2 TABLETS(200 MG) BY MOUTH DAILY 180 tablet 3     aspirin 81 MG tablet Take 1 tablet by mouth daily. with food       clopidogrel (PLAVIX) 75 MG tablet TAKE 1 TABLET(75 MG) BY MOUTH DAILY 90 tablet 3     COD LIVER OIL PO Take 1 capsule by mouth daily         "Cyanocobalamin (VITAMIN B 12 PO) Take 500 mcg by mouth 2 times daily.       diphenhydrAMINE-acetaminophen (TYLENOL PM)  MG tablet Take 1 tablet by mouth nightly as needed for sleep       hydrALAZINE (APRESOLINE) 25 MG tablet Take 1 tablet (25 mg) by mouth 3 times daily 90 tablet 1     insulin NPH (HUMULIN N, NOVOLIN N) 100 UNIT/ML VIAL 18 units at bedtime 3 Month 3     isosorbide mononitrate 120 MG TB24 Take 1 tablet (120 mg) by mouth daily 30 tablet 1     levothyroxine (SYNTHROID/LEVOTHROID) 75 MCG tablet Take 1 tablet (75 mcg) by mouth daily 30 tablet 1     metoprolol tartrate (LOPRESSOR) 50 MG tablet Take 1 tablet (50 mg) by mouth 2 times daily 60 tablet 0     nitroGLYcerin (NITROSTAT) 0.4 MG sublingual tablet For chest pain place 1 tablet under the tongue every 5 minutes for 3 doses. If symptoms persist 5 minutes after 1st dose call 911. 25 tablet 1     pravastatin (PRAVACHOL) 20 MG tablet Take 1 tablet (20 mg) by mouth once a week 30 tablet 1     theophylline (UNIPHYL) 400 MG 24 hr tablet TAKE 1 TABLET BY MOUTH EVERY DAY 90 tablet 3     torsemide (DEMADEX) 20 MG tablet Take 1 tablet (20 mg) by mouth daily 60 tablet 1     triamcinolone (KENALOG) 0.5 % cream Apply sparingly to affected area two  times daily as needed for rash. 45 g 1     blood glucose monitoring (ACCU-CHEK COMPACT PLUS) test strip Check blood sugar 2-3 times a day 200 strip 3     cetirizine (ZYRTEC) 10 MG tablet TAKE 1 TABLET(10 MG) BY MOUTH EVERY EVENING (Patient not taking: Reported on 6/19/2018) 30 tablet 8     insulin syringe-needle U-100 (BD INSULIN SYRINGE) 29G X 1/2\" 0.5 ML Use one syringe 2 daily or as directed. 200 each 4       ALLERGIES     Allergies   Allergen Reactions     Advair [Fluticasone-Salmeterol]      cough;  insomnia, nightmares     Amlodipine Besylate      edema       Azithromycin GI Disturbance     Clarithromycin      gi     Hydrochlorothiazide      hctz + lisinopril-->inc creat, k+     Lisinopril      lisinopril + " hctz --> inc creat, k+     Moxifloxacin Hydrochloride      clostridium diffile colitis     Penicillins      gen swelling     Pravastatin Cramps     Muscle cramps/spasm.  Stopped 2017     Vioxx      edema       PAST MEDICAL HISTORY:  Past Medical History:   Diagnosis Date     Acute myocardial infarction, unspecified site, episode of care unspecified      Allergic rhinitis, cause unspecified      Anemia 3/6/2014     CAD (coronary artery disease)     1996 CABG - LIMA to LAD, SVG to OM, 2007 Cath - KANU to Left main and ostial/prox LAD, 2012 Cath - 80-90% stenosis SVG to OM - KANU placed, EF 50%     CKD (chronic kidney disease) stage 3, GFR 30-59 ml/min 3/9/2014     CTS (carpal tunnel syndrome)     bilateral     Degeneration of cervical intervertebral disc      Diaphragmatic hernia without mention of obstruction or gangrene      Esophageal reflux      Essential hypertension, benign      Hyperlipidemia LDL goal <70      Hypothyroidism      Hypothyroidism, unspecified hypothyroidism type 1/14/2016     IDIOPATHIC CYCLIC EDEMA      Mild persistent asthma      Osteoarthrosis, unspecified whether generalized or localized, unspecified site      Pneumonia, organism unspecified(486)      Proteinuria 5/17/2014     PVD (peripheral vascular disease) (H)      Sensorineural hearing loss, unspecified      Subarachnoid bleed (H)      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 disease (goal A1C<8) 10/17/2015     Unspecified disorder resulting from impaired renal function      Unspecified hereditary and idiopathic peripheral neuropathy      Venous insufficiency        PAST SURGICAL HISTORY:  Past Surgical History:   Procedure Laterality Date     C NONSPECIFIC PROCEDURE      appendectomy     C NONSPECIFIC PROCEDURE      hemorrhoids     C NONSPECIFIC PROCEDURE      cervical strain     C NONSPECIFIC PROCEDURE      rotator cuff surgery, right shoulder     C NONSPECIFIC PROCEDURE  2008    iol os     CORONARY ARTERY BYPASS  1996     "LIMA to LAD, SVG to OM     HEART CATH STENT COR W/WO PTCA  2007    lad, left main cor artery stents/drug eluting     HEART CATH STENT COR W/WO PTCA  2012    80-90% stenosis SVG to OM - KANU placed, EF 50%     NONSPECIFIC PROCEDURE      iol od       FAMILY HISTORY:  Family History   Problem Relation Age of Onset     Lung Cancer Daughter      Family History Negative Mother      broken hip     Jaundice Father      Alcohol/Drug Father      Ovarian Cancer Daughter      Family History Negative Daughter      Family History Negative Son        SOCIAL HISTORY:  Social History     Social History     Marital status:      Spouse name: N/A     Number of children: N/A     Years of education: N/A     Social History Main Topics     Smoking status: Former Smoker     Packs/day: 1.00     Years: 14.00     Types: Cigarettes     Start date: 1952     Quit date: 1966     Smokeless tobacco: Never Used     Alcohol use No     Drug use: No     Sexual activity: No     Other Topics Concern     Caffeine Concern No     Sleep Concern Yes     Stress Concern No     Weight Concern No     Special Diet No     Exercise Yes     bowling, 5 days week. yard work     Social History Narrative       Review of Systems:  Skin:  Negative       Eyes:  Negative      ENT:  Positive for   dry throat  Respiratory:  Negative       Cardiovascular:    Positive for;chest pain;edema chest pain with exertion  Gastroenterology: Negative      Genitourinary:  Positive for urinary frequency    Musculoskeletal:  Positive for joint pain    Neurologic:  Positive for numbness or tingling of hands carpal tunnel, feet feel \"funny\" occ.  Psychiatric:  Negative      Heme/Lymph/Imm:  Positive for allergies    Endocrine:  Positive for diabetes      Physical Exam:  Vitals: /66  Pulse 64  Ht 1.829 m (6')  Wt 91.5 kg (201 lb 12.8 oz)  BMI 27.37 kg/m2    Constitutional:  cooperative, alert and oriented, well developed, well nourished, in no acute distress        Skin:  warm " and dry to the touch, no apparent skin lesions or masses noted          Head:  normocephalic, no masses or lesions        Eyes:  pupils equal and round;sclera white;EOMS intact        Lymph:      ENT:  no pallor or cyanosis        Neck:  JVP normal        Respiratory:  normal breath sounds, clear to auscultation, normal A-P diameter, normal symmetry, normal respiratory excursion, no use of accessory muscles;healed median sternotomy scar         Cardiac: regular rhythm;normal S1 and S2;no S3 or S4;apical impulse not displaced       systolic ejection murmur;RUSB;grade 1        not assessed this visit                                        GI:  abdomen soft;non-tender        Extremities and Muscular Skeletal:    stasis pigmentation bilateral LE edema;trace     pressure stckings in place    Neurological:  no gross motor deficits        Psych:  affect appropriate, oriented to time, person and place        Thank you for allowing me to participate in the care of your patient.    Sincerely,     Sebastien Gibson MD     [facility] [place]

## 2018-06-26 NOTE — MR AVS SNAPSHOT
After Visit Summary   6/26/2018    Oscar Terrazas    MRN: [id number]           Patient Information     Date Of Birth          2/2/1934        Visit Information        Provider Department      6/26/2018 2:15 PM Sebastien Gibson MD [facility]        Today's Diagnoses     NSTEMI (non-ST elevated myocardial infarction) (H)    -  1    Coronary artery disease involving native coronary artery of native heart, angina presence unspecified        Acute diastolic congestive heart failure (H)        CKD (chronic kidney disease) stage 4, GFR 15-29 ml/min (H)        Hyperlipidemia LDL goal <70           Follow-ups after your visit        Additional Services     Follow-Up with Cardiac Advanced Practice Provider                 Your next 10 appointments already scheduled     Jun 28, 2018  1:00 PM CDT   Office Visit with Jorge Hillman MD   [facility] ([facility])    [address] [address] [phone]           Bring a current list of meds and any records pertaining to this visit. For Physicals, please bring immunization records and any forms needing to be filled out. Please arrive 10 minutes early to complete paperwork.            Jul 18, 2018 12:30 PM CDT   Return Visit with AIDA Santacruz CNP   [facility] ([facility])    [address] [place] [id number] [phone] OPT 2              Future tests that were ordered for you today     Open Future Orders        Priority Expected Expires Ordered    Follow-Up with Cardiac Advanced Practice Provider Routine 9/24/2018 6/26/2019 6/26/2018    Basic metabolic panel Routine 9/24/2018 6/26/2019 6/26/2018            Who to contact     If you have questions or need follow up information about today's clinic visit or your schedule please contact [facility]  [facility] directly at [phone].  Normal or non-critical lab and imaging results will be communicated to you by MyChart, letter or phone within 4 business days after the clinic has received the results. If you do not hear from us within 7 days, please contact the clinic through [facility] or phone. If you have a critical or abnormal lab result, we will notify you by phone as soon as possible.  Submit refill requests through [facility] [employer] [facility] or call your pharmacy and they will forward the refill request to us. Please allow 3 business days for your refill to be completed.          Additional Information About Your Visit        [facility] Information     [facility] gives you secure access to your electronic health record. If you see a primary care provider, you can also send messages to your care team and make appointments. If you have questions, please call your primary care clinic.  If you do not have a primary care provider, please call [phone] and they will assist you.        Care EveryWhere ID     This is your Care EveryWhere ID. This could be used by other organizations to access your [facility] medical records  [id number]        Your Vitals Were     Pulse Height BMI (Body Mass Index)             64 1.829 m (6') 27.37 kg/m2          Blood Pressure from Last 3 Encounters:   06/26/18 139/66   06/19/18 126/64   06/15/18 148/68    Weight from Last 3 Encounters:   06/26/18 91.5 kg (201 lb 12.8 oz)   06/19/18 91 kg (200 lb 9.6 oz)   06/15/18 91.4 kg (201 lb 8 oz)               Primary Care Provider Office Phone # Fax #    Jorge Hillman [phone]       [address] [facility] [address]        Equal Access to Services     RANJAN JONES : Hadii narayan Plata, wavikasda luqadaha, qaybta kaalmada nicole carmona. So [facility] [phone].    ATENCIÓN: Si habla español, tiene a fernandez disposición servicios gratuitos de asistencia lingüística. Llame al [phone].    We comply  with applicable federal civil rights laws and [place] laws. We do not discriminate on the basis of race, color, national origin, age, disability, sex, sexual orientation, or gender identity.            Thank you!     Thank you for choosing [facility]  for your care. Our goal is always to provide you with excellent care. Hearing back from our patients is one way we can continue to improve our services. Please take a few minutes to complete the written survey that you may receive in the mail after your visit with us. Thank you!             Your Updated Medication List - Protect others around you: Learn how to safely use, store and throw away your medicines at www.disposemymeds.org.          This list is accurate as of 6/26/18  2:30 PM.  Always use your most recent med list.                   Brand Name Dispense Instructions for use Diagnosis    allopurinol 100 MG tablet    ZYLOPRIM    180 tablet    TAKE 2 TABLETS(200 MG) BY MOUTH DAILY    Chronic gout without tophus, unspecified cause, unspecified site       aspirin 81 MG tablet      Take 1 tablet by mouth daily. with food        blood glucose monitoring test strip    ACCU-CHEK COMPACT PLUS    200 strip    Check blood sugar 2-3 times a day    Type 2 diabetes mellitus with stage 3 chronic kidney disease, with long-term current use of insulin (H)       cetirizine 10 MG tablet    zyrTEC    30 tablet    TAKE 1 TABLET(10 MG) BY MOUTH EVERY EVENING    Atopic dermatitis, unspecified type, Itching       clopidogrel 75 MG tablet    PLAVIX    90 tablet    TAKE 1 TABLET(75 MG) BY MOUTH DAILY    CKD (chronic kidney disease) stage 3, GFR 30-59 ml/min, Cardiovascular disease       COD LIVER OIL PO      Take 1 capsule by mouth daily        diphenhydrAMINE-acetaminophen  MG tablet    TYLENOL PM     Take 1 tablet by mouth nightly as needed for sleep        hydrALAZINE 25 MG tablet    APRESOLINE    90 tablet    Take 1 tablet (25 mg) by  "mouth 3 times daily    Essential hypertension, benign       insulin  UNIT/ML injection    HumuLIN N/NovoLIN N    3 Month    18 units at bedtime    Type 2 diabetes mellitus with stage 3 chronic kidney disease, with long-term current use of insulin (H)       insulin syringe-needle U-100 29G X 1/2\" 0.5 ML    BD insulin syringe    200 each    Use one syringe 2 daily or as directed.    Type 2 diabetes mellitus with stage 3 chronic kidney disease, with long-term current use of insulin (H)       Isosorbide Mononitrate  MG Tb24     30 tablet    Take 1 tablet (120 mg) by mouth daily    NSTEMI (non-ST elevated myocardial infarction) (H)       levothyroxine 75 MCG tablet    SYNTHROID/LEVOTHROID    30 tablet    Take 1 tablet (75 mcg) by mouth daily    Hypothyroidism, unspecified type       metoprolol tartrate 50 MG tablet    LOPRESSOR    60 tablet    Take 1 tablet (50 mg) by mouth 2 times daily    NSTEMI (non-ST elevated myocardial infarction) (H)       nitroGLYcerin 0.4 MG sublingual tablet    NITROSTAT    25 tablet    For chest pain place 1 tablet under the tongue every 5 minutes for 3 doses. If symptoms persist 5 minutes after 1st dose call 911.    NSTEMI (non-ST elevated myocardial infarction) (H)       pravastatin 20 MG tablet    PRAVACHOL    30 tablet    Take 1 tablet (20 mg) by mouth once a week    Essential hypertension, benign, Cardiovascular disease       theophylline 400 MG 24 hr tablet    UNIPHYL    90 tablet    TAKE 1 TABLET BY MOUTH EVERY DAY    Mild persistent asthma without complication       torsemide 20 MG tablet    DEMADEX    60 tablet    Take 1 tablet (20 mg) by mouth daily    Acute on chronic congestive heart failure, unspecified congestive heart failure type (H)       triamcinolone 0.5 % cream    KENALOG    45 g    Apply sparingly to affected area two  times daily as needed for rash.    Itching       VITAMIN B 12 PO      Take 500 mcg by mouth 2 times daily.          "

## 2018-06-26 NOTE — PROGRESS NOTES
HPI and Plan:   This 84-year-old gentleman is seen, accompanied by his daughter, in followup of his recent non-ST elevation MI complicated by acute diastolic heart failure episode, with background history of coronary disease, CABG, subsequent multivessel stenting, stage IV chronic renal insufficiency, and resistant hypertension .   He was hospitalized in June of this year after he developed acute profound dyspnea which improved with oxygen.  He is found to have a mild troponin elevation and high NT proBNP.  Because of his very significant renal dysfunction we elected not to do angiography and treat medically.  He was diuresed about 9 pounds.   Since returning home his weight himself daily and his weights have been stable at home without significant change at around 198 pounds.    He has had no recurrence of the acute dyspnea or other ischemic symptoms.  He is able to get back to bowling and walking around the house and is not having significant dyspnea on exertion.  He denies orthopnea or PND.  He is wearing his pressure stockings every day and has only trace edema.   He has no palpitations, dizziness, syncope, new myalgias or muscle weakness, and denies claudication symptoms.    In 2017 he again had been unable to tolerate statins due to cramping and initially declined to continue them, but is now back on them once a week.  I have asked him to titrate this up to twice a week for a month and then 3 times a week for a month and we will see a is tolerating it.  He does have chronic venous insufficiency.  . He continues to have a fair amount of edema although he is asymptomatic from it.  He declined previous lymphedema clinic referral.  He states they are stable, he is having no skin breakdown bleeding or cellulitis and wants to continue using his pressure stockings which he is doing reliably.      This is a gentleman with a CABG in 1996. In 2007, he required multivessel drug-eluting stenting for recurrent angina. In  2012, he presented with an acute coronary syndrome after his Plavix was stopped for a dental procedure. At that time, his LAD and left main coronary stents were widely patent. There was a lesion at one end of the stent in the vein graft to the OM that was severe, and possibly acute thrombus, and that was successfully stented with a drug-eluting stent. He has been maintained on Plavix since then.  He also has diabetes mellitus and severe renal insufficiency.      Exam -full exam below.  In summary:   Blood pressure-139/66.  Pulse 64.  Weight on our scales 201 pounds, 198 pounds at home.   Cardiac-, regular rhythm, a soft systolic ejection murmur without gallop.  JVD is 6-7 cm without HJR   Lungs-clear  Extremities-trace bilateral pretibial edema underneath pressure stockings.  This is significantly improved from earlier this spring      Impression/plan  1-recent non-STEMI possibly related to acute diastolic heart failure episode.  Normal ejection fraction on echo in March.    Episode symptoms are not recurred after increasing isosorbide and further diuresis.   At this time I would not consider pursuing angiography as he has been stable for a while, episode may be related to diastolic heart failure episode, and high risk of precipitating need for dialysis with angiography.   He will continue to weigh himself daily and let us know if there is a change in symptoms or weight.  When his isosorbide was increased and he was diuresed, his hydralazine was cut back.  If his blood pressure was up anymore at home we will need to increase his hydralazine dose.    2-acute diastolic heart failure episode - as per #1 above.     3-coronary artery disease. Status post CABG and subsequent multivessel stenting.  Ejection fraction  normal at recent echo.  Continue dual antiplatelet therapy    4-dyslipidemia, -tolerating reduced dose statin so far.  I will have him increase to twice a week for a month and then 3 times a week for a  month.    5-significant peripheral venous insufficiency of the lower extremities.   now using pressure stockings reliably and is willing to continue this, but no further evaluation or intervention currently.    6-hypertension, resistant.  He will continue daily blood pressures.  Goal blood pressures are peak systolics in the 130s.  We may need to increase his hydralazine to maintain this.  He will let us know how they are going.  His daughter will keep track with him as she does very good care for him.    7-stage IV chronic renal insufficiency-as above will not consider angiography at this time because of this.    I have asked him to return in 3 months.  They will also call with any issue of the blood pressure or weight gain or breathing.    Orders Placed This Encounter   Procedures     Basic metabolic panel     Follow-Up with Cardiac Advanced Practice Provider       No orders of the defined types were placed in this encounter.      There are no discontinued medications.      Encounter Diagnoses   Name Primary?     Coronary artery disease involving native coronary artery of native heart, angina presence unspecified      Acute diastolic congestive heart failure (H)      NSTEMI (non-ST elevated myocardial infarction) (H) Yes     CKD (chronic kidney disease) stage 4, GFR 15-29 ml/min (H)      Hyperlipidemia LDL goal <70        CURRENT MEDICATIONS:  Current Outpatient Prescriptions   Medication Sig Dispense Refill     allopurinol (ZYLOPRIM) 100 MG tablet TAKE 2 TABLETS(200 MG) BY MOUTH DAILY 180 tablet 3     aspirin 81 MG tablet Take 1 tablet by mouth daily. with food       clopidogrel (PLAVIX) 75 MG tablet TAKE 1 TABLET(75 MG) BY MOUTH DAILY 90 tablet 3     COD LIVER OIL PO Take 1 capsule by mouth daily        Cyanocobalamin (VITAMIN B 12 PO) Take 500 mcg by mouth 2 times daily.       diphenhydrAMINE-acetaminophen (TYLENOL PM)  MG tablet Take 1 tablet by mouth nightly as needed for sleep       hydrALAZINE  "(APRESOLINE) 25 MG tablet Take 1 tablet (25 mg) by mouth 3 times daily 90 tablet 1     insulin NPH (HUMULIN N, NOVOLIN N) 100 UNIT/ML VIAL 18 units at bedtime 3 Month 3     isosorbide mononitrate 120 MG TB24 Take 1 tablet (120 mg) by mouth daily 30 tablet 1     levothyroxine (SYNTHROID/LEVOTHROID) 75 MCG tablet Take 1 tablet (75 mcg) by mouth daily 30 tablet 1     metoprolol tartrate (LOPRESSOR) 50 MG tablet Take 1 tablet (50 mg) by mouth 2 times daily 60 tablet 0     nitroGLYcerin (NITROSTAT) 0.4 MG sublingual tablet For chest pain place 1 tablet under the tongue every 5 minutes for 3 doses. If symptoms persist 5 minutes after 1st dose call 911. 25 tablet 1     pravastatin (PRAVACHOL) 20 MG tablet Take 1 tablet (20 mg) by mouth once a week 30 tablet 1     theophylline (UNIPHYL) 400 MG 24 hr tablet TAKE 1 TABLET BY MOUTH EVERY DAY 90 tablet 3     torsemide (DEMADEX) 20 MG tablet Take 1 tablet (20 mg) by mouth daily 60 tablet 1     triamcinolone (KENALOG) 0.5 % cream Apply sparingly to affected area two  times daily as needed for rash. 45 g 1     blood glucose monitoring (ACCU-CHEK COMPACT PLUS) test strip Check blood sugar 2-3 times a day 200 strip 3     cetirizine (ZYRTEC) 10 MG tablet TAKE 1 TABLET(10 MG) BY MOUTH EVERY EVENING (Patient not taking: Reported on 6/19/2018) 30 tablet 8     insulin syringe-needle U-100 (BD INSULIN SYRINGE) 29G X 1/2\" 0.5 ML Use one syringe 2 daily or as directed. 200 each 4       ALLERGIES     Allergies   Allergen Reactions     Advair [Fluticasone-Salmeterol]      cough;  insomnia, nightmares     Amlodipine Besylate      edema       Azithromycin GI Disturbance     Clarithromycin      gi     Hydrochlorothiazide      hctz + lisinopril-->inc creat, k+     Lisinopril      lisinopril + hctz --> inc creat, k+     Moxifloxacin Hydrochloride      clostridium diffile colitis     Penicillins      gen swelling     Pravastatin Cramps     Muscle cramps/spasm.  Stopped 2017     Vioxx      edema "       PAST MEDICAL HISTORY:  Past Medical History:   Diagnosis Date     Acute myocardial infarction, unspecified site, episode of care unspecified      Allergic rhinitis, cause unspecified      Anemia 3/6/2014     CAD (coronary artery disease)     1996 CABG - LIMA to LAD, SVG to OM, 2007 Cath - KANU to Left main and ostial/prox LAD, 2012 Cath - 80-90% stenosis SVG to OM - KANU placed, EF 50%     CKD (chronic kidney disease) stage 3, GFR 30-59 ml/min 3/9/2014     CTS (carpal tunnel syndrome)     bilateral     Degeneration of cervical intervertebral disc      Diaphragmatic hernia without mention of obstruction or gangrene      Esophageal reflux      Essential hypertension, benign      Hyperlipidemia LDL goal <70      Hypothyroidism      Hypothyroidism, unspecified hypothyroidism type 1/14/2016     IDIOPATHIC CYCLIC EDEMA      Mild persistent asthma      Osteoarthrosis, unspecified whether generalized or localized, unspecified site      Pneumonia, organism unspecified(486)      Proteinuria 5/17/2014     PVD (peripheral vascular disease) (H)      Sensorineural hearing loss, unspecified      Subarachnoid bleed (H)      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 disease (goal A1C<8) 10/17/2015     Unspecified disorder resulting from impaired renal function      Unspecified hereditary and idiopathic peripheral neuropathy      Venous insufficiency        PAST SURGICAL HISTORY:  Past Surgical History:   Procedure Laterality Date     C NONSPECIFIC PROCEDURE      appendectomy     C NONSPECIFIC PROCEDURE      hemorrhoids     C NONSPECIFIC PROCEDURE      cervical strain     C NONSPECIFIC PROCEDURE      rotator cuff surgery, right shoulder     C NONSPECIFIC PROCEDURE  2008    iol os     CORONARY ARTERY BYPASS  1996    LIMA to LAD, SVG to OM     HEART CATH STENT COR W/WO PTCA  2007    lad, left main cor artery stents/drug eluting     HEART CATH STENT COR W/WO PTCA  2012    80-90% stenosis SVG to OM - KANU placed, EF 50%  "    NONSPECIFIC PROCEDURE      iol od       FAMILY HISTORY:  Family History   Problem Relation Age of Onset     Lung Cancer Daughter      Family History Negative Mother      broken hip     Jaundice Father      Alcohol/Drug Father      Ovarian Cancer Daughter      Family History Negative Daughter      Family History Negative Son        SOCIAL HISTORY:  Social History     Social History     Marital status:      Spouse name: N/A     Number of children: N/A     Years of education: N/A     Social History Main Topics     Smoking status: Former Smoker     Packs/day: 1.00     Years: 14.00     Types: Cigarettes     Start date: 1952     Quit date: 1966     Smokeless tobacco: Never Used     Alcohol use No     Drug use: No     Sexual activity: No     Other Topics Concern     Caffeine Concern No     Sleep Concern Yes     Stress Concern No     Weight Concern No     Special Diet No     Exercise Yes     bowling, 5 days week. yard work     Social History Narrative       Review of Systems:  Skin:  Negative       Eyes:  Negative      ENT:  Positive for   dry throat  Respiratory:  Negative       Cardiovascular:    Positive for;chest pain;edema chest pain with exertion  Gastroenterology: Negative      Genitourinary:  Positive for urinary frequency    Musculoskeletal:  Positive for joint pain    Neurologic:  Positive for numbness or tingling of hands carpal tunnel, feet feel \"funny\" occ.  Psychiatric:  Negative      Heme/Lymph/Imm:  Positive for allergies    Endocrine:  Positive for diabetes      Physical Exam:  Vitals: /66  Pulse 64  Ht 1.829 m (6')  Wt 91.5 kg (201 lb 12.8 oz)  BMI 27.37 kg/m2    Constitutional:  cooperative, alert and oriented, well developed, well nourished, in no acute distress        Skin:  warm and dry to the touch, no apparent skin lesions or masses noted          Head:  normocephalic, no masses or lesions        Eyes:  pupils equal and round;sclera white;EOMS intact        Lymph:      ENT:  no " pallor or cyanosis        Neck:  JVP normal        Respiratory:  normal breath sounds, clear to auscultation, normal A-P diameter, normal symmetry, normal respiratory excursion, no use of accessory muscles;healed median sternotomy scar         Cardiac: regular rhythm;normal S1 and S2;no S3 or S4;apical impulse not displaced       systolic ejection murmur;RUSB;grade 1        not assessed this visit                                        GI:  abdomen soft;non-tender        Extremities and Muscular Skeletal:    stasis pigmentation bilateral LE edema;trace     pressure stckings in place    Neurological:  no gross motor deficits        Psych:  affect appropriate, oriented to time, person and place        CC  No referring provider defined for this encounter.

## 2018-06-28 ENCOUNTER — OFFICE VISIT (OUTPATIENT)
Dept: INTERNAL MEDICINE | Facility: CLINIC | Age: 83
End: 2018-06-28
Payer: COMMERCIAL

## 2018-06-28 VITALS
OXYGEN SATURATION: 99 % | DIASTOLIC BLOOD PRESSURE: 58 MMHG | HEART RATE: 60 BPM | BODY MASS INDEX: 27.33 KG/M2 | SYSTOLIC BLOOD PRESSURE: 130 MMHG | TEMPERATURE: 98 F | RESPIRATION RATE: 16 BRPM | WEIGHT: 201.5 LBS

## 2018-06-28 DIAGNOSIS — R60.9 EDEMA, UNSPECIFIED TYPE: ICD-10-CM

## 2018-06-28 DIAGNOSIS — I50.9 ACUTE ON CHRONIC CONGESTIVE HEART FAILURE, UNSPECIFIED CONGESTIVE HEART FAILURE TYPE: ICD-10-CM

## 2018-06-28 DIAGNOSIS — N18.30 TYPE 2 DIABETES MELLITUS WITH STAGE 3 CHRONIC KIDNEY DISEASE, WITH LONG-TERM CURRENT USE OF INSULIN (H): ICD-10-CM

## 2018-06-28 DIAGNOSIS — E03.9 HYPOTHYROIDISM, UNSPECIFIED TYPE: ICD-10-CM

## 2018-06-28 DIAGNOSIS — Z79.4 TYPE 2 DIABETES MELLITUS WITH STAGE 3 CHRONIC KIDNEY DISEASE, WITH LONG-TERM CURRENT USE OF INSULIN (H): ICD-10-CM

## 2018-06-28 DIAGNOSIS — E11.22 TYPE 2 DIABETES MELLITUS WITH STAGE 3 CHRONIC KIDNEY DISEASE, WITH LONG-TERM CURRENT USE OF INSULIN (H): ICD-10-CM

## 2018-06-28 DIAGNOSIS — N18.4 CKD (CHRONIC KIDNEY DISEASE) STAGE 4, GFR 15-29 ML/MIN (H): ICD-10-CM

## 2018-06-28 PROCEDURE — 99215 OFFICE O/P EST HI 40 MIN: CPT | Performed by: INTERNAL MEDICINE

## 2018-06-28 RX ORDER — TORSEMIDE 10 MG/1
10 TABLET ORAL DAILY
Qty: 30 TABLET | Refills: 5 | Status: SHIPPED | OUTPATIENT
Start: 2018-06-28 | End: 2018-07-05 | Stop reason: DRUGHIGH

## 2018-06-28 NOTE — MR AVS SNAPSHOT
After Visit Summary   6/28/2018    Oscar Terrazas    MRN: [id number]           Patient Information     Date Of Birth          2/2/1934        Visit Information        Provider Department      6/28/2018 1:00 PM Jorge Hillamn MD [facility]        Today's Diagnoses     CKD (chronic kidney disease) stage 4, GFR 15-29 ml/min (H)    -  1    Edema, unspecified type        Hypothyroidism, unspecified type          Care Instructions     Reduce  Torsemide to 10mg on odd days and 20mg on even days  Nonfasting kidney lab in 2 weeks  If weight up 3 pounds from baseline at home tomorrow, then go back to 20mg daily and inform MD  Reduce NPH to 14 units daily  Nonfasting TSH thyroid lab in 6 weeks  See Dr Ryder (kidney doctor) 7/12/18 as scheduled          Follow-ups after your visit        Your next 10 appointments already scheduled     Jul 18, 2018 12:30 PM CDT   Return Visit with AIDA Santacruz CNP   [facility] ([facility])    [address] [place] [id number] [phone] OPT 2              Future tests that were ordered for you today     Open Future Orders        Priority Expected Expires Ordered    Basic metabolic panel Routine 7/12/2018 6/28/2019 6/28/2018    TSH with free T4 reflex Routine 8/9/2018 6/28/2019 6/28/2018            Who to contact     If you have questions or need follow up information about today's clinic visit or your schedule please contact [facility] directly at [phone].  Normal or non-critical lab and imaging results will be communicated to you by Hoodinnhart, letter or phone within 4 business days after the clinic has received the results. If you do not hear from us within 7 days, please contact the clinic through Stamplayt or phone. If you have a critical or abnormal lab result, we will notify you by phone as soon as possible.  Submit refill requests through [employer]  or call your pharmacy and they will forward the refill request to us. Please allow 3 business days for your refill to be completed.          Additional Information About Your Visit        Per [employer] Information     [facility] gives you secure access to your electronic health record. If you see a primary care provider, you can also send messages to your care team and make appointments. If you have questions, please call your primary care clinic.  If you do not have a primary care provider, please call [phone] and they will assist you.        Care EveryWhere ID     This is your Care EveryWhere ID. This could be used by other organizations to access your [facility] medical records  [id number]        Your Vitals Were     Pulse Temperature Respirations Pulse Oximetry BMI (Body Mass Index)       60 98  F (36.7  C) (Oral) 16 99% 27.33 kg/m2        Blood Pressure from Last 3 Encounters:   06/28/18 130/58   06/26/18 139/66   06/19/18 126/64    Weight from Last 3 Encounters:   06/28/18 201 lb 8 oz (91.4 kg)   06/26/18 201 lb 12.8 oz (91.5 kg)   06/19/18 200 lb 9.6 oz (91 kg)                 Today's Medication Changes          These changes are accurate as of 6/28/18  1:48 PM.  If you have any questions, ask your nurse or doctor.               These medicines have changed or have updated prescriptions.        Dose/Directions    * torsemide 20 MG tablet   Commonly known as:  DEMADEX   This may have changed:  Another medication with the same name was added. Make sure you understand how and when to take each.   Used for:  Acute on chronic congestive heart failure, unspecified congestive heart failure type (H)   Changed by:  Jorge Hillman MD        Dose:  20 mg   Take 1 tablet (20 mg) by mouth daily   Quantity:  60 tablet   Refills:  1       * torsemide 10 MG tablet   Commonly known as:  DEMADEX   This may have changed:  You were already taking a medication with the same name, and this prescription was added. Make sure you  understand how and when to take each.   Used for:  Edema, unspecified type   Changed by:  Jorge Hillman MD        Dose:  10 mg   Take 1 tablet (10 mg) by mouth daily 1 tab every other day   Quantity:  30 tablet   Refills:  5       * Notice:  This list has 2 medication(s) that are the same as other medications prescribed for you. Read the directions carefully, and ask your doctor or other care provider to review them with you.         Where to get your medicines      These medications were sent to [email or web address] [id number] [place] [id number] AT [facility] [address] [facility] [address]     Phone:  [phone]     torsemide 10 MG tablet                Primary Care Provider Office Phone # Fax #    Jorge Hillman [phone]       [address] [facility] [address]        Equal Access to Services     [facility]: Hadii aad ku hadasho Soomaali, waaxda luqadaha, qaybta kaalmada adeegyada, waxay idiin hayaan skip pantoja . So [facility] [phone].    ATENCIÓN: Si habla español, tiene a fernandez disposición servicios gratuitos de asistencia lingüística. Llame al [phone].    We comply with applicable federal civil rights laws and [place] laws. We do not discriminate on the basis of race, color, national origin, age, disability, sex, sexual orientation, or gender identity.            Thank you!     Thank you for choosing [facility]  for your care. Our goal is always to provide you with excellent care. Hearing back from our patients is one way we can continue to improve our services. Please take a few minutes to complete the written survey that you may receive in the mail after your visit with us. Thank you!             Your Updated Medication List - Protect others around you: Learn how to safely use, store and throw away your medicines at www.disposemymeds.org.          This list is accurate as of 6/28/18  1:48 PM.  Always  "use your most recent med list.                   Brand Name Dispense Instructions for use Diagnosis    allopurinol 100 MG tablet    ZYLOPRIM    180 tablet    TAKE 2 TABLETS(200 MG) BY MOUTH DAILY    Chronic gout without tophus, unspecified cause, unspecified site       aspirin 81 MG tablet      Take 1 tablet by mouth daily. with food        blood glucose monitoring test strip    ACCU-CHEK COMPACT PLUS    200 strip    Check blood sugar 2-3 times a day    Type 2 diabetes mellitus with stage 3 chronic kidney disease, with long-term current use of insulin (H)       cetirizine 10 MG tablet    zyrTEC    30 tablet    TAKE 1 TABLET(10 MG) BY MOUTH EVERY EVENING    Atopic dermatitis, unspecified type, Itching       clopidogrel 75 MG tablet    PLAVIX    90 tablet    TAKE 1 TABLET(75 MG) BY MOUTH DAILY    CKD (chronic kidney disease) stage 3, GFR 30-59 ml/min, Cardiovascular disease       COD LIVER OIL PO      Take 1 capsule by mouth daily        diphenhydrAMINE-acetaminophen  MG tablet    TYLENOL PM     Take 1 tablet by mouth nightly as needed for sleep        hydrALAZINE 25 MG tablet    APRESOLINE    90 tablet    Take 1 tablet (25 mg) by mouth 3 times daily    Essential hypertension, benign       insulin  UNIT/ML injection    HumuLIN N/NovoLIN N    3 Month    18 units at bedtime    Type 2 diabetes mellitus with stage 3 chronic kidney disease, with long-term current use of insulin (H)       insulin syringe-needle U-100 29G X 1/2\" 0.5 ML    BD insulin syringe    200 each    Use one syringe 2 daily or as directed.    Type 2 diabetes mellitus with stage 3 chronic kidney disease, with long-term current use of insulin (H)       Isosorbide Mononitrate  MG Tb24     30 tablet    Take 1 tablet (120 mg) by mouth daily    NSTEMI (non-ST elevated myocardial infarction) (H)       levothyroxine 75 MCG tablet    SYNTHROID/LEVOTHROID    30 tablet    Take 1 tablet (75 mcg) by mouth daily    Hypothyroidism, unspecified type "       metoprolol tartrate 50 MG tablet    LOPRESSOR    60 tablet    Take 1 tablet (50 mg) by mouth 2 times daily    NSTEMI (non-ST elevated myocardial infarction) (H)       nitroGLYcerin 0.4 MG sublingual tablet    NITROSTAT    25 tablet    For chest pain place 1 tablet under the tongue every 5 minutes for 3 doses. If symptoms persist 5 minutes after 1st dose call 911.    NSTEMI (non-ST elevated myocardial infarction) (H)       pravastatin 20 MG tablet    PRAVACHOL    30 tablet    Take 1 tablet (20 mg) by mouth once a week    Essential hypertension, benign, Cardiovascular disease       theophylline 400 MG 24 hr tablet    UNIPHYL    90 tablet    TAKE 1 TABLET BY MOUTH EVERY DAY    Mild persistent asthma without complication       * torsemide 20 MG tablet    DEMADEX    60 tablet    Take 1 tablet (20 mg) by mouth daily    Acute on chronic congestive heart failure, unspecified congestive heart failure type (H)       * torsemide 10 MG tablet    DEMADEX    30 tablet    Take 1 tablet (10 mg) by mouth daily 1 tab every other day    Edema, unspecified type       triamcinolone 0.5 % cream    KENALOG    45 g    Apply sparingly to affected area two  times daily as needed for rash.    Itching       VITAMIN B 12 PO      Take 500 mcg by mouth 2 times daily.        * Notice:  This list has 2 medication(s) that are the same as other medications prescribed for you. Read the directions carefully, and ask your doctor or other care provider to review them with you.

## 2018-06-28 NOTE — PROGRESS NOTES
SUBJECTIVE:   Oscar Terrazas is a 84 year old male who presents to clinic today for the following health issues:          Hospital Follow-up Visit:    Hospital/Nursing Home/IP Rehab Facility: [facility]  Date of Admission: 06/12/2018  Date of Discharge: 06/15/2018  Reason(s) for Admission: MI  Tele contact 6/19/18 (within 2 business days of discharge)            Problems taking medications regularly:  None       Medication changes since discharge: None       Problems adhering to non-medication therapy:  None    Summary of hospitalization:  [facility] discharge summary reviewed  Diagnostic Tests/Treatments reviewed.  Follow up needed: labs re: kidney function  Other Healthcare Providers Involved in Patient s Care:         cadiology  Update since discharge: improved.     Post Discharge Medication Reconciliation: discharge medications reconciled and changed, per note/orders (see AVS).  Plan of care communicated with patient and daughter here today     Coding guidelines for this visit:  Type of Medical   Decision Making Face-to-Face Visit       within 7 Days of discharge Face-to-Face Visit        within 14 days of discharge   Moderate Complexity [id number]   High Complexity [id number]            Pt's past medical history, family history, habits, medications and allergies were reviewed with the patient today.  See snap shot for  HCM status. Most recent lab results reviewed with pt. Problem list and histories reviewed & adjusted, as indicated.  Additional history as below:    Hospital notes reviewed. Part of the d/c note as below:    Discharge Summary  Hospitalist     Date of Admission:  6/12/2018  Date of Discharge:  6/15/2018  Discharging Provider: Josefina Rodriguez  Date of Service (when I saw the patient): 06/15/18     Discharge Diagnoses       1. NSTEMI  2. History of CAD s/p 1-v CABG and subsequent PCI  3. Acute exacerbation of chronic diastolic CHF  4. Hypertensive emergency  5. CKD  stage IV  6. Chronic anemia  7. 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 Possible peripheral neuropathy  9. Hypothyroidism  10. Gout        History of Present Illness      Please see H&P by Dr. Rodriguez on 6/13/2018        Hospital Course      Oscar Terrazas is a 84 year old male with hx of CAD s/p CABG/PCI, dCHF, HTN, DM2, and CKD who was admitted on 6/12/2018 for NSTEMI and acute CHF exacerbation. He was treated medically with improvement, and is discharging home with outpatient cardiac rehab.  The following problems were addressed during his hospitalization:     NSTEMI  History of CAD s/p 1-v CABG and subsequent PCI  Presented with exertional chest pain and shortness of breath. EKG on arrival showed old LBBB and serial troponins were elevated from 1.143>1.864>1.720. On admission, he was loaded with aspirin and started on a heparin infusion. Coronary angiogram was considered, but was precluded by patient's renal function. Nephrology was consulted, and recommended against angiogram given high risk for contrast nephropathy and patient's wishes NOT to pursue dialysis if this were ever indicated. Stress test was also deferred because it would not . Because the patient has remained chest pain free, and was otherwise clinically improving, the decision was made to optimize medical treatment. He was treated with a heparin infusion for 48 hours and his cardiac medications were adjusted.  - PTA Imdur was increased from 60 to 120 mg daily  - PTA hydralazine was decreased from 75 to 25 mg TID  - He continues on his PTA aspirin, Plavix, and metoprolol at the time of discharge  - He will continue outpatient cardiac rehab and follow up in [facility] after discharge      Acute exacerbation of chronic diastolic CHF, Improving  Hypertensive emergency  CXR on admission showed pulmonary edema. NTproBNP >10,000 in the context of dietary indiscretion and marked hypertension to 212/101 on arrival. TTE (3/14) showed  "LVEF 55-60% with grade II diastolic dysfunction and hypokinesis of the basal inferior and inferolateral walls. On admission, he was initiated on IV Lasix and eventually transitioned to torsemide.  - Net neg ~3L since admission  - He will be discharged on torsemide 40 mg daily       CKD stage IV  Baseline Cr ~2.9-3.5. His renal function remained stable within baseline throughout his hospitalization. He does not have a primary nephrologist, and has an appointment to establish care with Dr. Ryder at [facility] next month      Chronic anemia  Hgb 9-10 range. Likely due to renal disease. 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 Continues on PTA NPH 18 units qhs at discharge.     Hypothyroidism  [PTA: levothyroxine 50 mcg daily]   - TSH 5.24, T4 0.90 on 6/5  - PTA levothyroxine was increased from 50 to 75 mcg daily during this hospitalization  - Recommend repeat thyroid studies in 4-6 weeks     Possible peripheral neuropathy  Patient reported \"pins and needles\" sensation in his hands and feet during admission. May be paresthesias from loop diuretic vs diabetic neuropathy vs hypothyroidism. B12 level during admission was normal  - Levothyroxine dose was increased as noted above  - Patient has been advised to monitor his symptoms, and if persistent over the next few weeks, could consider empiric initiation of renally-dosed gabapentin as per PCP      Gout  Chronic and stable on allopurinol     Has seen Cardiology since discharge. Notes reviewed. Will need repeat TSH for dose change  In early August  GFR worsened despite Torsemide dose reduced from 40mg daily to 20mg daily 1 week ago  Breathing better. Minimal SOB. Denies chest pain, abd pain or orthopnea, PND  Occ low blood sugar in middle of night. Using NPH at bedtime. Not on other DM meds      Additional ROS:   Constitutional, HEENT, Cardiovascular, Pulmonary, GI and , Neuro, MSK and Psych review of systems/symptoms " are otherwise negative or unchanged from previous, except as noted above.      OBJECTIVE:  /58  Pulse 60  Temp 98  F (36.7  C) (Oral)  Resp 16  Wt 201 lb 8 oz (91.4 kg)  SpO2 99%  BMI 27.33 kg/m2   Estimated body mass index is 27.33 kg/(m^2) as calculated from the following:    Height as of 6/26/18: 6' (1.829 m).    Weight as of this encounter: 201 lb 8 oz (91.4 kg).  Eye: PERRL, EOMI  HENT: ear canals and TM's normal and nose and mouth without ulcers or lesions   Neck: no adenopathy. Thyroid normal to palpation. No bruits  Pulm: Lungs clear to auscultation   CV: Regular rates and rhythm  GI: Soft, nontender, Normal active bowel sounds, No hepatosplenomegaly or masses palpable  Ext: Peripheral pulses intact.  Mild  BLE edema.  Neuro: Normal strength and tone, sensory exam grossly normal    Assessment/Plan: (See plan discussion below for further details)  1. Acute on chronic congestive heart failure, unspecified congestive heart failure type (H)  Improved with Torsemide. Appears dose still a little too high based on renal function. WIll reduce Torsemide dosing as below and recheck  Kidney function 2 weeks. Will continue other meds and monitor weight closely  - torsemide (DEMADEX) 10 MG tablet; Take 1 tablet (10 mg) by mouth daily 1 tab every other day  Dispense: 30 tablet; Refill: 5  - torsemide (DEMADEX) 20 MG tablet; 1 tab every other day  Dispense: 30 tablet; Refill: 1    2. CKD (chronic kidney disease) stage 4, GFR 15-29 ml/min (H)  Lab in 2 weeks as above with Torsemide reduction. Pt to see nephrology as scheduled also  - Basic metabolic panel; Future    3. Hypothyroidism, unspecified type   Recent dose change. Repeat TSH 6 weeks  - TSH with free T4 reflex; Future    4. Type 2 diabetes mellitus with stage 3 chronic kidney disease, with long-term current use of insulin (H)  Occ hypoglycemia at night. Will lower NHPH,  especially given age and current low A1C  - insulin NPH (HUMULIN N/NOVOLIN N) 100  UNIT/ML injection; 14 units at bedtime  Dispense: 2 vial; Refill: 11    Plan discussion:   Reduce  Torsemide to 10mg on odd days and 20mg on even days  Nonfasting kidney lab in 2 weeks  If weight up 3 pounds from baseline at home tomorrow, then go back to 20mg daily and inform MD  Reduce NPH to 14 units daily  Nonfasting TSH thyroid lab in 6 weeks  See Dr Ryder (kidney doctor) 7/12/18 as scheduled  Repeat A1C 3 mos       Jorge Hillman MD  Internal Medicine Department  [facility]

## 2018-06-28 NOTE — PATIENT INSTRUCTIONS
Reduce  Torsemide to 10mg on odd days and 20mg on even days  Nonfasting kidney lab in 2 weeks  If weight up 3 pounds from baseline at home tomorrow, then go back to 20mg daily and inform MD  Reduce NPH to 14 units daily  Nonfasting TSH thyroid lab in 6 weeks  See Dr Ryder (kidney doctor) 7/12/18 as scheduled

## 2018-06-28 NOTE — TELEPHONE ENCOUNTER
"Requested Prescriptions   Pending Prescriptions Disp Refills     torsemide (DEMADEX) 10 MG tablet [Pharmacy Med Name: TORSEMIDE 10MG TABLETS] 45 tablet 5     Sig: TAKE 1 TABLET BY MOUTH DAILY EVERY OTHER DAY    Diuretics (Including Combos) Protocol Failed    6/28/2018  2:18 PM       Failed - Normal serum creatinine on file in past 12 months    Recent Labs   Lab Test  06/26/18   1258   CR  3.39*             Passed - Blood pressure under 140/90 in past 12 months    BP Readings from Last 3 Encounters:   06/28/18 130/58   06/26/18 139/66   06/19/18 126/64                Passed - Recent (12 mo) or future (30 days) visit within the authorizing provider's specialty    Patient had office visit in the last 12 months or has a visit in the next 30 days with authorizing provider or within the authorizing provider's specialty.  See \"Patient Info\" tab in inbasket, or \"Choose Columns\" in Meds & Orders section of the refill encounter.           Passed - Patient is age 18 or older       Passed - Normal serum potassium on file in past 12 months    Recent Labs   Lab Test  06/26/18   1258   POTASSIUM  4.4                   Passed - Normal serum sodium on file in past 12 months    Recent Labs   Lab Test  06/26/18   1258   NA  144              Routing refill request to provider for review/approval because:  Labs out of range:  Cr        "

## 2018-07-01 DIAGNOSIS — I50.9 ACUTE ON CHRONIC CONGESTIVE HEART FAILURE, UNSPECIFIED CONGESTIVE HEART FAILURE TYPE: ICD-10-CM

## 2018-07-01 RX ORDER — TORSEMIDE 20 MG/1
TABLET ORAL
Qty: 30 TABLET | Refills: 1 | Status: SHIPPED | OUTPATIENT
Start: 2018-07-01 | End: 2018-12-05

## 2018-07-02 DIAGNOSIS — E03.9 HYPOTHYROIDISM, UNSPECIFIED TYPE: ICD-10-CM

## 2018-07-03 DIAGNOSIS — I50.9 ACUTE ON CHRONIC CONGESTIVE HEART FAILURE, UNSPECIFIED CONGESTIVE HEART FAILURE TYPE: ICD-10-CM

## 2018-07-03 RX ORDER — LEVOTHYROXINE SODIUM 50 UG/1
TABLET ORAL
Qty: 30 TABLET | Refills: 0 | OUTPATIENT
Start: 2018-07-03

## 2018-07-03 RX ORDER — TORSEMIDE 10 MG/1
TABLET ORAL
Qty: 45 TABLET | Refills: 5 | OUTPATIENT
Start: 2018-07-03

## 2018-07-03 RX ORDER — TORSEMIDE 20 MG/1
TABLET ORAL
Qty: 45 TABLET | Refills: 1 | OUTPATIENT
Start: 2018-07-03

## 2018-07-03 NOTE — TELEPHONE ENCOUNTER
"Patient confirms he is taking 75 mcg since last hospitalization. Refill request came for 50 mcg. Please advise and send correct dose. Last TSH abnormal.    Requested Prescriptions   Pending Prescriptions Disp Refills     levothyroxine (SYNTHROID/LEVOTHROID) 50 MCG tablet [Pharmacy Med Name: LEVOTHYROXINE 0.05MG (50MCG) TAB] 30 tablet 0     Sig: TAKE 1 TABLET BY MOUTH DAILY    Thyroid Protocol Failed    7/3/2018  3:24 PM       Failed - Normal TSH on file in past 12 months    Recent Labs   Lab Test  06/05/18   1009   TSH  5.24*             Passed - Patient is 12 years or older       Passed - Recent (12 mo) or future (30 days) visit within the authorizing provider's specialty    Patient had office visit in the last 12 months or has a visit in the next 30 days with authorizing provider or within the authorizing provider's specialty.  See \"Patient Info\" tab in inbasket, or \"Choose Columns\" in Meds & Orders section of the refill encounter.              "

## 2018-07-03 NOTE — TELEPHONE ENCOUNTER
"Requested Prescriptions   Pending Prescriptions Disp Refills     levothyroxine (SYNTHROID/LEVOTHROID) 50 MCG tablet [Pharmacy Med Name: LEVOTHYROXINE 0.05MG (50MCG) TAB] 30 tablet 0     Sig: TAKE 1 TABLET BY MOUTH DAILY    Thyroid Protocol Failed    7/2/2018  2:00 AM       Failed - Normal TSH on file in past 12 months    Recent Labs   Lab Test  06/05/18   1009   TSH  5.24*             Passed - Patient is 12 years or older       Passed - Recent (12 mo) or future (30 days) visit within the authorizing provider's specialty    Patient had office visit in the last 12 months or has a visit in the next 30 days with authorizing provider or within the authorizing provider's specialty.  See \"Patient Info\" tab in inbasket, or \"Choose Columns\" in Meds & Orders section of the refill encounter.            Last office visit: 6/28/2018 with prescribing provider:     Future Office Visit:   Next 5 appointments (look out 90 days)     Jul 18, 2018 12:30 PM CDT   Return Visit with AIDA Santacruz CNP   [facility] ([facility])    [address] [place] [address] [id number] [phone] OPT 2                   "

## 2018-07-03 NOTE — TELEPHONE ENCOUNTER
"Requested Prescriptions   Pending Prescriptions Disp Refills     torsemide (DEMADEX) 10 MG tablet [Pharmacy Med Name: TORSEMIDE 10MG TABLETS] 30 tablet 0     Sig: TAKE 1 TABLET BY MOUTH DAILY EVERY OTHER DAY    Diuretics (Including Combos) Protocol Failed    7/3/2018  4:56 PM       Failed - Normal serum creatinine on file in past 12 months    Recent Labs   Lab Test  06/26/18   1258   CR  3.39*             Passed - Blood pressure under 140/90 in past 12 months    BP Readings from Last 3 Encounters:   06/28/18 130/58   06/26/18 139/66   06/19/18 126/64                Passed - Recent (12 mo) or future (30 days) visit within the authorizing provider's specialty    Patient had office visit in the last 12 months or has a visit in the next 30 days with authorizing provider or within the authorizing provider's specialty.  See \"Patient Info\" tab in inbasket, or \"Choose Columns\" in Meds & Orders section of the refill encounter.           Passed - Patient is age 18 or older       Passed - Normal serum potassium on file in past 12 months    Recent Labs   Lab Test  06/26/18   1258   POTASSIUM  4.4                   Passed - Normal serum sodium on file in past 12 months    Recent Labs   Lab Test  06/26/18   1258   NA  144              Last Written Prescription Date:  7/1/18  Last Fill Quantity: 30,  # refills: 1   Last office visit: 6/28/2018 with prescribing provider:  6/28/18   Future Office Visit:   Next 5 appointments (look out 90 days)     Jul 18, 2018 12:30 PM CDT   Return Visit with AIDA Santacruz CNP   [facility] ([facility])    [address] [place] [id number] [phone] OPT 2                   "

## 2018-07-03 NOTE — TELEPHONE ENCOUNTER
Torsemide dose just recently reduced and future lab f/u ordered to be done in a couple weeks. I will NOT write for 90 day supply now as dose adjustments might  Further need to be done based on labs in a couple weeks. Just did Rx for this med for smaller quantity until labs can be reviewed and pt has that Rx already. RF request for 90 day supply declined

## 2018-07-03 NOTE — TELEPHONE ENCOUNTER
"Requested Prescriptions   Pending Prescriptions Disp Refills     levothyroxine (SYNTHROID/LEVOTHROID) 50 MCG tablet [Pharmacy Med Name: LEVOTHYROXINE 0.05MG (50MCG) TAB]  Last Written Prescription Date:  06/15/2018  Last Fill Quantity: 30,  # refills: 1   Last office visit: 6/28/2018 with prescribing provider:     Future Office Visit:   Next 5 appointments (look out 90 days)     Jul 18, 2018 12:30 PM CDT   Return Visit with AIDA Santacruz CNP   [facility] ([facility])    [address] [place] [id number] [phone] OPT 2                  30 tablet 0     Sig: TAKE 1 TABLET BY MOUTH DAILY    Thyroid Protocol Failed    7/2/2018  2:00 AM       Failed - Normal TSH on file in past 12 months    Recent Labs   Lab Test  06/05/18   1009   TSH  5.24*             Passed - Patient is 12 years or older       Passed - Recent (12 mo) or future (30 days) visit within the authorizing provider's specialty    Patient had office visit in the last 12 months or has a visit in the next 30 days with authorizing provider or within the authorizing provider's specialty.  See \"Patient Info\" tab in inbasket, or \"Choose Columns\" in Meds & Orders section of the refill encounter.              "

## 2018-07-03 NOTE — TELEPHONE ENCOUNTER
"Requested Prescriptions   Pending Prescriptions Disp Refills     torsemide (DEMADEX) 20 MG tablet [Pharmacy Med Name: TORSEMIDE 20MG TABLETS] 45 tablet 1     Sig: TAKE 1 TABLET BY MOUTH EVERY OTHER DAY    Diuretics (Including Combos) Protocol Failed    7/1/2018  3:37 PM       Failed - Normal serum creatinine on file in past 12 months    Recent Labs   Lab Test  06/26/18   1258   CR  3.39*             Passed - Blood pressure under 140/90 in past 12 months    BP Readings from Last 3 Encounters:   06/28/18 130/58   06/26/18 139/66   06/19/18 126/64                Passed - Recent (12 mo) or future (30 days) visit within the authorizing provider's specialty    Patient had office visit in the last 12 months or has a visit in the next 30 days with authorizing provider or within the authorizing provider's specialty.  See \"Patient Info\" tab in inbasket, or \"Choose Columns\" in Meds & Orders section of the refill encounter.           Passed - Patient is age 18 or older       Passed - Normal serum potassium on file in past 12 months    Recent Labs   Lab Test  06/26/18   1258   POTASSIUM  4.4                   Passed - Normal serum sodium on file in past 12 months    Recent Labs   Lab Test  06/26/18   1258   NA  144              Routing refill request to provider for review/approval because:  Labs out of range:  creatinine        "

## 2018-07-05 DIAGNOSIS — I50.9 ACUTE ON CHRONIC CONGESTIVE HEART FAILURE, UNSPECIFIED CONGESTIVE HEART FAILURE TYPE: ICD-10-CM

## 2018-07-05 RX ORDER — TORSEMIDE 10 MG/1
TABLET ORAL
Qty: 30 TABLET | Refills: 5 | Status: SHIPPED | OUTPATIENT
Start: 2018-07-05 | End: 2018-07-05

## 2018-07-06 RX ORDER — TORSEMIDE 10 MG/1
TABLET ORAL
Qty: 45 TABLET | Refills: 5 | Status: SHIPPED | OUTPATIENT
Start: 2018-07-06 | End: 2019-03-06

## 2018-07-06 NOTE — TELEPHONE ENCOUNTER
"Requested Prescriptions   Pending Prescriptions Disp Refills     torsemide (DEMADEX) 10 MG tablet [Pharmacy Med Name: TORSEMIDE 10MG TABLETS]  Last Written Prescription Date:  7/5/18  Last Fill Quantity: 30 TABLET,  # refills: 5   Last office visit: 6/28/2018 with prescribing provider:  TERRY   Future Office Visit:   Next 5 appointments (look out 90 days)     Jul 18, 2018 12:30 PM CDT   Return Visit with AIDA Santacruz CNP   [facility]    [address] [place] [id number] [phone] OPT 2                  45 tablet 5     Sig: TAKE 1 TABLET BY MOUTH EVERY OTHER DAY    Diuretics (Including Combos) Protocol Failed    7/5/2018  8:34 PM       Failed - Normal serum creatinine on file in past 12 months    Recent Labs   Lab Test  06/26/18   1258   CR  3.39*             Passed - Blood pressure under 140/90 in past 12 months    BP Readings from Last 3 Encounters:   06/28/18 130/58   06/26/18 139/66   06/19/18 126/64                Passed - Recent (12 mo) or future (30 days) visit within the authorizing provider's specialty    Patient had office visit in the last 12 months or has a visit in the next 30 days with authorizing provider or within the authorizing provider's specialty.  See \"Patient Info\" tab in inbasket, or \"Choose Columns\" in Meds & Orders section of the refill encounter.           Passed - Patient is age 18 or older       Passed - Normal serum potassium on file in past 12 months    Recent Labs   Lab Test  06/26/18   1258   POTASSIUM  4.4                   Passed - Normal serum sodium on file in past 12 months    Recent Labs   Lab Test  06/26/18   1258   NA  144                "

## 2018-07-06 NOTE — TELEPHONE ENCOUNTER
Patient requesting 90 day supply.  Alternates with 20mg tablets on even days as prescribed by PCP.  Script resent for 90 day supply.

## 2018-07-09 ENCOUNTER — PATIENT OUTREACH (OUTPATIENT)
Dept: CARE COORDINATION | Facility: CLINIC | Age: 83
End: 2018-07-09

## 2018-07-09 NOTE — PROGRESS NOTES
Clinic Care Coordination Contact    Incoming call from patient daughter following up from PCP on 6-28 on the status of update to FMLA paper work.  She states that PCP had requested the dates at visit and was going to update. She is most concern if there is an emergency.       She will also be dropping off paperwork for herself as well due to care giver stress.   RN CC requested for daughter to drop off a copy of the original paperwork for patient when she comes to office. Daughter agree with plan.     Please advise on status and if any additional action needed.       Patient status:    Daughter states that he is being followed by cardiology and has initial visit with nephrology next week. Overall patient is doing well all things considering.   Patient/Caregiver understanding: Patient verbalized understanding, engaged in AIDET communication behavior during encounter.           Future Appointments              In 1 week [facility]          Plan:   PCP to update FMLA paperwork discussed at visit on 6-28, daughter to drop off copy of orginial to be update for PCP to review if needed.    Daughter to drop off FMLA paperwork for her own care giver needs to be filled out by PCP.   RN CC will outreach within one month for status update, will be available sooner if needed. Contact information given.     Isela Dawson RN  Clinic Care Coordinator  Mobile: [phone]  [email or web address]

## 2018-07-16 ENCOUNTER — TELEPHONE (OUTPATIENT)
Dept: INTERNAL MEDICINE | Facility: CLINIC | Age: 83
End: 2018-07-16

## 2018-07-16 NOTE — TELEPHONE ENCOUNTER
Faxed [place] forms to [place] Attn: Lynda Bowers [phone]. Copy to be scanned into chart and orginals mailed back to Patient's home.

## 2018-07-18 DIAGNOSIS — N18.4 CKD (CHRONIC KIDNEY DISEASE) STAGE 4, GFR 15-29 ML/MIN (H): ICD-10-CM

## 2018-07-18 LAB
ANION GAP SERPL CALCULATED.3IONS-SCNC: 8 MMOL/L (ref 3–14)
BUN SERPL-MCNC: 53 MG/DL (ref 7–30)
CALCIUM SERPL-MCNC: 9.1 MG/DL (ref 8.5–10.1)
CHLORIDE SERPL-SCNC: 112 MMOL/L (ref 94–109)
CO2 SERPL-SCNC: 26 MMOL/L (ref 20–32)
CREAT SERPL-MCNC: 3.39 MG/DL (ref 0.66–1.25)
GFR SERPL CREATININE-BSD FRML MDRD: 17 ML/MIN/1.7M2
GLUCOSE SERPL-MCNC: 85 MG/DL (ref 70–99)
POTASSIUM SERPL-SCNC: 4.4 MMOL/L (ref 3.4–5.3)
SODIUM SERPL-SCNC: 146 MMOL/L (ref 133–144)

## 2018-07-18 PROCEDURE — 36415 COLL VENOUS BLD VENIPUNCTURE: CPT | Performed by: INTERNAL MEDICINE

## 2018-07-18 PROCEDURE — 80048 BASIC METABOLIC PNL TOTAL CA: CPT | Performed by: INTERNAL MEDICINE

## 2018-07-18 NOTE — PROGRESS NOTES
Forms for pt  Were completed and daughter was contacted 2 days ago updating her that FMLA was being faxed  With regards to this pt. Additional FMLA request for daughter could not be completed since I had not seen her in clinic specifically to discuss/evaluate issues related to her request for FMLA relating to herself. She was informed that she could see me in clinic to discuss those issues

## 2018-08-06 DIAGNOSIS — E03.9 HYPOTHYROIDISM, UNSPECIFIED TYPE: ICD-10-CM

## 2018-08-06 NOTE — TELEPHONE ENCOUNTER
"Requested Prescriptions   Pending Prescriptions Disp Refills     levothyroxine (SYNTHROID/LEVOTHROID) 75 MCG tablet [Pharmacy Med Name: LEVOTHYROXINE 0.075MG (75MCG) TABS] 30 tablet 0    Last Written Prescription Date:  6/15/18  Last Fill Quantity: 30,  # refills: 1   Last office visit: 6/28/2018 with prescribing provider:  6/28/18   Future Office Visit:   Next 5 appointments (look out 90 days)     Sep 24, 2018  1:10 PM CDT   Return Visit with Marie Gil PA-C   [facility] ([facility])    [address] [place] [id number] [phone] OPT 2                  Sig: TAKE 1 TABLET BY MOUTH DAILY    Thyroid Protocol Failed    8/6/2018  8:45 AM       Failed - Normal TSH on file in past 12 months    Recent Labs   Lab Test  06/05/18   1009   TSH  5.24*             Passed - Patient is 12 years or older       Passed - Recent (12 mo) or future (30 days) visit within the authorizing provider's specialty    Patient had office visit in the last 12 months or has a visit in the next 30 days with authorizing provider or within the authorizing provider's specialty.  See \"Patient Info\" tab in inbasket, or \"Choose Columns\" in Meds & Orders section of the refill encounter.              "

## 2018-08-07 RX ORDER — LEVOTHYROXINE SODIUM 75 UG/1
TABLET ORAL
Qty: 30 TABLET | Refills: 0 | Status: SHIPPED | OUTPATIENT
Start: 2018-08-07 | End: 2018-09-09

## 2018-08-08 NOTE — TELEPHONE ENCOUNTER
Call pt's daughter Yasmin as pt hard of hearing. Had dose change sonedna late June and now due for nonfasting thyroid lab to recheck thyroid function with higher dosage. RF done for 30 days. Pt to have lab as ordered in the next 1-2 weeks. If thyroid function normal, will then do future refills for 90 days with additional refills available. Assist daughter with getting lab appt for pt

## 2018-08-09 DIAGNOSIS — I21.4 NSTEMI (NON-ST ELEVATED MYOCARDIAL INFARCTION) (H): ICD-10-CM

## 2018-08-09 DIAGNOSIS — E03.9 HYPOTHYROIDISM, UNSPECIFIED TYPE: ICD-10-CM

## 2018-08-09 RX ORDER — LEVOTHYROXINE SODIUM 75 UG/1
TABLET ORAL
Qty: 30 TABLET | Refills: 0 | OUTPATIENT
Start: 2018-08-09

## 2018-08-09 RX ORDER — METOPROLOL TARTRATE 50 MG
50 TABLET ORAL 2 TIMES DAILY
Qty: 180 TABLET | Refills: 2 | Status: SHIPPED | OUTPATIENT
Start: 2018-08-09 | End: 2019-04-22

## 2018-08-09 NOTE — TELEPHONE ENCOUNTER
Called and left message on daughter Yasmin's number to return call to clinic to schedule lab appointment.  Teresita Dent CMA on 8/9/2018 at 9:28 AM

## 2018-08-09 NOTE — TELEPHONE ENCOUNTER
"Requested Prescriptions   Pending Prescriptions Disp Refills     levothyroxine (SYNTHROID/LEVOTHROID) 75 MCG tablet [Pharmacy Med Name: LEVOTHYROXINE 0.075MG (75MCG) TABS] 30 tablet 0    Last Written Prescription Date:  8/7/18  Last Fill Quantity: 30,  # refills: 0   Last office visit: 6/28/2018 with prescribing provider:  6/28/18   Future Office Visit:   Next 5 appointments (look out 90 days)     Sep 24, 2018  1:10 PM CDT   Return Visit with Marie Gil PA-C   [facility] ([facility])    [address] [place] [id number] [phone] OPT 2                  Sig: TAKE 1 TABLET BY MOUTH DAILY    Thyroid Protocol Failed    8/9/2018 10:27 AM       Failed - Normal TSH on file in past 12 months    Recent Labs   Lab Test  06/05/18   1009   TSH  5.24*             Passed - Patient is 12 years or older       Passed - Recent (12 mo) or future (30 days) visit within the authorizing provider's specialty    Patient had office visit in the last 12 months or has a visit in the next 30 days with authorizing provider or within the authorizing provider's specialty.  See \"Patient Info\" tab in inbasket, or \"Choose Columns\" in Meds & Orders section of the refill encounter.              "

## 2018-08-17 ENCOUNTER — PATIENT OUTREACH (OUTPATIENT)
Dept: CARE COORDINATION | Facility: CLINIC | Age: 83
End: 2018-08-17

## 2018-08-17 NOTE — PROGRESS NOTES
Clinic Care Coordination Contact  [facility]/Voicemail       Clinical Data: Care Coordinator Outreach  Chart review notes action item- FMLA form address by PCP  Outreach attempted x 1.  Left message on voicemail with call back information and requested return call.  Plan:No further outreaches will be made at this time unless a new referral is made or a change in the pt's status occurs. Patient was provided with this writer's contact information and encouraged to call with any questions or concerns.    Isela Dawson RN  Clinic Care Coordinator  Mobile: [phone]  [email or web address]

## 2018-08-19 NOTE — TELEPHONE ENCOUNTER
Routing refill request to provider for review/approval because:  Labs out of range:  TSH         Unable to assess due to medical condition

## 2018-09-05 DIAGNOSIS — I21.4 NSTEMI (NON-ST ELEVATED MYOCARDIAL INFARCTION) (H): ICD-10-CM

## 2018-09-05 RX ORDER — ISOSORBIDE MONONITRATE 120 MG/1
TABLET, EXTENDED RELEASE ORAL
Qty: 30 TABLET | Refills: 9 | Status: SHIPPED | OUTPATIENT
Start: 2018-09-05 | End: 2019-06-22

## 2018-09-05 NOTE — TELEPHONE ENCOUNTER
"Requested Prescriptions   Pending Prescriptions Disp Refills     Isosorbide Mononitrate  MG TB24 [Pharmacy Med Name: ISOSORBIDE MONONITRATE 120MG ER TAB] 30 tablet 0     Sig: TAKE 1 TABLET BY MOUTH DAILY    Nitrates Passed    9/5/2018  4:07 PM       Passed - Blood pressure under 140/90 in past 12 months    BP Readings from Last 3 Encounters:   06/28/18 130/58   06/26/18 139/66   06/19/18 126/64                Passed - Pt is not on erectile dysfunction medications       Passed - Recent (12 mo) or future (30 days) visit within the authorizing provider's specialty    Patient had office visit in the last 12 months or has a visit in the next 30 days with authorizing provider or within the authorizing provider's specialty.  See \"Patient Info\" tab in inbasket, or \"Choose Columns\" in Meds & Orders section of the refill encounter.           Passed - Patient is age 18 or older        Prescription approved per [facility] [place].    "

## 2018-09-12 ENCOUNTER — TELEPHONE (OUTPATIENT)
Dept: CARDIOLOGY | Facility: CLINIC | Age: 83
End: 2018-09-12

## 2018-09-12 DIAGNOSIS — N18.4 CKD (CHRONIC KIDNEY DISEASE) STAGE 4, GFR 15-29 ML/MIN (H): ICD-10-CM

## 2018-09-12 DIAGNOSIS — I50.31 ACUTE DIASTOLIC CONGESTIVE HEART FAILURE (H): ICD-10-CM

## 2018-09-12 DIAGNOSIS — E03.9 HYPOTHYROIDISM, UNSPECIFIED TYPE: ICD-10-CM

## 2018-09-12 LAB
ANION GAP SERPL CALCULATED.3IONS-SCNC: 11 MMOL/L (ref 3–14)
BUN SERPL-MCNC: 51 MG/DL (ref 7–30)
CALCIUM SERPL-MCNC: 8.7 MG/DL (ref 8.5–10.1)
CHLORIDE SERPL-SCNC: 110 MMOL/L (ref 94–109)
CO2 SERPL-SCNC: 23 MMOL/L (ref 20–32)
CREAT SERPL-MCNC: 2.85 MG/DL (ref 0.66–1.25)
GFR SERPL CREATININE-BSD FRML MDRD: 21 ML/MIN/1.7M2
GLUCOSE SERPL-MCNC: 147 MG/DL (ref 70–99)
POTASSIUM SERPL-SCNC: 4 MMOL/L (ref 3.4–5.3)
SODIUM SERPL-SCNC: 144 MMOL/L (ref 133–144)
TSH SERPL DL<=0.005 MIU/L-ACNC: 1.83 MU/L (ref 0.4–4)

## 2018-09-12 PROCEDURE — 36415 COLL VENOUS BLD VENIPUNCTURE: CPT | Performed by: INTERNAL MEDICINE

## 2018-09-12 PROCEDURE — 84443 ASSAY THYROID STIM HORMONE: CPT | Performed by: INTERNAL MEDICINE

## 2018-09-12 PROCEDURE — 80048 BASIC METABOLIC PNL TOTAL CA: CPT | Performed by: INTERNAL MEDICINE

## 2018-09-12 RX ORDER — LEVOTHYROXINE SODIUM 75 UG/1
75 TABLET ORAL DAILY
Qty: 90 TABLET | Refills: 3 | Status: SHIPPED | OUTPATIENT
Start: 2018-09-12 | End: 2019-01-01

## 2018-09-12 NOTE — TELEPHONE ENCOUNTER
"Requested Prescriptions   Pending Prescriptions Disp Refills     levothyroxine (SYNTHROID/LEVOTHROID) 75 MCG tablet 90 tablet 3     Sig: Take 1 tablet (75 mcg) by mouth daily    Thyroid Protocol Failed    9/12/2018 10:27 AM       Failed - Normal TSH on file in past 12 months    Recent Labs   Lab Test  06/05/18   1009   TSH  5.24*             Passed - Patient is 12 years or older       Passed - Recent (12 mo) or future (30 days) visit within the authorizing provider's specialty    Patient had office visit in the last 12 months or has a visit in the next 30 days with authorizing provider or within the authorizing provider's specialty.  See \"Patient Info\" tab in inbasket, or \"Choose Columns\" in Meds & Orders section of the refill encounter.            Routing refill request to provider for review/approval because:  Labs out of range:  tsh        "

## 2018-09-12 NOTE — TELEPHONE ENCOUNTER
Discussed results with Yasmin (pt's daughter) and explained that pt does not need another BMP prior to his OV on 9/24. Yasmin gave verbal understanding and this RN cancelled the lab draw.

## 2018-09-12 NOTE — TELEPHONE ENCOUNTER
BMP results noted. Pt had labs drawn today at his PMD's clinic. BMP was taken under Dr. Gibson's name. Pt was supposed to have this BMP done on 9/24/18 when pt has OV with [facility].     Will route to Dr. Gibson for update and see if he would like pt to have another BMP on 9/24?     Component      Latest Ref Rng & Units 6/19/2018 6/26/2018 7/18/2018 9/12/2018   Sodium      133 - 144 mmol/L 140 144 146 (H) 144   Potassium      3.4 - 5.3 mmol/L 4.1 4.4 4.4 4.0   Chloride      94 - 109 mmol/L 104 107 112 (H) 110 (H)   Carbon Dioxide      20 - 32 mmol/L 24 26 26 23   Anion Gap      3 - 14 mmol/L 16.1 15.4 8 11   Glucose      70 - 99 mg/dL 185 (H) 197 (H) 85 147 (H)   Urea Nitrogen      7 - 30 mg/dL 64 (H) 62 (H) 53 (H) 51 (H)   Creatinine      0.66 - 1.25 mg/dL 3.22 (H) 3.39 (H) 3.39 (H) 2.85 (H)   GFR Estimate      >60 mL/min/1.7m2 18 (L) 17 (L) 17 (L) 21 (L)   GFR Estimate If Black      >60 mL/min/1.7m2 22 (L) 21 (L) 21 (L) 26 (L)   Calcium      8.5 - 10.1 mg/dL 9.5 9.5 9.1 8.7

## 2018-09-12 NOTE — TELEPHONE ENCOUNTER
Reason for Call:  Medication or medication refill:    Do you use a [facility]?  Name of the pharmacy and phone number for the current request:  [facility] [address] [place] - [phone] Fax [phone]    Name of the medication requested  Thyroid  Medication  doesnot know what the name is    Other request no    Can we leave a detailed message on this number? YES    Phone number patient can be reached at: Home number on file [phone] (home)    Best Time anytime     Call taken on 9/12/2018 at 9:10 AM by Corina Singh

## 2018-09-24 ENCOUNTER — OFFICE VISIT (OUTPATIENT)
Dept: CARDIOLOGY | Facility: CLINIC | Age: 83
End: 2018-09-24
Attending: INTERNAL MEDICINE
Payer: COMMERCIAL

## 2018-09-24 VITALS
HEIGHT: 72 IN | HEART RATE: 59 BPM | DIASTOLIC BLOOD PRESSURE: 59 MMHG | SYSTOLIC BLOOD PRESSURE: 130 MMHG | WEIGHT: 202 LBS | BODY MASS INDEX: 27.36 KG/M2

## 2018-09-24 DIAGNOSIS — I21.4 NSTEMI (NON-ST ELEVATED MYOCARDIAL INFARCTION) (H): ICD-10-CM

## 2018-09-24 DIAGNOSIS — I25.10 CORONARY ARTERY DISEASE INVOLVING NATIVE CORONARY ARTERY OF NATIVE HEART, ANGINA PRESENCE UNSPECIFIED: ICD-10-CM

## 2018-09-24 DIAGNOSIS — I50.31 ACUTE DIASTOLIC CONGESTIVE HEART FAILURE (H): ICD-10-CM

## 2018-09-24 PROCEDURE — 99214 OFFICE O/P EST MOD 30 MIN: CPT | Performed by: PHYSICIAN ASSISTANT

## 2018-09-24 NOTE — PATIENT INSTRUCTIONS
Thank you for your [facility] visit today. Your provider has recommended the following:  Medication Changes:  No medication changes today.  Recommendations:  Call us with any worsening shortness of breath, chest discomfort, or weight gain.  Follow-up:  See us back for cardiology follow up in 3 months.  Reminder:  Please bring in all current medications, over the counter supplements and vitamin bottles to your next appointment.            [facility]

## 2018-09-24 NOTE — PROGRESS NOTES
Cardiology Progress Note    Date of Service: 09/24/2018  Patient seen today in follow up of: CHF, CAD  Primary cardiologist: Dr. Gibson    HPI:  Oscar Terrazas is a very pleasant 84 year old male with a history of coronary artery disease s/p CABG in 1996  with a LIMA to LAD and SVG to OM. In followup in 2007, he underwent multivessel drug stenting for recurrent angina. He then presented in 2012, with an acute coronary syndrome after his Plavix was stopped for a dental procedure.  At that time his LAD and left main coronary stents were widely patent.  There was one lesion at the end of the stent and the vein graft of the OM that was severe and possibly acutely thrombotic. This was successful stented with a drug eluting stent.  He has remained on Plavix since.  Additionally, he has a history of hypertension, stage IV CKD, chronic heart failure with preserved ejection fraction, peripheral vascular disease, and diabetes mellitus. He is a patient of Dr. Gibson's and followed previously in the [facility] clinic with AIDA Yee.  I am meeting him for the first time today.     In June of this year he was admitted to [facility] from 6/12 through 6/15 with exertional chest pain and shortness of breath. Troponins were elevated on admission. He was treated medically as an angiogram was not advised due to his renal function and significant risk of contrast nephropathy.  His Imdur dose was increased as well his hydralazine. He was continued on aspirin, plavix, and metoprolol. Additionally, during his stay he was diuresed with IV lasix and transitioned to Torsemide 40 mg daily.     He followed up in clinic after his hospitalization on 6/19. His creatinine was up a bit and his Torsemide was decreased to 20 mg daily. He then followed up with Dr. Gibson on 6/26/18 and was doing fairly well at that time. He had not had any further anginal symptoms. He has some chronic leg edema and chronic venous insufficiency. Dr. Gibson recommended he  increasing his statin as he was only taking it once a week as he was able to tolerate. Subsequently, his PCP cut his Torsemide dosing back from 20 mg alternating with 10 mg daily.     He is here today for routine [facility] clinic follow up with his daughter, Lisa. Over the last three months, he has been feeling fairly well. He notes some occasional mild dyspnea with moderate to strenuous activity. He does not have issues with dyspnea with his regular activities. He denies orthopnea or PND. He has chronic peripheral edema which is unchanged. He uses his compression stocking as needed at times. His weights have been stable at home between 198 - 200 lbs. He has continued to take his pravastatin but tells me he is only able to take 1/2 a tablet 1 - 2 times a week.     ASSESSMENT/PLAN:  1.  Coronary artery disease. With remote CABG and previous multivessel stenting. He remains on aspirin and plavix.  2.  Chronic heart failure with preserved ejection fraction. Last echocardiogram in March showed preserved LVEF with grade II diastolic dysfunction. He is currently on Torsemide 20 mg daily alternating with 10 mg daily.   3.  Stage IV chronic renal disease.  4.  Dyslipidemia. Currently taking pravastatin 1 - 2 times a week.  5.  Peripheral venous insufficiency with chronic lower extremity edema.  6.  Hypertension.     Oscar is here today for routine cardiology follow up. Over the last three months, he has been doing fairly well. He denies any increased anginal symptoms or shortness of breath. He appears fairly euvolemic today on his currently diuretic regimen. His recent basic metabolic panel showed some improvement in his creatinine to 2.85. His electrolytes are normal. His blood pressure was initially elevated in the 160s systolic in clinic. On recheck this improved. He took his morning medications just prior to leaving for this appointment this morning. I made no changes to his medications today. I asked him to continue to  "monitor his blood pressures at home and call with any elevated readings. We discussed a goal of less than 130 systolic. I encouraged him to take his statin at least one or two times a week at a minimum. I asked Oscar to return for re-evaluation in about 3 months. We will certainly see him back sooner with any worsening dyspnea, chest discomfort, weight gain, or any other concerns.     This note was completed in part using Dragon voice recognition software. Although reviewed after completion, some word and grammatical errors may occur.    Orders this Visit:  Orders Placed This Encounter   Procedures     Basic metabolic panel     Follow-Up with Cardiac Advanced Practice Provider     No orders of the defined types were placed in this encounter.    There are no discontinued medications.    CURRENT MEDICATIONS:  Current Outpatient Prescriptions   Medication Sig Dispense Refill     allopurinol (ZYLOPRIM) 100 MG tablet TAKE 2 TABLETS(200 MG) BY MOUTH DAILY 180 tablet 3     aspirin 81 MG tablet Take 1 tablet by mouth daily. with food       blood glucose monitoring (ACCU-CHEK COMPACT PLUS) test strip Check blood sugar 2-3 times a day 200 strip 3     cetirizine (ZYRTEC) 10 MG tablet TAKE 1 TABLET(10 MG) BY MOUTH EVERY EVENING 30 tablet 8     clopidogrel (PLAVIX) 75 MG tablet TAKE 1 TABLET(75 MG) BY MOUTH DAILY 90 tablet 3     COD LIVER OIL PO Take 1 capsule by mouth daily        Cyanocobalamin (VITAMIN B 12 PO) Take 500 mcg by mouth 2 times daily.       diphenhydrAMINE-acetaminophen (TYLENOL PM)  MG tablet Take 1 tablet by mouth nightly as needed for sleep       hydrALAZINE (APRESOLINE) 25 MG tablet Take 1 tablet (25 mg) by mouth 3 times daily 90 tablet 1     insulin NPH (HUMULIN N/NOVOLIN N) 100 UNIT/ML injection 14 units at bedtime 2 vial 11     insulin syringe-needle U-100 (BD INSULIN SYRINGE) 29G X 1/2\" 0.5 ML Use one syringe 2 daily or as directed. 200 each 4     Isosorbide Mononitrate  MG TB24 TAKE 1 TABLET " BY MOUTH DAILY 30 tablet 9     levothyroxine (SYNTHROID/LEVOTHROID) 75 MCG tablet Take 1 tablet (75 mcg) by mouth daily 90 tablet 3     metoprolol tartrate (LOPRESSOR) 50 MG tablet Take 1 tablet (50 mg) by mouth 2 times daily 180 tablet 2     nitroGLYcerin (NITROSTAT) 0.4 MG sublingual tablet For chest pain place 1 tablet under the tongue every 5 minutes for 3 doses. If symptoms persist 5 minutes after 1st dose call 911. 25 tablet 1     theophylline (UNIPHYL) 400 MG 24 hr tablet TAKE 1 TABLET BY MOUTH EVERY DAY 90 tablet 3     torsemide (DEMADEX) 10 MG tablet TAKE 1 TABLET BY MOUTH EVERY OTHER DAY 45 tablet 5     torsemide (DEMADEX) 20 MG tablet 1 tab every other day 30 tablet 1     triamcinolone (KENALOG) 0.5 % cream Apply sparingly to affected area two  times daily as needed for rash. 45 g 1     pravastatin (PRAVACHOL) 20 MG tablet Take 1 tablet (20 mg) by mouth once a week 30 tablet 1     ALLERGIES  Allergies   Allergen Reactions     Advair [Fluticasone-Salmeterol]      cough;  insomnia, nightmares     Amlodipine Besylate      edema       Azithromycin GI Disturbance     Clarithromycin      gi     Hydrochlorothiazide      hctz + lisinopril-->inc creat, k+     Lisinopril      lisinopril + hctz --> inc creat, k+     Moxifloxacin Hydrochloride      clostridium diffile colitis     Penicillins      gen swelling     Pravastatin Cramps     Muscle cramps/spasm.  Stopped 2017     Vioxx      edema     PAST MEDICAL HISTORY:  Past Medical History:   Diagnosis Date     Acute myocardial infarction, unspecified site, episode of care unspecified      Allergic rhinitis, cause unspecified      Anemia 3/6/2014     CAD (coronary artery disease)     1996 CABG - LIMA to LAD, SVG to OM, 2007 Cath - KANU to Left main and ostial/prox LAD, 2012 Cath - 80-90% stenosis SVG to OM - KANU placed, EF 50%     CKD (chronic kidney disease) stage 3, GFR 30-59 ml/min 3/9/2014     CTS (carpal tunnel syndrome)     bilateral     Degeneration of cervical  intervertebral disc      Diaphragmatic hernia without mention of obstruction or gangrene      Esophageal reflux      Essential hypertension, benign      Hyperlipidemia LDL goal <70      Hypothyroidism      Hypothyroidism, unspecified hypothyroidism type 1/14/2016     IDIOPATHIC CYCLIC EDEMA      Mild persistent asthma      Osteoarthrosis, unspecified whether generalized or localized, unspecified site      Pneumonia, organism unspecified(486)      Proteinuria 5/17/2014     PVD (peripheral vascular disease) (H)      Sensorineural hearing loss, unspecified      Subarachnoid bleed (H)      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 disease (goal A1C<8) 10/17/2015     Unspecified disorder resulting from impaired renal function      Unspecified hereditary and idiopathic peripheral neuropathy      Venous insufficiency      PAST SURGICAL HISTORY:  Past Surgical History:   Procedure Laterality Date     C NONSPECIFIC PROCEDURE      appendectomy     C NONSPECIFIC PROCEDURE      hemorrhoids     C NONSPECIFIC PROCEDURE      cervical strain     C NONSPECIFIC PROCEDURE      rotator cuff surgery, right shoulder     C NONSPECIFIC PROCEDURE  2008    iol os     CORONARY ARTERY BYPASS  1996    LIMA to LAD, SVG to OM     HEART CATH STENT COR W/WO PTCA  2007    lad, left main cor artery stents/drug eluting     HEART CATH STENT COR W/WO PTCA  2012    80-90% stenosis SVG to OM - KANU placed, EF 50%     NONSPECIFIC PROCEDURE      iol od     FAMILY HISTORY:  Family History   Problem Relation Age of Onset     Lung Cancer Daughter      Family History Negative Mother      broken hip     Jaundice Father      Alcohol/Drug Father      Ovarian Cancer Daughter      Family History Negative Daughter      Family History Negative Son      SOCIAL HISTORY:  Social History     Social History     Marital status:      Spouse name: N/A     Number of children: N/A     Years of education: N/A     Social History Main Topics     Smoking status:  Former Smoker     Packs/day: 1.00     Years: 14.00     Types: Cigarettes     Start date: 1952     Quit date: 1966     Smokeless tobacco: Never Used     Alcohol use No     Drug use: No     Sexual activity: No     Other Topics Concern     Caffeine Concern No     Sleep Concern Yes     Stress Concern No     Weight Concern No     Special Diet No     Exercise Yes     bowling, 5 days week. yard work     Social History Narrative     Review of Systems:  Skin:  Negative     Eyes:  Negative    ENT:  not assessed    Respiratory:  Positive for dyspnea on exertion  Cardiovascular:    Positive for;chest pain  Gastroenterology: Negative    Genitourinary:  not assessed    Musculoskeletal:  Positive for joint pain  Neurologic:  Positive for numbness or tingling of hands  Psychiatric:  Negative    Heme/Lymph/Imm:  Positive for allergies  Endocrine:  Positive for diabetes     Physical Exam:  Vitals: /59  Pulse 59  Ht 1.829 m (6')  Wt 91.6 kg (202 lb)  BMI 27.4 kg/m2   Wt Readings from Last 4 Encounters:   09/24/18 91.6 kg (202 lb)   06/28/18 91.4 kg (201 lb 8 oz)   06/26/18 91.5 kg (201 lb 12.8 oz)   06/19/18 91 kg (200 lb 9.6 oz)     GEN: well nourished, in no acute distress.  HEENT:  Pupils equal, round. Sclerae nonicteric.   NECK: Supple, no masses appreciated. JVP appears normal at 30 degrees.  C/V:  Regular rate and rhythm, II/VI systolic murmur at the LSB  RESP: Respirations are unlabored. Clear to auscultation bilaterally without wheezing, rales, or rhonchi.  GI: Abdomen soft, nontender.  EXTREM: 1+ bilateral LE edema.  NEURO: Alert and oriented, cooperative.  SKIN: Warm and dry.    Recent Lab Results:  LIPID RESULTS:  Lab Results   Component Value Date    CHOL 167 03/14/2018    HDL 32 (L) 03/14/2018     (H) 03/14/2018    TRIG 99 03/14/2018    CHOLHDLRATIO 3.8 09/11/2014     LIVER ENZYME RESULTS:  Lab Results   Component Value Date    AST 9 06/12/2018    ALT 20 06/12/2018     CBC RESULTS:  Lab Results    Component Value Date    WBC 8.8 06/14/2018    RBC 3.08 (L) 06/14/2018    HGB 9.4 (L) 06/14/2018    HCT 28.3 (L) 06/14/2018    MCV 92 06/14/2018    MCH 30.5 06/14/2018    MCHC 33.2 06/14/2018    RDW 15.7 (H) 06/14/2018     06/14/2018     BMP RESULTS:  Lab Results   Component Value Date     09/12/2018    POTASSIUM 4.0 09/12/2018    CHLORIDE 110 (H) 09/12/2018    CO2 23 09/12/2018    ANIONGAP 11 09/12/2018     (H) 09/12/2018    BUN 51 (H) 09/12/2018    CR 2.85 (H) 09/12/2018    GFRESTIMATED 21 (L) 09/12/2018    GFRESTBLACK 26 (L) 09/12/2018    LEV 8.7 09/12/2018      A1C RESULTS:  Lab Results   Component Value Date    A1C 5.9 (H) 06/13/2018     INR RESULTS:  Lab Results   Component Value Date    INR 1.06 03/12/2013    INR 1.19 (H) 12/08/2008       Marie Gil PA-C  [facility]

## 2018-09-24 NOTE — LETTER
9/24/2018    Jorge Hillman MD  [address] [facility] [address]    RE: Oscar Terrazas       Dear Colleague,    I had the pleasure of seeing Oscar Terrazas in the [facility].      Cardiology Progress Note    Date of Service: 09/24/2018  Patient seen today in follow up of: CHF, CAD  Primary cardiologist: Dr. Gibson    HPI:  Oscar Terrazas is a very pleasant 84 year old male with a history of coronary artery disease s/p CABG in 1996  with a LIMA to LAD and SVG to OM. In followup in 2007, he underwent multivessel drug stenting for recurrent angina. He then presented in 2012, with an acute coronary syndrome after his Plavix was stopped for a dental procedure.  At that time his LAD and left main coronary stents were widely patent.  There was one lesion at the end of the stent and the vein graft of the OM that was severe and possibly acutely thrombotic. This was successful stented with a drug eluting stent.  He has remained on Plavix since.  Additionally, he has a history of hypertension, stage IV CKD, chronic heart failure with preserved ejection fraction, peripheral vascular disease, and diabetes mellitus. He is a patient of Dr. Gibson's and followed previously in the [facility] clinic with AIDA Yee.  I am meeting him for the first time today.     In June of this year he was admitted to [facility] from 6/12 through 6/15 with exertional chest pain and shortness of breath. Troponins were elevated on admission. He was treated medically as an angiogram was not advised due to his renal function and significant risk of contrast nephropathy.  His Imdur dose was increased as well his hydralazine. He was continued on aspirin, plavix, and metoprolol. Additionally, during his stay he was diuresed with IV lasix and transitioned to Torsemide 40 mg daily.     He followed up in clinic after his hospitalization on 6/19. His creatinine was up a bit and his Torsemide was decreased to 20 mg daily. He  then followed up with Dr. Gibson on 6/26/18 and was doing fairly well at that time. He had not had any further anginal symptoms. He has some chronic leg edema and chronic venous insufficiency. Dr. Gibson recommended he increasing his statin as he was only taking it once a week as he was able to tolerate. Subsequently, his PCP cut his Torsemide dosing back from 20 mg alternating with 10 mg daily.     He is here today for routine [facility] clinic follow up with his daughter, Lisa. Over the last three months, he has been feeling fairly well. He notes some occasional mild dyspnea with moderate to strenuous activity. He does not have issues with dyspnea with his regular activities. He denies orthopnea or PND. He has chronic peripheral edema which is unchanged. He uses his compression stocking as needed at times. His weights have been stable at home between 198 - 200 lbs. He has continued to take his pravastatin but tells me he is only able to take 1/2 a tablet 1 - 2 times a week.     ASSESSMENT/PLAN:  1.  Coronary artery disease. With remote CABG and previous multivessel stenting. He remains on aspirin and plavix.  2.  Chronic heart failure with preserved ejection fraction. Last echocardiogram in March showed preserved LVEF with grade II diastolic dysfunction. He is currently on Torsemide 20 mg daily alternating with 10 mg daily.   3.  Stage IV chronic renal disease.  4.  Dyslipidemia. Currently taking pravastatin 1 - 2 times a week.  5.  Peripheral venous insufficiency with chronic lower extremity edema.  6.  Hypertension.     sOcar is here today for routine cardiology follow up. Over the last three months, he has been doing fairly well. He denies any increased anginal symptoms or shortness of breath. He appears fairly euvolemic today on his currently diuretic regimen. His recent basic metabolic panel showed some improvement in his creatinine to 2.85. His electrolytes are normal. His blood pressure was initially elevated  in the 160s systolic in clinic. On recheck this improved. He took his morning medications just prior to leaving for this appointment this morning. I made no changes to his medications today. I asked him to continue to monitor his blood pressures at home and call with any elevated readings. We discussed a goal of less than 130 systolic. I encouraged him to take his statin at least one or two times a week at a minimum. I asked Oscar to return for re-evaluation in about 3 months. We will certainly see him back sooner with any worsening dyspnea, chest discomfort, weight gain, or any other concerns.     This note was completed in part using Dragon voice recognition software. Although reviewed after completion, some word and grammatical errors may occur.    Orders this Visit:  Orders Placed This Encounter   Procedures     Basic metabolic panel     Follow-Up with Cardiac Advanced Practice Provider     No orders of the defined types were placed in this encounter.    There are no discontinued medications.    CURRENT MEDICATIONS:  Current Outpatient Prescriptions   Medication Sig Dispense Refill     allopurinol (ZYLOPRIM) 100 MG tablet TAKE 2 TABLETS(200 MG) BY MOUTH DAILY 180 tablet 3     aspirin 81 MG tablet Take 1 tablet by mouth daily. with food       blood glucose monitoring (ACCU-CHEK COMPACT PLUS) test strip Check blood sugar 2-3 times a day 200 strip 3     cetirizine (ZYRTEC) 10 MG tablet TAKE 1 TABLET(10 MG) BY MOUTH EVERY EVENING 30 tablet 8     clopidogrel (PLAVIX) 75 MG tablet TAKE 1 TABLET(75 MG) BY MOUTH DAILY 90 tablet 3     COD LIVER OIL PO Take 1 capsule by mouth daily        Cyanocobalamin (VITAMIN B 12 PO) Take 500 mcg by mouth 2 times daily.       diphenhydrAMINE-acetaminophen (TYLENOL PM)  MG tablet Take 1 tablet by mouth nightly as needed for sleep       hydrALAZINE (APRESOLINE) 25 MG tablet Take 1 tablet (25 mg) by mouth 3 times daily 90 tablet 1     insulin NPH (HUMULIN N/NOVOLIN N) 100 UNIT/ML  "injection 14 units at bedtime 2 vial 11     insulin syringe-needle U-100 (BD INSULIN SYRINGE) 29G X 1/2\" 0.5 ML Use one syringe 2 daily or as directed. 200 each 4     Isosorbide Mononitrate  MG TB24 TAKE 1 TABLET BY MOUTH DAILY 30 tablet 9     levothyroxine (SYNTHROID/LEVOTHROID) 75 MCG tablet Take 1 tablet (75 mcg) by mouth daily 90 tablet 3     metoprolol tartrate (LOPRESSOR) 50 MG tablet Take 1 tablet (50 mg) by mouth 2 times daily 180 tablet 2     nitroGLYcerin (NITROSTAT) 0.4 MG sublingual tablet For chest pain place 1 tablet under the tongue every 5 minutes for 3 doses. If symptoms persist 5 minutes after 1st dose call 911. 25 tablet 1     theophylline (UNIPHYL) 400 MG 24 hr tablet TAKE 1 TABLET BY MOUTH EVERY DAY 90 tablet 3     torsemide (DEMADEX) 10 MG tablet TAKE 1 TABLET BY MOUTH EVERY OTHER DAY 45 tablet 5     torsemide (DEMADEX) 20 MG tablet 1 tab every other day 30 tablet 1     triamcinolone (KENALOG) 0.5 % cream Apply sparingly to affected area two  times daily as needed for rash. 45 g 1     pravastatin (PRAVACHOL) 20 MG tablet Take 1 tablet (20 mg) by mouth once a week 30 tablet 1     ALLERGIES  Allergies   Allergen Reactions     Advair [Fluticasone-Salmeterol]      cough;  insomnia, nightmares     Amlodipine Besylate      edema       Azithromycin GI Disturbance     Clarithromycin      gi     Hydrochlorothiazide      hctz + lisinopril-->inc creat, k+     Lisinopril      lisinopril + hctz --> inc creat, k+     Moxifloxacin Hydrochloride      clostridium diffile colitis     Penicillins      gen swelling     Pravastatin Cramps     Muscle cramps/spasm.  Stopped 2017     Vioxx      edema     PAST MEDICAL HISTORY:  Past Medical History:   Diagnosis Date     Acute myocardial infarction, unspecified site, episode of care unspecified      Allergic rhinitis, cause unspecified      Anemia 3/6/2014     CAD (coronary artery disease)     1996 CABG - LIMA to LAD, SVG to OM, 2007 Cath - KANU to Left main and " ostial/prox LAD, 2012 Cath - 80-90% stenosis SVG to OM - KANU placed, EF 50%     CKD (chronic kidney disease) stage 3, GFR 30-59 ml/min 3/9/2014     CTS (carpal tunnel syndrome)     bilateral     Degeneration of cervical intervertebral disc      Diaphragmatic hernia without mention of obstruction or gangrene      Esophageal reflux      Essential hypertension, benign      Hyperlipidemia LDL goal <70      Hypothyroidism      Hypothyroidism, unspecified hypothyroidism type 1/14/2016     IDIOPATHIC CYCLIC EDEMA      Mild persistent asthma      Osteoarthrosis, unspecified whether generalized or localized, unspecified site      Pneumonia, organism unspecified(486)      Proteinuria 5/17/2014     PVD (peripheral vascular disease) (H)      Sensorineural hearing loss, unspecified      Subarachnoid bleed (H)      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 disease (goal A1C<8) 10/17/2015     Unspecified disorder resulting from impaired renal function      Unspecified hereditary and idiopathic peripheral neuropathy      Venous insufficiency      PAST SURGICAL HISTORY:  Past Surgical History:   Procedure Laterality Date     C NONSPECIFIC PROCEDURE      appendectomy     C NONSPECIFIC PROCEDURE      hemorrhoids     C NONSPECIFIC PROCEDURE      cervical strain     C NONSPECIFIC PROCEDURE      rotator cuff surgery, right shoulder     C NONSPECIFIC PROCEDURE  2008    iol os     CORONARY ARTERY BYPASS  1996    LIMA to LAD, SVG to OM     HEART CATH STENT COR W/WO PTCA  2007    lad, left main cor artery stents/drug eluting     HEART CATH STENT COR W/WO PTCA  2012    80-90% stenosis SVG to OM - KANU placed, EF 50%     NONSPECIFIC PROCEDURE      iol od     FAMILY HISTORY:  Family History   Problem Relation Age of Onset     Lung Cancer Daughter      Family History Negative Mother      broken hip     Jaundice Father      Alcohol/Drug Father      Ovarian Cancer Daughter      Family History Negative Daughter      Family History  Negative Son      SOCIAL HISTORY:  Social History     Social History     Marital status:      Spouse name: N/A     Number of children: N/A     Years of education: N/A     Social History Main Topics     Smoking status: Former Smoker     Packs/day: 1.00     Years: 14.00     Types: Cigarettes     Start date: 1952     Quit date: 1966     Smokeless tobacco: Never Used     Alcohol use No     Drug use: No     Sexual activity: No     Other Topics Concern     Caffeine Concern No     Sleep Concern Yes     Stress Concern No     Weight Concern No     Special Diet No     Exercise Yes     bowling, 5 days week. yard work     Social History Narrative     Review of Systems:  Skin:  Negative     Eyes:  Negative    ENT:  not assessed    Respiratory:  Positive for dyspnea on exertion  Cardiovascular:    Positive for;chest pain  Gastroenterology: Negative    Genitourinary:  not assessed    Musculoskeletal:  Positive for joint pain  Neurologic:  Positive for numbness or tingling of hands  Psychiatric:  Negative    Heme/Lymph/Imm:  Positive for allergies  Endocrine:  Positive for diabetes     Physical Exam:  Vitals: /59  Pulse 59  Ht 1.829 m (6')  Wt 91.6 kg (202 lb)  BMI 27.4 kg/m2   Wt Readings from Last 4 Encounters:   09/24/18 91.6 kg (202 lb)   06/28/18 91.4 kg (201 lb 8 oz)   06/26/18 91.5 kg (201 lb 12.8 oz)   06/19/18 91 kg (200 lb 9.6 oz)     GEN: well nourished, in no acute distress.  HEENT:  Pupils equal, round. Sclerae nonicteric.   NECK: Supple, no masses appreciated. JVP appears normal at 30 degrees.  C/V:  Regular rate and rhythm, II/VI systolic murmur at the LSB  RESP: Respirations are unlabored. Clear to auscultation bilaterally without wheezing, rales, or rhonchi.  GI: Abdomen soft, nontender.  EXTREM: 1+ bilateral LE edema.  NEURO: Alert and oriented, cooperative.  SKIN: Warm and dry.    Recent Lab Results:  LIPID RESULTS:  Lab Results   Component Value Date    CHOL 167 03/14/2018    HDL 32 (L)  03/14/2018     (H) 03/14/2018    TRIG 99 03/14/2018    CHOLHDLRATIO 3.8 09/11/2014     LIVER ENZYME RESULTS:  Lab Results   Component Value Date    AST 9 06/12/2018    ALT 20 06/12/2018     CBC RESULTS:  Lab Results   Component Value Date    WBC 8.8 06/14/2018    RBC 3.08 (L) 06/14/2018    HGB 9.4 (L) 06/14/2018    HCT 28.3 (L) 06/14/2018    MCV 92 06/14/2018    MCH 30.5 06/14/2018    MCHC 33.2 06/14/2018    RDW 15.7 (H) 06/14/2018     06/14/2018     BMP RESULTS:  Lab Results   Component Value Date     09/12/2018    POTASSIUM 4.0 09/12/2018    CHLORIDE 110 (H) 09/12/2018    CO2 23 09/12/2018    ANIONGAP 11 09/12/2018     (H) 09/12/2018    BUN 51 (H) 09/12/2018    CR 2.85 (H) 09/12/2018    GFRESTIMATED 21 (L) 09/12/2018    GFRESTBLACK 26 (L) 09/12/2018    LEV 8.7 09/12/2018      A1C RESULTS:  Lab Results   Component Value Date    A1C 5.9 (H) 06/13/2018     INR RESULTS:  Lab Results   Component Value Date    INR 1.06 03/12/2013    INR 1.19 (H) 12/08/2008       Thank you for allowing me to participate in the care of your patient.    Sincerely,     Marie Gil PA-C     [facility]

## 2018-09-24 NOTE — MR AVS SNAPSHOT
After Visit Summary   9/24/2018    Oscar Terrazas    MRN: [id number]           Patient Information     Date Of Birth          2/2/1934        Visit Information        Provider Department      9/24/2018 1:10 PM Marie Gil PA-C [facility]        Today's Diagnoses     Coronary artery disease involving native coronary artery of native heart, angina presence unspecified        Acute diastolic congestive heart failure (H)        NSTEMI (non-ST elevated myocardial infarction) (H)          Care Instructions    Thank you for your [facility] visit today. Your provider has recommended the following:  Medication Changes:  No medication changes today.  Recommendations:  Call us with any worsening shortness of breath, chest discomfort, or weight gain.  Follow-up:  See us back for cardiology follow up in 3 months.  Reminder:  Please bring in all current medications, over the counter supplements and vitamin bottles to your next appointment.            [facility]            Follow-ups after your visit        Who to contact     If you have questions or need follow up information about today's clinic visit or your schedule please contact [facility] directly at [phone].  Normal or non-critical lab and imaging results will be communicated to you by Triplhart, letter or phone within 4 business days after the clinic has received the results. If you do not hear from us within 7 days, please contact the clinic through Triplhart or phone. If you have a critical or abnormal lab result, we will notify you by phone as soon as possible.  Submit refill requests through [employer] or call your pharmacy and they will forward the refill request to us. Please allow 3 business days for your refill to be completed.          Additional Information About Your Visit        Triplhart Information     [employer] gives you  secure access to your electronic health record. If you see a primary care provider, you can also send messages to your care team and make appointments. If you have questions, please call your primary care clinic.  If you do not have a primary care provider, please call [phone] and they will assist you.        Care EveryWhere ID     This is your Care EveryWhere ID. This could be used by other organizations to access your [place] medical records  [id number]        Your Vitals Were     Pulse Height BMI (Body Mass Index)             59 1.829 m (6') 27.4 kg/m2          Blood Pressure from Last 3 Encounters:   09/24/18 130/59   06/28/18 130/58   06/26/18 139/66    Weight from Last 3 Encounters:   09/24/18 91.6 kg (202 lb)   06/28/18 91.4 kg (201 lb 8 oz)   06/26/18 91.5 kg (201 lb 12.8 oz)              We Performed the Following     Follow-Up with Cardiac Advanced Practice Provider        Primary Care Provider Office Phone # Fax #    Jorge Hillman [phone]       [address] [place] [id number]        Equal Access to Services     [facility] HospitalCH[facility]Y : Hadii aad ku hadasho Soomaali, waaxda luqadaha, qaybta kaalmada adeegyada, nicole mccain hayricardon skip pantoja . So [facility] [phone].    ATENCIÓN: Si habla español, tiene a fernandez disposición servicios gratuitos de asistencia lingüística. Llame al [phone].    We comply with applicable federal civil rights laws and [place] laws. We do not discriminate on the basis of race, color, national origin, age, disability, sex, sexual orientation, or gender identity.            Thank you!     Thank you for choosing [facility]  for your care. Our goal is always to provide you with excellent care. Hearing back from our patients is one way we can continue to improve our services. Please take a few minutes to complete the written survey that you may receive in the mail after your visit with us. Thank you!            "  Your Updated Medication List - Protect others around you: Learn how to safely use, store and throw away your medicines at www.disposemymeds.org.          This list is accurate as of 9/24/18  1:33 PM.  Always use your most recent med list.                   Brand Name Dispense Instructions for use Diagnosis    allopurinol 100 MG tablet    ZYLOPRIM    180 tablet    TAKE 2 TABLETS(200 MG) BY MOUTH DAILY    Chronic gout without tophus, unspecified cause, unspecified site       aspirin 81 MG tablet      Take 1 tablet by mouth daily. with food        blood glucose monitoring test strip    ACCU-CHEK COMPACT PLUS    200 strip    Check blood sugar 2-3 times a day    Type 2 diabetes mellitus with stage 3 chronic kidney disease, with long-term current use of insulin (H)       cetirizine 10 MG tablet    zyrTEC    30 tablet    TAKE 1 TABLET(10 MG) BY MOUTH EVERY EVENING    Atopic dermatitis, unspecified type, Itching       clopidogrel 75 MG tablet    PLAVIX    90 tablet    TAKE 1 TABLET(75 MG) BY MOUTH DAILY    CKD (chronic kidney disease) stage 3, GFR 30-59 ml/min, Cardiovascular disease       COD LIVER OIL PO      Take 1 capsule by mouth daily        diphenhydrAMINE-acetaminophen  MG tablet    TYLENOL PM     Take 1 tablet by mouth nightly as needed for sleep        hydrALAZINE 25 MG tablet    APRESOLINE    90 tablet    Take 1 tablet (25 mg) by mouth 3 times daily    Essential hypertension, benign       insulin  UNIT/ML injection    HumuLIN N/NovoLIN N    2 vial    14 units at bedtime    Type 2 diabetes mellitus with stage 3 chronic kidney disease, with long-term current use of insulin (H)       insulin syringe-needle U-100 29G X 1/2\" 0.5 ML    BD insulin syringe    200 each    Use one syringe 2 daily or as directed.    Type 2 diabetes mellitus with stage 3 chronic kidney disease, with long-term current use of insulin (H)       Isosorbide Mononitrate  MG Tb24     30 tablet    TAKE 1 TABLET BY MOUTH DAILY    " NSTEMI (non-ST elevated myocardial infarction) (H)       levothyroxine 75 MCG tablet    SYNTHROID/LEVOTHROID    90 tablet    Take 1 tablet (75 mcg) by mouth daily    Hypothyroidism, unspecified type       metoprolol tartrate 50 MG tablet    LOPRESSOR    180 tablet    Take 1 tablet (50 mg) by mouth 2 times daily    NSTEMI (non-ST elevated myocardial infarction) (H)       nitroGLYcerin 0.4 MG sublingual tablet    NITROSTAT    25 tablet    For chest pain place 1 tablet under the tongue every 5 minutes for 3 doses. If symptoms persist 5 minutes after 1st dose call 911.    NSTEMI (non-ST elevated myocardial infarction) (H)       pravastatin 20 MG tablet    PRAVACHOL    30 tablet    Take 1 tablet (20 mg) by mouth once a week    Essential hypertension, benign, Cardiovascular disease       theophylline 400 MG 24 hr tablet    UNIPHYL    90 tablet    TAKE 1 TABLET BY MOUTH EVERY DAY    Mild persistent asthma without complication       * torsemide 20 MG tablet    DEMADEX    30 tablet    1 tab every other day    Acute on chronic congestive heart failure, unspecified congestive heart failure type (H)       * torsemide 10 MG tablet    DEMADEX    45 tablet    TAKE 1 TABLET BY MOUTH EVERY OTHER DAY    Acute on chronic congestive heart failure, unspecified congestive heart failure type (H)       triamcinolone 0.5 % cream    KENALOG    45 g    Apply sparingly to affected area two  times daily as needed for rash.    Itching       VITAMIN B 12 PO      Take 500 mcg by mouth 2 times daily.        * Notice:  This list has 2 medication(s) that are the same as other medications prescribed for you. Read the directions carefully, and ask your doctor or other care provider to review them with you.

## 2018-09-24 NOTE — LETTER
9/24/2018    Jorge Hillman MD  [address] [facility] [address]    RE: Oscar Terrazas       Dear Colleague,    I had the pleasure of seeing Oscar Terrazas in the [facility].      Cardiology Progress Note    Date of Service: 09/24/2018  Patient seen today in follow up of: CHF, CAD  Primary cardiologist: Dr. Gibson    HPI:  Oscar Terrazas is a very pleasant 84 year old male with a history of coronary artery disease s/p CABG in 1996  with a LIMA to LAD and SVG to OM. In followup in 2007, he underwent multivessel drug stenting for recurrent angina. He then presented in 2012, with an acute coronary syndrome after his Plavix was stopped for a dental procedure.  At that time his LAD and left main coronary stents were widely patent.  There was one lesion at the end of the stent and the vein graft of the OM that was severe and possibly acutely thrombotic. This was successful stented with a drug eluting stent.  He has remained on Plavix since.  Additionally, he has a history of hypertension, stage IV CKD, chronic heart failure with preserved ejection fraction, peripheral vascular disease, and diabetes mellitus. He is a patient of Dr. Gibson's and followed previously in the [facility] clinic with AIDA Yee.  I am meeting him for the first time today.     In June of this year he was admitted to [facility] from 6/12 through 6/15 with exertional chest pain and shortness of breath. Troponins were elevated on admission. He was treated medically as an angiogram was not advised due to his renal function and significant risk of contrast nephropathy.  His Imdur dose was increased as well his hydralazine. He was continued on aspirin, plavix, and metoprolol. Additionally, during his stay he was diuresed with IV lasix and transitioned to Torsemide 40 mg daily.     He followed up in clinic after his hospitalization on 6/19. His creatinine was up a bit and his Torsemide was decreased to 20 mg daily. He  then followed up with Dr. Gibson on 6/26/18 and was doing fairly well at that time. He had not had any further anginal symptoms. He has some chronic leg edema and chronic venous insufficiency. Dr. Gibson recommended he increasing his statin as he was only taking it once a week as he was able to tolerate. Subsequently, his PCP cut his Torsemide dosing back from 20 mg alternating with 10 mg daily.     He is here today for routine CORE clinic follow up with his daughter, Lisa. Over the last three months, he has been feeling fairly well. He notes some occasional mild dyspnea with moderate to strenuous activity. He does not have issues with dyspnea with his regular activities. He denies orthopnea or PND. He has chronic peripheral edema which is unchanged. He uses his compression stocking as needed at times. His weights have been stable at home between 198 - 200 lbs. He has continued to take his pravastatin but tells me he is only able to take 1/2 a tablet 1 - 2 times a week.     ASSESSMENT/PLAN:  1.  Coronary artery disease. With remote CABG and previous multivessel stenting. He remains on aspirin and plavix.  2.  Chronic heart failure with preserved ejection fraction. Last echocardiogram in March showed preserved LVEF with grade II diastolic dysfunction. He is currently on Torsemide 20 mg daily alternating with 10 mg daily.   3.  Stage IV chronic renal disease.  4.  Dyslipidemia. Currently taking pravastatin 1 - 2 times a week.  5.  Peripheral venous insufficiency with chronic lower extremity edema.  6.  Hypertension.     Oscar is here today for routine cardiology follow up. Over the last three months, he has been doing fairly well. He denies any increased anginal symptoms or shortness of breath. He appears fairly euvolemic today on his currently diuretic regimen. His recent basic metabolic panel showed some improvement in his creatinine to 2.85. His electrolytes are normal. His blood pressure was initially elevated  in the 160s systolic in clinic. On recheck this improved. He took his morning medications just prior to leaving for this appointment this morning. I made no changes to his medications today. I asked him to continue to monitor his blood pressures at home and call with any elevated readings. We discussed a goal of less than 130 systolic. I encouraged him to take his statin at least one or two times a week at a minimum. I asked Oscar to return for re-evaluation in about 3 months. We will certainly see him back sooner with any worsening dyspnea, chest discomfort, weight gain, or any other concerns.     This note was completed in part using Dragon voice recognition software. Although reviewed after completion, some word and grammatical errors may occur.    Orders this Visit:  Orders Placed This Encounter   Procedures     Basic metabolic panel     Follow-Up with Cardiac Advanced Practice Provider     No orders of the defined types were placed in this encounter.    There are no discontinued medications.    CURRENT MEDICATIONS:  Current Outpatient Prescriptions   Medication Sig Dispense Refill     allopurinol (ZYLOPRIM) 100 MG tablet TAKE 2 TABLETS(200 MG) BY MOUTH DAILY 180 tablet 3     aspirin 81 MG tablet Take 1 tablet by mouth daily. with food       blood glucose monitoring (ACCU-CHEK COMPACT PLUS) test strip Check blood sugar 2-3 times a day 200 strip 3     cetirizine (ZYRTEC) 10 MG tablet TAKE 1 TABLET(10 MG) BY MOUTH EVERY EVENING 30 tablet 8     clopidogrel (PLAVIX) 75 MG tablet TAKE 1 TABLET(75 MG) BY MOUTH DAILY 90 tablet 3     COD LIVER OIL PO Take 1 capsule by mouth daily        Cyanocobalamin (VITAMIN B 12 PO) Take 500 mcg by mouth 2 times daily.       diphenhydrAMINE-acetaminophen (TYLENOL PM)  MG tablet Take 1 tablet by mouth nightly as needed for sleep       hydrALAZINE (APRESOLINE) 25 MG tablet Take 1 tablet (25 mg) by mouth 3 times daily 90 tablet 1     insulin NPH (HUMULIN N/NOVOLIN N) 100 UNIT/ML  "injection 14 units at bedtime 2 vial 11     insulin syringe-needle U-100 (BD INSULIN SYRINGE) 29G X 1/2\" 0.5 ML Use one syringe 2 daily or as directed. 200 each 4     Isosorbide Mononitrate  MG TB24 TAKE 1 TABLET BY MOUTH DAILY 30 tablet 9     levothyroxine (SYNTHROID/LEVOTHROID) 75 MCG tablet Take 1 tablet (75 mcg) by mouth daily 90 tablet 3     metoprolol tartrate (LOPRESSOR) 50 MG tablet Take 1 tablet (50 mg) by mouth 2 times daily 180 tablet 2     nitroGLYcerin (NITROSTAT) 0.4 MG sublingual tablet For chest pain place 1 tablet under the tongue every 5 minutes for 3 doses. If symptoms persist 5 minutes after 1st dose call 911. 25 tablet 1     theophylline (UNIPHYL) 400 MG 24 hr tablet TAKE 1 TABLET BY MOUTH EVERY DAY 90 tablet 3     torsemide (DEMADEX) 10 MG tablet TAKE 1 TABLET BY MOUTH EVERY OTHER DAY 45 tablet 5     torsemide (DEMADEX) 20 MG tablet 1 tab every other day 30 tablet 1     triamcinolone (KENALOG) 0.5 % cream Apply sparingly to affected area two  times daily as needed for rash. 45 g 1     pravastatin (PRAVACHOL) 20 MG tablet Take 1 tablet (20 mg) by mouth once a week 30 tablet 1     ALLERGIES  Allergies   Allergen Reactions     Advair [Fluticasone-Salmeterol]      cough;  insomnia, nightmares     Amlodipine Besylate      edema       Azithromycin GI Disturbance     Clarithromycin      gi     Hydrochlorothiazide      hctz + lisinopril-->inc creat, k+     Lisinopril      lisinopril + hctz --> inc creat, k+     Moxifloxacin Hydrochloride      clostridium diffile colitis     Penicillins      gen swelling     Pravastatin Cramps     Muscle cramps/spasm.  Stopped 2017     Vioxx      edema     PAST MEDICAL HISTORY:  Past Medical History:   Diagnosis Date     Acute myocardial infarction, unspecified site, episode of care unspecified      Allergic rhinitis, cause unspecified      Anemia 3/6/2014     CAD (coronary artery disease)     1996 CABG - LIMA to LAD, SVG to OM, 2007 Cath - KANU to Left main and " ostial/prox LAD, 2012 Cath - 80-90% stenosis SVG to OM - KANU placed, EF 50%     CKD (chronic kidney disease) stage 3, GFR 30-59 ml/min 3/9/2014     CTS (carpal tunnel syndrome)     bilateral     Degeneration of cervical intervertebral disc      Diaphragmatic hernia without mention of obstruction or gangrene      Esophageal reflux      Essential hypertension, benign      Hyperlipidemia LDL goal <70      Hypothyroidism      Hypothyroidism, unspecified hypothyroidism type 1/14/2016     IDIOPATHIC CYCLIC EDEMA      Mild persistent asthma      Osteoarthrosis, unspecified whether generalized or localized, unspecified site      Pneumonia, organism unspecified(486)      Proteinuria 5/17/2014     PVD (peripheral vascular disease) (H)      Sensorineural hearing loss, unspecified      Subarachnoid bleed (H)      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 disease (goal A1C<8) 10/17/2015     Unspecified disorder resulting from impaired renal function      Unspecified hereditary and idiopathic peripheral neuropathy      Venous insufficiency      PAST SURGICAL HISTORY:  Past Surgical History:   Procedure Laterality Date     C NONSPECIFIC PROCEDURE      appendectomy     C NONSPECIFIC PROCEDURE      hemorrhoids     C NONSPECIFIC PROCEDURE      cervical strain     C NONSPECIFIC PROCEDURE      rotator cuff surgery, right shoulder     C NONSPECIFIC PROCEDURE  2008    iol os     CORONARY ARTERY BYPASS  1996    LIMA to LAD, SVG to OM     HEART CATH STENT COR W/WO PTCA  2007    lad, left main cor artery stents/drug eluting     HEART CATH STENT COR W/WO PTCA  2012    80-90% stenosis SVG to OM - KANU placed, EF 50%     NONSPECIFIC PROCEDURE      iol od     FAMILY HISTORY:  Family History   Problem Relation Age of Onset     Lung Cancer Daughter      Family History Negative Mother      broken hip     Jaundice Father      Alcohol/Drug Father      Ovarian Cancer Daughter      Family History Negative Daughter      Family History  Negative Son      SOCIAL HISTORY:  Social History     Social History     Marital status:      Spouse name: N/A     Number of children: N/A     Years of education: N/A     Social History Main Topics     Smoking status: Former Smoker     Packs/day: 1.00     Years: 14.00     Types: Cigarettes     Start date: 1952     Quit date: 1966     Smokeless tobacco: Never Used     Alcohol use No     Drug use: No     Sexual activity: No     Other Topics Concern     Caffeine Concern No     Sleep Concern Yes     Stress Concern No     Weight Concern No     Special Diet No     Exercise Yes     bowling, 5 days week. yard work     Social History Narrative     Review of Systems:  Skin:  Negative     Eyes:  Negative    ENT:  not assessed    Respiratory:  Positive for dyspnea on exertion  Cardiovascular:    Positive for;chest pain  Gastroenterology: Negative    Genitourinary:  not assessed    Musculoskeletal:  Positive for joint pain  Neurologic:  Positive for numbness or tingling of hands  Psychiatric:  Negative    Heme/Lymph/Imm:  Positive for allergies  Endocrine:  Positive for diabetes     Physical Exam:  Vitals: /59  Pulse 59  Ht 1.829 m (6')  Wt 91.6 kg (202 lb)  BMI 27.4 kg/m2   Wt Readings from Last 4 Encounters:   09/24/18 91.6 kg (202 lb)   06/28/18 91.4 kg (201 lb 8 oz)   06/26/18 91.5 kg (201 lb 12.8 oz)   06/19/18 91 kg (200 lb 9.6 oz)     GEN: well nourished, in no acute distress.  HEENT:  Pupils equal, round. Sclerae nonicteric.   NECK: Supple, no masses appreciated. JVP appears normal at 30 degrees.  C/V:  Regular rate and rhythm, II/VI systolic murmur at the LSB  RESP: Respirations are unlabored. Clear to auscultation bilaterally without wheezing, rales, or rhonchi.  GI: Abdomen soft, nontender.  EXTREM: 1+ bilateral LE edema.  NEURO: Alert and oriented, cooperative.  SKIN: Warm and dry.    Recent Lab Results:  LIPID RESULTS:  Lab Results   Component Value Date    CHOL 167 03/14/2018    HDL 32 (L)  03/14/2018     (H) 03/14/2018    TRIG 99 03/14/2018    CHOLHDLRATIO 3.8 09/11/2014     LIVER ENZYME RESULTS:  Lab Results   Component Value Date    AST 9 06/12/2018    ALT 20 06/12/2018     CBC RESULTS:  Lab Results   Component Value Date    WBC 8.8 06/14/2018    RBC 3.08 (L) 06/14/2018    HGB 9.4 (L) 06/14/2018    HCT 28.3 (L) 06/14/2018    MCV 92 06/14/2018    MCH 30.5 06/14/2018    MCHC 33.2 06/14/2018    RDW 15.7 (H) 06/14/2018     06/14/2018     BMP RESULTS:  Lab Results   Component Value Date     09/12/2018    POTASSIUM 4.0 09/12/2018    CHLORIDE 110 (H) 09/12/2018    CO2 23 09/12/2018    ANIONGAP 11 09/12/2018     (H) 09/12/2018    BUN 51 (H) 09/12/2018    CR 2.85 (H) 09/12/2018    GFRESTIMATED 21 (L) 09/12/2018    GFRESTBLACK 26 (L) 09/12/2018    LEV 8.7 09/12/2018      A1C RESULTS:  Lab Results   Component Value Date    A1C 5.9 (H) 06/13/2018     INR RESULTS:  Lab Results   Component Value Date    INR 1.06 03/12/2013    INR 1.19 (H) 12/08/2008       Marie Gil PA-C  [facility]      Thank you for allowing me to participate in the care of your patient.      Sincerely,     Marie Gil PA-C     [facility]    cc:   Sebastien Gibson MD  [address]  [place] [id number]

## 2018-10-18 DIAGNOSIS — I10 ESSENTIAL HYPERTENSION, BENIGN: ICD-10-CM

## 2018-10-18 RX ORDER — HYDRALAZINE HYDROCHLORIDE 25 MG/1
TABLET, FILM COATED ORAL
Qty: 360 TABLET | Refills: 1 | Status: SHIPPED | OUTPATIENT
Start: 2018-10-18 | End: 2018-12-19

## 2018-10-18 NOTE — TELEPHONE ENCOUNTER
"Requested Prescriptions   Pending Prescriptions Disp Refills     hydrALAZINE (APRESOLINE) 25 MG tablet [Pharmacy Med Name: HYDRALAZINE  25MG TABLETS(ORANGE)] 360 tablet 0     Sig: TAKE 2 TABLETS(50 MG) BY MOUTH TWICE DAILY    Vasodilators Passed    10/18/2018 10:34 AM       Passed - Most recent BP less than 140/90 on record    BP Readings from Last 3 Encounters:   09/24/18 130/59   06/28/18 130/58   06/26/18 139/66                Passed - Most recent encounter is not a hospital encounter. Patient has recent (12 mos) or future (1 mos) visit with authorizing provider's specialty    Patient's most recent encounter is NOT a hospital encounter and has had an office visit in the last 12 months or has a visit in the next 30 days with authorizing provider or within the authorizing provider's specialty.      See \"Patient Info\" tab in inbasket, or \"Choose Columns\" in Meds & Orders section of the refill encounter.      If most recent encounter is a hospital encounter AND the patient does NOT have an appointment scheduled with the authorizing provider or authorizing provider's specialty within the next 30 days, forward refill to authorizing provider for medication review.         Passed - Patient is of age 18 years or older          "

## 2018-11-06 DIAGNOSIS — E11.22 TYPE 2 DIABETES MELLITUS WITH STAGE 3 CHRONIC KIDNEY DISEASE, WITH LONG-TERM CURRENT USE OF INSULIN (H): ICD-10-CM

## 2018-11-06 DIAGNOSIS — N18.30 TYPE 2 DIABETES MELLITUS WITH STAGE 3 CHRONIC KIDNEY DISEASE, WITH LONG-TERM CURRENT USE OF INSULIN (H): ICD-10-CM

## 2018-11-06 DIAGNOSIS — Z79.4 TYPE 2 DIABETES MELLITUS WITH STAGE 3 CHRONIC KIDNEY DISEASE, WITH LONG-TERM CURRENT USE OF INSULIN (H): ICD-10-CM

## 2018-11-07 NOTE — TELEPHONE ENCOUNTER
"Requested Prescriptions   Pending Prescriptions Disp Refills     ACCU-CHEK COMPACT PLUS test strip [Pharmacy Med Name: ACCU-CHEK COMPACT PLUS(DRUM) #51] 204 strip 0     Sig: CHECK BLOOD SUGAR TWICE DAILY TO THREE TIMES DAILY    Diabetic Supplies Protocol Passed    11/6/2018  6:34 PM       Passed - Patient is 18 years of age or older       Passed - Recent (6 mo) or future (30 days) visit within the authorizing provider's specialty    Patient had office visit in the last 6 months or has a visit in the next 30 days with authorizing provider.  See \"Patient Info\" tab in inbasket, or \"Choose Columns\" in Meds & Orders section of the refill encounter.            Last Written Prescription Date:  10/28/17  Last Fill Quantity: 200,  # refills: 3   Last office visit: 6/28/2018 with prescribing provider:  6/28/18   Future Office Visit:   Next 5 appointments (look out 90 days)     Dec 19, 2018 12:30 PM CST   Return Visit with Marie Gil PA-C   [facility] ([facility])    [address] [place] [id number] [phone] OPT 2                   "

## 2018-11-30 DIAGNOSIS — J45.30 MILD PERSISTENT ASTHMA WITHOUT COMPLICATION: ICD-10-CM

## 2018-11-30 NOTE — TELEPHONE ENCOUNTER
Requested Prescriptions   Pending Prescriptions Disp Refills     theophylline (UNIPHYL) 400 MG 24 hr tablet [Pharmacy Med Name: THEOPHYLLINE 400MG ER TABLETS] 90 tablet 0     Sig: TAKE 1 TABLET BY MOUTH EVERY DAY    There is no refill protocol information for this order        Last Written Prescription Date:  12/5/2017  Last Fill Quantity: 90,  # refills: 3   Last office visit: 6/28/2018 with prescribing provider:  6/28/2018   Future Office Visit:   Next 5 appointments (look out 90 days)     Dec 19, 2018 12:30 PM CST   Return Visit with Marie Gil PA-C   [facility] ([facility])    [address] [place] [id number] [phone] OPT 2

## 2018-12-03 RX ORDER — THEOPHYLLINE 400 MG/1
TABLET, EXTENDED RELEASE ORAL
Qty: 90 TABLET | Refills: 1 | Status: SHIPPED | OUTPATIENT
Start: 2018-12-03 | End: 2019-03-11

## 2018-12-05 DIAGNOSIS — N18.30 TYPE 2 DIABETES MELLITUS WITH STAGE 3 CHRONIC KIDNEY DISEASE, WITH LONG-TERM CURRENT USE OF INSULIN (H): ICD-10-CM

## 2018-12-05 DIAGNOSIS — Z79.4 TYPE 2 DIABETES MELLITUS WITH STAGE 3 CHRONIC KIDNEY DISEASE, WITH LONG-TERM CURRENT USE OF INSULIN (H): ICD-10-CM

## 2018-12-05 DIAGNOSIS — I50.9 CONGESTIVE HEART FAILURE, UNSPECIFIED HF CHRONICITY, UNSPECIFIED HEART FAILURE TYPE (H): Primary | ICD-10-CM

## 2018-12-05 DIAGNOSIS — E11.22 TYPE 2 DIABETES MELLITUS WITH STAGE 3 CHRONIC KIDNEY DISEASE, WITH LONG-TERM CURRENT USE OF INSULIN (H): ICD-10-CM

## 2018-12-05 RX ORDER — TORSEMIDE 20 MG/1
TABLET ORAL
Qty: 45 TABLET | Refills: 0 | Status: SHIPPED | OUTPATIENT
Start: 2018-12-05 | End: 2019-03-04

## 2018-12-05 NOTE — TELEPHONE ENCOUNTER
"Requested Prescriptions   Pending Prescriptions Disp Refills     torsemide (DEMADEX) 20 MG tablet [Pharmacy Med Name: TORSEMIDE 20MG TABLETS] 30 tablet 0     Sig: TAKE 1 TABLET BY MOUTH EVERY OTHER DAY    Diuretics (Including Combos) Protocol Failed    12/5/2018  2:35 AM       Failed - Normal serum creatinine on file in past 12 months    Recent Labs   Lab Test  09/12/18   0900   CR  2.85*             Passed - Blood pressure under 140/90 in past 12 months    BP Readings from Last 3 Encounters:   09/24/18 130/59   06/28/18 130/58   06/26/18 139/66                Passed - Recent (12 mo) or future (30 days) visit within the authorizing provider's specialty    Patient had office visit in the last 12 months or has a visit in the next 30 days with authorizing provider or within the authorizing provider's specialty.  See \"Patient Info\" tab in inbasket, or \"Choose Columns\" in Meds & Orders section of the refill encounter.             Passed - Patient is age 18 or older       Passed - Normal serum potassium on file in past 12 months    Recent Labs   Lab Test  09/12/18   0900   POTASSIUM  4.0                   Passed - Normal serum sodium on file in past 12 months    Recent Labs   Lab Test  09/12/18   0900   NA  144              Last Written Prescription Date:  7/1/18  Last Fill Quantity: 30,  # refills: 1   Last office visit: 6/28/2018 with prescribing provider:  6/28/18   Future Office Visit:   Next 5 appointments (look out 90 days)     Dec 19, 2018 12:30 PM CST   Return Visit with Marie Gil PA-C   [facility] ([facility])    [address] [place] [id number] [phone] OPT 2                   "

## 2018-12-15 DIAGNOSIS — N18.30 TYPE 2 DIABETES MELLITUS WITH STAGE 3 CHRONIC KIDNEY DISEASE, WITH LONG-TERM CURRENT USE OF INSULIN (H): ICD-10-CM

## 2018-12-15 DIAGNOSIS — E11.22 TYPE 2 DIABETES MELLITUS WITH STAGE 3 CHRONIC KIDNEY DISEASE, WITH LONG-TERM CURRENT USE OF INSULIN (H): ICD-10-CM

## 2018-12-15 DIAGNOSIS — Z79.4 TYPE 2 DIABETES MELLITUS WITH STAGE 3 CHRONIC KIDNEY DISEASE, WITH LONG-TERM CURRENT USE OF INSULIN (H): ICD-10-CM

## 2018-12-15 NOTE — TELEPHONE ENCOUNTER
"Requested Prescriptions   Pending Prescriptions Disp Refills     insulin NPH (NOVOLIN N VIAL) 100 UNIT/ML vial [Pharmacy Med Name: NOVOLIN INSULIN NPH U-100 HUMAN] 30 mL 0    Last Written Prescription Date:  7/1/2018  Last Fill Quantity: 2,  # refills: 11   Last office visit: 6/28/2018 with prescribing provider:  6/28/2018   Future Office Visit:     Sig: INJECT 18 UNITS SUBCUTANEOUSLY AT BEDTIME    Intermediate Acting Insulin Protocol Failed - 12/15/2018 10:24 AM       Failed - HgbA1C in past 3 or 6 months    If HgbA1C is 8 or greater, it needs to be on file within the past 3 months.  If less than 8, must be on file within the past 6 months.     Recent Labs   Lab Test 06/13/18  0600   A1C 5.9*            Passed - Blood pressure less than 140/90 in past 6 months    BP Readings from Last 3 Encounters:   09/24/18 130/59   06/28/18 130/58   06/26/18 139/66                Passed - LDL on file in past 12 months    Recent Labs   Lab Test 03/14/18  0525   *            Passed - Microalbumin on file in past 12 months    Recent Labs   Lab Test 12/13/17  0921   MICROL 1,580   UMALCR 1,889.95*            Passed - Serum creatinine on file in past 12 months    Recent Labs   Lab Test 09/12/18  0900   CR 2.85*            Passed - Patient is age 18 or older       Passed - Recent (6 mo) or future (30 days) visit within the authorizing provider's specialty    Patient had office visit in the last 6 months or has a visit in the next 30 days with authorizing provider or within the authorizing provider's specialty.  See \"Patient Info\" tab in inbasket, or \"Choose Columns\" in Meds & Orders section of the refill encounter.              "

## 2018-12-17 DIAGNOSIS — I25.10 CORONARY ARTERY DISEASE INVOLVING NATIVE CORONARY ARTERY OF NATIVE HEART, ANGINA PRESENCE UNSPECIFIED: ICD-10-CM

## 2018-12-17 DIAGNOSIS — E11.22 TYPE 2 DIABETES MELLITUS WITH STAGE 3 CHRONIC KIDNEY DISEASE, WITH LONG-TERM CURRENT USE OF INSULIN (H): ICD-10-CM

## 2018-12-17 DIAGNOSIS — N18.30 TYPE 2 DIABETES MELLITUS WITH STAGE 3 CHRONIC KIDNEY DISEASE, WITH LONG-TERM CURRENT USE OF INSULIN (H): ICD-10-CM

## 2018-12-17 DIAGNOSIS — I50.31 ACUTE DIASTOLIC CONGESTIVE HEART FAILURE (H): ICD-10-CM

## 2018-12-17 DIAGNOSIS — I21.4 NSTEMI (NON-ST ELEVATED MYOCARDIAL INFARCTION) (H): ICD-10-CM

## 2018-12-17 DIAGNOSIS — Z79.4 TYPE 2 DIABETES MELLITUS WITH STAGE 3 CHRONIC KIDNEY DISEASE, WITH LONG-TERM CURRENT USE OF INSULIN (H): ICD-10-CM

## 2018-12-17 LAB
ANION GAP SERPL CALCULATED.3IONS-SCNC: 7 MMOL/L (ref 3–14)
BUN SERPL-MCNC: 69 MG/DL (ref 7–30)
CALCIUM SERPL-MCNC: 9.1 MG/DL (ref 8.5–10.1)
CHLORIDE SERPL-SCNC: 109 MMOL/L (ref 94–109)
CO2 SERPL-SCNC: 24 MMOL/L (ref 20–32)
CREAT SERPL-MCNC: 3.51 MG/DL (ref 0.66–1.25)
GFR SERPL CREATININE-BSD FRML MDRD: 17 ML/MIN/1.7M2
GLUCOSE SERPL-MCNC: 99 MG/DL (ref 70–99)
HBA1C MFR BLD: 6.9 % (ref 0–5.6)
POTASSIUM SERPL-SCNC: 4.4 MMOL/L (ref 3.4–5.3)
SODIUM SERPL-SCNC: 140 MMOL/L (ref 133–144)

## 2018-12-17 PROCEDURE — 80048 BASIC METABOLIC PNL TOTAL CA: CPT | Performed by: INTERNAL MEDICINE

## 2018-12-17 PROCEDURE — 83036 HEMOGLOBIN GLYCOSYLATED A1C: CPT | Performed by: INTERNAL MEDICINE

## 2018-12-17 PROCEDURE — 36415 COLL VENOUS BLD VENIPUNCTURE: CPT | Performed by: INTERNAL MEDICINE

## 2018-12-18 ENCOUNTER — CARE COORDINATION (OUTPATIENT)
Dept: CARDIOLOGY | Facility: CLINIC | Age: 83
End: 2018-12-18

## 2018-12-18 ENCOUNTER — TELEPHONE (OUTPATIENT)
Dept: CARDIOLOGY | Facility: CLINIC | Age: 83
End: 2018-12-18

## 2018-12-18 NOTE — TELEPHONE ENCOUNTER
Pt was scheduled for lab appt on 12/19 prior to OV with ELIZABETH Olivia, tomorrow. Pt had the BMP drawn at [facility] yesterday. Cancelled lab apt for tomorrow and called daughter eusebio Hoffman that the lab is not needed and they will just need to check-in at 12:20pm for the office visit with Scarlet. Breann Drummond RN December 18, 2018, 9:13 AM

## 2018-12-18 NOTE — TELEPHONE ENCOUNTER
Routing refill request to provider for review/approval because:  Directions different than current med list

## 2018-12-19 ENCOUNTER — OFFICE VISIT (OUTPATIENT)
Dept: CARDIOLOGY | Facility: CLINIC | Age: 83
End: 2018-12-19
Attending: PHYSICIAN ASSISTANT
Payer: COMMERCIAL

## 2018-12-19 VITALS
WEIGHT: 205 LBS | SYSTOLIC BLOOD PRESSURE: 136 MMHG | DIASTOLIC BLOOD PRESSURE: 60 MMHG | HEIGHT: 72 IN | HEART RATE: 62 BPM | BODY MASS INDEX: 27.77 KG/M2

## 2018-12-19 DIAGNOSIS — I10 ESSENTIAL HYPERTENSION, BENIGN: ICD-10-CM

## 2018-12-19 DIAGNOSIS — I25.10 CORONARY ARTERY DISEASE INVOLVING NATIVE CORONARY ARTERY OF NATIVE HEART, ANGINA PRESENCE UNSPECIFIED: Primary | ICD-10-CM

## 2018-12-19 DIAGNOSIS — I21.4 NSTEMI (NON-ST ELEVATED MYOCARDIAL INFARCTION) (H): ICD-10-CM

## 2018-12-19 DIAGNOSIS — I50.32 CHRONIC DIASTOLIC CONGESTIVE HEART FAILURE (H): ICD-10-CM

## 2018-12-19 PROCEDURE — 99214 OFFICE O/P EST MOD 30 MIN: CPT | Performed by: PHYSICIAN ASSISTANT

## 2018-12-19 RX ORDER — HYDRALAZINE HYDROCHLORIDE 25 MG/1
25 TABLET, FILM COATED ORAL 3 TIMES DAILY
Qty: 360 TABLET | Refills: 1 | COMMUNITY
Start: 2018-12-19 | End: 2019-03-11

## 2018-12-19 ASSESSMENT — MIFFLIN-ST. JEOR: SCORE: 1657.87

## 2018-12-19 NOTE — PATIENT INSTRUCTIONS
Please call CORE nurse for any questions or concerns:       [phone]    1. Medication changes:  Take hydralazine three times a day rather than two. Take it with your morning/afternoon meal, with dinner, and then at bedtime.     2.  Weigh yourself daily and write it down.     3. Call C[facility] nurse if your weight is up more than 2 pounds in one day, or 5 pounds in one week.    4. Call C[facility] nurse if you feel more short of breath, have more abdominal bloating or leg swelling.    5. Eat a low sodium diet (less than 2,000mg daily). If you eat less salt, you will retain less fluid.     6.  Follow up: Dr. Gibson in three months. I think it would also be good to see the kidney doctors as well.     Scheduling phone number: [phone]  Reminder: Please bring in all current medications, over the counter supplements and vitamin bottles to your next appointment.

## 2018-12-19 NOTE — LETTER
12/19/2018    Jorge Hillman MD  [address] [facility] [address]    RE: Oscar Terrazas       Dear Colleague,    I had the pleasure of seeing Oscar Terrazas in the [facility].      Cardiology Progress Note    Date of Service: 12/19/2018  Patient seen today in follow up of: [facility] follow up  Primary cardiologist: Dr. Gibson    HPI:  Oscar Terrazas is a very pleasant 84 year old male with a history of coronary artery disease s/p CABG in 1996 with a LIMA to LAD and SVG to OM. In followup in 2007, he underwent multivessel drug stenting for recurrent angina. He then presented in 2012, with an acute coronary syndrome after his Plavix was stopped for a dental procedure.  At that time his LAD and left main coronary stents were widely patent.  There was one lesion at the end of the stent and the vein graft of the OM that was severe and possibly acutely thrombotic. This was successful stented with a drug eluting stent.  He has remained on Plavix since.  Additionally, he has a history of hypertension, stage IV CKD, chronic heart failure with preserved ejection fraction, peripheral vascular disease, and diabetes mellitus. He is a patient of Dr. Gibson's and followed previously in the [facility] clinic with AIDA Yee.      In June of this year he was admitted with exertional chest pain, shortness of breath and an elevated troponin. Repeat angiography was not advised due to his renal function and risk of contrast nephropathy.  His Imdur dose was increased as well his hydralazine. During his stay he was diuresed with IV lasix and transitioned to Torsemide 40 mg daily.      His volume status has been managed with Torsemide. Currently, he is on Torsemide 20 mg alternating every other day with 10 mg daily. I met him for the first time in September. He was doing fairly well at that time.    He is back today for routine [facility] clinic follow up with his daughter. Since his last visit, things have  been stable. He continues to have some dyspnea with more moderate/strenuous activity. He is able to do all of his activities or daily living and walk around his home without any issues. He denies orthopnea or PND. His weights at home have been stable around 198 and 199 lbs. He continues to bowl on a regular basis. He has no new complaints today.     ASSESSMENT/PLAN:  1.  Coronary artery disease. With remote CABG and previous multivessel stenting. He remains on aspirin and plavix.   2.  Chronic heart failure with preserved ejection fraction. Echo im March showed a preserved LVEF with grade II diastolic dysfunction. He is currently on Torsemide 20 mg daily alternating with 10 mg daily.   3.  Stage IV chronic renal disease.  4.  Dyslipidemia.  On ravastatin 1 - 2 times a week.  5.  Peripheral venous insufficiency with chronic lower extremity edema.  6.  Hypertension.  Reasonably well controlled today.     Oscar is here today for routine [facility] clinic follow up. Since his last visit, he has been stable. His weights are stable and he has no worsening symptoms to suggest congestive heart failure. He has been maintained on Torsemide 20 mg daily alternating with 10 mg daily. I reviewed his recent basic metabolic panel. This showed a creatinine of 3.5 and GFR of 17. His electrolytes are normal. His renal function appears fairly stable when compared to earlier this year. As clinically he has been stable, I made no changes to his diuretics.     He saw nephrology after his hospitalization this summer but has not follow up with them since then. Given his significant renal dysfunction, I did encourage him to follow up with them. He is clear that he would not want to pursue dialysis if necessary in the future. Given this,  He saw his wife decline after starting dialysis and does not want this for himself.     His blood pressure today was mildly elevated but improved on recheck. He has been taking his hydralazine only twice a day. I  asked him to take a three times a day which he is agreeable to. He will continue to monitor his blood pressures at home and call us with any consistently elevated readings. We could increase his hydralazine if this were to be the case.    I will have him return to see Dr. Gibson in about three months. We will repeat a basic metabolic panel at that time. Of course, I asked him to contact us sooner with any concerns or worsening symptoms.     This note was completed in part using Dragon voice recognition software. Although reviewed after completion, some word and grammatical errors may occur.    Orders this Visit:  Orders Placed This Encounter   Procedures     Basic metabolic panel     Follow-Up with [facility] - Return MD visit     Orders Placed This Encounter   Medications     hydrALAZINE (APRESOLINE) 25 MG tablet     Sig: Take 25 mg by mouth 3 times daily     Dispense:  360 tablet     Refill:  1     Medications Discontinued During This Encounter   Medication Reason     hydrALAZINE (APRESOLINE) 25 MG tablet Dose adjustment     hydrALAZINE (APRESOLINE) 25 MG tablet        CURRENT MEDICATIONS:  Current Outpatient Medications   Medication Sig Dispense Refill     allopurinol (ZYLOPRIM) 100 MG tablet TAKE 2 TABLETS(200 MG) BY MOUTH DAILY 180 tablet 3     aspirin 81 MG tablet Take 1 tablet by mouth daily. with food       cetirizine (ZYRTEC) 10 MG tablet TAKE 1 TABLET(10 MG) BY MOUTH EVERY EVENING 30 tablet 8     clopidogrel (PLAVIX) 75 MG tablet TAKE 1 TABLET(75 MG) BY MOUTH DAILY 90 tablet 3     COD LIVER OIL PO Take 1 capsule by mouth daily        Cyanocobalamin (VITAMIN B 12 PO) Take 500 mcg by mouth 2 times daily.       diphenhydrAMINE-acetaminophen (TYLENOL PM)  MG tablet Take 1 tablet by mouth nightly as needed for sleep       hydrALAZINE (APRESOLINE) 25 MG tablet Take 25 mg by mouth 3 times daily 360 tablet 1     insulin NPH (HUMULIN N/NOVOLIN N) 100 UNIT/ML injection 14 units at bedtime 2 vial 11      "Isosorbide Mononitrate  MG TB24 TAKE 1 TABLET BY MOUTH DAILY 30 tablet 9     levothyroxine (SYNTHROID/LEVOTHROID) 75 MCG tablet Take 1 tablet (75 mcg) by mouth daily 90 tablet 3     metoprolol tartrate (LOPRESSOR) 50 MG tablet Take 1 tablet (50 mg) by mouth 2 times daily 180 tablet 2     nitroGLYcerin (NITROSTAT) 0.4 MG sublingual tablet For chest pain place 1 tablet under the tongue every 5 minutes for 3 doses. If symptoms persist 5 minutes after 1st dose call 911. 25 tablet 1     pravastatin (PRAVACHOL) 20 MG tablet Take 10 mg by mouth twice a week  30 tablet 1     theophylline (UNIPHYL) 400 MG 24 hr tablet TAKE 1 TABLET BY MOUTH EVERY DAY 90 tablet 1     torsemide (DEMADEX) 10 MG tablet TAKE 1 TABLET BY MOUTH EVERY OTHER DAY 45 tablet 5     torsemide (DEMADEX) 20 MG tablet TAKE 1 TABLET BY MOUTH EVERY OTHER DAY (Patient taking differently: TAKE 1 TABLET BY MOUTH EVERY OTHER DAY, alternating with the 10mg) 45 tablet 0     triamcinolone (KENALOG) 0.5 % cream Apply sparingly to affected area two  times daily as needed for rash. 45 g 1     ACCU-CHEK COMPACT PLUS test strip CHECK BLOOD SUGAR TWICE DAILY TO THREE TIMES DAILY 204 strip 0     insulin syringe-needle U-100 (BD INSULIN SYRINGE) 29G X 1/2\" 0.5 ML Use one syringe 2 daily or as directed. 200 each 4     ALLERGIES  Allergies   Allergen Reactions     Advair [Fluticasone-Salmeterol]      cough;  insomnia, nightmares     Amlodipine Besylate      edema       Azithromycin GI Disturbance     Clarithromycin      gi     Hydrochlorothiazide      hctz + lisinopril-->inc creat, k+     Lisinopril      lisinopril + hctz --> inc creat, k+     Moxifloxacin Hydrochloride      clostridium diffile colitis     Penicillins      gen swelling     Pravastatin Cramps     Muscle cramps/spasm.  Stopped 2017     Vioxx      edema     PAST MEDICAL HISTORY:  Past Medical History:   Diagnosis Date     Acute myocardial infarction, unspecified site, episode of care unspecified      " Allergic rhinitis, cause unspecified      Anemia 3/6/2014     CAD (coronary artery disease)     1996 CABG - LIMA to LAD, SVG to OM, 2007 Cath - KANU to Left main and ostial/prox LAD, 2012 Cath - 80-90% stenosis SVG to OM - KANU placed, EF 50%     CKD (chronic kidney disease) stage 3, GFR 30-59 ml/min (H) 3/9/2014     CTS (carpal tunnel syndrome)     bilateral     Degeneration of cervical intervertebral disc      Diaphragmatic hernia without mention of obstruction or gangrene      Esophageal reflux      Essential hypertension, benign      Hyperlipidemia LDL goal <70      Hypothyroidism      Hypothyroidism, unspecified hypothyroidism type 1/14/2016     IDIOPATHIC CYCLIC EDEMA      Mild persistent asthma      Osteoarthrosis, unspecified whether generalized or localized, unspecified site      Pneumonia, organism unspecified(486)      Proteinuria 5/17/2014     PVD (peripheral vascular disease) (H)      Sensorineural hearing loss, unspecified      Subarachnoid bleed (H)      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 disease (goal A1C<8) 10/17/2015     Unspecified disorder resulting from impaired renal function      Unspecified hereditary and idiopathic peripheral neuropathy      Venous insufficiency      PAST SURGICAL HISTORY:  Past Surgical History:   Procedure Laterality Date     C NONSPECIFIC PROCEDURE      appendectomy     C NONSPECIFIC PROCEDURE      hemorrhoids     C NONSPECIFIC PROCEDURE      cervical strain     C NONSPECIFIC PROCEDURE      rotator cuff surgery, right shoulder     C NONSPECIFIC PROCEDURE  2008    iol os     CORONARY ARTERY BYPASS  1996    LIMA to LAD, SVG to OM     HEART CATH STENT COR W/WO PTCA  2007    lad, left main cor artery stents/drug eluting     HEART CATH STENT COR W/WO PTCA  2012    80-90% stenosis SVG to OM - KANU placed, EF 50%     NONSPECIFIC PROCEDURE      iol od     FAMILY HISTORY:  Family History   Problem Relation Age of Onset     Lung Cancer Daughter      Family History  Negative Mother         broken hip     Jaundice Father      Alcohol/Drug Father      Ovarian Cancer Daughter      Family History Negative Daughter      Family History Negative Son      SOCIAL HISTORY:  Social History     Socioeconomic History     Marital status:      Spouse name: None     Number of children: None     Years of education: None     Highest education level: None   Social Needs     Financial resource strain: None     Food insecurity - worry: None     Food insecurity - inability: None     Transportation needs - medical: None     Transportation needs - non-medical: None   Occupational History     None   Tobacco Use     Smoking status: Former Smoker     Packs/day: 1.00     Years: 14.00     Pack years: 14.00     Types: Cigarettes     Start date:      Last attempt to quit:      Years since quittin.0     Smokeless tobacco: Never Used   Substance and Sexual Activity     Alcohol use: No     Drug use: No     Sexual activity: No   Other Topics Concern     Parent/sibling w/ CABG, MI or angioplasty before 65F 55M? Not Asked      Service Not Asked     Blood Transfusions Not Asked     Caffeine Concern No     Occupational Exposure Not Asked     Hobby Hazards Not Asked     Sleep Concern Yes     Stress Concern No     Weight Concern No     Special Diet No     Back Care Not Asked     Exercise Yes     Comment: bowling, 5 days week. yard work     Bike Helmet Not Asked     Seat Belt Not Asked     Self-Exams Not Asked   Social History Narrative     None     Review of Systems:  Skin:  Negative     Eyes:  Negative    ENT:  not assessed    Respiratory:  Positive for dyspnea on exertion  Cardiovascular:    Positive for;chest pain;edema  Gastroenterology: Negative    Genitourinary:  not assessed    Musculoskeletal:  Positive for joint pain  Neurologic:  Positive for numbness or tingling of hands  Psychiatric:  Negative    Heme/Lymph/Imm:  Positive for allergies  Endocrine:  Positive for diabetes;thyroid  disorder     Physical Exam:  Vitals: /60   Pulse 62   Ht 1.829 m (6')   Wt 93 kg (205 lb)   BMI 27.80 kg/m      Wt Readings from Last 4 Encounters:   12/19/18 93 kg (205 lb)   09/24/18 91.6 kg (202 lb)   06/28/18 91.4 kg (201 lb 8 oz)   06/26/18 91.5 kg (201 lb 12.8 oz)     GEN: well nourished, in no acute distress.  HEENT:  Pupils equal, round. Sclerae nonicteric.   NECK: Supple, no masses appreciated.   C/V:  Regular rate and rhythm, III/VI systolic murmur at the RUSB  RESP: Respirations are unlabored. Clear to auscultation bilaterally without wheezing, rales, or rhonchi.  GI: Abdomen soft, nontender.  EXTREM: 1+ bilateral LE edema.  NEURO: Alert and oriented, cooperative.  SKIN: Warm and dry.     Recent Lab Results:  LIPID RESULTS:  Lab Results   Component Value Date    CHOL 167 03/14/2018    HDL 32 (L) 03/14/2018     (H) 03/14/2018    TRIG 99 03/14/2018    CHOLHDLRATIO 3.8 09/11/2014     LIVER ENZYME RESULTS:  Lab Results   Component Value Date    AST 9 06/12/2018    ALT 20 06/12/2018     CBC RESULTS:  Lab Results   Component Value Date    WBC 8.8 06/14/2018    RBC 3.08 (L) 06/14/2018    HGB 9.4 (L) 06/14/2018    HCT 28.3 (L) 06/14/2018    MCV 92 06/14/2018    MCH 30.5 06/14/2018    MCHC 33.2 06/14/2018    RDW 15.7 (H) 06/14/2018     06/14/2018     BMP RESULTS:  Lab Results   Component Value Date     12/17/2018    POTASSIUM 4.4 12/17/2018    CHLORIDE 109 12/17/2018    CO2 24 12/17/2018    ANIONGAP 7 12/17/2018    GLC 99 12/17/2018    BUN 69 (H) 12/17/2018    CR 3.51 (H) 12/17/2018    GFRESTIMATED 17 (L) 12/17/2018    GFRESTBLACK 20 (L) 12/17/2018    LEV 9.1 12/17/2018      A1C RESULTS:  Lab Results   Component Value Date    A1C 6.9 (H) 12/17/2018     INR RESULTS:  Lab Results   Component Value Date    INR 1.06 03/12/2013    INR 1.19 (H) 12/08/2008       New/Pertinent imaging results since last visit:  None        Thank you for allowing me to participate in the care of your  patient.    Sincerely,     Marie Gil PA-C     [facility]

## 2018-12-19 NOTE — PROGRESS NOTES
Cardiology Progress Note    Date of Service: 12/19/2018  Patient seen today in follow up of: [facility] follow up  Primary cardiologist: Dr. Gibson    HPI:  Oscar Terrazas is a very pleasant 84 year old male with a history of coronary artery disease s/p CABG in 1996 with a LIMA to LAD and SVG to OM. In followup in 2007, he underwent multivessel drug stenting for recurrent angina. He then presented in 2012, with an acute coronary syndrome after his Plavix was stopped for a dental procedure.  At that time his LAD and left main coronary stents were widely patent.  There was one lesion at the end of the stent and the vein graft of the OM that was severe and possibly acutely thrombotic. This was successful stented with a drug eluting stent.  He has remained on Plavix since.  Additionally, he has a history of hypertension, stage IV CKD, chronic heart failure with preserved ejection fraction, peripheral vascular disease, and diabetes mellitus. He is a patient of Dr. Gibson's and followed previously in the [facility] with AIDA Yee.      In June of this year he was admitted with exertional chest pain, shortness of breath and an elevated troponin. Repeat angiography was not advised due to his renal function and risk of contrast nephropathy.  His Imdur dose was increased as well his hydralazine. During his stay he was diuresed with IV lasix and transitioned to Torsemide 40 mg daily.      His volume status has been managed with Torsemide. Currently, he is on Torsemide 20 mg alternating every other day with 10 mg daily. I met him for the first time in September. He was doing fairly well at that time.    He is back today for routine [facility] clinic follow up with his daughter. Since his last visit, things have been stable. He continues to have some dyspnea with more moderate/strenuous activity. He is able to do all of his activities or daily living and walk around his home without any issues. He denies orthopnea or PND. His  weights at home have been stable around 198 and 199 lbs. He continues to bowl on a regular basis. He has no new complaints today.     ASSESSMENT/PLAN:  1.  Coronary artery disease. With remote CABG and previous multivessel stenting. He remains on aspirin and plavix.   2.  Chronic heart failure with preserved ejection fraction. Echo im March showed a preserved LVEF with grade II diastolic dysfunction. He is currently on Torsemide 20 mg daily alternating with 10 mg daily.   3.  Stage IV chronic renal disease.  4.  Dyslipidemia. On ravastatin 1 - 2 times a week.  5.  Peripheral venous insufficiency with chronic lower extremity edema.  6.  Hypertension. Reasonably well controlled today.     Oscar is here today for routine [facility] clinic follow up. Since his last visit, he has been stable. His weights are stable and he has no worsening symptoms to suggest congestive heart failure. He has been maintained on Torsemide 20 mg daily alternating with 10 mg daily. I reviewed his recent basic metabolic panel. This showed a creatinine of 3.5 and GFR of 17. His electrolytes are normal. His renal function appears fairly stable when compared to earlier this year. As clinically he has been stable, I made no changes to his diuretics.     He saw nephrology after his hospitalization this summer but has not follow up with them since then. Given his significant renal dysfunction, I did encourage him to follow up with them. He is clear that he would not want to pursue dialysis if necessary in the future. Given this,  He saw his wife decline after starting dialysis and does not want this for himself.     His blood pressure today was mildly elevated but improved on recheck. He has been taking his hydralazine only twice a day. I asked him to take a three times a day which he is agreeable to. He will continue to monitor his blood pressures at home and call us with any consistently elevated readings. We could increase his hydralazine if this were  to be the case.    I will have him return to see Dr. Gibson in about three months. We will repeat a basic metabolic panel at that time. Of course, I asked him to contact us sooner with any concerns or worsening symptoms.     This note was completed in part using Dragon voice recognition software. Although reviewed after completion, some word and grammatical errors may occur.    Orders this Visit:  Orders Placed This Encounter   Procedures     Basic metabolic panel     Follow-Up with [facility] - Return MD visit     Orders Placed This Encounter   Medications     hydrALAZINE (APRESOLINE) 25 MG tablet     Sig: Take 25 mg by mouth 3 times daily     Dispense:  360 tablet     Refill:  1     Medications Discontinued During This Encounter   Medication Reason     hydrALAZINE (APRESOLINE) 25 MG tablet Dose adjustment     hydrALAZINE (APRESOLINE) 25 MG tablet        CURRENT MEDICATIONS:  Current Outpatient Medications   Medication Sig Dispense Refill     allopurinol (ZYLOPRIM) 100 MG tablet TAKE 2 TABLETS(200 MG) BY MOUTH DAILY 180 tablet 3     aspirin 81 MG tablet Take 1 tablet by mouth daily. with food       cetirizine (ZYRTEC) 10 MG tablet TAKE 1 TABLET(10 MG) BY MOUTH EVERY EVENING 30 tablet 8     clopidogrel (PLAVIX) 75 MG tablet TAKE 1 TABLET(75 MG) BY MOUTH DAILY 90 tablet 3     COD LIVER OIL PO Take 1 capsule by mouth daily        Cyanocobalamin (VITAMIN B 12 PO) Take 500 mcg by mouth 2 times daily.       diphenhydrAMINE-acetaminophen (TYLENOL PM)  MG tablet Take 1 tablet by mouth nightly as needed for sleep       hydrALAZINE (APRESOLINE) 25 MG tablet Take 25 mg by mouth 3 times daily 360 tablet 1     insulin NPH (HUMULIN N/NOVOLIN N) 100 UNIT/ML injection 14 units at bedtime 2 vial 11     Isosorbide Mononitrate  MG TB24 TAKE 1 TABLET BY MOUTH DAILY 30 tablet 9     levothyroxine (SYNTHROID/LEVOTHROID) 75 MCG tablet Take 1 tablet (75 mcg) by mouth daily 90 tablet 3     metoprolol tartrate (LOPRESSOR) 50 MG  "tablet Take 1 tablet (50 mg) by mouth 2 times daily 180 tablet 2     nitroGLYcerin (NITROSTAT) 0.4 MG sublingual tablet For chest pain place 1 tablet under the tongue every 5 minutes for 3 doses. If symptoms persist 5 minutes after 1st dose call 911. 25 tablet 1     pravastatin (PRAVACHOL) 20 MG tablet Take 10 mg by mouth twice a week  30 tablet 1     theophylline (UNIPHYL) 400 MG 24 hr tablet TAKE 1 TABLET BY MOUTH EVERY DAY 90 tablet 1     torsemide (DEMADEX) 10 MG tablet TAKE 1 TABLET BY MOUTH EVERY OTHER DAY 45 tablet 5     torsemide (DEMADEX) 20 MG tablet TAKE 1 TABLET BY MOUTH EVERY OTHER DAY (Patient taking differently: TAKE 1 TABLET BY MOUTH EVERY OTHER DAY, alternating with the 10mg) 45 tablet 0     triamcinolone (KENALOG) 0.5 % cream Apply sparingly to affected area two  times daily as needed for rash. 45 g 1     ACCU-CHEK COMPACT PLUS test strip CHECK BLOOD SUGAR TWICE DAILY TO THREE TIMES DAILY 204 strip 0     insulin syringe-needle U-100 (BD INSULIN SYRINGE) 29G X 1/2\" 0.5 ML Use one syringe 2 daily or as directed. 200 each 4     ALLERGIES  Allergies   Allergen Reactions     Advair [Fluticasone-Salmeterol]      cough;  insomnia, nightmares     Amlodipine Besylate      edema       Azithromycin GI Disturbance     Clarithromycin      gi     Hydrochlorothiazide      hctz + lisinopril-->inc creat, k+     Lisinopril      lisinopril + hctz --> inc creat, k+     Moxifloxacin Hydrochloride      clostridium diffile colitis     Penicillins      gen swelling     Pravastatin Cramps     Muscle cramps/spasm.  Stopped 2017     Vioxx      edema     PAST MEDICAL HISTORY:  Past Medical History:   Diagnosis Date     Acute myocardial infarction, unspecified site, episode of care unspecified      Allergic rhinitis, cause unspecified      Anemia 3/6/2014     CAD (coronary artery disease)     1996 CABG - LIMA to LAD, SVG to OM, 2007 Cath - KANU to Left main and ostial/prox LAD, 2012 Cath - 80-90% stenosis SVG to OM - KANU " placed, EF 50%     CKD (chronic kidney disease) stage 3, GFR 30-59 ml/min (H) 3/9/2014     CTS (carpal tunnel syndrome)     bilateral     Degeneration of cervical intervertebral disc      Diaphragmatic hernia without mention of obstruction or gangrene      Esophageal reflux      Essential hypertension, benign      Hyperlipidemia LDL goal <70      Hypothyroidism      Hypothyroidism, unspecified hypothyroidism type 1/14/2016     IDIOPATHIC CYCLIC EDEMA      Mild persistent asthma      Osteoarthrosis, unspecified whether generalized or localized, unspecified site      Pneumonia, organism unspecified(486)      Proteinuria 5/17/2014     PVD (peripheral vascular disease) (H)      Sensorineural hearing loss, unspecified      Subarachnoid bleed (H)      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 disease (goal A1C<8) 10/17/2015     Unspecified disorder resulting from impaired renal function      Unspecified hereditary and idiopathic peripheral neuropathy      Venous insufficiency      PAST SURGICAL HISTORY:  Past Surgical History:   Procedure Laterality Date     C NONSPECIFIC PROCEDURE      appendectomy     C NONSPECIFIC PROCEDURE      hemorrhoids     C NONSPECIFIC PROCEDURE      cervical strain     C NONSPECIFIC PROCEDURE      rotator cuff surgery, right shoulder     C NONSPECIFIC PROCEDURE  2008    iol os     CORONARY ARTERY BYPASS  1996    LIMA to LAD, SVG to OM     HEART CATH STENT COR W/WO PTCA  2007    lad, left main cor artery stents/drug eluting     HEART CATH STENT COR W/WO PTCA  2012    80-90% stenosis SVG to OM - KANU placed, EF 50%     NONSPECIFIC PROCEDURE      iol od     FAMILY HISTORY:  Family History   Problem Relation Age of Onset     Lung Cancer Daughter      Family History Negative Mother         broken hip     Jaundice Father      Alcohol/Drug Father      Ovarian Cancer Daughter      Family History Negative Daughter      Family History Negative Son      SOCIAL HISTORY:  Social History      Socioeconomic History     Marital status:      Spouse name: None     Number of children: None     Years of education: None     Highest education level: None   Social Needs     Financial resource strain: None     Food insecurity - worry: None     Food insecurity - inability: None     Transportation needs - medical: None     Transportation needs - non-medical: None   Occupational History     None   Tobacco Use     Smoking status: Former Smoker     Packs/day: 1.00     Years: 14.00     Pack years: 14.00     Types: Cigarettes     Start date:      Last attempt to quit:      Years since quittin.0     Smokeless tobacco: Never Used   Substance and Sexual Activity     Alcohol use: No     Drug use: No     Sexual activity: No   Other Topics Concern     Parent/sibling w/ CABG, MI or angioplasty before 65F 55M? Not Asked      Service Not Asked     Blood Transfusions Not Asked     Caffeine Concern No     Occupational Exposure Not Asked     Hobby Hazards Not Asked     Sleep Concern Yes     Stress Concern No     Weight Concern No     Special Diet No     Back Care Not Asked     Exercise Yes     Comment: bowling, 5 days week. yard work     Bike Helmet Not Asked     Seat Belt Not Asked     Self-Exams Not Asked   Social History Narrative     None     Review of Systems:  Skin:  Negative     Eyes:  Negative    ENT:  not assessed    Respiratory:  Positive for dyspnea on exertion  Cardiovascular:    Positive for;chest pain;edema  Gastroenterology: Negative    Genitourinary:  not assessed    Musculoskeletal:  Positive for joint pain  Neurologic:  Positive for numbness or tingling of hands  Psychiatric:  Negative    Heme/Lymph/Imm:  Positive for allergies  Endocrine:  Positive for diabetes;thyroid disorder     Physical Exam:  Vitals: /60   Pulse 62   Ht 1.829 m (6')   Wt 93 kg (205 lb)   BMI 27.80 kg/m     Wt Readings from Last 4 Encounters:   18 93 kg (205 lb)   18 91.6 kg (202 lb)    06/28/18 91.4 kg (201 lb 8 oz)   06/26/18 91.5 kg (201 lb 12.8 oz)     GEN: well nourished, in no acute distress.  HEENT:  Pupils equal, round. Sclerae nonicteric.   NECK: Supple, no masses appreciated.   C/V:  Regular rate and rhythm, III/VI systolic murmur at the RUSB  RESP: Respirations are unlabored. Clear to auscultation bilaterally without wheezing, rales, or rhonchi.  GI: Abdomen soft, nontender.  EXTREM: 1+ bilateral LE edema.  NEURO: Alert and oriented, cooperative.  SKIN: Warm and dry.     Recent Lab Results:  LIPID RESULTS:  Lab Results   Component Value Date    CHOL 167 03/14/2018    HDL 32 (L) 03/14/2018     (H) 03/14/2018    TRIG 99 03/14/2018    CHOLHDLRATIO 3.8 09/11/2014     LIVER ENZYME RESULTS:  Lab Results   Component Value Date    AST 9 06/12/2018    ALT 20 06/12/2018     CBC RESULTS:  Lab Results   Component Value Date    WBC 8.8 06/14/2018    RBC 3.08 (L) 06/14/2018    HGB 9.4 (L) 06/14/2018    HCT 28.3 (L) 06/14/2018    MCV 92 06/14/2018    MCH 30.5 06/14/2018    MCHC 33.2 06/14/2018    RDW 15.7 (H) 06/14/2018     06/14/2018     BMP RESULTS:  Lab Results   Component Value Date     12/17/2018    POTASSIUM 4.4 12/17/2018    CHLORIDE 109 12/17/2018    CO2 24 12/17/2018    ANIONGAP 7 12/17/2018    GLC 99 12/17/2018    BUN 69 (H) 12/17/2018    CR 3.51 (H) 12/17/2018    GFRESTIMATED 17 (L) 12/17/2018    GFRESTBLACK 20 (L) 12/17/2018    LEV 9.1 12/17/2018      A1C RESULTS:  Lab Results   Component Value Date    A1C 6.9 (H) 12/17/2018     INR RESULTS:  Lab Results   Component Value Date    INR 1.06 03/12/2013    INR 1.19 (H) 12/08/2008       New/Pertinent imaging results since last visit:  None    Marie Gil PA-C  [facility]

## 2018-12-19 NOTE — LETTER
12/19/2018    Jorge Hillman MD  [address] [facility] [address]    RE: Oscar Terrazas       Dear Colleague,    I had the pleasure of seeing Oscar Terrazas in the [facility].      Cardiology Progress Note    Date of Service: 12/19/2018  Patient seen today in follow up of: CORE follow up  Primary cardiologist: Dr. Gibson    HPI:  Oscar Terrazas is a very pleasant 84 year old male with a history of coronary artery disease s/p CABG in 1996 with a LIMA to LAD and SVG to OM. In followup in 2007, he underwent multivessel drug stenting for recurrent angina. He then presented in 2012, with an acute coronary syndrome after his Plavix was stopped for a dental procedure.  At that time his LAD and left main coronary stents were widely patent.  There was one lesion at the end of the stent and the vein graft of the OM that was severe and possibly acutely thrombotic. This was successful stented with a drug eluting stent.  He has remained on Plavix since.  Additionally, he has a history of hypertension, stage IV CKD, chronic heart failure with preserved ejection fraction, peripheral vascular disease, and diabetes mellitus. He is a patient of Dr. Gibson's and followed previously in the CORE clinic with AIDA Yee.      In June of this year he was admitted with exertional chest pain, shortness of breath and an elevated troponin. Repeat angiography was not advised due to his renal function and risk of contrast nephropathy.  His Imdur dose was increased as well his hydralazine. During his stay he was diuresed with IV lasix and transitioned to Torsemide 40 mg daily.      His volume status has been managed with Torsemide. Currently, he is on Torsemide 20 mg alternating every other day with 10 mg daily. I met him for the first time in September. He was doing fairly well at that time.    He is back today for routine CORE clinic follow up with his daughter. Since his last visit, things have  been stable. He continues to have some dyspnea with more moderate/strenuous activity. He is able to do all of his activities or daily living and walk around his home without any issues. He denies orthopnea or PND. His weights at home have been stable around 198 and 199 lbs. He continues to bowl on a regular basis. He has no new complaints today.     ASSESSMENT/PLAN:  1.  Coronary artery disease. With remote CABG and previous multivessel stenting. He remains on aspirin and plavix.   2.  Chronic heart failure with preserved ejection fraction. Echo im March showed a preserved LVEF with grade II diastolic dysfunction. He is currently on Torsemide 20 mg daily alternating with 10 mg daily.   3.  Stage IV chronic renal disease.  4.  Dyslipidemia.  On ravastatin 1 - 2 times a week.  5.  Peripheral venous insufficiency with chronic lower extremity edema.  6.  Hypertension.  Reasonably well controlled today.     Oscar is here today for routine CORE clinic follow up. Since his last visit, he has been stable. His weights are stable and he has no worsening symptoms to suggest congestive heart failure. He has been maintained on Torsemide 20 mg daily alternating with 10 mg daily. I reviewed his recent basic metabolic panel. This showed a creatinine of 3.5 and GFR of 17. His electrolytes are normal. His renal function appears fairly stable when compared to earlier this year. As clinically he has been stable, I made no changes to his diuretics.     He saw nephrology after his hospitalization this summer but has not follow up with them since then. Given his significant renal dysfunction, I did encourage him to follow up with them. He is clear that he would not want to pursue dialysis if necessary in the future. Given this,  He saw his wife decline after starting dialysis and does not want this for himself.     His blood pressure today was mildly elevated but improved on recheck. He has been taking his hydralazine only twice a day. I  asked him to take a three times a day which he is agreeable to. He will continue to monitor his blood pressures at home and call us with any consistently elevated readings. We could increase his hydralazine if this were to be the case.    I will have him return to see Dr. Gibson in about three months. We will repeat a basic metabolic panel at that time. Of course, I asked him to contact us sooner with any concerns or worsening symptoms.     This note was completed in part using Dragon voice recognition software. Although reviewed after completion, some word and grammatical errors may occur.    Orders this Visit:  Orders Placed This Encounter   Procedures     Basic metabolic panel     Follow-Up with [facility] Clinic - Return MD visit     Orders Placed This Encounter   Medications     hydrALAZINE (APRESOLINE) 25 MG tablet     Sig: Take 25 mg by mouth 3 times daily     Dispense:  360 tablet     Refill:  1     Medications Discontinued During This Encounter   Medication Reason     hydrALAZINE (APRESOLINE) 25 MG tablet Dose adjustment     hydrALAZINE (APRESOLINE) 25 MG tablet        CURRENT MEDICATIONS:  Current Outpatient Medications   Medication Sig Dispense Refill     allopurinol (ZYLOPRIM) 100 MG tablet TAKE 2 TABLETS(200 MG) BY MOUTH DAILY 180 tablet 3     aspirin 81 MG tablet Take 1 tablet by mouth daily. with food       cetirizine (ZYRTEC) 10 MG tablet TAKE 1 TABLET(10 MG) BY MOUTH EVERY EVENING 30 tablet 8     clopidogrel (PLAVIX) 75 MG tablet TAKE 1 TABLET(75 MG) BY MOUTH DAILY 90 tablet 3     COD LIVER OIL PO Take 1 capsule by mouth daily        Cyanocobalamin (VITAMIN B 12 PO) Take 500 mcg by mouth 2 times daily.       diphenhydrAMINE-acetaminophen (TYLENOL PM)  MG tablet Take 1 tablet by mouth nightly as needed for sleep       hydrALAZINE (APRESOLINE) 25 MG tablet Take 25 mg by mouth 3 times daily 360 tablet 1     insulin NPH (HUMULIN N/NOVOLIN N) 100 UNIT/ML injection 14 units at bedtime 2 vial 11      "Isosorbide Mononitrate  MG TB24 TAKE 1 TABLET BY MOUTH DAILY 30 tablet 9     levothyroxine (SYNTHROID/LEVOTHROID) 75 MCG tablet Take 1 tablet (75 mcg) by mouth daily 90 tablet 3     metoprolol tartrate (LOPRESSOR) 50 MG tablet Take 1 tablet (50 mg) by mouth 2 times daily 180 tablet 2     nitroGLYcerin (NITROSTAT) 0.4 MG sublingual tablet For chest pain place 1 tablet under the tongue every 5 minutes for 3 doses. If symptoms persist 5 minutes after 1st dose call 911. 25 tablet 1     pravastatin (PRAVACHOL) 20 MG tablet Take 10 mg by mouth twice a week  30 tablet 1     theophylline (UNIPHYL) 400 MG 24 hr tablet TAKE 1 TABLET BY MOUTH EVERY DAY 90 tablet 1     torsemide (DEMADEX) 10 MG tablet TAKE 1 TABLET BY MOUTH EVERY OTHER DAY 45 tablet 5     torsemide (DEMADEX) 20 MG tablet TAKE 1 TABLET BY MOUTH EVERY OTHER DAY (Patient taking differently: TAKE 1 TABLET BY MOUTH EVERY OTHER DAY, alternating with the 10mg) 45 tablet 0     triamcinolone (KENALOG) 0.5 % cream Apply sparingly to affected area two  times daily as needed for rash. 45 g 1     ACCU-CHEK COMPACT PLUS test strip CHECK BLOOD SUGAR TWICE DAILY TO THREE TIMES DAILY 204 strip 0     insulin syringe-needle U-100 (BD INSULIN SYRINGE) 29G X 1/2\" 0.5 ML Use one syringe 2 daily or as directed. 200 each 4     ALLERGIES  Allergies   Allergen Reactions     Advair [Fluticasone-Salmeterol]      cough;  insomnia, nightmares     Amlodipine Besylate      edema       Azithromycin GI Disturbance     Clarithromycin      gi     Hydrochlorothiazide      hctz + lisinopril-->inc creat, k+     Lisinopril      lisinopril + hctz --> inc creat, k+     Moxifloxacin Hydrochloride      clostridium diffile colitis     Penicillins      gen swelling     Pravastatin Cramps     Muscle cramps/spasm.  Stopped 2017     Vioxx      edema     PAST MEDICAL HISTORY:  Past Medical History:   Diagnosis Date     Acute myocardial infarction, unspecified site, episode of care unspecified      " Allergic rhinitis, cause unspecified      Anemia 3/6/2014     CAD (coronary artery disease)     1996 CABG - LIMA to LAD, SVG to OM, 2007 Cath - KANU to Left main and ostial/prox LAD, 2012 Cath - 80-90% stenosis SVG to OM - KANU placed, EF 50%     CKD (chronic kidney disease) stage 3, GFR 30-59 ml/min (H) 3/9/2014     CTS (carpal tunnel syndrome)     bilateral     Degeneration of cervical intervertebral disc      Diaphragmatic hernia without mention of obstruction or gangrene      Esophageal reflux      Essential hypertension, benign      Hyperlipidemia LDL goal <70      Hypothyroidism      Hypothyroidism, unspecified hypothyroidism type 1/14/2016     IDIOPATHIC CYCLIC EDEMA      Mild persistent asthma      Osteoarthrosis, unspecified whether generalized or localized, unspecified site      Pneumonia, organism unspecified(486)      Proteinuria 5/17/2014     PVD (peripheral vascular disease) (H)      Sensorineural hearing loss, unspecified      Subarachnoid bleed (H)      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 disease (goal A1C<8) 10/17/2015     Unspecified disorder resulting from impaired renal function      Unspecified hereditary and idiopathic peripheral neuropathy      Venous insufficiency      PAST SURGICAL HISTORY:  Past Surgical History:   Procedure Laterality Date     C NONSPECIFIC PROCEDURE      appendectomy     C NONSPECIFIC PROCEDURE      hemorrhoids     C NONSPECIFIC PROCEDURE      cervical strain     C NONSPECIFIC PROCEDURE      rotator cuff surgery, right shoulder     C NONSPECIFIC PROCEDURE  2008    iol os     CORONARY ARTERY BYPASS  1996    LIMA to LAD, SVG to OM     HEART CATH STENT COR W/WO PTCA  2007    lad, left main cor artery stents/drug eluting     HEART CATH STENT COR W/WO PTCA  2012    80-90% stenosis SVG to OM - KANU placed, EF 50%     NONSPECIFIC PROCEDURE      iol od     FAMILY HISTORY:  Family History   Problem Relation Age of Onset     Lung Cancer Daughter      Family History  Negative Mother         broken hip     Jaundice Father      Alcohol/Drug Father      Ovarian Cancer Daughter      Family History Negative Daughter      Family History Negative Son      SOCIAL HISTORY:  Social History     Socioeconomic History     Marital status:      Spouse name: None     Number of children: None     Years of education: None     Highest education level: None   Social Needs     Financial resource strain: None     Food insecurity - worry: None     Food insecurity - inability: None     Transportation needs - medical: None     Transportation needs - non-medical: None   Occupational History     None   Tobacco Use     Smoking status: Former Smoker     Packs/day: 1.00     Years: 14.00     Pack years: 14.00     Types: Cigarettes     Start date:      Last attempt to quit:      Years since quittin.0     Smokeless tobacco: Never Used   Substance and Sexual Activity     Alcohol use: No     Drug use: No     Sexual activity: No   Other Topics Concern     Parent/sibling w/ CABG, MI or angioplasty before 65F 55M? Not Asked      Service Not Asked     Blood Transfusions Not Asked     Caffeine Concern No     Occupational Exposure Not Asked     Hobby Hazards Not Asked     Sleep Concern Yes     Stress Concern No     Weight Concern No     Special Diet No     Back Care Not Asked     Exercise Yes     Comment: bowling, 5 days week. yard work     Bike Helmet Not Asked     Seat Belt Not Asked     Self-Exams Not Asked   Social History Narrative     None     Review of Systems:  Skin:  Negative     Eyes:  Negative    ENT:  not assessed    Respiratory:  Positive for dyspnea on exertion  Cardiovascular:    Positive for;chest pain;edema  Gastroenterology: Negative    Genitourinary:  not assessed    Musculoskeletal:  Positive for joint pain  Neurologic:  Positive for numbness or tingling of hands  Psychiatric:  Negative    Heme/Lymph/Imm:  Positive for allergies  Endocrine:  Positive for diabetes;thyroid  disorder     Physical Exam:  Vitals: /60   Pulse 62   Ht 1.829 m (6')   Wt 93 kg (205 lb)   BMI 27.80 kg/m      Wt Readings from Last 4 Encounters:   12/19/18 93 kg (205 lb)   09/24/18 91.6 kg (202 lb)   06/28/18 91.4 kg (201 lb 8 oz)   06/26/18 91.5 kg (201 lb 12.8 oz)     GEN: well nourished, in no acute distress.  HEENT:  Pupils equal, round. Sclerae nonicteric.   NECK: Supple, no masses appreciated.   C/V:  Regular rate and rhythm, III/VI systolic murmur at the RUSB  RESP: Respirations are unlabored. Clear to auscultation bilaterally without wheezing, rales, or rhonchi.  GI: Abdomen soft, nontender.  EXTREM: 1+ bilateral LE edema.  NEURO: Alert and oriented, cooperative.  SKIN: Warm and dry.     Recent Lab Results:  LIPID RESULTS:  Lab Results   Component Value Date    CHOL 167 03/14/2018    HDL 32 (L) 03/14/2018     (H) 03/14/2018    TRIG 99 03/14/2018    CHOLHDLRATIO 3.8 09/11/2014     LIVER ENZYME RESULTS:  Lab Results   Component Value Date    AST 9 06/12/2018    ALT 20 06/12/2018     CBC RESULTS:  Lab Results   Component Value Date    WBC 8.8 06/14/2018    RBC 3.08 (L) 06/14/2018    HGB 9.4 (L) 06/14/2018    HCT 28.3 (L) 06/14/2018    MCV 92 06/14/2018    MCH 30.5 06/14/2018    MCHC 33.2 06/14/2018    RDW 15.7 (H) 06/14/2018     06/14/2018     BMP RESULTS:  Lab Results   Component Value Date     12/17/2018    POTASSIUM 4.4 12/17/2018    CHLORIDE 109 12/17/2018    CO2 24 12/17/2018    ANIONGAP 7 12/17/2018    GLC 99 12/17/2018    BUN 69 (H) 12/17/2018    CR 3.51 (H) 12/17/2018    GFRESTIMATED 17 (L) 12/17/2018    GFRESTBLACK 20 (L) 12/17/2018    LEV 9.1 12/17/2018      A1C RESULTS:  Lab Results   Component Value Date    A1C 6.9 (H) 12/17/2018     INR RESULTS:  Lab Results   Component Value Date    INR 1.06 03/12/2013    INR 1.19 (H) 12/08/2008       New/Pertinent imaging results since last visit:  None    Marie Gil PA-C  [facility]      Thank you for allowing me to  participate in the care of your patient.      Sincerely,     Marie Gil PA-C     [facility]    cc:   Marie Gil PA-C  [address] [place] [id number]

## 2018-12-21 ENCOUNTER — MYC MEDICAL ADVICE (OUTPATIENT)
Dept: INTERNAL MEDICINE | Facility: CLINIC | Age: 83
End: 2018-12-21

## 2018-12-21 DIAGNOSIS — M1A.9XX0 CHRONIC GOUT WITHOUT TOPHUS, UNSPECIFIED CAUSE, UNSPECIFIED SITE: ICD-10-CM

## 2018-12-21 DIAGNOSIS — E11.22 TYPE 2 DIABETES MELLITUS WITH STAGE 3 CHRONIC KIDNEY DISEASE, WITH LONG-TERM CURRENT USE OF INSULIN (H): ICD-10-CM

## 2018-12-21 DIAGNOSIS — Z79.4 TYPE 2 DIABETES MELLITUS WITH STAGE 3 CHRONIC KIDNEY DISEASE, WITH LONG-TERM CURRENT USE OF INSULIN (H): ICD-10-CM

## 2018-12-21 DIAGNOSIS — N18.4 CKD (CHRONIC KIDNEY DISEASE) STAGE 4, GFR 15-29 ML/MIN (H): Primary | ICD-10-CM

## 2018-12-21 DIAGNOSIS — N18.30 TYPE 2 DIABETES MELLITUS WITH STAGE 3 CHRONIC KIDNEY DISEASE, WITH LONG-TERM CURRENT USE OF INSULIN (H): ICD-10-CM

## 2018-12-24 NOTE — TELEPHONE ENCOUNTER
Patient's daughter, Yasmin, called back today. Patient has enough insulin for maybe today only. Please call patient asap, [phone].

## 2018-12-29 DIAGNOSIS — M1A.9XX0 CHRONIC GOUT WITHOUT TOPHUS, UNSPECIFIED CAUSE, UNSPECIFIED SITE: ICD-10-CM

## 2018-12-29 NOTE — TELEPHONE ENCOUNTER
"Requested Prescriptions   Pending Prescriptions Disp Refills     allopurinol (ZYLOPRIM) 100 MG tablet [Pharmacy Med Name: ALLOPURINOL 100MG TABLETS] 180 tablet 0    Last Written Prescription Date:  1/10/2018  Last Fill Quantity: 180,  # refills: 3   Last office visit: 6/28/2018 with prescribing provider:  6/28/2018   Future Office Visit:     Sig: TAKE 2 TABLETS(200 MG) BY MOUTH DAILY    Gout Agents Protocol Failed - 12/29/2018  7:43 AM       Failed - Has Uric Acid on file in past 12 months and value is less than 6    Recent Labs   Lab Test 01/08/16  1005   URIC 5.8     If level is 6mg/dL or greater, ok to refill one time and refer to provider.          Failed - Normal serum creatinine on file in the past 12 months    Recent Labs   Lab Test 12/17/18  0912   CR 3.51*            Passed - CBC on file in past 12 months    Recent Labs   Lab Test 06/14/18  0555   WBC 8.8   RBC 3.08*   HGB 9.4*   HCT 28.3*                   Passed - ALT on file in past 12 months    Recent Labs   Lab Test 06/12/18  2203   ALT 20            Passed - Recent (12 mo) or future (30 days) visit within the authorizing provider's specialty    Patient had office visit in the last 12 months or has a visit in the next 30 days with authorizing provider or within the authorizing provider's specialty.  See \"Patient Info\" tab in inbasket, or \"Choose Columns\" in Meds & Orders section of the refill encounter.             Passed - Patient is age 18 or older          "

## 2019-01-01 ENCOUNTER — OFFICE VISIT (OUTPATIENT)
Dept: INTERNAL MEDICINE | Facility: CLINIC | Age: 84
End: 2019-01-01
Payer: COMMERCIAL

## 2019-01-01 ENCOUNTER — TRANSFERRED RECORDS (OUTPATIENT)
Dept: HEALTH INFORMATION MANAGEMENT | Facility: CLINIC | Age: 84
End: 2019-01-01

## 2019-01-01 ENCOUNTER — OFFICE VISIT (OUTPATIENT)
Dept: CARDIOLOGY | Facility: CLINIC | Age: 84
End: 2019-01-01
Attending: PHYSICIAN ASSISTANT
Payer: COMMERCIAL

## 2019-01-01 ENCOUNTER — OFFICE VISIT (OUTPATIENT)
Dept: CARDIOLOGY | Facility: CLINIC | Age: 84
End: 2019-01-01
Payer: COMMERCIAL

## 2019-01-01 ENCOUNTER — ANTICOAGULATION THERAPY VISIT (OUTPATIENT)
Dept: ANTICOAGULATION | Facility: CLINIC | Age: 84
End: 2019-01-01
Payer: COMMERCIAL

## 2019-01-01 ENCOUNTER — HEALTH MAINTENANCE LETTER (OUTPATIENT)
Age: 84
End: 2019-01-01

## 2019-01-01 ENCOUNTER — TELEPHONE (OUTPATIENT)
Dept: INTERNAL MEDICINE | Facility: CLINIC | Age: 84
End: 2019-01-01

## 2019-01-01 ENCOUNTER — HOSPITAL ENCOUNTER (INPATIENT)
Facility: CLINIC | Age: 84
LOS: 5 days | Discharge: HOME | DRG: 281 | End: 2019-09-18
Attending: EMERGENCY MEDICINE | Admitting: INTERNAL MEDICINE
Payer: COMMERCIAL

## 2019-01-01 ENCOUNTER — TELEPHONE (OUTPATIENT)
Dept: CARDIOLOGY | Facility: CLINIC | Age: 84
End: 2019-01-01

## 2019-01-01 ENCOUNTER — APPOINTMENT (OUTPATIENT)
Dept: PHYSICAL THERAPY | Facility: CLINIC | Age: 84
DRG: 281 | End: 2019-01-01
Payer: COMMERCIAL

## 2019-01-01 ENCOUNTER — NURSE TRIAGE (OUTPATIENT)
Dept: INTERNAL MEDICINE | Facility: CLINIC | Age: 84
End: 2019-01-01

## 2019-01-01 ENCOUNTER — APPOINTMENT (OUTPATIENT)
Dept: PHYSICAL THERAPY | Facility: CLINIC | Age: 84
DRG: 281 | End: 2019-01-01
Attending: INTERNAL MEDICINE
Payer: COMMERCIAL

## 2019-01-01 ENCOUNTER — APPOINTMENT (OUTPATIENT)
Dept: CARDIOLOGY | Facility: CLINIC | Age: 84
DRG: 281 | End: 2019-01-01
Attending: INTERNAL MEDICINE
Payer: COMMERCIAL

## 2019-01-01 ENCOUNTER — APPOINTMENT (OUTPATIENT)
Dept: CARDIOLOGY | Facility: CLINIC | Age: 84
DRG: 281 | End: 2019-01-01
Attending: NURSE PRACTITIONER
Payer: COMMERCIAL

## 2019-01-01 ENCOUNTER — APPOINTMENT (OUTPATIENT)
Dept: GENERAL RADIOLOGY | Facility: CLINIC | Age: 84
DRG: 281 | End: 2019-01-01
Attending: EMERGENCY MEDICINE
Payer: COMMERCIAL

## 2019-01-01 VITALS
OXYGEN SATURATION: 98 % | WEIGHT: 199 LBS | BODY MASS INDEX: 26.99 KG/M2 | DIASTOLIC BLOOD PRESSURE: 54 MMHG | SYSTOLIC BLOOD PRESSURE: 138 MMHG | TEMPERATURE: 97.9 F | HEART RATE: 62 BPM | RESPIRATION RATE: 16 BRPM

## 2019-01-01 VITALS
BODY MASS INDEX: 25.9 KG/M2 | OXYGEN SATURATION: 100 % | WEIGHT: 191 LBS | HEART RATE: 66 BPM | TEMPERATURE: 98.7 F | SYSTOLIC BLOOD PRESSURE: 126 MMHG | DIASTOLIC BLOOD PRESSURE: 78 MMHG | RESPIRATION RATE: 16 BRPM

## 2019-01-01 VITALS
HEIGHT: 72 IN | WEIGHT: 199.4 LBS | HEART RATE: 80 BPM | BODY MASS INDEX: 27.01 KG/M2 | SYSTOLIC BLOOD PRESSURE: 136 MMHG | DIASTOLIC BLOOD PRESSURE: 60 MMHG

## 2019-01-01 VITALS
WEIGHT: 194 LBS | HEART RATE: 73 BPM | SYSTOLIC BLOOD PRESSURE: 165 MMHG | HEIGHT: 72 IN | BODY MASS INDEX: 26.28 KG/M2 | DIASTOLIC BLOOD PRESSURE: 69 MMHG

## 2019-01-01 VITALS
BODY MASS INDEX: 28.33 KG/M2 | WEIGHT: 208.9 LBS | DIASTOLIC BLOOD PRESSURE: 66 MMHG | SYSTOLIC BLOOD PRESSURE: 144 MMHG | OXYGEN SATURATION: 99 % | TEMPERATURE: 98.7 F | HEART RATE: 59 BPM

## 2019-01-01 VITALS
DIASTOLIC BLOOD PRESSURE: 68 MMHG | SYSTOLIC BLOOD PRESSURE: 154 MMHG | OXYGEN SATURATION: 99 % | HEIGHT: 72 IN | BODY MASS INDEX: 27.19 KG/M2 | HEART RATE: 56 BPM | WEIGHT: 200.7 LBS

## 2019-01-01 VITALS
HEIGHT: 72 IN | WEIGHT: 194.4 LBS | SYSTOLIC BLOOD PRESSURE: 130 MMHG | HEART RATE: 60 BPM | DIASTOLIC BLOOD PRESSURE: 58 MMHG | BODY MASS INDEX: 26.33 KG/M2 | OXYGEN SATURATION: 100 %

## 2019-01-01 VITALS
SYSTOLIC BLOOD PRESSURE: 111 MMHG | DIASTOLIC BLOOD PRESSURE: 69 MMHG | OXYGEN SATURATION: 96 % | TEMPERATURE: 98.3 F | BODY MASS INDEX: 25.14 KG/M2 | WEIGHT: 185.6 LBS | RESPIRATION RATE: 18 BRPM | HEART RATE: 88 BPM | HEIGHT: 72 IN

## 2019-01-01 DIAGNOSIS — D63.1 ANEMIA IN STAGE 5 CHRONIC KIDNEY DISEASE, NOT ON CHRONIC DIALYSIS (H): ICD-10-CM

## 2019-01-01 DIAGNOSIS — I50.32 CHRONIC DIASTOLIC CONGESTIVE HEART FAILURE (H): Primary | ICD-10-CM

## 2019-01-01 DIAGNOSIS — E11.22 TYPE 2 DIABETES MELLITUS WITH STAGE 3 CHRONIC KIDNEY DISEASE, WITH LONG-TERM CURRENT USE OF INSULIN (H): ICD-10-CM

## 2019-01-01 DIAGNOSIS — I25.118 CORONARY ARTERY DISEASE OF NATIVE ARTERY OF NATIVE HEART WITH STABLE ANGINA PECTORIS (H): Primary | ICD-10-CM

## 2019-01-01 DIAGNOSIS — I48.91 ATRIAL FIBRILLATION (H): ICD-10-CM

## 2019-01-01 DIAGNOSIS — I10 ESSENTIAL HYPERTENSION, BENIGN: Primary | ICD-10-CM

## 2019-01-01 DIAGNOSIS — I48.91 ATRIAL FIBRILLATION, UNSPECIFIED TYPE (H): ICD-10-CM

## 2019-01-01 DIAGNOSIS — E11.22 TYPE 2 DIABETES MELLITUS WITH STAGE 5 CHRONIC KIDNEY DISEASE NOT ON CHRONIC DIALYSIS, WITH LONG-TERM CURRENT USE OF INSULIN (H): ICD-10-CM

## 2019-01-01 DIAGNOSIS — I48.19 PERSISTENT ATRIAL FIBRILLATION (H): ICD-10-CM

## 2019-01-01 DIAGNOSIS — E03.9 HYPOTHYROIDISM, UNSPECIFIED TYPE: ICD-10-CM

## 2019-01-01 DIAGNOSIS — I25.118 CORONARY ARTERY DISEASE OF NATIVE ARTERY OF NATIVE HEART WITH STABLE ANGINA PECTORIS (H): ICD-10-CM

## 2019-01-01 DIAGNOSIS — I50.9 CHRONIC CONGESTIVE HEART FAILURE, UNSPECIFIED HEART FAILURE TYPE (H): Primary | ICD-10-CM

## 2019-01-01 DIAGNOSIS — I50.32 CHRONIC DIASTOLIC CONGESTIVE HEART FAILURE (H): ICD-10-CM

## 2019-01-01 DIAGNOSIS — N18.5 ANEMIA IN STAGE 5 CHRONIC KIDNEY DISEASE, NOT ON CHRONIC DIALYSIS (H): ICD-10-CM

## 2019-01-01 DIAGNOSIS — N18.4 ANEMIA IN STAGE 4 CHRONIC KIDNEY DISEASE (H): ICD-10-CM

## 2019-01-01 DIAGNOSIS — Z79.4 TYPE 2 DIABETES MELLITUS WITH STAGE 3 CHRONIC KIDNEY DISEASE, WITH LONG-TERM CURRENT USE OF INSULIN (H): ICD-10-CM

## 2019-01-01 DIAGNOSIS — I50.9 CONGESTIVE HEART FAILURE, UNSPECIFIED HF CHRONICITY, UNSPECIFIED HEART FAILURE TYPE (H): ICD-10-CM

## 2019-01-01 DIAGNOSIS — I25.10 CARDIOVASCULAR DISEASE: ICD-10-CM

## 2019-01-01 DIAGNOSIS — I48.91 ATRIAL FIBRILLATION, UNSPECIFIED TYPE (H): Primary | ICD-10-CM

## 2019-01-01 DIAGNOSIS — E78.5 HYPERLIPIDEMIA LDL GOAL <70: ICD-10-CM

## 2019-01-01 DIAGNOSIS — R09.02 HYPOXIA: ICD-10-CM

## 2019-01-01 DIAGNOSIS — D63.1 ANEMIA IN STAGE 4 CHRONIC KIDNEY DISEASE (H): ICD-10-CM

## 2019-01-01 DIAGNOSIS — I10 ESSENTIAL HYPERTENSION, BENIGN: ICD-10-CM

## 2019-01-01 DIAGNOSIS — G62.9 NEUROPATHY: Primary | ICD-10-CM

## 2019-01-01 DIAGNOSIS — N18.5 ANEMIA IN STAGE 5 CHRONIC KIDNEY DISEASE, NOT ON CHRONIC DIALYSIS (H): Primary | ICD-10-CM

## 2019-01-01 DIAGNOSIS — I21.4 NSTEMI (NON-ST ELEVATED MYOCARDIAL INFARCTION) (H): ICD-10-CM

## 2019-01-01 DIAGNOSIS — N18.4 CKD (CHRONIC KIDNEY DISEASE) STAGE 4, GFR 15-29 ML/MIN (H): ICD-10-CM

## 2019-01-01 DIAGNOSIS — N18.5 CKD (CHRONIC KIDNEY DISEASE) STAGE 5, GFR LESS THAN 15 ML/MIN (H): ICD-10-CM

## 2019-01-01 DIAGNOSIS — N18.5 TYPE 2 DIABETES MELLITUS WITH STAGE 5 CHRONIC KIDNEY DISEASE NOT ON CHRONIC DIALYSIS, WITH LONG-TERM CURRENT USE OF INSULIN (H): ICD-10-CM

## 2019-01-01 DIAGNOSIS — J81.0 ACUTE PULMONARY EDEMA (H): ICD-10-CM

## 2019-01-01 DIAGNOSIS — Z79.4 TYPE 2 DIABETES MELLITUS WITH STAGE 5 CHRONIC KIDNEY DISEASE NOT ON CHRONIC DIALYSIS, WITH LONG-TERM CURRENT USE OF INSULIN (H): ICD-10-CM

## 2019-01-01 DIAGNOSIS — R79.89 ELEVATED TROPONIN: ICD-10-CM

## 2019-01-01 DIAGNOSIS — D63.1 ANEMIA IN STAGE 5 CHRONIC KIDNEY DISEASE, NOT ON CHRONIC DIALYSIS (H): Primary | ICD-10-CM

## 2019-01-01 DIAGNOSIS — N18.9 CHRONIC KIDNEY DISEASE, UNSPECIFIED CKD STAGE: ICD-10-CM

## 2019-01-01 DIAGNOSIS — N18.30 TYPE 2 DIABETES MELLITUS WITH STAGE 3 CHRONIC KIDNEY DISEASE, WITH LONG-TERM CURRENT USE OF INSULIN (H): ICD-10-CM

## 2019-01-01 DIAGNOSIS — I50.9 CHRONIC CONGESTIVE HEART FAILURE, UNSPECIFIED HEART FAILURE TYPE (H): ICD-10-CM

## 2019-01-01 DIAGNOSIS — M1A.9XX0 CHRONIC GOUT WITHOUT TOPHUS, UNSPECIFIED CAUSE, UNSPECIFIED SITE: ICD-10-CM

## 2019-01-01 DIAGNOSIS — N18.30 CKD (CHRONIC KIDNEY DISEASE) STAGE 3, GFR 30-59 ML/MIN (H): ICD-10-CM

## 2019-01-01 DIAGNOSIS — I48.91 ATRIAL FIBRILLATION (H): Primary | ICD-10-CM

## 2019-01-01 DIAGNOSIS — J45.30 MILD PERSISTENT ASTHMA WITHOUT COMPLICATION: ICD-10-CM

## 2019-01-01 DIAGNOSIS — B35.3 TINEA PEDIS OF RIGHT FOOT: ICD-10-CM

## 2019-01-01 DIAGNOSIS — I50.9 ACUTE ON CHRONIC CONGESTIVE HEART FAILURE (H): ICD-10-CM

## 2019-01-01 DIAGNOSIS — R60.9 EDEMA, UNSPECIFIED TYPE: ICD-10-CM

## 2019-01-01 LAB
ALBUMIN SERPL-MCNC: 2.9 G/DL (ref 3.4–5)
ALBUMIN SERPL-MCNC: 3 G/DL (ref 3.4–5)
ALBUMIN SERPL-MCNC: 3.6 G/DL (ref 3.4–5)
ALP SERPL-CCNC: 100 U/L (ref 40–150)
ALP SERPL-CCNC: 74 U/L (ref 40–150)
ALP SERPL-CCNC: 82 U/L (ref 40–150)
ALT SERPL W P-5'-P-CCNC: 18 U/L (ref 0–70)
ALT SERPL W P-5'-P-CCNC: 20 U/L (ref 0–70)
ALT SERPL W P-5'-P-CCNC: 25 U/L (ref 0–70)
ANION GAP SERPL CALCULATED.3IONS-SCNC: 13.8 MMOL/L (ref 6–17)
ANION GAP SERPL CALCULATED.3IONS-SCNC: 15.1 MMOL/L (ref 6–17)
ANION GAP SERPL CALCULATED.3IONS-SCNC: 15.1 MMOL/L (ref 6–17)
ANION GAP SERPL CALCULATED.3IONS-SCNC: 5 MMOL/L (ref 3–14)
ANION GAP SERPL CALCULATED.3IONS-SCNC: 7 MMOL/L (ref 3–14)
ANION GAP SERPL CALCULATED.3IONS-SCNC: 8 MMOL/L (ref 3–14)
ANION GAP SERPL CALCULATED.3IONS-SCNC: 9 MMOL/L (ref 3–14)
ANION GAP SERPL CALCULATED.3IONS-SCNC: 9 MMOL/L (ref 3–14)
AST SERPL W P-5'-P-CCNC: 15 U/L (ref 0–45)
AST SERPL W P-5'-P-CCNC: 16 U/L (ref 0–45)
AST SERPL W P-5'-P-CCNC: 9 U/L (ref 0–45)
BASOPHILS # BLD AUTO: 0.1 10E9/L (ref 0–0.2)
BASOPHILS NFR BLD AUTO: 0.6 %
BILIRUB SERPL-MCNC: 0.5 MG/DL (ref 0.2–1.3)
BILIRUB SERPL-MCNC: 0.6 MG/DL (ref 0.2–1.3)
BILIRUB SERPL-MCNC: 0.7 MG/DL (ref 0.2–1.3)
BUN SERPL-MCNC: 100 MG/DL (ref 7–30)
BUN SERPL-MCNC: 56 MG/DL (ref 7–30)
BUN SERPL-MCNC: 64 MG/DL (ref 7–30)
BUN SERPL-MCNC: 83 MG/DL (ref 7–30)
BUN SERPL-MCNC: 84 MG/DL (ref 7–30)
BUN SERPL-MCNC: 87 MG/DL (ref 7–30)
BUN SERPL-MCNC: 87 MG/DL (ref 7–30)
BUN SERPL-MCNC: 93 MG/DL (ref 7–30)
BUN SERPL-MCNC: 94 MG/DL (ref 7–30)
BUN SERPL-MCNC: 99 MG/DL (ref 7–30)
CALCIUM SERPL-MCNC: 10 MG/DL (ref 8.5–10.5)
CALCIUM SERPL-MCNC: 8.6 MG/DL (ref 8.5–10.1)
CALCIUM SERPL-MCNC: 8.8 MG/DL (ref 8.5–10.1)
CALCIUM SERPL-MCNC: 8.8 MG/DL (ref 8.5–10.1)
CALCIUM SERPL-MCNC: 8.9 MG/DL (ref 8.5–10.1)
CALCIUM SERPL-MCNC: 9 MG/DL (ref 8.5–10.1)
CALCIUM SERPL-MCNC: 9.1 MG/DL (ref 8.5–10.1)
CALCIUM SERPL-MCNC: 9.4 MG/DL (ref 8.5–10.1)
CALCIUM SERPL-MCNC: 9.7 MG/DL (ref 8.5–10.5)
CALCIUM SERPL-MCNC: 9.8 MG/DL (ref 8.5–10.5)
CHLORIDE SERPL-SCNC: 104 MMOL/L (ref 94–109)
CHLORIDE SERPL-SCNC: 106 MMOL/L (ref 94–109)
CHLORIDE SERPL-SCNC: 106 MMOL/L (ref 98–107)
CHLORIDE SERPL-SCNC: 107 MMOL/L (ref 94–109)
CHLORIDE SERPL-SCNC: 107 MMOL/L (ref 98–107)
CHLORIDE SERPL-SCNC: 108 MMOL/L (ref 98–107)
CHLORIDE SERPL-SCNC: 109 MMOL/L (ref 94–109)
CHLORIDE SERPL-SCNC: 111 MMOL/L (ref 94–109)
CO2 SERPL-SCNC: 23 MMOL/L (ref 23–29)
CO2 SERPL-SCNC: 24 MMOL/L (ref 20–32)
CO2 SERPL-SCNC: 24 MMOL/L (ref 23–29)
CO2 SERPL-SCNC: 25 MMOL/L (ref 20–32)
CO2 SERPL-SCNC: 26 MMOL/L (ref 23–29)
CO2 SERPL-SCNC: 27 MMOL/L (ref 20–32)
CO2 SERPL-SCNC: 28 MMOL/L (ref 20–32)
CO2 SERPL-SCNC: 29 MMOL/L (ref 20–32)
CREAT SERPL-MCNC: 3.39 MG/DL (ref 0.7–1.3)
CREAT SERPL-MCNC: 3.44 MG/DL (ref 0.7–1.3)
CREAT SERPL-MCNC: 3.91 MG/DL (ref 0.66–1.25)
CREAT SERPL-MCNC: 3.98 MG/DL (ref 0.66–1.25)
CREAT SERPL-MCNC: 4.03 MG/DL (ref 0.66–1.25)
CREAT SERPL-MCNC: 4.04 MG/DL (ref 0.66–1.25)
CREAT SERPL-MCNC: 4.05 MG/DL (ref 0.66–1.25)
CREAT SERPL-MCNC: 4.11 MG/DL (ref 0.66–1.25)
CREAT SERPL-MCNC: 4.67 MG/DL (ref 0.66–1.25)
CREAT SERPL-MCNC: 4.72 MG/DL (ref 0.7–1.3)
DIFFERENTIAL METHOD BLD: ABNORMAL
EOSINOPHIL # BLD AUTO: 0.3 10E9/L (ref 0–0.7)
EOSINOPHIL NFR BLD AUTO: 2.9 %
ERYTHROCYTE [DISTWIDTH] IN BLOOD BY AUTOMATED COUNT: 14.7 % (ref 10–15)
ERYTHROCYTE [DISTWIDTH] IN BLOOD BY AUTOMATED COUNT: 14.9 % (ref 10–15)
ERYTHROCYTE [DISTWIDTH] IN BLOOD BY AUTOMATED COUNT: 15.1 % (ref 10–15)
ERYTHROCYTE [DISTWIDTH] IN BLOOD BY AUTOMATED COUNT: 15.3 % (ref 10–15)
FERRITIN SERPL-MCNC: 182 NG/ML (ref 26–388)
GFR SERPL CREATININE-BSD FRML MDRD: 11 ML/MIN/{1.73_M2}
GFR SERPL CREATININE-BSD FRML MDRD: 12 ML/MIN/{1.73_M2}
GFR SERPL CREATININE-BSD FRML MDRD: 12 ML/MIN/{1.73_M2}
GFR SERPL CREATININE-BSD FRML MDRD: 13 ML/MIN/{1.73_M2}
GFR SERPL CREATININE-BSD FRML MDRD: 17 ML/MIN/{1.73_M2}
GFR SERPL CREATININE-BSD FRML MDRD: 17 ML/MIN/{1.73_M2}
GLUCOSE BLDC GLUCOMTR-MCNC: 111 MG/DL (ref 70–99)
GLUCOSE BLDC GLUCOMTR-MCNC: 115 MG/DL (ref 70–99)
GLUCOSE BLDC GLUCOMTR-MCNC: 135 MG/DL (ref 70–99)
GLUCOSE BLDC GLUCOMTR-MCNC: 136 MG/DL (ref 70–99)
GLUCOSE BLDC GLUCOMTR-MCNC: 145 MG/DL (ref 70–99)
GLUCOSE BLDC GLUCOMTR-MCNC: 147 MG/DL (ref 70–99)
GLUCOSE BLDC GLUCOMTR-MCNC: 147 MG/DL (ref 70–99)
GLUCOSE BLDC GLUCOMTR-MCNC: 152 MG/DL (ref 70–99)
GLUCOSE BLDC GLUCOMTR-MCNC: 153 MG/DL (ref 70–99)
GLUCOSE BLDC GLUCOMTR-MCNC: 154 MG/DL (ref 70–99)
GLUCOSE BLDC GLUCOMTR-MCNC: 158 MG/DL (ref 70–99)
GLUCOSE BLDC GLUCOMTR-MCNC: 163 MG/DL (ref 70–99)
GLUCOSE BLDC GLUCOMTR-MCNC: 166 MG/DL (ref 70–99)
GLUCOSE BLDC GLUCOMTR-MCNC: 170 MG/DL (ref 70–99)
GLUCOSE BLDC GLUCOMTR-MCNC: 177 MG/DL (ref 70–99)
GLUCOSE BLDC GLUCOMTR-MCNC: 179 MG/DL (ref 70–99)
GLUCOSE BLDC GLUCOMTR-MCNC: 183 MG/DL (ref 70–99)
GLUCOSE BLDC GLUCOMTR-MCNC: 189 MG/DL (ref 70–99)
GLUCOSE BLDC GLUCOMTR-MCNC: 192 MG/DL (ref 70–99)
GLUCOSE BLDC GLUCOMTR-MCNC: 208 MG/DL (ref 70–99)
GLUCOSE BLDC GLUCOMTR-MCNC: 212 MG/DL (ref 70–99)
GLUCOSE BLDC GLUCOMTR-MCNC: 218 MG/DL (ref 70–99)
GLUCOSE BLDC GLUCOMTR-MCNC: 223 MG/DL (ref 70–99)
GLUCOSE SERPL-MCNC: 117 MG/DL (ref 70–105)
GLUCOSE SERPL-MCNC: 128 MG/DL (ref 70–105)
GLUCOSE SERPL-MCNC: 129 MG/DL (ref 70–99)
GLUCOSE SERPL-MCNC: 132 MG/DL (ref 70–99)
GLUCOSE SERPL-MCNC: 141 MG/DL (ref 70–105)
GLUCOSE SERPL-MCNC: 144 MG/DL (ref 70–99)
GLUCOSE SERPL-MCNC: 174 MG/DL (ref 70–99)
GLUCOSE SERPL-MCNC: 180 MG/DL (ref 70–99)
GLUCOSE SERPL-MCNC: 192 MG/DL (ref 70–99)
GLUCOSE SERPL-MCNC: 192 MG/DL (ref 70–99)
HCT VFR BLD AUTO: 24.3 % (ref 40–53)
HCT VFR BLD AUTO: 25.9 % (ref 40–53)
HCT VFR BLD AUTO: 26.3 % (ref 40–53)
HCT VFR BLD AUTO: 28.1 % (ref 40–53)
HCT VFR BLD AUTO: 28.3 % (ref 40–53)
HCT VFR BLD AUTO: 29.3 % (ref 40–53)
HGB BLD-MCNC: 8 G/DL (ref 13.3–17.7)
HGB BLD-MCNC: 8.5 G/DL (ref 13.3–17.7)
HGB BLD-MCNC: 8.7 G/DL (ref 13.3–17.7)
HGB BLD-MCNC: 9.2 G/DL (ref 13.3–17.7)
HGB BLD-MCNC: 9.3 G/DL (ref 13.3–17.7)
IMM GRANULOCYTES # BLD: 0 10E9/L (ref 0–0.4)
IMM GRANULOCYTES NFR BLD: 0.1 %
INR POINT OF CARE: 1.4 (ref 0.86–1.14)
INR POINT OF CARE: 1.8 (ref 0.86–1.14)
INR POINT OF CARE: 2 (ref 0.86–1.14)
INR POINT OF CARE: 2.1 (ref 0.86–1.14)
INR POINT OF CARE: 2.3 (ref 0.86–1.14)
INR POINT OF CARE: 2.4 (ref 0.86–1.14)
INR POINT OF CARE: 3.3 (ref 0.86–1.14)
INR POINT OF CARE: 3.6 (ref 0.86–1.14)
INR POINT OF CARE: 3.7 (ref 0.86–1.14)
INR POINT OF CARE: 4.1 (ref 0.86–1.14)
INR POINT OF CARE: 4.8 (ref 0.86–1.14)
INR POINT OF CARE: 5.2 (ref 0.86–1.14)
INR PPP: 1.25 (ref 0.86–1.14)
INR PPP: 1.28 (ref 0.86–1.14)
INR PPP: 1.28 (ref 0.86–1.14)
INTERPRETATION ECG - MUSE: NORMAL
IRON SATN MFR SERPL: 15 % (ref 15–46)
IRON SATN MFR SERPL: 15 % (ref 15–46)
IRON SERPL-MCNC: 27 UG/DL (ref 35–180)
IRON SERPL-MCNC: 34 UG/DL (ref 35–180)
LMWH PPP CHRO-ACNC: 0.12 IU/ML
LMWH PPP CHRO-ACNC: 0.12 IU/ML
LMWH PPP CHRO-ACNC: 0.16 IU/ML
LYMPHOCYTES # BLD AUTO: 1 10E9/L (ref 0.8–5.3)
LYMPHOCYTES NFR BLD AUTO: 10.4 %
MAGNESIUM SERPL-MCNC: 2.2 MG/DL (ref 1.6–2.3)
MAGNESIUM SERPL-MCNC: 2.7 MG/DL (ref 1.6–2.3)
MCH RBC QN AUTO: 30.2 PG (ref 26.5–33)
MCH RBC QN AUTO: 30.3 PG (ref 26.5–33)
MCH RBC QN AUTO: 30.4 PG (ref 26.5–33)
MCH RBC QN AUTO: 30.6 PG (ref 26.5–33)
MCH RBC QN AUTO: 30.7 PG (ref 26.5–33)
MCH RBC QN AUTO: 31.2 PG (ref 26.5–33)
MCHC RBC AUTO-ENTMCNC: 31.7 G/DL (ref 31.5–36.5)
MCHC RBC AUTO-ENTMCNC: 32.8 G/DL (ref 31.5–36.5)
MCHC RBC AUTO-ENTMCNC: 32.9 G/DL (ref 31.5–36.5)
MCHC RBC AUTO-ENTMCNC: 32.9 G/DL (ref 31.5–36.5)
MCHC RBC AUTO-ENTMCNC: 33.1 G/DL (ref 31.5–36.5)
MCHC RBC AUTO-ENTMCNC: 33.1 G/DL (ref 31.5–36.5)
MCV RBC AUTO: 91 FL (ref 78–100)
MCV RBC AUTO: 92 FL (ref 78–100)
MCV RBC AUTO: 93 FL (ref 78–100)
MCV RBC AUTO: 93 FL (ref 78–100)
MCV RBC AUTO: 94 FL (ref 78–100)
MCV RBC AUTO: 95 FL (ref 78–100)
MONOCYTES # BLD AUTO: 1 10E9/L (ref 0–1.3)
MONOCYTES NFR BLD AUTO: 9.9 %
NEUTROPHILS # BLD AUTO: 7.4 10E9/L (ref 1.6–8.3)
NEUTROPHILS NFR BLD AUTO: 76.1 %
NRBC # BLD AUTO: 0 10*3/UL
NRBC BLD AUTO-RTO: 0 /100
NT-PROBNP SERPL-MCNC: ABNORMAL PG/ML (ref 0–1800)
NT-PROBNP SERPL-MCNC: ABNORMAL PG/ML (ref 0–450)
PHOSPHATE SERPL-MCNC: 3.2 MG/DL (ref 2.5–4.5)
PHOSPHATE SERPL-MCNC: 3.5 MG/DL (ref 2.5–4.5)
PLATELET # BLD AUTO: 198 10E9/L (ref 150–450)
PLATELET # BLD AUTO: 202 10E9/L (ref 150–450)
PLATELET # BLD AUTO: 223 10E9/L (ref 150–450)
PLATELET # BLD AUTO: 230 10E9/L (ref 150–450)
PLATELET # BLD AUTO: 231 10E9/L (ref 150–450)
PLATELET # BLD AUTO: 284 10E9/L (ref 150–450)
POTASSIUM SERPL-SCNC: 3 MMOL/L (ref 3.4–5.3)
POTASSIUM SERPL-SCNC: 3.1 MMOL/L (ref 3.4–5.3)
POTASSIUM SERPL-SCNC: 3.2 MMOL/L (ref 3.4–5.3)
POTASSIUM SERPL-SCNC: 3.5 MMOL/L (ref 3.4–5.3)
POTASSIUM SERPL-SCNC: 3.6 MMOL/L (ref 3.4–5.3)
POTASSIUM SERPL-SCNC: 3.7 MMOL/L (ref 3.4–5.3)
POTASSIUM SERPL-SCNC: 3.8 MMOL/L (ref 3.5–5.1)
POTASSIUM SERPL-SCNC: 3.9 MMOL/L (ref 3.4–5.3)
POTASSIUM SERPL-SCNC: 4.1 MMOL/L (ref 3.4–5.3)
POTASSIUM SERPL-SCNC: 4.1 MMOL/L (ref 3.5–5.1)
POTASSIUM SERPL-SCNC: 4.1 MMOL/L (ref 3.5–5.1)
POTASSIUM SERPL-SCNC: 4.2 MMOL/L (ref 3.4–5.3)
PROT SERPL-MCNC: 6.1 G/DL (ref 6.8–8.8)
PROT SERPL-MCNC: 6.6 G/DL (ref 6.8–8.8)
PROT SERPL-MCNC: 7.7 G/DL (ref 6.8–8.8)
RBC # BLD AUTO: 2.63 10E12/L (ref 4.4–5.9)
RBC # BLD AUTO: 2.78 10E12/L (ref 4.4–5.9)
RBC # BLD AUTO: 2.88 10E12/L (ref 4.4–5.9)
RBC # BLD AUTO: 2.98 10E12/L (ref 4.4–5.9)
RBC # BLD AUTO: 3.03 10E12/L (ref 4.4–5.9)
RBC # BLD AUTO: 3.07 10E12/L (ref 4.4–5.9)
RETICS # AUTO: 75 10E9/L (ref 25–95)
RETICS/RBC NFR AUTO: 2.5 % (ref 0.5–2)
SODIUM SERPL-SCNC: 138 MMOL/L (ref 133–144)
SODIUM SERPL-SCNC: 140 MMOL/L (ref 133–144)
SODIUM SERPL-SCNC: 140 MMOL/L (ref 133–144)
SODIUM SERPL-SCNC: 141 MMOL/L (ref 133–144)
SODIUM SERPL-SCNC: 141 MMOL/L (ref 133–144)
SODIUM SERPL-SCNC: 142 MMOL/L (ref 136–145)
SODIUM SERPL-SCNC: 143 MMOL/L (ref 133–144)
SODIUM SERPL-SCNC: 143 MMOL/L (ref 133–144)
THEOPHYLLINE SERPL-MCNC: 16.2 MG/L (ref 10–20)
TIBC SERPL-MCNC: 184 UG/DL (ref 240–430)
TIBC SERPL-MCNC: 229 UG/DL (ref 240–430)
TROPONIN I SERPL-MCNC: 0.41 UG/L (ref 0–0.04)
TROPONIN I SERPL-MCNC: 0.47 UG/L (ref 0–0.04)
TROPONIN I SERPL-MCNC: 1.04 UG/L (ref 0–0.04)
TROPONIN I SERPL-MCNC: 3.6 UG/L (ref 0–0.04)
TROPONIN I SERPL-MCNC: 4 UG/L (ref 0–0.04)
TROPONIN I SERPL-MCNC: 5.12 UG/L (ref 0–0.04)
TROPONIN I SERPL-MCNC: 5.9 UG/L (ref 0–0.04)
TSH SERPL DL<=0.005 MIU/L-ACNC: 1.15 MU/L (ref 0.4–4)
WBC # BLD AUTO: 10.5 10E9/L (ref 4–11)
WBC # BLD AUTO: 11.8 10E9/L (ref 4–11)
WBC # BLD AUTO: 8.9 10E9/L (ref 4–11)
WBC # BLD AUTO: 9.5 10E9/L (ref 4–11)
WBC # BLD AUTO: 9.7 10E9/L (ref 4–11)
WBC # BLD AUTO: 9.8 10E9/L (ref 4–11)

## 2019-01-01 PROCEDURE — 99207 ZZC NO CHARGE NURSE ONLY: CPT

## 2019-01-01 PROCEDURE — 84132 ASSAY OF SERUM POTASSIUM: CPT | Performed by: PHYSICIAN ASSISTANT

## 2019-01-01 PROCEDURE — 25000132 ZZH RX MED GY IP 250 OP 250 PS 637: Performed by: INTERNAL MEDICINE

## 2019-01-01 PROCEDURE — 99223 1ST HOSP IP/OBS HIGH 75: CPT | Mod: 25 | Performed by: INTERNAL MEDICINE

## 2019-01-01 PROCEDURE — 93010 ELECTROCARDIOGRAM REPORT: CPT | Performed by: INTERNAL MEDICINE

## 2019-01-01 PROCEDURE — 99214 OFFICE O/P EST MOD 30 MIN: CPT | Performed by: PHYSICIAN ASSISTANT

## 2019-01-01 PROCEDURE — 97530 THERAPEUTIC ACTIVITIES: CPT | Mod: GP | Performed by: PHYSICAL THERAPIST

## 2019-01-01 PROCEDURE — 85027 COMPLETE CBC AUTOMATED: CPT | Performed by: INTERNAL MEDICINE

## 2019-01-01 PROCEDURE — 99207 ZZC MOONLIGHTING INDICATOR: CPT | Performed by: INTERNAL MEDICINE

## 2019-01-01 PROCEDURE — 83735 ASSAY OF MAGNESIUM: CPT | Performed by: INTERNAL MEDICINE

## 2019-01-01 PROCEDURE — 21000001 ZZH R&B HEART CARE

## 2019-01-01 PROCEDURE — 80048 BASIC METABOLIC PNL TOTAL CA: CPT | Performed by: INTERNAL MEDICINE

## 2019-01-01 PROCEDURE — 36415 COLL VENOUS BLD VENIPUNCTURE: CPT | Performed by: INTERNAL MEDICINE

## 2019-01-01 PROCEDURE — 12000000 ZZH R&B MED SURG/OB

## 2019-01-01 PROCEDURE — 83735 ASSAY OF MAGNESIUM: CPT | Performed by: EMERGENCY MEDICINE

## 2019-01-01 PROCEDURE — 82728 ASSAY OF FERRITIN: CPT | Performed by: INTERNAL MEDICINE

## 2019-01-01 PROCEDURE — 36415 COLL VENOUS BLD VENIPUNCTURE: CPT | Performed by: NURSE PRACTITIONER

## 2019-01-01 PROCEDURE — 25000132 ZZH RX MED GY IP 250 OP 250 PS 637

## 2019-01-01 PROCEDURE — 99223 1ST HOSP IP/OBS HIGH 75: CPT | Mod: AI | Performed by: INTERNAL MEDICINE

## 2019-01-01 PROCEDURE — 83550 IRON BINDING TEST: CPT | Performed by: INTERNAL MEDICINE

## 2019-01-01 PROCEDURE — 25000128 H RX IP 250 OP 636

## 2019-01-01 PROCEDURE — 36416 COLLJ CAPILLARY BLOOD SPEC: CPT

## 2019-01-01 PROCEDURE — 85610 PROTHROMBIN TIME: CPT | Mod: QW

## 2019-01-01 PROCEDURE — 25000131 ZZH RX MED GY IP 250 OP 636 PS 637: Performed by: INTERNAL MEDICINE

## 2019-01-01 PROCEDURE — 93270 REMOTE 30 DAY ECG REV/REPORT: CPT

## 2019-01-01 PROCEDURE — 80048 BASIC METABOLIC PNL TOTAL CA: CPT | Performed by: NURSE PRACTITIONER

## 2019-01-01 PROCEDURE — 25000132 ZZH RX MED GY IP 250 OP 250 PS 637: Performed by: PHYSICIAN ASSISTANT

## 2019-01-01 PROCEDURE — 84484 ASSAY OF TROPONIN QUANT: CPT | Performed by: INTERNAL MEDICINE

## 2019-01-01 PROCEDURE — 80053 COMPREHEN METABOLIC PANEL: CPT | Performed by: INTERNAL MEDICINE

## 2019-01-01 PROCEDURE — 83880 ASSAY OF NATRIURETIC PEPTIDE: CPT | Performed by: PHYSICIAN ASSISTANT

## 2019-01-01 PROCEDURE — 97161 PT EVAL LOW COMPLEX 20 MIN: CPT | Mod: GP | Performed by: PHYSICAL THERAPIST

## 2019-01-01 PROCEDURE — 99233 SBSQ HOSP IP/OBS HIGH 50: CPT | Performed by: PHYSICIAN ASSISTANT

## 2019-01-01 PROCEDURE — 99232 SBSQ HOSP IP/OBS MODERATE 35: CPT | Performed by: INTERNAL MEDICINE

## 2019-01-01 PROCEDURE — 83880 ASSAY OF NATRIURETIC PEPTIDE: CPT | Performed by: EMERGENCY MEDICINE

## 2019-01-01 PROCEDURE — 99207 ZZC APP CREDIT; MD BILLING SHARED VISIT: CPT | Performed by: PHYSICIAN ASSISTANT

## 2019-01-01 PROCEDURE — 99232 SBSQ HOSP IP/OBS MODERATE 35: CPT | Performed by: NURSE PRACTITIONER

## 2019-01-01 PROCEDURE — 25500064 ZZH RX 255 OP 636: Performed by: INTERNAL MEDICINE

## 2019-01-01 PROCEDURE — 25000132 ZZH RX MED GY IP 250 OP 250 PS 637: Performed by: HOSPITALIST

## 2019-01-01 PROCEDURE — 85610 PROTHROMBIN TIME: CPT | Performed by: INTERNAL MEDICINE

## 2019-01-01 PROCEDURE — 36415 COLL VENOUS BLD VENIPUNCTURE: CPT | Performed by: PHYSICIAN ASSISTANT

## 2019-01-01 PROCEDURE — 99214 OFFICE O/P EST MOD 30 MIN: CPT | Performed by: INTERNAL MEDICINE

## 2019-01-01 PROCEDURE — 99239 HOSP IP/OBS DSCHRG MGMT >30: CPT | Performed by: INTERNAL MEDICINE

## 2019-01-01 PROCEDURE — 93005 ELECTROCARDIOGRAM TRACING: CPT

## 2019-01-01 PROCEDURE — 84484 ASSAY OF TROPONIN QUANT: CPT | Performed by: EMERGENCY MEDICINE

## 2019-01-01 PROCEDURE — 85610 PROTHROMBIN TIME: CPT | Performed by: NURSE PRACTITIONER

## 2019-01-01 PROCEDURE — 85520 HEPARIN ASSAY: CPT | Performed by: INTERNAL MEDICINE

## 2019-01-01 PROCEDURE — 85018 HEMOGLOBIN: CPT | Performed by: PHYSICIAN ASSISTANT

## 2019-01-01 PROCEDURE — 99207 C PAF COMPLETED  NO CHARGE: CPT | Mod: 25 | Performed by: INTERNAL MEDICINE

## 2019-01-01 PROCEDURE — 00000146 ZZHCL STATISTIC GLUCOSE BY METER IP

## 2019-01-01 PROCEDURE — 84100 ASSAY OF PHOSPHORUS: CPT | Performed by: EMERGENCY MEDICINE

## 2019-01-01 PROCEDURE — 84443 ASSAY THYROID STIM HORMONE: CPT | Performed by: INTERNAL MEDICINE

## 2019-01-01 PROCEDURE — 94640 AIRWAY INHALATION TREATMENT: CPT

## 2019-01-01 PROCEDURE — 25800030 ZZH RX IP 258 OP 636: Performed by: INTERNAL MEDICINE

## 2019-01-01 PROCEDURE — 83550 IRON BINDING TEST: CPT | Performed by: PHYSICIAN ASSISTANT

## 2019-01-01 PROCEDURE — 83540 ASSAY OF IRON: CPT | Performed by: INTERNAL MEDICINE

## 2019-01-01 PROCEDURE — 83540 ASSAY OF IRON: CPT | Performed by: PHYSICIAN ASSISTANT

## 2019-01-01 PROCEDURE — 25000125 ZZHC RX 250: Performed by: EMERGENCY MEDICINE

## 2019-01-01 PROCEDURE — 25000125 ZZHC RX 250: Performed by: INTERNAL MEDICINE

## 2019-01-01 PROCEDURE — 80198 ASSAY OF THEOPHYLLINE: CPT | Performed by: INTERNAL MEDICINE

## 2019-01-01 PROCEDURE — 97116 GAIT TRAINING THERAPY: CPT | Mod: GP | Performed by: PHYSICAL THERAPIST

## 2019-01-01 PROCEDURE — 25000128 H RX IP 250 OP 636: Performed by: INTERNAL MEDICINE

## 2019-01-01 PROCEDURE — 84100 ASSAY OF PHOSPHORUS: CPT | Performed by: PHYSICIAN ASSISTANT

## 2019-01-01 PROCEDURE — 99233 SBSQ HOSP IP/OBS HIGH 50: CPT | Performed by: INTERNAL MEDICINE

## 2019-01-01 PROCEDURE — 80053 COMPREHEN METABOLIC PANEL: CPT | Performed by: EMERGENCY MEDICINE

## 2019-01-01 PROCEDURE — 40000264 ECHOCARDIOGRAM COMPLETE

## 2019-01-01 PROCEDURE — 85018 HEMOGLOBIN: CPT | Performed by: NURSE PRACTITIONER

## 2019-01-01 PROCEDURE — 85045 AUTOMATED RETICULOCYTE COUNT: CPT | Performed by: INTERNAL MEDICINE

## 2019-01-01 PROCEDURE — 99232 SBSQ HOSP IP/OBS MODERATE 35: CPT | Performed by: PHYSICIAN ASSISTANT

## 2019-01-01 PROCEDURE — 71046 X-RAY EXAM CHEST 2 VIEWS: CPT

## 2019-01-01 PROCEDURE — 25000132 ZZH RX MED GY IP 250 OP 250 PS 637: Performed by: NURSE PRACTITIONER

## 2019-01-01 PROCEDURE — 25000128 H RX IP 250 OP 636: Performed by: EMERGENCY MEDICINE

## 2019-01-01 PROCEDURE — 84100 ASSAY OF PHOSPHORUS: CPT | Performed by: INTERNAL MEDICINE

## 2019-01-01 PROCEDURE — 99285 EMERGENCY DEPT VISIT HI MDM: CPT | Mod: 25

## 2019-01-01 PROCEDURE — 96374 THER/PROPH/DIAG INJ IV PUSH: CPT

## 2019-01-01 PROCEDURE — 85025 COMPLETE CBC W/AUTO DIFF WBC: CPT | Performed by: INTERNAL MEDICINE

## 2019-01-01 PROCEDURE — 25000132 ZZH RX MED GY IP 250 OP 250 PS 637: Performed by: EMERGENCY MEDICINE

## 2019-01-01 PROCEDURE — 40000275 ZZH STATISTIC RCP TIME EA 10 MIN

## 2019-01-01 PROCEDURE — 93306 TTE W/DOPPLER COMPLETE: CPT | Mod: 26 | Performed by: INTERNAL MEDICINE

## 2019-01-01 RX ORDER — METOPROLOL SUCCINATE 50 MG/1
50 TABLET, EXTENDED RELEASE ORAL
Qty: 180 TABLET | Refills: 0 | Status: CANCELLED | OUTPATIENT
Start: 2019-01-01

## 2019-01-01 RX ORDER — METOPROLOL TARTRATE 25 MG/1
25 TABLET, FILM COATED ORAL 2 TIMES DAILY
Status: DISCONTINUED | OUTPATIENT
Start: 2019-01-01 | End: 2019-01-01

## 2019-01-01 RX ORDER — LANCETS
EACH MISCELLANEOUS
Qty: 300 EACH | Refills: 3 | Status: SHIPPED | OUTPATIENT
Start: 2019-01-01

## 2019-01-01 RX ORDER — METOPROLOL SUCCINATE 25 MG/1
25 TABLET, EXTENDED RELEASE ORAL
Qty: 180 TABLET | Refills: 0 | Status: CANCELLED | OUTPATIENT
Start: 2019-01-01

## 2019-01-01 RX ORDER — BISACODYL 10 MG
10 SUPPOSITORY, RECTAL RECTAL DAILY PRN
Status: DISCONTINUED | OUTPATIENT
Start: 2019-01-01 | End: 2019-01-01 | Stop reason: HOSPADM

## 2019-01-01 RX ORDER — LEVOTHYROXINE SODIUM 75 UG/1
TABLET ORAL
Qty: 30 TABLET | Refills: 8 | Status: ON HOLD | OUTPATIENT
Start: 2019-01-01 | End: 2020-01-01

## 2019-01-01 RX ORDER — DILTIAZEM HYDROCHLORIDE 30 MG/1
30 TABLET, FILM COATED ORAL EVERY 6 HOURS SCHEDULED
Status: COMPLETED | OUTPATIENT
Start: 2019-01-01 | End: 2019-01-01

## 2019-01-01 RX ORDER — HYDRALAZINE HYDROCHLORIDE 25 MG/1
25 TABLET, FILM COATED ORAL 3 TIMES DAILY
COMMUNITY
Start: 2019-01-01 | End: 2019-01-01 | Stop reason: DRUGHIGH

## 2019-01-01 RX ORDER — CLOPIDOGREL BISULFATE 75 MG/1
TABLET ORAL
Qty: 90 TABLET | Refills: 3 | Status: SHIPPED | OUTPATIENT
Start: 2019-01-01

## 2019-01-01 RX ORDER — ACETAMINOPHEN 650 MG/1
650 SUPPOSITORY RECTAL EVERY 4 HOURS PRN
Status: DISCONTINUED | OUTPATIENT
Start: 2019-01-01 | End: 2019-01-01 | Stop reason: HOSPADM

## 2019-01-01 RX ORDER — TORSEMIDE 10 MG/1
TABLET ORAL
Qty: 90 TABLET | Refills: 0 | Status: SHIPPED | OUTPATIENT
Start: 2019-01-01 | End: 2019-01-01

## 2019-01-01 RX ORDER — DIPHENHYDRAMINE HYDROCHLORIDE 50 MG/ML
50 INJECTION INTRAMUSCULAR; INTRAVENOUS
Status: DISCONTINUED | OUTPATIENT
Start: 2019-01-01 | End: 2019-01-01 | Stop reason: CLARIF

## 2019-01-01 RX ORDER — PROCHLORPERAZINE MALEATE 5 MG
5 TABLET ORAL EVERY 6 HOURS PRN
Status: DISCONTINUED | OUTPATIENT
Start: 2019-01-01 | End: 2019-01-01 | Stop reason: HOSPADM

## 2019-01-01 RX ORDER — METHYLPREDNISOLONE SODIUM SUCCINATE 125 MG/2ML
125 INJECTION, POWDER, LYOPHILIZED, FOR SOLUTION INTRAMUSCULAR; INTRAVENOUS
Status: DISCONTINUED | OUTPATIENT
Start: 2019-01-01 | End: 2019-01-01

## 2019-01-01 RX ORDER — LEVOTHYROXINE SODIUM 75 UG/1
TABLET ORAL
Qty: 90 TABLET | Refills: 3 | Status: SHIPPED | OUTPATIENT
Start: 2019-01-01 | End: 2019-01-01

## 2019-01-01 RX ORDER — WARFARIN SODIUM 4 MG/1
4 TABLET ORAL
Status: COMPLETED | OUTPATIENT
Start: 2019-01-01 | End: 2019-01-01

## 2019-01-01 RX ORDER — TORSEMIDE 20 MG/1
20 TABLET ORAL DAILY
Qty: 30 TABLET | Refills: 0 | Status: SHIPPED | OUTPATIENT
Start: 2019-01-01 | End: 2019-01-01

## 2019-01-01 RX ORDER — FUROSEMIDE 10 MG/ML
60 INJECTION INTRAMUSCULAR; INTRAVENOUS 2 TIMES DAILY
Status: DISCONTINUED | OUTPATIENT
Start: 2019-01-01 | End: 2019-01-01

## 2019-01-01 RX ORDER — ACETAMINOPHEN 325 MG/1
650 TABLET ORAL EVERY 4 HOURS PRN
Status: DISCONTINUED | OUTPATIENT
Start: 2019-01-01 | End: 2019-01-01 | Stop reason: HOSPADM

## 2019-01-01 RX ORDER — METOPROLOL TARTRATE 50 MG
50 TABLET ORAL 2 TIMES DAILY
Status: DISCONTINUED | OUTPATIENT
Start: 2019-01-01 | End: 2019-01-01 | Stop reason: DRUGHIGH

## 2019-01-01 RX ORDER — AMOXICILLIN 250 MG
1 CAPSULE ORAL 2 TIMES DAILY PRN
Status: DISCONTINUED | OUTPATIENT
Start: 2019-01-01 | End: 2019-01-01 | Stop reason: HOSPADM

## 2019-01-01 RX ORDER — WARFARIN SODIUM 3 MG/1
6 TABLET ORAL
Status: DISCONTINUED | OUTPATIENT
Start: 2019-01-01 | End: 2019-01-01 | Stop reason: HOSPADM

## 2019-01-01 RX ORDER — LANOLIN ALCOHOL/MO/W.PET/CERES
3 CREAM (GRAM) TOPICAL
Status: DISCONTINUED | OUTPATIENT
Start: 2019-01-01 | End: 2019-01-01 | Stop reason: HOSPADM

## 2019-01-01 RX ORDER — NICOTINE POLACRILEX 4 MG
15-30 LOZENGE BUCCAL
Status: DISCONTINUED | OUTPATIENT
Start: 2019-01-01 | End: 2019-01-01 | Stop reason: HOSPADM

## 2019-01-01 RX ORDER — LEVOTHYROXINE SODIUM 75 UG/1
75 TABLET ORAL DAILY
Status: DISCONTINUED | OUTPATIENT
Start: 2019-01-01 | End: 2019-01-01 | Stop reason: HOSPADM

## 2019-01-01 RX ORDER — DEXTROSE MONOHYDRATE 25 G/50ML
25-50 INJECTION, SOLUTION INTRAVENOUS
Status: DISCONTINUED | OUTPATIENT
Start: 2019-01-01 | End: 2019-01-01 | Stop reason: HOSPADM

## 2019-01-01 RX ORDER — DILTIAZEM HYDROCHLORIDE 30 MG/1
30 TABLET, FILM COATED ORAL EVERY 6 HOURS SCHEDULED
Status: DISCONTINUED | OUTPATIENT
Start: 2019-01-01 | End: 2019-01-01

## 2019-01-01 RX ORDER — HYDRALAZINE HYDROCHLORIDE 50 MG/1
25 TABLET, FILM COATED ORAL 3 TIMES DAILY
COMMUNITY
End: 2020-01-01

## 2019-01-01 RX ORDER — ONDANSETRON 4 MG/1
4 TABLET, ORALLY DISINTEGRATING ORAL EVERY 6 HOURS PRN
Status: DISCONTINUED | OUTPATIENT
Start: 2019-01-01 | End: 2019-01-01 | Stop reason: HOSPADM

## 2019-01-01 RX ORDER — METOPROLOL SUCCINATE 25 MG/1
75 TABLET, EXTENDED RELEASE ORAL 2 TIMES DAILY
Qty: 540 TABLET | Refills: 0 | Status: CANCELLED | OUTPATIENT
Start: 2019-01-01

## 2019-01-01 RX ORDER — ONDANSETRON 2 MG/ML
4 INJECTION INTRAMUSCULAR; INTRAVENOUS EVERY 6 HOURS PRN
Status: DISCONTINUED | OUTPATIENT
Start: 2019-01-01 | End: 2019-01-01 | Stop reason: HOSPADM

## 2019-01-01 RX ORDER — HYDRALAZINE HYDROCHLORIDE 50 MG/1
50 TABLET, FILM COATED ORAL 3 TIMES DAILY
Status: DISCONTINUED | OUTPATIENT
Start: 2019-01-01 | End: 2019-01-01

## 2019-01-01 RX ORDER — ALLOPURINOL 100 MG/1
TABLET ORAL
Qty: 180 TABLET | Refills: 1 | Status: SHIPPED | OUTPATIENT
Start: 2019-01-01 | End: 2020-01-01

## 2019-01-01 RX ORDER — METOPROLOL SUCCINATE 50 MG/1
TABLET, EXTENDED RELEASE ORAL
Qty: 90 TABLET | Refills: 3 | Status: SHIPPED | OUTPATIENT
Start: 2019-01-01

## 2019-01-01 RX ORDER — CETIRIZINE HYDROCHLORIDE 10 MG/1
10 TABLET ORAL EVERY EVENING
Status: DISCONTINUED | OUTPATIENT
Start: 2019-01-01 | End: 2019-01-01 | Stop reason: HOSPADM

## 2019-01-01 RX ORDER — ALLOPURINOL 100 MG/1
100 TABLET ORAL DAILY
Status: DISCONTINUED | OUTPATIENT
Start: 2019-01-01 | End: 2019-01-01 | Stop reason: HOSPADM

## 2019-01-01 RX ORDER — HYDRALAZINE HYDROCHLORIDE 25 MG/1
25 TABLET, FILM COATED ORAL 3 TIMES DAILY
Status: DISCONTINUED | OUTPATIENT
Start: 2019-01-01 | End: 2019-01-01 | Stop reason: HOSPADM

## 2019-01-01 RX ORDER — DILTIAZEM HYDROCHLORIDE 120 MG/1
CAPSULE, EXTENDED RELEASE ORAL
Qty: 90 CAPSULE | Refills: 3 | Status: SHIPPED | OUTPATIENT
Start: 2019-01-01

## 2019-01-01 RX ORDER — POTASSIUM CHLORIDE 1500 MG/1
20 TABLET, EXTENDED RELEASE ORAL ONCE
Status: COMPLETED | OUTPATIENT
Start: 2019-01-01 | End: 2019-01-01

## 2019-01-01 RX ORDER — WARFARIN SODIUM 2 MG/1
6 TABLET ORAL DAILY
Qty: 90 TABLET | Refills: 0 | Status: SHIPPED | OUTPATIENT
Start: 2019-01-01 | End: 2019-01-01

## 2019-01-01 RX ORDER — TORSEMIDE 20 MG/1
TABLET ORAL
Qty: 30 TABLET | Refills: 0
Start: 2019-01-01 | End: 2020-01-01 | Stop reason: DRUGHIGH

## 2019-01-01 RX ORDER — NITROGLYCERIN 0.4 MG/1
0.4 TABLET SUBLINGUAL EVERY 5 MIN PRN
Status: DISCONTINUED | OUTPATIENT
Start: 2019-01-01 | End: 2019-01-01 | Stop reason: HOSPADM

## 2019-01-01 RX ORDER — ALBUTEROL SULFATE 0.83 MG/ML
2.5 SOLUTION RESPIRATORY (INHALATION)
Status: DISCONTINUED | OUTPATIENT
Start: 2019-01-01 | End: 2019-01-01 | Stop reason: HOSPADM

## 2019-01-01 RX ORDER — TORSEMIDE 20 MG/1
TABLET ORAL
Qty: 45 TABLET | Refills: 1 | Status: ON HOLD | OUTPATIENT
Start: 2019-01-01 | End: 2019-01-01

## 2019-01-01 RX ORDER — PROCHLORPERAZINE 25 MG
12.5 SUPPOSITORY, RECTAL RECTAL EVERY 12 HOURS PRN
Status: DISCONTINUED | OUTPATIENT
Start: 2019-01-01 | End: 2019-01-01 | Stop reason: HOSPADM

## 2019-01-01 RX ORDER — METOPROLOL TARTRATE 50 MG
50 TABLET ORAL 2 TIMES DAILY
Status: DISCONTINUED | OUTPATIENT
Start: 2019-01-01 | End: 2019-01-01

## 2019-01-01 RX ORDER — DILTIAZEM HYDROCHLORIDE 120 MG/1
120 CAPSULE, EXTENDED RELEASE ORAL DAILY
Qty: 30 CAPSULE | Refills: 0 | Status: SHIPPED | OUTPATIENT
Start: 2019-01-01 | End: 2019-01-01

## 2019-01-01 RX ORDER — POLYETHYLENE GLYCOL 3350 17 G/17G
17 POWDER, FOR SOLUTION ORAL DAILY PRN
Status: DISCONTINUED | OUTPATIENT
Start: 2019-01-01 | End: 2019-01-01 | Stop reason: HOSPADM

## 2019-01-01 RX ORDER — METOPROLOL SUCCINATE 25 MG/1
75 TABLET, EXTENDED RELEASE ORAL 2 TIMES DAILY
Qty: 60 TABLET | Refills: 0 | Status: SHIPPED | OUTPATIENT
Start: 2019-01-01 | End: 2019-01-01

## 2019-01-01 RX ORDER — ISOSORBIDE MONONITRATE 60 MG/1
120 TABLET, EXTENDED RELEASE ORAL DAILY
Status: DISCONTINUED | OUTPATIENT
Start: 2019-01-01 | End: 2019-01-01 | Stop reason: HOSPADM

## 2019-01-01 RX ORDER — POLYETHYLENE GLYCOL 3350 17 G/17G
17 POWDER, FOR SOLUTION ORAL DAILY
Status: DISCONTINUED | OUTPATIENT
Start: 2019-01-01 | End: 2019-01-01 | Stop reason: HOSPADM

## 2019-01-01 RX ORDER — HYDRALAZINE HYDROCHLORIDE 25 MG/1
25 TABLET, FILM COATED ORAL 3 TIMES DAILY
Qty: 90 TABLET | Refills: 0 | Status: SHIPPED | OUTPATIENT
Start: 2019-01-01 | End: 2019-01-01

## 2019-01-01 RX ORDER — DILTIAZEM HYDROCHLORIDE 120 MG/1
120 CAPSULE, EXTENDED RELEASE ORAL DAILY
Status: DISCONTINUED | OUTPATIENT
Start: 2019-01-01 | End: 2019-01-01 | Stop reason: HOSPADM

## 2019-01-01 RX ORDER — FUROSEMIDE 10 MG/ML
60 INJECTION INTRAMUSCULAR; INTRAVENOUS ONCE
Status: COMPLETED | OUTPATIENT
Start: 2019-01-01 | End: 2019-01-01

## 2019-01-01 RX ORDER — IPRATROPIUM BROMIDE AND ALBUTEROL SULFATE 2.5; .5 MG/3ML; MG/3ML
3 SOLUTION RESPIRATORY (INHALATION) ONCE
Status: COMPLETED | OUTPATIENT
Start: 2019-01-01 | End: 2019-01-01

## 2019-01-01 RX ORDER — METOPROLOL SUCCINATE 25 MG/1
TABLET, EXTENDED RELEASE ORAL
Qty: 90 TABLET | Refills: 3 | Status: SHIPPED | OUTPATIENT
Start: 2019-01-01

## 2019-01-01 RX ORDER — WARFARIN SODIUM 2 MG/1
TABLET ORAL
Qty: 120 TABLET | Refills: 3 | Status: SHIPPED | OUTPATIENT
Start: 2019-01-01

## 2019-01-01 RX ORDER — TORSEMIDE 10 MG/1
TABLET ORAL
Qty: 90 TABLET | Refills: 3 | Status: SHIPPED | OUTPATIENT
Start: 2019-01-01 | End: 2020-01-01

## 2019-01-01 RX ORDER — AMOXICILLIN 250 MG
2 CAPSULE ORAL 2 TIMES DAILY PRN
Status: DISCONTINUED | OUTPATIENT
Start: 2019-01-01 | End: 2019-01-01 | Stop reason: HOSPADM

## 2019-01-01 RX ORDER — ASPIRIN 81 MG/1
81 TABLET ORAL DAILY
Status: DISCONTINUED | OUTPATIENT
Start: 2019-01-01 | End: 2019-01-01

## 2019-01-01 RX ORDER — THEOPHYLLINE 400 MG/1
TABLET, EXTENDED RELEASE ORAL
Qty: 90 TABLET | Refills: 3 | Status: SHIPPED | OUTPATIENT
Start: 2019-01-01

## 2019-01-01 RX ORDER — FUROSEMIDE 40 MG
80 TABLET ORAL
Status: DISCONTINUED | OUTPATIENT
Start: 2019-01-01 | End: 2019-01-01

## 2019-01-01 RX ORDER — METOPROLOL SUCCINATE 25 MG/1
75 TABLET, EXTENDED RELEASE ORAL 2 TIMES DAILY
Qty: 540 TABLET | Refills: 0 | Status: SHIPPED | OUTPATIENT
Start: 2019-01-01 | End: 2019-01-01 | Stop reason: DRUGHIGH

## 2019-01-01 RX ORDER — TORSEMIDE 20 MG/1
20 TABLET ORAL DAILY
Status: DISCONTINUED | OUTPATIENT
Start: 2019-01-01 | End: 2019-01-01 | Stop reason: HOSPADM

## 2019-01-01 RX ORDER — METOPROLOL TARTRATE 25 MG/1
25 TABLET, FILM COATED ORAL 2 TIMES DAILY
Status: DISCONTINUED | OUTPATIENT
Start: 2019-01-01 | End: 2019-01-01 | Stop reason: DRUGHIGH

## 2019-01-01 RX ORDER — ALLOPURINOL 100 MG/1
100 TABLET ORAL 2 TIMES DAILY
Status: CANCELLED | OUTPATIENT
Start: 2019-01-01

## 2019-01-01 RX ORDER — NALOXONE HYDROCHLORIDE 0.4 MG/ML
.1-.4 INJECTION, SOLUTION INTRAMUSCULAR; INTRAVENOUS; SUBCUTANEOUS
Status: DISCONTINUED | OUTPATIENT
Start: 2019-01-01 | End: 2019-01-01 | Stop reason: HOSPADM

## 2019-01-01 RX ORDER — AMOXICILLIN 250 MG
1 CAPSULE ORAL 2 TIMES DAILY
Status: DISCONTINUED | OUTPATIENT
Start: 2019-01-01 | End: 2019-01-01 | Stop reason: HOSPADM

## 2019-01-01 RX ORDER — ASPIRIN 81 MG/1
324 TABLET, CHEWABLE ORAL ONCE
Status: COMPLETED | OUTPATIENT
Start: 2019-01-01 | End: 2019-01-01

## 2019-01-01 RX ORDER — POTASSIUM CHLORIDE 1500 MG/1
40 TABLET, EXTENDED RELEASE ORAL ONCE
Status: COMPLETED | OUTPATIENT
Start: 2019-01-01 | End: 2019-01-01

## 2019-01-01 RX ORDER — METOPROLOL TARTRATE 1 MG/ML
2.5 INJECTION, SOLUTION INTRAVENOUS EVERY 4 HOURS PRN
Status: DISCONTINUED | OUTPATIENT
Start: 2019-01-01 | End: 2019-01-01 | Stop reason: HOSPADM

## 2019-01-01 RX ORDER — CLOPIDOGREL BISULFATE 75 MG/1
75 TABLET ORAL DAILY
Status: DISCONTINUED | OUTPATIENT
Start: 2019-01-01 | End: 2019-01-01 | Stop reason: HOSPADM

## 2019-01-01 RX ORDER — DIPHENHYDRAMINE HCL 25 MG
25 CAPSULE ORAL ONCE
Status: COMPLETED | OUTPATIENT
Start: 2019-01-01 | End: 2019-01-01

## 2019-01-01 RX ORDER — METOPROLOL SUCCINATE 50 MG/1
TABLET, EXTENDED RELEASE ORAL
Qty: 60 TABLET | Refills: 0 | OUTPATIENT
Start: 2019-01-01

## 2019-01-01 RX ORDER — HYDRALAZINE HYDROCHLORIDE 25 MG/1
TABLET, FILM COATED ORAL
Qty: 360 TABLET | Refills: 3 | Status: SHIPPED | OUTPATIENT
Start: 2019-01-01 | End: 2019-01-01 | Stop reason: DRUGHIGH

## 2019-01-01 RX ORDER — ISOSORBIDE MONONITRATE 120 MG/1
TABLET, EXTENDED RELEASE ORAL
Qty: 90 TABLET | Refills: 3 | Status: SHIPPED | OUTPATIENT
Start: 2019-01-01 | End: 2020-01-01

## 2019-01-01 RX ORDER — LIDOCAINE 40 MG/G
CREAM TOPICAL
Status: DISCONTINUED | OUTPATIENT
Start: 2019-01-01 | End: 2019-01-01 | Stop reason: HOSPADM

## 2019-01-01 RX ADMIN — CETIRIZINE HYDROCHLORIDE 10 MG: 10 TABLET, FILM COATED ORAL at 20:36

## 2019-01-01 RX ADMIN — MELATONIN TAB 3 MG 3 MG: 3 TAB at 00:53

## 2019-01-01 RX ADMIN — FUROSEMIDE 80 MG: 40 TABLET ORAL at 08:36

## 2019-01-01 RX ADMIN — CETIRIZINE HYDROCHLORIDE 10 MG: 10 TABLET, FILM COATED ORAL at 20:22

## 2019-01-01 RX ADMIN — ALLOPURINOL 100 MG: 100 TABLET ORAL at 08:41

## 2019-01-01 RX ADMIN — FUROSEMIDE 10 MG/HR: 10 INJECTION, SOLUTION INTRAVENOUS at 09:33

## 2019-01-01 RX ADMIN — METOPROLOL TARTRATE 25 MG: 25 TABLET ORAL at 09:14

## 2019-01-01 RX ADMIN — HYDRALAZINE HYDROCHLORIDE 50 MG: 50 TABLET ORAL at 23:47

## 2019-01-01 RX ADMIN — DILTIAZEM HYDROCHLORIDE 30 MG: 30 TABLET, FILM COATED ORAL at 21:30

## 2019-01-01 RX ADMIN — POTASSIUM CHLORIDE 40 MEQ: 1500 TABLET, EXTENDED RELEASE ORAL at 06:42

## 2019-01-01 RX ADMIN — CETIRIZINE HYDROCHLORIDE 10 MG: 10 TABLET, FILM COATED ORAL at 00:04

## 2019-01-01 RX ADMIN — INSULIN ASPART 1 UNITS: 100 INJECTION, SOLUTION INTRAVENOUS; SUBCUTANEOUS at 09:36

## 2019-01-01 RX ADMIN — ISOSORBIDE MONONITRATE 120 MG: 60 TABLET, EXTENDED RELEASE ORAL at 08:37

## 2019-01-01 RX ADMIN — CLOPIDOGREL BISULFATE 75 MG: 75 TABLET ORAL at 08:41

## 2019-01-01 RX ADMIN — THEOPHYLLINE ANHYDROUS 400 MG: 200 CAPSULE, EXTENDED RELEASE ORAL at 08:36

## 2019-01-01 RX ADMIN — LEVOTHYROXINE SODIUM 75 MCG: 75 TABLET ORAL at 08:42

## 2019-01-01 RX ADMIN — HYDRALAZINE HYDROCHLORIDE 25 MG: 25 TABLET ORAL at 08:40

## 2019-01-01 RX ADMIN — POLYETHYLENE GLYCOL 3350 17 G: 17 POWDER, FOR SOLUTION ORAL at 10:58

## 2019-01-01 RX ADMIN — ALBUTEROL SULFATE 2.5 MG: 2.5 SOLUTION RESPIRATORY (INHALATION) at 16:00

## 2019-01-01 RX ADMIN — SENNOSIDES AND DOCUSATE SODIUM 1 TABLET: 8.6; 5 TABLET ORAL at 10:57

## 2019-01-01 RX ADMIN — CLOPIDOGREL BISULFATE 75 MG: 75 TABLET ORAL at 08:36

## 2019-01-01 RX ADMIN — METOPROLOL SUCCINATE 75 MG: 50 TABLET, EXTENDED RELEASE ORAL at 09:32

## 2019-01-01 RX ADMIN — CETIRIZINE HYDROCHLORIDE 10 MG: 10 TABLET, FILM COATED ORAL at 23:47

## 2019-01-01 RX ADMIN — SENNOSIDES AND DOCUSATE SODIUM 1 TABLET: 8.6; 5 TABLET ORAL at 08:41

## 2019-01-01 RX ADMIN — POLYETHYLENE GLYCOL 3350 17 G: 17 POWDER, FOR SOLUTION ORAL at 08:40

## 2019-01-01 RX ADMIN — INSULIN ASPART 1 UNITS: 100 INJECTION, SOLUTION INTRAVENOUS; SUBCUTANEOUS at 17:32

## 2019-01-01 RX ADMIN — METOPROLOL TARTRATE 25 MG: 25 TABLET ORAL at 20:36

## 2019-01-01 RX ADMIN — POTASSIUM CHLORIDE 20 MEQ: 1500 TABLET, EXTENDED RELEASE ORAL at 20:35

## 2019-01-01 RX ADMIN — Medication 2200 UNITS: at 19:34

## 2019-01-01 RX ADMIN — CLOPIDOGREL BISULFATE 75 MG: 75 TABLET ORAL at 08:42

## 2019-01-01 RX ADMIN — TORSEMIDE 20 MG: 20 TABLET ORAL at 09:33

## 2019-01-01 RX ADMIN — NITROGLYCERIN 0.4 MG: 0.4 TABLET SUBLINGUAL at 19:41

## 2019-01-01 RX ADMIN — WARFARIN SODIUM 4 MG: 4 TABLET ORAL at 17:29

## 2019-01-01 RX ADMIN — IRON SUCROSE 100 MG: 20 INJECTION, SOLUTION INTRAVENOUS at 15:20

## 2019-01-01 RX ADMIN — SENNOSIDES AND DOCUSATE SODIUM 1 TABLET: 8.6; 5 TABLET ORAL at 20:24

## 2019-01-01 RX ADMIN — ALLOPURINOL 100 MG: 100 TABLET ORAL at 09:33

## 2019-01-01 RX ADMIN — FUROSEMIDE 10 MG/HR: 10 INJECTION, SOLUTION INTRAVENOUS at 06:25

## 2019-01-01 RX ADMIN — INSULIN ASPART 1 UNITS: 100 INJECTION, SOLUTION INTRAVENOUS; SUBCUTANEOUS at 12:45

## 2019-01-01 RX ADMIN — CLOPIDOGREL BISULFATE 75 MG: 75 TABLET ORAL at 09:32

## 2019-01-01 RX ADMIN — LEVOTHYROXINE SODIUM 75 MCG: 75 TABLET ORAL at 09:32

## 2019-01-01 RX ADMIN — ASPIRIN 81 MG 324 MG: 81 TABLET ORAL at 21:13

## 2019-01-01 RX ADMIN — INSULIN ASPART 1 UNITS: 100 INJECTION, SOLUTION INTRAVENOUS; SUBCUTANEOUS at 08:39

## 2019-01-01 RX ADMIN — IPRATROPIUM BROMIDE AND ALBUTEROL SULFATE 3 ML: .5; 3 SOLUTION RESPIRATORY (INHALATION) at 20:48

## 2019-01-01 RX ADMIN — HYDRALAZINE HYDROCHLORIDE 25 MG: 25 TABLET ORAL at 17:40

## 2019-01-01 RX ADMIN — INSULIN ASPART 1 UNITS: 100 INJECTION, SOLUTION INTRAVENOUS; SUBCUTANEOUS at 17:41

## 2019-01-01 RX ADMIN — MELATONIN TAB 3 MG 3 MG: 3 TAB at 23:43

## 2019-01-01 RX ADMIN — INSULIN GLARGINE 10 UNITS: 100 INJECTION, SOLUTION SUBCUTANEOUS at 22:01

## 2019-01-01 RX ADMIN — INSULIN ASPART 1 UNITS: 100 INJECTION, SOLUTION INTRAVENOUS; SUBCUTANEOUS at 12:35

## 2019-01-01 RX ADMIN — INSULIN GLARGINE 10 UNITS: 100 INJECTION, SOLUTION SUBCUTANEOUS at 21:38

## 2019-01-01 RX ADMIN — CLOPIDOGREL BISULFATE 75 MG: 75 TABLET ORAL at 08:38

## 2019-01-01 RX ADMIN — ASPIRIN 81 MG: 81 TABLET, DELAYED RELEASE ORAL at 08:42

## 2019-01-01 RX ADMIN — METOPROLOL TARTRATE 50 MG: 50 TABLET ORAL at 08:38

## 2019-01-01 RX ADMIN — THEOPHYLLINE ANHYDROUS 400 MG: 200 CAPSULE, EXTENDED RELEASE ORAL at 13:36

## 2019-01-01 RX ADMIN — INSULIN ASPART 1 UNITS: 100 INJECTION, SOLUTION INTRAVENOUS; SUBCUTANEOUS at 08:34

## 2019-01-01 RX ADMIN — INSULIN ASPART 2 UNITS: 100 INJECTION, SOLUTION INTRAVENOUS; SUBCUTANEOUS at 12:18

## 2019-01-01 RX ADMIN — ACETAMINOPHEN 650 MG: 325 TABLET, FILM COATED ORAL at 04:13

## 2019-01-01 RX ADMIN — METOPROLOL TARTRATE 50 MG: 50 TABLET ORAL at 00:07

## 2019-01-01 RX ADMIN — METOPROLOL TARTRATE 50 MG: 50 TABLET ORAL at 08:42

## 2019-01-01 RX ADMIN — MELATONIN TAB 3 MG 3 MG: 3 TAB at 00:28

## 2019-01-01 RX ADMIN — LEVOTHYROXINE SODIUM 75 MCG: 75 TABLET ORAL at 08:37

## 2019-01-01 RX ADMIN — HYDRALAZINE HYDROCHLORIDE 25 MG: 25 TABLET ORAL at 21:36

## 2019-01-01 RX ADMIN — POLYETHYLENE GLYCOL 3350 17 G: 17 POWDER, FOR SOLUTION ORAL at 09:31

## 2019-01-01 RX ADMIN — HYDRALAZINE HYDROCHLORIDE 50 MG: 50 TABLET ORAL at 00:04

## 2019-01-01 RX ADMIN — ASPIRIN 81 MG: 81 TABLET, DELAYED RELEASE ORAL at 08:38

## 2019-01-01 RX ADMIN — DIPHENHYDRAMINE HYDROCHLORIDE 25 MG: 25 CAPSULE ORAL at 04:13

## 2019-01-01 RX ADMIN — HYDRALAZINE HYDROCHLORIDE 50 MG: 50 TABLET ORAL at 08:38

## 2019-01-01 RX ADMIN — INSULIN ASPART 1 UNITS: 100 INJECTION, SOLUTION INTRAVENOUS; SUBCUTANEOUS at 16:54

## 2019-01-01 RX ADMIN — WARFARIN SODIUM 4 MG: 4 TABLET ORAL at 17:42

## 2019-01-01 RX ADMIN — Medication 2450 UNITS: at 08:26

## 2019-01-01 RX ADMIN — METOPROLOL TARTRATE 50 MG: 50 TABLET, FILM COATED ORAL at 20:36

## 2019-01-01 RX ADMIN — LEVOTHYROXINE SODIUM 75 MCG: 75 TABLET ORAL at 08:41

## 2019-01-01 RX ADMIN — FUROSEMIDE 80 MG: 40 TABLET ORAL at 08:41

## 2019-01-01 RX ADMIN — HEPARIN SODIUM 1150 UNITS/HR: 10000 INJECTION, SOLUTION INTRAVENOUS at 04:13

## 2019-01-01 RX ADMIN — HEPARIN SODIUM 1000 UNITS/HR: 10000 INJECTION, SOLUTION INTRAVENOUS at 09:42

## 2019-01-01 RX ADMIN — INSULIN ASPART 1 UNITS: 100 INJECTION, SOLUTION INTRAVENOUS; SUBCUTANEOUS at 11:55

## 2019-01-01 RX ADMIN — HYDRALAZINE HYDROCHLORIDE 50 MG: 50 TABLET ORAL at 16:42

## 2019-01-01 RX ADMIN — ACETAMINOPHEN 650 MG: 325 TABLET, FILM COATED ORAL at 00:53

## 2019-01-01 RX ADMIN — FUROSEMIDE 80 MG: 40 TABLET ORAL at 17:29

## 2019-01-01 RX ADMIN — METOPROLOL TARTRATE 25 MG: 25 TABLET ORAL at 20:25

## 2019-01-01 RX ADMIN — METOPROLOL TARTRATE 50 MG: 50 TABLET ORAL at 23:47

## 2019-01-01 RX ADMIN — SENNOSIDES AND DOCUSATE SODIUM 1 TABLET: 8.6; 5 TABLET ORAL at 09:32

## 2019-01-01 RX ADMIN — METOPROLOL TARTRATE 50 MG: 50 TABLET, FILM COATED ORAL at 08:37

## 2019-01-01 RX ADMIN — HUMAN ALBUMIN MICROSPHERES AND PERFLUTREN 9 ML: 10; .22 INJECTION, SOLUTION INTRAVENOUS at 13:29

## 2019-01-01 RX ADMIN — ISOSORBIDE MONONITRATE 120 MG: 60 TABLET, EXTENDED RELEASE ORAL at 08:42

## 2019-01-01 RX ADMIN — ALLOPURINOL 100 MG: 100 TABLET ORAL at 08:38

## 2019-01-01 RX ADMIN — ISOSORBIDE MONONITRATE 120 MG: 60 TABLET, EXTENDED RELEASE ORAL at 08:38

## 2019-01-01 RX ADMIN — ASPIRIN 81 MG: 81 TABLET, DELAYED RELEASE ORAL at 08:36

## 2019-01-01 RX ADMIN — ALLOPURINOL 100 MG: 100 TABLET ORAL at 08:37

## 2019-01-01 RX ADMIN — THEOPHYLLINE ANHYDROUS 400 MG: 200 CAPSULE, EXTENDED RELEASE ORAL at 08:42

## 2019-01-01 RX ADMIN — INSULIN GLARGINE 10 UNITS: 100 INJECTION, SOLUTION SUBCUTANEOUS at 23:59

## 2019-01-01 RX ADMIN — FUROSEMIDE 60 MG: 10 INJECTION, SOLUTION INTRAVENOUS at 21:13

## 2019-01-01 RX ADMIN — CETIRIZINE HYDROCHLORIDE 10 MG: 10 TABLET, FILM COATED ORAL at 20:25

## 2019-01-01 RX ADMIN — THEOPHYLLINE ANHYDROUS 400 MG: 200 CAPSULE, EXTENDED RELEASE ORAL at 08:41

## 2019-01-01 RX ADMIN — DILTIAZEM HYDROCHLORIDE 30 MG: 30 TABLET, FILM COATED ORAL at 08:42

## 2019-01-01 RX ADMIN — NITROGLYCERIN 0.4 MG: 0.4 TABLET SUBLINGUAL at 16:37

## 2019-01-01 RX ADMIN — THEOPHYLLINE ANHYDROUS 400 MG: 200 CAPSULE, EXTENDED RELEASE ORAL at 09:32

## 2019-01-01 RX ADMIN — ISOSORBIDE MONONITRATE 120 MG: 60 TABLET, EXTENDED RELEASE ORAL at 09:33

## 2019-01-01 RX ADMIN — FUROSEMIDE 80 MG: 40 TABLET ORAL at 17:40

## 2019-01-01 RX ADMIN — HYDRALAZINE HYDROCHLORIDE 50 MG: 50 TABLET ORAL at 08:42

## 2019-01-01 RX ADMIN — DILTIAZEM HYDROCHLORIDE 120 MG: 120 CAPSULE, EXTENDED RELEASE ORAL at 09:32

## 2019-01-01 RX ADMIN — METOPROLOL TARTRATE 50 MG: 50 TABLET, FILM COATED ORAL at 09:13

## 2019-01-01 RX ADMIN — ALLOPURINOL 100 MG: 100 TABLET ORAL at 08:42

## 2019-01-01 RX ADMIN — LEVOTHYROXINE SODIUM 75 MCG: 75 TABLET ORAL at 08:38

## 2019-01-01 RX ADMIN — INSULIN GLARGINE 10 UNITS: 100 INJECTION, SOLUTION SUBCUTANEOUS at 21:30

## 2019-01-01 RX ADMIN — POTASSIUM CHLORIDE 20 MEQ: 1500 TABLET, EXTENDED RELEASE ORAL at 06:14

## 2019-01-01 RX ADMIN — METOPROLOL SUCCINATE 75 MG: 50 TABLET, EXTENDED RELEASE ORAL at 20:22

## 2019-01-01 RX ADMIN — DILTIAZEM HYDROCHLORIDE 30 MG: 30 TABLET, FILM COATED ORAL at 17:42

## 2019-01-01 ASSESSMENT — ACTIVITIES OF DAILY LIVING (ADL)
BATHING: 0-->INDEPENDENT
ADLS_ACUITY_SCORE: 11
ADLS_ACUITY_SCORE: 11
ADLS_ACUITY_SCORE: 12
ADLS_ACUITY_SCORE: 11
ADLS_ACUITY_SCORE: 12
ADLS_ACUITY_SCORE: 11
ADLS_ACUITY_SCORE: 12
ADLS_ACUITY_SCORE: 12
ADLS_ACUITY_SCORE: 11
RETIRED_EATING: 0-->INDEPENDENT
ADLS_ACUITY_SCORE: 11
ADLS_ACUITY_SCORE: 11
RETIRED_COMMUNICATION: 0-->UNDERSTANDS/COMMUNICATES WITHOUT DIFFICULTY
ADLS_ACUITY_SCORE: 12
ADLS_ACUITY_SCORE: 11
ADLS_ACUITY_SCORE: 12
ADLS_ACUITY_SCORE: 11
ADLS_ACUITY_SCORE: 12
ADLS_ACUITY_SCORE: 11
ADLS_ACUITY_SCORE: 11
ADLS_ACUITY_SCORE: 12
TOILETING: 0-->INDEPENDENT
ADLS_ACUITY_SCORE: 11
ADLS_ACUITY_SCORE: 12
ADLS_ACUITY_SCORE: 12
DRESS: 0-->INDEPENDENT
ADLS_ACUITY_SCORE: 12
ADLS_ACUITY_SCORE: 12
ADLS_ACUITY_SCORE: 11
SWALLOWING: 0-->SWALLOWS FOODS/LIQUIDS WITHOUT DIFFICULTY
ADLS_ACUITY_SCORE: 10

## 2019-01-01 ASSESSMENT — MIFFLIN-ST. JEOR
SCORE: 1602.98
SCORE: 1602.07
SCORE: 1581.21
SCORE: 1597.08
SCORE: 1652.87
SCORE: 1604.79
SCORE: 1633.37
SCORE: 1627.47
SCORE: 1564.88
SCORE: 1601

## 2019-01-02 RX ORDER — ALLOPURINOL 100 MG/1
TABLET ORAL
Qty: 180 TABLET | Refills: 1 | Status: SHIPPED | OUTPATIENT
Start: 2019-01-02 | End: 2019-06-11

## 2019-01-02 NOTE — TELEPHONE ENCOUNTER
Routing refill request to provider for review/approval because:  Labs out of range:  Creatinine and last uric acid done 2016

## 2019-01-13 DIAGNOSIS — L20.9 ATOPIC DERMATITIS, UNSPECIFIED TYPE: ICD-10-CM

## 2019-01-13 DIAGNOSIS — L29.9 ITCHING: ICD-10-CM

## 2019-01-14 RX ORDER — CETIRIZINE HYDROCHLORIDE 10 MG/1
TABLET ORAL
Qty: 90 TABLET | Refills: 3 | Status: SHIPPED | OUTPATIENT
Start: 2019-01-14 | End: 2020-01-01

## 2019-01-14 NOTE — TELEPHONE ENCOUNTER
"Requested Prescriptions   Pending Prescriptions Disp Refills     cetirizine (ZYRTEC) 10 MG tablet [Pharmacy Med Name: CETIRIZINE 10MG TABLETS] 30 tablet 0    Last Written Prescription Date:  1/16/2018  Last Fill Quantity: 30,  # refills: 8   Last office visit: 6/28/2018 with prescribing provider:  6/28/2018   Future Office Visit:     Sig: TAKE 1 TABLET(10 MG) BY MOUTH EVERY EVENING    Antihistamines Protocol Failed - 1/13/2019  3:33 PM       Failed - Patient is 3-64 years of age    Apply weight-based dosing for peds patients age 3 - 12 years of age.    Forward request to provider for patients under the age of 3 or over the age of 64.         Passed - Recent (12 mo) or future (30 days) visit within the authorizing provider's specialty    Patient had office visit in the last 12 months or has a visit in the next 30 days with authorizing provider or within the authorizing provider's specialty.  See \"Patient Info\" tab in inbasket, or \"Choose Columns\" in Meds & Orders section of the refill encounter.             Passed - Medication is active on med list          "

## 2019-01-25 DIAGNOSIS — N18.30 CKD (CHRONIC KIDNEY DISEASE) STAGE 3, GFR 30-59 ML/MIN (H): ICD-10-CM

## 2019-01-25 DIAGNOSIS — I25.10 CARDIOVASCULAR DISEASE: ICD-10-CM

## 2019-01-25 NOTE — TELEPHONE ENCOUNTER
"Requested Prescriptions   Pending Prescriptions Disp Refills     clopidogrel (PLAVIX) 75 MG tablet [Pharmacy Med Name: CLOPIDOGREL 75MG TABLETS]  Last Written Prescription Date:  01/16/2018  Last Fill Quantity: 90,  # refills: 03   Last Office Visit: 6/28/2018   Future Office Visit:      90 tablet 0     Sig: TAKE 1 TABLET(75 MG) BY MOUTH DAILY    Plavix Failed - 1/25/2019  5:05 PM       Failed - Normal HGB on file in past 12 months    Recent Labs   Lab Test 06/14/18  0555   HGB 9.4*              Passed - No active PPI on record unless is Protonix       Passed - Normal Platelets on file in past 12 months    Recent Labs   Lab Test 06/14/18  0555                 Passed - Recent (12 mo) or future (30 days) visit within the authorizing provider's specialty    Patient had office visit in the last 12 months or has a visit in the next 30 days with authorizing provider or within the authorizing provider's specialty.  See \"Patient Info\" tab in inbasket, or \"Choose Columns\" in Meds & Orders section of the refill encounter.             Passed - Medication is active on med list       Passed - Patient is age 18 or older          "

## 2019-01-29 RX ORDER — CLOPIDOGREL BISULFATE 75 MG/1
TABLET ORAL
Qty: 90 TABLET | Refills: 1 | Status: SHIPPED | OUTPATIENT
Start: 2019-01-29 | End: 2019-01-01

## 2019-01-29 NOTE — TELEPHONE ENCOUNTER
Routing refill request to provider for review/approval because:  Labs out of range:  Low Hemoglobin.

## 2019-02-08 ENCOUNTER — TELEPHONE (OUTPATIENT)
Dept: CARDIOLOGY | Facility: CLINIC | Age: 84
End: 2019-02-08

## 2019-02-25 NOTE — PROGRESS NOTES
SUBJECTIVE:   Oscar Terrazas is a 85 year old male who presents to clinic today for the following health issues:    Chief Complaint   Patient presents with     Hyperlipidemia     Hypertension     Diabetes     Thyroid Problem       Diabetes Follow-up    Patient is checking blood sugars: once daily.  Results are as follows:         am - 120's -140's.  152  At 11pm with rare check     Diabetic concerns: None     Symptoms of hypoglycemia (low blood sugar): none     Paresthesias (numbness or burning in feet) or sores: Yes numbness at times     Date of last diabetic eye exam: No Recent Eye Exam on File    Diabetes Management Resources    Hyperlipidemia Follow-Up      Rate your low fat/cholesterol diet?: good    Taking statin?  No    Other lipid medications/supplements?:  none    Hypertension Follow-up      Outpatient blood pressures are being checked at home.  Results are with normal limits.    Low Salt Diet: no added salt    BP Readings from Last 2 Encounters:   12/19/18 136/60   09/24/18 130/59     Hemoglobin A1C (%)   Date Value   12/17/2018 6.9 (H)   06/13/2018 5.9 (H)     LDL Cholesterol Calculated (mg/dL)   Date Value   03/14/2018 115 (H)   12/13/2017 144 (H)     Hypothyroidism Follow-up      Since last visit, patient describes the following symptoms: Weight stable, no hair loss, no skin changes, no constipation, no loose stools      Amount of exercise or physical activity: 2-3 days/week for an average of 45-60 minutes    Problems taking medications regularly: No    Medication side effects: none    Diet: low salt and low fat/cholesterol        Lab Results   Component Value Date    GLC 99 12/17/2018     Lab Results   Component Value Date    A1C 6.9 12/17/2018     Lab Results   Component Value Date    CHOL 167 03/14/2018     Lab Results   Component Value Date     03/14/2018     Lab Results   Component Value Date    HDL 32 03/14/2018     Lab Results   Component Value Date    TRIG 99 03/14/2018     Lab  Results   Component Value Date    CR 3.51 12/17/2018     Lab Results   Component Value Date    ALT 20 06/12/2018     Lab Results   Component Value Date    AST 9 06/12/2018     Lab Results   Component Value Date    MICROL 1,580 12/13/2017     Lab Results   Component Value Date    TSH 1.83 09/12/2018       Pt's past medical history, family history, habits, medications and allergies were reviewed with the patient today.  See snap shot for  HCM status. Most recent lab results reviewed with pt. Problem list and histories reviewed & adjusted, as indicated.  Additional history as below:    Denies CP, SOB, abdominal pain, polyuria, polydipsia, vision changes, extremity numbness/parasthesias or skin problems.  Bowel movement yesterday.  Rare constipation.   Did not have a flu shot this year declines at this time.  Her pain is stable with use of theophylline.  Patient brings in a paper from his insurance that says he needs to change to a different generic version of his theophylline pill.  Has not responded well to inhaler therapy in the past.  History of previous mild anemia.  Patient denies seeing blood in stools.  Due for lab recheck       Additional ROS:   Constitutional, HEENT, Cardiovascular, Pulmonary, GI and , Neuro, MSK and Psych review of systems/symptoms are otherwise negative or unchanged from previous, except as noted above.      OBJECTIVE:  /64   Pulse 60   Temp 98.3  F (36.8  C) (Oral)   Resp 16   Ht 1.829 m (6')   Wt 92.5 kg (204 lb)   SpO2 100%   BMI 27.67 kg/m     Estimated body mass index is 27.67 kg/m  as calculated from the following:    Height as of this encounter: 1.829 m (6').    Weight as of this encounter: 92.5 kg (204 lb).    Neck: no adenopathy. Thyroid normal to palpation. No bruits  Pulm: Lungs clear to auscultation   CV: Regular rates and rhythm  GI: Soft, nontender, Normal active bowel sounds, No hepatosplenomegaly or masses palpable  Ext: Peripheral pulses intact. Trace BLE  edema.  Neuro: Normal strength and tone, sensory exam grossly normal    Assessment/Plan: (See plan discussion below for further details)  1. 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  Continue current medication.  Repeat fasting labs in 1 month as ordered  - insulin NPH (HUMULIN N/NOVOLIN N VIAL) 100 UNIT/ML vial; 14 units at  suppertime  Dispense: 2 vial; Refill: 11  - EYE EXAM (SIMPLE-NONBILLABLE)  - Hemoglobin A1c; Future  - Comprehensive metabolic panel; Future  - Lipid panel reflex to direct LDL Fasting; Future  - Albumin Random Urine Quantitative with Creat Ratio; Future    2. Anemia, unspecified type  Patient denies any blood in his stools.  Labs ordered.  Future hemoglobin monitoring  - Ferritin  - Reticulocyte count  - CBC with platelets; Future    3. Mild persistent asthma without complication  Symptoms well controlled.  Will change to other generic version of this and release theophylline for formulary coverage  - theophylline (UNIPHYL) 400 MG 24 hr tablet; Take 1 tablet (400 mg) by mouth daily  Dispense: 90 tablet; Refill: 3    4. Hypothyroidism, unspecified type  Continue levothyroxine.  Repeat thyroid lab in 1 month  - TSH with free T4 reflex; Future      Plan discussion:  Continue current medication  Fasting labs in 1 month as ordered  May use occasional MiraLAX as needed if any constipation issues  See me in 6 months for follow-up or earlier depending on future lab results       Jorge Hillman MD  Internal Medicine Department  [facility]

## 2019-02-27 ENCOUNTER — OFFICE VISIT (OUTPATIENT)
Dept: INTERNAL MEDICINE | Facility: CLINIC | Age: 84
End: 2019-02-27
Payer: COMMERCIAL

## 2019-02-27 VITALS
WEIGHT: 204 LBS | HEIGHT: 72 IN | DIASTOLIC BLOOD PRESSURE: 64 MMHG | BODY MASS INDEX: 27.63 KG/M2 | HEART RATE: 60 BPM | TEMPERATURE: 98.3 F | OXYGEN SATURATION: 100 % | SYSTOLIC BLOOD PRESSURE: 126 MMHG | RESPIRATION RATE: 16 BRPM

## 2019-02-27 DIAGNOSIS — E11.22 TYPE 2 DIABETES MELLITUS WITH STAGE 3 CHRONIC KIDNEY DISEASE, WITH LONG-TERM CURRENT USE OF INSULIN (H): ICD-10-CM

## 2019-02-27 DIAGNOSIS — N18.30 TYPE 2 DIABETES MELLITUS WITH STAGE 3 CHRONIC KIDNEY DISEASE, WITH LONG-TERM CURRENT USE OF INSULIN (H): ICD-10-CM

## 2019-02-27 DIAGNOSIS — I25.10 CORONARY ARTERY DISEASE INVOLVING NATIVE CORONARY ARTERY OF NATIVE HEART, ANGINA PRESENCE UNSPECIFIED: ICD-10-CM

## 2019-02-27 DIAGNOSIS — I10 ESSENTIAL HYPERTENSION, BENIGN: ICD-10-CM

## 2019-02-27 DIAGNOSIS — N18.4 CKD (CHRONIC KIDNEY DISEASE) STAGE 4, GFR 15-29 ML/MIN (H): ICD-10-CM

## 2019-02-27 DIAGNOSIS — Z79.4 TYPE 2 DIABETES MELLITUS WITH STAGE 3 CHRONIC KIDNEY DISEASE, WITH LONG-TERM CURRENT USE OF INSULIN (H): ICD-10-CM

## 2019-02-27 DIAGNOSIS — E03.9 HYPOTHYROIDISM, UNSPECIFIED TYPE: ICD-10-CM

## 2019-02-27 DIAGNOSIS — D64.9 ANEMIA, UNSPECIFIED TYPE: Primary | ICD-10-CM

## 2019-02-27 DIAGNOSIS — J45.30 MILD PERSISTENT ASTHMA WITHOUT COMPLICATION: ICD-10-CM

## 2019-02-27 DIAGNOSIS — M1A.9XX0 CHRONIC GOUT WITHOUT TOPHUS, UNSPECIFIED CAUSE, UNSPECIFIED SITE: ICD-10-CM

## 2019-02-27 LAB
ALBUMIN SERPL-MCNC: 3.8 G/DL (ref 3.4–5)
ALP SERPL-CCNC: 78 U/L (ref 40–150)
ALT SERPL W P-5'-P-CCNC: 16 U/L (ref 0–70)
ANION GAP SERPL CALCULATED.3IONS-SCNC: 12 MMOL/L (ref 3–14)
AST SERPL W P-5'-P-CCNC: 8 U/L (ref 0–45)
BILIRUB SERPL-MCNC: 0.6 MG/DL (ref 0.2–1.3)
BUN SERPL-MCNC: 66 MG/DL (ref 7–30)
CALCIUM SERPL-MCNC: 9 MG/DL (ref 8.5–10.1)
CHLORIDE SERPL-SCNC: 111 MMOL/L (ref 94–109)
CHOLEST SERPL-MCNC: 192 MG/DL
CO2 SERPL-SCNC: 20 MMOL/L (ref 20–32)
CREAT SERPL-MCNC: 3.57 MG/DL (ref 0.66–1.25)
CREAT UR-MCNC: 57 MG/DL
ERYTHROCYTE [DISTWIDTH] IN BLOOD BY AUTOMATED COUNT: 14.9 % (ref 10–15)
FERRITIN SERPL-MCNC: 91 NG/ML (ref 26–388)
GFR SERPL CREATININE-BSD FRML MDRD: 15 ML/MIN/{1.73_M2}
GLUCOSE SERPL-MCNC: 127 MG/DL (ref 70–99)
HBA1C MFR BLD: 6.8 % (ref 0–5.6)
HCT VFR BLD AUTO: 30.5 % (ref 40–53)
HDLC SERPL-MCNC: 32 MG/DL
HGB BLD-MCNC: 9.9 G/DL (ref 13.3–17.7)
LDLC SERPL CALC-MCNC: 133 MG/DL
MCH RBC QN AUTO: 30.9 PG (ref 26.5–33)
MCHC RBC AUTO-ENTMCNC: 32.5 G/DL (ref 31.5–36.5)
MCV RBC AUTO: 95 FL (ref 78–100)
MICROALBUMIN UR-MCNC: 872 MG/L
MICROALBUMIN/CREAT UR: 1532.51 MG/G CR (ref 0–17)
NONHDLC SERPL-MCNC: 160 MG/DL
PLATELET # BLD AUTO: 198 10E9/L (ref 150–450)
POTASSIUM SERPL-SCNC: 4.4 MMOL/L (ref 3.4–5.3)
PROT SERPL-MCNC: 7.1 G/DL (ref 6.8–8.8)
PTH-INTACT SERPL-MCNC: 138 PG/ML (ref 18–80)
RBC # BLD AUTO: 3.2 10E12/L (ref 4.4–5.9)
RETICS # AUTO: 44.2 10E9/L (ref 25–95)
RETICS/RBC NFR AUTO: 1.4 % (ref 0.5–2)
SODIUM SERPL-SCNC: 143 MMOL/L (ref 133–144)
TRIGL SERPL-MCNC: 137 MG/DL
TSH SERPL DL<=0.005 MIU/L-ACNC: 0.49 MU/L (ref 0.4–4)
URATE SERPL-MCNC: 6.1 MG/DL (ref 3.5–7.2)
WBC # BLD AUTO: 9 10E9/L (ref 4–11)

## 2019-02-27 PROCEDURE — 83036 HEMOGLOBIN GLYCOSYLATED A1C: CPT | Performed by: INTERNAL MEDICINE

## 2019-02-27 PROCEDURE — 80061 LIPID PANEL: CPT | Performed by: INTERNAL MEDICINE

## 2019-02-27 PROCEDURE — 85027 COMPLETE CBC AUTOMATED: CPT | Performed by: INTERNAL MEDICINE

## 2019-02-27 PROCEDURE — 82728 ASSAY OF FERRITIN: CPT | Performed by: INTERNAL MEDICINE

## 2019-02-27 PROCEDURE — 83970 ASSAY OF PARATHORMONE: CPT | Performed by: INTERNAL MEDICINE

## 2019-02-27 PROCEDURE — 80053 COMPREHEN METABOLIC PANEL: CPT | Performed by: INTERNAL MEDICINE

## 2019-02-27 PROCEDURE — 84550 ASSAY OF BLOOD/URIC ACID: CPT | Performed by: INTERNAL MEDICINE

## 2019-02-27 PROCEDURE — 84443 ASSAY THYROID STIM HORMONE: CPT | Performed by: INTERNAL MEDICINE

## 2019-02-27 PROCEDURE — 36415 COLL VENOUS BLD VENIPUNCTURE: CPT | Performed by: INTERNAL MEDICINE

## 2019-02-27 PROCEDURE — 85045 AUTOMATED RETICULOCYTE COUNT: CPT | Performed by: INTERNAL MEDICINE

## 2019-02-27 PROCEDURE — 82043 UR ALBUMIN QUANTITATIVE: CPT | Performed by: INTERNAL MEDICINE

## 2019-02-27 PROCEDURE — 99214 OFFICE O/P EST MOD 30 MIN: CPT | Performed by: INTERNAL MEDICINE

## 2019-02-27 RX ORDER — THEOPHYLLINE 400 MG/1
400 TABLET, EXTENDED RELEASE ORAL DAILY
Qty: 90 TABLET | Refills: 3 | Status: SHIPPED | OUTPATIENT
Start: 2019-02-27 | End: 2019-01-01

## 2019-02-27 ASSESSMENT — PATIENT HEALTH QUESTIONNAIRE - PHQ9
SUM OF ALL RESPONSES TO PHQ QUESTIONS 1-9: 2
5. POOR APPETITE OR OVEREATING: NOT AT ALL

## 2019-02-27 ASSESSMENT — ANXIETY QUESTIONNAIRES
7. FEELING AFRAID AS IF SOMETHING AWFUL MIGHT HAPPEN: SEVERAL DAYS
1. FEELING NERVOUS, ANXIOUS, OR ON EDGE: SEVERAL DAYS
2. NOT BEING ABLE TO STOP OR CONTROL WORRYING: MORE THAN HALF THE DAYS
6. BECOMING EASILY ANNOYED OR IRRITABLE: MORE THAN HALF THE DAYS
GAD7 TOTAL SCORE: 9
3. WORRYING TOO MUCH ABOUT DIFFERENT THINGS: NEARLY EVERY DAY
IF YOU CHECKED OFF ANY PROBLEMS ON THIS QUESTIONNAIRE, HOW DIFFICULT HAVE THESE PROBLEMS MADE IT FOR YOU TO DO YOUR WORK, TAKE CARE OF THINGS AT HOME, OR GET ALONG WITH OTHER PEOPLE: NOT DIFFICULT AT ALL
5. BEING SO RESTLESS THAT IT IS HARD TO SIT STILL: NOT AT ALL

## 2019-02-27 ASSESSMENT — MIFFLIN-ST. JEOR: SCORE: 1648.34

## 2019-02-27 NOTE — LETTER
My Asthma Action Plan  Name: Oscar Terrazas   Date: 2/27/2019   My doctor: Jorge Hillman   My clinic: [address] [address] [phone] My Asthma Severity: mild persistent    My Control Medicine: none  My Rescue Medicine: none    Pharmacy: [facility] [address] [place] [address] [id number] AT [address]  Avoid these possible asthma triggers: smoke, upper respiratory infections, dust mites, pollens, animal dander, insects/rodents, mold, humidity, aspirin, strong odors and fumes, occupational exposure, exercise or sports, emotions, cold air and Gastric Reflux        GREEN ZONE   Good Control    I feel good    No cough or wheeze    Can work, sleep and play without asthma symptoms       Take your asthma control medicine every day.    1. If exercise triggers your asthma, take albuterol 2 puffs      15 minutes before exercise or sports, and    During exercise if you have asthma symptoms  2. Spacer to use with inhaler: no              YELLOW ZONE Getting Worse  I have ANY of these:    I do not feel good    Cough or wheeze    Chest feels tight    Wake up at night   1. Keep taking your Green Zone medications  2. Start taking your rescue medicine:    every 20 minutes for up to 1 hour. Then every 4 hours for 24-48 hours.  3. If you stay in the Yellow Zone for more than 12-24 hours, contact your doctor.  4. If you do not return to the Green Zone in 12-24 hours or you get worse, start taking your oral steroid medicine if prescribed by your provider.           RED ZONE Medical Alert - Get Help  I have ANY of these:    I feel awful    Medicine is not helping    Breathing getting harder    Trouble walking or talking    Nose opens wide to breathe       1. Take your rescue medicine NOW  2. If your provider has prescribed an oral steroid medicine, start taking it NOW  3. Call your doctor NOW  4. If you are still in the Red Zone after 20  minutes and you have not reached your doctor:    Take your rescue medicine again and    Call 911 or go to the emergency room right away    See your regular doctor within 2 weeks of an Emergency Room or Urgent Care visit for follow-up treatment.        Asthma Health Reminders:    * Meet with Asthma Educator annually, if indicated  * Flu shot each year in the fall  * Pneumonia shot    Electronically signed February 27, 2019 by: Leslee Jones                          Asthma Triggers  How To Control Things That Make Your Asthma Worse    Triggers are things that make your asthma worse.  Look at the list below to help you find your triggers and what you can do about them.  You can help prevent asthma flare-ups by staying away from your triggers.      Trigger                                                          What you can do   Cigarette Smoke  Tobacco smoke can make asthma worse. Do not allow smoking in your home, car or around you.  Be sure no one smokes at a child s day care or school.  If you smoke, ask your health care provider for ways to help you quick.  Ask family members to quit too.  Ask your health care provider for a referral to Quit plan to help you quit smoking, or call 6-237-481-PLAN.     Colds, Flu, Bronchitis  These are common triggers of asthma. Wash your hands often.  Don t touch your eyes, nose or mouth.  Get a flu shot every year.     Dust Mites  These are tiny bugs that live in cloth or carpet. They are too small to see. Wash sheets and blankets in hot water every week.   Encase pillows and mattress in dust mite proof covers.  Avoid having carpet if you can. If you have carpet, vacuum weekly.   Use a dust mask and HEPA vacuum.   Pollen and Outdoor Mold  Some people are allergic to trees, grass, or weed pollen, or molds. Try to keep your windows closed.  Limit time out doors when pollen count is high.   Ask you health care provider about taking medicine during allergy season.     Animal  Dander  Some people are allergic to skin flakes, urine or saliva from pets with fur or feathers. Keep pets with fur or feathers out of your home.    If you can t keep the pet outdoors, then keep the pet out of your bedroom.  Keep the bedroom door closed.  Keep pets off cloth furniture and away from stuffed toys.     Mice, Rats, and Cockroaches  Some people are allergic to the waste from these pets.   Cover food and garbage.  Clean up spills and food crumbs.  Store grease in the refrigerator.   Keep food out of the bedroom.   Indoor Mold  This can be a trigger if your home has high moisture Fix leaking faucets, pipes, or other sources of water.   Clean moldy surfaces.  Dehumidify basement if it is damp and smelly.   Smoke, Strong Odors, and Sprays  These can reduce air quality. Stay away from strong odors and sprays, such as perfume, powder, hair spray, paints, smoke incense, paints, cleaning products, candles and new carpet.   Exercise or Sports  Some people with asthma have this trigger. Be active!  Ask you doctor about taking medicine before sports or exercise to prevent symptoms.    Warm up for 5-10 minutes before and after sports or exercise.     Other Triggers of Asthma  Cold air:  Cover your nose and mouth with a scarf.  Sometimes laughing or crying can be a trigger.  Some medicines and food can trigger asthma.

## 2019-02-28 ASSESSMENT — ASTHMA QUESTIONNAIRES: ACT_TOTALSCORE: 25

## 2019-02-28 ASSESSMENT — ANXIETY QUESTIONNAIRES: GAD7 TOTAL SCORE: 9

## 2019-03-04 DIAGNOSIS — I50.9 CONGESTIVE HEART FAILURE, UNSPECIFIED HF CHRONICITY, UNSPECIFIED HEART FAILURE TYPE (H): ICD-10-CM

## 2019-03-05 NOTE — TELEPHONE ENCOUNTER
"Requested Prescriptions   Pending Prescriptions Disp Refills     torsemide (DEMADEX) 20 MG tablet [Pharmacy Med Name: TORSEMIDE 20MG TABLETS] 45 tablet 0     Sig: TAKE 1 TABLET BY MOUTH EVERY OTHER DAY    Diuretics (Including Combos) Protocol Failed - 3/4/2019  4:48 PM       Failed - Normal serum creatinine on file in past 12 months    Recent Labs   Lab Test 02/27/19 0818   CR 3.57*             Passed - Blood pressure under 140/90 in past 12 months    BP Readings from Last 3 Encounters:   02/27/19 126/64   12/19/18 136/60   09/24/18 130/59                Passed - Recent (12 mo) or future (30 days) visit within the authorizing provider's specialty    Patient had office visit in the last 12 months or has a visit in the next 30 days with authorizing provider or within the authorizing provider's specialty.  See \"Patient Info\" tab in inbasket, or \"Choose Columns\" in Meds & Orders section of the refill encounter.             Passed - Medication is active on med list       Passed - Patient is age 18 or older       Passed - Normal serum potassium on file in past 12 months    Recent Labs   Lab Test 02/27/19 0818   POTASSIUM 4.4                   Passed - Normal serum sodium on file in past 12 months    Recent Labs   Lab Test 02/27/19 0818                 Last Written Prescription Date:  12/05/2018  Last Fill Quantity: 45,  # refills: 0   Last office visit: 2/27/2019 with prescribing provider:  Dr Hillman   Future Office Visit:      "

## 2019-03-06 RX ORDER — TORSEMIDE 20 MG/1
TABLET ORAL
Qty: 45 TABLET | Refills: 1 | Status: SHIPPED | OUTPATIENT
Start: 2019-03-06 | End: 2019-01-01

## 2019-03-11 ENCOUNTER — OFFICE VISIT (OUTPATIENT)
Dept: CARDIOLOGY | Facility: CLINIC | Age: 84
End: 2019-03-11
Payer: COMMERCIAL

## 2019-03-11 VITALS
HEART RATE: 64 BPM | DIASTOLIC BLOOD PRESSURE: 70 MMHG | BODY MASS INDEX: 27.74 KG/M2 | WEIGHT: 204.8 LBS | SYSTOLIC BLOOD PRESSURE: 140 MMHG | HEIGHT: 72 IN

## 2019-03-11 DIAGNOSIS — I10 ESSENTIAL HYPERTENSION, BENIGN: ICD-10-CM

## 2019-03-11 DIAGNOSIS — E78.5 HYPERLIPIDEMIA LDL GOAL <70: ICD-10-CM

## 2019-03-11 DIAGNOSIS — I50.32 CHRONIC DIASTOLIC CONGESTIVE HEART FAILURE (H): ICD-10-CM

## 2019-03-11 DIAGNOSIS — I25.118 CORONARY ARTERY DISEASE OF NATIVE ARTERY OF NATIVE HEART WITH STABLE ANGINA PECTORIS (H): Primary | ICD-10-CM

## 2019-03-11 DIAGNOSIS — N18.4 CKD (CHRONIC KIDNEY DISEASE) STAGE 4, GFR 15-29 ML/MIN (H): ICD-10-CM

## 2019-03-11 PROCEDURE — 99214 OFFICE O/P EST MOD 30 MIN: CPT | Performed by: INTERNAL MEDICINE

## 2019-03-11 RX ORDER — HYDRALAZINE HYDROCHLORIDE 50 MG/1
50 TABLET, FILM COATED ORAL 3 TIMES DAILY
Qty: 280 TABLET | Refills: 3 | Status: ON HOLD | OUTPATIENT
Start: 2019-03-11 | End: 2019-01-01

## 2019-03-11 RX ORDER — TORSEMIDE 10 MG/1
10 TABLET ORAL EVERY OTHER DAY
Status: ON HOLD | COMMUNITY
End: 2019-01-01

## 2019-03-11 ASSESSMENT — MIFFLIN-ST. JEOR: SCORE: 1651.97

## 2019-03-11 NOTE — PROGRESS NOTES
HPI and Plan:   This 85-year-old gentleman is seen, accompanied by his daughter, in followup of his history of a non-ST elevation MI complicated by acute diastolic heart failure episode in March 2018, with background history of coronary disease, CABG, subsequent multivessel stenting, stage IV chronic renal insufficiency, and resistant hypertension .   He was hospitalized in [place] of the 18 after he developed acute profound dyspnea which improved with oxygen.  He is found to have a mild troponin elevation and high N- proBNP.  Because of his very significant renal dysfunction we elected not to do angiography and treat medically.  He was diuresed about 9 pounds.   Since returning home his weight himself daily and his weights have been stable at home without significant change at around 199 pounds.    He has had no recurrence of the acute dyspnea or new symptoms.  He continues to bowl and walking around the house and is not having significant dyspnea on exertion.  If he walks further distances than then from the parking lot to the bowling alley or locks a little faster he does get some chest tightness which always goes away quickly with rest.  This pattern is not changed in the last 9 months.  He is not having rest or nocturnal episodes of chest tightness or other chest discomfort or with mild activity.  For many years his LDL is well controlled on statin therapy but in 2017 he was developing severe stiffness and pain particular in his lower extremities to the point where he can barely get up and walk in the morning and often his daughter would have to massage his legs and use a heating pad and after a few hours he could get around.  This resolved when he stopped his statin.  He slowly went back on a smaller dose, currently 10 mg twice a week, but has not been able to titrate up further because of those symptoms of myalgias.   He denies orthopnea or PND.  He is wearing his pressure stockings every day and has only trace  edema.   He has no palpitations, dizziness, syncope,  and denies claudication symptoms.    His chronic venous insufficiency, left greater than right (previous saphenous vein harvesting from the left) is currently pretty well controlled with diuretics and pressure stockings.  He is aware of the issue with sodium restriction and states he is following this and his daughter confirms this.      This is a gentleman with a CABG in 1996. In 2007, he required multivessel drug-eluting stenting for recurrent angina. In 2012, he presented with an acute coronary syndrome after his Plavix was stopped for a dental procedure. At that time, his LAD and left main coronary stents were widely patent. There was a lesion at one end of the stent in the vein graft to the OM that was severe, and possibly acute thrombus, and that was successfully stented with a drug-eluting stent. He has been maintained on Plavix since then.      He also has diabetes mellitus and severe renal insufficiency.  For this year his GFR has oscillated between 15 and 18 mL's per minute and creatinine usually around 3.4.  He continues to state he would not consider dialysis.  He saw his wife with her away previously on dialysis.    He takes his blood pressure with a wrist cuff fairly regularly and states most of them are in the high 130 systolic but there are a fair number in the 146 systolic.  Later in his clinic visit today he remained in the low 180 systolic.  He states he is taking his hydralazine 3 times a day.  He has been intolerant to ACE/ARB due to hyperkalemia.  Ejection fraction was normal in March 2018      Exam -full exam below.  In summary:   Blood pressure-140/70 Pulse 64.  Weight on our scales 204 pounds, 199 pounds at home.   Cardiac-, regular rhythm, a soft systolic ejection murmur without gallop.  No JVD or HJR  Lungs-clear  Extremities-trace focal right lower extremity edema.  There is 1+ below the ankle on the left.  Pressure stockings are in  place      Impression/plan  1-coronary artery disease with non-STEMI possibly related to acute diastolic heart failure episode 3/2018, global normal ejection fraction at that time.   No recurrence of those episodes/symptoms after increasing isosorbide and further diuresis.     He has stable but generally not very limiting anginal pattern at this time.  I would continue to pursue medical therapy.  I think it is likely would end up on dialysis if he had an angiogram.  I did discuss with him and his daughter that even if he had a heart attack again I would have a very high threshold for want to do emergent or urgent angiography because of the risk of dialysis and this would have to be carefully analyzed versus the risk of any acute ischemia.     2-acute diastolic heart failure episode, resolved with continued careful diuresis. - He will continue to weigh himself daily and let us know if there is a change in symptoms or weight.  When his isosorbide was increased and he was diuresed, his hydralazine was cut back.    It was increased in December because blood pressure is adequate.  I think as above pressure still inadequate and so I will increase it further, see below.     3-coronary artery disease. Status post CABG and subsequent multivessel stenting.  Ejection fraction  normal at recent echo.  Continue dual antiplatelet therapy.  Also inadequately controlled dyslipidemia.  See #4 below.     4-dyslipidemia, intolerant of statin doses that provide reasonable LDL lowering.  He is interested in trying a PCSK 9 agent and I will start the process of getting approval and coverage.     5-significant peripheral venous insufficiency of the lower extremities.   now using pressure stockings reliably with good result.  These will be continued.     6-hypertension, resistant.  I am going to increase his hydralazine to 50 mg 3 times daily at this point in time.  He will continue to monitor his blood pressures.     7-stage IV chronic  renal insufficiency, with diabetes and proteinuria.  His hemoglobin has been in the mid 9 range and if it stays there he may not need any erythropoietin replacement at this time.     I have asked him to return in 6 months.  They will let us know his blood pressure is doing with the increase in hydralazine.  I will await the results of approval for Praluent or Repatha They will also call with any issue of the blood pressure or weight gain or breathing.    Orders Placed This Encounter   Procedures     Follow-Up with Cardiac Advanced Practice Provider       Orders Placed This Encounter   Medications     torsemide (DEMADEX) 10 MG tablet     Sig: Take 10 mg by mouth every other day (days not taking 20mg)     hydrALAZINE (APRESOLINE) 50 MG tablet     Sig: Take 1 tablet (50 mg) by mouth 3 times daily     Dispense:  280 tablet     Refill:  3     alirocumab (PRALUENT) 150 MG/ML injectable pen     Sig: Inject 1 mL (150 mg) Subcutaneous every 14 days     Dispense:  6 mL     Refill:  3       Medications Discontinued During This Encounter   Medication Reason     theophylline (UNIPHYL) 400 MG 24 hr tablet Medication Reconciliation Clean Up     hydrALAZINE (APRESOLINE) 25 MG tablet Reorder         Encounter Diagnoses   Name Primary?     Coronary artery disease of native artery of native heart with stable angina pectoris (H) Yes     BENIGN HYPERTENSION      Hyperlipidemia LDL goal <70      CKD (chronic kidney disease) stage 4, GFR 15-29 ml/min (H)      Chronic diastolic congestive heart failure (H)        CURRENT MEDICATIONS:  Current Outpatient Medications   Medication Sig Dispense Refill     ACCU-CHEK COMPACT PLUS test strip CHECK BLOOD SUGAR TWICE DAILY TO THREE TIMES DAILY 204 strip 0     alirocumab (PRALUENT) 150 MG/ML injectable pen Inject 1 mL (150 mg) Subcutaneous every 14 days 6 mL 3     allopurinol (ZYLOPRIM) 100 MG tablet TAKE 2 TABLETS(200 MG) BY MOUTH DAILY 180 tablet 1     aspirin 81 MG tablet Take 1 tablet by mouth  "daily. with food       cetirizine (ZYRTEC) 10 MG tablet TAKE 1 TABLET(10 MG) BY MOUTH EVERY EVENING 90 tablet 3     clopidogrel (PLAVIX) 75 MG tablet TAKE 1 TABLET(75 MG) BY MOUTH DAILY 90 tablet 1     COD LIVER OIL PO Take 1 capsule by mouth daily        Cyanocobalamin (VITAMIN B 12 PO) Take 500 mcg by mouth 2 times daily.       diphenhydrAMINE-acetaminophen (TYLENOL PM)  MG tablet Take 1 tablet by mouth nightly as needed for sleep       hydrALAZINE (APRESOLINE) 50 MG tablet Take 1 tablet (50 mg) by mouth 3 times daily 280 tablet 3     insulin NPH (HUMULIN N/NOVOLIN N VIAL) 100 UNIT/ML vial 14 units at  suppertime 2 vial 11     insulin syringe-needle U-100 (BD INSULIN SYRINGE) 29G X 1/2\" 0.5 ML Use one syringe 2 daily or as directed. 200 each 4     Isosorbide Mononitrate  MG TB24 TAKE 1 TABLET BY MOUTH DAILY 30 tablet 9     levothyroxine (SYNTHROID/LEVOTHROID) 75 MCG tablet Take 1 tablet (75 mcg) by mouth daily 90 tablet 3     metoprolol tartrate (LOPRESSOR) 50 MG tablet Take 1 tablet (50 mg) by mouth 2 times daily 180 tablet 2     nitroGLYcerin (NITROSTAT) 0.4 MG sublingual tablet For chest pain place 1 tablet under the tongue every 5 minutes for 3 doses. If symptoms persist 5 minutes after 1st dose call 911. 25 tablet 1     pravastatin (PRAVACHOL) 20 MG tablet Take 10 mg by mouth twice a week  30 tablet 1     theophylline (UNIPHYL) 400 MG 24 hr tablet Take 1 tablet (400 mg) by mouth daily 90 tablet 3     torsemide (DEMADEX) 10 MG tablet Take 10 mg by mouth every other day (days not taking 20mg)       torsemide (DEMADEX) 20 MG tablet TAKE 1 TABLET BY MOUTH EVERY OTHER DAY 45 tablet 1     triamcinolone (KENALOG) 0.5 % cream Apply sparingly to affected area two  times daily as needed for rash. 45 g 1       ALLERGIES     Allergies   Allergen Reactions     Advair [Fluticasone-Salmeterol]      cough;  insomnia, nightmares     Amlodipine Besylate      edema       Azithromycin GI Disturbance     " Clarithromycin      gi     Hydrochlorothiazide      hctz + lisinopril-->inc creat, k+     Lisinopril      lisinopril + hctz --> inc creat, k+     Moxifloxacin Hydrochloride      clostridium diffile colitis     Penicillins      gen swelling     Pravastatin Cramps     Muscle cramps/spasm.  Stopped 2017     Vioxx      edema       PAST MEDICAL HISTORY:  Past Medical History:   Diagnosis Date     Acute myocardial infarction, unspecified site, episode of care unspecified      Allergic rhinitis, cause unspecified      Anemia 3/6/2014     CAD (coronary artery disease)     1996 CABG - LIMA to LAD, SVG to OM, 2007 Cath - KANU to Left main and ostial/prox LAD, 2012 Cath - 80-90% stenosis SVG to OM - KANU placed, EF 50%     CKD (chronic kidney disease) stage 3, GFR 30-59 ml/min (H) 3/9/2014     CTS (carpal tunnel syndrome)     bilateral     Degeneration of cervical intervertebral disc      Diaphragmatic hernia without mention of obstruction or gangrene      Esophageal reflux      Essential hypertension, benign      Hyperlipidemia LDL goal <70      Hypothyroidism      Hypothyroidism, unspecified hypothyroidism type 1/14/2016     IDIOPATHIC CYCLIC EDEMA      Mild persistent asthma      Osteoarthrosis, unspecified whether generalized or localized, unspecified site      Pneumonia, organism unspecified(486)      Proteinuria 5/17/2014     PVD (peripheral vascular disease) (H)      Sensorineural hearing loss, unspecified      Subarachnoid bleed (H)      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 disease (goal A1C<8) 10/17/2015     Unspecified disorder resulting from impaired renal function      Unspecified hereditary and idiopathic peripheral neuropathy      Venous insufficiency        PAST SURGICAL HISTORY:  Past Surgical History:   Procedure Laterality Date     C NONSPECIFIC PROCEDURE      appendectomy     C NONSPECIFIC PROCEDURE      hemorrhoids     C NONSPECIFIC PROCEDURE      cervical strain     C NONSPECIFIC PROCEDURE       rotator cuff surgery, right shoulder     C NONSPECIFIC PROCEDURE  2008    iol os     CORONARY ARTERY BYPASS  1996    LIMA to LAD, SVG to OM     HEART CATH STENT COR W/WO PTCA      lad, left main cor artery stents/drug eluting     HEART CATH STENT COR W/WO PTCA  2012    80-90% stenosis SVG to OM - KANU placed, EF 50%     NONSPECIFIC PROCEDURE      iol od       FAMILY HISTORY:  Family History   Problem Relation Age of Onset     Lung Cancer Daughter      Family History Negative Mother         broken hip     Jaundice Father      Alcohol/Drug Father      Ovarian Cancer Daughter      Family History Negative Daughter      Family History Negative Son        SOCIAL HISTORY:  Social History     Socioeconomic History     Marital status:      Spouse name: None     Number of children: None     Years of education: None     Highest education level: None   Occupational History     None   Social Needs     Financial resource strain: None     Food insecurity:     Worry: None     Inability: None     Transportation needs:     Medical: None     Non-medical: None   Tobacco Use     Smoking status: Former Smoker     Packs/day: 1.00     Years: 14.00     Pack years: 14.00     Types: Cigarettes     Start date:      Last attempt to quit: 1966     Years since quittin.2     Smokeless tobacco: Never Used   Substance and Sexual Activity     Alcohol use: No     Drug use: No     Sexual activity: No   Lifestyle     Physical activity:     Days per week: None     Minutes per session: None     Stress: None   Relationships     Social connections:     Talks on phone: None     Gets together: None     Attends [employer] service: None     Active member of club or organization: None     Attends meetings of clubs or organizations: None     Relationship status: None     Intimate partner violence:     Fear of current or ex partner: None     Emotionally abused: None     Physically abused: None     Forced sexual activity: None   Other Topics  Concern     Parent/sibling w/ CABG, MI or angioplasty before 65F 55M? Not Asked      Service Not Asked     Blood Transfusions Not Asked     Caffeine Concern No     Occupational Exposure Not Asked     Hobby Hazards Not Asked     Sleep Concern Yes     Stress Concern No     Weight Concern No     Special Diet No     Back Care Not Asked     Exercise Yes     Comment: bowling, 5 days week. yard work     Bike Helmet Not Asked     Seat Belt Not Asked     Self-Exams Not Asked   Social History Narrative     None       Review of Systems:  Skin:  Negative       Eyes:  Positive for glasses reading  ENT:  Negative      Respiratory:  Positive for dyspnea on exertion     Cardiovascular:    Positive for;chest pain;edema with exertion  Gastroenterology: Negative      Genitourinary:  Negative      Musculoskeletal:  Negative      Neurologic:  Positive for numbness or tingling of hands carpal tunnel  Psychiatric:  Negative      Heme/Lymph/Imm:  Negative      Endocrine:  Positive for thyroid disorder;diabetes      Physical Exam:  Vitals: /70   Pulse 64   Ht 1.829 m (6')   Wt 92.9 kg (204 lb 12.8 oz)   BMI 27.78 kg/m      Constitutional:  cooperative, alert and oriented, well developed, well nourished, in no acute distress        Skin:  warm and dry to the touch, no apparent skin lesions or masses noted          Head:  normocephalic, no masses or lesions        Eyes:  pupils equal and round;sclera white;EOMS intact        Lymph:      ENT:  no pallor or cyanosis        Neck:  JVP normal;carotid pulses are full and equal bilaterally        Respiratory:  normal breath sounds, clear to auscultation, normal A-P diameter, normal symmetry, normal respiratory excursion, no use of accessory muscles;healed median sternotomy scar         Cardiac: regular rhythm;normal S1 and S2;no S3 or S4;apical impulse not displaced       systolic ejection murmur;RUSB;grade 1        not assessed this visit                               right  femoral bruit (-) left femoral bruit (-)      GI:  abdomen soft;non-tender;no HSM;no bruits surgical scars      Extremities and Muscular Skeletal:    stasis pigmentation   RLE edema;trace;pitting LLE edema;pitting;1+ pressure stckings in place    Neurological:  no gross motor deficits        Psych:  affect appropriate, oriented to time, person and place        EDGARD Gil, CATHERINE  [id number]  [place] [id number]

## 2019-03-11 NOTE — LETTER
3/11/2019    Jorge Hillman MD  [address] [facility] [address]    RE: Oscar Terrazas       Dear Colleague,    I had the pleasure of seeing Oscar Terrazas in the [facility].    HPI and Plan:   This 85-year-old gentleman is seen, accompanied by his daughter, in followup of his history of a non-ST elevation MI complicated by acute diastolic heart failure episode in March 2018, with background history of coronary disease, CABG, subsequent multivessel stenting, stage IV chronic renal insufficiency, and resistant hypertension .   He was hospitalized in [place] of  the 18 after he developed acute profound dyspnea which improved with oxygen.  He is found to have a mild troponin elevation and high N- proBNP.  Because of his very significant renal dysfunction we elected not to do angiography and treat medically.  He was diuresed about 9 pounds.   Since returning home his weight himself daily and his weights have been stable at home without significant change at around 199 pounds.    He has had no recurrence of the acute dyspnea or new symptoms.  He  continues to bowl and walking around the house and is not having significant dyspnea on exertion.  If he walks further distances than then from the parking lot to the bowling alley or locks a little faster he does get some chest tightness which always goes away quickly with rest.  This pattern is not changed in the last 9 months.  He is not having rest or nocturnal episodes of chest tightness or other chest discomfort or with mild activity.  For many years his LDL is well controlled on statin therapy but in 2017 he was developing severe stiffness and pain particular in his lower extremities to the point where he can barely get up and walk in the morning and often his daughter would have to massage his legs and use a heating pad and after a few hours he could get around.  This resolved when he stopped his statin.  He slowly went back on a smaller  dose, currently 10 mg twice a week, but has not been able to titrate up further because of those symptoms of myalgias.   He denies orthopnea or PND.  He is wearing his pressure stockings every day and has only trace edema.   He has no palpitations, dizziness, syncope,  and denies claudication symptoms.    His chronic venous insufficiency, left greater than right (previous saphenous vein harvesting from the left) is currently pretty well controlled with diuretics and pressure stockings.  He is aware of the issue with sodium restriction and states he is following this and his daughter confirms this.      This is a gentleman with a CABG in 1996. In 2007, he required multivessel drug-eluting stenting for recurrent angina. In 2012, he presented with an acute coronary syndrome after his Plavix was stopped for a dental procedure. At that time, his LAD and left main coronary stents were widely patent. There was a lesion at one end of the stent in the vein graft to the OM that was severe, and possibly acute thrombus, and that was successfully stented with a drug-eluting stent. He has been maintained on Plavix since then.      He also has diabetes mellitus and severe renal insufficiency.  For this year his GFR has oscillated between 15 and 18 mL's per minute and creatinine usually around 3.4.  He continues to state he would not consider dialysis.  He saw his wife with her away previously on dialysis.    He takes his blood pressure with a wrist cuff fairly regularly and states most of them are in the high 130 systolic but there are a fair number in the 146 systolic.  Later in his clinic visit today he remained in the low 180 systolic.  He states he is taking his hydralazine 3 times a day.  He has been intolerant to ACE/ARB due to hyperkalemia.  Ejection fraction was normal in March 2018      Exam -full exam below.  In summary:   Blood pressure-140/70 Pulse 64.  Weight on our scales 204 pounds, 199 pounds at home.   Cardiac-,  regular rhythm, a soft systolic ejection murmur without gallop.  No JVD or HJR  Lungs-clear  Extremities-trace focal right lower extremity edema.  There is 1+ below the ankle on the left.  Pressure stockings are in place      Impression/plan  1-coronary artery disease with non-STEMI possibly related to acute diastolic heart failure episode 3/2018, global normal ejection fraction at that time.   No recurrence of those episodes/symptoms after increasing isosorbide and further diuresis.      He has stable but generally not very limiting anginal pattern at this time.  I would continue to pursue medical therapy.  I think it is likely would end up on dialysis if he had an angiogram.  I did discuss with him and his daughter that even if he had a heart attack again I would have a very high threshold for want to do emergent or urgent angiography because of the risk of dialysis and this would have to be carefully analyzed versus the risk of any acute ischemia.     2-acute diastolic heart failure episode, resolved with continued careful diuresis. - He will continue to weigh himself daily and let us know if there is a change in symptoms or weight.  When his isosorbide was increased and he was diuresed, his hydralazine was cut back.     It was increased in December because blood pressure is adequate.  I think as above pressure still inadequate and so I will increase it further, see below.     3-coronary artery disease. Status post CABG and subsequent multivessel stenting.  Ejection fraction  normal at recent echo.  Continue dual antiplatelet therapy.  Also inadequately controlled dyslipidemia.  See #4 below.     4-dyslipidemia, intolerant of statin doses that provide reasonable LDL lowering.  He is interested in trying a PCSK 9 agent and I will start the process of getting approval and coverage.     5-significant peripheral venous insufficiency of the lower extremities.   now using pressure stockings reliably with good result.   These will be continued.     6-hypertension, resistant.  I am going to increase his hydralazine to 50 mg 3 times daily at this point in time.  He will continue to monitor his blood pressures.     7-stage IV chronic renal insufficiency, with diabetes and proteinuria.  His hemoglobin has been in the mid 9 range and if it stays there he may not need any erythropoietin replacement at this time.     I have asked him to return in 6 months.  They will let us know his blood pressure is doing with the increase in hydralazine.  I will await the results of approval for Praluent or Repatha They will also call with any issue of the blood pressure or weight gain or breathing.    Orders Placed This Encounter   Procedures     Follow-Up with Cardiac Advanced Practice Provider       Orders Placed This Encounter   Medications     torsemide (DEMADEX) 10 MG tablet     Sig: Take 10 mg by mouth every other day (days not taking 20mg)     hydrALAZINE (APRESOLINE) 50 MG tablet     Sig: Take 1 tablet (50 mg) by mouth 3 times daily     Dispense:  280 tablet     Refill:  3     alirocumab (PRALUENT) 150 MG/ML injectable pen     Sig: Inject 1 mL (150 mg) Subcutaneous every 14 days     Dispense:  6 mL     Refill:  3       Medications Discontinued During This Encounter   Medication Reason     theophylline (UNIPHYL) 400 MG 24 hr tablet Medication Reconciliation Clean Up     hydrALAZINE (APRESOLINE) 25 MG tablet Reorder         Encounter Diagnoses   Name Primary?     Coronary artery disease of native artery of native heart with stable angina pectoris (H) Yes     BENIGN HYPERTENSION      Hyperlipidemia LDL goal <70      CKD (chronic kidney disease) stage 4, GFR 15-29 ml/min (H)      Chronic diastolic congestive heart failure (H)        CURRENT MEDICATIONS:  Current Outpatient Medications   Medication Sig Dispense Refill     ACCU-CHEK COMPACT PLUS test strip CHECK BLOOD SUGAR TWICE DAILY TO THREE TIMES DAILY 204 strip 0     alirocumab (PRALUENT) 150  "MG/ML injectable pen Inject 1 mL (150 mg) Subcutaneous every 14 days 6 mL 3     allopurinol (ZYLOPRIM) 100 MG tablet TAKE 2 TABLETS(200 MG) BY MOUTH DAILY 180 tablet 1     aspirin 81 MG tablet Take 1 tablet by mouth daily. with food       cetirizine (ZYRTEC) 10 MG tablet TAKE 1 TABLET(10 MG) BY MOUTH EVERY EVENING 90 tablet 3     clopidogrel (PLAVIX) 75 MG tablet TAKE 1 TABLET(75 MG) BY MOUTH DAILY 90 tablet 1     COD LIVER OIL PO Take 1 capsule by mouth daily        Cyanocobalamin (VITAMIN B 12 PO) Take 500 mcg by mouth 2 times daily.       diphenhydrAMINE-acetaminophen (TYLENOL PM)  MG tablet Take 1 tablet by mouth nightly as needed for sleep       hydrALAZINE (APRESOLINE) 50 MG tablet Take 1 tablet (50 mg) by mouth 3 times daily 280 tablet 3     insulin NPH (HUMULIN N/NOVOLIN N VIAL) 100 UNIT/ML vial 14 units at  suppertime 2 vial 11     insulin syringe-needle U-100 (BD INSULIN SYRINGE) 29G X 1/2\" 0.5 ML Use one syringe 2 daily or as directed. 200 each 4     Isosorbide Mononitrate  MG TB24 TAKE 1 TABLET BY MOUTH DAILY 30 tablet 9     levothyroxine (SYNTHROID/LEVOTHROID) 75 MCG tablet Take 1 tablet (75 mcg) by mouth daily 90 tablet 3     metoprolol tartrate (LOPRESSOR) 50 MG tablet Take 1 tablet (50 mg) by mouth 2 times daily 180 tablet 2     nitroGLYcerin (NITROSTAT) 0.4 MG sublingual tablet For chest pain place 1 tablet under the tongue every 5 minutes for 3 doses. If symptoms persist 5 minutes after 1st dose call 911. 25 tablet 1     pravastatin (PRAVACHOL) 20 MG tablet Take 10 mg by mouth twice a week  30 tablet 1     theophylline (UNIPHYL) 400 MG 24 hr tablet Take 1 tablet (400 mg) by mouth daily 90 tablet 3     torsemide (DEMADEX) 10 MG tablet Take 10 mg by mouth every other day (days not taking 20mg)       torsemide (DEMADEX) 20 MG tablet TAKE 1 TABLET BY MOUTH EVERY OTHER DAY 45 tablet 1     triamcinolone (KENALOG) 0.5 % cream Apply sparingly to affected area two  times daily as needed for " rash. 45 g 1       ALLERGIES     Allergies   Allergen Reactions     Advair [Fluticasone-Salmeterol]      cough;  insomnia, nightmares     Amlodipine Besylate      edema       Azithromycin GI Disturbance     Clarithromycin      gi     Hydrochlorothiazide      hctz + lisinopril-->inc creat, k+     Lisinopril      lisinopril + hctz --> inc creat, k+     Moxifloxacin Hydrochloride      clostridium diffile colitis     Penicillins      gen swelling     Pravastatin Cramps     Muscle cramps/spasm.  Stopped 2017     Vioxx      edema       PAST MEDICAL HISTORY:  Past Medical History:   Diagnosis Date     Acute myocardial infarction, unspecified site, episode of care unspecified      Allergic rhinitis, cause unspecified      Anemia 3/6/2014     CAD (coronary artery disease)     1996 CABG - LIMA to LAD, SVG to OM, 2007 Cath - KANU to Left main and ostial/prox LAD, 2012 Cath - 80-90% stenosis SVG to OM - KANU placed, EF 50%     CKD (chronic kidney disease) stage 3, GFR 30-59 ml/min (H) 3/9/2014     CTS (carpal tunnel syndrome)     bilateral     Degeneration of cervical intervertebral disc      Diaphragmatic hernia without mention of obstruction or gangrene      Esophageal reflux      Essential hypertension, benign      Hyperlipidemia LDL goal <70      Hypothyroidism      Hypothyroidism, unspecified hypothyroidism type 1/14/2016     IDIOPATHIC CYCLIC EDEMA      Mild persistent asthma      Osteoarthrosis, unspecified whether generalized or localized, unspecified site      Pneumonia, organism unspecified(486)      Proteinuria 5/17/2014     PVD (peripheral vascular disease) (H)      Sensorineural hearing loss, unspecified      Subarachnoid bleed (H)      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 disease (goal A1C<8) 10/17/2015     Unspecified disorder resulting from impaired renal function      Unspecified hereditary and idiopathic peripheral neuropathy      Venous insufficiency        PAST SURGICAL HISTORY:  Past Surgical  History:   Procedure Laterality Date     C NONSPECIFIC PROCEDURE      appendectomy     C NONSPECIFIC PROCEDURE      hemorrhoids     C NONSPECIFIC PROCEDURE      cervical strain     C NONSPECIFIC PROCEDURE      rotator cuff surgery, right shoulder     C NONSPECIFIC PROCEDURE  2008    iol os     CORONARY ARTERY BYPASS      LIMA to LAD, SVG to OM     HEART CATH STENT COR W/WO PTCA      lad, left main cor artery stents/drug eluting     HEART CATH STENT COR W/WO PTCA  2012    80-90% stenosis SVG to OM - KANU placed, EF 50%     NONSPECIFIC PROCEDURE      iol od       FAMILY HISTORY:  Family History   Problem Relation Age of Onset     Lung Cancer Daughter      Family History Negative Mother         broken hip     Jaundice Father      Alcohol/Drug Father      Ovarian Cancer Daughter      Family History Negative Daughter      Family History Negative Son        SOCIAL HISTORY:  Social History     Socioeconomic History     Marital status:      Spouse name: None     Number of children: None     Years of education: None     Highest education level: None   Occupational History     None   Social Needs     Financial resource strain: None     Food insecurity:     Worry: None     Inability: None     Transportation needs:     Medical: None     Non-medical: None   Tobacco Use     Smoking status: Former Smoker     Packs/day: 1.00     Years: 14.00     Pack years: 14.00     Types: Cigarettes     Start date:      Last attempt to quit: 1966     Years since quittin.2     Smokeless tobacco: Never Used   Substance and Sexual Activity     Alcohol use: No     Drug use: No     Sexual activity: No   Lifestyle     Physical activity:     Days per week: None     Minutes per session: None     Stress: None   Relationships     Social connections:     Talks on phone: None     Gets together: None     Attends Yazidi service: None     Active member of club or organization: None     Attends meetings of clubs or organizations: None      Relationship status: None     Intimate partner violence:     Fear of current or ex partner: None     Emotionally abused: None     Physically abused: None     Forced sexual activity: None   Other Topics Concern     Parent/sibling w/ CABG, MI or angioplasty before 65F 55M? Not Asked      Service Not Asked     Blood Transfusions Not Asked     Caffeine Concern No     Occupational Exposure Not Asked     Hobby Hazards Not Asked     Sleep Concern Yes     Stress Concern No     Weight Concern No     Special Diet No     Back Care Not Asked     Exercise Yes     Comment: bowling, 5 days week. yard work     Bike Helmet Not Asked     Seat Belt Not Asked     Self-Exams Not Asked   Social History Narrative     None       Review of Systems:  Skin:  Negative       Eyes:  Positive for glasses reading  ENT:  Negative      Respiratory:  Positive for dyspnea on exertion     Cardiovascular:    Positive for;chest pain;edema with exertion  Gastroenterology: Negative      Genitourinary:  Negative      Musculoskeletal:  Negative      Neurologic:  Positive for numbness or tingling of hands carpal tunnel  Psychiatric:  Negative      Heme/Lymph/Imm:  Negative      Endocrine:  Positive for thyroid disorder;diabetes      Physical Exam:  Vitals: /70   Pulse 64   Ht 1.829 m (6')   Wt 92.9 kg (204 lb 12.8 oz)   BMI 27.78 kg/m       Constitutional:  cooperative, alert and oriented, well developed, well nourished, in no acute distress        Skin:  warm and dry to the touch, no apparent skin lesions or masses noted          Head:  normocephalic, no masses or lesions        Eyes:  pupils equal and round;sclera white;EOMS intact        Lymph:      ENT:  no pallor or cyanosis        Neck:  JVP normal;carotid pulses are full and equal bilaterally        Respiratory:  normal breath sounds, clear to auscultation, normal A-P diameter, normal symmetry, normal respiratory excursion, no use of accessory muscles;healed median sternotomy scar          Cardiac: regular rhythm;normal S1 and S2;no S3 or S4;apical impulse not displaced       systolic ejection murmur;RUSB;grade 1        not assessed this visit                               right femoral bruit (-) left femoral bruit (-)      GI:  abdomen soft;non-tender;no HSM;no bruits surgical scars      Extremities and Muscular Skeletal:    stasis pigmentation   RLE edema;trace;pitting LLE edema;pitting;1+ pressure stckings in place    Neurological:  no gross motor deficits        Psych:  affect appropriate, oriented to time, person and place        CC  Marie Gil PA-C  [address] [facility] [address]  [place] [address]                Thank you for allowing me to participate in the care of your patient.      Sincerely,     Sebastien Gibson MD     [facility]    cc:   Marie Gil PA-C  [address] [facility] [address]  [address]  [place] [address]

## 2019-03-11 NOTE — LETTER
3/11/2019    Jorge Hillman MD  [address] [facility] [address]    RE: Oscar Terrazas       Dear Colleague,    I had the pleasure of seeing Oscar Terrazas in the [facility].    HPI and Plan:   This 85-year-old gentleman is seen, accompanied by his daughter, in followup of his history of a non-ST elevation MI complicated by acute diastolic heart failure episode in March 2018, with background history of coronary disease, CABG, subsequent multivessel stenting, stage IV chronic renal insufficiency, and resistant hypertension .   He was hospitalized in [place] of  the 18 after he developed acute profound dyspnea which improved with oxygen.  He is found to have a mild troponin elevation and high N- proBNP.  Because of his very significant renal dysfunction we elected not to do angiography and treat medically.  He was diuresed about 9 pounds.   Since returning home his weight himself daily and his weights have been stable at home without significant change at around 199 pounds.    He has had no recurrence of the acute dyspnea or new symptoms.  He  continues to bowl and walking around the house and is not having significant dyspnea on exertion.  If he walks further distances than then from the parking lot to the bowling alley or locks a little faster he does get some chest tightness which always goes away quickly with rest.  This pattern is not changed in the last 9 months.  He is not having rest or nocturnal episodes of chest tightness or other chest discomfort or with mild activity.  For many years his LDL is well controlled on statin therapy but in 2017 he was developing severe stiffness and pain particular in his lower extremities to the point where he can barely get up and walk in the morning and often his daughter would have to massage his legs and use a heating pad and after a few hours he could get around.  This resolved when he stopped his statin.  He slowly went back on a smaller  dose, currently 10 mg twice a week, but has not been able to titrate up further because of those symptoms of myalgias.   He denies orthopnea or PND.  He is wearing his pressure stockings every day and has only trace edema.   He has no palpitations, dizziness, syncope,  and denies claudication symptoms.    His chronic venous insufficiency, left greater than right (previous saphenous vein harvesting from the left) is currently pretty well controlled with diuretics and pressure stockings.  He is aware of the issue with sodium restriction and states he is following this and his daughter confirms this.      This is a gentleman with a CABG in 1996. In 2007, he required multivessel drug-eluting stenting for recurrent angina. In 2012, he presented with an acute coronary syndrome after his Plavix was stopped for a dental procedure. At that time, his LAD and left main coronary stents were widely patent. There was a lesion at one end of the stent in the vein graft to the OM that was severe, and possibly acute thrombus, and that was successfully stented with a drug-eluting stent. He has been maintained on Plavix since then.      He also has diabetes mellitus and severe renal insufficiency.  For this year his GFR has oscillated between 15 and 18 mL's per minute and creatinine usually around 3.4.  He continues to state he would not consider dialysis.  He saw his wife with her away previously on dialysis.    He takes his blood pressure with a wrist cuff fairly regularly and states most of them are in the high 130 systolic but there are a fair number in the 146 systolic.  Later in his clinic visit today he remained in the low 180 systolic.  He states he is taking his hydralazine 3 times a day.  He has been intolerant to ACE/ARB due to hyperkalemia.  Ejection fraction was normal in March 2018      Exam -full exam below.  In summary:   Blood pressure-140/70 Pulse 64.  Weight on our scales 204 pounds, 199 pounds at home.   Cardiac-,  regular rhythm, a soft systolic ejection murmur without gallop.  No JVD or HJR  Lungs-clear  Extremities-trace focal right lower extremity edema.  There is 1+ below the ankle on the left.  Pressure stockings are in place      Impression/plan  1-coronary artery disease with non-STEMI possibly related to acute diastolic heart failure episode 3/2018, global normal ejection fraction at that time.   No recurrence of those episodes/symptoms after increasing isosorbide and further diuresis.      He has stable but generally not very limiting anginal pattern at this time.  I would continue to pursue medical therapy.  I think it is likely would end up on dialysis if he had an angiogram.  I did discuss with him and his daughter that even if he had a heart attack again I would have a very high threshold for want to do emergent or urgent angiography because of the risk of dialysis and this would have to be carefully analyzed versus the risk of any acute ischemia.     2-acute diastolic heart failure episode, resolved with continued careful diuresis. - He will continue to weigh himself daily and let us know if there is a change in symptoms or weight.  When his isosorbide was increased and he was diuresed, his hydralazine was cut back.     It was increased in December because blood pressure is adequate.  I think as above pressure still inadequate and so I will increase it further, see below.     3-coronary artery disease. Status post CABG and subsequent multivessel stenting.  Ejection fraction  normal at recent echo.  Continue dual antiplatelet therapy.  Also inadequately controlled dyslipidemia.  See #4 below.     4-dyslipidemia, intolerant of statin doses that provide reasonable LDL lowering.  He is interested in trying a PCSK 9 agent and I will start the process of getting approval and coverage.     5-significant peripheral venous insufficiency of the lower extremities.   now using pressure stockings reliably with good result.   These will be continued.     6-hypertension, resistant.  I am going to increase his hydralazine to 50 mg 3 times daily at this point in time.  He will continue to monitor his blood pressures.     7-stage IV chronic renal insufficiency, with diabetes and proteinuria.  His hemoglobin has been in the mid 9 range and if it stays there he may not need any erythropoietin replacement at this time.     I have asked him to return in 6 months.  They will let us know his blood pressure is doing with the increase in hydralazine.  I will await the results of approval for Praluent or Repatha They will also call with any issue of the blood pressure or weight gain or breathing.    Orders Placed This Encounter   Procedures     Follow-Up with Cardiac Advanced Practice Provider       Orders Placed This Encounter   Medications     torsemide (DEMADEX) 10 MG tablet     Sig: Take 10 mg by mouth every other day (days not taking 20mg)     hydrALAZINE (APRESOLINE) 50 MG tablet     Sig: Take 1 tablet (50 mg) by mouth 3 times daily     Dispense:  280 tablet     Refill:  3     alirocumab (PRALUENT) 150 MG/ML injectable pen     Sig: Inject 1 mL (150 mg) Subcutaneous every 14 days     Dispense:  6 mL     Refill:  3       Medications Discontinued During This Encounter   Medication Reason     theophylline (UNIPHYL) 400 MG 24 hr tablet Medication Reconciliation Clean Up     hydrALAZINE (APRESOLINE) 25 MG tablet Reorder         Encounter Diagnoses   Name Primary?     Coronary artery disease of native artery of native heart with stable angina pectoris (H) Yes     BENIGN HYPERTENSION      Hyperlipidemia LDL goal <70      CKD (chronic kidney disease) stage 4, GFR 15-29 ml/min (H)      Chronic diastolic congestive heart failure (H)        CURRENT MEDICATIONS:  Current Outpatient Medications   Medication Sig Dispense Refill     ACCU-CHEK COMPACT PLUS test strip CHECK BLOOD SUGAR TWICE DAILY TO THREE TIMES DAILY 204 strip 0     alirocumab (PRALUENT) 150  "MG/ML injectable pen Inject 1 mL (150 mg) Subcutaneous every 14 days 6 mL 3     allopurinol (ZYLOPRIM) 100 MG tablet TAKE 2 TABLETS(200 MG) BY MOUTH DAILY 180 tablet 1     aspirin 81 MG tablet Take 1 tablet by mouth daily. with food       cetirizine (ZYRTEC) 10 MG tablet TAKE 1 TABLET(10 MG) BY MOUTH EVERY EVENING 90 tablet 3     clopidogrel (PLAVIX) 75 MG tablet TAKE 1 TABLET(75 MG) BY MOUTH DAILY 90 tablet 1     COD LIVER OIL PO Take 1 capsule by mouth daily        Cyanocobalamin (VITAMIN B 12 PO) Take 500 mcg by mouth 2 times daily.       diphenhydrAMINE-acetaminophen (TYLENOL PM)  MG tablet Take 1 tablet by mouth nightly as needed for sleep       hydrALAZINE (APRESOLINE) 50 MG tablet Take 1 tablet (50 mg) by mouth 3 times daily 280 tablet 3     insulin NPH (HUMULIN N/NOVOLIN N VIAL) 100 UNIT/ML vial 14 units at  suppertime 2 vial 11     insulin syringe-needle U-100 (BD INSULIN SYRINGE) 29G X 1/2\" 0.5 ML Use one syringe 2 daily or as directed. 200 each 4     Isosorbide Mononitrate  MG TB24 TAKE 1 TABLET BY MOUTH DAILY 30 tablet 9     levothyroxine (SYNTHROID/LEVOTHROID) 75 MCG tablet Take 1 tablet (75 mcg) by mouth daily 90 tablet 3     metoprolol tartrate (LOPRESSOR) 50 MG tablet Take 1 tablet (50 mg) by mouth 2 times daily 180 tablet 2     nitroGLYcerin (NITROSTAT) 0.4 MG sublingual tablet For chest pain place 1 tablet under the tongue every 5 minutes for 3 doses. If symptoms persist 5 minutes after 1st dose call 911. 25 tablet 1     pravastatin (PRAVACHOL) 20 MG tablet Take 10 mg by mouth twice a week  30 tablet 1     theophylline (UNIPHYL) 400 MG 24 hr tablet Take 1 tablet (400 mg) by mouth daily 90 tablet 3     torsemide (DEMADEX) 10 MG tablet Take 10 mg by mouth every other day (days not taking 20mg)       torsemide (DEMADEX) 20 MG tablet TAKE 1 TABLET BY MOUTH EVERY OTHER DAY 45 tablet 1     triamcinolone (KENALOG) 0.5 % cream Apply sparingly to affected area two  times daily as needed for " rash. 45 g 1       ALLERGIES     Allergies   Allergen Reactions     Advair [Fluticasone-Salmeterol]      cough;  insomnia, nightmares     Amlodipine Besylate      edema       Azithromycin GI Disturbance     Clarithromycin      gi     Hydrochlorothiazide      hctz + lisinopril-->inc creat, k+     Lisinopril      lisinopril + hctz --> inc creat, k+     Moxifloxacin Hydrochloride      clostridium diffile colitis     Penicillins      gen swelling     Pravastatin Cramps     Muscle cramps/spasm.  Stopped 2017     Vioxx      edema       PAST MEDICAL HISTORY:  Past Medical History:   Diagnosis Date     Acute myocardial infarction, unspecified site, episode of care unspecified      Allergic rhinitis, cause unspecified      Anemia 3/6/2014     CAD (coronary artery disease)     1996 CABG - LIMA to LAD, SVG to OM, 2007 Cath - KANU to Left main and ostial/prox LAD, 2012 Cath - 80-90% stenosis SVG to OM - KANU placed, EF 50%     CKD (chronic kidney disease) stage 3, GFR 30-59 ml/min (H) 3/9/2014     CTS (carpal tunnel syndrome)     bilateral     Degeneration of cervical intervertebral disc      Diaphragmatic hernia without mention of obstruction or gangrene      Esophageal reflux      Essential hypertension, benign      Hyperlipidemia LDL goal <70      Hypothyroidism      Hypothyroidism, unspecified hypothyroidism type 1/14/2016     IDIOPATHIC CYCLIC EDEMA      Mild persistent asthma      Osteoarthrosis, unspecified whether generalized or localized, unspecified site      Pneumonia, organism unspecified(486)      Proteinuria 5/17/2014     PVD (peripheral vascular disease) (H)      Sensorineural hearing loss, unspecified      Subarachnoid bleed (H)      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 disease (goal A1C<8) 10/17/2015     Unspecified disorder resulting from impaired renal function      Unspecified hereditary and idiopathic peripheral neuropathy      Venous insufficiency        PAST SURGICAL HISTORY:  Past Surgical  History:   Procedure Laterality Date     C NONSPECIFIC PROCEDURE      appendectomy     C NONSPECIFIC PROCEDURE      hemorrhoids     C NONSPECIFIC PROCEDURE      cervical strain     C NONSPECIFIC PROCEDURE      rotator cuff surgery, right shoulder     C NONSPECIFIC PROCEDURE  2008    iol os     CORONARY ARTERY BYPASS      LIMA to LAD, SVG to OM     HEART CATH STENT COR W/WO PTCA      lad, left main cor artery stents/drug eluting     HEART CATH STENT COR W/WO PTCA  2012    80-90% stenosis SVG to OM - KANU placed, EF 50%     NONSPECIFIC PROCEDURE      iol od       FAMILY HISTORY:  Family History   Problem Relation Age of Onset     Lung Cancer Daughter      Family History Negative Mother         broken hip     Jaundice Father      Alcohol/Drug Father      Ovarian Cancer Daughter      Family History Negative Daughter      Family History Negative Son        SOCIAL HISTORY:  Social History     Socioeconomic History     Marital status:      Spouse name: None     Number of children: None     Years of education: None     Highest education level: None   Occupational History     None   Social Needs     Financial resource strain: None     Food insecurity:     Worry: None     Inability: None     Transportation needs:     Medical: None     Non-medical: None   Tobacco Use     Smoking status: Former Smoker     Packs/day: 1.00     Years: 14.00     Pack years: 14.00     Types: Cigarettes     Start date:      Last attempt to quit: 1966     Years since quittin.2     Smokeless tobacco: Never Used   Substance and Sexual Activity     Alcohol use: No     Drug use: No     Sexual activity: No   Lifestyle     Physical activity:     Days per week: None     Minutes per session: None     Stress: None   Relationships     Social connections:     Talks on phone: None     Gets together: None     Attends [employer] service: None     Active member of club or organization: None     Attends meetings of clubs or organizations: None      Relationship status: None     Intimate partner violence:     Fear of current or ex partner: None     Emotionally abused: None     Physically abused: None     Forced sexual activity: None   Other Topics Concern     Parent/sibling w/ CABG, MI or angioplasty before 65F 55M? Not Asked      Service Not Asked     Blood Transfusions Not Asked     Caffeine Concern No     Occupational Exposure Not Asked     Hobby Hazards Not Asked     Sleep Concern Yes     Stress Concern No     Weight Concern No     Special Diet No     Back Care Not Asked     Exercise Yes     Comment: bowling, 5 days week. yard work     Bike Helmet Not Asked     Seat Belt Not Asked     Self-Exams Not Asked   Social History Narrative     None       Review of Systems:  Skin:  Negative       Eyes:  Positive for glasses reading  ENT:  Negative      Respiratory:  Positive for dyspnea on exertion     Cardiovascular:    Positive for;chest pain;edema with exertion  Gastroenterology: Negative      Genitourinary:  Negative      Musculoskeletal:  Negative      Neurologic:  Positive for numbness or tingling of hands carpal tunnel  Psychiatric:  Negative      Heme/Lymph/Imm:  Negative      Endocrine:  Positive for thyroid disorder;diabetes      Physical Exam:  Vitals: /70   Pulse 64   Ht 1.829 m (6')   Wt 92.9 kg (204 lb 12.8 oz)   BMI 27.78 kg/m       Constitutional:  cooperative, alert and oriented, well developed, well nourished, in no acute distress        Skin:  warm and dry to the touch, no apparent skin lesions or masses noted          Head:  normocephalic, no masses or lesions        Eyes:  pupils equal and round;sclera white;EOMS intact        Lymph:      ENT:  no pallor or cyanosis        Neck:  JVP normal;carotid pulses are full and equal bilaterally        Respiratory:  normal breath sounds, clear to auscultation, normal A-P diameter, normal symmetry, normal respiratory excursion, no use of accessory muscles;healed median sternotomy scar          Cardiac: regular rhythm;normal S1 and S2;no S3 or S4;apical impulse not displaced       systolic ejection murmur;RUSB;grade 1        not assessed this visit                               right femoral bruit (-) left femoral bruit (-)      GI:  abdomen soft;non-tender;no HSM;no bruits surgical scars      Extremities and Muscular Skeletal:    stasis pigmentation   RLE edema;trace;pitting LLE edema;pitting;1+ pressure stckings in place    Neurological:  no gross motor deficits        Psych:  affect appropriate, oriented to time, person and place          Thank you for allowing me to participate in the care of your patient.    Sincerely,     Sebastien Gibson MD     [facility]

## 2019-03-29 ENCOUNTER — TELEPHONE (OUTPATIENT)
Dept: CARDIOLOGY | Facility: CLINIC | Age: 84
End: 2019-03-29

## 2019-03-29 DIAGNOSIS — E78.5 HYPERLIPIDEMIA LDL GOAL <70: Primary | ICD-10-CM

## 2019-03-29 NOTE — TELEPHONE ENCOUNTER
RN called pt to discuss Praluent approval. Left a message to call RN back.    RN spoke with daughter Yasmin. Pt gave himself the injection on Saturday 3/23/19. Daughter states he did not have any concerns or questions at this time. RN advised daughter that pt will need a lipid profile done in 6-8 weeks. Daughter verbalized understanding.

## 2019-04-17 DIAGNOSIS — E78.5 HYPERLIPIDEMIA LDL GOAL <70: ICD-10-CM

## 2019-04-17 LAB
ALT SERPL W P-5'-P-CCNC: 15 U/L (ref 0–70)
CHOLEST SERPL-MCNC: 124 MG/DL
HDLC SERPL-MCNC: 34 MG/DL
LDLC SERPL CALC-MCNC: 57 MG/DL
NONHDLC SERPL-MCNC: 90 MG/DL
TRIGL SERPL-MCNC: 166 MG/DL

## 2019-04-17 PROCEDURE — 80061 LIPID PANEL: CPT | Performed by: INTERNAL MEDICINE

## 2019-04-17 PROCEDURE — 84460 ALANINE AMINO (ALT) (SGPT): CPT | Performed by: INTERNAL MEDICINE

## 2019-04-17 PROCEDURE — 36415 COLL VENOUS BLD VENIPUNCTURE: CPT | Performed by: INTERNAL MEDICINE

## 2019-04-22 DIAGNOSIS — I10 ESSENTIAL HYPERTENSION, BENIGN: ICD-10-CM

## 2019-04-22 DIAGNOSIS — I21.4 NSTEMI (NON-ST ELEVATED MYOCARDIAL INFARCTION) (H): ICD-10-CM

## 2019-04-24 NOTE — TELEPHONE ENCOUNTER
"Requested Prescriptions   Pending Prescriptions Disp Refills     metoprolol tartrate (LOPRESSOR) 25 MG tablet [Pharmacy Med Name: METOPROLOL TARTRATE 25MG TABLETS] 180 tablet 0     Sig: TAKE 1 TABLET(25 MG) BY MOUTH TWICE DAILY       Beta-Blockers Protocol Failed - 4/22/2019  5:23 PM        Failed - Blood pressure under 140/90 in past 12 months     BP Readings from Last 3 Encounters:   03/11/19 140/70   02/27/19 126/64   12/19/18 136/60                 Passed - Patient is age 6 or older        Passed - Recent (12 mo) or future (30 days) visit within the authorizing provider's specialty     Patient had office visit in the last 12 months or has a visit in the next 30 days with authorizing provider or within the authorizing provider's specialty.  See \"Patient Info\" tab in inbasket, or \"Choose Columns\" in Meds & Orders section of the refill encounter.              Passed - Medication is active on med list        metoprolol tartrate (LOPRESSOR) 50 MG tablet [Pharmacy Med Name: METOPROLOL TARTRATE 50MG TABLETS] 180 tablet 0     Sig: TAKE 1 TABLET BY MOUTH TWICE DAILY       Beta-Blockers Protocol Failed - 4/22/2019  5:23 PM        Failed - Blood pressure under 140/90 in past 12 months     BP Readings from Last 3 Encounters:   03/11/19 140/70   02/27/19 126/64   12/19/18 136/60                 Passed - Patient is age 6 or older        Passed - Recent (12 mo) or future (30 days) visit within the authorizing provider's specialty     Patient had office visit in the last 12 months or has a visit in the next 30 days with authorizing provider or within the authorizing provider's specialty.  See \"Patient Info\" tab in inbasket, or \"Choose Columns\" in Meds & Orders section of the refill encounter.              Passed - Medication is active on med list        Last Written Prescription Date:  8/9/18  Last Fill Quantity: 180,  # refills: 2   Last office visit: 2/27/2019 with prescribing provider:  2/27/19   Future Office Visit:      "

## 2019-04-26 RX ORDER — METOPROLOL TARTRATE 50 MG
TABLET ORAL
Qty: 180 TABLET | Refills: 3 | Status: ON HOLD | OUTPATIENT
Start: 2019-04-26 | End: 2019-01-01

## 2019-04-26 RX ORDER — METOPROLOL TARTRATE 25 MG/1
TABLET, FILM COATED ORAL
Qty: 180 TABLET | Refills: 0 | OUTPATIENT
Start: 2019-04-26

## 2019-05-15 ENCOUNTER — TRANSFERRED RECORDS (OUTPATIENT)
Dept: HEALTH INFORMATION MANAGEMENT | Facility: CLINIC | Age: 84
End: 2019-05-15

## 2019-05-15 LAB
CREAT SERPL-MCNC: 4.02 MG/DL (ref 0–1.11)
GFR SERPL CREATININE-BSD FRML MDRD: 13 ML/MIN/1.73M2
GLUCOSE SERPL-MCNC: 206 MG/DL (ref 65–99)
POTASSIUM SERPL-SCNC: 4.7 MMOL/L (ref 3.5–5.3)

## 2019-05-20 DIAGNOSIS — N18.9 ANEMIA IN CKD (CHRONIC KIDNEY DISEASE): ICD-10-CM

## 2019-05-20 DIAGNOSIS — D63.1 ANEMIA IN CKD (CHRONIC KIDNEY DISEASE): ICD-10-CM

## 2019-05-20 DIAGNOSIS — N18.5 CHRONIC KIDNEY DISEASE, STAGE V (H): ICD-10-CM

## 2019-05-20 RX ORDER — HEPARIN SODIUM (PORCINE) LOCK FLUSH IV SOLN 100 UNIT/ML 100 UNIT/ML
5 SOLUTION INTRAVENOUS
Status: CANCELLED | OUTPATIENT
Start: 2019-05-29

## 2019-05-20 RX ORDER — HEPARIN SODIUM,PORCINE 10 UNIT/ML
5 VIAL (ML) INTRAVENOUS
Status: CANCELLED | OUTPATIENT
Start: 2019-05-29

## 2019-06-05 ENCOUNTER — TELEPHONE (OUTPATIENT)
Dept: CARDIOLOGY | Facility: CLINIC | Age: 84
End: 2019-06-05

## 2019-06-05 NOTE — TELEPHONE ENCOUNTER
Received call from Lucille nurse at [facility]. Pt is seen there for anemia and CKD management and Lucille stated pt was referred by our office. Lucille called to update Dr. Gibson that pt has declined iron infusion for anemia as well as continued to decline hemodialysis. Per Lucille, pt's family let staff know they are going to talk with pt's PCP about hospice care. Advised will update Dr. Gibson.

## 2019-06-11 ENCOUNTER — TELEPHONE (OUTPATIENT)
Dept: INTERNAL MEDICINE | Facility: CLINIC | Age: 84
End: 2019-06-11

## 2019-06-11 DIAGNOSIS — M1A.9XX0 CHRONIC GOUT WITHOUT TOPHUS, UNSPECIFIED CAUSE, UNSPECIFIED SITE: ICD-10-CM

## 2019-06-11 NOTE — TELEPHONE ENCOUNTER
Pt will need to see me in clnic with his daughter to discuss. I cannot change FMLA at this time as it specifically requires documentation as to the expected frequency of family member missing work, expectation of how long that level of care would continue, etc.  Not an option for home care since not home bound unless willing to pay OOP. Palliative care consult may be an option but need to discuss further first with pt to see what his thoughts are re: future level of care. Please schedule appt for pt and daughter to see me in clinic

## 2019-06-11 NOTE — TELEPHONE ENCOUNTER
"Spoke to Yasmin. She's asking if her father can be placed in Home Care or Hospice.\" (Since he's not home bound, maybe Pallative Care?) Pt no longer want to do anything new to help his health other than what he's doing right now. His kidney specialist recently recommended iron infusions or daily injections and he said no. He will not do that or any other thing to help his condition. Yasmin said, \"he's just done.\"   Pt went bowling recently, then went to [facility] for groceries and came home \"in distress.\" She doesn't know his exact symptoms but she believes that he may have had a mild heart attack. Pt refused to go to there ED or anywhere else to check him out at that time. Pt requested Yasmin to stay home from work because he didn't want to be alone but [place] do not let her do that so she went to work and checked in on him over her lunch break. She'd like to be available if this happens again. It's happened twice where he's wanted her to stay with him as he was earful about his health.   Yasmin is requesting her [place] to be updated and changed also to include that she can stay home from work on an as needed basis to care for him when he's ill. She can get a new form to us if the previous one is unable to be addended.  Please advise next step to discuss with him and his daughter. Should he schedule an appt with Dr Hillman first?    Below is a comment from his cardiology dept. On 06/05/19:       "

## 2019-06-11 NOTE — TELEPHONE ENCOUNTER
Routing refill request to provider for review/approval because:  Labs out of range:  Creat, uric acid, CBC

## 2019-06-11 NOTE — TELEPHONE ENCOUNTER
Reason for Call:  Other call back    Detailed comments: Patient's daughter, Yasmin, called to discuss putting her father in homecare or hospice care per the recommendation of another doctor of [facility]. Please call her to discuss arranging this.    Yasmin has consent to communicate for pt (2018) and power of .    Phone Number Patient can be reached at: Yasmin [phone]    Best Time: Anytime today if possible    Can we leave a detailed message on this number? YES    Call taken on 6/11/2019 at 11:10 AM by Brenna Peter

## 2019-06-11 NOTE — TELEPHONE ENCOUNTER
"Requested Prescriptions   Pending Prescriptions Disp Refills     allopurinol (ZYLOPRIM) 100 MG tablet [Pharmacy Med Name: ALLOPURINOL 100MG TABLETS] 180 tablet 0     Sig: TAKE 2 TABLETS(200 MG) BY MOUTH DAILY       Gout Agents Protocol Failed - 6/11/2019 10:44 AM        Failed - Has Uric Acid on file in past 12 months and value is less than 6     Recent Labs   Lab Test 02/27/19  0818   URIC 6.1     If level is 6mg/dL or greater, ok to refill one time and refer to provider.           Failed - Normal serum creatinine on file in the past 12 months     Recent Labs   Lab Test 05/15/19   CR 4.02*             Passed - CBC on file in past 12 months     Recent Labs   Lab Test 02/27/19  0818   WBC 9.0   RBC 3.20*   HGB 9.9*   HCT 30.5*                    Passed - ALT on file in past 12 months     Recent Labs   Lab Test 04/17/19  0819   ALT 15             Passed - Recent (12 mo) or future (30 days) visit within the authorizing provider's specialty     Patient had office visit in the last 12 months or has a visit in the next 30 days with authorizing provider or within the authorizing provider's specialty.  See \"Patient Info\" tab in inbasket, or \"Choose Columns\" in Meds & Orders section of the refill encounter.              Passed - Medication is active on med list        Passed - Patient is age 18 or older        Last Written Prescription Date:  1/2/19  Last Fill Quantity: 180,  # refills: 1   Last office visit: 2/27/2019 with prescribing provider:  PCP   Future Office Visit:      "

## 2019-06-11 NOTE — TELEPHONE ENCOUNTER
Daughter, Yasmin, notified. Appt scheduled to discuss. She will definitely accompany pt to visit.  LUCIEN Ravi LPN

## 2019-06-12 RX ORDER — ALLOPURINOL 100 MG/1
TABLET ORAL
Qty: 180 TABLET | Refills: 1 | Status: SHIPPED | OUTPATIENT
Start: 2019-06-12 | End: 2019-01-01

## 2019-06-12 NOTE — TELEPHONE ENCOUNTER
Liver OK. Because of CKD, dose being maintained at 200mg daily. No recent gout flares. Will continue med

## 2019-06-21 ENCOUNTER — OFFICE VISIT (OUTPATIENT)
Dept: INTERNAL MEDICINE | Facility: CLINIC | Age: 84
End: 2019-06-21
Payer: COMMERCIAL

## 2019-06-21 DIAGNOSIS — E11.22 TYPE 2 DIABETES MELLITUS WITH STAGE 5 CHRONIC KIDNEY DISEASE NOT ON CHRONIC DIALYSIS, WITH LONG-TERM CURRENT USE OF INSULIN (H): ICD-10-CM

## 2019-06-21 DIAGNOSIS — I10 ESSENTIAL HYPERTENSION, BENIGN: ICD-10-CM

## 2019-06-21 DIAGNOSIS — D64.9 ANEMIA, UNSPECIFIED TYPE: Primary | ICD-10-CM

## 2019-06-21 DIAGNOSIS — N18.5 TYPE 2 DIABETES MELLITUS WITH STAGE 5 CHRONIC KIDNEY DISEASE NOT ON CHRONIC DIALYSIS, WITH LONG-TERM CURRENT USE OF INSULIN (H): ICD-10-CM

## 2019-06-21 DIAGNOSIS — N18.5 CHRONIC KIDNEY DISEASE, STAGE V (H): ICD-10-CM

## 2019-06-21 DIAGNOSIS — Z79.4 TYPE 2 DIABETES MELLITUS WITH STAGE 5 CHRONIC KIDNEY DISEASE NOT ON CHRONIC DIALYSIS, WITH LONG-TERM CURRENT USE OF INSULIN (H): ICD-10-CM

## 2019-06-21 LAB
ALBUMIN SERPL-MCNC: 3.7 G/DL (ref 3.4–5)
ALP SERPL-CCNC: 100 U/L (ref 40–150)
ALT SERPL W P-5'-P-CCNC: 17 U/L (ref 0–70)
ANION GAP SERPL CALCULATED.3IONS-SCNC: 10 MMOL/L (ref 3–14)
AST SERPL W P-5'-P-CCNC: 7 U/L (ref 0–45)
BILIRUB SERPL-MCNC: 0.3 MG/DL (ref 0.2–1.3)
BUN SERPL-MCNC: 66 MG/DL (ref 7–30)
CALCIUM SERPL-MCNC: 8.7 MG/DL (ref 8.5–10.1)
CHLORIDE SERPL-SCNC: 110 MMOL/L (ref 94–109)
CO2 SERPL-SCNC: 22 MMOL/L (ref 20–32)
CREAT SERPL-MCNC: 3.48 MG/DL (ref 0.66–1.25)
ERYTHROCYTE [DISTWIDTH] IN BLOOD BY AUTOMATED COUNT: 15.1 % (ref 10–15)
GFR SERPL CREATININE-BSD FRML MDRD: 15 ML/MIN/{1.73_M2}
GLUCOSE SERPL-MCNC: 166 MG/DL (ref 70–99)
HBA1C MFR BLD: 6.3 % (ref 0–5.6)
HCT VFR BLD AUTO: 30.7 % (ref 40–53)
HGB BLD-MCNC: 9.8 G/DL (ref 13.3–17.7)
IRON SATN MFR SERPL: 18 % (ref 15–46)
IRON SERPL-MCNC: 41 UG/DL (ref 35–180)
MCH RBC QN AUTO: 30.7 PG (ref 26.5–33)
MCHC RBC AUTO-ENTMCNC: 31.9 G/DL (ref 31.5–36.5)
MCV RBC AUTO: 96 FL (ref 78–100)
PLATELET # BLD AUTO: 201 10E9/L (ref 150–450)
POTASSIUM SERPL-SCNC: 4.4 MMOL/L (ref 3.4–5.3)
PROT SERPL-MCNC: 7.1 G/DL (ref 6.8–8.8)
RBC # BLD AUTO: 3.19 10E12/L (ref 4.4–5.9)
RETICS # AUTO: 36.5 10E9/L (ref 25–95)
RETICS/RBC NFR AUTO: 1.1 % (ref 0.5–2)
SODIUM SERPL-SCNC: 142 MMOL/L (ref 133–144)
TIBC SERPL-MCNC: 232 UG/DL (ref 240–430)
WBC # BLD AUTO: 11.5 10E9/L (ref 4–11)

## 2019-06-21 PROCEDURE — 83550 IRON BINDING TEST: CPT | Performed by: INTERNAL MEDICINE

## 2019-06-21 PROCEDURE — 99215 OFFICE O/P EST HI 40 MIN: CPT | Performed by: INTERNAL MEDICINE

## 2019-06-21 PROCEDURE — 85045 AUTOMATED RETICULOCYTE COUNT: CPT | Performed by: INTERNAL MEDICINE

## 2019-06-21 PROCEDURE — 83540 ASSAY OF IRON: CPT | Performed by: INTERNAL MEDICINE

## 2019-06-21 PROCEDURE — 36415 COLL VENOUS BLD VENIPUNCTURE: CPT | Performed by: INTERNAL MEDICINE

## 2019-06-21 PROCEDURE — 85027 COMPLETE CBC AUTOMATED: CPT | Performed by: INTERNAL MEDICINE

## 2019-06-21 PROCEDURE — 83036 HEMOGLOBIN GLYCOSYLATED A1C: CPT | Performed by: INTERNAL MEDICINE

## 2019-06-21 PROCEDURE — 80053 COMPREHEN METABOLIC PANEL: CPT | Performed by: INTERNAL MEDICINE

## 2019-06-21 NOTE — PROGRESS NOTES
Subjective     Oscar Terrazas is a 85 year old male who presents to clinic today for the following health issues:    HPI     Discuss possible palliative care. Daughter, Yasmin is with pt today.        Pt's past medical history, family history, habits, medications and allergies were reviewed with the patient today.  See snap shot for  HCM status. Most recent lab results reviewed with pt. Problem list and histories reviewed & adjusted, as indicated.  Additional history as below:    Declines any future dialysis. Confirms DNR. Wife had dialysis and does not wish to undergo this, even if would prolong his life  Daughter states  pt did not meet criteria for Aranesp  with labs done at renal clinic  May 2019. No clinic note to review. Have potassium and creatinine lab but no Hgb result from then. Pt then declined what sounds like probable offering  by clinic for Erythropoeitin. Pt has been eating more iron-rich foods like Cheerios more recently.  Patient still enjoying bowling.  Denies current chest pain or shortness of breath.  No abdominal pain.  Mild bilateral lower extremity edema.  Using compression stockings with good response.  Denies orthopnea or PND  Not checking blood sugars recently.  Previous A1c at goal     Additional ROS:   Constitutional, HEENT, Cardiovascular, Pulmonary, GI and , Neuro, MSK and Psych review of systems/symptoms are otherwise negative or unchanged from previous, except as noted above.      OBJECTIVE:  /66   Pulse 59   Temp 98.7  F (37.1  C) (Oral)   Wt 94.8 kg (208 lb 14.4 oz)   SpO2 99%   BMI 28.33 kg/m     Estimated body mass index is 28.33 kg/m  as calculated from the following:    Height as of 3/11/19: 1.829 m (6').    Weight as of this encounter: 94.8 kg (208 lb 14.4 oz).     Neck: no adenopathy. Thyroid normal to palpation. No bruits. No JVD  Pulm: Lungs clear to auscultation   CV: Regular rates and rhythm  GI: Soft, nontender, Normal active bowel sounds, No  hepatosplenomegaly or masses palpable  Ext: Peripheral pulses intact. Mild BLE edema.  Neuro: Normal strength and tone, sensory exam mildly reduced light touch sensation distal bilateral lower extremity    Assessment/Plan: (See plan discussion below for further details)  1. Anemia, unspecified type  Labs as ordered to see if at level where Epo or Aranesp may be options  - CBC with platelets  - Reticulocyte count  - Iron and iron binding capacity    2. 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  Continue insulin therapy.  Labs as ordered  - Comprehensive metabolic panel  - Hemoglobin A1c    3. Chronic kidney disease, stage V (H)  Due for lab follow-up  - Comprehensive metabolic panel    4. Essential hypertension, benign  Blood pressure mildly above goal.  On hydralazine.  Intolerant of amlodipine and ACE inhibitor/ARB not an option with kidney function.  Will continue current medications for now and recheck blood pressure in 1 to 2 months      Plan discussion:   Labs as ordered  Continue compression stockings   if Hgb worsened, will speak with renal clinic to clarify hormone options such as EPO that pt may now be willing to do  Continue current low sodium diet  BP recheck with me or kidney doctors in 1-2 months  NO future dialysis per pt    Jorge Hillman MD  Internal Medicine Department  [facility]    (Chart documentation was completed, in part, with FANCRU voice-recognition software. Even though reviewed, some grammatical, spelling, and word errors may remain.)

## 2019-06-22 DIAGNOSIS — I21.4 NSTEMI (NON-ST ELEVATED MYOCARDIAL INFARCTION) (H): ICD-10-CM

## 2019-06-22 NOTE — TELEPHONE ENCOUNTER
"Requested Prescriptions   Pending Prescriptions Disp Refills     isosorbide mononitrate CR (IMDUR) 120 MG 24 HR ER tablet [Pharmacy Med Name: ISOSORBIDE MONONITRATE 120MG ER TAB] 30 tablet 0     Sig: TAKE 1 TABLET BY MOUTH DAILY   Last Written Prescription Date:  9/5/2018  Last Fill Quantity: 30,  # refills: 9   Last Office Visit: 6/21/2019   Future Office Visit:         Nitrates Failed - 6/22/2019  9:00 AM        Failed - Blood pressure under 140/90 in past 12 months     BP Readings from Last 3 Encounters:   06/21/19 152/68   03/11/19 140/70   02/27/19 126/64                 Passed - Pt is not on erectile dysfunction medications        Passed - Recent (12 mo) or future (30 days) visit within the authorizing provider's specialty     Patient had office visit in the last 12 months or has a visit in the next 30 days with authorizing provider or within the authorizing provider's specialty.  See \"Patient Info\" tab in inbasket, or \"Choose Columns\" in Meds & Orders section of the refill encounter.              Passed - Medication is active on med list        Passed - Patient is age 18 or older          "

## 2019-06-30 PROBLEM — E11.22 TYPE 2 DIABETES MELLITUS WITH STAGE 5 CHRONIC KIDNEY DISEASE NOT ON CHRONIC DIALYSIS, WITH LONG-TERM CURRENT USE OF INSULIN (H): Status: ACTIVE | Noted: 2019-01-01

## 2019-06-30 PROBLEM — N18.5 TYPE 2 DIABETES MELLITUS WITH STAGE 5 CHRONIC KIDNEY DISEASE NOT ON CHRONIC DIALYSIS, WITH LONG-TERM CURRENT USE OF INSULIN (H): Status: ACTIVE | Noted: 2019-01-01

## 2019-06-30 PROBLEM — Z79.4 TYPE 2 DIABETES MELLITUS WITH STAGE 5 CHRONIC KIDNEY DISEASE NOT ON CHRONIC DIALYSIS, WITH LONG-TERM CURRENT USE OF INSULIN (H): Status: ACTIVE | Noted: 2019-01-01

## 2019-07-24 NOTE — TELEPHONE ENCOUNTER
"Requested Prescriptions   Pending Prescriptions Disp Refills     clopidogrel (PLAVIX) 75 MG tablet [Pharmacy Med Name: CLOPIDOGREL 75MG TABLETS] 90 tablet 0     Sig: TAKE 1 TABLET(75 MG) BY MOUTH DAILY       Plavix Failed - 7/24/2019  4:39 PM        Failed - Normal HGB on file in past 12 months     Recent Labs   Lab Test 06/21/19  1415   HGB 9.8*               Passed - No active PPI on record unless is Protonix        Passed - Normal Platelets on file in past 12 months     Recent Labs   Lab Test 06/21/19  1415                  Passed - Recent (12 mo) or future (30 days) visit within the authorizing provider's specialty     Patient had office visit in the last 12 months or has a visit in the next 30 days with authorizing provider or within the authorizing provider's specialty.  See \"Patient Info\" tab in inbasket, or \"Choose Columns\" in Meds & Orders section of the refill encounter.              Passed - Medication is active on med list        Passed - Patient is age 18 or older          Last Written Prescription Date:  1/29/2019  Last Fill Quantity: 90,  # refills: 1   Last office visit: 6/21/2019 with prescribing provider:  Jorge Hillman     Future Office Visit:   Next 5 appointments (look out 90 days)    Sep 18, 2019  9:30 AM CDT  Return Visit with Marie Gil PA-C  [facility] [address] [place] [id number] [phone] OPT 2         Routing refill request to provider for review/approval because:  Labs out of range: Low HGB    Cheyanne HERRERA, RN, BSN, PHN          " Per Dr. Henderson:  1. Discontinue Amlodipine.  2. Start Losartan 100 mg daily.     Patient informed of recommendations.   Amy verbalizes understanding and is agreeable.   No additional questions at this time.    Medication sent to preferred pharmacy.

## 2019-08-15 NOTE — PROGRESS NOTES
Cardiology Progress Note    Date of Service: 08/16/2019  Patient seen today in follow up of: CORE follow up  Primary cardiologist: Dr. Gibson    HPI:  Oscar Terrazas is a very pleasant 85 year old male with a history of coronary artery disease s/p CABG in 1996 with a LIMA to LAD and SVG to OM. In followup in 2007, he underwent multivessel drug stenting for recurrent angina. He then presented in 2012 with an acute coronary syndrome after his Plavix was stopped for a dental procedure.  At that time his LAD and left main coronary stents were widely patent.  There was one lesion at the end of the stent and the vein graft of the OM that was severe and possibly acutely thrombotic. This was successful stented with a drug eluting stent.  He has remained on Plavix since.  Additionally, he has a history of hypertension, stage IV CKD, chronic heart failure with preserved ejection fraction, peripheral vascular disease, and diabetes mellitus.    He was admitted in June of last year with exertional chest discomfort, shortness of breath and an elevated troponin. Given his significant renal dysfunction, repeat coronary angiography was not advised and medical management was pursued.  He was diuresed with IV Lasix with improvement and was started on Imdur and hydralazine.    He has done well on torsemide since that time taking 20 mg alternating with 10 mg every other day.  I met him about a year ago and he fortunately has done well from a cardiac perspective since then.  He was last seen in clinic by Dr. Gibson in March and his hydralazine was increased for better blood pressure control at that time.    He is back today for early CORE clinic follow-up with his daughter, Martina. His daughter called the clinic and requested his appointment be moved up as his blood pressures have been elevated recently. They have been running in the 150 - 180s systolic at home.  They are talking about an hour or 2 after his  medications.    Fortunately, he overall has been feeling well.  He has been a little more fatigued lately but still able to do his usual activities and bowled several games yesterday.  He denies any dyspnea on exertion, orthopnea, or PND.  He has some mild exertional chest pressure with activity at time which resolves at rest.  This is stable since he saw Dr. Gibson.  No dizziness or lightheadedness.    ASSESSMENT/PLAN:  1.  Coronary artery disease. With remote CABG and previous multivessel stenting. He remains on aspirin and plavix.  Additionally, he is on beta-blocker, Imdur, and a PCSK9 inhibitor.  He has some mild chest pressure with exertion consistent with mild stable angina which is unchanged.  Is been hesitant to consider coronary angiography is he is a very high risk of dialysis with this.    2.  Hypertension.   Uncontrolled as of lately.  He remains on metoprolol tartrate 50 mg twice daily, hydralazine 50 mg 3 times daily, and Imdur 120 mg daily.  Today, I recommended we increase his hydralazine.  He will start by taking 75 mg 3 times daily.  If his blood pressures remain consistently elevated then he will go up to 100 mg 3 times daily.  I asked him to call with any dizziness or lightheadedness.  He has follow-up with Dr. KEY in a few weeks and his blood pressure can be reassessed at that time.  3.  Chronic heart failure with preserved ejection fraction. Echo last March showed a preserved LVEF with grade II diastolic dysfunction.  Torsemide 20 mg a day alternating with 10 mg a day and has done well at this dose.  He appears euvolemic on exam and will continue this unchanged.  He will have labs when he sees Dr. KEY in a few weeks.  4.  Stage IV chronic renal disease.  5.  Dyslipidemia.  He has not tolerated statins well and was started on a PCSK9 inhibitor by Dr. Gibson in the spring.  He has had a good response with an LDL of 57 on last check.  6.  Peripheral venous insufficiency with chronic lower extremity  "edema.    Overall, Oscar seems to be doing well. I will have him return to see Dr. Gibson in 6 months for annual follow up. I asked him to contact us with any concerns in the meantime.     Orders this Visit:  Orders Placed This Encounter   Procedures     Follow-Up with Cardiologist     No orders of the defined types were placed in this encounter.    There are no discontinued medications.    CURRENT MEDICATIONS:  Current Outpatient Medications   Medication Sig Dispense Refill     ACCU-CHEK COMPACT PLUS test strip CHECK BLOOD SUGAR TWICE DAILY TO THREE TIMES DAILY 204 strip 0     alirocumab (PRALUENT) 150 MG/ML injectable pen Inject 1 mL (150 mg) Subcutaneous every 14 days 6 mL 3     allopurinol (ZYLOPRIM) 100 MG tablet TAKE 2 TABLETS(200 MG) BY MOUTH DAILY 180 tablet 1     aspirin 81 MG tablet Take 1 tablet by mouth daily. with food       cetirizine (ZYRTEC) 10 MG tablet TAKE 1 TABLET(10 MG) BY MOUTH EVERY EVENING 90 tablet 3     clopidogrel (PLAVIX) 75 MG tablet TAKE 1 TABLET(75 MG) BY MOUTH DAILY 90 tablet 3     COD LIVER OIL PO Take 1 capsule by mouth daily        Cyanocobalamin (VITAMIN B 12 PO) Take 500 mcg by mouth 2 times daily.       diphenhydrAMINE-acetaminophen (TYLENOL PM)  MG tablet Take 1 tablet by mouth nightly as needed for sleep       hydrALAZINE (APRESOLINE) 50 MG tablet Take 1 tablet (50 mg) by mouth 3 times daily 280 tablet 3     insulin NPH (HUMULIN N/NOVOLIN N VIAL) 100 UNIT/ML vial 14 units at  suppertime 2 vial 11     insulin syringe-needle U-100 (BD INSULIN SYRINGE) 29G X 1/2\" 0.5 ML Use one syringe 2 daily or as directed. 200 each 4     isosorbide mononitrate CR (IMDUR) 120 MG 24 HR ER tablet TAKE 1 TABLET BY MOUTH DAILY 90 tablet 3     levothyroxine (SYNTHROID/LEVOTHROID) 75 MCG tablet Take 1 tablet (75 mcg) by mouth daily 90 tablet 3     metoprolol tartrate (LOPRESSOR) 50 MG tablet TAKE 1 TABLET BY MOUTH TWICE DAILY 180 tablet 3     nitroGLYcerin (NITROSTAT) 0.4 MG sublingual tablet " For chest pain place 1 tablet under the tongue every 5 minutes for 3 doses. If symptoms persist 5 minutes after 1st dose call 911. 25 tablet 1     theophylline (UNIPHYL) 400 MG 24 hr tablet Take 1 tablet (400 mg) by mouth daily 90 tablet 3     torsemide (DEMADEX) 10 MG tablet Take 10 mg by mouth every other day (days not taking 20mg)       triamcinolone (KENALOG) 0.5 % cream Apply sparingly to affected area two  times daily as needed for rash. 45 g 1     torsemide (DEMADEX) 20 MG tablet TAKE 1 TABLET BY MOUTH EVERY OTHER DAY (Patient not taking: Reported on 8/16/2019) 45 tablet 1     ALLERGIES  Allergies   Allergen Reactions     Advair [Fluticasone-Salmeterol]      cough;  insomnia, nightmares     Amlodipine Besylate      edema       Azithromycin GI Disturbance     Clarithromycin      gi     Hydrochlorothiazide      hctz + lisinopril-->inc creat, k+     Lisinopril      lisinopril + hctz --> inc creat, k+     Moxifloxacin Hydrochloride      clostridium diffile colitis     Penicillins      gen swelling     Pravastatin Cramps     Muscle cramps/spasm.  Stopped 2017     Vioxx      edema     PAST MEDICAL HISTORY:  Past Medical History:   Diagnosis Date     Acute myocardial infarction, unspecified site, episode of care unspecified      Allergic rhinitis, cause unspecified      Anemia 3/6/2014     CAD (coronary artery disease)     1996 CABG - LIMA to LAD, SVG to OM, 2007 Cath - KANU to Left main and ostial/prox LAD, 2012 Cath - 80-90% stenosis SVG to OM - KANU placed, EF 50%     CKD (chronic kidney disease) stage 3, GFR 30-59 ml/min (H) 3/9/2014     CTS (carpal tunnel syndrome)     bilateral     Degeneration of cervical intervertebral disc      Diaphragmatic hernia without mention of obstruction or gangrene      Esophageal reflux      Essential hypertension, benign      Hyperlipidemia LDL goal <70      Hypothyroidism      Hypothyroidism, unspecified hypothyroidism type 1/14/2016     IDIOPATHIC CYCLIC EDEMA      Mild  persistent asthma      Osteoarthrosis, unspecified whether generalized or localized, unspecified site      Pneumonia, organism unspecified(486)      Proteinuria 5/17/2014     PVD (peripheral vascular disease) (H)      Sensorineural hearing loss, unspecified      Subarachnoid bleed (H)      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, with long-term current use of insulin (H) 6/30/2019     Unspecified hereditary and idiopathic peripheral neuropathy      Venous insufficiency      PAST SURGICAL HISTORY:  Past Surgical History:   Procedure Laterality Date     C NONSPECIFIC PROCEDURE      appendectomy     C NONSPECIFIC PROCEDURE      hemorrhoids     C NONSPECIFIC PROCEDURE      cervical strain     C NONSPECIFIC PROCEDURE      rotator cuff surgery, right shoulder     C NONSPECIFIC PROCEDURE  2008    iol os     CORONARY ARTERY BYPASS  1996    LIMA to LAD, SVG to OM     HEART CATH STENT COR W/WO PTCA  2007    lad, left main cor artery stents/drug eluting     HEART CATH STENT COR W/WO PTCA  2012    80-90% stenosis SVG to OM - KANU placed, EF 50%     NONSPECIFIC PROCEDURE      iol od     FAMILY HISTORY:  Family History   Problem Relation Age of Onset     Lung Cancer Daughter      Family History Negative Mother         broken hip     Jaundice Father      Alcohol/Drug Father      Ovarian Cancer Daughter      Family History Negative Daughter      Family History Negative Son      SOCIAL HISTORY:  Social History     Socioeconomic History     Marital status:      Spouse name: None     Number of children: None     Years of education: None     Highest education level: None   Occupational History     None   Social Needs     Financial resource strain: None     Food insecurity:     Worry: None     Inability: None     Transportation needs:     Medical: None     Non-medical: None   Tobacco Use     Smoking status: Former Smoker     Packs/day: 1.00     Years: 14.00     Pack years: 14.00      Types: Cigarettes     Start date:      Last attempt to quit: 1966     Years since quittin.6     Smokeless tobacco: Never Used   Substance and Sexual Activity     Alcohol use: No     Drug use: No     Sexual activity: Not Currently   Lifestyle     Physical activity:     Days per week: None     Minutes per session: None     Stress: None   Relationships     Social connections:     Talks on phone: None     Gets together: None     Attends Protestant service: None     Active member of club or organization: None     Attends meetings of clubs or organizations: None     Relationship status: None     Intimate partner violence:     Fear of current or ex partner: None     Emotionally abused: None     Physically abused: None     Forced sexual activity: None   Other Topics Concern     Parent/sibling w/ CABG, MI or angioplasty before 65F 55M? Not Asked      Service Not Asked     Blood Transfusions Not Asked     Caffeine Concern No     Occupational Exposure Not Asked     Hobby Hazards Not Asked     Sleep Concern Yes     Stress Concern No     Weight Concern No     Special Diet No     Back Care Not Asked     Exercise Yes     Comment: bowling, 5 days week. yard work     Bike Helmet Not Asked     Seat Belt Not Asked     Self-Exams Not Asked   Social History Narrative     None     Review of Systems:  Skin:  Negative     Eyes:  Positive for glasses  ENT:  Negative    Respiratory:  Positive for dyspnea on exertion;cough  Cardiovascular:    Positive for;edema  Gastroenterology: Negative    Genitourinary:  Negative    Musculoskeletal:  Negative    Neurologic:  Positive for numbness or tingling of hands  Psychiatric:  Negative    Heme/Lymph/Imm:  Negative    Endocrine:  Positive for thyroid disorder;diabetes     Physical Exam:  Vitals: BP (!) 154/68   Pulse 56   Ht 1.829 m (6')   Wt 91 kg (200 lb 11.2 oz)   SpO2 99%   BMI 27.22 kg/m     Wt Readings from Last 4 Encounters:   19 91 kg (200 lb 11.2 oz)   19 94.8  kg (208 lb 14.4 oz)   03/11/19 92.9 kg (204 lb 12.8 oz)   02/27/19 92.5 kg (204 lb)     GEN: well nourished, in no acute distress.  HEENT:  Pupils equal, round. Sclerae nonicteric.   C/V:  Regular rate and rhythm, soft systolic murmur at the RUSB  RESP: Respirations are unlabored. Clear to auscultation bilaterally without wheezing, rales, or rhonchi.  GI: Abdomen soft, nontender.  EXTREM: no LE edema.  NEURO: Alert and oriented, cooperative.  SKIN: Warm and dry.     Recent Lab Results:  LIPID RESULTS:  Lab Results   Component Value Date    CHOL 124 04/17/2019    HDL 34 (L) 04/17/2019    LDL 57 04/17/2019    TRIG 166 (H) 04/17/2019    CHOLHDLRATIO 3.8 09/11/2014     LIVER ENZYME RESULTS:  Lab Results   Component Value Date    AST 7 06/21/2019    ALT 17 06/21/2019     CBC RESULTS:  Lab Results   Component Value Date    WBC 11.5 (H) 06/21/2019    RBC 3.19 (L) 06/21/2019    HGB 9.8 (L) 06/21/2019    HCT 30.7 (L) 06/21/2019    MCV 96 06/21/2019    MCH 30.7 06/21/2019    MCHC 31.9 06/21/2019    RDW 15.1 (H) 06/21/2019     06/21/2019     BMP RESULTS:  Lab Results   Component Value Date     06/21/2019    POTASSIUM 4.4 06/21/2019    CHLORIDE 110 (H) 06/21/2019    CO2 22 06/21/2019    ANIONGAP 10 06/21/2019     (H) 06/21/2019    BUN 66 (H) 06/21/2019    CR 3.48 (H) 06/21/2019    GFRESTIMATED 15 (L) 06/21/2019    GFRESTBLACK 17 (L) 06/21/2019    LEV 8.7 06/21/2019      A1C RESULTS:  Lab Results   Component Value Date    A1C 6.3 (H) 06/21/2019     INR RESULTS:  Lab Results   Component Value Date    INR 1.06 03/12/2013    INR 1.19 (H) 12/08/2008       New/Pertinent imaging results since last visit:  None    Marie Gil PA-C  [facility]

## 2019-08-15 NOTE — TELEPHONE ENCOUNTER
Daughter calling concerned about her father.  Pt has heart and kidney diease.   Last 3 days BP has been elevated. Reading ranging from 150-180/60-70, BP today is: 159/65.    BP Readings from Last 3 Encounters:   06/21/19 144/66   03/11/19 140/70   02/27/19 126/64     Says he takes his meds daily.  No other symptoms.  No chest pain, no SOB, no headaches, no dizziness, or lightheadedness.   Routing to Dr. Hillman for recommendations.   Do you want to see pt for appt?  Or change any med dosing?    Additional Information    Negative: Sounds like a life-threatening emergency to the triager    Negative: Pregnant > 20 weeks and new hand or face swelling    Negative: Pregnant > 20 weeks and BP > 140/90    Negative: Systolic BP >= 160 OR Diastolic >= 100, and any cardiac or neurologic symptoms (e.g., chest pain, difficulty breathing, unsteady gait, blurred vision)    Negative: Patient sounds very sick or weak to the triager    Negative: BP Systolic BP >= 140 OR Diastolic >= 90 and postpartum < 4 weeks    Negative: Systolic BP >= 180 OR Diastolic >= 110, and missed most recent dose of blood pressure medication    Systolic BP >= 180 OR Diastolic >= 110    Patient wants to be seen    Protocols used: HIGH BLOOD PRESSURE-A-OH

## 2019-08-15 NOTE — TELEPHONE ENCOUNTER
Limited in options for blood pressure control given pt's kidney function status, previous BP med intolerances and asthma status  Canpt raise Metoprolol with current heart rate and on max dose Hydralazine already and on diuretic therapy. Adding another BP med  (Clonidine) wouold likely bring on some fatigue and dry mouth side effects. Therefore I would do the followin. Pt is currently alternating  Torsemide 20mg on one day and 10mg the other day. The next time pt is due to take the 10mg daily dose, pt to double it up and take 20mg that one day (basiclaly getting pt an extra 10mg of Torsemide once). Then go back to usual alternating 10mg once day and 20mg the other day. Having pt do this to pull a little extra blood volume off if has gotten too high recently and contributing to higher BPs  2. Maintain low sodium/salt intake (goal < 2,000mg/day)  3. See me in 3-4 weeks for follow-up re: blood pressure in clinic. If still running higher at that time, will then have to make BP med adjustments

## 2019-08-15 NOTE — TELEPHONE ENCOUNTER
Daughter advised.  She did also notify cardiologist and they have an appt tomorrow.      She might have to send you some paperwork for work, fmla.  She had to leave work early today and will have to take off tomorrow to take him to the cardiology appt.  She will let you know.

## 2019-08-15 NOTE — TELEPHONE ENCOUNTER
Received VM from pt's daughter Yasmin stating BP has been high and pt has had some swelling in his feet, requested call back to discuss. Called back and spoke with Yasmin (consent to communicate on file). Yasmin stated pt's BP has been elevated for the past few days, as high as 186/78 on 8/13/19. Per Yasmin, this afternoon his BP is 159/70. Yasmin also called pt's PCP, but she was told Dr. Hillman is out of the office and she would like to review with Cardiology. Yasmin stated pt is supposed to check his BP 2-3 times a day and write it down, however pt does not always do this. Yasmin stated she tries to check pt's BP about an hour and a half after pt takes his medications when she is with pt. Inquired if pt is having any other symptoms, and Yasmin stated pt reported feeling fatigued easily at bowling, otherwise denies SOB. Yasmin does not believe pt is having any trouble breathing at night, pt sleeps with 2 pillows and this has not changed recently. Yasmin stated pt's left foot appears swollen, otherwise she does not see any swelling in pt's legs. Pt does not weigh himself daily, although today Yasmin requested he weigh himself and he was 197 lbs on his home scale, which is stable. Reviewed cardiac medications and Yasmin stated pt is taking all as prescribed. Pt is scheduled to see GERMAINE Novak on 9/18/19 and Yasmin requested pt be seen sooner, especially as she is about to go on vacation and she is concerned about the elevated BP readings. Offered appointment tomorrow, 8/16/19 at 7:30 am and Yasmin verbalized agreement with plan.

## 2019-08-16 NOTE — PATIENT INSTRUCTIONS
Call CORE nurse for any questions or concerns Mon-Fri 8am-4pm:                                                #[phone]                                       For concerns after hours:                                               #[phone]     1: Medication changes: INCREASE hydralazine to 75 mg three times daily (1.5 tablets). See how your blood pressure numbers are at home over the next week or two. If they remain elevated (> 140s for the top number), then you can increase your hydralazine to 100 mg three times daily. If you end up on the 100 mg dose, call us when you run out of your medication and we'll send in a prescription for the 100 mg tablets.   2: Plan from today: Continue Torsemide.   -  Call us if you have dizziness or lightheadedness or any other concerns.

## 2019-08-16 NOTE — LETTER
8/16/2019    Jorge Hillman MD  [address] [facility] [address]    RE: Oscar Terrazas       Dear Colleague,    I had the pleasure of seeing Oscar Terrazas in the [facility].      Cardiology Progress Note    Date of Service: 08/16/2019  Patient seen today in follow up of: CORE follow up  Primary cardiologist: Dr. Gibson    HPI:  Oscar Terrazas is a very pleasant 85 year old male with a history of coronary artery disease s/p CABG in 1996 with a LIMA to LAD and SVG to OM. In followup in 2007, he underwent multivessel drug stenting for recurrent angina. He then presented in 2012 with an acute coronary syndrome after his Plavix was stopped for a dental procedure.  At that time his LAD and left main coronary stents were widely patent.  There was one lesion at the end of the stent and the vein graft of the OM that was severe and possibly acutely thrombotic. This was successful stented with a drug eluting stent.  He has remained on Plavix since.  Additionally, he has a history of hypertension, stage IV CKD, chronic heart failure with preserved ejection fraction, peripheral vascular disease, and diabetes mellitus.    He was admitted in June of last year with exertional chest discomfort, shortness of breath and an elevated troponin. Given his significant renal dysfunction, repeat coronary angiography was not advised and medical management was pursued.  He was diuresed with IV Lasix with improvement and was started on Imdur and hydralazine.    He has done well on torsemide since that time taking 20 mg alternating with 10 mg every other day.  I met him about a year ago and he fortunately has done well from a cardiac perspective since then.  He was last seen in clinic by Dr. Gibson in March and his hydralazine was increased for better blood pressure control at that time.    He is back today for early CORE clinic follow-up with his daughter, Martina. His daughter called the clinic and  requested his appointment be moved up as his blood pressures have been elevated recently. They have been running in the 150 - 180s systolic at home.  They are talking about an hour or 2 after his medications.    Fortunately, he overall has been feeling well.  He has been a little more fatigued lately but still able to do his usual activities and bowled several games yesterday.  He denies any dyspnea on exertion, orthopnea, or PND.  He has some mild exertional chest pressure with activity at time which resolves at rest.  This is stable since he saw Dr. Gibson.  No dizziness or lightheadedness.    ASSESSMENT/PLAN:  1.  Coronary artery disease. With remote CABG and previous multivessel stenting. He remains on aspirin and plavix.  Additionally, he is on beta-blocker, Imdur, and a PCSK9 inhibitor.  He has some mild chest pressure with exertion consistent with mild stable angina which is unchanged.  Is been hesitant to consider coronary angiography is he is a very high risk of dialysis with this.    2.  Hypertension.    Uncontrolled as of lately.  He remains on metoprolol tartrate 50 mg twice daily, hydralazine 50 mg 3 times daily, and Imdur 120 mg daily.  Today, I recommended we increase his hydralazine.  He will start by taking 75 mg 3 times daily.  If his blood pressures remain consistently elevated then he will go up to 100 mg 3 times daily.  I asked him to call with any dizziness or lightheadedness.  He has follow-up with Dr. KEY in a few weeks and his blood pressure can be reassessed at that time.  3.  Chronic heart failure with preserved ejection fraction. Echo  last March showed a preserved LVEF with grade II diastolic dysfunction.  Torsemide 20 mg a day alternating with 10 mg a day and has done well at this dose.  He appears euvolemic on exam and will continue this unchanged.  He will have labs when he sees Dr. KEY in a few weeks.  4.  Stage IV chronic renal disease.  5.  Dyslipidemia.  He has not tolerated statins  "well and was started on a PCSK9 inhibitor by Dr. Gibson in the spring.  He has had a good response with an LDL of 57 on last check.  6.  Peripheral venous insufficiency with chronic lower extremity edema.    Overall, Oscar seems to be doing well. I will have him return to see Dr. Gibson in 6 months for annual follow up. I asked him to contact us with any concerns in the meantime.     Orders this Visit:  Orders Placed This Encounter   Procedures     Follow-Up with Cardiologist     No orders of the defined types were placed in this encounter.    There are no discontinued medications.    CURRENT MEDICATIONS:  Current Outpatient Medications   Medication Sig Dispense Refill     ACCU-CHEK COMPACT PLUS test strip CHECK BLOOD SUGAR TWICE DAILY TO THREE TIMES DAILY 204 strip 0     alirocumab (PRALUENT) 150 MG/ML injectable pen Inject 1 mL (150 mg) Subcutaneous every 14 days 6 mL 3     allopurinol (ZYLOPRIM) 100 MG tablet TAKE 2 TABLETS(200 MG) BY MOUTH DAILY 180 tablet 1     aspirin 81 MG tablet Take 1 tablet by mouth daily. with food       cetirizine (ZYRTEC) 10 MG tablet TAKE 1 TABLET(10 MG) BY MOUTH EVERY EVENING 90 tablet 3     clopidogrel (PLAVIX) 75 MG tablet TAKE 1 TABLET(75 MG) BY MOUTH DAILY 90 tablet 3     COD LIVER OIL PO Take 1 capsule by mouth daily        Cyanocobalamin (VITAMIN B 12 PO) Take 500 mcg by mouth 2 times daily.       diphenhydrAMINE-acetaminophen (TYLENOL PM)  MG tablet Take 1 tablet by mouth nightly as needed for sleep       hydrALAZINE (APRESOLINE) 50 MG tablet Take 1 tablet (50 mg) by mouth 3 times daily 280 tablet 3     insulin NPH (HUMULIN N/NOVOLIN N VIAL) 100 UNIT/ML vial 14 units at  suppertime 2 vial 11     insulin syringe-needle U-100 (BD INSULIN SYRINGE) 29G X 1/2\" 0.5 ML Use one syringe 2 daily or as directed. 200 each 4     isosorbide mononitrate CR (IMDUR) 120 MG 24 HR ER tablet TAKE 1 TABLET BY MOUTH DAILY 90 tablet 3     levothyroxine (SYNTHROID/LEVOTHROID) 75 MCG tablet Take " 1 tablet (75 mcg) by mouth daily 90 tablet 3     metoprolol tartrate (LOPRESSOR) 50 MG tablet TAKE 1 TABLET BY MOUTH TWICE DAILY 180 tablet 3     nitroGLYcerin (NITROSTAT) 0.4 MG sublingual tablet For chest pain place 1 tablet under the tongue every 5 minutes for 3 doses. If symptoms persist 5 minutes after 1st dose call 911. 25 tablet 1     theophylline (UNIPHYL) 400 MG 24 hr tablet Take 1 tablet (400 mg) by mouth daily 90 tablet 3     torsemide (DEMADEX) 10 MG tablet Take 10 mg by mouth every other day (days not taking 20mg)       triamcinolone (KENALOG) 0.5 % cream Apply sparingly to affected area two  times daily as needed for rash. 45 g 1     torsemide (DEMADEX) 20 MG tablet TAKE 1 TABLET BY MOUTH EVERY OTHER DAY (Patient not taking: Reported on 8/16/2019) 45 tablet 1     ALLERGIES  Allergies   Allergen Reactions     Advair [Fluticasone-Salmeterol]      cough;  insomnia, nightmares     Amlodipine Besylate      edema       Azithromycin GI Disturbance     Clarithromycin      gi     Hydrochlorothiazide      hctz + lisinopril-->inc creat, k+     Lisinopril      lisinopril + hctz --> inc creat, k+     Moxifloxacin Hydrochloride      clostridium diffile colitis     Penicillins      gen swelling     Pravastatin Cramps     Muscle cramps/spasm.  Stopped 2017     Vioxx      edema     PAST MEDICAL HISTORY:  Past Medical History:   Diagnosis Date     Acute myocardial infarction, unspecified site, episode of care unspecified      Allergic rhinitis, cause unspecified      Anemia 3/6/2014     CAD (coronary artery disease)     1996 CABG - LIMA to LAD, SVG to OM, 2007 Cath - KANU to Left main and ostial/prox LAD, 2012 Cath - 80-90% stenosis SVG to OM - KANU placed, EF 50%     CKD (chronic kidney disease) stage 3, GFR 30-59 ml/min (H) 3/9/2014     CTS (carpal tunnel syndrome)     bilateral     Degeneration of cervical intervertebral disc      Diaphragmatic hernia without mention of obstruction or gangrene      Esophageal reflux       Essential hypertension, benign      Hyperlipidemia LDL goal <70      Hypothyroidism      Hypothyroidism, unspecified hypothyroidism type 1/14/2016     IDIOPATHIC CYCLIC EDEMA      Mild persistent asthma      Osteoarthrosis, unspecified whether generalized or localized, unspecified site      Pneumonia, organism unspecified(486)      Proteinuria 5/17/2014     PVD (peripheral vascular disease) (H)      Sensorineural hearing loss, unspecified      Subarachnoid bleed (H)      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, with long-term current use of insulin (H) 6/30/2019     Unspecified hereditary and idiopathic peripheral neuropathy      Venous insufficiency      PAST SURGICAL HISTORY:  Past Surgical History:   Procedure Laterality Date     C NONSPECIFIC PROCEDURE      appendectomy     C NONSPECIFIC PROCEDURE      hemorrhoids     C NONSPECIFIC PROCEDURE      cervical strain     C NONSPECIFIC PROCEDURE      rotator cuff surgery, right shoulder     C NONSPECIFIC PROCEDURE  2008    iol os     CORONARY ARTERY BYPASS  1996    LIMA to LAD, SVG to OM     HEART CATH STENT COR W/WO PTCA  2007    lad, left main cor artery stents/drug eluting     HEART CATH STENT COR W/WO PTCA  2012    80-90% stenosis SVG to OM - KANU placed, EF 50%     NONSPECIFIC PROCEDURE      iol od     FAMILY HISTORY:  Family History   Problem Relation Age of Onset     Lung Cancer Daughter      Family History Negative Mother         broken hip     Jaundice Father      Alcohol/Drug Father      Ovarian Cancer Daughter      Family History Negative Daughter      Family History Negative Son      SOCIAL HISTORY:  Social History     Socioeconomic History     Marital status:      Spouse name: None     Number of children: None     Years of education: None     Highest education level: None   Occupational History     None   Social Needs     Financial resource strain: None     Food insecurity:     Worry: None      Inability: None     Transportation needs:     Medical: None     Non-medical: None   Tobacco Use     Smoking status: Former Smoker     Packs/day: 1.00     Years: 14.00     Pack years: 14.00     Types: Cigarettes     Start date:      Last attempt to quit:      Years since quittin.6     Smokeless tobacco: Never Used   Substance and Sexual Activity     Alcohol use: No     Drug use: No     Sexual activity: Not Currently   Lifestyle     Physical activity:     Days per week: None     Minutes per session: None     Stress: None   Relationships     Social connections:     Talks on phone: None     Gets together: None     Attends Jainism service: None     Active member of club or organization: None     Attends meetings of clubs or organizations: None     Relationship status: None     Intimate partner violence:     Fear of current or ex partner: None     Emotionally abused: None     Physically abused: None     Forced sexual activity: None   Other Topics Concern     Parent/sibling w/ CABG, MI or angioplasty before 65F 55M? Not Asked      Service Not Asked     Blood Transfusions Not Asked     Caffeine Concern No     Occupational Exposure Not Asked     Hobby Hazards Not Asked     Sleep Concern Yes     Stress Concern No     Weight Concern No     Special Diet No     Back Care Not Asked     Exercise Yes     Comment: bowling, 5 days week. yard work     Bike Helmet Not Asked     Seat Belt Not Asked     Self-Exams Not Asked   Social History Narrative     None     Review of Systems:  Skin:  Negative     Eyes:  Positive for glasses  ENT:  Negative    Respiratory:  Positive for dyspnea on exertion;cough  Cardiovascular:    Positive for;edema  Gastroenterology: Negative    Genitourinary:  Negative    Musculoskeletal:  Negative    Neurologic:  Positive for numbness or tingling of hands  Psychiatric:  Negative    Heme/Lymph/Imm:  Negative    Endocrine:  Positive for thyroid disorder;diabetes     Physical Exam:  Vitals:  BP (!) 154/68   Pulse 56   Ht 1.829 m (6')   Wt 91 kg (200 lb 11.2 oz)   SpO2 99%   BMI 27.22 kg/m      Wt Readings from Last 4 Encounters:   08/16/19 91 kg (200 lb 11.2 oz)   06/21/19 94.8 kg (208 lb 14.4 oz)   03/11/19 92.9 kg (204 lb 12.8 oz)   02/27/19 92.5 kg (204 lb)     GEN: well nourished, in no acute distress.  HEENT:  Pupils equal, round. Sclerae nonicteric.   C/V:  Regular rate and rhythm, soft systolic murmur at the RUSB  RESP: Respirations are unlabored. Clear to auscultation bilaterally without wheezing, rales, or rhonchi.  GI: Abdomen soft, nontender.  EXTREM: no LE edema.  NEURO: Alert and oriented, cooperative.  SKIN: Warm and dry.     Recent Lab Results:  LIPID RESULTS:  Lab Results   Component Value Date    CHOL 124 04/17/2019    HDL 34 (L) 04/17/2019    LDL 57 04/17/2019    TRIG 166 (H) 04/17/2019    CHOLHDLRATIO 3.8 09/11/2014     LIVER ENZYME RESULTS:  Lab Results   Component Value Date    AST 7 06/21/2019    ALT 17 06/21/2019     CBC RESULTS:  Lab Results   Component Value Date    WBC 11.5 (H) 06/21/2019    RBC 3.19 (L) 06/21/2019    HGB 9.8 (L) 06/21/2019    HCT 30.7 (L) 06/21/2019    MCV 96 06/21/2019    MCH 30.7 06/21/2019    MCHC 31.9 06/21/2019    RDW 15.1 (H) 06/21/2019     06/21/2019     BMP RESULTS:  Lab Results   Component Value Date     06/21/2019    POTASSIUM 4.4 06/21/2019    CHLORIDE 110 (H) 06/21/2019    CO2 22 06/21/2019    ANIONGAP 10 06/21/2019     (H) 06/21/2019    BUN 66 (H) 06/21/2019    CR 3.48 (H) 06/21/2019    GFRESTIMATED 15 (L) 06/21/2019    GFRESTBLACK 17 (L) 06/21/2019    LEV 8.7 06/21/2019      A1C RESULTS:  Lab Results   Component Value Date    A1C 6.3 (H) 06/21/2019     INR RESULTS:  Lab Results   Component Value Date    INR 1.06 03/12/2013    INR 1.19 (H) 12/08/2008       New/Pertinent imaging results since last visit:  None        Thank you for allowing me to participate in the care of your patient.    Sincerely,     Marie Gil,  PANomiC     [facility]

## 2019-08-28 NOTE — TELEPHONE ENCOUNTER
"Pt's daughter Yasmin called regarding pt's blood pressure. Pt was last seen on 8/16/19 by ELIZABETH Novak. Per Scarlet's note on 8/16/19: \"2. Hypertension.   Uncontrolled as of lately.  He remains on metoprolol tartrate 50 mg twice daily, hydralazine 50 mg 3 times daily, and Imdur 120 mg daily.  Today, I recommended we increase his hydralazine.  He will start by taking 75 mg 3 times daily.  If his blood pressures remain consistently elevated then he will go up to 100 mg 3 times daily.  I asked him to call with any dizziness or lightheadedness.  He has follow-up with Dr. KEY in a few weeks and his blood pressure can be reassessed at that time.\" Pt's daughter stated pt has increased his hydralazine to 100 mg three times daily and has been checking his BP but not writing it down. Yasmin stated pt is still noting some elevated readings. Inquired if pt is having any symptoms and Yasmin stated pt is feeling fine. Pt has an appointment with Dr. Hillman on 9/3/19 and Yasmin asked if they should address pt's BP at that visit. Advised Yasmin to keep a written log of readings starting today and bring this with to the visit for Dr. Hillman to review. Yasmin verbalized agreement with plan.   "

## 2019-08-30 NOTE — TELEPHONE ENCOUNTER
"Requested Prescriptions   Pending Prescriptions Disp Refills     torsemide (DEMADEX) 20 MG tablet [Pharmacy Med Name: TORSEMIDE 20MG TABLETS] 45 tablet 0     Sig: TAKE 1 TABLET BY MOUTH EVERY OTHER DAY       Diuretics (Including Combos) Protocol Failed - 8/30/2019 10:57 AM        Failed - Blood pressure under 140/90 in past 12 months     BP Readings from Last 3 Encounters:   08/16/19 (!) 154/68   06/21/19 144/66   03/11/19 140/70                 Failed - Normal serum creatinine on file in past 12 months     Recent Labs   Lab Test 06/21/19  1415   CR 3.48*              Passed - Recent (12 mo) or future (30 days) visit within the authorizing provider's specialty     Patient had office visit in the last 12 months or has a visit in the next 30 days with authorizing provider or within the authorizing provider's specialty.  See \"Patient Info\" tab in inbasket, or \"Choose Columns\" in Meds & Orders section of the refill encounter.              Passed - Medication is active on med list        Passed - Patient is age 18 or older        Passed - Normal serum potassium on file in past 12 months     Recent Labs   Lab Test 06/21/19  1415   POTASSIUM 4.4                    Passed - Normal serum sodium on file in past 12 months     Recent Labs   Lab Test 06/21/19  1415                 Last Written Prescription Date:  3/06/2019  Last Fill Quantity: 45,  # refills: 1   Last office visit: 6/21/2019 with prescribing provider:  6/21/2019   Future Office Visit:   Next 5 appointments (look out 90 days)    Sep 03, 2019  2:30 PM CDT  [facility] Visit with Jorge [facility]man MD  [facility] [address] [id number] [phone]   Sep 18, 2019  9:30 AM CDT  Return Visit with Marie Gil PA-C  [facility] ([facility]) [address] [place] [id number] [phone] OPT [address]           "

## 2019-08-30 NOTE — TELEPHONE ENCOUNTER
Routing refill request to provider for review/approval because:  BP not at goal    Labs not in range: elevated CR.    Cheyanne HERRERA RN, BSN, PHN

## 2019-09-03 NOTE — TELEPHONE ENCOUNTER
Prior Authorization Approval    Authorization Effective Date: 3/13/2019  Authorization Expiration Date: 9/13/2019  Medication: Praluent 150mg/ mL pens  Approved Dose/Quantity: 2 per 28 days  Reference #:     Insurance Company: [employer] - Phone [phone] Fax [phone]  Expected CoPay: $8.50     CoPay Card Available: No    Foundation Assistance Needed:    Which Pharmacy is filling the prescription (Not needed for infusion/clinic administered): [facility] - [place], [place] - [address]  Pharmacy Notified:    Patient Notified:

## 2019-09-03 NOTE — PROGRESS NOTES
Subjective     Oscar Terrazas is a 85 year old male who presents to clinic today for the following health issues:    HPI     Hypertension Follow-up      Do you check your blood pressure regularly outside of the clinic? Yes     Are you following a low salt diet? Yes    Are your blood pressures ever more than 140 on the top number (systolic) OR more   than 90 on the bottom number (diastolic), for example 140/90? Yes    BP Readings from Last 2 Encounters:   08/16/19 (!) 154/68   06/21/19 144/66         How many servings of fruits and vegetables do you eat daily?  2-3    On average, how many sweetened beverages do you drink each day (soda, juice, sweet tea, etc)?   1-2    How many days per week do you miss taking your medication? 0    Pt's past medical history, family history, habits, medications and allergies were reviewed with the patient today.  See snap shot for  HCM status. Most recent lab results reviewed with pt. Problem list and histories reviewed & adjusted, as indicated.  Additional history as below:    Also following up regarding diabetes, CKD and eval mild skin rash on feet.  Patient continues to decline any consideration for dialysis.  Has previously seen nephrology.    Denies chest pain, shortness breath, abdominal pain.  Mild redness and dryness between toes right side. Sl controlled.  Continue current medication ight itching       Lab Results   Component Value Date     06/21/2019     Lab Results   Component Value Date    A1C 6.3 06/21/2019     Lab Results   Component Value Date    CHOL 124 04/17/2019     Lab Results   Component Value Date    LDL 57 04/17/2019     Lab Results   Component Value Date    HDL 34 04/17/2019     Lab Results   Component Value Date    TRIG 166 04/17/2019     Lab Results   Component Value Date    CR 3.48 06/21/2019     Lab Results   Component Value Date    ALT 17 06/21/2019     Lab Results   Component Value Date    AST 7 06/21/2019     Lab Results   Component Value Date     MICROL 872 02/27/2019     Lab Results   Component Value Date    TSH 0.49 02/27/2019       Additional ROS:   Constitutional, HEENT, Cardiovascular, Pulmonary, GI and , Neuro, MSK and Psych review of systems/symptoms are otherwise negative or unchanged from previous, except as noted above.      OBJECTIVE:  /54   Pulse 62   Temp 97.9  F (36.6  C) (Temporal)   Resp 16   Wt 90.3 kg (199 lb)   SpO2 98%   BMI 26.99 kg/m     Estimated body mass index is 26.99 kg/m  as calculated from the following:    Height as of 8/16/19: 1.829 m (6').    Weight as of this encounter: 90.3 kg (199 lb).     Neck: no adenopathy. Thyroid normal to palpation. No bruits  Pulm: Lungs clear to auscultation   CV: Regular rates and rhythm  GI: Soft, nontender, Normal active bowel sounds, No hepatosplenomegaly or masses palpable  Ext: Peripheral pulses intact. 1 plus BLE edema. Wearing compression stockings knee high bilaterally  Skin: Mild erythema and scaling in webbing between right 4th and 5th toes     Assessment/Plan: (See plan discussion below for further details)   1. Essential hypertension, benign   controlled.  Continue current medication    2. CKD (chronic kidney disease) stage 4, GFR 15-29 ml/min (H)  Stable.  Patient not interested in any consideration for future dialysis if necessary.  Follow-up labs ordered  - Basic metabolic panel; Future    3. 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  Diabetic control at goal overall per previous A1C . Continue current medication.  Future labs ordered  - Hemoglobin A1c; Future  - Basic metabolic panel; Future    4. Anemia in stage 4 chronic kidney disease (H)  Denies any blood in stools.  Future lab monitoring as ordered  - CBC with platelets; Future    5. Tinea pedis of right foot  Mild.  Will treat with over-the-counter clotrimazole    6. Edema,  unspecified type  Controlled with torsemide.  Continue current medication.  Future labs ordered  - Basic metabolic panel; Future      Plan discussion:   Combine  2/3 Clotrimazole (Lotrimin antifungal) and 1/3 Bacitracin antibacterial creams and put between the 4th and  5th toes right foot twice a day until resolved. If not improved in 2 weeks or worsens prior then call clinic  Continue current medications  Low salt diet and continue compression stockings  Nonfasting  A1C, BMP,  CBC labs in early December. See me a few days  after lab results to review results and follow-up blood pressure   I would recommend you receive an influenza (flu) vaccine in the Fall  (October or November)         Jorge Hillman MD  Internal Medicine Department  [facility]    (Chart documentation was completed, in part, with frestyl voice-recognition software. Even though reviewed, some grammatical, spelling, and word errors may remain.)

## 2019-09-03 NOTE — PATIENT INSTRUCTIONS
Combine  2/3 Clotrimazole (Lotrimin antifungal) and 1/3 Bacitracin antibacterial creams and put between the 4th and  5th toes right foot twice a day until resolved. If not improved in 2 weeks or worsens prior then call clinic  Continue current medications  Low salt diet and continue compression stockings  Nonfasting  A1C, BMP,  CBC labs in early December. See me a few days  after lab results to review results and follow-up blood pressure   I would recommend you receive an influenza (flu) vaccine in the Fall  (October or November)

## 2019-09-13 PROBLEM — N28.9 HYPERVOLEMIA ASSOCIATED WITH RENAL INSUFFICIENCY: Status: ACTIVE | Noted: 2019-01-01

## 2019-09-13 PROBLEM — E87.70 HYPERVOLEMIA ASSOCIATED WITH RENAL INSUFFICIENCY: Status: ACTIVE | Noted: 2019-01-01

## 2019-09-14 NOTE — PLAN OF CARE
DATE & TIME: 09/13/19 night    Cognitive Concerns/ Orientation : A/O x 4  BEHAVIOR & AGGRESSION TOOL COLOR: green  CIWA SCORE: na   ABNL VS/O2: VSS, 1.5LPM via nasal cannula 96%  MOBILITY: Assist-1, gaitbelt  PAIN MANAGMENT: denies this shift  DIET: Renal diet, 2 gram sodium, lo saturated fat  BOWEL/BLADDER: urinal at bedside, no BM this shift  ABNL LAB/BG: Hgb 9.3, creatinine 4.03, GFR 13, troponin 0.411,   DRAIN/DEVICES: PIV saline locked  TELEMETRY RHYTHM: SR with BBB  SKIN: Bruising on arms, pale skin  TESTS/PROCEDURES:   D/C DAY/GOALS/PLACE: Pending resolution of hypoxia/shortness of breath and diuresis to home with daughter  OTHER IMPORTANT INFO: Patient anxious/concerned about breathing ability though sat-ing in the mid 90s

## 2019-09-14 NOTE — PROGRESS NOTES
Pt on 1 LPM NC with moisture sat 97%. Stat neb given for chest tightness/pressure. Pt lung sounds diminished.  Cj  RT

## 2019-09-14 NOTE — ED TRIAGE NOTES
pt c/o generalized weakness and loss of appetite x 3 weeks - worse today. fingerstick 190, hx of diabetes (controlled by diet), MI, stage 5 renal disease.

## 2019-09-14 NOTE — PLAN OF CARE
CR/PT: Orders received, chart reviewed, discussed with RN. Troponin trending up, awaiting cardiology consult. Plan to hold CR/PT until consults/work-up completed. RN in agreement.

## 2019-09-14 NOTE — PROVIDER NOTIFICATION
Trop of 3.6, Trinity Simsr notified at 0830 9/14/2019, will start on lasix drip, cardiology consult placed.

## 2019-09-14 NOTE — ED PROVIDER NOTES
History     Chief Complaint:  Generalized Weakness (pt c/o generalized weakness and loss of appetite x 3 weeks - worse today. fingerstick 190, hx of diabetes (controlled by diet), MI, stage 5 renal disease.)      HPI   Oscar Terrazas is a 85 year old male who presents with worsening weakness and shortness of breath.  Progressive over weeks.  Last few nights has not been able to sleep on back because of trouble breathing.  Also coughing, worse at night.  Throat feels dry.  Denies fevers.  Does not currently smoke.  Decreased appetite but no nausea, vomiting, diarrhea.  No urinary symptoms.  No chest pain.  Daughter here too; they live together.    Allergies:  Advair  Amlodipine  Azithromycin  Clarithromycin  Hydrochlorothiazide  Lisinopril  Moxifloxacin Hydrochloride  Penicillins  Pravastatin  Vioxx    Medications:    Praluent  Zyloprim  Aspirin 81 mg  Zyrtec  Plavix  Apresoline  Insulin  Synthroid  Lopressor  Nitrostat  Uniphyl  Demadex    Past Medical History:    MI  Allergic rhinitis  Anemia  CAD  CKD  CTS  Cervical disc degeneration  Diaphragmatic hernia  Esophageal reflux  Hypertension  Hyperlipidemia  Hypothyroidism  Idiopathic cyclic edema  Asthma  Osteoarthrosis  Pneumonia  Proteinuria  PVD  Hearing loss  Subarachnoid bleed  Diabetes Type II  Peripheral neuropathy  Venous insufficiency    Past Surgical History:    Appendectomy  Hemorrhoids  Cervical strain  Rotator cuff surgery  IOL left eye  Coronary artery bypass  Heart stent  IOL right eye    Family History:    Jaundice  Alcohol/Drug    Social History:  The patient was accompanied to the ED by his daughter.  Smoking Status: Former  Smokeless Tobacco: Never  Alcohol Use: No  Drug Use: No  Marital Status:        Review of Systems  Ten system ROS reviewed and is negative except as above      Physical Exam   Vitals:  Patient Vitals for the past 24 hrs:   BP Temp Temp src Heart Rate Resp SpO2 Height Weight   09/13/19 1953 (!) 143/72 98.1  F (36.7  C)  Temporal 84 18 90 % 1.829 m (6') 93 kg (205 lb)     Physical Exam  Eyes:  Sclera white; Pupils are equal and round  ENT:    External ears and nares normal  CV:  Rate as above with regular rhythm     Symmetric radial pulses    1+ BLE edema  Resp:  Breath sounds clear and equal bilaterally  GI:  Abdomen is soft, non-tender  MS:  Moves all extremities  Skin:  Warm and dry  Neuro:  Speech is normal and fluent. Alert.  Strength symmetric and 5/5 x4.        Emergency Department Course   ECG:  Time: 2004  Vent. Rate 76 bpm. RI interval *. QRS duration 144. QT/QTc 446/501.  Read time: 2019  Interpretation: Wide QRS rhythm, left axis deviation, LBBB, abnormal EKG.  No significant change vs 6/14/18.  Interpreted by Dr. Marcial      Imaging:  Radiographic findings were communicated with the patient who voiced understanding of the findings.    XR Chest 2 Views  Decreased interstitial infiltrates compared to previous,  it's unclear if this is edema that is improved compared to previous. A  component of infectious infiltrate would be difficult to exclude.  Reading per radiology.    Laboratory:  CMP: Chloride 111 (H) Glucose 180 (H) BUN 83 (H) Creatinine 4.03 (H) GRF 13 o/w  AWNL   Troponin (Collected 2005): 0.465 (H)    BNP: 74796 (H)  Magnesium: 2.7 (H)    Interventions:  Medications   ipratropium - albuterol 0.5 mg/2.5 mg/3 mL (DUONEB) neb solution 3 mL (3 mLs Nebulization Given 9/13/19 2048)   aspirin (ASA) chewable tablet 324 mg (324 mg Oral Given 9/13/19 2113)   furosemide (LASIX) injection 60 mg (60 mg Intravenous Given 9/13/19 2113)         Impression & Plan      9:15 PM I spoke with Dr. Rodriguez regarding admission.     Medical Decision Making:  Positional shortness of breath and crackles on exam concerning for pulmonary edema.  Differential also includes dysrhythmia, ischemia, bronchitis, pleural effusion, pneumonia, electrolyte abnormalities.  Work up c/w pulmonary edema and demand ischemia vs baseline troponin elevation in  the setting of CKD.  Given aspirin and lasix.  Admission arranged.    Diagnosis:    ICD-10-CM    1. Hypoxia R09.02    2. Acute pulmonary edema (H) J81.0    3. Elevated troponin R74.8    4. Chronic kidney disease, unspecified CKD stage N18.9        Jana Marcial MD, MD  9/13/2019    EMERGENCY DEPARTMENT       Jana Marcial MD  09/13/19 2228

## 2019-09-14 NOTE — CONSULTS
RENAL CONSULTATION NOTE    REFERRING MD:  Dillon Rodriguez MD    REASON FOR CONSULTATION:  hypervolemia w/ chronic renal failure    HPI:  85 y.o pleasant gentleman with advanced CKD V, CAD s/p CABG, grade II DD, HTN and diabetes, who was admitted on 9/13 for shortness of breath. Patient say she has had worsening shortness of breath with exertion and worsening orthopnea since the summer. Yesterday, he developed shortness of breath with rest. He did not have any other symptoms with the SOB. He denies chest pain, N/V. No worsening productive cough. He says he watches his salt intake. He did not notice a drop in his urine output. He denies dysuria and hematuria. He denies weight gain. He denies worsening LE edema.     BP was okay and afebrile in the ER. O2 sat was in the 90s. CXR showed pulmonary edema. GNP was high at 20K. He was started on Lasix gtt. Troponin is up to 3.6 today. Again no chest pain. He is on heparin gtt. He is feeling better.   He is getting an echocardiogram.     ROS:  A complete review of systems was performed and is negative except as noted above.    PMH:    Past Medical History:   Diagnosis Date     Acute myocardial infarction, unspecified site, episode of care unspecified      Allergic rhinitis, cause unspecified      Anemia 3/6/2014     CAD (coronary artery disease)     1996 CABG - LIMA to LAD, SVG to OM, 2007 Cath - KANU to Left main and ostial/prox LAD, 2012 Cath - 80-90% stenosis SVG to OM - KANU placed, EF 50%     CKD (chronic kidney disease) stage 3, GFR 30-59 ml/min (H) 3/9/2014     CTS (carpal tunnel syndrome)     bilateral     Degeneration of cervical intervertebral disc      Diaphragmatic hernia without mention of obstruction or gangrene      Esophageal reflux      Essential hypertension, benign      Hyperlipidemia LDL goal <70      Hypothyroidism      Hypothyroidism, unspecified hypothyroidism type 1/14/2016     IDIOPATHIC CYCLIC EDEMA      Mild persistent asthma      Osteoarthrosis,  unspecified whether generalized or localized, unspecified site      Pneumonia, organism unspecified(486)      Proteinuria 5/17/2014     PVD (peripheral vascular disease) (H)      Sensorineural hearing loss, unspecified      Subarachnoid bleed (H)      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, with long-term current use of insulin (H) 6/30/2019     Unspecified hereditary and idiopathic peripheral neuropathy      Venous insufficiency        PSH:    Past Surgical History:   Procedure Laterality Date     C NONSPECIFIC PROCEDURE      appendectomy     C NONSPECIFIC PROCEDURE      hemorrhoids     C NONSPECIFIC PROCEDURE      cervical strain     C NONSPECIFIC PROCEDURE      rotator cuff surgery, right shoulder     C NONSPECIFIC PROCEDURE  2008    iol os     CORONARY ARTERY BYPASS  1996    LIMA to LAD, SVG to OM     HEART CATH STENT COR W/WO PTCA  2007    lad, left main cor artery stents/drug eluting     HEART CATH STENT COR W/WO PTCA  2012    80-90% stenosis SVG to OM - KANU placed, EF 50%     NONSPECIFIC PROCEDURE      iol od       MEDICATIONS:      allopurinol  100 mg Oral Daily     aspirin  81 mg Oral Daily     cetirizine  10 mg Oral QPM     clopidogrel  75 mg Oral Daily     hydrALAZINE  50 mg Oral TID     insulin aspart   Subcutaneous TID w/meals     insulin aspart  1-7 Units Subcutaneous TID AC     insulin aspart  1-5 Units Subcutaneous At Bedtime     insulin glargine  10 Units Subcutaneous At Bedtime     isosorbide mononitrate  120 mg Oral Daily     levothyroxine  75 mcg Oral Daily     metoprolol tartrate  50 mg Oral BID     perflutren diluted 1mL to 2mL with saline  9 mL Intravenous Once     sodium chloride (PF)  10 mL Intravenous Once     sodium chloride (PF)  3 mL Intracatheter Q8H     theophylline  400 mg Oral Daily       ALLERGIES:    Allergies as of 09/13/2019 - Reviewed 09/13/2019   Allergen Reaction Noted     Advair [fluticasone-salmeterol]  01/04/2007     Amlodipine  besylate  10/31/2012     Azithromycin GI Disturbance 2012     Clarithromycin  2002     Hydrochlorothiazide  2006     Lisinopril  2006     Moxifloxacin hydrochloride  2004     Penicillins  2002     Pravastatin Cramps 2018     Vioxx  2002       FH:    Family History   Problem Relation Age of Onset     Lung Cancer Daughter      Family History Negative Mother         broken hip     Jaundice Father      Alcohol/Drug Father      Ovarian Cancer Daughter      Family History Negative Daughter      Family History Negative Son        SH:    Social History     Socioeconomic History     Marital status:      Spouse name: Not on file     Number of children: Not on file     Years of education: Not on file     Highest education level: Not on file   Occupational History     Not on file   Social Needs     Financial resource strain: Not on file     Food insecurity:     Worry: Not on file     Inability: Not on file     Transportation needs:     Medical: Not on file     Non-medical: Not on file   Tobacco Use     Smoking status: Former Smoker     Packs/day: 1.00     Years: 14.00     Pack years: 14.00     Types: Cigarettes     Start date:      Last attempt to quit: 1966     Years since quittin.7     Smokeless tobacco: Never Used   Substance and Sexual Activity     Alcohol use: No     Drug use: No     Sexual activity: Not Currently   Lifestyle     Physical activity:     Days per week: Not on file     Minutes per session: Not on file     Stress: Not on file   Relationships     Social connections:     Talks on phone: Not on file     Gets together: Not on file     Attends [employer] service: Not on file     Active member of club or organization: Not on file     Attends meetings of clubs or organizations: Not on file     Relationship status: Not on file     Intimate partner violence:     Fear of current or ex partner: Not on file     Emotionally abused: Not on file     Physically  abused: Not on file     Forced sexual activity: Not on file   Other Topics Concern     Parent/sibling w/ CABG, MI or angioplasty before 65F 55M? Not Asked      Service Not Asked     Blood Transfusions Not Asked     Caffeine Concern No     Occupational Exposure Not Asked     Hobby Hazards Not Asked     Sleep Concern Yes     Stress Concern No     Weight Concern No     Special Diet No     Back Care Not Asked     Exercise Yes     Comment: bowling, 5 days week. yard work     Bike Helmet Not Asked     Seat Belt Not Asked     Self-Exams Not Asked   Social History Narrative     Not on file       PHYSICAL EXAM:    /55 (BP Location: Right arm)   Pulse 74   Temp 99.4  F (37.4  C) (Oral)   Resp 16   Ht 1.829 m (6')   Wt 87.8 kg (193 lb 9 oz)   SpO2 93%   BMI 26.25 kg/m    GENERAL: pleasant, alert, NAD  HEENT:  Normocephalic. No gross abnormalities.  Pupils equal.  MMM.    CV: RRR, no murmurs, no clicks, gallops, or rubs, no edema  RESP: Lungs sound pretty clear anteriorly. No wheezes.   GI: Abdomen o/s/nt/nd, BS present. No masses, organomegaly  MUSCULOSKELETAL: extremities nl - no gross deformities noted  SKIN: no suspicious lesions or rashes, dry to touch  NEURO:  Strength normal and symmetric. A/o x3. Grossly intact.   PSYCH: mood good, affect appropriate  LYMPH: No palpable ant/post cervical     LABS:      CBC RESULTS:     Recent Labs   Lab 09/14/19 0915 09/14/19 0451 09/13/19 2049   WBC 9.5 10.5 9.8   RBC 2.78* 3.03* 2.98*   HGB 8.5* 9.3* 9.3*   HCT 25.9* 28.3* 28.1*    231 230       BMP RESULTS:  Recent Labs   Lab 09/14/19 0451 09/13/19 2005    141   POTASSIUM 4.2 4.1   CHLORIDE 109 111*   CO2 24 25   BUN 84* 83*   CR 3.98* 4.03*   * 180*   LEV 8.8 9.1       INRNo lab results found in last 7 days.     DIAGNOSTICS:  Reviewed  CXR: Decreased interstitial infiltrates compared to previous,  it's unclear if this is edema that is improved compared to previous. A  component of  infectious infiltrate would be difficult to exclude.    A/P:  85 y.o man with CKD V and CAD, admitted for progressive SOB.     # Advanced CKD V: Patient states he does want dialysis. He understands the consequences of ESRD including death.     # SOB from pulmonary edema: Likely from advanced CKD.    -continue Lasix gtt   - goal 1 liter net negative daily    # CAD s/p CABG: Type 1 vs 2 NSTEMI? Pt does not want anything done including LHC.     # HTN:    -will put holding parameters with hydralazine    # Anemia:    -add Fe studies    # FEN: K and bicarb are okay.     Plan:  # I discussed the case with the patient and his daughter at the bedside. Patient does want any invasive procedure (LHC, dialysis) done. We will try to optimize his condition with mediations such as diuretics to get some fluid off. As patient does not want much to be done, I advised him to maybe talk with the palliative/hospice care team to help him plan for the future. Please call with any questions or concerns. Thank you.    Олег Moreno MD  [facility] - Nephrology  Office Phone: [phone]  Pager: [phone]

## 2019-09-14 NOTE — PLAN OF CARE
DATE & TIME: 9/14/2019 8027-8887    Cognitive Concerns/ Orientation : A&O x4   BEHAVIOR & AGGRESSION TOOL COLOR: green  CIWA SCORE: na    ABNL VS/O2: Tmax of 99.4, weaned off O2, sats >92%. Pt denied  SOB. Fine crackles on bases.   MOBILITY: Ax1  PAIN MANAGMENT: denied pain.   DIET: renal/cardiac/no caffeine. Fair appetite.  BOWEL/BLADDER: continent of B&B, strict I&O   ABNL LAB/BG: Trop of 3.6, again at 1418--5.903, /170. MD aware. Placed on lasix drip at 10 mg/hour and heparin drip at 1000 units/hour.   DRAIN/DEVICES: PIV on left arm, infusing.   TELEMETRY RHYTHM: NSR with RBBB   SKIN: redness blanchable on coccyx, encouraged to turn while in bed, able to reposition self in bed.   TESTS/PROCEDURES: ECHO done, result pending. Nephrology consulted, no change, but recommending palliative consult since pt refused HD. Cardiologist consulted, will transfer to heart center. Repeat trop at 1800. Hep 10 A at 1600  D/C DAY/GOALS/PLACE: pending   OTHER IMPORTANT INFO: [place] [facility] [place] RN, bed assigned to 244-1, ok to transfer pt after 1530.

## 2019-09-14 NOTE — ED NOTES
"[facility]  ED Nurse Handoff Report    ED Chief complaint: Generalized Weakness (pt c/o generalized weakness and loss of appetite x 3 weeks - worse today. fingerstick 190, hx of diabetes (controlled by diet), MI, stage 5 renal disease.)      ED Diagnosis:   Final diagnoses:   Hypoxia   Acute pulmonary edema (H)   Elevated troponin   Chronic kidney disease, unspecified CKD stage       Code Status: DNR / DNI    Allergies:   Allergies   Allergen Reactions     Advair [Fluticasone-Salmeterol]      cough;  insomnia, nightmares     Amlodipine Besylate      edema       Azithromycin GI Disturbance     Clarithromycin      gi     Hydrochlorothiazide      hctz + lisinopril-->inc creat, k+     Lisinopril      lisinopril + hctz --> inc creat, k+     Moxifloxacin Hydrochloride      clostridium diffile colitis     Penicillins      gen swelling     Pravastatin Cramps     Muscle cramps/spasm.  Stopped 2017     Vioxx      edema       Activity level - Baseline/Home:  Independent  Activity Level - Current:   Independent    Patient's Preferred language: english   Needed?: No    Isolation: No  Infection: Not Applicable  Bariatric?: No    Vital Signs:   Vitals:    09/13/19 1953   BP: (!) 143/72   Resp: 18   Temp: 98.1  F (36.7  C)   TempSrc: Temporal   SpO2: 90%   Weight: 93 kg (205 lb)   Height: 1.829 m (6')       Cardiac Rhythm: ,        Pain level:      Is this patient confused?: No   Does this patient have a guardian?  No         If yes, is there guardianship documents in the Epic \"Code/ACP\" activity?  N/A         Guardian Notified?  N/A  [place] - Suicide Severity Rating Scale Completed?  Yes  If yes, what color did the patient score?  White    Patient Report: Initial Complaint: pt alert, oriented - lives at home with daughter. Pt states for past 3 weeks, he has been feeling weak and having lack of appetite. Denies abdominal pain or nausea/vomiting. Pt states today, he is also feeling short of breath which " is why he called the ambulance. Hx of stage 5 renal disease (declined treatment), diabetes (controlled by diet), hx of MI and few stents.   Focused Assessment: scattered wheezing on auscultation, denies chest pain or abdominal pain.  Tests Performed: labs, xray  Abnormal Results: elevated troponin, abnormal kidney function, pulmonary edema that seems to be improving from last image  Treatments provided: albuterol duoneb    Family Comments: daughter at bedside     OBS brochure/video discussed/provided to patient/family: N/A              Name of person given brochure if not patient: n/a              Relationship to patient: n/a    ED Medications:   Medications   aspirin (ASA) chewable tablet 324 mg (has no administration in time range)   furosemide (LASIX) injection 60 mg (has no administration in time range)   ipratropium - albuterol 0.5 mg/2.5 mg/3 mL (DUONEB) neb solution 3 mL (3 mLs Nebulization Given 9/13/19 2048)       Drips infusing?:  No    For the majority of the shift this patient was Green.   Interventions performed were reassurance and information.    Severe Sepsis OR Septic Shock Diagnosis Present: No    To be done/followed up on inpatient unit: zak gaxiola  ED NURSE PHONE NUMBER: *[phone]

## 2019-09-14 NOTE — PROGRESS NOTES
Admission    Patient arrives to room 636-2 via cart from ED.  Care plan note: Pt arrived on unit- Assist 1 stand and pivot to bed. Sating at 95% on 2L O2. Voiding well in urinal.     Inpatient nursing criteria listed below were met:    PCD's Documented: No  Skin issues/needs documented :No  Isolation education started/completed No  Patient allergies verified with patient: No  Verified completion of [place] Risk Assessment Tool:  Yes  Verified completion of Guardianship screening tool: Yes  Fall Prevention: Care plan updated, Education given and documented Yes  Care Plan initiated: Yes  Home medications documented in belongings flowsheet: NA  Patient belongings documented in belongings flowsheet: No  Reminder note (belongings/ medications) placed in discharge instructions:No  Admission profile/ required documentation complete: No  Bedside Report Letter given and explained to patient Yes

## 2019-09-14 NOTE — PROGRESS NOTES
"[facility]    Hospitalist Progress Note    Assessment & Plan   Oscar Terrazas is a 85 year old male admitted on 9/13/2019. He presents with progressive orthopnea and shortness of breath in the setting of stage V renal failure.     Volume overload secondary to renal insufficiency, stage V chronic kidney disease: Creatinine currently in the 4 range.  -Nephrology consulted.  Patient confirms that he would not like to pursue dialysis  -Pending response to diuretics and cardiology/nephrology consultations, consider family meeting with transition to a more palliative goal of care, potentially even hospice given chronic renal failure with difficulty controlling volume.  -Continue allopurinol 100 mg daily  -Prior to admission torsemide at home dose of 10 mg alternating with 20 mg every other day held. Put on lasix gtt at 10 mg/hr.      Acute on chronic diastolic heart failure exacerbation: History of grade 2 diastolic dysfunction with mild ischemic cardiomyopathy with moderate hypokinesis of basal inferior and inferior lateral walls.  Last echocardiogram with ejection fraction of 55 to 60% in March 2018.  Suspect primary  of diastolic heart failure exacerbation is actually patient's chronic renal disease.  Pending ability/response to diuresis, patient may have a poor intermediate term prognosis from the standpoint of his presenting symptoms.  -IV diuresis as above  -Cardiology consulted  -Core clinic consultation  -TTE pending  -Hydralazine, Imdur, metoprolol.     Troponin elevation: Patient has known coronary artery disease with history of coronary artery bypass grafting to LAD, OM in 2006 as well as left main and LAD stenting in 2007, OM graft stent in 2012, as well as more recent non-ST elevation myocardial infarction which has been medically managed given chronic kidney disease.  Reports no change in his chronic anginal symptoms, though does have chest pressure which has been there for \"40 " "years.\" Trop now 3. Will start heparin gtt.   -TTE pending as above  -Cardiology consulted as above  -Continue aspirin 81 mg daily  -Continue Plavix 75 mg daily  -Continue Imdur 120 mg daily  -Continue metoprolol tartrate 50 mg twice daily  -Continue hydralazine 50 mg 3 times daily  -Patient is intolerant to statins     Hypertension:  -Continuing Imdur, hydralazine, metoprolol as above.  Last dose increase in hydralazine March 2019 through cardiology.     Chronic anemia: Suspect secondary to chronic kidney disease.  -Nephrology consulted as above; could potentially have some oncotic benefit to increased hemoglobin, though not necessarily in need of Epogen currently with hemoglobin persistently stable in the 9 range.     Hypothyroidism:  -Continue prior to admission levothyroxine 75 mcg daily     Type 2 diabetes: Last hemoglobin A1c 6.3.  -Hypoglycemia protocol in place  -Medium dose sliding scale insulin available if needed  -1 unit per 20 g carbohydrate prandial insulin.  -Lantus 10 units hs.  Prior to admission dose of 14 units.        Diet: Renal diet as tolerated.  DVT Prophylaxis: Pneumatic Compression Devices  Chicas Catheter: not present  Code Status: DNR/DNI.  Confirmed with patient on admission.     Disposition Plan     Expected discharge: 2 - 3 days, recommended to prior living arrangement once Hypoxia resolved, diuresing with improvement in his objective shortness of breath..  Amish Jessica MD   Text Page (7am to 6pm)    Interval History   Started on lasix gtt. Pending resolution of hypoxia/shortness of breath.     -Data reviewed today: I reviewed all new labs and imaging results over the last 24 hours. I personally reviewed no images or EKG's today.    Physical Exam   Temp: 97.7  F (36.5  C) Temp src: Oral BP: 138/77 Pulse: 89 Heart Rate: 85 Resp: 16 SpO2: 96 % O2 Device: Nasal cannula Oxygen Delivery: 1.5 LPM  Vitals:    09/13/19 1953 09/13/19 2300 09/14/19 0600   Weight: 93 kg (205 lb) 87.9 kg (193 lb " 12.8 oz) 87.8 kg (193 lb 9 oz)     Vital Signs with Ranges  Temp:  [97.7  F (36.5  C)-98.1  F (36.7  C)] 97.7  F (36.5  C)  Pulse:  [77-92] 89  Heart Rate:  [78-85] 85  Resp:  [16-24] 16  BP: (138-169)/(72-95) 138/77  SpO2:  [90 %-96 %] 96 %  I/O last 3 completed shifts:  In: -   Out: 950 [Urine:950]    General Appearance: Fairly robust appearing 85-year-old male  Eyes: No scleral icterus or injection.  HEENT: Normocephalic  Respiratory: Breath sounds with crackles throughout lung fields bilaterally, no wheezes.  Cardiovascular: Regular rate and rhythm with heart rate in the 80s.  2/6 systolic murmur best appreciated at apex and left upper sternal border.  GI: Abdomen obese, soft, no palpable mass.  Lower abdomen pitting edema  Lymph/Hematologic: Patient with compression stockings on, and lower extremity edema is minimal secondary to this.  Does have trace pitting edema into back and low abdomen present  Skin: No rash appreciated  Musculoskeletal: Muscular tone largely intact  Neurologic: Alert, conversant, appropriate in conversation.  Psychiatric: Blunted affect.  Pleasant    Medications     - MEDICATION INSTRUCTIONS -       ACE/ARB/ARNI NOT PRESCRIBED         allopurinol  100 mg Oral Daily     aspirin  81 mg Oral Daily     cetirizine  10 mg Oral QPM     clopidogrel  75 mg Oral Daily     furosemide  60 mg Intravenous BID 09 12     hydrALAZINE  50 mg Oral TID     insulin aspart   Subcutaneous TID w/meals     insulin aspart  1-7 Units Subcutaneous TID AC     insulin aspart  1-5 Units Subcutaneous At Bedtime     insulin glargine  10 Units Subcutaneous At Bedtime     isosorbide mononitrate  120 mg Oral Daily     levothyroxine  75 mcg Oral Daily     metoprolol tartrate  50 mg Oral BID     sodium chloride (PF)  3 mL Intracatheter Q8H     theophylline  400 mg Oral Daily       Data   Recent Labs   Lab 09/14/19  0451 09/14/19  0011 09/13/19 2049 09/13/19 2005   WBC 10.5  --  9.8  --    HGB 9.3*  --  9.3*  --    MCV 93   --  94  --      --  230  --      --   --  141   POTASSIUM 4.2  --   --  4.1   CHLORIDE 109  --   --  111*   CO2 24  --   --  25   BUN 84*  --   --  83*   CR 3.98*  --   --  4.03*   ANIONGAP 7  --   --  5   LEV 8.8  --   --  9.1   *  --   --  180*   ALBUMIN  --   --   --  3.6   PROTTOTAL  --   --   --  7.7   BILITOTAL  --   --   --  0.6   ALKPHOS  --   --   --  100   ALT  --   --   --  25   AST  --   --   --  9   TROPI 1.040* 0.411*  --  0.465*       Imaging:   Recent Results (from the past 24 hour(s))   XR Chest 2 Views    Narrative    CHEST TWO VIEWS 9/13/2019 8:31 PM     HISTORY: Hypoxia, crackles, wheeze.    COMPARISON: June 12, 2018     FINDINGS: Decreased interstitial changes since the comparison study. A  component of infectious infiltrate would be difficult to exclude. The  cardiac silhouette is not enlarged. Pulmonary vasculature is  unremarkable.      Impression    IMPRESSION: Decreased interstitial infiltrates compared to previous,  it's unclear if this is edema that is improved compared to previous. A  component of infectious infiltrate would be difficult to exclude.    LAYA JORGENSEN MD

## 2019-09-14 NOTE — PROGRESS NOTES
RECEIVING UNIT ED HANDOFF REVIEW    ED Nurse Handoff Report was reviewed by: Leticia Salas RN on September 13, 2019 at 10:10 PM

## 2019-09-14 NOTE — CONSULTS
"CLINICAL NUTRITION SERVICES  -  ASSESSMENT NOTE      Malnutrition:   % Weight Loss:  Weight loss does not meet criteria for malnutrition - see wt trending   % Intake:  No decreased intake noted  Subcutaneous Fat Loss:  Orbital region mild depletion (not significant alone to contribute to maln dx)  Muscle Loss:  Clavicle bone region mild depletion (not significant alone to contribute to maln dx)  Fluid Retention:  None noted    Malnutrition Diagnosis: Patient does not meet two of the above criteria necessary for diagnosing malnutrition         REASON FOR ASSESSMENT  Oscar Terrazas is a 85 year old male seen by Registered Dietitian for Admission Nutrition Risk Screen for unintentional loss of 10# or more in the past two months and Provider Order - Nutrition Education - Congestive Heart Failure (CHF) - Dietitian to instruct patient on 2 gram sodium diet      NUTRITION HISTORY  - Information obtained from patient and daughter:  - Pt resides with daughter  - Acknowledges eating the same thing everyday, as follows~  - Breakfast: oatmeal with blueberries  - Lunch: peanut butter and jelly sandwich with diet soda  - Dinner: homemade orange chicken with mixed vegetables and/or rice   - Reports a decreased appetite 2/2 SOB for a few weeks PTA, \"But I still eat because my daughter makes me\"  - Denies any weight loss PTA. Daughter believes pt weighs between 198-200#  - No known food allergies    Per chart review, patient received formal CHF diet teaching ~1 year  - H/o stage 2 renal disease (pt unlikely to pursue dialysis), type 2 diabetes on insulin     CURRENT NUTRITION ORDERS  Diet Order:     Low Saturated Fat/2400 mg Sodium and Renal     Current Intake/Tolerance:  Patient reports that he has no teeth and that he needs to order foods that \"melt in the mouth.\"  He was being fed jello and soup by daughter during our visit today. 75% of 1 meal recorded thus far. He is receiving assistance from room service for meal orders " "which has been helpful.    Daughter denies receiving diet explanation for pt's renal diet. Accepting handouts/resources at this time.      NUTRITION FOCUSED PHYSICAL ASSESSMENT FOR DIAGNOSING MALNUTRITION)  Completed:  Yes Visual assessment only (poor patient participation d/t eating lunch)         Observed:    Muscle wasting (refer to documentation in Malnutrition section) and Subcutaneous fat loss (refer to documentation in Malnutrition section)    Obtained from Chart/Interdisciplinary Team:  None noted     ANTHROPOMETRICS  Height: 6' 0\"  Weight: 193 lbs 9.02 oz (87.8 kg)  Body mass index is 26.25 kg/m .  Weight Status:  Overweight BMI 25-29.9  IBW: 80.9 kg   % IBW: 108%  Weight History: Pt slightly down from baseline, although pt and daughter deny true weight loss PTA. Over the past 3 months, patient appears down ~15# or 7%  Wt Readings from Last 10 Encounters:   09/14/19 87.8 kg (193 lb 9 oz)   09/03/19 90.3 kg (199 lb)   08/16/19 91 kg (200 lb 11.2 oz)   06/21/19 94.8 kg (208 lb 14.4 oz)   03/11/19 92.9 kg (204 lb 12.8 oz)   02/27/19 92.5 kg (204 lb)   12/19/18 93 kg (205 lb)   09/24/18 91.6 kg (202 lb)   06/28/18 91.4 kg (201 lb 8 oz)   06/26/18 91.5 kg (201 lb 12.8 oz)       LABS  Labs reviewed  BUN 84 (H), Cr 3.98 (H)    Lab Results   Component Value Date    A1C 6.3 06/21/2019    A1C 6.8 02/27/2019    A1C 6.9 12/17/2018    A1C 5.9 06/13/2018    A1C 6.3 03/13/2018     Recent Labs   Lab 09/14/19  1144 09/14/19  0828 09/14/19  0451 09/14/19  0201 09/13/19 2005   GLC  --   --  192*  --  180*   * 147*  --  192*  --        MEDICATIONS  Medications reviewed      ASSESSED NUTRITION NEEDS PER APPROVED PRACTICE GUIDELINES:    Dosing Weight 87.8 kg - current  Estimated Energy Needs: 3690-6163 kcals (20-25 Kcal/Kg)  Justification: maintenance  Estimated Protein Needs:  grams protein (1-1.2 g pro/Kg)  Justification: CKD and preservation of lean body mass    MALNUTRITION:  % Weight Loss:  Weight loss does " not meet criteria for malnutrition - see wt trending   % Intake:  No decreased intake noted  Subcutaneous Fat Loss:  Orbital region mild depletion (not significant alone to contribute to maln dx)  Muscle Loss:  Clavicle bone region mild depletion (not significant alone to contribute to maln dx)  Fluid Retention:  None noted    Malnutrition Diagnosis: Patient does not meet two of the above criteria necessary for diagnosing malnutrition     NUTRITION DIAGNOSIS:  Food and nutrition-related knowledge deficit related to no prior renal diet teaching received as evidenced by patient/family report       NUTRITION INTERVENTIONS  Recommendations / Nutrition Prescription  Continue renal/low sodium diet per team, encouraging PO    Implementation  Nutrition education: Provided education on renal diet    Assessed learning needs, learning preferences, and willingness to learn    Nutrition Education (Content):  a) Provided handouts: Tips for a Low-Sodium Diet, Managing Your Phosphorus Intake, Managing Your Potassium Intake   b) Discussed the importance of limiting these nutrients in the diet. Explained foods high in Na, K, and P04 to limit     Nutrition Education (Application):  a) Discussed eating habits and recommended alternative food choices  b) Encouraged the use of a food log to track nutrients consumed     Patient verbalizes understanding of diet    Anticipate fair-good compliance    Diet Education - refer to Education Flowsheet      Nutrition Goals  Pt to name 3 food items high in sodium       MONITORING AND EVALUATION:  Progress towards goals will be monitored and evaluated per protocol and Practice Guidelines        Lauryn Cooper RD, LD  Clinical Dietitian

## 2019-09-14 NOTE — H&P
[facility]    History and Physical - Hospitalist Service       Date of Admission:  9/13/2019    Assessment & Plan   Oscar Terrazas is a 85 year old male admitted on 9/13/2019. He presents with progressive orthopnea and shortness of breath in the setting of stage V renal failure.    Volume overload secondary to renal insufficiency, stage V chronic kidney disease: Creatinine currently in the 4 range.  -Nephrology consulted.  Patient confirms that he would not like to pursue dialysis  -Pending response to diuretics and cardiology/nephrology consultations, consider family meeting with transition to a more palliative goal of care, potentially even hospice given chronic renal failure with difficulty controlling volume.  -Intake and output  -Daily weights  -Continue allopurinol 100 mg daily  -Prior to admission torsemide at home dose of 10 mg alternating with 20 mg every other day held  -IV Lasix 60 mg given in the emergency department.  Continue 60 mg IV twice daily while assessing urinary response.  Will need higher doses of loop diuretics to facilitate diuresis in the setting of chronic kidney disease, though will also need to monitor to ensure not worsening patient's already limited GFR with overdiuresis.  -Continue to elevate lower extremities as able  -We will need to trend potassium, replace as needed given chronic renal disease with diuresis.  No protocol in place.    Acute on chronic diastolic heart failure exacerbation: History of grade 2 diastolic dysfunction with mild ischemic cardiomyopathy with moderate hypokinesis of basal inferior and inferior lateral walls.  Last echocardiogram with ejection fraction of 55 to 60% in March 2018.  Suspect primary  of diastolic heart failure exacerbation is actually patient's chronic renal disease.  Pending ability/response to diuresis, patient may have a poor intermediate term prognosis from the standpoint of his presenting symptoms.  -IV diuresis as  "above  -Cardiology consulted  -Core clinic consultation  -TTE pending  -Hydralazine, Imdur, metoprolol.    Troponin elevation: Patient has known coronary artery disease with history of coronary artery bypass grafting to LAD, OM in 2006 as well as left main and LAD stenting in 2007, OM graft stent in 2012, as well as more recent non-ST elevation myocardial infarction which has been medically managed given chronic kidney disease.  Reports no change in his chronic anginal symptoms, though does have chest pressure which has been there for \"40 years.\"  -Cardiac monitoring   -I will obtain additional troponin to ensure no hyperbolic increase.  Anticipate chronic demand ischemia with poor clearance in the setting of renal failure.  -TTE pending as above  -Received a full dose aspirin in the emergency department.  I have not initiated heparinization outside of change in symptoms.  -Cardiology consulted as above  -Continue aspirin 81 mg daily  -Continue Plavix 75 mg daily  -Continue Imdur 120 mg daily  -Continue metoprolol tartrate 50 mg twice daily  -Continue hydralazine 50 mg 3 times daily  -Patient is intolerant to statins  -Prior to admission Repatha held, resume at discharge.  Patient is not due for 1 week.    Hypertension:  -Continuing Imdur, hydralazine, metoprolol as above.  Last dose increase in hydralazine March 2019 through cardiology.    Chronic anemia: Suspect secondary to chronic kidney disease.  -Nephrology consulted as above; could potentially have some oncotic benefit to increased hemoglobin, though not necessarily in need of Epogen currently with hemoglobin persistently stable in the 9 range..    Hypothyroidism:  -Continue prior to admission levothyroxine 75 mcg daily    Type 2 diabetes: Last hemoglobin A1c 6.3.  -Hypoglycemia protocol in place  -Medium dose sliding scale insulin available if needed  -1 unit per 20 g carbohydrate prandial insulin.  -Lantus 10 units hs.  Prior to admission dose of 14 " units.       Diet: Renal diet as tolerated.  DVT Prophylaxis: Pneumatic Compression Devices  Chicas Catheter: not present  Code Status: DNR/DNI.  Confirmed with patient on admission.    Disposition Plan   Expected discharge: 2 - 3 days, recommended to prior living arrangement once Hypoxia resolved, diuresing with improvement in his objective shortness of breath..  Entered: Dillon Rodriguez MD 09/13/2019, 9:49 PM     The patient's care was discussed with the Patient and Dr. Marcial in the emergency department.    Dillon Rodriguez MD  [facility]    ______________________________________________________________________    Chief Complaint   Patient presents to the emergency department with complaints of shortness of breath which has been slowly progressive, orthopnea.    History is obtained from patient, chart review, discussion with Dr. Marcial in the emergency department.    History of Present Illness   Oscar Terrazas is a 85 year old male who presents to the emergency department with complaints of progressive shortness of breath over the preceding weeks, orthopnea.    Patient borderline hypoxic in the emergency department with 90% saturations on room air; patient prefers to be sitting up secondary to orthopnea.  He has not noted a significant change in his lower extremity edema, though wears compression stockings consistently.  Patient does have some pitting edema in his back and abdomen suggestive of increased volume.  Patient is typically on torsemide alternating 10 mg with 20 mg every other day.  He describes a response to diuretic therapy, and was given 60 mg IV Lasix in the emergency department with subsequent need to urinate.  When describing his typical urine output in a day, with we come to the estimate that he produces approximately 750-1000 mL of urine per day.  He has not noted any significant change in his urine output.  Patient once again confirms with me that he would not want to pursue  dialysis.  I believe his wife was actually on dialysis historically.    Chest x-ray demonstrates interstitial infiltrates, most consistent with edema.  Patient has had a cough with white sputum production, though no fevers or chills.  Orthopnea, lower extremity edema, known history of diastolic heart failure exacerbations, and stage V renal disease with elevated BNP suggests pulmonary edema related to volume overload is the most likely clinical scenario.    Patient has chest discomfort.  He describes this as a pressure in his central chest.  Has a long history of angina, often exacerbated by activity.  Note history of coronary artery disease including coronary artery bypass grafting, multiple stents, and most recently with an STEMI which was medically treated.  Elevated troponin in the emergency department, the reports that his current chest pressure has been present for 40 years and is unchanged.  He is not concerned about his chest discomfort when we discussed this in the context of his presentation.    Review of Systems    The 10 point Review of Systems is negative other than noted in the HPI or here.  No fevers or chills  Patient occasionally has bouts of anxiety where he feels as though he is going to die related to his difficulty breathing.  Chest pressure which is chronic and persistent for many years, unchanged per patient report.  No diarrhea    Past Medical History    I have reviewed this patient's medical history and updated it with pertinent information if needed.   Past Medical History:   Diagnosis Date     Acute myocardial infarction, unspecified site, episode of care unspecified      Allergic rhinitis, cause unspecified      Anemia 3/6/2014     CAD (coronary artery disease)     1996 CABG - LIMA to LAD, SVG to OM, 2007 Cath - KNAU to Left main and ostial/prox LAD, 2012 Cath - 80-90% stenosis SVG to OM - KANU placed, EF 50%     CKD (chronic kidney disease) stage 3, GFR 30-59 ml/min (H) 3/9/2014     CTS  (carpal tunnel syndrome)     bilateral     Degeneration of cervical intervertebral disc      Diaphragmatic hernia without mention of obstruction or gangrene      Esophageal reflux      Essential hypertension, benign      Hyperlipidemia LDL goal <70      Hypothyroidism      Hypothyroidism, unspecified hypothyroidism type 1/14/2016     IDIOPATHIC CYCLIC EDEMA      Mild persistent asthma      Osteoarthrosis, unspecified whether generalized or localized, unspecified site      Pneumonia, organism unspecified(486)      Proteinuria 5/17/2014     PVD (peripheral vascular disease) (H)      Sensorineural hearing loss, unspecified      Subarachnoid bleed (H)      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, with long-term current use of insulin (H) 6/30/2019     Unspecified hereditary and idiopathic peripheral neuropathy      Venous insufficiency        Past Surgical History   I have reviewed this patient's surgical history and updated it with pertinent information if needed.  Past Surgical History:   Procedure Laterality Date     C NONSPECIFIC PROCEDURE      appendectomy     C NONSPECIFIC PROCEDURE      hemorrhoids     C NONSPECIFIC PROCEDURE      cervical strain     C NONSPECIFIC PROCEDURE      rotator cuff surgery, right shoulder     C NONSPECIFIC PROCEDURE  2008    iol os     CORONARY ARTERY BYPASS  1996    LIMA to LAD, SVG to OM     HEART CATH STENT COR W/WO PTCA  2007    lad, left main cor artery stents/drug eluting     HEART CATH STENT COR W/WO PTCA  2012    80-90% stenosis SVG to OM - KANU placed, EF 50%     NONSPECIFIC PROCEDURE      iol od       Social History   I have reviewed this patient's social history and updated it with pertinent information if needed.  Social History     Tobacco Use     Smoking status: Former Smoker     Packs/day: 1.00     Years: 14.00     Pack years: 14.00     Types: Cigarettes     Start date: 1952     Last attempt to quit: 1966     Years since  quittin.7     Smokeless tobacco: Never Used   Substance Use Topics     Alcohol use: No     Drug use: No       Family History   I have reviewed this patient's family history and updated it with pertinent information if needed.   Family History   Problem Relation Age of Onset     Lung Cancer Daughter      Family History Negative Mother         broken hip     Jaundice Father      Alcohol/Drug Father      Ovarian Cancer Daughter      Family History Negative Daughter      Family History Negative Son        Prior to Admission Medications   Prior to Admission Medications   Prescriptions Last Dose Informant Patient Reported? Taking?   ACCU-CHEK COMPACT PLUS test strip  Daughter No No   Sig: CHECK BLOOD SUGAR TWICE DAILY TO THREE TIMES DAILY   Cod Liver Oil 1000 MG CAPS 2019 at am Daughter Yes Yes   Sig: Take 1 capsule by mouth daily    Cyanocobalamin (VITAMIN B 12) 250 MCG LOZG 2019 at am Daughter Yes Yes   Sig: Take 500 mcg by mouth 2 times daily    alirocumab (PRALUENT) 150 MG/ML injectable pen 2019 at am Daughter No No   Sig: Inject 1 mL (150 mg) Subcutaneous every 14 days   allopurinol (ZYLOPRIM) 100 MG tablet 2019 at am Daughter No Yes   Sig: TAKE 2 TABLETS(200 MG) BY MOUTH DAILY   aspirin 81 MG tablet 2019 at am Daughter Yes Yes   Sig: Take 1 tablet by mouth daily. with food   cetirizine (ZYRTEC) 10 MG tablet 2019 at pm Daughter No Yes   Sig: TAKE 1 TABLET(10 MG) BY MOUTH EVERY EVENING   clopidogrel (PLAVIX) 75 MG tablet 2019 at am Daughter No Yes   Sig: TAKE 1 TABLET(75 MG) BY MOUTH DAILY   diphenhydrAMINE-acetaminophen (TYLENOL PM)  MG tablet 2019 at pm Daughter Yes Yes   Sig: Take 1 tablet by mouth nightly as needed for sleep   hydrALAZINE (APRESOLINE) 50 MG tablet 2019 at x2 Daughter No Yes   Sig: Take 1 tablet (50 mg) by mouth 3 times daily   insulin NPH (HUMULIN N/NOVOLIN N VIAL) 100 UNIT/ML vial Past Month at Unknown time Daughter No Yes   Si units at   "suppertime   insulin syringe-needle U-100 (BD INSULIN SYRINGE) 29G X 1/2\" 0.5 ML  Daughter No No   Sig: Use one syringe 2 daily or as directed.   isosorbide mononitrate CR (IMDUR) 120 MG 24 HR ER tablet 9/13/2019 at am Daughter No Yes   Sig: TAKE 1 TABLET BY MOUTH DAILY   levothyroxine (SYNTHROID/LEVOTHROID) 75 MCG tablet 9/13/2019 at am Daughter No Yes   Sig: Take 1 tablet (75 mcg) by mouth daily   metoprolol tartrate (LOPRESSOR) 50 MG tablet 9/13/2019 at am Daughter No Yes   Sig: TAKE 1 TABLET BY MOUTH TWICE DAILY   nitroGLYcerin (NITROSTAT) 0.4 MG sublingual tablet prn at prn Daughter No Yes   Sig: For chest pain place 1 tablet under the tongue every 5 minutes for 3 doses. If symptoms persist 5 minutes after 1st dose call 911.   theophylline (UNIPHYL) 400 MG 24 hr tablet 9/13/2019 at am Daughter No Yes   Sig: Take 1 tablet (400 mg) by mouth daily   torsemide (DEMADEX) 10 MG tablet Unknown at Unknown time Daughter Yes No   Sig: Take 10 mg by mouth every other day (days not taking 20mg)   torsemide (DEMADEX) 20 MG tablet Unknown at Unknown time Daughter No No   Sig: TAKE 1 TABLET BY MOUTH EVERY OTHER DAY      Facility-Administered Medications: None     Allergies   Allergies   Allergen Reactions     Advair [Fluticasone-Salmeterol]      cough;  insomnia, nightmares     Amlodipine Besylate      edema       Azithromycin GI Disturbance     Clarithromycin      gi     Hydrochlorothiazide      hctz + lisinopril-->inc creat, k+     Lisinopril      lisinopril + hctz --> inc creat, k+     Moxifloxacin Hydrochloride      clostridium diffile colitis     Penicillins      gen swelling     Pravastatin Cramps     Muscle cramps/spasm.  Stopped 2017     Vioxx      edema       Physical Exam   Vital Signs: Temp: 98.1  F (36.7  C) Temp src: Temporal BP: (!) 141/73 Pulse: 80 Heart Rate: 78 Resp: 24 SpO2: 94 % O2 Device: None (Room air)    Weight: 205 lbs 0 oz    General Appearance: Fairly robust appearing 85-year-old male  Eyes: No " scleral icterus or injection.  HEENT: Normocephalic  Respiratory: Breath sounds with crackles throughout lung fields bilaterally, no wheezes.  Cardiovascular: Regular rate and rhythm with heart rate in the 80s.  2/6 systolic murmur best appreciated at apex and left upper sternal border.  GI: Abdomen obese, soft, no palpable mass.  Lower abdomen pitting edema  Lymph/Hematologic: Patient with compression stockings on, and lower extremity edema is minimal secondary to this.  Does have trace pitting edema into back and low abdomen present  Skin: No rash appreciated  Musculoskeletal: Muscular tone largely intact  Neurologic: Alert, conversant, appropriate in conversation.  Psychiatric: Blunted affect.  Pleasant    Data   Data reviewed today: I reviewed all medications, new labs and imaging results over the last 24 hours. I personally reviewed the chest x-ray image(s) showing What appears to be pulmonary edema.    Recent Labs   Lab 09/14/19  0011 09/13/19 2049 09/13/19 2005   WBC  --  9.8  --    HGB  --  9.3*  --    MCV  --  94  --    PLT  --  230  --    NA  --   --  141   POTASSIUM  --   --  4.1   CHLORIDE  --   --  111*   CO2  --   --  25   BUN  --   --  83*   CR  --   --  4.03*   ANIONGAP  --   --  5   LEV  --   --  9.1   GLC  --   --  180*   ALBUMIN  --   --  3.6   PROTTOTAL  --   --  7.7   BILITOTAL  --   --  0.6   ALKPHOS  --   --  100   ALT  --   --  25   AST  --   --  9   TROPI 0.411*  --  0.465*

## 2019-09-14 NOTE — CONSULTS
Electrophysiology/ Cardiology- Consult Note         H&P and Plan:     Reason for consult: CHF exacerbation/ NSTEMI.      History of present illness: 85-year old gentleman with a history of hypertension, hyperlipidemia, diabetes, chronic kidney disease on (creatinine range from 3-4/2018) and coronary artery disease) CABG 1996; LIMA to LAD and SVG to OM; PCI to left main 2007 and PCI to SVG to OM 2012), who presented with worsening shortness of breath and was found to have deterioration of renal function as well as troponin anemia.    Patient presented with worsening respiratory symptoms/shortness of breath for the last couple weeks.  In the ED he was found to be hypoxic and had mild complaints of chest discomfort.  Initial troponin that was elevated and he was started on heparin and Lasix drip.      At the moment he is doing well.  He affirms that his respiratory symptoms are much improved, and he denies any chest discomfort.  Admission EKG shows    Left bundle branch block which is appears to be chronic for him.  Initial labs showed elevated proBNP (19,000).  Elevation of troponin (last troponin was 3.6 since July).  And worsening in renal function (initial creatinine was 4.03, subsequent creatinine was 3.98).    Previous echo done in 2018 showed normal LV function with EF estimated 55-6%.     Plan:  1.  CHF exacerbation/non-STEMI.  Patient presented with worsening respiratory symptoms which appears to be somewhat subacute.  However, he exhibited troponin elevation with a peak troponin of 3.6.  He ruled in for non-STEMI.  Recommendations:  -Continue diuresis with Lasix drip.  Continue to follow-up renal function.  -Continue heparin drip and continue to trend troponin.  Troponin elevation could be in part related to demand ischemia and worsening renal function.  -Repeat echocardiogram.  The pain on echocardiogram results and may have to discuss coronary angiography, which I would like to avoid due to impaired renal  function.  -Continue therapy with aspirin, Plavix, hydralazine and metoprolol.  -Please transfer patient to the cardiac unit.    Darius Diaz MD    Physical Exam:  Vitals: /55 (BP Location: Right arm)   Pulse 74   Temp 99.4  F (37.4  C) (Oral)   Resp 16   Ht 1.829 m (6')   Wt 87.8 kg (193 lb 9 oz)   SpO2 93%   BMI 26.25 kg/m        Intake/Output Summary (Last 24 hours) at 9/14/2019 1336  Last data filed at 9/14/2019 1244  Gross per 24 hour   Intake 600 ml   Output 1550 ml   Net -950 ml     Vitals:    09/13/19 1953 09/13/19 2300 09/14/19 0600   Weight: 93 kg (205 lb) 87.9 kg (193 lb 12.8 oz) 87.8 kg (193 lb 9 oz)       Constitutional:  AAO x3.  Pt is in NAD.  HEAD: Normocephalic.  SKIN: Skin normal color, texture and turgor with no lesions or eruptions.  Eyes: PERRL, EOMI.  ENT:  Supple, normal JVP. No lymphadenopathy or thyroid enlargement.  Chest:  CTAB.  Cardiac:    RRR, normal  S1 and S2.  No murmurs rubs or gallop.   Abdomen:  Normal BS.  Soft, non-tender and non-distended.  No rebound or guarding.    Extremities:  Pedious pulses palpable B/L.  No LE edema noticed.   Neurological: Strength and sensation grossly symmetric and intact throughout.         Review of Systems:  Complete review of system is otherwise negative with the exception of what was described above.     CURRENT MEDICATIONS:    allopurinol  100 mg Oral Daily     aspirin  81 mg Oral Daily     cetirizine  10 mg Oral QPM     clopidogrel  75 mg Oral Daily     hydrALAZINE  50 mg Oral TID     insulin aspart   Subcutaneous TID w/meals     insulin aspart  1-7 Units Subcutaneous TID AC     insulin aspart  1-5 Units Subcutaneous At Bedtime     insulin glargine  10 Units Subcutaneous At Bedtime     isosorbide mononitrate  120 mg Oral Daily     levothyroxine  75 mcg Oral Daily     metoprolol tartrate  50 mg Oral BID     sodium chloride (PF)  3 mL Intracatheter Q8H     theophylline  400 mg Oral Daily     PRN Meds: acetaminophen, acetaminophen,  albuterol, bisacodyl, - MEDICATION INSTRUCTIONS -, glucose **OR** dextrose **OR** glucagon, HOLD MEDICATION, lidocaine 4%, lidocaine (buffered or not buffered), melatonin, naloxone, nitroGLYcerin, ondansetron **OR** ondansetron, polyethylene glycol, prochlorperazine **OR** prochlorperazine **OR** prochlorperazine, ACE/ARB/ARNI NOT PRESCRIBED, senna-docusate **OR** senna-docusate, sodium chloride (PF)    ALLERGIES     Allergies   Allergen Reactions     Advair [Fluticasone-Salmeterol]      cough;  insomnia, nightmares     Amlodipine Besylate      edema       Azithromycin GI Disturbance     Clarithromycin      gi     Hydrochlorothiazide      hctz + lisinopril-->inc creat, k+     Lisinopril      lisinopril + hctz --> inc creat, k+     Moxifloxacin Hydrochloride      clostridium diffile colitis     Penicillins      gen swelling     Pravastatin Cramps     Muscle cramps/spasm.  Stopped 2017     Vioxx      edema       PAST MEDICAL HISTORY:  Past Medical History:   Diagnosis Date     Acute myocardial infarction, unspecified site, episode of care unspecified      Allergic rhinitis, cause unspecified      Anemia 3/6/2014     CAD (coronary artery disease)     1996 CABG - LIMA to LAD, SVG to OM, 2007 Cath - KANU to Left main and ostial/prox LAD, 2012 Cath - 80-90% stenosis SVG to OM - KANU placed, EF 50%     CKD (chronic kidney disease) stage 3, GFR 30-59 ml/min (H) 3/9/2014     CTS (carpal tunnel syndrome)     bilateral     Degeneration of cervical intervertebral disc      Diaphragmatic hernia without mention of obstruction or gangrene      Esophageal reflux      Essential hypertension, benign      Hyperlipidemia LDL goal <70      Hypothyroidism      Hypothyroidism, unspecified hypothyroidism type 1/14/2016     IDIOPATHIC CYCLIC EDEMA      Mild persistent asthma      Osteoarthrosis, unspecified whether generalized or localized, unspecified site      Pneumonia, organism unspecified(486)      Proteinuria 5/17/2014     PVD  (peripheral vascular disease) (H)      Sensorineural hearing loss, unspecified      Subarachnoid bleed (H)      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, with long-term current use of insulin (H) 2019     Unspecified hereditary and idiopathic peripheral neuropathy      Venous insufficiency        PAST SURGICAL HISTORY:  Past Surgical History:   Procedure Laterality Date     C NONSPECIFIC PROCEDURE      appendectomy     C NONSPECIFIC PROCEDURE      hemorrhoids     C NONSPECIFIC PROCEDURE      cervical strain     C NONSPECIFIC PROCEDURE      rotator cuff surgery, right shoulder     C NONSPECIFIC PROCEDURE  2008    iol os     CORONARY ARTERY BYPASS      LIMA to LAD, SVG to OM     HEART CATH STENT COR W/WO PTCA      lad, left main cor artery stents/drug eluting     HEART CATH STENT COR W/WO PTCA  2012    80-90% stenosis SVG to OM - KANU placed, EF 50%     NONSPECIFIC PROCEDURE      iol od       FAMILY HISTORY:  Family History   Problem Relation Age of Onset     Lung Cancer Daughter      Family History Negative Mother         broken hip     Jaundice Father      Alcohol/Drug Father      Ovarian Cancer Daughter      Family History Negative Daughter      Family History Negative Son        SOCIAL HISTORY:  Social History     Socioeconomic History     Marital status:      Spouse name: None     Number of children: None     Years of education: None     Highest education level: None   Occupational History     None   Social Needs     Financial resource strain: None     Food insecurity:     Worry: None     Inability: None     Transportation needs:     Medical: None     Non-medical: None   Tobacco Use     Smoking status: Former Smoker     Packs/day: 1.00     Years: 14.00     Pack years: 14.00     Types: Cigarettes     Start date:      Last attempt to quit: 1966     Years since quittin.7     Smokeless tobacco: Never Used   Substance and Sexual Activity      Alcohol use: No     Drug use: No     Sexual activity: Not Currently   Lifestyle     Physical activity:     Days per week: None     Minutes per session: None     Stress: None   Relationships     Social connections:     Talks on phone: None     Gets together: None     Attends Orthodoxy service: None     Active member of club or organization: None     Attends meetings of clubs or organizations: None     Relationship status: None     Intimate partner violence:     Fear of current or ex partner: None     Emotionally abused: None     Physically abused: None     Forced sexual activity: None   Other Topics Concern     Parent/sibling w/ CABG, MI or angioplasty before 65F 55M? Not Asked      Service Not Asked     Blood Transfusions Not Asked     Caffeine Concern No     Occupational Exposure Not Asked     Hobby Hazards Not Asked     Sleep Concern Yes     Stress Concern No     Weight Concern No     Special Diet No     Back Care Not Asked     Exercise Yes     Comment: bowling, 5 days week. yard work     Bike Helmet Not Asked     Seat Belt Not Asked     Self-Exams Not Asked   Social History Narrative     None         Recent Lab Results:  Recent Labs   Lab 09/14/19  0915 09/14/19  0733 09/14/19  0451 09/14/19  0011 09/13/19 2049 09/13/19 2005   WBC 9.5  --  10.5  --  9.8  --    HGB 8.5*  --  9.3*  --  9.3*  --    MCV 93  --  93  --  94  --      --  231  --  230  --    NA  --   --  140  --   --  141   POTASSIUM  --   --  4.2  --   --  4.1   CHLORIDE  --   --  109  --   --  111*   CO2  --   --  24  --   --  25   BUN  --   --  84*  --   --  83*   CR  --   --  3.98*  --   --  4.03*   ANIONGAP  --   --  7  --   --  5   LEV  --   --  8.8  --   --  9.1   GLC  --   --  192*  --   --  180*   ALBUMIN  --   --   --   --   --  3.6   PROTTOTAL  --   --   --   --   --  7.7   BILITOTAL  --   --   --   --   --  0.6   ALKPHOS  --   --   --   --   --  100   ALT  --   --   --   --   --  25   AST  --   --   --   --   --  9   TROPI   --  3.600* 1.040* 0.411*  --  0.465*

## 2019-09-14 NOTE — PHARMACY-ADMISSION MEDICATION HISTORY
Admission medication history interview status for the 9/13/2019  admission is complete. See EPIC admission navigator for prior to admission medications     Medication history source reliability:Good    Actions taken by pharmacist (provider contacted, etc): Talked to patient, Reviewed SureScirpts     Additional medication history information not noted on PTA med list :None    Medication reconciliation/reorder completed by provider prior to medication history? No    Time spent in this activity: 15 minutes     Patient rotates torsemide 10 mg and 20 mg every other day but does not what strength he took this morning.      Prior to Admission medications    Medication Sig Last Dose Taking? Auth Provider   allopurinol (ZYLOPRIM) 100 MG tablet TAKE 2 TABLETS(200 MG) BY MOUTH DAILY 9/13/2019 at am Yes Jorge Hillman MD   aspirin 81 MG tablet Take 1 tablet by mouth daily. with food 9/13/2019 at am Yes Joe Aguilar MD   cetirizine (ZYRTEC) 10 MG tablet TAKE 1 TABLET(10 MG) BY MOUTH EVERY EVENING 9/12/2019 at pm Yes Jogre Hillman MD   clopidogrel (PLAVIX) 75 MG tablet TAKE 1 TABLET(75 MG) BY MOUTH DAILY 9/13/2019 at am Yes Jorge Hillman MD   Cod Liver Oil 1000 MG CAPS Take 1 capsule by mouth daily  9/13/2019 at am Yes Reported, Patient   Cyanocobalamin (VITAMIN B 12) 250 MCG LOZG Take 500 mcg by mouth 2 times daily  9/13/2019 at am Yes Unknown, Entered By History   diphenhydrAMINE-acetaminophen (TYLENOL PM)  MG tablet Take 1 tablet by mouth nightly as needed for sleep 9/12/2019 at pm Yes Reported, Patient   hydrALAZINE (APRESOLINE) 50 MG tablet Take 1 tablet (50 mg) by mouth 3 times daily 9/13/2019 at x2 Yes Sebastien Gibson MD   insulin NPH (HUMULIN N/NOVOLIN N VIAL) 100 UNIT/ML vial 14 units at  suppertime Past Month at Unknown time Yes Jorge Hillman MD   isosorbide mononitrate CR (IMDUR) 120 MG 24 HR ER tablet TAKE 1 TABLET BY MOUTH DAILY 9/13/2019 at am Yes Jorge Hillman MD   levothyroxine  "(SYNTHROID/LEVOTHROID) 75 MCG tablet Take 1 tablet (75 mcg) by mouth daily 9/13/2019 at am Yes Jorge Hillman MD   metoprolol tartrate (LOPRESSOR) 50 MG tablet TAKE 1 TABLET BY MOUTH TWICE DAILY 9/13/2019 at am Yes Jorge Hillman MD   nitroGLYcerin (NITROSTAT) 0.4 MG sublingual tablet For chest pain place 1 tablet under the tongue every 5 minutes for 3 doses. If symptoms persist 5 minutes after 1st dose call 911. prn at prn Yes Josefina Rodriguez MD   theophylline (UNIPHYL) 400 MG 24 hr tablet Take 1 tablet (400 mg) by mouth daily 9/13/2019 at am Yes Jorge Hillman MD   ACCU-CHEK COMPACT PLUS test strip CHECK BLOOD SUGAR TWICE DAILY TO THREE TIMES DAILY   Jorge Hillman MD   alirocumab (PRALUENT) 150 MG/ML injectable pen Inject 1 mL (150 mg) Subcutaneous every 14 days 9/7/2019 at am  Sebastien Gibson MD   insulin syringe-needle U-100 (BD INSULIN SYRINGE) 29G X 1/2\" 0.5 ML Use one syringe 2 daily or as directed.   Jorge Hillman MD   torsemide (DEMADEX) 10 MG tablet Take 10 mg by mouth every other day (days not taking 20mg) Unknown at Unknown time  Reported, Patient   torsemide (DEMADEX) 20 MG tablet TAKE 1 TABLET BY MOUTH EVERY OTHER DAY Unknown at Unknown time  Jorge Hillman MD       "

## 2019-09-14 NOTE — ED NOTES
Bed: ED05  Expected date:   Expected time:   Means of arrival:   Comments:  [facility] - 17M weakness - ETA 4min

## 2019-09-15 NOTE — PROVIDER NOTIFICATION
MD Notification    Notified Person: MD Cardiology     Notified Person Name: Dr. Diaz     Notification Date/Time: 9/14/19 1620    Notification Interaction: Telephone      Purpose of Notification: Pt c/o chest tightness 4/10, EKG completed, SL Ntg given and oxygen applied      Orders Received: Transfer pt to CCU if needed

## 2019-09-15 NOTE — PROGRESS NOTES
" Renal Medicine Progress Note            Assessment/Plan:     85 y.o man with CKD V and CAD, admitted for progressive SOB.      # Advanced CKD V: Patient does not want dialysis. He understands the consequences of ESRD including death.      # SOB from pulmonary edema: Improving.      # CAD s/p CABG: Type 1 vs 2 NSTEMI? Pt does not want anything done including LHC. EF 40-45%     # HTN:                -will put holding parameters with hydralazine     # Anemia:                -Fe is low     # FEN: K and bicarb are okay.     Plan:  # Stop Lasix gtt and start Lasix 80 mg po bid  # Decrease hydralazine to 25 mg tid  # Will give a couple doses of IV FE  # Pt states he does not want dialysis or LHC again today. He is competent and his daughter agreed          Interval History:     He feels better. His breathing is better. He feels \"fuzzy\" intermittently. No chest pain.           Medications and Allergies:       allopurinol  100 mg Oral Daily     aspirin  81 mg Oral Daily     cetirizine  10 mg Oral QPM     clopidogrel  75 mg Oral Daily     hydrALAZINE  50 mg Oral TID     insulin aspart   Subcutaneous TID w/meals     insulin aspart  1-7 Units Subcutaneous TID AC     insulin aspart  1-5 Units Subcutaneous At Bedtime     insulin glargine  10 Units Subcutaneous At Bedtime     isosorbide mononitrate  120 mg Oral Daily     levothyroxine  75 mcg Oral Daily     metoprolol tartrate  25 mg Oral BID     sodium chloride (PF)  3 mL Intracatheter Q8H     theophylline  400 mg Oral Daily        Allergies   Allergen Reactions     Advair [Fluticasone-Salmeterol]      cough;  insomnia, nightmares     Amlodipine Besylate      edema       Azithromycin GI Disturbance     Clarithromycin      gi     Hydrochlorothiazide      hctz + lisinopril-->inc creat, k+     Lisinopril      lisinopril + hctz --> inc creat, k+     Moxifloxacin Hydrochloride      clostridium diffile colitis     Penicillins      gen swelling     Pravastatin Cramps     Muscle " cramps/spasm.  Stopped 2017     Vioxx      edema            Physical Exam:   Vitals were reviewed  Heart Rate: 75, Blood pressure 129/68, pulse 107, temperature 98.3  F (36.8  C), temperature source Oral, resp. rate 16, height 1.829 m (6'), weight 87.4 kg (192 lb 11.2 oz), SpO2 95 %.    Wt Readings from Last 3 Encounters:   09/15/19 87.4 kg (192 lb 11.2 oz)   09/03/19 90.3 kg (199 lb)   08/16/19 91 kg (200 lb 11.2 oz)       Intake/Output Summary (Last 24 hours) at 9/15/2019 1245  Last data filed at 9/15/2019 1100  Gross per 24 hour   Intake 590.8 ml   Output 1900 ml   Net -1309.2 ml       GENERAL APPEARANCE: pleasant, NAD, alert  HEENT:  Eyes/ears/nose/neck grossly normal  RESP: Poor airflow. Crackles improved. No wheezes.  CV: RRR, nl S1/S2, +murmur  ABDOMEN: obese, soft, NT  EXTREMITIES/SKIN: no rashes/lesions on observed skin; no edema  Neuro: a/o x 3         Data:     CBC RESULTS:     Recent Labs   Lab 09/15/19  0537 09/14/19 0915 09/14/19 0451 09/13/19 2049   WBC 9.7 9.5 10.5 9.8   RBC 2.63* 2.78* 3.03* 2.98*   HGB 8.0* 8.5* 9.3* 9.3*   HCT 24.3* 25.9* 28.3* 28.1*    202 231 230       Basic Metabolic Panel:  Recent Labs   Lab 09/15/19  0537 09/14/19  0451 09/13/19  2005    140 141   POTASSIUM 3.6 4.2 4.1   CHLORIDE 106 109 111*   CO2 25 24 25   BUN 93* 84* 83*   CR 4.11* 3.98* 4.03*   * 192* 180*   LEV 8.6 8.8 9.1       INRNo lab results found in last 7 days.   Attestation:   I have reviewed today's relevant vital signs, notes, medications, labs and imaging.    Олег Moreno MD  [facility] - Nephrology  Office phone :[phone]  Pager: [phone]

## 2019-09-15 NOTE — PLAN OF CARE
Heart Failure Care Pathway  GOALS TO BE MET BEFORE DISCHARGE:    1. Decrease congestion and/or edema with diuretic therapy to achieve near      optimal volume status.            Dyspnea improved:  Yes            Edema improved:     Yes        Net I/O and Weights since admission:          08/16 2300 - 09/15 2259  In: 1670.8 [P.O.:1560; I.V.:110.8]  Out: 3950 [Urine:3950]  Net: -2279.2            Vitals:    09/13/19 1953 09/13/19 2300 09/14/19 0600 09/15/19 0124   Weight: 93 kg (205 lb) 87.9 kg (193 lb 12.8 oz) 87.8 kg (193 lb 9 oz) 87.4 kg (192 lb 11.2 oz)         2.  O2 sats > 92% on RA or at prior home O2 therapy level.          Current oxygenation status:       SpO2: 95 %         O2 Device: None (Room air),  Oxygen Delivery: 2 LPM         Able to wean O2 this shift to keep sats > 92%:  Yes       Does patient use Home O2? No    3.  Tolerates ambulation and mobility near baseline: partially, ambulates to BR, WEBB        How many times did the patient ambulate with nursing staff this shift? 1    Please review the Heart Failure Care Pathway for additional HF goal outcomes.    Mona Santos RN   9/15/2019     A&O, forgetful at times. VSS, Weaned to RA. PO lasix. Tele: SR BBB. Hgb: 8.0, given one dose of IV iron. Denied chest pain. WEBB, SBA to BR. Nephrology and cardiology following.

## 2019-09-15 NOTE — PLAN OF CARE
"Discharge Planner PT   Patient plan for discharge: Home, he misses his dog.  Current status: Evaluation completed, treatment initiated. 84 yo male adm with orthopnea and worsening SOB. Found to be in volume overload with contributing CKD V and acute on chronic diastolic heart failure. Resides in a house with \"no stairs\" with his daughter. Pt's daughter works during the day, pt is home alone at that time. He goes Bowling 3x per week [place] [employer]-states he has to sit after every frame because he sometimes has chest pain. The chest pain is reduced with rest.   Presents alert and oriented. Bed mobility and sit<>Stand with independence. Ambulated 150' without AD inially, but did have hand hold assist from therapist after a few steps-states his leges \"feel numb,\" \"It's a funny feeling and I don't know how to describe it any other way, they just feel numb.\" Reports from the knees down. Declines need/want for AD trial. Negotiated 5 stair with B rails, supervision. Demonstrates impaired activity tolerance, WEBB. VSS pre/post, rates ambulation and stairs at a 6/10 on the OMNI scale of effort.    Barriers to return to prior living situation: None.  Recommendations for discharge: Home with continued assist from daughter for med set up.  Rationale for recommendations: Suspect pt is close to baseline with mobility, would benefit from the use of a cane or a walker. Will continue to trial with pt during next session. Will benefit from continued skilled rehab services to progress independence with gait training.        Entered by: Anna Sommer 09/15/2019 5:45 PM       "

## 2019-09-15 NOTE — PROVIDER NOTIFICATION
Text page Dr. Diaz to clarify discontining of heparin drip. Dr. Diaz said to discontinue heparin drip.

## 2019-09-15 NOTE — PROGRESS NOTES
EP- Cardiology Progress Note           Assessment and Plan:     85-yo M with a Hx of HTN, HL, DM, CKD and CAD (CABG 1996; LIMA to LAD and SVG to OM; PCI to left main 2007 and PCI to SVG to OM 2012), who p/w CHF and worsening in renal function, and R/I for NSTEMI (peak Tn of 5.9).    Echo: EF of 40-45%. There is moderate hypokinesis of the entire inferior wall and mid to basal inferolateral wall and true LV apex. Mild to moderate MR.    Plan:  1. NSTEMI.  peak Tn of 5.9.  Possible component of demand ischemia.  EF is down compared to previous echo, however regional wall motion abnormal basically unchanged.     He is currently asymptomatic.  I had an extensive discussion with patient and family.  He will be high risk for dialysis if we decide to move forward with coronary angiography.  After discussion we agreed to continue conservative approach.     We will continue conservative management.  Continue aspirin, Plavix, metoprolol, indoor and hydralazine.  May continue heparin.    1.  CHF exacerbation-likely secondary to worsening renal function.  He seems to be euvolemic on physical exam and only put out only 720 cc yesterday, despite of Lasix drip.  I favor discontinue Lasix drip, however will leave renal team to make decisions on diuretic therapy.      Physical Exam:  Vitals: BP (!) 142/73 (BP Location: Left arm)   Pulse 107   Temp 98.4  F (36.9  C) (Oral)   Resp 16   Ht 1.829 m (6')   Wt 87.4 kg (192 lb 11.2 oz)   SpO2 95%   BMI 26.13 kg/m        Intake/Output Summary (Last 24 hours) at 9/15/2019 1118  Last data filed at 9/15/2019 0800  Gross per 24 hour   Intake 590.8 ml   Output 1900 ml   Net -1309.2 ml     Vitals:    09/13/19 1953 09/13/19 2300 09/14/19 0600 09/15/19 0124   Weight: 93 kg (205 lb) 87.9 kg (193 lb 12.8 oz) 87.8 kg (193 lb 9 oz) 87.4 kg (192 lb 11.2 oz)       Constitutional:  AAO x3.  Pt is in NAD.  HEAD: Normocephalic.  SKIN: Skin normal color, texture and turgor with no lesions or  eruptions.  Eyes: PERRL, EOMI.  ENT:  Supple, normal JVP. No lymphadenopathy or thyroid enlargement.  Chest:  CTAB.  Cardiac:   RRR, normal  S1 and S2.  No murmurs rubs or gallop.   Abdomen:  Normal BS.  Soft, non-tender and non-distended.  No rebound or guarding.    Extremities:  Pedious pulses palpable B/L.  No LE edema noticed.   Neurological: Strength and sensation grossly symmetric and intact throughout.                            Review of Systems:   As per subjective, otherwise 5 systems reviewed and negative.         Medications:          allopurinol  100 mg Oral Daily     aspirin  81 mg Oral Daily     cetirizine  10 mg Oral QPM     clopidogrel  75 mg Oral Daily     hydrALAZINE  50 mg Oral TID     insulin aspart   Subcutaneous TID w/meals     insulin aspart  1-7 Units Subcutaneous TID AC     insulin aspart  1-5 Units Subcutaneous At Bedtime     insulin glargine  10 Units Subcutaneous At Bedtime     isosorbide mononitrate  120 mg Oral Daily     levothyroxine  75 mcg Oral Daily     metoprolol tartrate  25 mg Oral BID     sodium chloride (PF)  3 mL Intracatheter Q8H     theophylline  400 mg Oral Daily     PRN Meds: acetaminophen, acetaminophen, albuterol, bisacodyl, - MEDICATION INSTRUCTIONS -, glucose **OR** dextrose **OR** glucagon, lidocaine 4%, lidocaine (buffered or not buffered), melatonin, naloxone, nitroGLYcerin, ondansetron **OR** ondansetron, polyethylene glycol, prochlorperazine **OR** prochlorperazine **OR** prochlorperazine, ACE/ARB/ARNI NOT PRESCRIBED, senna-docusate **OR** senna-docusate, sodium chloride (PF)             Data:     Recent Labs   Lab 09/15/19  0537 09/14/19  2145 09/14/19  1729 09/14/19  1418 09/14/19  0915  09/14/19  0451  09/13/19 2005   WBC 9.7  --   --   --  9.5  --  10.5   < >  --    HGB 8.0*  --   --   --  8.5*  --  9.3*   < >  --    MCV 92  --   --   --  93  --  93   < >  --      --   --   --  202  --  231   < >  --      --   --   --   --   --  140  --  141    POTASSIUM 3.6  --   --   --   --   --  4.2  --  4.1   CHLORIDE 106  --   --   --   --   --  109  --  111*   CO2 25  --   --   --   --   --  24  --  25   BUN 93*  --   --   --   --   --  84*  --  83*   CR 4.11*  --   --   --   --   --  3.98*  --  4.03*   ANIONGAP 7  --   --   --   --   --  7  --  5   LEV 8.6  --   --   --   --   --  8.8  --  9.1   *  --   --   --   --   --  192*  --  180*   ALBUMIN 2.9*  --   --   --   --   --   --   --  3.6   PROTTOTAL 6.1*  --   --   --   --   --   --   --  7.7   BILITOTAL 0.7  --   --   --   --   --   --   --  0.6   ALKPHOS 74  --   --   --   --   --   --   --  100   ALT 18  --   --   --   --   --   --   --  25   AST 16  --   --   --   --   --   --   --  9   TROPI  --  3.999* 5.125* 5.903*  --    < > 1.040*   < > 0.465*    < > = values in this interval not displayed.

## 2019-09-15 NOTE — PLAN OF CARE
DATE & TIME: 9/15/2019/6136-8138    Cognitive Concerns/ Orientation : A&O x4   BEHAVIOR & AGGRESSION TOOL COLOR: green  CIWA SCORE: na    ABNL VS/O2: Tmax of 99.4, weaned off O2, sats >92%. Pt denied  SOB. Fine crackles on bases.   MOBILITY: Ax1  PAIN MANAGMENT: denied pain.   DIET: renal/cardiac/no caffeine. Fair appetite.  BOWEL/BLADDER: continent of B&B, strict I&O   ABNL LAB/BG: Trop of trending 3.6, /170. MD aware. Placed on lasix drip at 10 mg/hour and heparin drip at 1000 units/hour.   DRAIN/DEVICES: PIV on left arm, infusing.   TELEMETRY RHYTHM: NSR with RBBB   SKIN: redness blanchable on coccyx, encouraged to turn while in bed, able to reposition self in bed.   TESTS/PROCEDURES: PT A&O forgetful at times denies pain up SBA to BR c/o fgtj-ECHO done, result pending. Nephrology consulted, no change, but recommending palliative consult since pt refused

## 2019-09-15 NOTE — PROGRESS NOTES
[facility]     Hospitalist Progress Note        Assessment & Plan     Oscar Terrazas is a 85 year old male admitted on 9/13/2019. He presents with progressive orthopnea and shortness of breath in the setting of stage V renal failure.     Volume overload secondary to renal insufficiency, stage V chronic kidney disease:   -Nephrology consulted. Creatinine currently in the 4 range. Patient confirms that he would not like to pursue dialysis. Recommended to continue lasix gtt.   -Pending response to diuretics, consider family meeting with transition to a more palliative goal of care, potentially even hospice given chronic renal failure with difficulty controlling volume. No palliative care consult ordered yet, but consider early next week after assessing response to diureitcs.   -Continue allopurinol 100 mg daily  -Prior to admission torsemide at home dose of 10 mg alternating with 20 mg every other day held. Put on lasix gtt at 10 mg/hr.      Acute on chronic diastolic heart failure exacerbation: History of grade 2 diastolic dysfunction with mild ischemic cardiomyopathy with moderate hypokinesis of basal inferior and inferior lateral walls.  Echo this admission shows EF 40-45%, worsened LV function. Pending ability/response to diuresis, patient may have a poor intermediate term prognosis from the standpoint of his presenting symptoms.  -IV diuresis as above  -Cardiology consulted, recommended diuresis on lasix gtt which is ordered. Will follow. Would like to avoid angiogram given renal function.    -Core clinic consultation  -TTE: Echo this admission shows EF 40-45%, worsened LV function.   -Hydralazine, Imdur, metoprolol continued, but metoprolol dose cut in half today to 25 mg BID.      Troponin elevation: Patient has known coronary artery disease with history of coronary artery bypass grafting to LAD, OM in 2006 as well as left main and LAD stenting in 2007, OM graft stent in 2012, as well as more  "recent non-ST elevation myocardial infarction which has been medically managed given chronic kidney disease.  Reports no change in his chronic anginal symptoms, though does have chest pressure which has been there for \"40 years.\" Trop peaked at 6. Will start heparin gtt.   -Cardiology consulted as above. Would like to avoid angiogram given renal function.    -Continue aspirin 81 mg daily. Continue heparin gtt.   -Continue Plavix 75 mg daily  -Continue Imdur 120 mg daily  -Cut to metop 25 BID  -Continue hydralazine 50 mg 3 times daily  -Patient is intolerant to statins     Hypertension:  -Continuing Imdur, hydralazine, metoprolol as above.  Last dose increase in hydralazine March 2019 through cardiology.     Chronic anemia: Suspect secondary to chronic kidney disease.  -Nephrology consulted as above; could potentially have some oncotic benefit to increased hemoglobin, though not necessarily in need of Epogen currently with hemoglobin persistently stable in the 9 range.     Hypothyroidism:  -Continue prior to admission levothyroxine 75 mcg daily     Type 2 diabetes: Last hemoglobin A1c 6.3.  -Hypoglycemia protocol in place  -Medium dose sliding scale insulin available if needed  -1 unit per 20 g carbohydrate prandial insulin.  -Lantus 10 units hs.  Prior to admission dose of 14 units.        Diet: Renal diet as tolerated.  DVT Prophylaxis: Pneumatic Compression Devices  Chicas Catheter: not present  Code Status: DNR/DNI.  Confirmed with patient on admission.     Disposition Plan     Expected discharge: 2 - 3 days, recommended to prior living arrangement once adequately diuresed.      Amish Jessica MD   Text Page (7am to 6pm)        Interval History     Started on lasix gtt. Diuresed about 2 liters. Will decrease metop to 25 BID. Heparin gtt started, trop peaked at 6. Cardiology and nephrology following. Patient otherwise feels ok today.      -Data reviewed today: I reviewed all new labs and imaging results over the last " 24 hours. I personally reviewed no images or EKG's today.        Physical Exam     Temp: 98.4  F (36.9  C) Temp src: Oral BP: (!) 142/73 Pulse: 107 Heart Rate: 86 Resp: 16 SpO2: 95 % O2 Device: None (Room air) Oxygen Delivery: 2 LPM        Vitals:     09/13/19 2300 09/14/19 0600 09/15/19 0124   Weight: 87.9 kg (193 lb 12.8 oz) 87.8 kg (193 lb 9 oz) 87.4 kg (192 lb 11.2 oz)      Vital Signs with Ranges  Temp:  [98.1  F (36.7  C)-99.4  F (37.4  C)] 98.4  F (36.9  C)  Pulse:  [] 107  Heart Rate:  [86-96] 86  Resp:  [14-18] 16  BP: (109-149)/(55-78) 142/73  SpO2:  [93 %-97 %] 95 %  I/O last 3 completed shifts:  In: 950.8 [P.O.:840; I.V.:110.8]  Out: 1850 [Urine:1850]     General Appearance: Fairly robust appearing 85-year-old male  Eyes: No scleral icterus or injection.  HEENT: Normocephalic  Respiratory: Breath sounds with crackles throughout lung fields bilaterally, no wheezes.  Cardiovascular: Regular rate and rhythm with heart rate in the 80s.  2/6 systolic murmur best appreciated at apex and left upper sternal border.  GI: Abdomen obese, soft, no palpable mass.  Lower abdomen pitting edema  Lymph/Hematologic: Patient with compression stockings on, and lower extremity edema is minimal secondary to this.  Does have trace pitting edema into back and low abdomen present  Skin: No rash appreciated  Musculoskeletal: Muscular tone largely intact  Neurologic: Alert, conversant, appropriate in conversation.  Psychiatric: Blunted affect.  Pleasant        Medications        - MEDICATION INSTRUCTIONS -       furosemide 10 mg/hr (09/15/19 0625)     HEParin 1,300 Units/hr (09/15/19 0827)     ACE/ARB/ARNI NOT PRESCRIBED          allopurinol  100 mg Oral Daily     aspirin  81 mg Oral Daily     cetirizine  10 mg Oral QPM     clopidogrel  75 mg Oral Daily     hydrALAZINE  50 mg Oral TID     insulin aspart   Subcutaneous TID w/meals     insulin aspart  1-7 Units Subcutaneous TID AC     insulin aspart  1-5 Units Subcutaneous At  Bedtime     insulin glargine  10 Units Subcutaneous At Bedtime     isosorbide mononitrate  120 mg Oral Daily     levothyroxine  75 mcg Oral Daily     metoprolol tartrate  50 mg Oral BID     sodium chloride (PF)  3 mL Intracatheter Q8H     theophylline  400 mg Oral Daily            Data

## 2019-09-15 NOTE — PROGRESS NOTES
09/15/19 1419   Quick Adds   Type of Visit Initial PT Evaluation   Living Environment   Lives With child(ligia), adult   Living Arrangements house   Home Accessibility no concerns   Transportation Anticipated family or friend will provide;car, drives self   Self-Care   Usual Activity Tolerance moderate   Current Activity Tolerance moderate   Regular Exercise Yes   Activity/Exercise/Self-Care Comment 3x per week bowling at [place] lanes   Functional Level Prior   Ambulation 0-->independent   Transferring 0-->independent   Toileting 0-->independent   Bathing 0-->independent   Which of the above functional risks had a recent onset or change? none   Prior Functional Level Comment Pt confirms the following Pt I with ADL/IADL's. pt manages own breakfast, lunch and dtr makes dinner, pt drives and reports dtr helps with med set up-as stated in previous evaluation 6/18   General Information   Onset of Illness/Injury or Date of Surgery - Date 09/13/19   Referring Physician Rodriguez, Dillon Murillo MD   Patient/Family Goals Statement Eager to go home, misses his dog   Pertinent History of Current Problem (include personal factors and/or comorbidities that impact the POC)  He presents with progressive orthopnea and shortness of breath in the setting of stage V renal failure, acute on chronic diastolic heart failure.   Precautions/Limitations fall precautions   Cognitive Status Examination   Orientation orientation to person, place and time   Level of Consciousness alert   Follows Commands and Answers Questions 100% of the time;able to follow multistep instructions   Personal Safety and Judgment intact   Pain Assessment   Patient Currently in Pain No   Posture    Posture Forward head position;Protracted shoulders;Kyphosis  (Trunk flexion in standing with R lean,)   Range of Motion (ROM)   ROM Comment B LEs WFL, tight HS B   Strength   Strength Comments MMTs in sitting DF 5/5, knee extension 5/5 , knee flexion 5/5 hip flexion 4/5 B,  "hip abd 5/5 B, hip adduction 5/5 B with gross assessment in sitting.   Bed Mobility   Bed Mobility Comments Independent   Transfer Skills   Transfer Comments Indepenent sit<>Stand   Gait   Gait Comments Ambulated with supervision in room, no LOB, R list and wide SATURNINO. No acute LOB, looks unsteady with use of arms for balance-denies need/trial of AD.   Balance   Balance Comments Sitting blance good, able to move about no LOB. Standing balance static with increased sway, dynamic with slight path deviation to the R, no acute LOB. suspect diffciulty with higher level balance activites.   Sensory Examination   Sensory Perception Comments B LEs intact to assessent of gross light touch; however, pt reports a \"funny feeling\" in his legs foot to knee B of \"numbness\" they just feel numb.\"   General Therapy Interventions   Planned Therapy Interventions balance training;bed mobility training;gait training;neuromuscular re-education;ROM;strengthening;stretching;transfer training;home program guidelines;risk factor education;progressive activity/exercise   Clinical Impression   Criteria for Skilled Therapeutic Intervention yes, treatment indicated   PT Diagnosis Impaired functional activity tolerance   Influenced by the following impairments generalized weakness, fatigue, slight SOB with activity   Functional limitations due to impairments Decreased functoinal independence   Clinical Presentation Stable/Uncomplicated   Clinical Presentation Rationale see MR   Clinical Decision Making (Complexity) Low complexity   Therapy Frequency Daily   Predicted Duration of Therapy Intervention (days/wks) 3 days   Anticipated Discharge Disposition Home with Assist   Risk & Benefits of therapy have been explained Yes   Patient, Family & other staff in agreement with plan of care Yes   Clinical Impression Comments HOme with continued assist for med management, longer distance bessie   [facility] AM-PAC TM \"6 Clicks\"   2016, Trustees of " "[facility], under license to [employer].  All rights reserved.   6 Clicks Short Forms Basic Mobility Inpatient Short Form   [facility] AM-PAC  \"6 Clicks\" V.2 Basic Mobility Inpatient Short Form   1. Turning from your back to your side while in a flat bed without using bedrails? 4 - None   2. Moving from lying on your back to sitting on the side of a flat bed without using bedrails? 4 - None   3. Moving to and from a bed to a chair (including a wheelchair)? 4 - None   4. Standing up from a chair using your arms (e.g., wheelchair, or bedside chair)? 4 - None   5. To walk in hospital room? 4 - None   6. Climbing 3-5 steps with a railing? 3 - A Little   Basic Mobility Raw Score (Score out of 24.Lower scores equate to lower levels of function) 23   Total Evaluation Time   Total Evaluation Time (Minutes) 10     "

## 2019-09-15 NOTE — PROGRESS NOTES
[facility]    Hospitalist Progress Note    Assessment & Plan   Oscar Terrazas is a 85 year old male admitted on 9/13/2019. He presents with progressive orthopnea and shortness of breath in the setting of stage V renal failure.     Volume overload secondary to renal insufficiency, stage V chronic kidney disease:   -Nephrology consulted. Creatinine currently in the 4 range. Patient confirms that he would not like to pursue dialysis. Recommended to continue lasix gtt.   -Pending response to diuretics, consider family meeting with transition to a more palliative goal of care, potentially even hospice given chronic renal failure with difficulty controlling volume. No palliative care consult ordered yet, but consider early next week after assessing response to diureitcs.   -Continue allopurinol 100 mg daily  -Prior to admission torsemide at home dose of 10 mg alternating with 20 mg every other day held. Put on lasix gtt at 10 mg/hr.      Acute on chronic diastolic heart failure exacerbation: History of grade 2 diastolic dysfunction with mild ischemic cardiomyopathy with moderate hypokinesis of basal inferior and inferior lateral walls.  Echo this admission shows EF 40-45%, worsened LV function. Pending ability/response to diuresis, patient may have a poor intermediate term prognosis from the standpoint of his presenting symptoms.  -IV diuresis as above  -Cardiology consulted, recommended diuresis on lasix gtt which is ordered. Will follow. Would like to avoid angiogram given renal function.    -Core clinic consultation  -TTE: Echo this admission shows EF 40-45%, worsened LV function.   -Hydralazine, Imdur, metoprolol continued, but metoprolol dose cut in half today to 25 mg BID.      Troponin elevation: Patient has known coronary artery disease with history of coronary artery bypass grafting to LAD, OM in 2006 as well as left main and LAD stenting in 2007, OM graft stent in 2012, as well as more recent  "non-ST elevation myocardial infarction which has been medically managed given chronic kidney disease.  Reports no change in his chronic anginal symptoms, though does have chest pressure which has been there for \"40 years.\" Trop peaked at 6. Will start heparin gtt.   -Cardiology consulted as above. Would like to avoid angiogram given renal function.    -Continue aspirin 81 mg daily. Continue heparin gtt.   -Continue Plavix 75 mg daily  -Continue Imdur 120 mg daily  -Cut to metop 25 BID  -Continue hydralazine 50 mg 3 times daily  -Patient is intolerant to statins     Hypertension:  -Continuing Imdur, hydralazine, metoprolol as above.  Last dose increase in hydralazine March 2019 through cardiology.     Chronic anemia: Suspect secondary to chronic kidney disease.  -Nephrology consulted as above; could potentially have some oncotic benefit to increased hemoglobin, though not necessarily in need of Epogen currently with hemoglobin persistently stable in the 9 range.     Hypothyroidism:  -Continue prior to admission levothyroxine 75 mcg daily     Type 2 diabetes: Last hemoglobin A1c 6.3.  -Hypoglycemia protocol in place  -Medium dose sliding scale insulin available if needed  -1 unit per 20 g carbohydrate prandial insulin.  -Lantus 10 units hs.  Prior to admission dose of 14 units.        Diet: Renal diet as tolerated.  DVT Prophylaxis: Pneumatic Compression Devices  Chicas Catheter: not present  Code Status: DNR/DNI.  Confirmed with patient on admission.     Disposition Plan     Expected discharge: 2 - 3 days, recommended to prior living arrangement once adequately diuresed.     Amish Jessica MD   Text Page (7am to 6pm)    Interval History   Started on lasix gtt. Diuresed about 2 liters. Will decrease metop to 25 BID. Heparin gtt started, trop peaked at 6. Cardiology and nephrology following. Patient otherwise feels ok today.     -Data reviewed today: I reviewed all new labs and imaging results over the last 24 hours. I " personally reviewed no images or EKG's today.    Physical Exam   Temp: 98.4  F (36.9  C) Temp src: Oral BP: (!) 142/73 Pulse: 107 Heart Rate: 86 Resp: 16 SpO2: 95 % O2 Device: None (Room air) Oxygen Delivery: 2 LPM  Vitals:    09/13/19 2300 09/14/19 0600 09/15/19 0124   Weight: 87.9 kg (193 lb 12.8 oz) 87.8 kg (193 lb 9 oz) 87.4 kg (192 lb 11.2 oz)     Vital Signs with Ranges  Temp:  [98.1  F (36.7  C)-99.4  F (37.4  C)] 98.4  F (36.9  C)  Pulse:  [] 107  Heart Rate:  [86-96] 86  Resp:  [14-18] 16  BP: (109-149)/(55-78) 142/73  SpO2:  [93 %-97 %] 95 %  I/O last 3 completed shifts:  In: 950.8 [P.O.:840; I.V.:110.8]  Out: 1850 [Urine:1850]    General Appearance: Fairly robust appearing 85-year-old male  Eyes: No scleral icterus or injection.  HEENT: Normocephalic  Respiratory: Breath sounds with crackles throughout lung fields bilaterally, no wheezes.  Cardiovascular: Regular rate and rhythm with heart rate in the 80s.  2/6 systolic murmur best appreciated at apex and left upper sternal border.  GI: Abdomen obese, soft, no palpable mass.  Lower abdomen pitting edema  Lymph/Hematologic: Patient with compression stockings on, and lower extremity edema is minimal secondary to this.  Does have trace pitting edema into back and low abdomen present  Skin: No rash appreciated  Musculoskeletal: Muscular tone largely intact  Neurologic: Alert, conversant, appropriate in conversation.  Psychiatric: Blunted affect.  Pleasant    Medications     - MEDICATION INSTRUCTIONS -       furosemide 10 mg/hr (09/15/19 0625)     HEParin 1,300 Units/hr (09/15/19 0827)     ACE/ARB/ARNI NOT PRESCRIBED         allopurinol  100 mg Oral Daily     aspirin  81 mg Oral Daily     cetirizine  10 mg Oral QPM     clopidogrel  75 mg Oral Daily     hydrALAZINE  50 mg Oral TID     insulin aspart   Subcutaneous TID w/meals     insulin aspart  1-7 Units Subcutaneous TID AC     insulin aspart  1-5 Units Subcutaneous At Bedtime     insulin glargine  10  Units Subcutaneous At Bedtime     isosorbide mononitrate  120 mg Oral Daily     levothyroxine  75 mcg Oral Daily     metoprolol tartrate  50 mg Oral BID     sodium chloride (PF)  3 mL Intracatheter Q8H     theophylline  400 mg Oral Daily       Data   Recent Labs   Lab 09/15/19  0537 09/14/19  2145 09/14/19  1729 09/14/19  1418 09/14/19  0915  09/14/19  0451  09/13/19 2005   WBC 9.7  --   --   --  9.5  --  10.5   < >  --    HGB 8.0*  --   --   --  8.5*  --  9.3*   < >  --    MCV 92  --   --   --  93  --  93   < >  --      --   --   --  202  --  231   < >  --      --   --   --   --   --  140  --  141   POTASSIUM 3.6  --   --   --   --   --  4.2  --  4.1   CHLORIDE 106  --   --   --   --   --  109  --  111*   CO2 25  --   --   --   --   --  24  --  25   BUN 93*  --   --   --   --   --  84*  --  83*   CR 4.11*  --   --   --   --   --  3.98*  --  4.03*   ANIONGAP 7  --   --   --   --   --  7  --  5   LEV 8.6  --   --   --   --   --  8.8  --  9.1   *  --   --   --   --   --  192*  --  180*   ALBUMIN 2.9*  --   --   --   --   --   --   --  3.6   PROTTOTAL 6.1*  --   --   --   --   --   --   --  7.7   BILITOTAL 0.7  --   --   --   --   --   --   --  0.6   ALKPHOS 74  --   --   --   --   --   --   --  100   ALT 18  --   --   --   --   --   --   --  25   AST 16  --   --   --   --   --   --   --  9   TROPI  --  3.999* 5.125* 5.903*  --    < > 1.040*   < > 0.465*    < > = values in this interval not displayed.       Imaging:   No results found for this or any previous visit (from the past 24 hour(s)).

## 2019-09-16 NOTE — PLAN OF CARE
Patient alert and oriented, forgetful. Tele: Afib C/RVR 90-110s with BBB. New as of 0700. Blood pressures 110-130s/60s. Increased metoprolol due to afib. Lungs clear, diminished in bases on room air. Up with stand by. Potassium 3.0 this morning with 20 meq supplement. Recheck this evening 3.2. Paged hospitalist. Blood sugars 127-145-152. Discharge plan is home once diuresed.     Aggression tool: Green    Heart Failure Care Pathway  GOALS TO BE MET BEFORE DISCHARGE:    1. Decrease congestion and/or edema with diuretic therapy to achieve near      optimal volume status.            Dyspnea improved:  Yes            Edema improved:     Yes        Net I/O and Weights since admission:          08/17 2300 - 09/16 2259  In: 2910.8 [P.O.:2600; I.V.:310.8]  Out: 6850 [Urine:6850]  Net: -3939.2            Vitals:    09/13/19 1953 09/13/19 2300 09/14/19 0600 09/15/19 0124   Weight: 93 kg (205 lb) 87.9 kg (193 lb 12.8 oz) 87.8 kg (193 lb 9 oz) 87.4 kg (192 lb 11.2 oz)    09/16/19 0022   Weight: 85.8 kg (189 lb 3.2 oz)       2.  O2 sats > 92% on RA or at prior home O2 therapy level.          Current oxygenation status:       SpO2: 98 %         O2 Device: None (Room air),            Able to wean O2 this shift to keep sats > 92%:  Yes       Does patient use Home O2? No    3.  Tolerates ambulation and mobility near baseline: No, please explain: Patient feeling a little dizzy when moving around. Says his legs feel tingly.         How many times did the patient ambulate with nursing staff this shift? 1    Please review the Heart Failure Care Pathway for additional HF goal outcomes.    Rhonda Burks, RN RN  9/16/2019

## 2019-09-16 NOTE — PLAN OF CARE
"Discharge Planner PT   Patient plan for discharge: Home  Current status: Sit<>Stand SBA. Ambulated 250' with no AD, declines use of cane or walker-leans R with step to pattern leading with the R, drifts R. Reports \"this is normal, I have numbness in my legs.\" Seated rest in CR gym, revealed HR of 135-140 with activity. Pt symptomatic with report of needing to \"catch my breath.\" /74 mid activity. Returned to room resting in bed watching Marcell.   Barriers to return to prior living situation: None.  Recommendations for discharge: Home with continued assist from daughter for med set up.  Rationale for recommendations: Suspect pt is close to baseline with mobility, would benefit from the use of a cane or a walker. Will continue to trial with pt during next session. Will benefit from continued skilled rehab services to progress independence with gait training.        Entered by: Anna Sommer 09/16/2019 4:36 PM       "

## 2019-09-16 NOTE — CONSULTS
Orders received for Congestive Heart Failure to assist with disposition and discharge planning.  CHF education to be completed by bedside nurse.  Will continue to follow and assist with discharge planning.

## 2019-09-16 NOTE — PLAN OF CARE
Heart Failure Care Pathway  GOALS TO BE MET BEFORE DISCHARGE:    1. Decrease congestion and/or edema with diuretic therapy to achieve near      optimal volume status.            Dyspnea improved:  Yes            Edema improved:     Yes        Net I/O and Weights since admission:          08/17 0700 - 09/16 0659  In: 2110.8 [P.O.:1800; I.V.:310.8]  Out: 5875 [Urine:5875]  Net: -3764.2            Vitals:    09/13/19 1953 09/13/19 2300 09/14/19 0600 09/15/19 0124   Weight: 93 kg (205 lb) 87.9 kg (193 lb 12.8 oz) 87.8 kg (193 lb 9 oz) 87.4 kg (192 lb 11.2 oz)    09/16/19 0022   Weight: 85.8 kg (189 lb 3.2 oz)         2.  O2 sats > 92% on RA or at prior home O2 therapy level.          Current oxygenation status:       SpO2: 97 %         O2 Device: None (Room air),            Able to wean O2 this shift to keep sats > 92%:  Yes       Does patient use Home O2? No    3.  Tolerates ambulation and mobility near baseline: Still claims WEBB and ambulating short distances only, SBA        How many times did the patient ambulate with nursing staff this shift? 0 times in soliz, 5+times to bathroom    Please review the Heart Failure Care Pathway for additional HF goal outcomes.    Kandice Willoughby, RN   9/16/2019      A&Ox4, but forgetful. Tele SR with BBB. VSS on RA, sats 94-97%, but notes intermittent SOB and WEBB. LS dim, clear. Denies chest pain. Up SBA to BR. Voiding adequately. IV SL. Had difficulty sleeping again, despite melatonin and tylenol at bedtime. Continue to monitor and encourage ambulation as tolerated.

## 2019-09-16 NOTE — CONSULTS
[facility] evaluation referral received from Dr Rodriguez.      Oscar is currently established in the [facility] with Scarlet JOHNSON.  Oscar is being treated on admission 9/'13 for exacerbation of systolic and diastolic CHF in setting of chronic kidney disease stage V. BNP > 19, 000. Patient had a new onset of atrial fibrillation with RVR treated with an increase of metoprolol back to 50 mg twice daily. Labs WNL including TSH 1.15 and Magnesium 2.2. On lasix drip until 9/15, then switched to PO lasix 80 mg BID. ECHO with EF of 40-45%. Nephrology was consulted and patient confirmed he would not want dialysis. NSTEMI with known CAD and peak troponin of 5.9. On aspirin 81 mg daily, Plavix 75 mg daily, Imdur 120 mg daily, hydralazine decreased from 50 mg TID to 25 mg TID. History of intolerance tostatins and is currently on alirocumab injection every 2 weeks. History of chronic anemia, diabetes type 2, and hypothyroidism.     9/13/19  8:05 PM [id number]    Component Value Flag Ref Range Units Status Collected Lab   N-Terminal Pro BNP Inpatient 19,824  High   0 - 1,800 pg/mL Final 09/13/2019  8:05 PM [id number]     Admit weight 9/13 205 lb; weight 9/15 192 lb     [facility] appointment made for:  9/24/2019 10:30 AM DOS [facility] SULAB [facility]   9/24/2019 10:50 AM Marie Gil PA-C SUUMHT [facility]     We look forward to seeing Oscar in the clinic.   Please call with questions.     JUSTUS Pool, BSN, RN  [facility] RN Care Coordinator  [facility]  [phone]    [facility]: Cardiomyopathy, Optimization, Rehabilitation, Education   The [facility] is a heart failure specialty clinic within the [facility] where you will work with your cardiologist, nurse practitioners, physician assistants and registered nurses who specialize in heart failure care. They are dedicated to helping patients with heart failure to carefully adjust medications,  receive education, and learn who and when to call if symptoms develop. They specialize in helping you better understand your condition, slow the progression of your disease, improve the length and quality of your life, help you detect future heart problems before they become life threatening, and avoid hospitalizations.

## 2019-09-16 NOTE — PROGRESS NOTES
INTERNAL MEDICINE UPDATE    Called re: low potassium    Recent Labs   Lab 09/16/19  0534 09/15/19  0537 09/14/19  0451 09/13/19 2005   POTASSIUM 3.0* 3.6 4.2 4.1     PLAN:  - Given 20 meq PO KCl.  - No protocol given renal insufficiency.    Nathan Calloway Jr., MD  [phone] (p)  Text Page

## 2019-09-16 NOTE — PROVIDER NOTIFICATION
MD Notification    Notified Person: MD    Notified Person Name: Dr. Barry Mitchell     Notification Date/Time: 9/16 1844    Notification Interaction: text page    Purpose of Notification: Potassium is 3.2 this evening. CKD Stage 5. Do you want to give supplement?     Orders Received:    Comments:

## 2019-09-16 NOTE — PHARMACY-ANTICOAGULATION SERVICE
Clinical Pharmacy - Warfarin Dosing Consult     Pharmacy has been consulted to manage this patient s warfarin therapy.  Indication: Atrial Fibrillation  Therapy Goal: INR 2-3  Warfarin Prior to Admission: No    INR   Date Value Ref Range Status   09/16/2019 1.28 (H) 0.86 - 1.14 Final   03/12/2013 1.06 0.86 - 1.14 Final       Recommend warfarin 4 mg today.  Pharmacy will monitor Oscar Terrazas daily and order warfarin doses to achieve specified goal.      Please contact pharmacy as soon as possible if the warfarin needs to be held for a procedure or if the warfarin goals change.

## 2019-09-16 NOTE — PROGRESS NOTES
[facility]    Hospitalist Progress Note    Assessment & Plan   Oscar Terrazas is a 85 year old male who was admitted on 9/13/2019.     Past medical history significant for CAD with history of MI (2 vessel CABG 1996, KANU LAD 2007, KANU 2012), HTN, HLP, PVD, DM2, CKD stage V, Idiopathic neuropathy, GERD, Hypothyroidism, Mild persistent Asthma, Chronic anemia, Gout, OA, history of subarachnoid bleed who was admitted with progressive orthopnea and shortness of breath.    CHF exacerbation (diastolic and systolic):  Noted volume overload thought to be due to renal insufficiency and exacerbation of CHF.  Noted history of grade 2 diastolic dysfunction with mild ischemic cardiomyopathy and moderate hypokinesis of basal inferior and inferior lateral walls.    ECHO with EF of 40-45%, worsened LV function.     --Cardiology consult appreciated:  --[facility] consultation.    --Metoprolol dose decreased 9/15 (50 mg BID to 25 mg BID)   --As patient has now developed Atrial fib with RVR will increase back to 50 mg BID.    --PTA hydralazine and Imdur resumed.      Chronic kidney disease stage V, with associated volume overload:  --Nephrology consult appreciated.     --Patient confirmed he would not want dialysis.    --Lasix drip was stopped 9/15 and patient has been started on PO lasix 80 mg BID.  --I's and O's.    --Daily weights.      New onset atrial fib with RVR:  Noted on telemetry the morning (9/16) and no history of atrial fib.  [id number] score of ~6.    STAT EKG confirmed atrial fib () and previously was sinus with BBB.  TSH WNL at 1.15, Mag WNL 2.2 and Phos WNL at 3.2.  Theophylline level therapeutic at 16.2.    --Increase metoprolol back to 50 mg BID.    --PRN lopressor 2.5 mg available for HR > 120.    --Cardiology is following.    --Discussed anticoagulation with patient and daughter; will await recommendations from Cardiology.      NSTEMI in setting of known CAD with history of MI and  interventions and associated HTN and HLP:  Troponin peak of 5.9.  Possibly related to demand ischemia.  ECHO without change in wall motion from previous study but noted decrease in EF.    --Appreciate Cardiology consult:   --Conservative management.    --Resume Aspirin 81 mg daily, Plavix 75 mg daily, Imdur 120 mg daily, hydralazine decreased from 50 mg TID to 25 mg TID).  --Metoprolol back to 50 mg BID.    --Patient intolerant to statins and is currently on alirocumab injection every 2 weeks.       Hypokalemia:  Potassium of 3.    --One time dose of K-Dur 20 mEq given this morning.    --Monitor.      Chronic anemia:   Suspect secondary to chronic kidney disease.Iron low at 27, Iron Binding low at 184.    --Nephrology consult appreciated.    --Received Venofer 9/15.    --Monitor.       Hypothyroidism:  TSH WNL 1.15.    --Resume PTA levothyroxine 75 mcg daily     Type 2 diabetes:   Last hemoglobin A1c 6.3% (6/2019).  --Hypoglycemia protocol in place  --Medium dose sliding scale insulin available if needed  --1 unit per 20 g carbohydrate prandial insulin.  --Lantus 10 units hs.  Prior to admission dose of 14 units.    Gout:  --Continue PTA allopurinol 100 mg daily.      Mild intermittent asthma:  --Continue PRN neb.    --Resume PTA theophylline 400 mg daily.    GERD:  Noted history but does not appear to be on any medications.      Diet: Combination Diet Renal Diet; 2 gm NA Diet; Low Saturated Fat Na <2400mg Diet, No Caffeine Diet  Room Service     DVT Prophylaxis: Pneumatic Compression Devices   Chicas Catheter: not present  Code Status: DNR/DNI     Disposition Plan    Expected discharge: 2 - 3 days, recommended to awaiting PT assessment once medication adjustment has been completed; definitive plan in place for WEBB and now new onset atrial fib.   Entered: Landon Francis PA-C 09/16/2019, 7:06 AM        The patient has been discussed with Dr. Rodriguez, who agrees with the assessment and plan at this time.   Dr. Rodriguez will evaluate the patient independently.      Kahlil Francis PA-C   [phone]    Interval History   Patient was resting in bed upon arrival.  His daughter was at the bedside.  He denied fever, chills, chest pain, shortness of breath or abdominal pain.      He denied palpitations and a history of atrial fib.  He has not had a bowel movement in several days.  He is always cold.  He denied swelling in his legs.  His daughter stated that he was winded yesterday after walking to the bathroom.  Yesterday he used the bathroom around 5 times.        Discussed new onset atrial fibrillation.  Reviewed checking lab studies and current plan with medication (metoprolol).  Brought up usual plan for anticoagulation.      -Data reviewed today: I reviewed all new labs and imaging results over the last 24 hours. I personally reviewed the EKG tracing showing atrial fib.    Physical Exam   Temp: 98.4  F (36.9  C) Temp src: Oral BP: 120/72 Pulse: 60 Heart Rate: 83 Resp: 16 SpO2: 97 % O2 Device: None (Room air)    Vitals:    09/14/19 0600 09/15/19 0124 09/16/19 0022   Weight: 87.8 kg (193 lb 9 oz) 87.4 kg (192 lb 11.2 oz) 85.8 kg (189 lb 3.2 oz)     Vital Signs with Ranges  Temp:  [98  F (36.7  C)-98.5  F (36.9  C)] 98.4  F (36.9  C)  Pulse:  [60-90] 60  Heart Rate:  [] 83  Resp:  [16] 16  BP: (118-146)/(59-93) 120/72  SpO2:  [95 %-98 %] 97 %  I/O last 3 completed shifts:  In: 1280 [P.O.:1080; I.V.:200]  Out: 3400 [Urine:3400]      Constitutional: Awake, alert, cooperative, no apparent distress.    ENT: Normocephalic, without obvious abnormality, atraumatic, oral pharynx with moist mucus membranes, tonsils without erythema or exudates.  Neck: Supple, symmetrical, trachea midline, no adenopathy.  Pulmonary: No increased work of breathing, fair air exchange, clear to auscultation bilaterally, no crackles or wheezing.  Cardiovascular: Irreg irreg, tachycardic, normal S1 and S2, no S3 or S4, and no murmur noted.  GI:  Normal bowel sounds, soft, non-distended, non-tender.  Skin/Integumen: Clear.  Neuro: CN II-XII grossly intact.  Psych:  Alert and oriented x 3. Normal affect.  Extremities: No lower extremity edema noted, and calves are non-TTP bilaterally.       Medications     - MEDICATION INSTRUCTIONS -       ACE/ARB/ARNI NOT PRESCRIBED         allopurinol  100 mg Oral Daily     aspirin  81 mg Oral Daily     cetirizine  10 mg Oral QPM     clopidogrel  75 mg Oral Daily     furosemide  80 mg Oral BID     hydrALAZINE  25 mg Oral TID     insulin aspart   Subcutaneous TID w/meals     insulin aspart  1-7 Units Subcutaneous TID AC     insulin aspart  1-5 Units Subcutaneous At Bedtime     insulin glargine  10 Units Subcutaneous At Bedtime     isosorbide mononitrate  120 mg Oral Daily     levothyroxine  75 mcg Oral Daily     metoprolol tartrate  25 mg Oral BID     sodium chloride (PF)  3 mL Intracatheter Q8H     theophylline  400 mg Oral Daily       Data   Recent Labs   Lab 09/16/19  0534 09/15/19  0537 09/14/19  2145 09/14/19  1729 09/14/19  1418 09/14/19  0915  09/14/19  0451   WBC 8.9 9.7  --   --   --  9.5  --  10.5   HGB 8.7* 8.0*  --   --   --  8.5*  --  9.3*   MCV 91 92  --   --   --  93  --  93    198  --   --   --  202  --  231   INR 1.28*  --   --   --   --   --   --   --     138  --   --   --   --   --  140   POTASSIUM 3.0* 3.6  --   --   --   --   --  4.2   CHLORIDE 107 106  --   --   --   --   --  109   CO2 29 25  --   --   --   --   --  24   BUN 87* 93*  --   --   --   --   --  84*   CR 3.91* 4.11*  --   --   --   --   --  3.98*   ANIONGAP 7 7  --   --   --   --   --  7   LEV 8.9 8.6  --   --   --   --   --  8.8   * 174*  --   --   --   --   --  192*   ALBUMIN 3.0* 2.9*  --   --   --   --   --   --    PROTTOTAL 6.6* 6.1*  --   --   --   --   --   --    BILITOTAL 0.5 0.7  --   --   --   --   --   --    ALKPHOS 82 74  --   --   --   --   --   --    ALT 20 18  --   --   --   --   --   --    AST 15 16  --    --   --   --   --   --    TROPI  --   --  3.999* 5.125* 5.903*  --    < > 1.040*    < > = values in this interval not displayed.       No results found for this or any previous visit (from the past 24 hour(s)).

## 2019-09-16 NOTE — PROGRESS NOTES
[facility]    Cardiology Progress Note    Date of Service (when I saw the patient): 09/16/2019     Assessment & Plan   Oscar Terrazas is a 85 year old male who was admitted on 9/13/2019 with SOB, hypoxic in ED and mild chest discomfort.  Hx of HTN, HL, DM, CKD, CAD (CABG 1996; LIMA to LAD and SVG to OM; PCI to left main 2007 and PCI to SVG to OM 2012), who p/w CHF and worsening in renal function, and R/I for NSTEMI (peak Tn of 5.9). Chronic LBBB    NSTEMI  -Echo: EF of 40-45% (previously 55-60%). There is moderate hypokinesis of the entire inferior wall and mid to basal inferolateral wall and true LV apex. Mild to moderate MR.  - peak Tn of 5.9.  Possible component of demand ischemia.  EF is down compared to previous echo, however regional wall motion abnormal basically unchanged  -Creat has been elevated, 3.91 today. This makes risk of revascularization higher risk for needing dialysis after. Pt does not want dialysis  -ASA, lopressor 50 BID, imdur 120, plavix (long term)  Plan:  Stop ASA to avoid triple therapy. Continue chronic plavix especially in context of NSTEMI      CHF  -likely secondary to LYN  -4L, wt down 4 pounds  -avoid nephrotoxic meds  -lopressor- should switch to XL once dose established  -imdur, hydralazine    CKD  -nephrology involved  -doesn't want dialysis  -creat 3.91    CAD  -s/p CABG      HTN  -per nephrology  -lopressor, imdur, hydralazine  -1149    Afib-new  -rate 100 on EKG  -lopressor already increased to 50 BID  Plan  Discussed warfarin vs NOAC. Given renal function, will need warfarin. No bridging. Wants f/u INR at [facility]. Should have event monitor at discharge. Does not want HAYDEE/CV. Asymptomatic.       Has [facility] f/u already scheduled 9/24 with Scarlet    ATTESTATION:  Mr. Terrazas was seen and examined. Agree with note and plan of care. HR low 100s. Would give extra 25 mg (total 75mg) of lopressor this evening so long as BP >110 systolic. Expect dismissal  jay Patton MD     Interval History   Pt sleepy. Daughter Mona, who he lives with is present. No chest pain or SOB, appears comfortable on RA    Physical Exam   Temp: 98.6  F (37  C) Temp src: Oral BP: 131/69 Pulse: 112 Heart Rate: 112 Resp: 16 SpO2: (P) 96 % O2 Device: (P) None (Room air)    Vitals:    09/14/19 0600 09/15/19 0124 09/16/19 0022   Weight: 87.8 kg (193 lb 9 oz) 87.4 kg (192 lb 11.2 oz) 85.8 kg (189 lb 3.2 oz)     Vital Signs with Ranges  Temp:  [98  F (36.7  C)-98.6  F (37  C)] 98.6  F (37  C)  Pulse:  [] 112  Heart Rate:  [] 112  Resp:  [16] 16  BP: (118-149)/(59-93) 131/69  SpO2:  [95 %-98 %] (P) 96 %  I/O last 3 completed shifts:  In: 1280 [P.O.:1080; I.V.:200]  Out: 3400 [Urine:3400]    Constitutional     alert and oriented, in no acute distress, sleepy     Skin     warm and dry to touch    ENT     no pallor or cyanosis    Neck    Supple, JVP normal    Chest     no tenderness to palpation    Lungs  clear to auscultation     Cardiac  irregular irregular rhythm, S1 normal, S2 normal    Abdomen     abdomen soft, bowel sounds normoactive, no hepatosplenomegaly    Extremities and Back     No edema observed.        Neurological     no gross motor deficits noted, affect appropriate, oriented to time, person and place.    Medications     - MEDICATION INSTRUCTIONS -       ACE/ARB/ARNI NOT PRESCRIBED         allopurinol  100 mg Oral Daily     aspirin  81 mg Oral Daily     cetirizine  10 mg Oral QPM     clopidogrel  75 mg Oral Daily     furosemide  80 mg Oral BID     hydrALAZINE  25 mg Oral TID     insulin aspart   Subcutaneous TID w/meals     insulin aspart  1-7 Units Subcutaneous TID AC     insulin aspart  1-5 Units Subcutaneous At Bedtime     insulin glargine  10 Units Subcutaneous At Bedtime     isosorbide mononitrate  120 mg Oral Daily     levothyroxine  75 mcg Oral Daily     metoprolol tartrate  50 mg Oral BID     polyethylene glycol  17 g Oral Daily     senna-docusate  1 tablet  Oral BID     sodium chloride (PF)  3 mL Intracatheter Q8H     theophylline  400 mg Oral Daily       Data   Results for orders placed or performed during the hospital encounter of 09/13/19 (from the past 24 hour(s))   Glucose by meter   Result Value Ref Range    Glucose 166 (H) 70 - 99 mg/dL   Glucose by meter   Result Value Ref Range    Glucose 136 (H) 70 - 99 mg/dL   Glucose by meter   Result Value Ref Range    Glucose 179 (H) 70 - 99 mg/dL   Glucose by meter   Result Value Ref Range    Glucose 166 (H) 70 - 99 mg/dL   Comprehensive metabolic panel   Result Value Ref Range    Sodium 143 133 - 144 mmol/L    Potassium 3.0 (L) 3.4 - 5.3 mmol/L    Chloride 107 94 - 109 mmol/L    Carbon Dioxide 29 20 - 32 mmol/L    Anion Gap 7 3 - 14 mmol/L    Glucose 144 (H) 70 - 99 mg/dL    Urea Nitrogen 87 (H) 7 - 30 mg/dL    Creatinine 3.91 (H) 0.66 - 1.25 mg/dL    GFR Estimate 13 (L) >60 mL/min/[1.73_m2]    GFR Estimate If Black 15 (L) >60 mL/min/[1.73_m2]    Calcium 8.9 8.5 - 10.1 mg/dL    Bilirubin Total 0.5 0.2 - 1.3 mg/dL    Albumin 3.0 (L) 3.4 - 5.0 g/dL    Protein Total 6.6 (L) 6.8 - 8.8 g/dL    Alkaline Phosphatase 82 40 - 150 U/L    ALT 20 0 - 70 U/L    AST 15 0 - 45 U/L   CBC with platelets   Result Value Ref Range    WBC 8.9 4.0 - 11.0 10e9/L    RBC Count 2.88 (L) 4.4 - 5.9 10e12/L    Hemoglobin 8.7 (L) 13.3 - 17.7 g/dL    Hematocrit 26.3 (L) 40.0 - 53.0 %    MCV 91 78 - 100 fl    MCH 30.2 26.5 - 33.0 pg    MCHC 33.1 31.5 - 36.5 g/dL    RDW 14.7 10.0 - 15.0 %    Platelet Count 223 150 - 450 10e9/L   INR   Result Value Ref Range    INR 1.28 (H) 0.86 - 1.14   Magnesium   Result Value Ref Range    Magnesium 2.2 1.6 - 2.3 mg/dL   Phosphorus   Result Value Ref Range    Phosphorus 3.2 2.5 - 4.5 mg/dL   TSH with free T4 reflex   Result Value Ref Range    TSH 1.15 0.40 - 4.00 mU/L   Theophylline level   Result Value Ref Range    Theophylline Level 16.2 10.0 - 20.0 mg/L   EKG 12-lead, tracing only   Result Value Ref Range     Interpretation ECG Click View Image link to view waveform and result    Glucose by meter   Result Value Ref Range    Glucose 147 (H) 70 - 99 mg/dL       AIDA Collins, CNP  Cardiology  Pager:  [phone]

## 2019-09-17 NOTE — PROGRESS NOTES
[facility]    Hospitalist Progress Note    Assessment & Plan   Oscar Terrazas is a 85 year old male who was admitted on 9/13/2019.     Past medical history significant for CAD with history of MI (2 vessel CABG 1996, KANU LAD 2007, KANU 2012), HTN, HLP, PVD, DM2, CKD stage V, Idiopathic neuropathy, GERD, Hypothyroidism, Mild persistent Asthma, Chronic anemia, Gout, OA, history of subarachnoid bleed who was admitted with progressive orthopnea and shortness of breath.    CHF exacerbation (diastolic and systolic):  Noted volume overload thought to be due to renal insufficiency and exacerbation of CHF.  Noted history of grade 2 diastolic dysfunction with mild ischemic cardiomyopathy and moderate hypokinesis of basal inferior and inferior lateral walls.    ECHO with EF of 40-45%, worsened LV function.     --Cardiology consult appreciated:  --[facility] consultation.     --Follow up scheduled for 9/24.    --Metoprolol dose adjusted due to new onset atrial fib with persistent RVR:  75 mg BID.    --Continue hydralazine (decreased) 25 mg TID and Imdur 120 mg daily.      Chronic kidney disease stage V, with associated volume overload:  Recent Labs   Lab 09/17/19  0539 09/16/19  0534 09/15/19  0537 09/14/19  0451 09/13/19 2005   CR 4.05* 3.91* 4.11* 3.98* 4.03*   --Nephrology consult appreciated.     --Patient confirmed he would not want dialysis.    --Lasix drip was stopped 9/15 and patient has been started on PO lasix 80 mg BID.  --I's and O's.    --Daily weights.      New onset atrial fib with RVR:  Continued RVR (HR )  Noted on telemetry the morning (9/16) and no history of atrial fib.  [id number] score of ~6.    STAT EKG confirmed atrial fib () and previously was sinus with BBB.  TSH WNL at 1.15, Mag WNL 2.2 and Phos WNL at 3.2.  Theophylline level therapeutic at 16.2.    --Increase metoprolol back to 50 mg BID-->increased to 75 mg BID 9/16 (evening).   --Per Cardiology lopressor should  switch to XL once dose established.  --Start Diltiazem 30 mg every 6 hours: switch to long acting if/when appropriate.    --PRN lopressor 2.5 mg available for HR > 120.    --Cardiology is following.    --Patient started on Coumadin with pharmacy assistance.  Per Cardiology will not require bridging (INR of 1.25).  Requesting INR follow up at [facility].  --30 day event monitor at discharge.      NSTEMI in setting of known CAD with history of MI and interventions and associated HTN and HLP:  Troponin peak of 5.9.  Possibly related to demand ischemia.  ECHO without change in wall motion from previous study but noted decrease in EF.    --Appreciate Cardiology consult:   --Conservative management.    --Resume Plavix 75 mg daily, Imdur 120 mg daily, hydralazine decreased from 50 mg TID to 25 mg TID).  --Metoprolol adjusted: 75 mg BID.     --Per Cardiology lopressor should switch to XL once dose established.  --Start Diltiazem 30 mg every 6 hours.  --Stop aspirin as coumadin has been started.    --Patient intolerant to statins and is currently on alirocumab injection every 2 weeks.       Hypokalemia:  Resolved.    Potassium of 3.    --Received a total of 40 mEq of K-dur.    --Monitor.      Chronic anemia: Improving  Suspect secondary to chronic kidney disease.Iron low at 27, Iron Binding low at 184.    --Nephrology consult appreciated.    --Received Venofer 9/15.    --Monitor.       Hypothyroidism:  TSH WNL 1.15.    --Resume PTA levothyroxine 75 mcg daily     Type 2 diabetes:   Last hemoglobin A1c 6.3% (6/2019).  --Hypoglycemia protocol in place  --Medium dose sliding scale insulin available if needed  --1 unit per 20 g carbohydrate prandial insulin.  --Lantus 10 units at bedtime.  Prior to admission dose of 14 units.    Gout:  --Continue PTA allopurinol 100 mg daily.      Mild intermittent asthma:  --Continue PRN neb.    --Resume PTA theophylline 400 mg daily.    GERD:  Noted history but does not appear to be on any  medications.      Diet: Combination Diet Renal Diet; 2 gm NA Diet; Low Saturated Fat Na <2400mg Diet, No Caffeine Diet  Room Service     DVT Prophylaxis: Pneumatic Compression Devices   Chicas Catheter: not present  Code Status: DNR/DNI     Disposition Plan    Expected discharge: 1-2 days, recommended to prior living arrangement once cardiac medication adjustment is completed and patient tolerates them.   Entered: Landon Francis PA-C 09/17/2019, 7:03 AM        The patient has been discussed with Dr. Rodriguez, who agrees with the assessment and plan at this time.  Dr. Rodriguez will evaluate the patient independently.      Kahlil Francis PA-C   [phone]    Interval History   Patient was resting in bed upon arrival.  His daughter is present at the bedside.  He had just completed working with physical therapy.  He denied fever, chills, chest pain, shortness of breath or abdominal pain.      He complained of cold feet.  Reviewed atrial fib with continued RVR (heart rate in the 120's) and initiation and adjustment of medications (lopressor and diltiazem).  Discussed staying overnight at least for further monitoring and to ensure he tolerates these changes.      -Data reviewed today: I reviewed all new labs and imaging results over the last 24 hours. I personally reviewed no images or EKG's today.    Physical Exam   Temp: 97.8  F (36.6  C) Temp src: Oral BP: 121/69 Pulse: 112 Heart Rate: 85 Resp: 18 SpO2: 96 % O2 Device: None (Room air)    Vitals:    09/15/19 0124 09/16/19 0022 09/17/19 0521   Weight: 87.4 kg (192 lb 11.2 oz) 85.8 kg (189 lb 3.2 oz) 84.2 kg (185 lb 9.6 oz)     Vital Signs with Ranges  Temp:  [97.8  F (36.6  C)-98.6  F (37  C)] 97.8  F (36.6  C)  Pulse:  [112] 112  Heart Rate:  [] 85  Resp:  [16-18] 18  BP: ()/(52-85) 121/69  SpO2:  [96 %-98 %] 96 %  I/O last 3 completed shifts:  In: 930 [P.O.:930]  Out: 2000 [Urine:2000]      Constitutional: Awake, alert, cooperative, NAD.    ENT:  NCAT, oral pharynx with moist mucus membranes, tonsils without erythema or exudates.  Neck: Supple, symmetrical, trachea midline, no adenopathy.  Pulmonary: No increased work of breathing, fair air exchange,CTA bilaterally, no crackles or wheezing.  Cardiovascular: Irreg irreg, normal S1 and S2, no S3 or S4, and no murmur noted.  GI: Active bowel sounds, soft, non-distended, non-tender.  Skin/Integumen: Clear.  Neuro: CN II-XII grossly intact.  Psych:  Alert and oriented x 3. Normal affect.  Extremities: No lower extremity edema noted, and calves are non-TTP bilaterally.       Medications     - MEDICATION INSTRUCTIONS -       ACE/ARB/ARNI NOT PRESCRIBED       Warfarin Therapy Reminder         allopurinol  100 mg Oral Daily     cetirizine  10 mg Oral QPM     clopidogrel  75 mg Oral Daily     furosemide  80 mg Oral BID     hydrALAZINE  25 mg Oral TID     insulin aspart   Subcutaneous TID w/meals     insulin aspart  1-7 Units Subcutaneous TID AC     insulin aspart  1-5 Units Subcutaneous At Bedtime     insulin glargine  10 Units Subcutaneous At Bedtime     isosorbide mononitrate  120 mg Oral Daily     levothyroxine  75 mcg Oral Daily     metoprolol tartrate  25 mg Oral BID     metoprolol tartrate  50 mg Oral BID     polyethylene glycol  17 g Oral Daily     senna-docusate  1 tablet Oral BID     sodium chloride (PF)  3 mL Intracatheter Q8H     theophylline  400 mg Oral Daily       Data   Recent Labs   Lab 09/17/19  0539 09/16/19  1818 09/16/19  0534 09/15/19  0537 09/14/19  2145 09/14/19  1729 09/14/19  1418 09/14/19  0915   WBC  --   --  8.9 9.7  --   --   --  9.5   HGB 9.3*  --  8.7* 8.0*  --   --   --  8.5*   MCV  --   --  91 92  --   --   --  93   PLT  --   --  223 198  --   --   --  202   INR 1.25*  --  1.28*  --   --   --   --   --      --  143 138  --   --   --   --    POTASSIUM 3.7 3.2* 3.0* 3.6  --   --   --   --    CHLORIDE 107  --  107 106  --   --   --   --    CO2 27  --  29 25  --   --   --   --    BUN  99*  --  87* 93*  --   --   --   --    CR 4.05*  --  3.91* 4.11*  --   --   --   --    ANIONGAP 9  --  7 7  --   --   --   --    LEV 9.4  --  8.9 8.6  --   --   --   --    *  --  144* 174*  --   --   --   --    ALBUMIN  --   --  3.0* 2.9*  --   --   --   --    PROTTOTAL  --   --  6.6* 6.1*  --   --   --   --    BILITOTAL  --   --  0.5 0.7  --   --   --   --    ALKPHOS  --   --  82 74  --   --   --   --    ALT  --   --  20 18  --   --   --   --    AST  --   --  15 16  --   --   --   --    TROPI  --   --   --   --  3.999* 5.125* 5.903*  --        No results found for this or any previous visit (from the past 24 hour(s)).

## 2019-09-17 NOTE — PLAN OF CARE
Pt here with mvd- possible cabg.   Disoriented to situation.  VSS. Tele SR 71 with bbb.  Mod cho/cardiac diet.  [place]; has not been oob for us. Denies pain. Pt scoring  green on the Aggression Stop Light Tool.  Plan:  waiting for hospitalist and cards to see her.

## 2019-09-17 NOTE — PROGRESS NOTES
[facility]    Cardiology Progress Note    Date of Service (when I saw the patient): 09/17/2019     Assessment & Plan   Oscar Terrazas is a 85 year old male who was admitted on 9/13/2019 with SOB, hypoxic in ED and mild chest discomfort.  Hx of HTN, HL, DM, CKD, CAD (CABG 1996; LIMA to LAD and SVG to OM; PCI to left main 2007 and PCI to SVG to OM 2012), who p/w CHF and worsening in renal function, and R/I for NSTEMI (peak Tn of 5.9). Chronic LBBB    NSTEMI  -Echo: EF of 40-45% (previously 55-60%). There is moderate hypokinesis of the entire inferior wall and mid to basal inferolateral wall and true LV apex. Mild to moderate MR.  - peak Tn of 5.9.  Possible component of demand ischemia.  EF is down compared to previous echo, however regional wall motion abnormal basically unchanged  -Creat has been elevated, 3.91 today. This makes risk of revascularization higher risk for needing dialysis after. Pt does not want dialysis  -ASA, lopressor 50 BID, imdur 120, plavix (long term)  Plan:  Stop ASA to avoid triple therapy. Continue chronic plavix especially in context of NSTEMI      CHF  -likely secondary to LYN  -4L, wt down 4 pounds  -avoid nephrotoxic meds  -lopressor- should switch to XL once dose established  -imdur, hydralazine    CKD  -nephrology involved  -doesn't want dialysis  -creat 3.91->4.05    CAD  -s/p CABG      HTN  -per nephrology  -lopressor, imdur, hydralazine  -149    Afib-new  -Rate   -lopressor already increased to 75 BID last night, HRs still high.   Plan  Given renal function, warfarin initiated.   No bridging.   Wants f[facility]R at [facility].   Add diltiazem 30 q 6 for better rate control, switch to long acting if/when appropriate  30 day event monitor at discharge.   Does not want HAYDEE/CV. Asymptomatic.     2:20 pm addendum  Heart rate better on diltiazem. Stable from cardiac perspective. We will sign off.    Has [facility] f/u already scheduled 9/24 with steffi Novak  monitor    ATTESTATION:  Ms. Terrazas was seen and examined. Agree with note and plan of care.  VENKATA Patton MD       Interval History   Up in bed eating breakfast, daughter preset. No CP or SOB, on RA. Asymptomatic with tachycardia    Physical Exam   Temp: 97.8  F (36.6  C) Temp src: Oral BP: 121/69 Pulse: 112 Heart Rate: 85 Resp: 18 SpO2: 96 % O2 Device: None (Room air)    Vitals:    09/15/19 0124 09/16/19 0022 09/17/19 0521   Weight: 87.4 kg (192 lb 11.2 oz) 85.8 kg (189 lb 3.2 oz) 84.2 kg (185 lb 9.6 oz)     Vital Signs with Ranges  Temp:  [97.8  F (36.6  C)] 97.8  F (36.6  C)  Pulse:  [112] 112  Heart Rate:  [] 85  Resp:  [16-18] 18  BP: ()/(52-85) 121/69  SpO2:  [96 %-98 %] 96 %  I/O last 3 completed shifts:  In: 930 [P.O.:930]  Out: 2000 [Urine:2000]    Constitutional     alert and oriented, in no acute distress, sleepy     Skin     warm and dry to touch    ENT     no pallor or cyanosis    Neck    Supple, JVP normal    Chest     no tenderness to palpation    Lungs  clear to auscultation     Cardiac  irregular irregular rhythm, S1 normal, S2 normal    Abdomen     abdomen soft, bowel sounds normoactive, no hepatosplenomegaly    Extremities and Back     No edema observed.        Neurological     no gross motor deficits noted, affect appropriate, oriented to time, person and place.    Medications     - MEDICATION INSTRUCTIONS -       ACE/ARB/ARNI NOT PRESCRIBED       Warfarin Therapy Reminder         allopurinol  100 mg Oral Daily     cetirizine  10 mg Oral QPM     clopidogrel  75 mg Oral Daily     furosemide  80 mg Oral BID     hydrALAZINE  25 mg Oral TID     insulin aspart   Subcutaneous TID w/meals     insulin aspart  1-7 Units Subcutaneous TID AC     insulin aspart  1-5 Units Subcutaneous At Bedtime     insulin glargine  10 Units Subcutaneous At Bedtime     isosorbide mononitrate  120 mg Oral Daily     levothyroxine  75 mcg Oral Daily     metoprolol tartrate  25 mg Oral BID     metoprolol  tartrate  50 mg Oral BID     polyethylene glycol  17 g Oral Daily     senna-docusate  1 tablet Oral BID     sodium chloride (PF)  3 mL Intracatheter Q8H     theophylline  400 mg Oral Daily       Data   Results for orders placed or performed during the hospital encounter of 09/13/19 (from the past 24 hour(s))   Glucose by meter   Result Value Ref Range    Glucose 145 (H) 70 - 99 mg/dL   Glucose by meter   Result Value Ref Range    Glucose 152 (H) 70 - 99 mg/dL   Potassium   Result Value Ref Range    Potassium 3.2 (L) 3.4 - 5.3 mmol/L   Glucose by meter   Result Value Ref Range    Glucose 189 (H) 70 - 99 mg/dL   Glucose by meter   Result Value Ref Range    Glucose 111 (H) 70 - 99 mg/dL   INR   Result Value Ref Range    INR 1.25 (H) 0.86 - 1.14   Basic metabolic panel   Result Value Ref Range    Sodium 143 133 - 144 mmol/L    Potassium 3.7 3.4 - 5.3 mmol/L    Chloride 107 94 - 109 mmol/L    Carbon Dioxide 27 20 - 32 mmol/L    Anion Gap 9 3 - 14 mmol/L    Glucose 129 (H) 70 - 99 mg/dL    Urea Nitrogen 99 (H) 7 - 30 mg/dL    Creatinine 4.05 (H) 0.66 - 1.25 mg/dL    GFR Estimate 13 (L) >60 mL/min/[1.73_m2]    GFR Estimate If Black 15 (L) >60 mL/min/[1.73_m2]    Calcium 9.4 8.5 - 10.1 mg/dL   Hemoglobin   Result Value Ref Range    Hemoglobin 9.3 (L) 13.3 - 17.7 g/dL       AIDA Collins, CNP  Cardiology  Pager:  [phone]

## 2019-09-17 NOTE — PLAN OF CARE
Heart Failure Care Pathway  GOALS TO BE MET BEFORE DISCHARGE:    1. Decrease congestion and/or edema with diuretic therapy to achieve near      optimal volume status.            Dyspnea improved:  Yes            Edema improved:     Yes        Net I/O and Weights since admission:          08/18 0700 - 09/17 0659  In: 3160.8 [P.O.:2850; I.V.:310.8]  Out: 7875 [Urine:7875]  Net: -4714.2            Vitals:    09/13/19 1953 09/13/19 2300 09/14/19 0600 09/15/19 0124   Weight: 93 kg (205 lb) 87.9 kg (193 lb 12.8 oz) 87.8 kg (193 lb 9 oz) 87.4 kg (192 lb 11.2 oz)    09/16/19 0022   Weight: 85.8 kg (189 lb 3.2 oz)         2.  O2 sats > 92% on RA or at prior home O2 therapy level.          Current oxygenation status:       SpO2: 96 %         O2 Device: None (Room air),            Able to wean O2 this shift to keep sats > 92%:  Yes       Does patient use Home O2? No    3.  Tolerates ambulation and mobility near baseline: No, please explain: Generalized weakness.         How many times did the patient ambulate with nursing staff this shift? 2    Please review the Heart Failure Care Pathway for additional HF goal outcomes.    Ashley Lopez RN RN  9/17/2019

## 2019-09-17 NOTE — PROGRESS NOTES
Nephrology Progress Note          Assessment and Plan:   CKD stage 5 a bit better with vigorous diuretic Rx.  Pt feeling better overall.  Assume some element of cardiorenal syndrome, now improved.  Continue same Rx.  Reassess volume status and chemistries in AM.        Hypervolemia associated with renal insufficiency    * No resolved hospital problems. *               Interval History:   no new complaints, up and ambulating, alert, oriented to person, place and time and doing well; no cp, sob, n/v/d, or abd pain.  Calm and cheerful.  VS ok, though a fib rate >100.  Good rm air SaO2.  Good intake and UO.  Wt down nearly 2 kg.  Meds and labs reviewed.  Lytes ok, except persistent low K+; receiving supp.  BUN/Cr sl lower than on 9/15.  Other chemistries fine.  CBC stable.  Hgb sl higher with diuresis, 8.7.                     Medications:       allopurinol  100 mg Oral Daily     cetirizine  10 mg Oral QPM     clopidogrel  75 mg Oral Daily     furosemide  80 mg Oral BID     hydrALAZINE  25 mg Oral TID     insulin aspart   Subcutaneous TID w/meals     insulin aspart  1-7 Units Subcutaneous TID AC     insulin aspart  1-5 Units Subcutaneous At Bedtime     insulin glargine  10 Units Subcutaneous At Bedtime     isosorbide mononitrate  120 mg Oral Daily     levothyroxine  75 mcg Oral Daily     metoprolol tartrate  25 mg Oral BID     metoprolol tartrate  50 mg Oral BID     polyethylene glycol  17 g Oral Daily     potassium chloride  20 mEq Oral Once     senna-docusate  1 tablet Oral BID     sodium chloride (PF)  3 mL Intracatheter Q8H     theophylline  400 mg Oral Daily       - MEDICATION INSTRUCTIONS -       ACE/ARB/ARNI NOT PRESCRIBED       Warfarin Therapy Reminder                      Physical Exam:       Vital Sign Ranges  Temp:  [97.8  F (36.6  C)-98.6  F (37  C)] 97.8  F (36.6  C)  Pulse:  [] 112  Heart Rate:  [] 112  Resp:  [16] 16  BP: ()/(52-78) 119/56  SpO2:  [96 %-98 %] 98 %    Weight, current:   85.8 kg (actual weight)  Weight change: -1.588 kg (-3 lb 8 oz)    I/O last 3 completed shifts:  In: 1320 [P.O.:1320]  Out: 3050 [Urine:3050]    Physical Exam:   General:  Patient comfortable, in no apparent distress.  Awake, alert, oriented x3.  Neck:  Supple, no JVD.  Lungs:  Clear to auscultation bilaterally.  Cardiac:  Irregular rate and rhythm, no murmurs, rub, or gallops.  Abdomen:  Soft, nontender, physiologic sounds.  Extremities:  Minimal edema.  2+ pulses.  Skin:  Warm, dry.  Neurologic:  No focal deficits.             Data:        Lab Results   Component Value Date     09/16/2019    Lab Results   Component Value Date    CHLORIDE 107 09/16/2019    Lab Results   Component Value Date    BUN 87 (H) 09/16/2019      Lab Results   Component Value Date    POTASSIUM 3.2 (L) 09/16/2019    Lab Results   Component Value Date    CO2 29 09/16/2019    Lab Results   Component Value Date    CR 3.91 (H) 09/16/2019        Lab Results   Component Value Date     09/16/2019     09/15/2019     09/14/2019     Lab Results   Component Value Date    POTASSIUM 3.2 (L) 09/16/2019    POTASSIUM 3.0 (L) 09/16/2019    POTASSIUM 3.6 09/15/2019     Lab Results   Component Value Date    CHLORIDE 107 09/16/2019    CHLORIDE 106 09/15/2019    CHLORIDE 109 09/14/2019     Lab Results   Component Value Date    CO2 29 09/16/2019    CO2 25 09/15/2019    CO2 24 09/14/2019     Lab Results   Component Value Date    CR 3.91 (H) 09/16/2019    CR 4.11 (H) 09/15/2019    CR 3.98 (H) 09/14/2019     Lab Results   Component Value Date    BUN 87 (H) 09/16/2019    BUN 93 (H) 09/15/2019    BUN 84 (H) 09/14/2019     Lab Results   Component Value Date    HGB 8.7 (L) 09/16/2019    HGB 8.0 (L) 09/15/2019    HGB 8.5 (L) 09/14/2019     No results found for: PH, PHARTERIAL, PO2, [id number], SAT, PCO2, HCO3, BASEEXCESS, ELIEZER, BEB          Toby Simental MD  Nephrology; [employer]  [phone]

## 2019-09-17 NOTE — PLAN OF CARE
HR stable, BP somewhat low this afternoon. Received diltiazem but held hydralazine. Up with SBA, alarms not active as pt gets between bed and chair frequently and reliably. Renal following, adjusted meds. Cards signed off. Probable discharge tomorrow with event monitor.  Using sliding scale and carb counting insulin.

## 2019-09-17 NOTE — PROVIDER NOTIFICATION
FyI- metoprolol is ordered for 50mg in am, but there is also a 25mg tablet order that states: give extra dose tonight....please clarify amt of metoprolol.  thanks.

## 2019-09-17 NOTE — PLAN OF CARE
A&O except forgetful.  Remains in afib rvr to cvr.  Rest of vs s.  Lungs clear with diminished bases.  Voiding in lrge amts.  Given K_Dur for low K+.  C/o not sleeping well and given melatonin.  Slept at intervals.

## 2019-09-17 NOTE — PROGRESS NOTES
Hospitalist cross cover note    Patient RN regarding potassium of 3.2.  This is after 20 meq p.o. potassium given after a.m. potassium was 3.0.  I note patient does have significant CKD.  Will repeat 20 meq p.o. once as patient is still getting significant diuresis twice a day.  Discussed with RN    Dr. GIL

## 2019-09-17 NOTE — PLAN OF CARE
"Discharge Planner PT   Patient plan for discharge: Home   Current status: independent with sit<>stand and supine<>sit. Ambulated with single end cane 250', looks much better with the cane-balance improved, pt more confident gait. Note  and drop in BP with activity. Pt does endorses having , low energy and feeling \"a little weaker\" after walking. Discussed with RN. Placed order for single end cane at discharge.  Barriers to return to prior living situation: None  Recommendations for discharge: Home   Rationale for recommendations: Pt will benefit from use of a cane in home and when out and about for improved balance and strength. Recommend frequent short bouts of ambulation with family and/or staff during LOS to continue to progress activity tolerance as HR allows.       Entered by: Anna Sommer 09/17/2019 11:15 AM       "

## 2019-09-17 NOTE — PROGRESS NOTES
Nephrology Progress Note          Assessment and Plan:   Stage 5 CKD slightly worse.  Ongoing brisk UO and falling wt.  He may be getting a bit dry.  I recommend resuming PTA diuretic, torsemide, in slightly higher dose than before, 20 mg/day.  Continue daily chemistries and volume assessment while in-pt.  Maybe ready for discharge in 1-2 days.  Pt has been seen in our office in the past but not in more than a year.  He and his daughter do not feel they need out-pt nephrology care at this point.  They are ok with a return to our office if requested by cardiology or Dr. Hillman.            Hypervolemia associated with renal insufficiency    * No resolved hospital problems. *               Interval History:   no new complaints, up and ambulating, alert, oriented to person, place and time and doing well; no cp, sob, n/v/d, or abd pain.  Continues to feel well.  VS ok.  A fib rate better, 80-90.  Good rm air SaO2.  Good intake.  UO at least 2 L/day.  Wt down another 1.6 kg.  Meds and labs reviewed.  Lytes nl.  Cr back over 4, and BUN up to ~100.  Hgb higher with diuresis, 9.3.                       Medications:       allopurinol  100 mg Oral Daily     cetirizine  10 mg Oral QPM     clopidogrel  75 mg Oral Daily     diltiazem  30 mg Oral Q6H [facility]     [START ON 9/18/2019] diltiazem ER  120 mg Oral Daily     hydrALAZINE  25 mg Oral TID     insulin aspart   Subcutaneous TID w/meals     insulin aspart  1-7 Units Subcutaneous TID AC     insulin aspart  1-5 Units Subcutaneous At Bedtime     insulin glargine  10 Units Subcutaneous At Bedtime     isosorbide mononitrate  120 mg Oral Daily     levothyroxine  75 mcg Oral Daily     metoprolol succinate ER  75 mg Oral BID     polyethylene glycol  17 g Oral Daily     senna-docusate  1 tablet Oral BID     sodium chloride (PF)  3 mL Intracatheter Q8H     theophylline  400 mg Oral Daily     [START ON 9/18/2019] torsemide  20 mg Oral Daily     warfarin ANTICOAGULANT  4 mg Oral ONCE at 18:00        - MEDICATION INSTRUCTIONS -       ACE/ARB/ARNI NOT PRESCRIBED       Warfarin Therapy Reminder                      Physical Exam:       Vital Sign Ranges  Temp:  [97.9  F (36.6  C)-98  F (36.7  C)] 97.9  F (36.6  C)  Pulse:  [88] 88  Heart Rate:  [] 87  Resp:  [18] 18  BP: ()/(52-85) 123/65  SpO2:  [96 %-100 %] 100 %    Weight, current:  84.2 kg (actual weight)  Weight change: -1.633 kg (-3 lb 9.6 oz)    I/O last 3 completed shifts:  In: 930 [P.O.:930]  Out: 2000 [Urine:2000]    Physical Exam:   General:  Patient comfortable, in no apparent distress.  Awake, alert, oriented x3.  Neck:  Supple, no JVD.  Lungs:  Clear to auscultation bilaterally.  Cardiac:  Irregular rate and rhythm, no murmurs, rub, or gallops.  Abdomen:  Soft, nontender, physiologic sounds.  Extremities:  Same minimal edema.  2+ pulses.  Skin:  Warm, dry.  Neurologic:  No focal deficits.             Data:        Lab Results   Component Value Date     09/17/2019    Lab Results   Component Value Date    CHLORIDE 107 09/17/2019    Lab Results   Component Value Date    BUN 99 (H) 09/17/2019      Lab Results   Component Value Date    POTASSIUM 3.7 09/17/2019    Lab Results   Component Value Date    CO2 27 09/17/2019    Lab Results   Component Value Date    CR 4.05 (H) 09/17/2019        Lab Results   Component Value Date     09/17/2019     09/16/2019     09/15/2019     Lab Results   Component Value Date    POTASSIUM 3.7 09/17/2019    POTASSIUM 3.2 (L) 09/16/2019    POTASSIUM 3.0 (L) 09/16/2019     Lab Results   Component Value Date    CHLORIDE 107 09/17/2019    CHLORIDE 107 09/16/2019    CHLORIDE 106 09/15/2019     Lab Results   Component Value Date    CO2 27 09/17/2019    CO2 29 09/16/2019    CO2 25 09/15/2019     Lab Results   Component Value Date    CR 4.05 (H) 09/17/2019    CR 3.91 (H) 09/16/2019    CR 4.11 (H) 09/15/2019     Lab Results   Component Value Date    BUN 99 (H) 09/17/2019    BUN 87 (H) 09/16/2019     BUN 93 (H) 09/15/2019     Lab Results   Component Value Date    HGB 9.3 (L) 09/17/2019    HGB 8.7 (L) 09/16/2019    HGB 8.0 (L) 09/15/2019     No results found for: PH, PHARTERIAL, PO2, [id number], SAT, PCO2, HCO3, BASEEXCESS, ELIEZER, BEB          Toby Simental MD  Nephrology; [employer]  [phone]

## 2019-09-17 NOTE — PLAN OF CARE
Pt here with chf exac. A&O. Tele afib cvr- started on new afib meds with heart rate improving, bp soft in high 90's after meds.   Cardiac diet.  Up with sba. Denies pain. Pt scoring green on the Aggression Stop Light Tool. Plan:  will discharge to home- most likely tmrw- cards signed off.

## 2019-09-18 NOTE — PLAN OF CARE
Pt. Discharging to home.   Discharge planning started.  Waiting for daughter to get here to go over paperwork and waiting for cane.    A & o- up with sba, cardiac diet.  No q's or concerns.

## 2019-09-18 NOTE — PROGRESS NOTES
[facility]    Hospitalist Progress Note    Assessment & Plan   Oscar Terrazas is a 85 year old male who was admitted on 9/13/2019.     Past medical history significant for CAD with history of MI (2 vessel CABG 1996, KANU LAD 2007, KANU 2012), HTN, HLP, PVD, DM2, CKD stage V, Idiopathic neuropathy, GERD, Hypothyroidism, Mild persistent Asthma, Chronic anemia, Gout, OA, history of subarachnoid bleed who was admitted with progressive orthopnea and shortness of breath.    CHF exacerbation (diastolic and systolic):  Noted volume overload thought to be due to renal insufficiency and exacerbation of CHF.  Noted history of grade 2 diastolic dysfunction with mild ischemic cardiomyopathy and moderate hypokinesis of basal inferior and inferior lateral walls.    ECHO with EF of 40-45%, worsened LV function.     --Cardiology consult appreciated:  --[facility] consultation.     --Follow up scheduled for 9/24.    --Metoprolol dose adjusted due to new onset atrial fib with persistent RVR:  Metoprolol XL 75 mg BID.    --Continue hydralazine (decreased) 25 mg TID and Imdur 120 mg daily.      Chronic kidney disease stage V, with associated volume overload:  Recent Labs   Lab 09/18/19  0553 09/17/19  0539 09/16/19  0534 09/15/19  0537 09/14/19  0451 09/13/19 2005   CR PENDING 4.05* 3.91* 4.11* 3.98* 4.03*   --Nephrology consult appreciated.     --Patient confirmed he would not want dialysis.    --Lasix drip was stopped 9/15 and patient has been started on PO lasix 80 mg BID.   --Per Nephrology stop PO lasix and start PO torsemide 20 mg daily.    --I's and O's.    --Daily weights.      New onset atrial fib with RVR:  Improved  Noted on telemetry the morning (9/16) and no history of atrial fib.  [id number] score of ~6.    STAT EKG confirmed atrial fib () and previously was sinus with BBB.  TSH WNL at 1.15, Mag WNL 2.2 and Phos WNL at 3.2.  Theophylline level therapeutic at 16.2.    --Increase metoprolol back  to 50 mg BID-->increased to 75 mg BID 9/16 (evening).   --Per Cardiology lopressor should switch to XL once dose established-->Toprol-XL 75 mg BID.  --Start Diltiazem 30 mg every 6 hours-->Diltiazem  mg daily.    --PRN lopressor 2.5 mg available for HR > 120.    --Cardiology is following.    --Patient started on Coumadin with pharmacy assistance.  Per Cardiology will not require bridging (INR of 1.25).  Requesting INR follow up at [facility].  --30 day event monitor at discharge.      NSTEMI in setting of known CAD with history of MI and interventions and associated HTN and HLP:  Troponin peak of 5.9.  Possibly related to demand ischemia.  ECHO without change in wall motion from previous study but noted decrease in EF.    --Appreciate Cardiology consult:   --Conservative management.    --Resume Plavix 75 mg daily, Imdur 120 mg daily, hydralazine decreased from 50 mg TID to 25 mg TID).  --Metoprolol adjusted: 75 mg BID.     --Per Cardiology lopressor should switch to XL once dose established-->Toprol-XL 75 mg BID  --Start Diltiazem 30 mg every 6 hours-->Diltiazem  mg daily.    --Stop aspirin as coumadin has been started.    --Patient intolerant to statins and is currently on alirocumab injection every 2 weeks.       Hypokalemia:  Reoccurrence due to diuretic therapy.    Received a total of 40 mEq of K-dur with resolution previously.  Now back at 3.1.    --One time dose of 40 mEq K-Dur this morning and check around noon.    --Monitor.      Chronic anemia: Improving  Suspect secondary to chronic kidney disease.  Iron low at 27, Iron Binding low at 184.    --Nephrology consult appreciated.    --Received Venofer 9/15.    --Monitor.       Hypothyroidism:  TSH WNL 1.15.    --Resume PTA levothyroxine 75 mcg daily     Type 2 diabetes:   Last hemoglobin A1c 6.3% (6/2019).  --Hypoglycemia protocol in place  --Medium dose sliding scale insulin available if needed  --1 unit per 20 g carbohydrate prandial  insulin.  --Lantus 10 units at bedtime.  Prior to admission dose of 14 units.    Gout:  --Continue PTA allopurinol 100 mg daily.      Mild intermittent asthma:  --Continue PRN neb.    --Resume PTA theophylline 400 mg daily.    GERD:  Noted history but does not appear to be on any medications.      Diet: Combination Diet Renal Diet; 2 gm NA Diet; Low Saturated Fat Na <2400mg Diet, No Caffeine Diet  Room Service     DVT Prophylaxis: Pneumatic Compression Devices   Chicas Catheter: not present  Code Status: DNR/DNI     Disposition Plan    Expected discharge: Today, recommended to prior living arrangement once cardiac medication adjustment is completed and patient tolerates them.   Entered: Landon Francis PA-C 09/18/2019, 6:19 AM        The patient has been discussed with Dr. Rodriguez, who agrees with the assessment and plan at this time.  Dr. Rodriguez will evaluate the patient independently.      Kahlil Francis PA-C   [phone]    Interval History   Patient was resting in bed.  He again said his feet are always cold.  He has been up and walking and had slight chest discomfort when active that resolved with rest.  He otherwise he denied fevers, shortness of breath or abdominal pain.      He mentioned that he had a bowel movement yesterday.      Reviewed plan for discharge later this afternoon with patient and his daughter.  Went over medications that are new and changed.  Discussed starting an every other day potassium supplement that both him and his daughter would rather not start as he eats a banana a day.    Reviewed plan for INR check on Friday.  Informed them that he has follow up scheduled with PCP and [facility].         -Data reviewed today: I reviewed all new labs and imaging results over the last 24 hours. I personally reviewed no images or EKG's today.    Physical Exam   Temp: 97.9  F (36.6  C) Temp src: Oral BP: 124/86 Pulse: 88 Heart Rate: 94 Resp: 18 SpO2: 99 % O2 Device: None (Room air)     Vitals:    09/15/19 0124 09/16/19 0022 09/17/19 0521   Weight: 87.4 kg (192 lb 11.2 oz) 85.8 kg (189 lb 3.2 oz) 84.2 kg (185 lb 9.6 oz)     Vital Signs with Ranges  Temp:  [97.9  F (36.6  C)-98  F (36.7  C)] 97.9  F (36.6  C)  Pulse:  [88] 88  Heart Rate:  [] 94  Resp:  [18] 18  BP: ()/(52-86) 124/86  SpO2:  [99 %-100 %] 99 %  I/O last 3 completed shifts:  In: 1080 [P.O.:1080]  Out: 1550 [Urine:1550]      Constitutional: Awake, alert, cooperative, NAD.    ENT: NCAT, oral pharynx with MMM, tonsils without erythema or exudates.  Neck: Supple, symmetrical, trachea midline, no adenopathy.  Pulmonary: No increased work of breathing, fair air exchange,CTA bilaterally, no crackles or wheezing.  Cardiovascular: Irreg irreg with CVR, normal S1 and S2, no S3 or S4, and no murmur noted.  GI: Active bowel sounds, soft, non-distended, non-tender.  Skin/Integumen: Clear.  Neuro: CN II-XII grossly intact.  Psych:  Alert and oriented x 3. Normal affect.  Extremities: No lower extremity edema noted, and calves are non-TTP bilaterally.       Medications     - MEDICATION INSTRUCTIONS -       ACE/ARB/ARNI NOT PRESCRIBED       Warfarin Therapy Reminder         allopurinol  100 mg Oral Daily     cetirizine  10 mg Oral QPM     clopidogrel  75 mg Oral Daily     diltiazem ER  120 mg Oral Daily     hydrALAZINE  25 mg Oral TID     insulin aspart   Subcutaneous TID w/meals     insulin aspart  1-7 Units Subcutaneous TID AC     insulin aspart  1-5 Units Subcutaneous At Bedtime     insulin glargine  10 Units Subcutaneous At Bedtime     isosorbide mononitrate  120 mg Oral Daily     levothyroxine  75 mcg Oral Daily     metoprolol succinate ER  75 mg Oral BID     polyethylene glycol  17 g Oral Daily     senna-docusate  1 tablet Oral BID     sodium chloride (PF)  3 mL Intracatheter Q8H     theophylline  400 mg Oral Daily     torsemide  20 mg Oral Daily       Data   Recent Labs   Lab 09/18/19  0553 09/17/19  0539 09/16/19  1814  09/16/19  0534 09/15/19  0537 09/14/19  2145 09/14/19  1729 09/14/19  1418 09/14/19  0915   WBC  --   --   --  8.9 9.7  --   --   --  9.5   HGB  --  9.3*  --  8.7* 8.0*  --   --   --  8.5*   MCV  --   --   --  91 92  --   --   --  93   PLT  --   --   --  223 198  --   --   --  202   INR 1.28* 1.25*  --  1.28*  --   --   --   --   --     143  --  143 138  --   --   --   --    POTASSIUM 3.1* 3.7 3.2* 3.0* 3.6  --   --   --   --    CHLORIDE 104 107  --  107 106  --   --   --   --    CO2 28 27  --  29 25  --   --   --   --    BUN 87* 99*  --  87* 93*  --   --   --   --    CR 4.04* 4.05*  --  3.91* 4.11*  --   --   --   --    ANIONGAP 8 9  --  7 7  --   --   --   --    LEV 8.8 9.4  --  8.9 8.6  --   --   --   --    * 129*  --  144* 174*  --   --   --   --    ALBUMIN  --   --   --  3.0* 2.9*  --   --   --   --    PROTTOTAL  --   --   --  6.6* 6.1*  --   --   --   --    BILITOTAL  --   --   --  0.5 0.7  --   --   --   --    ALKPHOS  --   --   --  82 74  --   --   --   --    ALT  --   --   --  20 18  --   --   --   --    AST  --   --   --  15 16  --   --   --   --    TROPI  --   --   --   --   --  3.999* 5.125* 5.903*  --        No results found for this or any previous visit (from the past 24 hour(s)).

## 2019-09-18 NOTE — PROGRESS NOTES
Pt discharged to home care via daughter by private car. Discharge instructions and medications reviewed with pt and daughter. Pt and daughter  denies questions/concerns. VSS. Pt belongings, medications and discharge instructions sent home with pt.     Lauren Mortensen RN on 9/18/2019 at 3:47 PM

## 2019-09-18 NOTE — PROGRESS NOTES
SPIRITUAL HEALTH SERVICES Progress Note  [facility]    Initial visit per LOS. SH offered spiritual and emotional support to both pt and pt's daughter. Pt declined services. SH explained that if he ever needed it, he could always put in a request. No follow up needed.      Abi Perez   Intern

## 2019-09-18 NOTE — PLAN OF CARE
PT: Pt discharging to home at this time.    Physical Therapy Discharge Summary    Reason for therapy discharge:    Discharged to home.    Progress towards therapy goal(s). See goals on Care Plan in [facility] electronic health record for goal details.  Goals partially met.  Barriers to achieving goals:   discharge from facility and Goals with progress, elevated HR limits full 10 min of continuous activity. .    Therapy recommendation(s):    No further therapy is recommended.  Continue home exercise program.Recommend frequent short bouts of ambulation with family and use of a single end cane for improved balance and activity tolerance.

## 2019-09-18 NOTE — TELEPHONE ENCOUNTER
Reason for call:  Form   Our goal is to have forms completed within 72 hours, however some forms may require a visit or additional information.     Who is the form from? Insurance comp  Where did the form come from? form was faxed in  What clinic location was the form placed at? [facility]  Where was the form placed? Pcp's Folder  What number is listed as a contact on the form? [phone]    Phone call message - patient request for a letter, form or note:     Date needed: as soon as possible  Please fax to [phone]  Has the patient signed a consent form for release of information? NO    Additional comments:     Type of letter, form or note: [facility]    Phone number to reach patient:  Home number on file [phone] (home)    Best Time:      Can we leave a detailed message on this number?  YES

## 2019-09-18 NOTE — PLAN OF CARE
Vss.  Bp below parameters for hydralazine and held med.  Up to bathroom and voids in large amts.  A&O.  Heart Failure Care Pathway  GOALS TO BE MET BEFORE DISCHARGE:    1. Decrease congestion and/or edema with diuretic therapy to achieve near      optimal volume status.            Dyspnea improved:  Yes            Edema improved:     Yes        Net I/O and Weights since admission:          08/19 0700 - 09/18 0659  In: 4140.8 [P.O.:3830; I.V.:310.8]  Out: 9935 [Urine:9935]  Net: -5794.2            Vitals:    09/13/19 1953 09/13/19 2300 09/14/19 0600 09/15/19 0124   Weight: 93 kg (205 lb) 87.9 kg (193 lb 12.8 oz) 87.8 kg (193 lb 9 oz) 87.4 kg (192 lb 11.2 oz)    09/16/19 0022 09/17/19 0521   Weight: 85.8 kg (189 lb 3.2 oz) 84.2 kg (185 lb 9.6 oz)         2.  O2 sats > 92% on RA or at prior home O2 therapy level.          Current oxygenation status:       SpO2: 99 %         O2 Device: None (Room air),            Able to wean O2 this shift to keep sats > 92%:  Yes       Does patient use Home O2? No    3.  Tolerates ambulation and mobility near baseline: Yes        How many times did the patient ambulate with nursing staff this shift? 3    Please review the Heart Failure Care Pathway for additional HF goal outcomes.    Ashley Lopez RN RN  9/18/2019

## 2019-09-18 NOTE — PROGRESS NOTES
Agree with plan for discharge.  Looks well.  BUN/Cr better and lytes ok with change to torsemide.  Note plan for out-pt chemistries on 9/20 and appts with PCP and cardiology next week.  We will stand by and see him in our office as requested.  Thanks.    Toby Simental MD  Nephrology; [employer]  [phone]

## 2019-09-18 NOTE — DISCHARGE SUMMARY
Admit Date:     09/13/2019   Discharge Date:      09/18/2019     DISCHARGE DIAGNOSES:   1.  Diastolic and systolic congestive heart failure exacerbation.   2.  Chronic kidney disease, stage V, with associated volume overload.   3.  New onset atrial fibrillation with rapid ventricular response.   4.  Non-ST elevation myocardial infarction in the setting of known coronary artery disease with history of MI and interventions and associated hypertension and hyperlipidemia.   5.  Hypokalemia with reoccurrence.   6.  Chronic anemia, likely of chronic disease.   7.  Hypothyroidism.   8.  Type 2 diabetes.   9.  Gout.   10.  Mild intermittent asthma.   11.  Gastroesophageal reflux disease.      DISCHARGE MEDICATIONS:       Review of your medicines      START taking      Dose / Directions   diltiazem  MG 24 hr capsule  Commonly known as:  DILT-XR  Used for:  Persistent atrial fibrillation (H)      Dose:  120 mg  Take 1 capsule (120 mg) by mouth daily  Quantity:  30 capsule  Refills:  0     metoprolol succinate ER 25 MG 24 hr tablet  Commonly known as:  TOPROL-XL  Used for:  Persistent atrial fibrillation (H)      Dose:  75 mg  Take 3 tablets (75 mg) by mouth 2 times daily  Quantity:  60 tablet  Refills:  0     order for DME  Used for:  Neuropathy      Equipment being ordered: Cane ()  Treatment Diagnosis: Impaired balance, impaired activity tolerance  Quantity:  1 each  Refills:  0     warfarin ANTICOAGULANT 2 MG tablet  Commonly known as:  COUMADIN ANTICOAGULANT  Used for:  Persistent atrial fibrillation (H)      Dose:  6 mg  Take 3 tablets (6 mg) by mouth daily For the next 2 nights and INR checked on Friday with adjustment in dose per clinic.  Quantity:  90 tablet  Refills:  0        CONTINUE these medicines which may have CHANGED, or have new prescriptions. If we are uncertain of the size of tablets/capsules you have at home, strength may be listed as something that might have changed.      Dose / Directions    torsemide 20 MG tablet  Commonly known as:  DEMADEX  This may have changed:      medication strength    how much to take    when to take this    additional instructions    Another medication with the same name was removed. Continue taking this medication, and follow the directions you see here.      Dose:  20 mg  Take 1 tablet (20 mg) by mouth daily  Quantity:  30 tablet  Refills:  0        CONTINUE these medicines which have NOT CHANGED      Dose / Directions   ACCU-CHEK COMPACT PLUS test strip  Used for:  Type 2 diabetes mellitus with stage 3 chronic kidney disease, with long-term current use of insulin (H)  Generic drug:  blood glucose      CHECK BLOOD SUGAR TWICE DAILY TO THREE TIMES DAILY  Quantity:  204 strip  Refills:  0     alirocumab 150 MG/ML injectable pen  Commonly known as:  PRALUENT  Used for:  Hyperlipidemia LDL goal <70      Dose:  150 mg  Inject 1 mL (150 mg) Subcutaneous every 14 days  Quantity:  6 mL  Refills:  3     allopurinol 100 MG tablet  Commonly known as:  ZYLOPRIM  Used for:  Chronic gout without tophus, unspecified cause, unspecified site      TAKE 2 TABLETS(200 MG) BY MOUTH DAILY  Quantity:  180 tablet  Refills:  1     cetirizine 10 MG tablet  Commonly known as:  zyrTEC  Used for:  Atopic dermatitis, unspecified type, Itching      TAKE 1 TABLET(10 MG) BY MOUTH EVERY EVENING  Quantity:  90 tablet  Refills:  3     clopidogrel 75 MG tablet  Commonly known as:  PLAVIX  Used for:  CKD (chronic kidney disease) stage 3, GFR 30-59 ml/min (H), Cardiovascular disease      TAKE 1 TABLET(75 MG) BY MOUTH DAILY  Quantity:  90 tablet  Refills:  3     Cod Liver Oil 1000 MG Caps      Dose:  1 capsule  Take 1 capsule by mouth daily  Refills:  0     diphenhydrAMINE-acetaminophen  MG tablet  Commonly known as:  TYLENOL PM      Dose:  1 tablet  Take 1 tablet by mouth nightly as needed for sleep  Refills:  0     insulin  UNIT/ML vial  Commonly known as:  HumuLIN N/NovoLIN N VIAL  Used for:   "Type 2 diabetes mellitus with stage 3 chronic kidney disease, with long-term current use of insulin (H)      14 units at  suppertime  Quantity:  2 vial  Refills:  11     insulin syringe-needle U-100 29G X 1/2\" 0.5 ML miscellaneous  Commonly known as:  BD insulin syringe  Used for:  Type 2 diabetes mellitus with stage 3 chronic kidney disease, with long-term current use of insulin (H)      Use one syringe 2 daily or as directed.  Quantity:  200 each  Refills:  4     isosorbide mononitrate  MG 24 HR ER tablet  Commonly known as:  IMDUR  Used for:  NSTEMI (non-ST elevated myocardial infarction) (H)      TAKE 1 TABLET BY MOUTH DAILY  Quantity:  90 tablet  Refills:  3     levothyroxine 75 MCG tablet  Commonly known as:  SYNTHROID/LEVOTHROID  Used for:  Hypothyroidism, unspecified type      Dose:  75 mcg  Take 1 tablet (75 mcg) by mouth daily  Quantity:  90 tablet  Refills:  3     nitroGLYcerin 0.4 MG sublingual tablet  Commonly known as:  NITROSTAT  Used for:  NSTEMI (non-ST elevated myocardial infarction) (H)      For chest pain place 1 tablet under the tongue every 5 minutes for 3 doses. If symptoms persist 5 minutes after 1st dose call 911.  Quantity:  25 tablet  Refills:  1     theophylline 400 MG 24 hr tablet  Commonly known as:  UNIPHYL  Used for:  Mild persistent asthma without complication      Dose:  400 mg  Take 1 tablet (400 mg) by mouth daily  Quantity:  90 tablet  Refills:  3     Vitamin B 12 250 MCG Lozg      Dose:  500 mcg  Take 500 mcg by mouth 2 times daily  Refills:  0        STOP taking    aspirin 81 MG tablet  Commonly known as:  ASA        hydrALAZINE 50 MG tablet  Commonly known as:  APRESOLINE        metoprolol tartrate 50 MG tablet  Commonly known as:  LOPRESSOR              Where to get your medicines      These medications were sent to [facility] [place], [place] - [address] [place] [address]  [address] [place] [address] [address]    Phone:  [phone]     diltiazem ER " 120 MG 24 hr capsule    metoprolol succinate ER 25 MG 24 hr tablet    torsemide 20 MG tablet    warfarin ANTICOAGULANT 2 MG tablet     Some of these will need a paper prescription and others can be bought over the counter. Ask your nurse if you have questions.    Bring a paper prescription for each of these medications    order for DME          ALLERGIES:    Allergies   Allergen Reactions     Advair [Fluticasone-Salmeterol]      cough;  insomnia, nightmares     Amlodipine Besylate      edema       Azithromycin GI Disturbance     Clarithromycin      gi     Hydrochlorothiazide      hctz + lisinopril-->inc creat, k+     Lisinopril      lisinopril + hctz --> inc creat, k+     Moxifloxacin Hydrochloride      clostridium diffile colitis     Penicillins      gen swelling     Pravastatin Cramps     Muscle cramps/spasm.  Stopped 2017     Vioxx      edema        DISPOSITION:  Home.      FOLLOWUP WITH RECOMMENDATIONS:   1.  The patient has been set up to follow up with INR Clinic at [facility] on 09/20/2019 at 10:30 in the morning.   2.  The patient will follow up with Dr. Hillman on 09/23/2019 at 2:00 p.m. and recommend a basic metabolic panel being checked.   3.  Follow up [facility] with physician assistant, Marie Gil, on 09/24/2019 at 10:50.  Patient should present before this as lab draw is scheduled for 10:30.      ACTIVITY:  As tolerated.      DIET:  Recommend a combination renal diet, 2-gram sodium restriction, low-fat, no caffeine and moderate carbohydrates.      CONSULTS:   1.  Cardiology.   2.  Nephrology.     LABORATORY, IMAGING AND PROCEDURES:   1.  Serial blood glucose monitoring, serial troponin, comprehensive metabolic panel, CBC with platelets, phosphorus level, magnesium level, proBNP, serial basic metabolic panel, iron studies with ferritin, iron and iron binding, heparin Xa levels frequent CBC with platelets.  Theophylline level, TSH with free T4, INR serially.   2.  Two-view chest  x-ray.   3.  EKG x3.   4.  Echocardiogram complete with contrast.      PENDING RESULTS:  The patient is being set up with a 30-day cardiac event monitor.      PHYSICAL EXAMINATION ON DAY OF DISCHARGE:  Please review progress note as written earlier today.      BRIEF HISTORY OF PRESENT ILLNESS:  Oscar Terrazas is an 85-year-old male with a past medical history significant for coronary artery disease with history of MI, status post 2-vessel CABG in 1996, drug-eluting stent placement to the LAD in 2007 and drug-eluting stent placed in 2012, hypertension, hyperlipidemia, PVD, type 2 diabetes, chronic kidney disease stage V, idiopathic neuropathy, GERD, hypothyroidism, mild persistent asthma, chronic anemia, gout, osteoarthritis and history of subarachnoid bleed who was admitted to [facility] due to diastolic and systolic congestive heart failure exacerbation with fluid overload.      HOSPITAL COURSE:   1.  Diastolic and systolic acute congestive heart failure exacerbation:  The patient was noted to have volume overload, thought to be due to renal insufficiency and exacerbation of congestive heart failure.  The patient has noted a grade 2 diastolic dysfunction and mild ischemic cardiomyopathy and moderate hypokinesis of basal inferior and inferolateral walls.  Echocardiogram was performed which showed an EF of 40%-45% with worsened LV function.  The patient was evaluated by Cardiology with medication adjustments being performed.  Initially, the patient was on a Lasix drip and was then transitioned to oral Lasix 80 mg 2 times daily and then on day of discharge was transitioned to torsemide 20 mg daily by Nephrology.  The patient had adequate diuresis and will follow up [facility] on 09/24 and will continue with metoprolol, which dose has been adjusted.   2.  Chronic kidney disease, stage V with associated volume overload:  Patient has confirmed to multiple providers that he would not want  dialysis.  Creatinine appears to hover right around 4.  Again, the patient was on a Lasix drip and this was stopped on 09/15 and then transitioned to oral Lasix 80 mg 2 times daily.  This was stopped the evening of 09/17 and on morning of discharge, the patient was started on torsemide 20 mg daily per Nephrology recommendations.  I's and O's and daily weights were monitored.   3.  New onset atrial fibrillation with rapid ventricular response:  This was discovered the morning of 09/16 and the patient has no history of atrial fibrillation.  CHADS2-VASc score is around 6.  EKG was performed that confirmed atrial fibrillation with previous telemetry and EKG showing sinus with bundle branch block.  TSH was within normal limits at 1.15.  Magnesium was within normal limits at 2.2 and phosphorus was within normal limits at 3.2.  Theophylline level was also checked and found to be therapeutic at 16.2.  The patient's metoprolol was adjusted during this stay and at time of discharge, the patient is being placed on Toprol-XL 75 mg 2 times daily.  The patient was initially started on diltiazem 30 mg every 6 hours and then transitioned to diltiazem  mg daily.  Cardiology was following.  The patient was started on Coumadin and received 4 mg the past 2 evenings and is being discharged with recommendations to take 6 mg the next 2 evenings and follow up with INR check on Friday.  The patient is also being set up with a 30-day event monitor.   4.  NSTEMI in the setting of known coronary artery disease with history of MI and multiple interventions and associated hypertension and hyperlipidemia:  The patient's troponin peaked at 5.9, which could be related to demand ischemia.  Echocardiogram was performed without change and wall motion from previous study, but noted decrease in EF of 40%-45%.  Cardiology was consulted and recommended conservative management.  The patient was continued on Plavix 75 mg daily, Imdur 120 mg daily.   Initially, this patient's prior to admit hydralazine was decreased from 50 to 25 mg 3 times daily, but due to hold parameters was not receiving this medication as patient was hypotensive.  At time of discharge, this medication will be discontinued completely.  Metoprolol medication underwent multiple adjustments and at time of discharge, the patient will be on Toprol-XL 75 mg 2 times daily.  The patient was also started on diltiazem and will be discharged on diltiazem  mg daily.  As patient was started on Coumadin, prior to admit aspirin was discontinued.  Notably, the patient is intolerant to statins and receives injections every 2 weeks of alirocumab.  The patient will follow up with [facility] and Cardiology as scheduled.   5.  Hypokalemia:  This was recurrent.  The patient received 40 mEq of potassium replacement and monitored closely.  At time of discharge following 40 mEq potassium administration morning of discharge, potassium is now at 3.5.  Discussed with patient and patient's daughter about starting scheduled replacement every other day with 20 mEq.  they preferred not to start a new medication and will follow up with primary care provider.  They also mentioned that he does usually eat one banana a day.  The patient has been set up to follow up with primary care provider in 1 week.   6.  Chronic anemia, likely of chronic disease:  This was improving.  The patient did receive Venofer on 09/15.  Notable iron studies show an iron level of 27, iron binding of 184.  Again, this showed improvement during this stay with diuresis and IV Venofer infusion.   7.  Hypothyroidism:  Again, patient's TSH level was within normal limits.  The patient was maintained on prior to admit levothyroxine 75 mcg daily, which will be resumed at time of discharge.   4.  Type 2 diabetes:  Patient's A1c is noted to be 6.3 from June of this year.  The patient's Lantus was decreased to 10 units from previous 14 units.  He was  put on prandial insulin 1 unit per 20 grams of carbohydrates, medium insulin sliding scale and hypoglycemia protocol.  At time of discharge, the patient will resume prior to admit regimen.   5.  Gout:  Patient's prior to admit allopurinol 100 mg daily was continued during this stay and will be resumed at time of discharge.   6.  Mild intermittent asthma:  Patient's prior to admit theophylline 400 mg daily was continued during this stay.   7.  Gastroesophageal reflux disease:  The patient does not appear to be on any medications at this time.   8.  Idiopathic lower extremity neuropathy:  Patient is not on any medications, has chronic complaints of coldness on his feet.      CODE STATUS:  DNR/DNI.      The patient was discussed with Dr. Josefina Mullen, who agrees with discharge at this time.  Dr. Mullen will evaluate the patient independently.      TOTAL DISCHARGE TIME:  Greater than 30 minutes.         JOSEFINA MULLEN MD       As dictated by NITESH FISHER PA-C            D: 2019   T: 2019   MT: ROLA      Name:     AKILAH BARILLAS   MRN:      [id number]        Account:        [id number]   :      1934           Admit Date:     2019                                  Discharge Date:       Document: [id number]       cc: Jorge Hillman MD

## 2019-09-20 PROBLEM — I48.91 ATRIAL FIBRILLATION (H): Status: ACTIVE | Noted: 2019-01-01

## 2019-09-20 NOTE — PROGRESS NOTES
ANTICOAGULATION INITIAL CLINIC VISIT    Patient Name:  Oscar Terrazas  Date:  2019  Referred by: Dr. Hillman  Contact Type:  Face to Face    SUBJECTIVE:  Coumadin education was completed today.  Topics covered include:  -Introduction to coumadin  -Proper Administration  -INR Testing  -Sign/Symptoms of Bleeding  -Signs/Symptoms of Clot Formation or Stroke  -Dietary Intake of Vitamin K  -Drug Interactions  -Anticoagulation Identification (bracelet, necklace or wallet card)  -Future Surgery  -Effects of Alcohol, Tobacco, and Exercise on Coumadin    Coumadin Education Booklet and Coumadin Identification Wallet Card were given to the patient.    Patient Findings     Comments:   Patient is on Plavix.         Clinical Outcomes     Negatives:   Major bleeding event, Thromboembolic event, Anticoagulation-related hospital admission, Anticoagulation-related ED visit, Anticoagulation-related fatality    Comments:   Patient is on Plavix.           OBJECTIVE    INR Protime   Date Value Ref Range Status   2019 1.4 (A) 0.86 - 1.14 Final       ASSESSMENT / PLAN  No question data found.  Anticoagulation Summary  As of 2019    INR goal:   2.0-3.0   TTR:   --   INR used for dosin.4! (2019)   Warfarin maintenance plan:   4 mg (2 mg x 2) every Mon, Tue; 6 mg (2 mg x 3) every Wed, Thu; 8 mg (2 mg x 4) all other days   Full warfarin instructions:   4 mg every Mon, Tue; 6 mg every Wed, Thu; 8 mg all other days   Weekly warfarin total:   44 mg   Plan last modified:   Nisreen Jay RN (2019)   Next INR check:   2019   Target end date:   Indefinite    Indications    Atrial fibrillation (H) [I48.91]             Anticoagulation Episode Summary     INR check location:       Preferred lab:       Send INR reminders to:   [facility]    Comments:         Anticoagulation Care Providers     Provider Role Specialty Phone number    Jorge Hillman MD [facility] Internal Medicine [phone]             See the Encounter Report to view Anticoagulation Flowsheet and Dosing Calendar (Go to Encounters tab in chart review, and find the Anticoagulation Therapy Visit)        Nisreen Jay RN

## 2019-09-20 NOTE — TELEPHONE ENCOUNTER
Has the patient previously taken warfarin? no  If yes, for what indication? Started Warfarin on 9/14 for newly diagnosed Atrial Fibrillation    Does the patient have any of the following indications for a higher range of 2.5-3.5:    Mitral position mechanical valve? no    Maia-Shiley, Ball and Cage or Monoleaflet valve (regardless of position) no    Other (if yes, please explain) no    Nisreen Jay RN - BC

## 2019-09-20 NOTE — PATIENT INSTRUCTIONS
Anticoagulation Clinic:  Please call [phone] with any problems or questions regarding your Warfarin therapy.

## 2019-09-20 NOTE — TELEPHONE ENCOUNTER
Pt was in hospital, and was given lasix to remove the fluid from his chest and lungs.   He is not urinating. (only had 2 glasses of small water).   Denies any urinary symptoms.   Appt scheduled for pt to see Dr. Hillman Monday 9/23 for hospital f/u, after he comes in for INR labs this afternoon.

## 2019-09-23 NOTE — PROGRESS NOTES
Subjective     Oscar Terrazas is a 85 year old male who presents to clinic today for the following health issues:    HPI       Hospital Follow-up Visit:    Hospital/Nursing Home/IP Rehab Facility: [facility]  Date of Admission: 09/13/2019  Date of Discharge: 09/18/2019  Reason(s) for Admission: Hypervolemia associated with renal insufficiency  No tele contact after discharge            Problems taking medications regularly:  None       Medication changes since discharge: None       Problems adhering to non-medication therapy:  None    Summary of hospitalization:  [facility] discharge summary reviewed  Diagnostic Tests/Treatments reviewed.  Follow up needed:  Labs  Other Healthcare Providers Involved in Patient s Care:         cardiology  Update since discharge: stable.     Post Discharge Medication Reconciliation: discharge medications reconciled and changed, per note/orders (see AVS).  Plan of care communicated with patient and daughter     Coding guidelines for this visit:  Type of Medical   Decision Making Face-to-Face Visit       within 7 Days of discharge Face-to-Face Visit        within 14 days of discharge   Moderate Complexity [id number]   High Complexity [id number]            Discharge summary reviewed. Part of the summary as below:    Physician Attestation   I, Josefina Rodriguez, saw and evaluated Oscar Terrazas as part of a shared visit.  I have reviewed and discussed with the advanced practice provider their history, physical and plan.     I personally reviewed the vital signs, medications and labs.     My key history or physical exam findings: Denies cp/sob, dizziness/lightheadedness. Ambulated around the nursing station without event  Constitutional: Appears comfortable, NAD  Respiratory: Breathing non-labored. Lungs CTAB - no wheezes, crackles, or rhonchi  Cardiovascular: Heart irregular rhythm, normal rate. Minimal pedal edema  Neuro: Alert and appropriate,  MACEY  Psych: Calm and cooperative     Key management decisions made by me:   1. Acute exacerbation of chronic combined CHF  2. New onset atrial fibrillation with RVR  3. NSTEMI, managed medically  4. CKD stage V  5. Anemia of chronic disease  6. DM2 without complications  7. Mild intermittent asthma  8. GERD  9. Gout     - He will be discharged with torsemide 20 mg PO daily  - He has been started on metoprolol XL 75 mg daily and diltiazem  mg daily for ongoing rate control  - PTA hydralazine has been discontinued due to intermittent hypotension  - Continues on PTA Imdur  - He has been started on warfarin for primary stroke prevention.           *INR 1.28 at discharge. He has an appointment in INR clinic on 9/20  - Continues on PTA Plavix. PTA aspirin has been discontinued  - Continues on PTA insulin regimen and other meds as previously prescribed  - He has a follow up appointment with Cardiology next week on 9/24     Josefina Rodriguez  Date of Service (when I saw the patient): 09/18/2019         Admit Date:     09/13/2019   Discharge Date:      09/18/2019     DISCHARGE DIAGNOSES:   1.  Diastolic and systolic congestive heart failure exacerbation.   2.  Chronic kidney disease, stage V, with associated volume overload.   3.  New onset atrial fibrillation with rapid ventricular response.   4.  Non-ST elevation myocardial infarction in the setting of known coronary artery disease with history of MI and interventions and associated hypertension and hyperlipidemia.   5.  Hypokalemia with reoccurrence.   6.  Chronic anemia, likely of chronic disease.   7.  Hypothyroidism.   8.  Type 2 diabetes.   9.  Gout.   10.  Mild intermittent asthma.   11.  Gastroesophageal reflux disease.      DISCHARGE MEDICATIONS:            Review of your medicines           START taking      Dose / Directions   diltiazem  MG 24 hr capsule  Commonly known as:  DILT-XR  Used for:  Persistent atrial fibrillation (H)       Dose:  120  mg  Take 1 capsule (120 mg) by mouth daily  Quantity:  30 capsule  Refills:  0      metoprolol succinate ER 25 MG 24 hr tablet  Commonly known as:  TOPROL-XL  Used for:  Persistent atrial fibrillation (H)       Dose:  75 mg  Take 3 tablets (75 mg) by mouth 2 times daily  Quantity:  60 tablet  Refills:  0      order for DME  Used for:  Neuropathy       Equipment being ordered: Cane ()  Treatment Diagnosis: Impaired balance, impaired activity tolerance  Quantity:  1 each  Refills:  0      warfarin ANTICOAGULANT 2 MG tablet  Commonly known as:  COUMADIN ANTICOAGULANT  Used for:  Persistent atrial fibrillation (H)       Dose:  6 mg  Take 3 tablets (6 mg) by mouth daily For the next 2 nights and INR checked on Friday with adjustment in dose per clinic.  Quantity:  90 tablet  Refills:  0                  CONTINUE these medicines which may have CHANGED, or have new prescriptions. If we are uncertain of the size of tablets/capsules you have at home, strength may be listed as something that might have changed.      Dose / Directions   torsemide 20 MG tablet  Commonly known as:  DEMADEX  This may have changed:      medication strength    how much to take    when to take this    additional instructions    Another medication with the same name was removed. Continue taking this medication, and follow the directions you see here.       Dose:  20 mg  Take 1 tablet (20 mg) by mouth daily  Quantity:  30 tablet  Refills:  0                  CONTINUE these medicines which have NOT CHANGED      Dose / Directions   ACCU-CHEK COMPACT PLUS test strip  Used for:  Type 2 diabetes mellitus with stage 3 chronic kidney disease, with long-term current use of insulin (H)  Generic drug:  blood glucose       CHECK BLOOD SUGAR TWICE DAILY TO THREE TIMES DAILY  Quantity:  204 strip  Refills:  0      alirocumab 150 MG/ML injectable pen  Commonly known as:  PRALUENT  Used for:  Hyperlipidemia LDL goal <70       Dose:  150 mg  Inject 1 mL (150 mg)  "Subcutaneous every 14 days  Quantity:  6 mL  Refills:  3      allopurinol 100 MG tablet  Commonly known as:  ZYLOPRIM  Used for:  Chronic gout without tophus, unspecified cause, unspecified site       TAKE 2 TABLETS(200 MG) BY MOUTH DAILY  Quantity:  180 tablet  Refills:  1      cetirizine 10 MG tablet  Commonly known as:  zyrTEC  Used for:  Atopic dermatitis, unspecified type, Itching       TAKE 1 TABLET(10 MG) BY MOUTH EVERY EVENING  Quantity:  90 tablet  Refills:  3      clopidogrel 75 MG tablet  Commonly known as:  PLAVIX  Used for:  CKD (chronic kidney disease) stage 3, GFR 30-59 ml/min (H), Cardiovascular disease       TAKE 1 TABLET(75 MG) BY MOUTH DAILY  Quantity:  90 tablet  Refills:  3      Cod Liver Oil 1000 MG Caps       Dose:  1 capsule  Take 1 capsule by mouth daily  Refills:  0      diphenhydrAMINE-acetaminophen  MG tablet  Commonly known as:  TYLENOL PM       Dose:  1 tablet  Take 1 tablet by mouth nightly as needed for sleep  Refills:  0      insulin  UNIT/ML vial  Commonly known as:  HumuLIN N/NovoLIN N VIAL  Used for:  Type 2 diabetes mellitus with stage 3 chronic kidney disease, with long-term current use of insulin (H)       14 units at  suppertime  Quantity:  2 vial  Refills:  11      insulin syringe-needle U-100 29G X 1/2\" 0.5 ML miscellaneous  Commonly known as:  BD insulin syringe  Used for:  Type 2 diabetes mellitus with stage 3 chronic kidney disease, with long-term current use of insulin (H)       Use one syringe 2 daily or as directed.  Quantity:  200 each  Refills:  4      isosorbide mononitrate  MG 24 HR ER tablet  Commonly known as:  IMDUR  Used for:  NSTEMI (non-ST elevated myocardial infarction) (H)       TAKE 1 TABLET BY MOUTH DAILY  Quantity:  90 tablet  Refills:  3      levothyroxine 75 MCG tablet  Commonly known as:  SYNTHROID/LEVOTHROID  Used for:  Hypothyroidism, unspecified type       Dose:  75 mcg  Take 1 tablet (75 mcg) by mouth daily  Quantity:  90 " tablet  Refills:  3      nitroGLYcerin 0.4 MG sublingual tablet  Commonly known as:  NITROSTAT  Used for:  NSTEMI (non-ST elevated myocardial infarction) (H)       For chest pain place 1 tablet under the tongue every 5 minutes for 3 doses. If symptoms persist 5 minutes after 1st dose call 911.  Quantity:  25 tablet  Refills:  1      theophylline 400 MG 24 hr tablet  Commonly known as:  UNIPHYL  Used for:  Mild persistent asthma without complication       Dose:  400 mg  Take 1 tablet (400 mg) by mouth daily  Quantity:  90 tablet  Refills:  3      Vitamin B 12 250 MCG Lozg       Dose:  500 mcg  Take 500 mcg by mouth 2 times daily  Refills:  0                      STOP taking          aspirin 81 MG tablet  Commonly known as:  ASA         hydrALAZINE 50 MG tablet  Commonly known as:  APRESOLINE         metoprolol tartrate 50 MG tablet  Commonly known as:  LOPRESSOR                             Where to get your medicines             These medications were sent to [facility] - [place], [place] - [address] [address]  [address] [address] [facility] [address]     Phone:  [phone]     diltiazem  MG 24 hr capsule    metoprolol succinate ER 25 MG 24 hr tablet    torsemide 20 MG tablet    warfarin ANTICOAGULANT 2 MG tablet      Some of these will need a paper prescription and others can be bought over the counter. Ask your nurse if you have questions.    Bring a paper prescription for each of these medications    order for DME            ALLERGIES:          Allergies   Allergen Reactions     Advair [Fluticasone-Salmeterol]         cough;  insomnia, nightmares     Amlodipine Besylate         edema        Azithromycin GI Disturbance     Clarithromycin         gi     Hydrochlorothiazide         hctz + lisinopril-->inc creat, k+     Lisinopril         lisinopril + hctz --> inc creat, k+     Moxifloxacin Hydrochloride         clostridium diffile colitis     Penicillins         gen swelling      Pravastatin Cramps       Muscle cramps/spasm.  Stopped 2017     Vioxx         edema         DISPOSITION:  Home.      FOLLOWUP WITH RECOMMENDATIONS:   1.  The patient has been set up to follow up with INR Clinic at [facility] on 09/20/2019 at 10:30 in the morning.   2.  The patient will follow up with Dr. Hillman on 09/23/2019 at 2:00 p.m. and recommend a basic metabolic panel being checked.   3.  Follow up with [facility] with physician assistant, Marie Gil, on 09/24/2019 at 10:50.  Patient should present before this as lab draw is scheduled for 10:30.      ACTIVITY:  As tolerated.      DIET:  Recommend a combination renal diet, 2-gram sodium restriction, low-fat, no caffeine and moderate carbohydrates.      CONSULTS:   1.  Cardiology.   2.  Nephrology.      LABORATORY, IMAGING AND PROCEDURES:   1.  Serial blood glucose monitoring, serial troponin, comprehensive metabolic panel, CBC with platelets, phosphorus level, magnesium level, proBNP, serial basic metabolic panel, iron studies with ferritin, iron and iron binding, heparin Xa levels frequent CBC with platelets.  Theophylline level, TSH with free T4, INR serially.   2.  Two-view chest x-ray.   3.  EKG x3.   4.  Echocardiogram complete with contrast.      PENDING RESULTS:  The patient is being set up with a 30-day cardiac event monitor.      PHYSICAL EXAMINATION ON DAY OF DISCHARGE:  Please review progress note as written earlier today.      BRIEF HISTORY OF PRESENT ILLNESS:  Oscar Terrazas is an 85-year-old male with a past medical history significant for coronary artery disease with history of MI, status post 2-vessel CABG in 1996, drug-eluting stent placement to the LAD in 2007 and drug-eluting stent placed in 2012, hypertension, hyperlipidemia, PVD, type 2 diabetes, chronic kidney disease stage V, idiopathic neuropathy, GERD, hypothyroidism, mild persistent asthma, chronic anemia, gout, osteoarthritis and history of subarachnoid bleed who  was admitted to [facility] due to diastolic and systolic congestive heart failure exacerbation with fluid overload.      HOSPITAL COURSE:   1.  Diastolic and systolic acute congestive heart failure exacerbation:  The patient was noted to have volume overload, thought to be due to renal insufficiency and exacerbation of congestive heart failure.  The patient has noted a grade 2 diastolic dysfunction and mild ischemic cardiomyopathy and moderate hypokinesis of basal inferior and inferolateral walls.  Echocardiogram was performed which showed an EF of 40%-45% with worsened LV function.  The patient was evaluated by Cardiology with medication adjustments being performed.  Initially, the patient was on a Lasix drip and was then transitioned to oral Lasix 80 mg 2 times daily and then on day of discharge was transitioned to torsemide 20 mg daily by Nephrology.  The patient had adequate diuresis and will follow up [facility] on 09/24 and will continue with metoprolol, which dose has been adjusted.   2.  Chronic kidney disease, stage V with associated volume overload:  Patient has confirmed to multiple providers that he would not want dialysis.  Creatinine appears to hover right around 4.  Again, the patient was on a Lasix drip and this was stopped on 09/15 and then transitioned to oral Lasix 80 mg 2 times daily.  This was stopped the evening of 09/17 and on morning of discharge, the patient was started on torsemide 20 mg daily per Nephrology recommendations.  I's and O's and daily weights were monitored.   3.  New onset atrial fibrillation with rapid ventricular response:  This was discovered the morning of 09/16 and the patient has no history of atrial fibrillation.  CHADS2-VASc score is around 6.  EKG was performed that confirmed atrial fibrillation with previous telemetry and EKG showing sinus with bundle branch block.  TSH was within normal limits at 1.15.  Magnesium was within normal limits  at 2.2 and phosphorus was within normal limits at 3.2.  Theophylline level was also checked and found to be therapeutic at 16.2.  The patient's metoprolol was adjusted during this stay and at time of discharge, the patient is being placed on Toprol-XL 75 mg 2 times daily.  The patient was initially started on diltiazem 30 mg every 6 hours and then transitioned to diltiazem  mg daily.  Cardiology was following.  The patient was started on Coumadin and received 4 mg the past 2 evenings and is being discharged with recommendations to take 6 mg the next 2 evenings and follow up with INR check on Friday.  The patient is also being set up with a 30-day event monitor.   4.  NSTEMI in the setting of known coronary artery disease with history of MI and multiple interventions and associated hypertension and hyperlipidemia:  The patient's troponin peaked at 5.9, which could be related to demand ischemia.  Echocardiogram was performed without change and wall motion from previous study, but noted decrease in EF of 40%-45%.  Cardiology was consulted and recommended conservative management.  The patient was continued on Plavix 75 mg daily, Imdur 120 mg daily.  Initially, this patient's prior to admit hydralazine was decreased from 50 to 25 mg 3 times daily, but due to hold parameters was not receiving this medication as patient was hypotensive.  At time of discharge, this medication will be discontinued completely.  Metoprolol medication underwent multiple adjustments and at time of discharge, the patient will be on Toprol-XL 75 mg 2 times daily.  The patient was also started on diltiazem and will be discharged on diltiazem  mg daily.  As patient was started on Coumadin, prior to admit aspirin was discontinued.  Notably, the patient is intolerant to statins and receives injections every 2 weeks of alirocumab.  The patient will follow up with [facility] and Cardiology as scheduled.   5.  Hypokalemia:  This was  recurrent.  The patient received 40 mEq of potassium replacement and monitored closely.  At time of discharge following 40 mEq potassium administration morning of discharge, potassium is now at 3.5.  Discussed with patient and patient's daughter about starting scheduled replacement every other day with 20 mEq.  they preferred not to start a new medication and will follow up with primary care provider.  They also mentioned that he does usually eat one banana a day.  The patient has been set up to follow up with primary care provider in 1 week.   6.  Chronic anemia, likely of chronic disease:  This was improving.  The patient did receive Venofer on 09/15.  Notable iron studies show an iron level of 27, iron binding of 184.  Again, this showed improvement during this stay with diuresis and IV Venofer infusion.   7.  Hypothyroidism:  Again, patient's TSH level was within normal limits.  The patient was maintained on prior to admit levothyroxine 75 mcg daily, which will be resumed at time of discharge.   4.  Type 2 diabetes:  Patient's A1c is noted to be 6.3 from June of this year.  The patient's Lantus was decreased to 10 units from previous 14 units.  He was put on prandial insulin 1 unit per 20 grams of carbohydrates, medium insulin sliding scale and hypoglycemia protocol.  At time of discharge, the patient will resume prior to admit regimen.   5.  Gout:  Patient's prior to admit allopurinol 100 mg daily was continued during this stay and will be resumed at time of discharge.   6.  Mild intermittent asthma:  Patient's prior to admit theophylline 400 mg daily was continued during this stay.   7.  Gastroesophageal reflux disease:  The patient does not appear to be on any medications at this time.   8.  Idiopathic lower extremity neuropathy:  Patient is not on any medications, has chronic complaints of coldness on his feet.      CODE STATUS:  DNR/DNI.      The patient was discussed with Dr. Josefina Rodriguez, who agrees  with discharge at this time.  Dr. Rodriguez will evaluate the patient independently.      TOTAL DISCHARGE TIME:  Greater than 30 minutes.         JAMAL RODRIGUEZ MD       As dictated by NITESH FISHER PA-C            Pt's past medical history, family history, habits, medications and allergies were reviewed with the patient today.  See snap shot for  HCM status. Most recent lab results reviewed with pt. Problem list and histories reviewed & adjusted, as indicated.  Additional history as below:    Patient having some positional change lightheadedness/orthostasis with current therapy.  Renal function worsening her labs likely related to some overdiuresis.  Patient's baseline weight was about 200-203 prior to having exacerbation as above when his weight went up to 205.  Got diuresed with weight dropping to 185 at lowest and now back up to 191. Lightheadedness a little better since home.  Has CKD5 and confirms that will not do dialysis. Nephrology notes in [facility] reviewed. Denies chest pain now with activity. Denies sense of palpitations   Appetite good   Blood sugars remain at goal  On Coumadin now for A fib  Has appt tomorrow with cardiology  Additional ROS:   Constitutional, HEENT, Cardiovascular, Pulmonary, GI and , Neuro, MSK and Psych review of systems/symptoms are otherwise negative or unchanged from previous, except as noted above.      OBJECTIVE:  /78   Pulse 66   Temp 98.7  F (37.1  C) (Temporal)   Resp 16   Wt 86.6 kg (191 lb)   SpO2 100%   BMI 25.90 kg/m     Estimated body mass index is 25.9 kg/m  as calculated from the following:    Height as of 9/13/19: 1.829 m (6').    Weight as of this encounter: 86.6 kg (191 lb).  Eye: PERRL, EOMI  HENT: ear canals and TM's normal and nose and mouth without ulcers or lesions   Neck: no adenopathy. Thyroid normal to palpation. No bruits  Pulm: Lungs clear to auscultation   CV: Irregular rates and rhythm. Drop 9 points in SBP with orthostatic  maneuver lying to standing  GI: Soft, nontender, Normal active bowel sounds, No hepatosplenomegaly or masses palpable  Ext: Peripheral pulses intact. Mild BLE edema.  Neuro: Normal strength and tone, sensory exam grossly normal    Assessment/Plan: (See plan discussion below for further details)  1. Chronic diastolic congestive heart failure (H)   -45 %.  Seems like slight over diuresed given prior baseline weight when not in CHF was about 200 pounds and 9 point drop BP with position change. WIll reduce Torsemide slightly and recheck BMP 2 weeks.   - torsemide (DEMADEX) 20 MG tablet; 1 tab on odd days and 1/2 tab on even days  Dispense: 30 tablet; Refill: 0    2. Atrial fibrillation, unspecified type (H)  Rate controlled. On Warfarin now. [facility] managing INR    3. CKD (chronic kidney disease) stage 5, GFR less than 15 ml/min (H)  Recheck GFR in 2 weeks with lower Torsemide. Refuses dialysis. If sx worsen in future, will then pursue Palliative Care consult. Pt wishes to continue general medical management for now except now dialysis  - Basic metabolic panel; Future    4. 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, with long-term current use of insulin (H)  At goal for age and CAD hx. Needs new glucometer  - blood glucose (NO BRAND SPECIFIED) test strip; Use to test blood sugar 3 times daily or as directed.  Dispense: 300 strip; Refill: 3  - blood glucose monitoring (NO BRAND SPECIFIED) meter device kit; Use to test blood sugar 3 times daily or as directed.  Dispense: 1 kit; Refill: 1  - thin (NO BRAND SPECIFIED) lancets; Use to test blood sugar 3 times daily or as directed.  Dispense: 300 each; Refill: 3  - Basic metabolic panel    5. Anemia in stage 5 chronic kidney disease, not on chronic dialysis (H)   Denies seeing blood in stools. Labs as ordered for monitoring  - CBC with platelets  - Reticulocyte count    6. NSTEMI (non-ST elevated myocardial infarction) (H)  No  chest pain. Being medically managed as not able to do angio with CKD. Continue current meds    Plan discussion:  Labs as ordered  See cardiology tomorrow as scheduled   Next INR per [facility]  Reduce Torsemide to 1 tab on odd days and 1/2 tab on even days  Recheck nonfasting kidney lab in 2 weeks  Continue other medications       Jorge Hillman MD  Internal Medicine Department  [facility]    (Chart documentation was completed, in part, with [employer] voice-recognition software. Even though reviewed, some grammatical, spelling, and word errors may remain.)

## 2019-09-23 NOTE — PROGRESS NOTES
ANTICOAGULATION FOLLOW-UP CLINIC VISIT    Patient Name:  Oscar Terrazas  Date:  2019  Contact Type:  Face to Face    SUBJECTIVE:  Patient Findings     Comments:   New start on warfarin         Clinical Outcomes     Negatives:   Major bleeding event, Thromboembolic event, Anticoagulation-related hospital admission, Anticoagulation-related ED visit, Anticoagulation-related fatality    Comments:   New start on warfarin            OBJECTIVE    INR Protime   Date Value Ref Range Status   2019 4.1 (A) 0.86 - 1.14 Final       ASSESSMENT / PLAN  INR assessment SUB      Anticoagulation Summary  As of 2019    INR goal:   2.0-3.0   TTR:   --   INR used for dosin.1! (2019)   Warfarin maintenance plan:   4 mg (2 mg x 2) every Mon, Tue; 6 mg (2 mg x 3) every Wed, Thu; 8 mg (2 mg x 4) all other days   Full warfarin instructions:   : Hold; : 4 mg; Otherwise 4 mg every Mon, Tue; 6 mg every Wed, Thu; 8 mg all other days   Weekly warfarin total:   44 mg   Plan last modified:   Nisreen Jay RN (2019)   Next INR check:   2019   Target end date:   Indefinite    Indications    Atrial fibrillation (H) [I48.91]             Anticoagulation Episode Summary     INR check location:       Preferred lab:       Send INR reminders to:   [facility]    Comments:         Anticoagulation Care Providers     Provider Role Specialty Phone number    BhartiJorge puckett MD [facility] Internal Medicine [phone]            See the Encounter Report to view Anticoagulation Flowsheet and Dosing Calendar (Go to Encounters tab in chart review, and find the Anticoagulation Therapy Visit)    Patient recently started on warfarin and has been on x 7 days.  INR 4.1 today, will hold and decrease dose~20% and recheck in 3 days per protocol.     Nolvia Sierra, ANDRE

## 2019-09-24 NOTE — TELEPHONE ENCOUNTER
Call pt and his daughter. Pt currently taking Torsemide 20mg, 1 tab daily and kidney function worsened  Due to a little too much dehydration with current Torsemide dose. Pt to reduce Torsemide to 1 tab on odd days and 1/2 tab on even days and repeat a nonfasting BMP lab in 2 weeks as ordered  Hgb low but stable. Bone marrow is trying to make more Hgb.  Was given Iron in hospital and will also recheck Hgb level and iron levels when has the lab in 2 weeks

## 2019-09-24 NOTE — LETTER
9/24/2019    Jorge Hillman MD  [address] [facility] [address]    RE: Oscar Terrazas       Dear Colleague,    I had the pleasure of seeing Oscar Terrazas in the [facility].      Cardiology Progress Note    Date of Service: 09/24/2019  Patient seen today in follow up of: CORE follow up  Primary cardiologist: Dr. Gibson    HPI:  Oscar Terrazas is a very pleasant 85 year old male with a history of coronary artery disease s/p CABG in 1996 with a LIMA to LAD and SVG to OM. In followup in 2007, he underwent multivessel drug stenting for recurrent angina. He then presented in 2012 with an acute coronary syndrome after his Plavix was stopped for a dental procedure.  At that time his LAD and left main coronary stents were widely patent.  There was one lesion at the end of the stent and the vein graft of the OM that was severe and possibly acutely thrombotic. This was successful stented with a drug eluting stent.  He has remained on Plavix since.  Additionally, he has a history of hypertension, stage IV CKD, chronic heart failure with preserved ejection fraction, peripheral vascular disease, and diabetes mellitus.     He was admitted in June of last year with exertional chest discomfort, shortness of breath and an elevated troponin. Given his significant renal dysfunction, repeat coronary angiography was not advised and medical management was pursued.  He was diuresed with IV Lasix with improvement and was started on Imdur and hydralazine.    I met him about a year ago after his hospitalization. He has done fairly well on a regimen of Torsemide 20 mg alternative with 10 mg ever other day. He has been hypertensive, and we have been adjusting his antihypertensive regimen for better control.    I saw him in clinic in August.  He was feeling well at that appointment although his blood pressures remain elevated.  We further increased his hydralazine.  He had symptoms of ongoing mild stable  angina.    Unfortunately, he was recently admitted to [facility] on 9/13 through 9/18 with new onset atrial fibrillation with rapid ventricular response, NSTEMI, worsening renal insufficiency, and congestive heart failure exacerbation.  He underwent a repeat echocardiogram which showed a mild decrease in his ejection fraction to 40 to 45%.  There was moderate hypokinesis of the basal inferior and inferolateral walls.  Overall, his ejection fraction had declined from 55 to 60% previously but his wall motion abnormalities appeared stable.    He was diuresed with a Lasix drip and eventually transitioned to oral Lasix 80 mg twice daily.  In the morning of discharge, he was started on torsemide 20 mg daily per nephrology's recommendations.  His atrial fibrillation occurred during his hospitalization.  His metoprolol was increased and he was started on diltiazem for rate control.  Coumadin was started for anticoagulation.  Conservative management of his NSTEMI was recommended. In addition to warfarin he was discharged on plavix.  We previously had been hesitant to send him for coronary angiography given his significant renal insufficiency.  He has been clear he would not want dialysis.  Admission weight was 205 lbs.  His discharge weight was 185 lbs. He was discharged with an event monitor for evaluation of his atrial fibrillation.     He is here today for post hospital follow-up and CORE clinic follow-up with his daughter Martina.  He has been feeling okay since hospital discharge.  He does not do much during the day but is able to manage his usual activities at home.  He does however become short of breath walking across his entire house.  He denies tab orthopnea or PND.  He continues to deny chest discomfort.  He is tolerating warfarin without any major bleeding issues.  His weight at home is 185 pounds at discharge.  He is now 190 pounds.  His blood pressure is well controlled.    He saw Dr. Hillman  yesterday and had labs done there.  His creatinine has increased from around 4 to 4.6 yesterday and he was instructed to cut his torsemide back to 10 mg alternating with 20 mg.    ASSESSMENT/PLAN:  1.  Coronary artery disease. With remote CABG and previous multivessel stenting.  He has been on good medical therapy with aspirin and Plavix.  Aspirin has now been discontinued as he was started on warfarin for new atrial fibrillation.  He will continue on Plavix.  He is also on a beta-blocker, Imdur, and a PCSK9 inhibitor as he has been intolerant of statins.  He denies any chest discomfort since his hospitalization.  Will continue to medically and he is at high risk of dialysis is high with coronary angiography.  2.  Mild, ? Ischemic cardiomyopathy. With an LVEF of 40 - 45% by recent echocardiogram.  He was noted to have inferior wall motion abnormalities although disease appeared unchanged from prior.  Again, we will continue to medically manage this.  He is currently on metoprolol XL in addition to Imdur.  He is was previously on hydralazine but this was stopped to allow room for rate control medications.  He is currently on torsemide 20 mg daily although this is planning to be cut back due to some worsening renal insufficiency on his recent labs to 20 mg alternating with 10 mg daily.  With this change, I asked him to contact the CORE clinic or Dr. Hillman with weight gain, shortness of breath, or leg swelling.  3.  Stage V renal disease.  With a creatinine now running above 4.  He was followed by nephrology while inpatient but does not have plans to follow them as an outpatient.  His creatinine has trended up since his discharge to 4.7 today.  As noted above, his torsemide has been adjusted.  This is certainly a major contributor to his volume overload.  I anticipate he will likely need further diuretic adjustments in the future.  4.  Hypertension.  This is well controlled today.  5.  Dyslipidemia.  He has not  tolerated statins well and was started on a PCSK9 inhibitor by Dr. Gibson in the spring.  He has had a good response with an LDL of 57 on last check.  6.  Peripheral venous insufficiency with chronic lower extremity edema.  7.  Persistent atrial fibrillation.  Diagnosed during his recent hospitalization.  He is currently on metoprolol XL and diltiazem for rate control.  His rates appear well controlled in clinic today he is currently wearing an event monitor which we will follow-up on the results of.  Anticoagulation has been initiated with warfarin. His [id number] score is 6.  He has been having some difficulty with his event monitor transmitting his data.  We will sort this out before he leaves clinic today.    Oscar seems to be fairly stable since his hospital discharge. He will require close ongoing monitoring of his volume status in the setting of his significant renal dysfunction.  He is very clear that he would not want to proceed with dialysis if needed.  He also shares today that all this testing and medical appointments are taking a toll on him. I wonder if perhaps a more palliative approach going forward focusing on symptom management may be appropriate, but I did not address this today.    He is scheduled to have labs in 2 weeks to reevaluate his renal function per Dr. Hillman.  I asked him in the meantime to let us or Dr. Hillman know if his weights continue to trend up or he is worsening shortness of breath or swelling.  He may be a candidate for treatment in the infusion clinic if needed in the future as it seems he did well with an IV Lasix drip in the hospital.  I will have him return to see me in the C[facility] clinic in about a month.  I also asked him to make an appointment with Dr. Gibson at his next available.    Orders this Visit:  Orders Placed This Encounter   Procedures     Basic metabolic panel     Basic metabolic panel     Follow-Up with [facility] Clinic - Return MD visit     Follow-Up with [facility] Clinic  "- ALVARO visit     No orders of the defined types were placed in this encounter.    There are no discontinued medications.    CURRENT MEDICATIONS:  Current Outpatient Medications   Medication Sig Dispense Refill     alirocumab (PRALUENT) 150 MG/ML injectable pen Inject 1 mL (150 mg) Subcutaneous every 14 days 6 mL 3     allopurinol (ZYLOPRIM) 100 MG tablet TAKE 2 TABLETS(200 MG) BY MOUTH DAILY 180 tablet 1     blood glucose (NO BRAND SPECIFIED) test strip Use to test blood sugar 3 times daily or as directed. 300 strip 3     blood glucose monitoring (NO BRAND SPECIFIED) meter device kit Use to test blood sugar 3 times daily or as directed. 1 kit 1     cetirizine (ZYRTEC) 10 MG tablet TAKE 1 TABLET(10 MG) BY MOUTH EVERY EVENING 90 tablet 3     clopidogrel (PLAVIX) 75 MG tablet TAKE 1 TABLET(75 MG) BY MOUTH DAILY 90 tablet 3     Cod Liver Oil 1000 MG CAPS Take 1 capsule by mouth daily        Cyanocobalamin (VITAMIN B 12) 250 MCG LOZG Take 500 mcg by mouth 2 times daily        diltiazem ER (DILT-XR) 120 MG 24 hr capsule Take 1 capsule (120 mg) by mouth daily 30 capsule 0     diphenhydrAMINE-acetaminophen (TYLENOL PM)  MG tablet Take 1 tablet by mouth nightly as needed for sleep       insulin NPH (HUMULIN N/NOVOLIN N VIAL) 100 UNIT/ML vial 14 units at  suppertime 2 vial 11     insulin syringe-needle U-100 (BD INSULIN SYRINGE) 29G X 1/2\" 0.5 ML Use one syringe 2 daily or as directed. 200 each 4     isosorbide mononitrate CR (IMDUR) 120 MG 24 HR ER tablet TAKE 1 TABLET BY MOUTH DAILY 90 tablet 3     levothyroxine (SYNTHROID/LEVOTHROID) 75 MCG tablet Take 1 tablet (75 mcg) by mouth daily 90 tablet 3     metoprolol succinate ER (TOPROL-XL) 25 MG 24 hr tablet Take 3 tablets (75 mg) by mouth 2 times daily 60 tablet 0     nitroGLYcerin (NITROSTAT) 0.4 MG sublingual tablet For chest pain place 1 tablet under the tongue every 5 minutes for 3 doses. If symptoms persist 5 minutes after 1st dose call 911. 25 tablet 1     order " for DME Equipment being ordered: Cane ()  Treatment Diagnosis: Impaired balance, impaired activity tolerance 1 each 0     theophylline (UNIPHYL) 400 MG 24 hr tablet Take 1 tablet (400 mg) by mouth daily 90 tablet 3     thin (NO BRAND SPECIFIED) lancets Use to test blood sugar 3 times daily or as directed. 300 each 3     torsemide (DEMADEX) 20 MG tablet 1 tab on odd days and 1/2 tab on even days 30 tablet 0     warfarin ANTICOAGULANT (COUMADIN ANTICOAGULANT) 2 MG tablet Take 3 tablets (6 mg) by mouth daily For the next 2 nights and INR checked on Friday with adjustment in dose per clinic. 90 tablet 0     ALLERGIES  Allergies   Allergen Reactions     Advair [Fluticasone-Salmeterol]      cough;  insomnia, nightmares     Amlodipine Besylate      edema       Azithromycin GI Disturbance     Clarithromycin      gi     Hydrochlorothiazide      hctz + lisinopril-->inc creat, k+     Lisinopril      lisinopril + hctz --> inc creat, k+     Moxifloxacin Hydrochloride      clostridium diffile colitis     Penicillins      gen swelling     Pravastatin Cramps     Muscle cramps/spasm.  Stopped 2017     Vioxx      edema     PAST MEDICAL HISTORY:  Past Medical History:   Diagnosis Date     Acute myocardial infarction, unspecified site, episode of care unspecified      Allergic rhinitis, cause unspecified      Anemia 3/6/2014     CAD (coronary artery disease)     1996 CABG - LIMA to LAD, SVG to OM, 2007 Cath - KANU to Left main and ostial/prox LAD, 2012 Cath - 80-90% stenosis SVG to OM - KANU placed, EF 50%     CKD (chronic kidney disease) stage 3, GFR 30-59 ml/min (H) 3/9/2014     CTS (carpal tunnel syndrome)     bilateral     Degeneration of cervical intervertebral disc      Diaphragmatic hernia without mention of obstruction or gangrene      Esophageal reflux      Essential hypertension, benign      Hyperlipidemia LDL goal <70      Hypothyroidism      Hypothyroidism, unspecified hypothyroidism type 1/14/2016     IDIOPATHIC CYCLIC  EDEMA      Mild persistent asthma      Osteoarthrosis, unspecified whether generalized or localized, unspecified site      Pneumonia, organism unspecified(486)      Proteinuria 5/17/2014     PVD (peripheral vascular disease) (H)      Sensorineural hearing loss, unspecified      Subarachnoid bleed (H)      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, with long-term current use of insulin (H) 6/30/2019     Unspecified hereditary and idiopathic peripheral neuropathy      Venous insufficiency      PAST SURGICAL HISTORY:  Past Surgical History:   Procedure Laterality Date     C NONSPECIFIC PROCEDURE      appendectomy     C NONSPECIFIC PROCEDURE      hemorrhoids     C NONSPECIFIC PROCEDURE      cervical strain     C NONSPECIFIC PROCEDURE      rotator cuff surgery, right shoulder     C NONSPECIFIC PROCEDURE  2008    iol os     CORONARY ARTERY BYPASS  1996    LIMA to LAD, SVG to OM     HEART CATH STENT COR W/WO PTCA  2007    lad, left main cor artery stents/drug eluting     HEART CATH STENT COR W/WO PTCA  2012    80-90% stenosis SVG to OM - KANU placed, EF 50%     NONSPECIFIC PROCEDURE      iol od     FAMILY HISTORY:  Family History   Problem Relation Age of Onset     Lung Cancer Daughter      Family History Negative Mother         broken hip     Jaundice Father      Alcohol/Drug Father      Ovarian Cancer Daughter      Family History Negative Daughter      Family History Negative Son      SOCIAL HISTORY:  Social History     Socioeconomic History     Marital status:      Spouse name: None     Number of children: None     Years of education: None     Highest education level: None   Occupational History     None   Social Needs     Financial resource strain: None     Food insecurity:     Worry: None     Inability: None     Transportation needs:     Medical: None     Non-medical: None   Tobacco Use     Smoking status: Former Smoker     Packs/day: 1.00     Years: 14.00     Pack  years: 14.00     Types: Cigarettes     Start date:      Last attempt to quit: 1966     Years since quittin.7     Smokeless tobacco: Never Used   Substance and Sexual Activity     Alcohol use: No     Drug use: No     Sexual activity: Not Currently   Lifestyle     Physical activity:     Days per week: None     Minutes per session: None     Stress: None   Relationships     Social connections:     Talks on phone: None     Gets together: None     Attends Anabaptist service: None     Active member of club or organization: None     Attends meetings of clubs or organizations: None     Relationship status: None     Intimate partner violence:     Fear of current or ex partner: None     Emotionally abused: None     Physically abused: None     Forced sexual activity: None   Other Topics Concern     Parent/sibling w/ CABG, MI or angioplasty before 65F 55M? Not Asked      Service Not Asked     Blood Transfusions Not Asked     Caffeine Concern No     Occupational Exposure Not Asked     Hobby Hazards Not Asked     Sleep Concern Yes     Stress Concern No     Weight Concern No     Special Diet No     Back Care Not Asked     Exercise Yes     Comment: bowling, 5 days week. yard work     Bike Helmet Not Asked     Seat Belt Not Asked     Self-Exams Not Asked   Social History Narrative     None     Review of Systems:  Skin:  Negative     Eyes:  Positive for glasses  ENT:  Negative    Respiratory:  Positive for dyspnea on exertion;cough  Cardiovascular:    Positive for;edema  Gastroenterology: Negative    Genitourinary:  Negative    Musculoskeletal:  Negative    Neurologic:  Positive for numbness or tingling of hands  Psychiatric:  Negative    Heme/Lymph/Imm:  Negative    Endocrine:  Positive for thyroid disorder;diabetes     Physical Exam:  Vitals: /58   Pulse 60   Ht 1.829 m (6')   Wt 88.2 kg (194 lb 6.4 oz)   SpO2 100%   BMI 26.37 kg/m      Wt Readings from Last 4 Encounters:   19 88.2 kg (194 lb 6.4 oz)    09/23/19 86.6 kg (191 lb)   09/17/19 84.2 kg (185 lb 9.6 oz)   09/03/19 90.3 kg (199 lb)     GEN: well nourished, in no acute distress.  HEENT:  Pupils equal, round. Sclerae nonicteric.   NECK: JVP 8 - 10 with positive HJR  C/V:  Regular rate and rhythm, no murmur, rub or gallop.   RESP: Respirations are unlabored. Clear to auscultation bilaterally without wheezing, rales, or rhonchi.  GI: Abdomen soft, nontender.  EXTREM: trace leg edema bilaterally.    NEURO: Alert and oriented, cooperative.  SKIN: Warm and dry.     Recent Lab Results:  LIPID RESULTS:  Lab Results   Component Value Date    CHOL 124 04/17/2019    HDL 34 (L) 04/17/2019    LDL 57 04/17/2019    TRIG 166 (H) 04/17/2019    CHOLHDLRATIO 3.8 09/11/2014     LIVER ENZYME RESULTS:  Lab Results   Component Value Date    AST 15 09/16/2019    ALT 20 09/16/2019     CBC RESULTS:  Lab Results   Component Value Date    WBC 11.8 (H) 09/23/2019    RBC 3.07 (L) 09/23/2019    HGB 9.2 (L) 09/24/2019    HCT 29.3 (L) 09/23/2019    MCV 95 09/23/2019    MCH 30.3 09/23/2019    MCHC 31.7 09/23/2019    RDW 15.3 (H) 09/23/2019     09/23/2019     BMP RESULTS:  Lab Results   Component Value Date     09/24/2019    POTASSIUM 3.8 09/24/2019    CHLORIDE 106 09/24/2019    CO2 26 09/24/2019    ANIONGAP 13.8 09/24/2019     (H) 09/24/2019    BUN 94 (H) 09/24/2019    CR 4.72 (H) 09/24/2019    GFRESTIMATED 12 (L) 09/24/2019    GFRESTBLACK 14 (L) 09/24/2019    LEV 9.7 09/24/2019      A1C RESULTS:  Lab Results   Component Value Date    A1C 6.3 (H) 06/21/2019     INR RESULTS:  Lab Results   Component Value Date    INR 4.1 (A) 09/23/2019    INR 1.4 (A) 09/20/2019    INR 1.28 (H) 09/18/2019    INR 1.25 (H) 09/17/2019       New/Pertinent imaging results since last visit:  Echocardiogram 9/14/19:  Interpretation Summary     Technically difficult, suboptimal study. The left ventricle is normal in size.  Left ventricular systolic function is mild to moderately reduced. The  visual  ejection fraction is estimated at 40-45%. LVEF 44% based on biplane 2D  tracing. Grade II or moderate diastolic dysfunction. Diastolic Doppler  findings (E/E' ratio and/or other parameters) suggest left ventricular filling  pressures are increased. There is moderate hypokinesis of the entire inferior  wall as well as the mid to basal inferolateral wall and the true LV apex.  The right ventricle is normal size. The right ventricular systolic function is  normal.  Mild to moderate mitral regurgitation.  Mild aortic root dilatation.  No pericardial effusion.  In direct comparison to the previous study dated 03/14/2018, there has been an  interval deterioration in left ventricular systolic function.          Thank you for allowing me to participate in the care of your patient.    Sincerely,     Marie Gil PA-C     [facility]

## 2019-09-24 NOTE — PROGRESS NOTES
Cardiology Progress Note    Date of Service: 09/24/2019  Patient seen today in follow up of: [facility] follow up  Primary cardiologist: Dr. Gibson    HPI:  Oscar Terrazas is a very pleasant 85 year old male with a history of coronary artery disease s/p CABG in 1996 with a LIMA to LAD and SVG to OM. In followup in 2007, he underwent multivessel drug stenting for recurrent angina. He then presented in 2012 with an acute coronary syndrome after his Plavix was stopped for a dental procedure.  At that time his LAD and left main coronary stents were widely patent.  There was one lesion at the end of the stent and the vein graft of the OM that was severe and possibly acutely thrombotic. This was successful stented with a drug eluting stent.  He has remained on Plavix since.  Additionally, he has a history of hypertension, stage IV CKD, chronic heart failure with preserved ejection fraction, peripheral vascular disease, and diabetes mellitus.     He was admitted in June of last year with exertional chest discomfort, shortness of breath and an elevated troponin. Given his significant renal dysfunction, repeat coronary angiography was not advised and medical management was pursued.  He was diuresed with IV Lasix with improvement and was started on Imdur and hydralazine.    I met him about a year ago after his hospitalization. He has done fairly well on a regimen of Torsemide 20 mg alternative with 10 mg ever other day. He has been hypertensive, and we have been adjusting his antihypertensive regimen for better control.    I saw him in clinic in August.  He was feeling well at that appointment although his blood pressures remain elevated.  We further increased his hydralazine.  He had symptoms of ongoing mild stable angina.    Unfortunately, he was recently admitted to [facility] on 9/13 through 9/18 with new onset atrial fibrillation with rapid ventricular response, NSTEMI, worsening renal insufficiency, and  congestive heart failure exacerbation.  He underwent a repeat echocardiogram which showed a mild decrease in his ejection fraction to 40 to 45%.  There was moderate hypokinesis of the basal inferior and inferolateral walls.  Overall, his ejection fraction had declined from 55 to 60% previously but his wall motion abnormalities appeared stable.    He was diuresed with a Lasix drip and eventually transitioned to oral Lasix 80 mg twice daily.  In the morning of discharge, he was started on torsemide 20 mg daily per nephrology's recommendations.  His atrial fibrillation occurred during his hospitalization.  His metoprolol was increased and he was started on diltiazem for rate control.  Coumadin was started for anticoagulation.  Conservative management of his NSTEMI was recommended. In addition to warfarin he was discharged on plavix.  We previously had been hesitant to send him for coronary angiography given his significant renal insufficiency.  He has been clear he would not want dialysis.  Admission weight was 205 lbs.  His discharge weight was 185 lbs. He was discharged with an event monitor for evaluation of his atrial fibrillation.     He is here today for post hospital follow-up and [facility] clinic follow-up with his daughter Martina.  He has been feeling okay since hospital discharge.  He does not do much during the day but is able to manage his usual activities at home.  He does however become short of breath walking across his entire house.  He denies tab orthopnea or PND.  He continues to deny chest discomfort.  He is tolerating warfarin without any major bleeding issues.  His weight at home is 185 pounds at discharge.  He is now 190 pounds.  His blood pressure is well controlled.    He saw Dr. Hillman yesterday and had labs done there.  His creatinine has increased from around 4 to 4.6 yesterday and he was instructed to cut his torsemide back to 10 mg alternating with 20 mg.    ASSESSMENT/PLAN:  1.  Coronary artery  disease. With remote CABG and previous multivessel stenting.  He has been on good medical therapy with aspirin and Plavix.  Aspirin has now been discontinued as he was started on warfarin for new atrial fibrillation.  He will continue on Plavix.  He is also on a beta-blocker, Imdur, and a PCSK9 inhibitor as he has been intolerant of statins.  He denies any chest discomfort since his hospitalization.  Will continue to medically and he is at high risk of dialysis is high with coronary angiography.  2.  Mild, ? Ischemic cardiomyopathy. With an LVEF of 40 - 45% by recent echocardiogram.  He was noted to have inferior wall motion abnormalities although disease appeared unchanged from prior.  Again, we will continue to medically manage this.  He is currently on metoprolol XL in addition to Imdur.  He is was previously on hydralazine but this was stopped to allow room for rate control medications.  He is currently on torsemide 20 mg daily although this is planning to be cut back due to some worsening renal insufficiency on his recent labs to 20 mg alternating with 10 mg daily.  With this change, I asked him to contact the [facility] clinic or Dr. Hillman with weight gain, shortness of breath, or leg swelling.  3.  Stage V renal disease.  With a creatinine now running above 4.  He was followed by nephrology while inpatient but does not have plans to follow them as an outpatient.  His creatinine has trended up since his discharge to 4.7 today.  As noted above, his torsemide has been adjusted.  This is certainly a major contributor to his volume overload.  I anticipate he will likely need further diuretic adjustments in the future.  4.  Hypertension.  This is well controlled today.  5.  Dyslipidemia.  He has not tolerated statins well and was started on a PCSK9 inhibitor by Dr. Gibson in the spring.  He has had a good response with an LDL of 57 on last check.  6.  Peripheral venous insufficiency with chronic lower extremity edema.  7.   Persistent atrial fibrillation.  Diagnosed during his recent hospitalization.  He is currently on metoprolol XL and diltiazem for rate control.  His rates appear well controlled in clinic today he is currently wearing an event monitor which we will follow-up on the results of.  Anticoagulation has been initiated with warfarin. His [id number] score is 6.  He has been having some difficulty with his event monitor transmitting his data.  We will sort this out before he leaves clinic today.    Oscar seems to be fairly stable since his hospital discharge. He will require close ongoing monitoring of his volume status in the setting of his significant renal dysfunction.  He is very clear that he would not want to proceed with dialysis if needed.  He also shares today that all this testing and medical appointments are taking a toll on him. I wonder if perhaps a more palliative approach going forward focusing on symptom management may be appropriate, but I did not address this today.    He is scheduled to have labs in 2 weeks to reevaluate his renal function per Dr. Hillman.  I asked him in the meantime to let us or Dr. Hillman know if his weights continue to trend up or he is worsening shortness of breath or swelling.  He may be a candidate for treatment in the infusion clinic if needed in the future as it seems he did well with an IV Lasix drip in the hospital.  I will have him return to see me in the [facility] clinic in about a month.  I also asked him to make an appointment with Dr. Gibson at his next available.    Orders this Visit:  Orders Placed This Encounter   Procedures     Basic metabolic panel     Basic metabolic panel     Follow-Up with [facility] Clinic - Return MD visit     Follow-Up with [facility] Clinic - ALVARO visit     No orders of the defined types were placed in this encounter.    There are no discontinued medications.    CURRENT MEDICATIONS:  Current Outpatient Medications   Medication Sig Dispense Refill     alirocumab  "(PRALUENT) 150 MG/ML injectable pen Inject 1 mL (150 mg) Subcutaneous every 14 days 6 mL 3     allopurinol (ZYLOPRIM) 100 MG tablet TAKE 2 TABLETS(200 MG) BY MOUTH DAILY 180 tablet 1     blood glucose (NO BRAND SPECIFIED) test strip Use to test blood sugar 3 times daily or as directed. 300 strip 3     blood glucose monitoring (NO BRAND SPECIFIED) meter device kit Use to test blood sugar 3 times daily or as directed. 1 kit 1     cetirizine (ZYRTEC) 10 MG tablet TAKE 1 TABLET(10 MG) BY MOUTH EVERY EVENING 90 tablet 3     clopidogrel (PLAVIX) 75 MG tablet TAKE 1 TABLET(75 MG) BY MOUTH DAILY 90 tablet 3     Cod Liver Oil 1000 MG CAPS Take 1 capsule by mouth daily        Cyanocobalamin (VITAMIN B 12) 250 MCG LOZG Take 500 mcg by mouth 2 times daily        diltiazem ER (DILT-XR) 120 MG 24 hr capsule Take 1 capsule (120 mg) by mouth daily 30 capsule 0     diphenhydrAMINE-acetaminophen (TYLENOL PM)  MG tablet Take 1 tablet by mouth nightly as needed for sleep       insulin NPH (HUMULIN N/NOVOLIN N VIAL) 100 UNIT/ML vial 14 units at  suppertime 2 vial 11     insulin syringe-needle U-100 (BD INSULIN SYRINGE) 29G X 1/2\" 0.5 ML Use one syringe 2 daily or as directed. 200 each 4     isosorbide mononitrate CR (IMDUR) 120 MG 24 HR ER tablet TAKE 1 TABLET BY MOUTH DAILY 90 tablet 3     levothyroxine (SYNTHROID/LEVOTHROID) 75 MCG tablet Take 1 tablet (75 mcg) by mouth daily 90 tablet 3     metoprolol succinate ER (TOPROL-XL) 25 MG 24 hr tablet Take 3 tablets (75 mg) by mouth 2 times daily 60 tablet 0     nitroGLYcerin (NITROSTAT) 0.4 MG sublingual tablet For chest pain place 1 tablet under the tongue every 5 minutes for 3 doses. If symptoms persist 5 minutes after 1st dose call 911. 25 tablet 1     order for DME Equipment being ordered: Cane ()  Treatment Diagnosis: Impaired balance, impaired activity tolerance 1 each 0     theophylline (UNIPHYL) 400 MG 24 hr tablet Take 1 tablet (400 mg) by mouth daily 90 tablet 3     " thin (NO BRAND SPECIFIED) lancets Use to test blood sugar 3 times daily or as directed. 300 each 3     torsemide (DEMADEX) 20 MG tablet 1 tab on odd days and 1/2 tab on even days 30 tablet 0     warfarin ANTICOAGULANT (COUMADIN ANTICOAGULANT) 2 MG tablet Take 3 tablets (6 mg) by mouth daily For the next 2 nights and INR checked on Friday with adjustment in dose per clinic. 90 tablet 0     ALLERGIES  Allergies   Allergen Reactions     Advair [Fluticasone-Salmeterol]      cough;  insomnia, nightmares     Amlodipine Besylate      edema       Azithromycin GI Disturbance     Clarithromycin      gi     Hydrochlorothiazide      hctz + lisinopril-->inc creat, k+     Lisinopril      lisinopril + hctz --> inc creat, k+     Moxifloxacin Hydrochloride      clostridium diffile colitis     Penicillins      gen swelling     Pravastatin Cramps     Muscle cramps/spasm.  Stopped 2017     Vioxx      edema     PAST MEDICAL HISTORY:  Past Medical History:   Diagnosis Date     Acute myocardial infarction, unspecified site, episode of care unspecified      Allergic rhinitis, cause unspecified      Anemia 3/6/2014     CAD (coronary artery disease)     1996 CABG - LIMA to LAD, SVG to OM, 2007 Cath - KANU to Left main and ostial/prox LAD, 2012 Cath - 80-90% stenosis SVG to OM - KANU placed, EF 50%     CKD (chronic kidney disease) stage 3, GFR 30-59 ml/min (H) 3/9/2014     CTS (carpal tunnel syndrome)     bilateral     Degeneration of cervical intervertebral disc      Diaphragmatic hernia without mention of obstruction or gangrene      Esophageal reflux      Essential hypertension, benign      Hyperlipidemia LDL goal <70      Hypothyroidism      Hypothyroidism, unspecified hypothyroidism type 1/14/2016     IDIOPATHIC CYCLIC EDEMA      Mild persistent asthma      Osteoarthrosis, unspecified whether generalized or localized, unspecified site      Pneumonia, organism unspecified(486)      Proteinuria 5/17/2014     PVD (peripheral vascular disease)  (H)      Sensorineural hearing loss, unspecified      Subarachnoid bleed (H)      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, with long-term current use of insulin (H) 2019     Unspecified hereditary and idiopathic peripheral neuropathy      Venous insufficiency      PAST SURGICAL HISTORY:  Past Surgical History:   Procedure Laterality Date     C NONSPECIFIC PROCEDURE      appendectomy     C NONSPECIFIC PROCEDURE      hemorrhoids     C NONSPECIFIC PROCEDURE      cervical strain     C NONSPECIFIC PROCEDURE      rotator cuff surgery, right shoulder     C NONSPECIFIC PROCEDURE  2008    iol os     CORONARY ARTERY BYPASS      LIMA to LAD, SVG to OM     HEART CATH STENT COR W/WO PTCA      lad, left main cor artery stents/drug eluting     HEART CATH STENT COR W/WO PTCA      80-90% stenosis SVG to OM - KANU placed, EF 50%     NONSPECIFIC PROCEDURE      iol od     FAMILY HISTORY:  Family History   Problem Relation Age of Onset     Lung Cancer Daughter      Family History Negative Mother         broken hip     Jaundice Father      Alcohol/Drug Father      Ovarian Cancer Daughter      Family History Negative Daughter      Family History Negative Son      SOCIAL HISTORY:  Social History     Socioeconomic History     Marital status:      Spouse name: None     Number of children: None     Years of education: None     Highest education level: None   Occupational History     None   Social Needs     Financial resource strain: None     Food insecurity:     Worry: None     Inability: None     Transportation needs:     Medical: None     Non-medical: None   Tobacco Use     Smoking status: Former Smoker     Packs/day: 1.00     Years: 14.00     Pack years: 14.00     Types: Cigarettes     Start date:      Last attempt to quit: 1966     Years since quittin.7     Smokeless tobacco: Never Used   Substance and Sexual Activity     Alcohol use: No     Drug use: No      Sexual activity: Not Currently   Lifestyle     Physical activity:     Days per week: None     Minutes per session: None     Stress: None   Relationships     Social connections:     Talks on phone: None     Gets together: None     Attends Gnosticist service: None     Active member of club or organization: None     Attends meetings of clubs or organizations: None     Relationship status: None     Intimate partner violence:     Fear of current or ex partner: None     Emotionally abused: None     Physically abused: None     Forced sexual activity: None   Other Topics Concern     Parent/sibling w/ CABG, MI or angioplasty before 65F 55M? Not Asked      Service Not Asked     Blood Transfusions Not Asked     Caffeine Concern No     Occupational Exposure Not Asked     Hobby Hazards Not Asked     Sleep Concern Yes     Stress Concern No     Weight Concern No     Special Diet No     Back Care Not Asked     Exercise Yes     Comment: bowling, 5 days week. yard work     Bike Helmet Not Asked     Seat Belt Not Asked     Self-Exams Not Asked   Social History Narrative     None     Review of Systems:  Skin:  Negative     Eyes:  Positive for glasses  ENT:  Negative    Respiratory:  Positive for dyspnea on exertion;cough  Cardiovascular:    Positive for;edema  Gastroenterology: Negative    Genitourinary:  Negative    Musculoskeletal:  Negative    Neurologic:  Positive for numbness or tingling of hands  Psychiatric:  Negative    Heme/Lymph/Imm:  Negative    Endocrine:  Positive for thyroid disorder;diabetes     Physical Exam:  Vitals: /58   Pulse 60   Ht 1.829 m (6')   Wt 88.2 kg (194 lb 6.4 oz)   SpO2 100%   BMI 26.37 kg/m     Wt Readings from Last 4 Encounters:   09/24/19 88.2 kg (194 lb 6.4 oz)   09/23/19 86.6 kg (191 lb)   09/17/19 84.2 kg (185 lb 9.6 oz)   09/03/19 90.3 kg (199 lb)     GEN: well nourished, in no acute distress.  HEENT:  Pupils equal, round. Sclerae nonicteric.   NECK: JVP 8 - 10 with positive  HJR  C/V:  Regular rate and rhythm, no murmur, rub or gallop.   RESP: Respirations are unlabored. Clear to auscultation bilaterally without wheezing, rales, or rhonchi.  GI: Abdomen soft, nontender.  EXTREM: trace leg edema bilaterally.    NEURO: Alert and oriented, cooperative.  SKIN: Warm and dry.     Recent Lab Results:  LIPID RESULTS:  Lab Results   Component Value Date    CHOL 124 04/17/2019    HDL 34 (L) 04/17/2019    LDL 57 04/17/2019    TRIG 166 (H) 04/17/2019    CHOLHDLRATIO 3.8 09/11/2014     LIVER ENZYME RESULTS:  Lab Results   Component Value Date    AST 15 09/16/2019    ALT 20 09/16/2019     CBC RESULTS:  Lab Results   Component Value Date    WBC 11.8 (H) 09/23/2019    RBC 3.07 (L) 09/23/2019    HGB 9.2 (L) 09/24/2019    HCT 29.3 (L) 09/23/2019    MCV 95 09/23/2019    MCH 30.3 09/23/2019    MCHC 31.7 09/23/2019    RDW 15.3 (H) 09/23/2019     09/23/2019     BMP RESULTS:  Lab Results   Component Value Date     09/24/2019    POTASSIUM 3.8 09/24/2019    CHLORIDE 106 09/24/2019    CO2 26 09/24/2019    ANIONGAP 13.8 09/24/2019     (H) 09/24/2019    BUN 94 (H) 09/24/2019    CR 4.72 (H) 09/24/2019    GFRESTIMATED 12 (L) 09/24/2019    GFRESTBLACK 14 (L) 09/24/2019    LEV 9.7 09/24/2019      A1C RESULTS:  Lab Results   Component Value Date    A1C 6.3 (H) 06/21/2019     INR RESULTS:  Lab Results   Component Value Date    INR 4.1 (A) 09/23/2019    INR 1.4 (A) 09/20/2019    INR 1.28 (H) 09/18/2019    INR 1.25 (H) 09/17/2019       New/Pertinent imaging results since last visit:  Echocardiogram 9/14/19:  Interpretation Summary     Technically difficult, suboptimal study. The left ventricle is normal in size.  Left ventricular systolic function is mild to moderately reduced. The visual  ejection fraction is estimated at 40-45%. LVEF 44% based on biplane 2D  tracing. Grade II or moderate diastolic dysfunction. Diastolic Doppler  findings (E/E' ratio and/or other parameters) suggest left ventricular  filling  pressures are increased. There is moderate hypokinesis of the entire inferior  wall as well as the mid to basal inferolateral wall and the true LV apex.  The right ventricle is normal size. The right ventricular systolic function is  normal.  Mild to moderate mitral regurgitation.  Mild aortic root dilatation.  No pericardial effusion.  In direct comparison to the previous study dated 03/14/2018, there has been an  interval deterioration in left ventricular systolic function.    Marie Gil PA-C  [facility]

## 2019-09-24 NOTE — PATIENT INSTRUCTIONS
Call [facility] nurse for any questions or concerns Mon-Fri 8am-4pm:                                                #[phone]                                       For concerns after hours:                                               #[phone]     1: Medication changes: no medication changes today.  2: Plan from today: call us or Dr. Hillman with weight gain, shortness of breath or worsening swelling.   -  See me in one month and Dr. Gibson at his next available appointment.   3: Lab results: see attached; labs look stable from yesterday .  Component      Latest Ref Rng & Units 9/23/2019 9/24/2019   Sodium      136 - 145 mmol/L 141 142   Potassium      3.5 - 5.1 mmol/L 3.9 3.8   Chloride      98 - 107 mmol/L 107 106   Carbon Dioxide      23 - 29 mmol/L 25 26   Anion Gap      6 - 17 mmol/L 9 13.8   Glucose      70 - 105 mg/dL 192 (H) 141 (H)   Urea Nitrogen      7 - 30 mg/dL 100 (H) 94 (H)   Creatinine      0.70 - 1.30 mg/dL 4.67 (H) 4.72 (H)   GFR Estimate      >60 mL/min/1.73:m2 11 (L) 12 (L)   GFR Estimate If Black      >60 mL/min/1.73:m2 12 (L) 14 (L)   Calcium      8.5 - 10.5 mg/dL 9.0 9.7

## 2019-09-25 NOTE — TELEPHONE ENCOUNTER
"Requested Prescriptions   Pending Prescriptions Disp Refills     levothyroxine (SYNTHROID/LEVOTHROID) 75 MCG tablet [Pharmacy Med Name: LEVOTHYROXINE 0.075MG (75MCG) TABS] 90 tablet 0     Sig: TAKE 1 TABLET BY MOUTH DAILY       Thyroid Protocol Passed - 9/25/2019  2:33 PM        Passed - Patient is 12 years or older        Passed - Recent (12 mo) or future (30 days) visit within the authorizing provider's specialty     Patient had office visit in the last 12 months or has a visit in the next 30 days with authorizing provider or within the authorizing provider's specialty.  See \"Patient Info\" tab in inbasket, or \"Choose Columns\" in Meds & Orders section of the refill encounter.              Passed - Medication is active on med list        Passed - Normal TSH on file in past 12 months     Recent Labs   Lab Test 09/16/19  0534   TSH 1.15                "

## 2019-09-25 NOTE — TELEPHONE ENCOUNTER
Iron was to be drawn in 2 weeks but done today by lab instead. Will see if rises further in a couple weeks after the Venofer in hospitia as originally intended. If not, will add extra iron infusion (normally done twice and 1 week apart and only got one in hospitla

## 2019-09-25 NOTE — TELEPHONE ENCOUNTER
"Requested Prescriptions   Pending Prescriptions Disp Refills     torsemide (DEMADEX) 10 MG tablet [Pharmacy Med Name: TORSEMIDE 10MG TABLETS] 45 tablet 0     Sig: TAKE 1 TABLET BY MOUTH EVERY OTHER DAY       Diuretics (Including Combos) Protocol Failed - 9/25/2019  2:39 PM        Failed - Normal serum creatinine on file in past 12 months     Recent Labs   Lab Test 09/24/19  1015   CR 4.72*              Passed - Blood pressure under 140/90 in past 12 months     BP Readings from Last 3 Encounters:   09/24/19 130/58   09/23/19 126/78   09/18/19 111/69                 Passed - Recent (12 mo) or future (30 days) visit within the authorizing provider's specialty     Patient had office visit in the last 12 months or has a visit in the next 30 days with authorizing provider or within the authorizing provider's specialty.  See \"Patient Info\" tab in inbasket, or \"Choose Columns\" in Meds & Orders section of the refill encounter.              Passed - Medication is active on med list        Passed - Patient is age 18 or older        Passed - Normal serum potassium on file in past 12 months     Recent Labs   Lab Test 09/24/19  1015   POTASSIUM 3.8                    Passed - Normal serum sodium on file in past 12 months     Recent Labs   Lab Test 09/24/19  1015                 Routing refill request to provider for review/approval because:  Labs out of range:  creat        "

## 2019-09-26 NOTE — PROGRESS NOTES
ANTICOAGULATION FOLLOW-UP CLINIC VISIT    Patient Name:  Oscar Terrazas  Date:  2019  Contact Type:  Face to Face    SUBJECTIVE:         OBJECTIVE    INR Protime   Date Value Ref Range Status   2019 4.8 (A) 0.86 - 1.14 Final       ASSESSMENT / PLAN  INR assessment SUPRA    Recheck INR In: 4 DAYS    INR Location Clinic      Anticoagulation Summary  As of 2019    INR goal:   2.0-3.0   TTR:   --   INR used for dosin.8! (2019)   Warfarin maintenance plan:   No maintenance plan   Full warfarin instructions:   : Hold; : 2 mg; : 4 mg; : 4 mg   Plan last modified:   Lilibeth Bear RN (2019)   Next INR check:   2019   Target end date:   Indefinite    Indications    Atrial fibrillation (H) [I48.91]             Anticoagulation Episode Summary     INR check location:       Preferred lab:       Send INR reminders to:   [facility]    Comments:         Anticoagulation Care Providers     Provider Role Specialty Phone number    Jorge Hillman MD [facility] Internal Medicine [phone]            See the Encounter Report to view Anticoagulation Flowsheet and Dosing Calendar (Go to Encounters tab in chart review, and find the Anticoagulation Therapy Visit)    Dosage adjustment made based on physician directed care plan.    INR 4.8 today, Discussed tobias with [facility] pharm D about tobias, pt to lold x 1 day, give 2mg, then 4mg daily x 2 days. This is a signicant decrease per week from 38mg down to 18 mg. Per True:  Patient's CHF fluid/volume load is shifting. Adjusting torsemide and levothyroxine doses.     Pt's daughter called informed of dosing as she does set up meds in strip boxes. Pt and daughter aware Some signs and symptoms of bleeding include: Nose bleed or cut that does not stop bleeding in 10 minutes, bleeding of the gums, vomiting (will look like coffee grounds) or coughing up blood, unusual, easy or large areas of bruising,  red or black  stools, red or orange urine, prolonged or severe headache, pale skin, unusual or constant tiredness.  If you have these please call 911 or seek medical care immediately.     Lilibeth Bear RN

## 2019-09-30 NOTE — PATIENT INSTRUCTIONS
Labs as ordered  See cardiology tomorrow as scheduled   Next INR per [facility]  Reduce Torsemide to 1 tab on odd days and 1/2 tab on even days  Recheck nonfasting kidney lab in 2 weeks  Continue other medications

## 2019-10-04 NOTE — PROGRESS NOTES
ANTICOAGULATION FOLLOW-UP CLINIC VISIT    Patient Name:  Oscar Terrazas  Date:  10/4/2019  Contact Type:  Face to Face    SUBJECTIVE:  Patient Findings     Comments:   The patient was assessed for diet, medication, and activity level changes, missed or extra doses, bruising or bleeding, with no problem findings.          Clinical Outcomes     Comments:   The patient was assessed for diet, medication, and activity level changes, missed or extra doses, bruising or bleeding, with no problem findings.             OBJECTIVE    INR Protime   Date Value Ref Range Status   10/04/2019 3.6 (A) 0.86 - 1.14 Final       ASSESSMENT / PLAN  INR assessment SUPRA    Recheck INR In: 3 DAYS    INR Location Clinic      Anticoagulation Summary  As of 10/4/2019    INR goal:   2.0-3.0   TTR:   0.0 % (4 d)   INR used for dosing:   3.6! (10/4/2019)   Warfarin maintenance plan:   4 mg (2 mg x 2) every Mon, Fri; 2 mg (2 mg x 1) all other days   Full warfarin instructions:   10/4: 4 mg; 10/5: 2 mg; 10/6: 2 mg; Otherwise 4 mg every Mon, Fri; 2 mg all other days   Weekly warfarin total:   18 mg   Plan last modified:   Nilda Duran RN (10/4/2019)   Next INR check:   10/7/2019   Target end date:   Indefinite    Indications    Atrial fibrillation (H) [I48.91]             Anticoagulation Episode Summary     INR check location:       Preferred lab:       Send INR reminders to:   [facility]    Comments:         Anticoagulation Care Providers     Provider Role Specialty Phone number    Jorge Hillman MD [facility] Internal Medicine [phone]            See the Encounter Report to view Anticoagulation Flowsheet and Dosing Calendar (Go to Encounters tab in chart review, and find the Anticoagulation Therapy Visit)        Nilda Duran RN

## 2019-10-07 NOTE — PROGRESS NOTES
ANTICOAGULATION FOLLOW-UP CLINIC VISIT    Patient Name:  Oscar Terrazas  Date:  10/7/2019  Contact Type:  Face to Face    SUBJECTIVE:  Patient Findings     Comments:   The patient was assessed for diet, medication, and activity level changes, missed or extra doses, bruising or bleeding, with no problem findings.          Clinical Outcomes     Comments:   The patient was assessed for diet, medication, and activity level changes, missed or extra doses, bruising or bleeding, with no problem findings.             OBJECTIVE    INR Protime   Date Value Ref Range Status   10/07/2019 3.7 (A) 0.86 - 1.14 Final       ASSESSMENT / PLAN  INR assessment SUPRA    Recheck INR In: 1 WEEK    INR Location Clinic      Anticoagulation Summary  As of 10/7/2019    INR goal:   2.0-3.0   TTR:   0.0 % (1 wk)   INR used for dosing:   3.7! (10/7/2019)   Warfarin maintenance plan:   4 mg (2 mg x 2) every Fri; 2 mg (2 mg x 1) all other days   Full warfarin instructions:   10/7: 1 mg; 10/11: 2 mg; Otherwise 4 mg every Fri; 2 mg all other days   Weekly warfarin total:   16 mg   Plan last modified:   Nilda Duran RN (10/7/2019)   Next INR check:   10/14/2019   Target end date:   Indefinite    Indications    Atrial fibrillation (H) [I48.91]             Anticoagulation Episode Summary     INR check location:       Preferred lab:       Send INR reminders to:   [facility]    Comments:         Anticoagulation Care Providers     Provider Role Specialty Phone number    Jorge Hillman MD [facility] Internal Medicine [phone]            See the Encounter Report to view Anticoagulation Flowsheet and Dosing Calendar (Go to Encounters tab in chart review, and find the Anticoagulation Therapy Visit)        Nilda Duran RN

## 2019-10-09 NOTE — TELEPHONE ENCOUNTER
"Requested Prescriptions   Pending Prescriptions Disp Refills     DILT- MG 24 hr capsule [Pharmacy Med Name: DILTIAZEM XR 120MG CAPSULES (24 HR)] 30 capsule 0     Sig: TAKE ONE CAPSULE BY MOUTH EVERY DAY       Calcium Channel Blockers Protocol  Failed - 10/9/2019 10:27 AM        Failed - Normal serum creatinine on file in past 12 months     Recent Labs   Lab Test 09/24/19  1015   CR 4.72*             Passed - Blood pressure under 140/90 in past 12 months     BP Readings from Last 3 Encounters:   09/24/19 130/58   09/23/19 126/78   09/18/19 111/69                 Passed - Normal ALT in past 12 months     Recent Labs   Lab Test 09/16/19  0534   ALT 20             Passed - Recent (12 mo) or future (30 days) visit within the authorizing provider's specialty     Patient has had an office visit with the authorizing provider or a provider within the authorizing providers department within the previous 12 mos or has a future within next 30 days. See \"Patient Info\" tab in inbasket, or \"Choose Columns\" in Meds & Orders section of the refill encounter.              Passed - Medication is active on med list        Passed - Patient is age 18 or older   Last Written Prescription Date:  9/18/2019  Last Fill Quantity: 30,  # refills: 0   Last office visit: 9/23/2019 with prescribing provider:  Dr. Hillman   Future Office Visit:         Routing refill request to provider for review/approval because:  Labs out of range:  Elevated CR            warfarin ANTICOAGULANT (COUMADIN) 2 MG tablet [Pharmacy Med Name: WARFARIN SOD 2MG TABLETS (PURPLE)] 90 tablet 0     Sig: TAKE 3 TABLETS BY MOUTH ONCE DAILY FOR THE NEXT 2 NIGHTS AND INR CHECKED ON FRIDAY WITH ADJUSTMENT IN DOSE PER CLINIC       Vitamin K Antagonists Failed - 10/9/2019 10:27 AM        Failed - INR is within goal in the past 6 weeks     Confirm INR is within goal in the past 6 weeks.     Recent Labs   Lab Test 10/07/19   INR 3.7*                       Passed - Recent (12 mo) " "or future (30 days) visit within the authorizing provider's specialty     Patient has had an office visit with the authorizing provider or a provider within the authorizing providers department within the previous 12 mos or has a future within next 30 days. See \"Patient Info\" tab in inbasket, or \"Choose Columns\" in Meds & Orders section of the refill encounter.              Passed - Medication is active on med list        Passed - Patient is 18 years of age or older      Last Written Prescription Date:  9/18/2019  Last Fill Quantity: 90,  # refills: 0   Last office visit: 9/23/2019 with prescribing provider:  Dr. Hillman   Future Office Visit:    "

## 2019-10-14 NOTE — PROGRESS NOTES
ANTICOAGULATION FOLLOW-UP CLINIC VISIT    Patient Name:  Oscar Terrazas  Date:  10/14/2019  Contact Type:  Face to Face    SUBJECTIVE:  Patient Findings     Comments:   The patient was assessed for diet, medication, and activity level changes, missed or extra doses, bruising or bleeding, with no problem findings.          Clinical Outcomes     Comments:   The patient was assessed for diet, medication, and activity level changes, missed or extra doses, bruising or bleeding, with no problem findings.             OBJECTIVE    INR Protime   Date Value Ref Range Status   10/14/2019 3.3 (A) 0.86 - 1.14 Final       ASSESSMENT / PLAN  INR assessment SUPRA    Recheck INR In: 10 DAYS    INR Location Clinic      Anticoagulation Summary  As of 10/14/2019    INR goal:   2.0-3.0   TTR:   0.0 % (2 wk)   INR used for dosing:   3.3! (10/14/2019)   Warfarin maintenance plan:   4 mg (2 mg x 2) every Fri; 2 mg (2 mg x 1) all other days   Full warfarin instructions:   10/14: 1 mg; 10/18: 2 mg; 10/21: 1 mg; 10/25: 2 mg; Otherwise 4 mg every Fri; 2 mg all other days   Weekly warfarin total:   16 mg   Plan last modified:   Nilda Duran RN (10/14/2019)   Next INR check:   10/25/2019   Target end date:   Indefinite    Indications    Atrial fibrillation (H) [I48.91]             Anticoagulation Episode Summary     INR check location:       Preferred lab:       Send INR reminders to:   [facility]    Comments:         Anticoagulation Care Providers     Provider Role Specialty Phone number    Jorge Hillman MD [facility] Internal Medicine [phone]            See the Encounter Report to view Anticoagulation Flowsheet and Dosing Calendar (Go to Encounters tab in chart review, and find the Anticoagulation Therapy Visit)        Nilda Duran, RN

## 2019-10-14 NOTE — TELEPHONE ENCOUNTER
[facility] Prior Authorization Team   Phone: [phone]  Fax: [phone]    PA Initiation    Medication: Praluent - initiated  Insurance Company: [employer] - Phone [phone] Fax [phone]  Pharmacy Filling the Rx: [facility] - [place], [place] - [address]  Filling Pharmacy Phone: [phone]  Filling Pharmacy Fax:    Start Date: 10/14/2019

## 2019-10-14 NOTE — TELEPHONE ENCOUNTER
Pharmacy needs a new nx for metoprolol succinate ER (TOPROL-XL) 25 MG 24 hr tablet . Insurance doesn't cover 6/ day of the 25mg tabs.  Please send separate scripts for 25mg and 50mg BID which insurance does cover.

## 2019-10-15 NOTE — TELEPHONE ENCOUNTER
Insurance will only cover the Metoprolol as pt was taking it 1 tab 50 mg twice per day along with 1 tablet 25 mg twice per day . Please enter as one says succinate the new order not covered and other old one says tartrate. . Pt only has four days left.Samantha Stack RN

## 2019-10-15 NOTE — TELEPHONE ENCOUNTER
"Requested Prescriptions   Pending Prescriptions Disp Refills     insulin NPH (HUMULIN N/NOVOLIN N VIAL) 100 UNIT/ML vial 2 vial 11     Si units at  suppertime       Intermediate Acting Insulin Protocol Passed - 10/15/2019  1:37 PM        Passed - Blood pressure less than 140/90 in past 6 months     BP Readings from Last 3 Encounters:   19 130/58   19 126/78   19 111/69                 Passed - LDL on file in past 12 months     Recent Labs   Lab Test 19  0819   LDL 57             Passed - Microalbumin on file in past 12 months     Recent Labs   Lab Test 19  0818   MICROL 872   UMALCR 1,532.51*             Passed - Serum creatinine on file in past 12 months     Recent Labs   Lab Test 19  1015   CR 4.72*             Passed - HgbA1C in past 3 or 6 months     If HgbA1C is 8 or greater, it needs to be on file within the past 3 months.  If less than 8, must be on file within the past 6 months.     Recent Labs   Lab Test 19  1415   A1C 6.3*             Passed - Medication is active on med list        Passed - Patient is age 18 or older        Passed - Recent (6 mo) or future (30 days) visit within the authorizing provider's specialty     Patient had office visit in the last 6 months or has a visit in the next 30 days with authorizing provider or within the authorizing provider's specialty.  See \"Patient Info\" tab in inbasket, or \"Choose Columns\" in Meds & Orders section of the refill encounter.            Routing refill request to provider for review/approval because:  Medication is reported/historical        "

## 2019-10-16 NOTE — TELEPHONE ENCOUNTER
"Requested Prescriptions   Pending Prescriptions Disp Refills     hydrALAZINE (APRESOLINE) 25 MG tablet [Pharmacy Med Name: HYDRALAZINE  25MG TABLETS(ORANGE)] 360 tablet 0     Sig: TAKE 2 TABLETS(50 MG) BY MOUTH TWICE DAILY       Vasodilators Failed - 10/16/2019  9:32 AM        Failed - Medication is active on med list        Passed - Most recent BP less than 140/90 on record     BP Readings from Last 3 Encounters:   09/24/19 130/58   09/23/19 126/78   09/18/19 111/69                 Passed - Most recent encounter is not a hospital encounter. Patient has recent (12 mos) or future (1 mos) visit with authorizing provider's specialty     Patient's most recent encounter is NOT a hospital encounter and has had an office visit in the last 12 months or has a visit in the next 30 days with authorizing provider or within the authorizing provider's specialty.      See \"Patient Info\" tab in inbasket, or \"Choose Columns\" in Meds & Orders section of the refill encounter.      If most recent encounter is a hospital encounter AND the patient does NOT have an appointment scheduled with the authorizing provider or authorizing provider's specialty within the next 30 days, forward refill to authorizing provider for medication review.          Passed - Patient is of age 18 years or older     Last Written Prescription Date:  9/18/2019  Last Fill Quantity: ?,  # refills: ?   Last office visit: 9/23/2019 with prescribing provider:  Jorge Hillman     Future Office Visit:      Routing refill request to provider for review/approval because:  Drug not active on patient's medication list.- stopped at discharge was the reason     Please advise if patient should resume medication.      "

## 2019-10-16 NOTE — TELEPHONE ENCOUNTER
"     levothyroxine (SYNTHROID/LEVOTHROID) 75 MCG tablet [Pharmacy Med Name: LEVOTHYROXINE 0.075MG (75MCG) TABS] 30 tablet 0     Sig: TAKE 1 TABLET BY MOUTH DAILY       Thyroid Protocol Passed - 10/16/2019  9:32 AM        Passed - Patient is 12 years or older        Passed - Recent (12 mo) or future (30 days) visit within the authorizing provider's specialty     Patient has had an office visit with the authorizing provider or a provider within the authorizing providers department within the previous 12 mos or has a future within next 30 days. See \"Patient Info\" tab in inbasket, or \"Choose Columns\" in Meds & Orders section of the refill encounter.              Passed - Medication is active on med list        Passed - Normal TSH on file in past 12 months     Recent Labs   Lab Test 09/16/19  0534   TSH 1.15              "

## 2019-10-16 NOTE — TELEPHONE ENCOUNTER
Triage spoke with [facility] Pharmacist. Insurance will not cover 3 tablets of 25mg dose.     Insurance will need the dose split up versus sending in a PA.     Medication Td'up. PCP please review, edit, add, and sign if appropriate.    Cheyanne HERRERA RN, BSN, PHN

## 2019-10-16 NOTE — TELEPHONE ENCOUNTER
[facility] Prior Authorization Team   Phone: [phone]  Fax: [phone]    Prior Authorization Approval    Authorization Effective Date: 10/16/2019  Authorization Expiration Date: 4/11/2020  Medication: Praluent - Approved  Insurance Company: [employer] - Phone [phone] Fax [phone]  Expected CoPay:$0       Which Pharmacy is filling the prescription (Not needed for infusion/clinic administered): [facility] - [place] [address]  Pharmacy Notified:  yes  Patient Notified:  Yes

## 2019-10-16 NOTE — TELEPHONE ENCOUNTER
Reason for Call:  Medication or medication refill:    Do you use a [facility]?  Name of the pharmacy and phone number for the current request:  [facility] # [phone]    Name of the medication requested: metoprolol pt not receiving from pharmacy    Other request: pt daughter states pharmacy says it was denied by insurance    Can we leave a detailed message on this number? yes    Phone number patient can be reached at: Home number on file [phone] (home)    Best Time: asap    Call taken on 10/16/2019 at 11:22 AM by Helen Thomason

## 2019-10-17 NOTE — TELEPHONE ENCOUNTER
Patient's daughter informed, on CTC. He had been taking metoprolol succinate 75 mg BID but will switch to just once daily in AM now. She said yesterday the pharmacy was telling them it would cost 600 dollars but this could have been before the change sent in by Dr. Hillman. If there are still issues she will call back.

## 2019-10-17 NOTE — TELEPHONE ENCOUNTER
Reviewed hospital notes. Pt had been on  Metoprolol tartrate  That required  BID dosing. Then was changed to Metoprolol XL succinate which is daily per discharge summary but then was ordered   BID  Dosing of the succinate version at discharge.  Pt should be only taking Metoprolol XL succinate once a day. There is not a 75mg  Tab. Pt  Take Metoprolol XL 50mg tab, 1 tab daily in AM along with Metoprolol XL 25mg tab, 1 tab daily in AM so total daily dose is 75mg once a day. RXs faxed to pharmacy. Inform pt's daughter and pt

## 2019-10-17 NOTE — TELEPHONE ENCOUNTER
Correct dosing will be Metiprolol XL 75mg daily as written. Cost issue was likely related to previous lack of coverage at dosing previously written

## 2019-10-21 NOTE — TELEPHONE ENCOUNTER
Prior Authorization Retail Medication Request    Medication/Dose:insulin NPH (HUMULIN N/NOVOLIN N VIAL) 100 UNIT/ML vial   ICD code (if different than what is on RX):  E11.22,N18.3,Z79.4  Previously Tried and Failed:    Rationale:      Insurance Name:  [employer]  Insurance ID:  [id number]      Pharmacy Information (if different than what is on RX)  Name:  [facility]  Phone:  [phone]

## 2019-10-22 NOTE — TELEPHONE ENCOUNTER
Central Prior Authorization Team   Phone: [phone]    Prior Authorization Not Needed per Insurance    Medication: insulin NPH (HUMULIN N/NOVOLIN N VIAL) 100 UNIT/ML vial  Insurance Company: [employer] - Phone [phone] Fax [phone]  Expected CoPay:      Pharmacy Filling the Rx: [id number] [address] AT [address] Notified: Yes  Patient Notified: Yes    Called pharmacy to see which medication needed a PA, pharmacist verified that Novolin N went through insurance. No PA is needed.

## 2019-10-23 NOTE — PROGRESS NOTES
Cardiology Progress Note    Date of Service: 10/23/2019  Patient seen today in follow up of: CORE follow up  Primary cardiologist: Dr. Gibson    HPI:  Oscar Terrazas is a very pleasant 85 year old male with a history of coronary artery disease s/p CABG in 1996 with a LIMA to LAD and SVG to OM. In followup in 2007, he underwent multivessel drug stenting for recurrent angina. He then presented in 2012 with an acute coronary syndrome after his Plavix was stopped for a dental procedure.  At that time his LAD and left main coronary stents were widely patent.  There was one lesion at the end of the stent and the vein graft of the OM that was severe and possibly acutely thrombotic. This was successful stented with a drug eluting stent.  He has remained on Plavix since.  Additionally, he has a history of hypertension, stage V CKD, chronic heart failure with preserved ejection fraction, peripheral vascular disease, and diabetes mellitus.     He was admitted in June of last year with exertional chest discomfort, shortness of breath and an elevated troponin. Given his significant renal dysfunction, repeat coronary angiography was not advised and medical management was pursued.      I met him about a year ago after his hospitalization. He has done fairly well on a regimen of Torsemide 20 mg alternative with 10 mg ever other day. He has had some chronic symptoms of mild stable angina.    Unfortunately, last month he was admitted to [facility] with new onset atrial fibrillation with RVR, NSTEMI, CHF, and worsening renal insufficiency. A repeat echo showed a mild decrease in his LVEF to 40 - 45% with moderate hypokinesis of the basal inferior and inferolateral walls. His prior echo had shown an EF of 55-60%. He was diuresed with a lasix drip and discharged on 20 mg of Torsemide daily. His atrial fibrillation was managed with a rate control strategy. Coronary angiography again was not pursued given his advanced CKD. He has been clear  he has not wanted to pursue dialysis. He was admitted at 205 lbs and discharged at 185 lbs.     I saw him back for follow up one month ago. His weight has plateued near 190 - 195 lbs. His Torsemide had been cut back to 10 mg alternating with 20 mg daily given further worsening of his creatinine by Dr. Hillman. We elected to continue this and follow his weight and symptoms closely. He was discharged with an event monitor to evaluate his rate control with his atrial fibrillation which is still pending at this time.     He is back today for follow up. Fortunately, he has continued to feel fairly well. He denies any increased dyspnea on exertion, orthopnea, or PND. His weight has been stable at home at 190 lbs and is stable here in clinic at 194 lbs. He continues to have some chest pressure with more moderate exertion such as mowing the lawn but is able to do his usual activities without difficulty around the home. He has no palpitations.     There was some issue with his insurance coverage for metoprolol XL and he is now only taking this once a day. Since this change, his blood pressures have been higher in the evenings, often in the 150s systolic.     ASSESSMENT/PLAN:  1.  Coronary artery disease. With remote CABG and previous multivessel stenting.    2.  Mild, ? ischemic cardiomyopathy. With an LVEF of 40 - 45%.  3.  Stage V renal disease.   4.  Hypertension.    5.  Dyslipidemia. On pralautn with good response. Last LDL in April was 57.  6.  Peripheral venous insufficiency with chronic lower extremity edema.  7.  Persistent atrial fibrillation.       Oscar is here today for CORE clinic follow up. He has a history of remote CABG with a non-STEMI last month which was medically managed given his advanced renal insufficiency. He is clear he does not want to pursue dialysis. He was also diagnosed with new atrial fibrillation last month. He has been found to have a mild to moderate cardiomyopathy with an LVEF of 40 - 45%. He  is currently on medical therapy for his cardiac issues with plavix and warfarin ([id number] of 6), PCSK-9 inhibitors as he has been intolerant of statins, and imdur. He is also on diltiazem for rate control. An event monitor was ordered at discharge to evaluate his heart rates which is still pending at the time of this visit. His heart rate is well controlled in clinic today.    He previously was on hydralazine which was stopped during his hospitalization to allow for rate control medications. His blood pressures now have been creeping up to the 150s in the evening. I am going to have him resume hydralazine 25 mg three times daily. He will let us know if his blood pressures remain elevated and this can be increased.     In regards to his volume status, he currently is on only 10 mg of Torsemide alternating with 20 mg daily. His weight is stable in clinic today. He overall appears euvolemic to slightly hypervolemic but is doing well and thus I made no changes to his diuretics today. Labs today show a creatinine of 3.4, GFR of 17, BUN of 56 and normal electrolytes. As noted above, he is clear he does not want to pursue dialysis when that time comes. He has follow up arranged with Dr. Gibson next month. In the meantime, I asked him to call with questions, symptoms, or weight gain.     Orders this Visit:  No orders of the defined types were placed in this encounter.    Orders Placed This Encounter   Medications     hydrALAZINE (APRESOLINE) 25 MG tablet     Sig: Take 1 tablet (25 mg) by mouth 3 times daily     There are no discontinued medications.    CURRENT MEDICATIONS:  Current Outpatient Medications   Medication Sig Dispense Refill     alirocumab (PRALUENT) 150 MG/ML injectable pen Inject 1 mL (150 mg) Subcutaneous every 14 days 6 mL 3     allopurinol (ZYLOPRIM) 100 MG tablet TAKE 2 TABLETS(200 MG) BY MOUTH DAILY 180 tablet 1     blood glucose (NO BRAND SPECIFIED) test strip Use to test blood sugar 3 times daily or  "as directed. 300 strip 3     blood glucose monitoring (NO BRAND SPECIFIED) meter device kit Use to test blood sugar 3 times daily or as directed. 1 kit 1     cetirizine (ZYRTEC) 10 MG tablet TAKE 1 TABLET(10 MG) BY MOUTH EVERY EVENING 90 tablet 3     clopidogrel (PLAVIX) 75 MG tablet TAKE 1 TABLET(75 MG) BY MOUTH DAILY 90 tablet 3     Cod Liver Oil 1000 MG CAPS Take 1 capsule by mouth daily        Cyanocobalamin (VITAMIN B 12) 250 MCG LOZG Take 500 mcg by mouth 2 times daily        DILT- MG 24 hr capsule TAKE ONE CAPSULE BY MOUTH EVERY DAY 90 capsule 3     diphenhydrAMINE-acetaminophen (TYLENOL PM)  MG tablet Take 1 tablet by mouth nightly as needed for sleep       hydrALAZINE (APRESOLINE) 25 MG tablet Take 1 tablet (25 mg) by mouth 3 times daily       insulin NPH (HUMULIN N/NOVOLIN N VIAL) 100 UNIT/ML vial 14 units at  suppertime 2 vial 11     insulin syringe-needle U-100 (BD INSULIN SYRINGE) 29G X 1/2\" 0.5 ML Use one syringe 2 daily or as directed. 200 each 4     isosorbide mononitrate CR (IMDUR) 120 MG 24 HR ER tablet TAKE 1 TABLET BY MOUTH DAILY 90 tablet 3     levothyroxine (SYNTHROID/LEVOTHROID) 75 MCG tablet TAKE 1 TABLET BY MOUTH DAILY 30 tablet 8     metoprolol succinate ER (TOPROL-XL) 25 MG 24 hr tablet 1 tab daily in AM (in addition to metoprolol XL 50mg tab daily) so total  Dose = 75mg daily 90 tablet 3     metoprolol succinate ER (TOPROL-XL) 50 MG 24 hr tablet 1 tab daily in AM (in addition to metoprolol XL 25mg tab daily) so total  Dose = 75mg daily 90 tablet 3     nitroGLYcerin (NITROSTAT) 0.4 MG sublingual tablet For chest pain place 1 tablet under the tongue every 5 minutes for 3 doses. If symptoms persist 5 minutes after 1st dose call 911. 25 tablet 1     order for DME Equipment being ordered: Cane ()  Treatment Diagnosis: Impaired balance, impaired activity tolerance 1 each 0     theophylline (UNIPHYL) 400 MG 24 hr tablet Take 1 tablet (400 mg) by mouth daily 90 tablet 3     thin " (NO BRAND SPECIFIED) lancets Use to test blood sugar 3 times daily or as directed. 300 each 3     torsemide (DEMADEX) 10 MG tablet 1 tab daily in AM  on odd days and 1/2 tab daily in AM on even days (Patient taking differently: 1 tab daily in AM  on even days) 90 tablet 0     torsemide (DEMADEX) 20 MG tablet 1 tab on odd days and 1/2 tab on even days (Patient taking differently: 1 tab on odd days) 30 tablet 0     warfarin ANTICOAGULANT (COUMADIN) 2 MG tablet 4 mg (2 mg x 2) every Fri; 2 mg (2 mg x 1) all other days unless directed otherwise by  tablet 3     hydrALAZINE (APRESOLINE) 25 MG tablet TAKE 2 TABLETS(50 MG) BY MOUTH TWICE DAILY (Patient not taking: Reported on 10/23/2019) 360 tablet 3     ALLERGIES  Allergies   Allergen Reactions     Advair [Fluticasone-Salmeterol]      cough;  insomnia, nightmares     Amlodipine Besylate      edema       Azithromycin GI Disturbance     Clarithromycin      gi     Hydrochlorothiazide      hctz + lisinopril-->inc creat, k+     Lisinopril      lisinopril + hctz --> inc creat, k+     Moxifloxacin Hydrochloride      clostridium diffile colitis     Penicillins      gen swelling     Pravastatin Cramps     Muscle cramps/spasm.  Stopped 2017     Vioxx      edema     PAST MEDICAL HISTORY:  Past Medical History:   Diagnosis Date     Acute myocardial infarction, unspecified site, episode of care unspecified      Allergic rhinitis, cause unspecified      Anemia 3/6/2014     CAD (coronary artery disease)     1996 CABG - LIMA to LAD, SVG to OM, 2007 Cath - KANU to Left main and ostial/prox LAD, 2012 Cath - 80-90% stenosis SVG to OM - KANU placed, EF 50%     CKD (chronic kidney disease) stage 3, GFR 30-59 ml/min (H) 3/9/2014     CTS (carpal tunnel syndrome)     bilateral     Degeneration of cervical intervertebral disc      Diaphragmatic hernia without mention of obstruction or gangrene      Esophageal reflux      Essential hypertension, benign      Hyperlipidemia LDL goal <70       Hypothyroidism      Hypothyroidism, unspecified hypothyroidism type 1/14/2016     IDIOPATHIC CYCLIC EDEMA      Mild persistent asthma      Osteoarthrosis, unspecified whether generalized or localized, unspecified site      Pneumonia, organism unspecified(486)      Proteinuria 5/17/2014     PVD (peripheral vascular disease) (H)      Sensorineural hearing loss, unspecified      Subarachnoid bleed (H)      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, with long-term current use of insulin (H) 6/30/2019     Unspecified hereditary and idiopathic peripheral neuropathy      Venous insufficiency      PAST SURGICAL HISTORY:  Past Surgical History:   Procedure Laterality Date     C NONSPECIFIC PROCEDURE      appendectomy     C NONSPECIFIC PROCEDURE      hemorrhoids     C NONSPECIFIC PROCEDURE      cervical strain     C NONSPECIFIC PROCEDURE      rotator cuff surgery, right shoulder     C NONSPECIFIC PROCEDURE  2008    iol os     CORONARY ARTERY BYPASS  1996    LIMA to LAD, SVG to OM     HEART CATH STENT COR W/WO PTCA  2007    lad, left main cor artery stents/drug eluting     HEART CATH STENT COR W/WO PTCA  2012    80-90% stenosis SVG to OM - KANU placed, EF 50%     NONSPECIFIC PROCEDURE      iol od     FAMILY HISTORY:  Family History   Problem Relation Age of Onset     Lung Cancer Daughter      Family History Negative Mother         broken hip     Jaundice Father      Alcohol/Drug Father      Ovarian Cancer Daughter      Family History Negative Daughter      Family History Negative Son      SOCIAL HISTORY:  Social History     Socioeconomic History     Marital status:      Spouse name: Not on file     Number of children: Not on file     Years of education: Not on file     Highest education level: Not on file   Occupational History     Not on file   Social Needs     Financial resource strain: Not on file     Food insecurity:     Worry: Not on file     Inability: Not on file      Transportation needs:     Medical: Not on file     Non-medical: Not on file   Tobacco Use     Smoking status: Former Smoker     Packs/day: 1.00     Years: 14.00     Pack years: 14.00     Types: Cigarettes     Start date:      Last attempt to quit:      Years since quittin.8     Smokeless tobacco: Never Used   Substance and Sexual Activity     Alcohol use: No     Drug use: No     Sexual activity: Not Currently   Lifestyle     Physical activity:     Days per week: Not on file     Minutes per session: Not on file     Stress: Not on file   Relationships     Social connections:     Talks on phone: Not on file     Gets together: Not on file     Attends Nondenominational service: Not on file     Active member of club or organization: Not on file     Attends meetings of clubs or organizations: Not on file     Relationship status: Not on file     Intimate partner violence:     Fear of current or ex partner: Not on file     Emotionally abused: Not on file     Physically abused: Not on file     Forced sexual activity: Not on file   Other Topics Concern     Parent/sibling w/ CABG, MI or angioplasty before 65F 55M? Not Asked      Service Not Asked     Blood Transfusions Not Asked     Caffeine Concern No     Occupational Exposure Not Asked     Hobby Hazards Not Asked     Sleep Concern Yes     Stress Concern No     Weight Concern No     Special Diet No     Back Care Not Asked     Exercise Yes     Comment: bowling, 5 days week. yard work     Bike Helmet Not Asked     Seat Belt Not Asked     Self-Exams Not Asked   Social History Narrative     Not on file     Review of Systems:  Skin:  Negative     Eyes:  Positive for glasses  ENT:  Negative    Respiratory:  Positive for dyspnea on exertion(Stops activity before becomming SOB)  Cardiovascular:    Positive for;edema;fatigue(if he over does)  Gastroenterology: Negative    Genitourinary:  Negative    Musculoskeletal:  Negative    Neurologic:  Positive for numbness or  tingling of hands  Psychiatric:  Negative    Heme/Lymph/Imm:  Positive for allergies  Endocrine:  Positive for thyroid disorder;diabetes     Physical Exam:  Vitals: BP (!) 165/69   Pulse 73   Ht 1.829 m (6')   Wt 88 kg (194 lb)   BMI 26.31 kg/m     Wt Readings from Last 4 Encounters:   10/23/19 88 kg (194 lb)   09/24/19 88.2 kg (194 lb 6.4 oz)   09/23/19 86.6 kg (191 lb)   09/17/19 84.2 kg (185 lb 9.6 oz)     GEN: well nourished, in no acute distress.  HEENT:  Pupils equal, round. Sclerae nonicteric.   NECK: JVP 8 - 10 cm at 30 degrees.  C/V:  IRR, no murmur, rub or gallop.   RESP: Respirations are unlabored. Clear to auscultation bilaterally without wheezing, rales, or rhonchi.  GI: Abdomen soft, nontender.  EXTREM: No significant LE edema. Compression stockings are in place.  NEURO: Alert and oriented, cooperative.  SKIN: Warm and dry.     Recent Lab Results:  LIPID RESULTS:  Lab Results   Component Value Date    CHOL 124 04/17/2019    HDL 34 (L) 04/17/2019    LDL 57 04/17/2019    TRIG 166 (H) 04/17/2019    CHOLHDLRATIO 3.8 09/11/2014     LIVER ENZYME RESULTS:  Lab Results   Component Value Date    AST 15 09/16/2019    ALT 20 09/16/2019     CBC RESULTS:  Lab Results   Component Value Date    WBC 11.8 (H) 09/23/2019    RBC 3.07 (L) 09/23/2019    HGB 9.2 (L) 09/24/2019    HCT 29.3 (L) 09/23/2019    MCV 95 09/23/2019    MCH 30.3 09/23/2019    MCHC 31.7 09/23/2019    RDW 15.3 (H) 09/23/2019     09/23/2019     BMP RESULTS:  Lab Results   Component Value Date     10/23/2019    POTASSIUM 4.1 10/23/2019    CHLORIDE 108 (H) 10/23/2019    CO2 23 10/23/2019    ANIONGAP 15.1 10/23/2019     (H) 10/23/2019    BUN 56 (H) 10/23/2019    CR 3.44 (H) 10/23/2019    GFRESTIMATED 17 (L) 10/23/2019    GFRESTBLACK 21 (L) 10/23/2019    LEV 10.0 10/23/2019      A1C RESULTS:  Lab Results   Component Value Date    A1C 6.3 (H) 06/21/2019     INR RESULTS:  Lab Results   Component Value Date    INR 3.3 (A) 10/14/2019     INR 3.7 (A) 10/07/2019    INR 1.28 (H) 09/18/2019    INR 1.25 (H) 09/17/2019       New/Pertinent imaging results since last visit:  None    Marie Gil PA-C  [facility]

## 2019-10-23 NOTE — LETTER
10/23/2019    Jorge Hillman MD  [address] [facility] [address]    RE: Oscar Terrazas       Dear Colleague,    I had the pleasure of seeing Oscar Terrazas in the [facility].      Cardiology Progress Note    Date of Service: 10/23/2019  Patient seen today in follow up of: CORE follow up  Primary cardiologist: Dr. Gibson    HPI:  Oscar Terrazas is a very pleasant 85 year old male with a history of coronary artery disease s/p CABG in 1996 with a LIMA to LAD and SVG to OM. In followup in 2007, he underwent multivessel drug stenting for recurrent angina. He then presented in 2012 with an acute coronary syndrome after his Plavix was stopped for a dental procedure.  At that time his LAD and left main coronary stents were widely patent.  There was one lesion at the end of the stent and the vein graft of the OM that was severe and possibly acutely thrombotic. This was successful stented with a drug eluting stent.  He has remained on Plavix since.  Additionally, he has a history of hypertension, stage V CKD, chronic heart failure with preserved ejection fraction, peripheral vascular disease, and diabetes mellitus.     He was admitted in June of last year with exertional chest discomfort, shortness of breath and an elevated troponin. Given his significant renal dysfunction, repeat coronary angiography was not advised and medical management was pursued.      I met him about a year ago after his hospitalization. He has done fairly well on a regimen of Torsemide 20 mg alternative with 10 mg ever other day. He has had some chronic symptoms of mild stable angina.    Unfortunately, last month he was admitted to [facility] with new onset atrial fibrillation with RVR, NSTEMI, CHF, and worsening renal insufficiency. A repeat echo showed a mild decrease in his LVEF to 40 - 45% with moderate hypokinesis of the basal inferior and inferolateral walls. His prior echo had shown an EF of 55-60%. He was  diuresed with a lasix drip and discharged on 20 mg of Torsemide daily. His atrial fibrillation was managed with a rate control strategy. Coronary angiography again was not pursued given his advanced CKD. He has been clear he has not wanted to pursue dialysis. He was admitted at 205 lbs and discharged at 185 lbs.     I saw him back for follow up one month ago. His weight has plateued near 190 - 195 lbs. His Torsemide had been cut back to 10 mg alternating with 20 mg daily given further worsening of his creatinine by Dr. Hillman. We elected to continue this and follow his weight and symptoms closely. He was discharged with an event monitor to evaluate his rate control with his atrial fibrillation which is still pending at this time.     He is back today for follow up. Fortunately, he has continued to feel fairly well. He denies any increased dyspnea on exertion, orthopnea, or PND. His weight has been stable at home at 190 lbs and is stable here in clinic at 194 lbs. He continues to have some chest pressure with more moderate exertion such as mowing the lawn but is able to do his usual activities without difficulty around the home. He has no palpitations.     There was some issue with his insurance coverage for metoprolol XL and he is now only taking this once a day. Since this change, his blood pressures have been higher in the evenings, often in the 150s systolic.     ASSESSMENT/PLAN:  1.  Coronary artery disease. With remote CABG and previous multivessel stenting.    2.  Mild,  ? ischemic cardiomyopathy. With an LVEF of 40 - 45%.  3.  Stage V renal disease.   4.  Hypertension.    5.  Dyslipidemia. On pralautn with good response. Last LDL in April was 57.  6.  Peripheral venous insufficiency with chronic lower extremity edema.  7.  Persistent atrial fibrillation.       Oscar is here today for CORE clinic follow up. He has a history of remote CABG with a non-STEMI last month which was medically managed given his  advanced renal insufficiency. He is clear he does not want to pursue dialysis. He was also diagnosed with new atrial fibrillation last month. He has been found to have a mild to moderate cardiomyopathy with an LVEF of 40 - 45%. He is currently on medical therapy for his cardiac issues with plavix and warfarin ([id number] of 6), PCSK-9 inhibitors as he has been intolerant of statins, and imdur. He is also on diltiazem for rate control. An event monitor was ordered at discharge to evaluate his heart rates which is still pending at the time of this visit. His heart rate is well controlled in clinic today.    He previously was on hydralazine which was stopped during his hospitalization to allow for rate control medications. His blood pressures now have been creeping up to the 150s in the evening. I am going to have him resume hydralazine 25 mg three times daily. He will let us know if his blood pressures remain elevated and this can be increased.     In regards to his volume status, he currently is on only 10 mg of Torsemide alternating with 20 mg daily. His weight is stable in clinic today. He overall appears euvolemic to slightly hypervolemic but is doing well and thus I made no changes to his diuretics today. Labs today show a creatinine of 3.4, GFR of 17, BUN of 56 and normal electrolytes. As noted above, he is clear he does not want to pursue dialysis when that time comes. He has follow up arranged with Dr. Gibson next month. In the meantime, I asked him to call with questions, symptoms, or weight gain.     Orders this Visit:  No orders of the defined types were placed in this encounter.    Orders Placed This Encounter   Medications     hydrALAZINE (APRESOLINE) 25 MG tablet     Sig: Take 1 tablet (25 mg) by mouth 3 times daily     There are no discontinued medications.    CURRENT MEDICATIONS:  Current Outpatient Medications   Medication Sig Dispense Refill     alirocumab (PRALUENT) 150 MG/ML injectable pen Inject  "1 mL (150 mg) Subcutaneous every 14 days 6 mL 3     allopurinol (ZYLOPRIM) 100 MG tablet TAKE 2 TABLETS(200 MG) BY MOUTH DAILY 180 tablet 1     blood glucose (NO BRAND SPECIFIED) test strip Use to test blood sugar 3 times daily or as directed. 300 strip 3     blood glucose monitoring (NO BRAND SPECIFIED) meter device kit Use to test blood sugar 3 times daily or as directed. 1 kit 1     cetirizine (ZYRTEC) 10 MG tablet TAKE 1 TABLET(10 MG) BY MOUTH EVERY EVENING 90 tablet 3     clopidogrel (PLAVIX) 75 MG tablet TAKE 1 TABLET(75 MG) BY MOUTH DAILY 90 tablet 3     Cod Liver Oil 1000 MG CAPS Take 1 capsule by mouth daily        Cyanocobalamin (VITAMIN B 12) 250 MCG LOZG Take 500 mcg by mouth 2 times daily        DILT- MG 24 hr capsule TAKE ONE CAPSULE BY MOUTH EVERY DAY 90 capsule 3     diphenhydrAMINE-acetaminophen (TYLENOL PM)  MG tablet Take 1 tablet by mouth nightly as needed for sleep       hydrALAZINE (APRESOLINE) 25 MG tablet Take 1 tablet (25 mg) by mouth 3 times daily       insulin NPH (HUMULIN N/NOVOLIN N VIAL) 100 UNIT/ML vial 14 units at  suppertime 2 vial 11     insulin syringe-needle U-100 (BD INSULIN SYRINGE) 29G X 1/2\" 0.5 ML Use one syringe 2 daily or as directed. 200 each 4     isosorbide mononitrate CR (IMDUR) 120 MG 24 HR ER tablet TAKE 1 TABLET BY MOUTH DAILY 90 tablet 3     levothyroxine (SYNTHROID/LEVOTHROID) 75 MCG tablet TAKE 1 TABLET BY MOUTH DAILY 30 tablet 8     metoprolol succinate ER (TOPROL-XL) 25 MG 24 hr tablet 1 tab daily in AM (in addition to metoprolol XL 50mg tab daily) so total  Dose = 75mg daily 90 tablet 3     metoprolol succinate ER (TOPROL-XL) 50 MG 24 hr tablet 1 tab daily in AM (in addition to metoprolol XL 25mg tab daily) so total  Dose = 75mg daily 90 tablet 3     nitroGLYcerin (NITROSTAT) 0.4 MG sublingual tablet For chest pain place 1 tablet under the tongue every 5 minutes for 3 doses. If symptoms persist 5 minutes after 1st dose call 911. 25 tablet 1     " order for DME Equipment being ordered: Cane ()  Treatment Diagnosis: Impaired balance, impaired activity tolerance 1 each 0     theophylline (UNIPHYL) 400 MG 24 hr tablet Take 1 tablet (400 mg) by mouth daily 90 tablet 3     thin (NO BRAND SPECIFIED) lancets Use to test blood sugar 3 times daily or as directed. 300 each 3     torsemide (DEMADEX) 10 MG tablet 1 tab daily in AM  on odd days and 1/2 tab daily in AM on even days (Patient taking differently: 1 tab daily in AM  on even days) 90 tablet 0     torsemide (DEMADEX) 20 MG tablet 1 tab on odd days and 1/2 tab on even days (Patient taking differently: 1 tab on odd days) 30 tablet 0     warfarin ANTICOAGULANT (COUMADIN) 2 MG tablet 4 mg (2 mg x 2) every Fri; 2 mg (2 mg x 1) all other days unless directed otherwise by  tablet 3     hydrALAZINE (APRESOLINE) 25 MG tablet TAKE 2 TABLETS(50 MG) BY MOUTH TWICE DAILY (Patient not taking: Reported on 10/23/2019) 360 tablet 3     ALLERGIES  Allergies   Allergen Reactions     Advair [Fluticasone-Salmeterol]      cough;  insomnia, nightmares     Amlodipine Besylate      edema       Azithromycin GI Disturbance     Clarithromycin      gi     Hydrochlorothiazide      hctz + lisinopril-->inc creat, k+     Lisinopril      lisinopril + hctz --> inc creat, k+     Moxifloxacin Hydrochloride      clostridium diffile colitis     Penicillins      gen swelling     Pravastatin Cramps     Muscle cramps/spasm.  Stopped 2017     Vioxx      edema     PAST MEDICAL HISTORY:  Past Medical History:   Diagnosis Date     Acute myocardial infarction, unspecified site, episode of care unspecified      Allergic rhinitis, cause unspecified      Anemia 3/6/2014     CAD (coronary artery disease)     1996 CABG - LIMA to LAD, SVG to OM, 2007 Cath - KANU to Left main and ostial/prox LAD, 2012 Cath - 80-90% stenosis SVG to OM - KANU placed, EF 50%     CKD (chronic kidney disease) stage 3, GFR 30-59 ml/min (H) 3/9/2014     CTS (carpal tunnel  syndrome)     bilateral     Degeneration of cervical intervertebral disc      Diaphragmatic hernia without mention of obstruction or gangrene      Esophageal reflux      Essential hypertension, benign      Hyperlipidemia LDL goal <70      Hypothyroidism      Hypothyroidism, unspecified hypothyroidism type 1/14/2016     IDIOPATHIC CYCLIC EDEMA      Mild persistent asthma      Osteoarthrosis, unspecified whether generalized or localized, unspecified site      Pneumonia, organism unspecified(486)      Proteinuria 5/17/2014     PVD (peripheral vascular disease) (H)      Sensorineural hearing loss, unspecified      Subarachnoid bleed (H)      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, with long-term current use of insulin (H) 6/30/2019     Unspecified hereditary and idiopathic peripheral neuropathy      Venous insufficiency      PAST SURGICAL HISTORY:  Past Surgical History:   Procedure Laterality Date     C NONSPECIFIC PROCEDURE      appendectomy     C NONSPECIFIC PROCEDURE      hemorrhoids     C NONSPECIFIC PROCEDURE      cervical strain     C NONSPECIFIC PROCEDURE      rotator cuff surgery, right shoulder     C NONSPECIFIC PROCEDURE  2008    iol os     CORONARY ARTERY BYPASS  1996    LIMA to LAD, SVG to OM     HEART CATH STENT COR W/WO PTCA  2007    lad, left main cor artery stents/drug eluting     HEART CATH STENT COR W/WO PTCA  2012    80-90% stenosis SVG to OM - KANU placed, EF 50%     NONSPECIFIC PROCEDURE      iol od     FAMILY HISTORY:  Family History   Problem Relation Age of Onset     Lung Cancer Daughter      Family History Negative Mother         broken hip     Jaundice Father      Alcohol/Drug Father      Ovarian Cancer Daughter      Family History Negative Daughter      Family History Negative Son      SOCIAL HISTORY:  Social History     Socioeconomic History     Marital status:      Spouse name: Not on file     Number of children: Not on file     Years of  education: Not on file     Highest education level: Not on file   Occupational History     Not on file   Social Needs     Financial resource strain: Not on file     Food insecurity:     Worry: Not on file     Inability: Not on file     Transportation needs:     Medical: Not on file     Non-medical: Not on file   Tobacco Use     Smoking status: Former Smoker     Packs/day: 1.00     Years: 14.00     Pack years: 14.00     Types: Cigarettes     Start date:      Last attempt to quit:      Years since quittin.8     Smokeless tobacco: Never Used   Substance and Sexual Activity     Alcohol use: No     Drug use: No     Sexual activity: Not Currently   Lifestyle     Physical activity:     Days per week: Not on file     Minutes per session: Not on file     Stress: Not on file   Relationships     Social connections:     Talks on phone: Not on file     Gets together: Not on file     Attends Restoration service: Not on file     Active member of club or organization: Not on file     Attends meetings of clubs or organizations: Not on file     Relationship status: Not on file     Intimate partner violence:     Fear of current or ex partner: Not on file     Emotionally abused: Not on file     Physically abused: Not on file     Forced sexual activity: Not on file   Other Topics Concern     Parent/sibling w/ CABG, MI or angioplasty before 65F 55M? Not Asked      Service Not Asked     Blood Transfusions Not Asked     Caffeine Concern No     Occupational Exposure Not Asked     Hobby Hazards Not Asked     Sleep Concern Yes     Stress Concern No     Weight Concern No     Special Diet No     Back Care Not Asked     Exercise Yes     Comment: bowling, 5 days week. yard work     Bike Helmet Not Asked     Seat Belt Not Asked     Self-Exams Not Asked   Social History Narrative     Not on file     Review of Systems:  Skin:  Negative     Eyes:  Positive for glasses  ENT:  Negative    Respiratory:  Positive for dyspnea on  exertion(Stops activity before becomming SOB)  Cardiovascular:    Positive for;edema;fatigue(if he over does)  Gastroenterology: Negative    Genitourinary:  Negative    Musculoskeletal:  Negative    Neurologic:  Positive for numbness or tingling of hands  Psychiatric:  Negative    Heme/Lymph/Imm:  Positive for allergies  Endocrine:  Positive for thyroid disorder;diabetes     Physical Exam:  Vitals: BP (!) 165/69   Pulse 73   Ht 1.829 m (6')   Wt 88 kg (194 lb)   BMI 26.31 kg/m      Wt Readings from Last 4 Encounters:   10/23/19 88 kg (194 lb)   09/24/19 88.2 kg (194 lb 6.4 oz)   09/23/19 86.6 kg (191 lb)   09/17/19 84.2 kg (185 lb 9.6 oz)     GEN: well nourished, in no acute distress.  HEENT:  Pupils equal, round. Sclerae nonicteric.   NECK: JVP 8 - 10 cm at 30 degrees.  C/V:  IRR, no murmur, rub or gallop.   RESP: Respirations are unlabored. Clear to auscultation bilaterally without wheezing, rales, or rhonchi.  GI: Abdomen soft, nontender.  EXTREM: No significant LE edema. Compression stockings are in place.  NEURO: Alert and oriented, cooperative.  SKIN: Warm and dry.     Recent Lab Results:  LIPID RESULTS:  Lab Results   Component Value Date    CHOL 124 04/17/2019    HDL 34 (L) 04/17/2019    LDL 57 04/17/2019    TRIG 166 (H) 04/17/2019    CHOLHDLRATIO 3.8 09/11/2014     LIVER ENZYME RESULTS:  Lab Results   Component Value Date    AST 15 09/16/2019    ALT 20 09/16/2019     CBC RESULTS:  Lab Results   Component Value Date    WBC 11.8 (H) 09/23/2019    RBC 3.07 (L) 09/23/2019    HGB 9.2 (L) 09/24/2019    HCT 29.3 (L) 09/23/2019    MCV 95 09/23/2019    MCH 30.3 09/23/2019    MCHC 31.7 09/23/2019    RDW 15.3 (H) 09/23/2019     09/23/2019     BMP RESULTS:  Lab Results   Component Value Date     10/23/2019    POTASSIUM 4.1 10/23/2019    CHLORIDE 108 (H) 10/23/2019    CO2 23 10/23/2019    ANIONGAP 15.1 10/23/2019     (H) 10/23/2019    BUN 56 (H) 10/23/2019    CR 3.44 (H) 10/23/2019     GFRESTIMATED 17 (L) 10/23/2019    GFRESTBLACK 21 (L) 10/23/2019    LEV 10.0 10/23/2019      A1C RESULTS:  Lab Results   Component Value Date    A1C 6.3 (H) 06/21/2019     INR RESULTS:  Lab Results   Component Value Date    INR 3.3 (A) 10/14/2019    INR 3.7 (A) 10/07/2019    INR 1.28 (H) 09/18/2019    INR 1.25 (H) 09/17/2019       New/Pertinent imaging results since last visit:  None        Thank you for allowing me to participate in the care of your patient.    Sincerely,     Marie Gil PA-C     [facility]

## 2019-10-25 NOTE — PROGRESS NOTES
ANTICOAGULATION FOLLOW-UP CLINIC VISIT    Patient Name:  Oscar Terrazas  Date:  10/25/2019  Contact Type:  Face to Face    SUBJECTIVE:  Patient Findings     Comments:   The patient was assessed for diet, medication, and activity level changes, missed or extra doses, bruising or bleeding, with no problem findings.            Clinical Outcomes     Negatives:   Major bleeding event, Thromboembolic event, Anticoagulation-related hospital admission, Anticoagulation-related ED visit, Anticoagulation-related fatality    Comments:   The patient was assessed for diet, medication, and activity level changes, missed or extra doses, bruising or bleeding, with no problem findings.               OBJECTIVE    INR Protime   Date Value Ref Range Status   10/25/2019 2.0 (A) 0.86 - 1.14 Final       ASSESSMENT / PLAN  No question data found.  Anticoagulation Summary  As of 10/25/2019    INR goal:   2.0-3.0   TTR:   33.8 % (3.6 wk)   INR used for dosin.0 (10/25/2019)   Warfarin maintenance plan:   1 mg (2 mg x 0.5) every Mon; 2 mg (2 mg x 1) all other days   Full warfarin instructions:   10/25: 2 mg; Otherwise 1 mg every Mon; 2 mg all other days   Weekly warfarin total:   13 mg   Plan last modified:   Nisreen Jay RN (10/25/2019)   Next INR check:   2019   Target end date:   Indefinite    Indications    Atrial fibrillation (H) [I48.91]             Anticoagulation Episode Summary     INR check location:       Preferred lab:       Send INR reminders to:   [facility]    Comments:         Anticoagulation Care Providers     Provider Role Specialty Phone number    Jorge Hillman MD [facility] Internal Medicine [phone]            See the Encounter Report to view Anticoagulation Flowsheet and Dosing Calendar (Go to Encounters tab in chart review, and find the Anticoagulation Therapy Visit)        Nisreen Jay RN

## 2019-10-25 NOTE — PATIENT INSTRUCTIONS
Anticoagulation Clinic:  Please call [phone] with any problems or questions regarding your Warfarin therapy.

## 2019-11-01 NOTE — PROGRESS NOTES
ANTICOAGULATION FOLLOW-UP CLINIC VISIT    Patient Name:  Oscar Terrazas  Date:  2019  Contact Type:  Face to Face    SUBJECTIVE:  Patient Findings     Comments:   The patient was assessed for diet, medication, and activity level changes, missed or extra doses, bruising or bleeding, with no problem findings.          Clinical Outcomes     Negatives:   Major bleeding event, Thromboembolic event, Anticoagulation-related hospital admission, Anticoagulation-related ED visit, Anticoagulation-related fatality    Comments:   The patient was assessed for diet, medication, and activity level changes, missed or extra doses, bruising or bleeding, with no problem findings.             OBJECTIVE    INR Protime   Date Value Ref Range Status   2019 2.4 (A) 0.86 - 1.14 Final       ASSESSMENT / PLAN  INR assessment THER    Recheck INR In: 2 WEEKS    INR Location Clinic      Anticoagulation Summary  As of 2019    INR goal:   2.0-3.0   TTR:   48.3 % (1.1 mo)   INR used for dosin.4 (2019)   Warfarin maintenance plan:   1 mg (2 mg x 0.5) every Mon; 2 mg (2 mg x 1) all other days   Full warfarin instructions:   1 mg every Mon; 2 mg all other days   Weekly warfarin total:   13 mg   Plan last modified:   Nisreen Jay RN (10/25/2019)   Next INR check:   11/15/2019   Target end date:   Indefinite    Indications    Atrial fibrillation (H) [I48.91]             Anticoagulation Episode Summary     INR check location:       Preferred lab:       [facility]    Comments:         Anticoagulation Care Providers     Provider Role Specialty Phone number    Jorge Hillman MD [facility] Internal Medicine [phone]            See the Encounter Report to view Anticoagulation Flowsheet and Dosing Calendar (Go to Encounters tab in chart review, and find the Anticoagulation Therapy Visit)        Nolvia Sierra RN

## 2019-11-15 NOTE — PROGRESS NOTES
ANTICOAGULATION FOLLOW-UP CLINIC VISIT    Patient Name:  Oscar Terrazas  Date:  11/15/2019  Contact Type:  Face to Face    SUBJECTIVE:  Patient Findings     Comments:   The patient was assessed for diet, medication, and activity level changes, missed or extra doses, bruising or bleeding, with no problem findings.          Clinical Outcomes     Comments:   The patient was assessed for diet, medication, and activity level changes, missed or extra doses, bruising or bleeding, with no problem findings.             OBJECTIVE    INR Protime   Date Value Ref Range Status   11/15/2019 2.3 (A) 0.86 - 1.14 Final       ASSESSMENT / PLAN  INR assessment THER    Recheck INR In: 3 WEEKS    INR Location Clinic      Anticoagulation Summary  As of 11/15/2019    INR goal:   2.0-3.0   TTR:   64.0 % (1.5 mo)   INR used for dosin.3 (11/15/2019)   Warfarin maintenance plan:   1 mg (2 mg x 0.5) every Mon; 2 mg (2 mg x 1) all other days   Full warfarin instructions:   1 mg every Mon; 2 mg all other days   Weekly warfarin total:   13 mg   Plan last modified:   Nisreen Jay RN (10/25/2019)   Next INR check:   2019   Target end date:   Indefinite    Indications    Atrial fibrillation (H) [I48.91]             Anticoagulation Episode Summary     INR check location:       Preferred lab:       Send INR reminders to:   [facility]    Comments:         Anticoagulation Care Providers     Provider Role Specialty Phone number    Jorge Hillman MD [facility] Internal Medicine [phone]            See the Encounter Report to view Anticoagulation Flowsheet and Dosing Calendar (Go to Encounters tab in chart review, and find the Anticoagulation Therapy Visit)        Nilda Duran RN

## 2019-11-21 NOTE — LETTER
11/21/2019    Jorge Hillman MD  [address] [facility] [address]    RE: Oscar Terrazas       Dear Colleague,    I had the pleasure of seeing Oscar Terrazas in the [facility].    HPI and Plan:   This 85-year-old gentleman is seen, accompanied by his daughter, in followup of his history of a non-ST elevation MI complicated by acute diastolic heart failure episode in March 2018, with background history of coronary disease, CABG, subsequent multivessel stenting, stage IV chronic renal insufficiency, and resistant hypertension .    More recently was hospitalized with another heart failure episode September found to have atrial fibrillation with rapid ventricular response.  There was evidence of a non-STEMI.  In 2018 he had no angina and a non-STEMI given his renal function we deferred angiography.  His ejection fraction was a bit lower than this recent visit.  Again angiography was deferred because of his severe renal insufficiency.  He does have coronary disease with a prior CABG many years ago and multiple coronary stents since that time.    He is now in persistent atrial fibrillation.  He was able to be stabilized from a heart failure standpoint, rate control, and is now on anticoagulation and tolerating this.  He has stage V chronic renal insufficiency.  Recently his diuretics were decreased because of exacerbation of renal function and today's creatinine is returned back to his baseline of 3.4    Over the last 2 months he statesweight is mainly stable.  He has chronic right lower extremity edema which is moderate and wears pressure stockings.  Only trace on the left and this has not changed.  He denies dyspnea on exertion, orthopnea, PND.  Activity is limited by his hip and knee arthritis such that he has stopped doing much bowling.  But this is not because of shortness of breath.  He is having no ischemic chest discomfort or other chest discomfort.  Denies palpitations,  dizziness, syncope.  No bleeding issues.  No falls.  He was intolerant of statins.  I started him on Praluent and he is tolerating this with excellent result with LDL dropping from 150-160 to 57.  He brings in notes from his insurance that they are no longer going to cover Praluent but will cover Repatha so I will change him.     If he walks further distances than then [place] parking [place] does get some chest tightness which always goes away quickly with rest.  This pattern is not changed in the last 3 months.  He is not having rest or nocturnal episodes of chest tightness or other chest discomfort or with mild activity.      For many years his LDL is well controlled on statin therapy but in 2017 he was developing severe stiffness and pain particular in his lower extremities to the point where he can barely get up and walk in the morning and often his daughter would have to massage his legs and use a heating pad and after a few hours he could get around.  This resolved when he stopped his statin.  He slowly went back on a smaller dose, currently 10 mg twice a week, but did not tolerate statins again.      He denies orthopnea or PND.  He is wearing his pressure stockings every day and has only trace edema.   He has no palpitations, dizziness, syncope,  and denies claudication symptoms.    His chronic venous insufficiency, left greater than right (previous saphenous vein harvesting from the left) is currently pretty well controlled with diuretics and pressure stockings.  He is aware of the issue with sodium restriction and states he is following this and his daughter confirms this.      This is a gentleman with a CABG in 1996. In 2007, he required multivessel drug-eluting stenting for recurrent angina. In 2012, he presented with an acute coronary syndrome after his Plavix was stopped for a dental procedure. At that time, his LAD and left main coronary stents were widely patent. There was a lesion  at one end of the stent in the vein graft to the OM that was severe, and possibly acute thrombus, and that was successfully stented with a drug-eluting stent. He has been maintained on Plavix since then.       He also has diabetes mellitus and severe renal insufficiency.  For this year his GFR has oscillated between 15 and 18 mL's per minute and creatinine usually around 3.4.  He continues to state he would not consider dialysis.  He saw his wife suffer previously on dialysis.    He has been intolerant to ACE/ARB due to hyperkalemia.  Ejection fraction was normal in March 2018      Exam -full exam below.  In summary:   Blood pressure-136/60 Pulse 64.  Weight on our [id number] pounds,  less at at home.  Similar to early September but up 5 pounds from a month ago  Cardiac-, regular rhythm, a soft systolic ejection murmur without gallop.  No JVD or HJR  Lungs-faint basilar crackles otherwise clear with fair air movement  Extremities-trace focal right lower extremity edema.  There is 2+ on the left up to the knee.  This is worse than earlier this year .  Pressure stockings are in place    Labs today show creatinine back to 3.4.  Electrolytes normal.    mpression/plan  1-coronary artery disease with non-STEMI possibly related to acute diastolic heart failure episode 3/2018,  another in 2019 likely related to rapid atrial fibrillation, recent EF mildly decreased, which is new.   No recurrence of those episodes/symptoms after  rate control on current regimen. He has stable but generally not very limiting anginal pattern at this time.  I would continue to pursue medical therapy.  I think it is likely would end up on dialysis if he had an angiogram.  I did discuss with him and his daughter that even if he had a heart attack again I would have a very high threshold for emergent or urgent angiography because of the risk of dialysis and this would have to be carefully analyzed versus the risk of any acute ischemia.     2-chronic  diastolic heart failure episode, fairly stable with continued careful diuresis. - He will continue to weigh himself daily and let us know if there is a change in symptoms or weight.  I do not want his weight going up anymore.  He is not always compliant with his full doses of diuretics and sodium restriction and this was rediscussed as being important.      3-coronary artery disease. Status post CABG and subsequent multivessel stenting.  Now on anticoagulation so only on single agent antiplatelet, clopidogrel.  No bleeding issues currently.     4-dyslipidemia, intolerant of statin -well controlled with Praluent.  We will changes to Repatha due to his formulary changes.  Prescription sent to the specialty pharmacy and nursing notified     5-significant peripheral venous insufficiency of the lower extremities.   now using pressure stockings reliably with good result.  These will be continued.     6-hypertension, resistant.    He is monitoring at home.  He is taking extra hydralazine for blood pressures over 140 systolic, I would ideally like to see them lower but is difficult to get him to take all his medications     7-stage IV chronic renal insufficiency, with diabetes and proteinuria.  His hemoglobin has been in the mid 9 range and if it stays there he may not need any erythropoietin replacement at this time.    As long is not gaining more weight and has no heart failure symptoms we can continue current regimen and follow-up in 4 months in [facility] clinic    Orders Placed This Encounter   Procedures     Basic metabolic panel     Follow-Up with [facility] - [facility] visit       Orders Placed This Encounter   Medications     hydrALAZINE (APRESOLINE) 50 MG tablet     Sig: Take 25 mg by mouth 3 times daily     evolocumab (REPATHA) 140 MG/ML prefilled autoinjector     Sig: Inject 1 mL (140 mg) Subcutaneous every 14 days     Dispense:  4 each     Refill:  6     torsemide (DEMADEX) 10 MG tablet     Si tab daily in AM  on odd  days and 1/2 tab daily in AM on even days     Dispense:  90 tablet     Refill:  3       Medications Discontinued During This Encounter   Medication Reason     hydrALAZINE (APRESOLINE) 25 MG tablet Dose adjustment     hydrALAZINE (APRESOLINE) 25 MG tablet Dose adjustment     alirocumab (PRALUENT) 150 MG/ML injectable pen Cost/Formulary change     torsemide (DEMADEX) 10 MG tablet Reorder         Encounter Diagnoses   Name Primary?     Chronic diastolic congestive heart failure (H) Yes     Persistent atrial fibrillation      Coronary artery disease of native artery of native heart with stable angina pectoris (H)      Hyperlipidemia LDL goal <70      Chronic congestive heart failure, unspecified heart failure type (H)        CURRENT MEDICATIONS:  Current Outpatient Medications   Medication Sig Dispense Refill     allopurinol (ZYLOPRIM) 100 MG tablet TAKE 2 TABLETS(200 MG) BY MOUTH DAILY 180 tablet 1     blood glucose (NO BRAND SPECIFIED) test strip Use to test blood sugar 3 times daily or as directed. 300 strip 3     blood glucose monitoring (NO BRAND SPECIFIED) meter device kit Use to test blood sugar 3 times daily or as directed. 1 kit 1     cetirizine (ZYRTEC) 10 MG tablet TAKE 1 TABLET(10 MG) BY MOUTH EVERY EVENING 90 tablet 3     clopidogrel (PLAVIX) 75 MG tablet TAKE 1 TABLET(75 MG) BY MOUTH DAILY 90 tablet 3     Cod Liver Oil 1000 MG CAPS Take 1 capsule by mouth daily        Cyanocobalamin (VITAMIN B 12) 250 MCG LOZG Take 500 mcg by mouth 2 times daily        DILT- MG 24 hr capsule TAKE ONE CAPSULE BY MOUTH EVERY DAY 90 capsule 3     diphenhydrAMINE-acetaminophen (TYLENOL PM)  MG tablet Take 1 tablet by mouth nightly as needed for sleep       evolocumab (REPATHA) 140 MG/ML prefilled autoinjector Inject 1 mL (140 mg) Subcutaneous every 14 days 4 each 6     hydrALAZINE (APRESOLINE) 50 MG tablet Take 25 mg by mouth 3 times daily       insulin NPH (HUMULIN N/NOVOLIN N VIAL) 100 UNIT/ML vial 14 units at   "suppertime 2 vial 11     insulin syringe-needle U-100 (BD INSULIN SYRINGE) 29G X 1/2\" 0.5 ML Use one syringe 2 daily or as directed. 200 each 4     isosorbide mononitrate CR (IMDUR) 120 MG 24 HR ER tablet TAKE 1 TABLET BY MOUTH DAILY 90 tablet 3     levothyroxine (SYNTHROID/LEVOTHROID) 75 MCG tablet TAKE 1 TABLET BY MOUTH DAILY 30 tablet 8     metoprolol succinate ER (TOPROL-XL) 25 MG 24 hr tablet 1 tab daily in AM (in addition to metoprolol XL 50mg tab daily) so total  Dose = 75mg daily 90 tablet 3     metoprolol succinate ER (TOPROL-XL) 50 MG 24 hr tablet 1 tab daily in AM (in addition to metoprolol XL 25mg tab daily) so total  Dose = 75mg daily 90 tablet 3     nitroGLYcerin (NITROSTAT) 0.4 MG sublingual tablet For chest pain place 1 tablet under the tongue every 5 minutes for 3 doses. If symptoms persist 5 minutes after 1st dose call 911. 25 tablet 1     order for DME Equipment being ordered: Cane ()  Treatment Diagnosis: Impaired balance, impaired activity tolerance 1 each 0     theophylline (UNIPHYL) 400 MG 24 hr tablet Take 1 tablet (400 mg) by mouth daily 90 tablet 3     thin (NO BRAND SPECIFIED) lancets Use to test blood sugar 3 times daily or as directed. 300 each 3     torsemide (DEMADEX) 10 MG tablet 1 tab daily in AM  on odd days and 1/2 tab daily in AM on even days 90 tablet 3     torsemide (DEMADEX) 20 MG tablet 1 tab on odd days and 1/2 tab on even days (Patient taking differently: 1 tab on odd days) 30 tablet 0     warfarin ANTICOAGULANT (COUMADIN) 2 MG tablet 4 mg (2 mg x 2) every Fri; 2 mg (2 mg x 1) all other days unless directed otherwise by  tablet 3       ALLERGIES     Allergies   Allergen Reactions     Advair [Fluticasone-Salmeterol]      cough;  insomnia, nightmares     Amlodipine Besylate      edema       Azithromycin GI Disturbance     Clarithromycin      gi     Hydrochlorothiazide      hctz + lisinopril-->inc creat, k+     Lisinopril      lisinopril + hctz --> inc creat, k+     " Moxifloxacin Hydrochloride      clostridium diffile colitis     Penicillins      gen swelling     Pravastatin Cramps     Muscle cramps/spasm.  Stopped 2017     Vioxx      edema       PAST MEDICAL HISTORY:  Past Medical History:   Diagnosis Date     Acute myocardial infarction, unspecified site, episode of care unspecified      Allergic rhinitis, cause unspecified      Anemia 3/6/2014     CAD (coronary artery disease)     1996 CABG - LIMA to LAD, SVG to OM, 2007 Cath - KANU to Left main and ostial/prox LAD, 2012 Cath - 80-90% stenosis SVG to OM - KANU placed, EF 50%     CKD (chronic kidney disease) stage 3, GFR 30-59 ml/min (H) 3/9/2014     CTS (carpal tunnel syndrome)     bilateral     Degeneration of cervical intervertebral disc      Diaphragmatic hernia without mention of obstruction or gangrene      Esophageal reflux      Essential hypertension, benign      Hyperlipidemia LDL goal <70      Hypothyroidism      Hypothyroidism, unspecified hypothyroidism type 1/14/2016     IDIOPATHIC CYCLIC EDEMA      Mild persistent asthma      Osteoarthrosis, unspecified whether generalized or localized, unspecified site      Pneumonia, organism unspecified(486)      Proteinuria 5/17/2014     PVD (peripheral vascular disease) (H)      Sensorineural hearing loss, unspecified      Subarachnoid bleed (H)      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, with long-term current use of insulin (H) 6/30/2019     Unspecified hereditary and idiopathic peripheral neuropathy      Venous insufficiency        PAST SURGICAL HISTORY:  Past Surgical History:   Procedure Laterality Date     C NONSPECIFIC PROCEDURE      appendectomy     C NONSPECIFIC PROCEDURE      hemorrhoids     C NONSPECIFIC PROCEDURE      cervical strain     C NONSPECIFIC PROCEDURE      rotator cuff surgery, right shoulder     C NONSPECIFIC PROCEDURE  2008    iol os     CORONARY ARTERY BYPASS  1996    LIMA to LAD, SVG to OM     HEART CATH  STENT COR W/WO PTCA      lad, left main cor artery stents/drug eluting     HEART CATH STENT COR W/WO PTCA  2012    80-90% stenosis SVG to OM - KANU placed, EF 50%     NONSPECIFIC PROCEDURE      iol od       FAMILY HISTORY:  Family History   Problem Relation Age of Onset     Lung Cancer Daughter      Family History Negative Mother         broken hip     Jaundice Father      Alcohol/Drug Father      Ovarian Cancer Daughter      Family History Negative Daughter      Family History Negative Son        SOCIAL HISTORY:  Social History     Socioeconomic History     Marital status:      Spouse name: None     Number of children: None     Years of education: None     Highest education level: None   Occupational History     None   Social Needs     Financial resource strain: None     Food insecurity:     Worry: None     Inability: None     Transportation needs:     Medical: None     Non-medical: None   Tobacco Use     Smoking status: Former Smoker     Packs/day: 1.00     Years: 14.00     Pack years: 14.00     Types: Cigarettes     Start date:      Last attempt to quit:      Years since quittin.9     Smokeless tobacco: Never Used   Substance and Sexual Activity     Alcohol use: No     Drug use: No     Sexual activity: Not Currently   Lifestyle     Physical activity:     Days per week: None     Minutes per session: None     Stress: None   Relationships     Social connections:     Talks on phone: None     Gets together: None     Attends [employer] witness service: None     Active member of club or organization: None     Attends meetings of clubs or organizations: None     Relationship status: None     Intimate partner violence:     Fear of current or ex partner: None     Emotionally abused: None     Physically abused: None     Forced sexual activity: None   Other Topics Concern     Parent/sibling w/ CABG, MI or angioplasty before 65F 55M? Not Asked      Service Not Asked     Blood Transfusions Not Asked      Caffeine Concern No     Occupational Exposure Not Asked     Hobby Hazards Not Asked     Sleep Concern Yes     Stress Concern No     Weight Concern No     Special Diet No     Back Care Not Asked     Exercise Yes     Comment: bowling, 5 days week. yard work     Bike Helmet Not Asked     Seat Belt Not Asked     Self-Exams Not Asked   Social History Narrative     None       Review of Systems:  Skin:  Negative       Eyes:  Positive for glasses reading  ENT:  Negative      Respiratory:  Positive for dyspnea on exertion(Stops activity before becomming SOB) occ   Cardiovascular:    Positive for;edema;fatigue(if he over does) compression socks on both legs  Gastroenterology: Positive for constipation    Genitourinary:  Negative      Musculoskeletal:  Positive for arthritis;joint pain    Neurologic:  Positive for numbness or tingling of hands carpal tunnel  Psychiatric:  Negative      Heme/Lymph/Imm:  Positive for allergies    Endocrine:  Positive for thyroid disorder;diabetes      Physical Exam:  Vitals: /60   Pulse 80   Ht 1.829 m (6')   Wt 90.4 kg (199 lb 6.4 oz)   BMI 27.04 kg/m       Constitutional:  cooperative, alert and oriented, well developed, well nourished, in no acute distress        Skin:  warm and dry to the touch, no apparent skin lesions or masses noted          Head:  normocephalic, no masses or lesions        Eyes:  pupils equal and round;sclera white;EOMS intact        Lymph:      ENT:  no pallor or cyanosis        Neck:  JVP normal;carotid pulses are full and equal bilaterally        Respiratory:  healed median sternotomy scar fine rales;basal rales       Cardiac: normal S1 and S2;apical impulse not displaced irregularly irregular rhythm distant heart sounds            not assessed this visit                                        GI:  abdomen soft surgical scars      Extremities and Muscular Skeletal:    stasis pigmentation   RLE edema;trace;pitting LLE edema;pitting;2+ pressure stckings in  place    Neurological:  no gross motor deficits        Psych:  affect appropriate, oriented to time, person and place                        Thank you for allowing me to participate in the care of your patient.    Sincerely,     Sebastien Gibson MD     [facility]

## 2019-11-21 NOTE — PROGRESS NOTES
HPI and Plan:   This 85-year-old gentleman is seen, accompanied by his daughter, in followup of his history of a non-ST elevation MI complicated by acute diastolic heart failure episode in March 2018, with background history of coronary disease, CABG, subsequent multivessel stenting, stage IV chronic renal insufficiency, and resistant hypertension .    More recently was hospitalized with another heart failure episode September found to have atrial fibrillation with rapid ventricular response.  There was evidence of a non-STEMI.  In 2018 he had no angina and a non-STEMI given his renal function we deferred angiography.  His ejection fraction was a bit lower than this recent visit.  Again angiography was deferred because of his severe renal insufficiency.  He does have coronary disease with a prior CABG many years ago and multiple coronary stents since that time.    He is now in persistent atrial fibrillation.  He was able to be stabilized from a heart failure standpoint, rate control, and is now on anticoagulation and tolerating this.  He has stage V chronic renal insufficiency.  Recently his diuretics were decreased because of exacerbation of renal function and today's creatinine is returned back to his baseline of 3.4    Over the last 2 months he statesweight is mainly stable.  He has chronic right lower extremity edema which is moderate and wears pressure stockings.  Only trace on the left and this has not changed.  He denies dyspnea on exertion, orthopnea, PND.  Activity is limited by his hip and knee arthritis such that he has stopped doing much bowling.  But this is not because of shortness of breath.  He is having no ischemic chest discomfort or other chest discomfort.  Denies palpitations, dizziness, syncope.  No bleeding issues.  No falls.  He was intolerant of statins.  I started him on Praluent and he is tolerating this with excellent result with LDL dropping from 150-160 to 57.  He brings in notes from  his insurance that they are no longer going to cover Praluent but will cover Repatha so I will change him.     If he walks further distances than then from the parking lot to the [facility] alley does get some chest tightness which always goes away quickly with rest.  This pattern is not changed in the last 3 months.  He is not having rest or nocturnal episodes of chest tightness or other chest discomfort or with mild activity.      For many years his LDL is well controlled on statin therapy but in 2017 he was developing severe stiffness and pain particular in his lower extremities to the point where he can barely get up and walk in the morning and often his daughter would have to massage his legs and use a heating pad and after a few hours he could get around.  This resolved when he stopped his statin.  He slowly went back on a smaller dose, currently 10 mg twice a week, but did not tolerate statins again.      He denies orthopnea or PND.  He is wearing his pressure stockings every day and has only trace edema.   He has no palpitations, dizziness, syncope,  and denies claudication symptoms.    His chronic venous insufficiency, left greater than right (previous saphenous vein harvesting from the left) is currently pretty well controlled with diuretics and pressure stockings.  He is aware of the issue with sodium restriction and states he is following this and his daughter confirms this.      This is a gentleman with a CABG in 1996. In 2007, he required multivessel drug-eluting stenting for recurrent angina. In 2012, he presented with an acute coronary syndrome after his Plavix was stopped for a dental procedure. At that time, his LAD and left main coronary stents were widely patent. There was a lesion at one end of the stent in the vein graft to the OM that was severe, and possibly acute thrombus, and that was successfully stented with a drug-eluting stent. He has been maintained on Plavix since then.       He also has  diabetes mellitus and severe renal insufficiency.  For this year his GFR has oscillated between 15 and 18 mL's per minute and creatinine usually around 3.4.  He continues to state he would not consider dialysis.  He saw his wife suffer previously on dialysis.    He has been intolerant to ACE/ARB due to hyperkalemia.  Ejection fraction was normal in March 2018      Exam -full exam below.  In summary:   Blood pressure-136/60 Pulse 64.  Weight on our [id number] pounds, less at at home.  Similar to early September but up 5 pounds from a month ago  Cardiac-, regular rhythm, a soft systolic ejection murmur without gallop.  No JVD or HJR  Lungs-faint basilar crackles otherwise clear with fair air movement  Extremities-trace focal right lower extremity edema.  There is 2+ on the left up to the knee.  This is worse than earlier this year .  Pressure stockings are in place    Labs today show creatinine back to 3.4.  Electrolytes normal.    mpression/plan  1-coronary artery disease with non-STEMI possibly related to acute diastolic heart failure episode 3/2018, another in 2019 likely related to rapid atrial fibrillation, recent EF mildly decreased, which is new.   No recurrence of those episodes/symptoms after rate control on current regimen. He has stable but generally not very limiting anginal pattern at this time.  I would continue to pursue medical therapy.  I think it is likely would end up on dialysis if he had an angiogram.  I did discuss with him and his daughter that even if he had a heart attack again I would have a very high threshold for emergent or urgent angiography because of the risk of dialysis and this would have to be carefully analyzed versus the risk of any acute ischemia.     2-chronic diastolic heart failure episode, fairly stable with continued careful diuresis. - He will continue to weigh himself daily and let us know if there is a change in symptoms or weight.  I do not want his weight going up  anymore.  He is not always compliant with his full doses of diuretics and sodium restriction and this was rediscussed as being important.      3-coronary artery disease. Status post CABG and subsequent multivessel stenting.  Now on anticoagulation so only on single agent antiplatelet, clopidogrel.  No bleeding issues currently.     4-dyslipidemia, intolerant of statin -well controlled with Praluent.  We will changes to Repatha due to his formulary changes.  Prescription sent to the specialty pharmacy and nursing notified     5-significant peripheral venous insufficiency of the lower extremities.   now using pressure stockings reliably with good result.  These will be continued.     6-hypertension, resistant.   He is monitoring at home.  He is taking extra hydralazine for blood pressures over 140 systolic, I would ideally like to see them lower but is difficult to get him to take all his medications     7-stage IV chronic renal insufficiency, with diabetes and proteinuria.  His hemoglobin has been in the mid 9 range and if it stays there he may not need any erythropoietin replacement at this time.    As long is not gaining more weight and has no heart failure symptoms we can continue current regimen and follow-up in 4 months in [facility] clinic    Orders Placed This Encounter   Procedures     Basic metabolic panel     [facility]-Up with [facility] visit       Orders Placed This Encounter   Medications     hydrALAZINE (APRESOLINE) 50 MG tablet     Sig: Take 25 mg by mouth 3 times daily     evolocumab (REPATHA) 140 MG/ML prefilled autoinjector     Sig: Inject 1 mL (140 mg) Subcutaneous every 14 days     Dispense:  4 each     Refill:  6     torsemide (DEMADEX) 10 MG tablet     Si tab daily in AM  on odd days and 1/2 tab daily in AM on even days     Dispense:  90 tablet     Refill:  3       Medications Discontinued During This Encounter   Medication Reason     hydrALAZINE (APRESOLINE) 25 MG tablet Dose adjustment      "hydrALAZINE (APRESOLINE) 25 MG tablet Dose adjustment     alirocumab (PRALUENT) 150 MG/ML injectable pen Cost/Formulary change     torsemide (DEMADEX) 10 MG tablet Reorder         Encounter Diagnoses   Name Primary?     Chronic diastolic congestive heart failure (H) Yes     Persistent atrial fibrillation      Coronary artery disease of native artery of native heart with stable angina pectoris (H)      Hyperlipidemia LDL goal <70      Chronic congestive heart failure, unspecified heart failure type (H)        CURRENT MEDICATIONS:  Current Outpatient Medications   Medication Sig Dispense Refill     allopurinol (ZYLOPRIM) 100 MG tablet TAKE 2 TABLETS(200 MG) BY MOUTH DAILY 180 tablet 1     blood glucose (NO BRAND SPECIFIED) test strip Use to test blood sugar 3 times daily or as directed. 300 strip 3     blood glucose monitoring (NO BRAND SPECIFIED) meter device kit Use to test blood sugar 3 times daily or as directed. 1 kit 1     cetirizine (ZYRTEC) 10 MG tablet TAKE 1 TABLET(10 MG) BY MOUTH EVERY EVENING 90 tablet 3     clopidogrel (PLAVIX) 75 MG tablet TAKE 1 TABLET(75 MG) BY MOUTH DAILY 90 tablet 3     Cod Liver Oil 1000 MG CAPS Take 1 capsule by mouth daily        Cyanocobalamin (VITAMIN B 12) 250 MCG LOZG Take 500 mcg by mouth 2 times daily        DILT- MG 24 hr capsule TAKE ONE CAPSULE BY MOUTH EVERY DAY 90 capsule 3     diphenhydrAMINE-acetaminophen (TYLENOL PM)  MG tablet Take 1 tablet by mouth nightly as needed for sleep       evolocumab (REPATHA) 140 MG/ML prefilled autoinjector Inject 1 mL (140 mg) Subcutaneous every 14 days 4 each 6     hydrALAZINE (APRESOLINE) 50 MG tablet Take 25 mg by mouth 3 times daily       insulin NPH (HUMULIN N/NOVOLIN N VIAL) 100 UNIT/ML vial 14 units at  suppertime 2 vial 11     insulin syringe-needle U-100 (BD INSULIN SYRINGE) 29G X 1/2\" 0.5 ML Use one syringe 2 daily or as directed. 200 each 4     isosorbide mononitrate CR (IMDUR) 120 MG 24 HR ER tablet TAKE 1 TABLET " BY MOUTH DAILY 90 tablet 3     levothyroxine (SYNTHROID/LEVOTHROID) 75 MCG tablet TAKE 1 TABLET BY MOUTH DAILY 30 tablet 8     metoprolol succinate ER (TOPROL-XL) 25 MG 24 hr tablet 1 tab daily in AM (in addition to metoprolol XL 50mg tab daily) so total  Dose = 75mg daily 90 tablet 3     metoprolol succinate ER (TOPROL-XL) 50 MG 24 hr tablet 1 tab daily in AM (in addition to metoprolol XL 25mg tab daily) so total  Dose = 75mg daily 90 tablet 3     nitroGLYcerin (NITROSTAT) 0.4 MG sublingual tablet For chest pain place 1 tablet under the tongue every 5 minutes for 3 doses. If symptoms persist 5 minutes after 1st dose call 911. 25 tablet 1     order for DME Equipment being ordered: Cane ()  Treatment Diagnosis: Impaired balance, impaired activity tolerance 1 each 0     theophylline (UNIPHYL) 400 MG 24 hr tablet Take 1 tablet (400 mg) by mouth daily 90 tablet 3     thin (NO BRAND SPECIFIED) lancets Use to test blood sugar 3 times daily or as directed. 300 each 3     torsemide (DEMADEX) 10 MG tablet 1 tab daily in AM  on odd days and 1/2 tab daily in AM on even days 90 tablet 3     torsemide (DEMADEX) 20 MG tablet 1 tab on odd days and 1/2 tab on even days (Patient taking differently: 1 tab on odd days) 30 tablet 0     warfarin ANTICOAGULANT (COUMADIN) 2 MG tablet 4 mg (2 mg x 2) every Fri; 2 mg (2 mg x 1) all other days unless directed otherwise by  tablet 3       ALLERGIES     Allergies   Allergen Reactions     Advair [Fluticasone-Salmeterol]      cough;  insomnia, nightmares     Amlodipine Besylate      edema       Azithromycin GI Disturbance     Clarithromycin      gi     Hydrochlorothiazide      hctz + lisinopril-->inc creat, k+     Lisinopril      lisinopril + hctz --> inc creat, k+     Moxifloxacin Hydrochloride      clostridium diffile colitis     Penicillins      gen swelling     Pravastatin Cramps     Muscle cramps/spasm.  Stopped 2017     Vioxx      edema       PAST MEDICAL HISTORY:  Past Medical  History:   Diagnosis Date     Acute myocardial infarction, unspecified site, episode of care unspecified      Allergic rhinitis, cause unspecified      Anemia 3/6/2014     CAD (coronary artery disease)     1996 CABG - LIMA to LAD, SVG to OM, 2007 Cath - KANU to Left main and ostial/prox LAD, 2012 Cath - 80-90% stenosis SVG to OM - KANU placed, EF 50%     CKD (chronic kidney disease) stage 3, GFR 30-59 ml/min (H) 3/9/2014     CTS (carpal tunnel syndrome)     bilateral     Degeneration of cervical intervertebral disc      Diaphragmatic hernia without mention of obstruction or gangrene      Esophageal reflux      Essential hypertension, benign      Hyperlipidemia LDL goal <70      Hypothyroidism      Hypothyroidism, unspecified hypothyroidism type 1/14/2016     IDIOPATHIC CYCLIC EDEMA      Mild persistent asthma      Osteoarthrosis, unspecified whether generalized or localized, unspecified site      Pneumonia, organism unspecified(486)      Proteinuria 5/17/2014     PVD (peripheral vascular disease) (H)      Sensorineural hearing loss, unspecified      Subarachnoid bleed (H)      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, with long-term current use of insulin (H) 6/30/2019     Unspecified hereditary and idiopathic peripheral neuropathy      Venous insufficiency        PAST SURGICAL HISTORY:  Past Surgical History:   Procedure Laterality Date     C NONSPECIFIC PROCEDURE      appendectomy     C NONSPECIFIC PROCEDURE      hemorrhoids     C NONSPECIFIC PROCEDURE      cervical strain     C NONSPECIFIC PROCEDURE      rotator cuff surgery, right shoulder     C NONSPECIFIC PROCEDURE  2008    iol os     CORONARY ARTERY BYPASS  1996    LIMA to LAD, SVG to OM     HEART CATH STENT COR W/WO PTCA  2007    lad, left main cor artery stents/drug eluting     HEART CATH STENT COR W/WO PTCA  2012    80-90% stenosis SVG to OM - KANU placed, EF 50%     NONSPECIFIC PROCEDURE      iol od       FAMILY  HISTORY:  Family History   Problem Relation Age of Onset     Lung Cancer Daughter      Family History Negative Mother         broken hip     Jaundice Father      Alcohol/Drug Father      Ovarian Cancer Daughter      Family History Negative Daughter      Family History Negative Son        SOCIAL HISTORY:  Social History     Socioeconomic History     Marital status:      Spouse name: None     Number of children: None     Years of education: None     Highest education level: None   Occupational History     None   Social Needs     Financial resource strain: None     Food insecurity:     Worry: None     Inability: None     Transportation needs:     Medical: None     Non-medical: None   Tobacco Use     Smoking status: Former Smoker     Packs/day: 1.00     Years: 14.00     Pack years: 14.00     Types: Cigarettes     Start date:      Last attempt to quit:      Years since quittin.9     Smokeless tobacco: Never Used   Substance and Sexual Activity     Alcohol use: No     Drug use: No     Sexual activity: Not Currently   Lifestyle     Physical activity:     Days per week: None     Minutes per session: None     Stress: None   Relationships     Social connections:     Talks on phone: None     Gets together: None     Attends Judaism service: None     Active member of club or organization: None     Attends meetings of clubs or organizations: None     Relationship status: None     Intimate partner violence:     Fear of current or ex partner: None     Emotionally abused: None     Physically abused: None     Forced sexual activity: None   Other Topics Concern     Parent/sibling w/ CABG, MI or angioplasty before 65F 55M? Not Asked      Service Not Asked     Blood Transfusions Not Asked     Caffeine Concern No     Occupational Exposure Not Asked     Hobby Hazards Not Asked     Sleep Concern Yes     Stress Concern No     Weight Concern No     Special Diet No     Back Care Not Asked     Exercise Yes      Comment: bowling, 5 days week. yard work     Bike Helmet Not Asked     Seat Belt Not Asked     Self-Exams Not Asked   Social History Narrative     None       Review of Systems:  Skin:  Negative       Eyes:  Positive for glasses reading  ENT:  Negative      Respiratory:  Positive for dyspnea on exertion(Stops activity before becomming SOB) occ   Cardiovascular:    Positive for;edema;fatigue(if he over does) compression socks on both legs  Gastroenterology: Positive for constipation    Genitourinary:  Negative      Musculoskeletal:  Positive for arthritis;joint pain    Neurologic:  Positive for numbness or tingling of hands carpal tunnel  Psychiatric:  Negative      Heme/Lymph/Imm:  Positive for allergies    Endocrine:  Positive for thyroid disorder;diabetes      Physical Exam:  Vitals: /60   Pulse 80   Ht 1.829 m (6')   Wt 90.4 kg (199 lb 6.4 oz)   BMI 27.04 kg/m      Constitutional:  cooperative, alert and oriented, well developed, well nourished, in no acute distress        Skin:  warm and dry to the touch, no apparent skin lesions or masses noted          Head:  normocephalic, no masses or lesions        Eyes:  pupils equal and round;sclera white;EOMS intact        Lymph:      ENT:  no pallor or cyanosis        Neck:  JVP normal;carotid pulses are full and equal bilaterally        Respiratory:  healed median sternotomy scar fine rales;basal rales       Cardiac: normal S1 and S2;apical impulse not displaced irregularly irregular rhythm distant heart sounds            not assessed this visit                                        GI:  abdomen soft surgical scars      Extremities and Muscular Skeletal:    stasis pigmentation   RLE edema;trace;pitting LLE edema;pitting;2+ pressure stckings in place    Neurological:  no gross motor deficits        Psych:  affect appropriate, oriented to time, person and place        EDGARD Gil PA-C  [id number]  [place] [address]

## 2019-11-21 NOTE — LETTER
11/21/2019    Jorge Hillman MD  [address] [facility] [address]    RE: Oscar Terrazas       Dear Colleague,    I had the pleasure of seeing Oscar Terrazas in the [facility].    HPI and Plan:   This 85-year-old gentleman is seen, accompanied by his daughter, in followup of his history of a non-ST elevation MI complicated by acute diastolic heart failure episode in March 2018, with background history of coronary disease, CABG, subsequent multivessel stenting, stage IV chronic renal insufficiency, and resistant hypertension .    More recently was hospitalized with another heart failure episode September found to have atrial fibrillation with rapid ventricular response.  There was evidence of a non-STEMI.  In 2018 he had no angina and a non-STEMI given his renal function we deferred angiography.  His ejection fraction was a bit lower than this recent visit.  Again angiography was deferred because of his severe renal insufficiency.  He does have coronary disease with a prior CABG many years ago and multiple coronary stents since that time.    He is now in persistent atrial fibrillation.  He was able to be stabilized from a heart failure standpoint, rate control, and is now on anticoagulation and tolerating this.  He has stage V chronic renal insufficiency.  Recently his diuretics were decreased because of exacerbation of renal function and today's creatinine is returned back to his baseline of 3.4    Over the last 2 months he statesweight is mainly stable.  He has chronic right lower extremity edema which is moderate and wears pressure stockings.  Only trace on the left and this has not changed.  He denies dyspnea on exertion, orthopnea, PND.  Activity is limited by his hip and knee arthritis such that he has stopped doing much bowling.  But this is not because of shortness of breath.  He is having no ischemic chest discomfort or other chest discomfort.  Denies palpitations,  dizziness, syncope.  No bleeding issues.  No falls.  He was intolerant of statins.  I started him on Praluent and he is tolerating this with excellent result with LDL dropping from 150-160 to 57.  He brings in notes from his insurance that they are no longer going to cover Praluent but will cover Repatha so I will change him.     If he walks further distances than then from [place] parking [place] [email or web address] [place] does get some chest tightness which always goes away quickly with rest.  This pattern is not changed in the last 3 months.  He is not having rest or nocturnal episodes of chest tightness or other chest discomfort or with mild activity.      For many years his LDL is well controlled on statin therapy but in 2017 he was developing severe stiffness and pain particular in his lower extremities to the point where he can barely get up and walk in the morning and often his daughter would have to massage his legs and use a heating pad and after a few hours he could get around.  This resolved when he stopped his statin.  He slowly went back on a smaller dose, currently 10 mg twice a week, but did not tolerate statins again.      He denies orthopnea or PND.  He is wearing his pressure stockings every day and has only trace edema.   He has no palpitations, dizziness, syncope,  and denies claudication symptoms.    His chronic venous insufficiency, left greater than right (previous saphenous vein harvesting from the left) is currently pretty well controlled with diuretics and pressure stockings.  He is aware of the issue with sodium restriction and states he is following this and his daughter confirms this.      This is a gentleman with a CABG in 1996. In 2007, he required multivessel drug-eluting stenting for recurrent angina. In 2012, he presented with an acute coronary syndrome after his Plavix was stopped for a dental procedure. At that time, his LAD and left main coronary stents were widely patent. There was a lesion  at one end of the stent in the vein graft to the OM that was severe, and possibly acute thrombus, and that was successfully stented with a drug-eluting stent. He has been maintained on Plavix since then.       He also has diabetes mellitus and severe renal insufficiency.  For this year his GFR has oscillated between 15 and 18 mL's per minute and creatinine usually around 3.4.  He continues to state he would not consider dialysis.  He saw his wife suffer previously on dialysis.    He has been intolerant to ACE/ARB due to hyperkalemia.  Ejection fraction was normal in March 2018      Exam -full exam below.  In summary:   Blood pressure-136/60 Pulse 64.  Weight on our [id number] pounds,  less at at home.  Similar to early September but up 5 pounds from a month ago  Cardiac-, regular rhythm, a soft systolic ejection murmur without gallop.  No JVD or HJR  Lungs-faint basilar crackles otherwise clear with fair air movement  Extremities-trace focal right lower extremity edema.  There is 2+ on the left up to the knee.  This is worse than earlier this year .  Pressure stockings are in place    Labs today show creatinine back to 3.4.  Electrolytes normal.    mpression/plan  1-coronary artery disease with non-STEMI possibly related to acute diastolic heart failure episode 3/2018,  another in 2019 likely related to rapid atrial fibrillation, recent EF mildly decreased, which is new.   No recurrence of those episodes/symptoms after  rate control on current regimen. He has stable but generally not very limiting anginal pattern at this time.  I would continue to pursue medical therapy.  I think it is likely would end up on dialysis if he had an angiogram.  I did discuss with him and his daughter that even if he had a heart attack again I would have a very high threshold for emergent or urgent angiography because of the risk of dialysis and this would have to be carefully analyzed versus the risk of any acute ischemia.     2-chronic  diastolic heart failure episode, fairly stable with continued careful diuresis. - He will continue to weigh himself daily and let us know if there is a change in symptoms or weight.  I do not want his weight going up anymore.  He is not always compliant with his full doses of diuretics and sodium restriction and this was rediscussed as being important.      3-coronary artery disease. Status post CABG and subsequent multivessel stenting.  Now on anticoagulation so only on single agent antiplatelet, clopidogrel.  No bleeding issues currently.     4-dyslipidemia, intolerant of statin -well controlled with Praluent.  We will changes to Repatha due to his formulary changes.  Prescription sent to the specialty pharmacy and nursing notified     5-significant peripheral venous insufficiency of the lower extremities.   now using pressure stockings reliably with good result.  These will be continued.     6-hypertension, resistant.    He is monitoring at home.  He is taking extra hydralazine for blood pressures over 140 systolic, I would ideally like to see them lower but is difficult to get him to take all his medications     7-stage IV chronic renal insufficiency, with diabetes and proteinuria.  His hemoglobin has been in the mid 9 range and if it stays there he may not need any erythropoietin replacement at this time.    As long is not gaining more weight and has no heart failure symptoms we can continue current regimen and follow-up in 4 months in core clinic    Orders Placed This Encounter   Procedures     Basic metabolic panel     Follow-Up with [facility] - ALVARO visit       Orders Placed This Encounter   Medications     hydrALAZINE (APRESOLINE) 50 MG tablet     Sig: Take 25 mg by mouth 3 times daily     evolocumab (REPATHA) 140 MG/ML prefilled autoinjector     Sig: Inject 1 mL (140 mg) Subcutaneous every 14 days     Dispense:  4 each     Refill:  6     torsemide (DEMADEX) 10 MG tablet     Si tab daily in AM  on odd  days and 1/2 tab daily in AM on even days     Dispense:  90 tablet     Refill:  3       Medications Discontinued During This Encounter   Medication Reason     hydrALAZINE (APRESOLINE) 25 MG tablet Dose adjustment     hydrALAZINE (APRESOLINE) 25 MG tablet Dose adjustment     alirocumab (PRALUENT) 150 MG/ML injectable pen Cost/Formulary change     torsemide (DEMADEX) 10 MG tablet Reorder         Encounter Diagnoses   Name Primary?     Chronic diastolic congestive heart failure (H) Yes     Persistent atrial fibrillation      Coronary artery disease of native artery of native heart with stable angina pectoris (H)      Hyperlipidemia LDL goal <70      Chronic congestive heart failure, unspecified heart failure type (H)        CURRENT MEDICATIONS:  Current Outpatient Medications   Medication Sig Dispense Refill     allopurinol (ZYLOPRIM) 100 MG tablet TAKE 2 TABLETS(200 MG) BY MOUTH DAILY 180 tablet 1     blood glucose (NO BRAND SPECIFIED) test strip Use to test blood sugar 3 times daily or as directed. 300 strip 3     blood glucose monitoring (NO BRAND SPECIFIED) meter device kit Use to test blood sugar 3 times daily or as directed. 1 kit 1     cetirizine (ZYRTEC) 10 MG tablet TAKE 1 TABLET(10 MG) BY MOUTH EVERY EVENING 90 tablet 3     clopidogrel (PLAVIX) 75 MG tablet TAKE 1 TABLET(75 MG) BY MOUTH DAILY 90 tablet 3     Cod Liver Oil 1000 MG CAPS Take 1 capsule by mouth daily        Cyanocobalamin (VITAMIN B 12) 250 MCG LOZG Take 500 mcg by mouth 2 times daily        DILT- MG 24 hr capsule TAKE ONE CAPSULE BY MOUTH EVERY DAY 90 capsule 3     diphenhydrAMINE-acetaminophen (TYLENOL PM)  MG tablet Take 1 tablet by mouth nightly as needed for sleep       evolocumab (REPATHA) 140 MG/ML prefilled autoinjector Inject 1 mL (140 mg) Subcutaneous every 14 days 4 each 6     hydrALAZINE (APRESOLINE) 50 MG tablet Take 25 mg by mouth 3 times daily       insulin NPH (HUMULIN N/NOVOLIN N VIAL) 100 UNIT/ML vial 14 units at   "suppertime 2 vial 11     insulin syringe-needle U-100 (BD INSULIN SYRINGE) 29G X 1/2\" 0.5 ML Use one syringe 2 daily or as directed. 200 each 4     isosorbide mononitrate CR (IMDUR) 120 MG 24 HR ER tablet TAKE 1 TABLET BY MOUTH DAILY 90 tablet 3     levothyroxine (SYNTHROID/LEVOTHROID) 75 MCG tablet TAKE 1 TABLET BY MOUTH DAILY 30 tablet 8     metoprolol succinate ER (TOPROL-XL) 25 MG 24 hr tablet 1 tab daily in AM (in addition to metoprolol XL 50mg tab daily) so total  Dose = 75mg daily 90 tablet 3     metoprolol succinate ER (TOPROL-XL) 50 MG 24 hr tablet 1 tab daily in AM (in addition to metoprolol XL 25mg tab daily) so total  Dose = 75mg daily 90 tablet 3     nitroGLYcerin (NITROSTAT) 0.4 MG sublingual tablet For chest pain place 1 tablet under the tongue every 5 minutes for 3 doses. If symptoms persist 5 minutes after 1st dose call 911. 25 tablet 1     order for DME Equipment being ordered: Cane ()  Treatment Diagnosis: Impaired balance, impaired activity tolerance 1 each 0     theophylline (UNIPHYL) 400 MG 24 hr tablet Take 1 tablet (400 mg) by mouth daily 90 tablet 3     thin (NO BRAND SPECIFIED) lancets Use to test blood sugar 3 times daily or as directed. 300 each 3     torsemide (DEMADEX) 10 MG tablet 1 tab daily in AM  on odd days and 1/2 tab daily in AM on even days 90 tablet 3     torsemide (DEMADEX) 20 MG tablet 1 tab on odd days and 1/2 tab on even days (Patient taking differently: 1 tab on odd days) 30 tablet 0     warfarin ANTICOAGULANT (COUMADIN) 2 MG tablet 4 mg (2 mg x 2) every Fri; 2 mg (2 mg x 1) all other days unless directed otherwise by  tablet 3       ALLERGIES     Allergies   Allergen Reactions     Advair [Fluticasone-Salmeterol]      cough;  insomnia, nightmares     Amlodipine Besylate      edema       Azithromycin GI Disturbance     Clarithromycin      gi     Hydrochlorothiazide      hctz + lisinopril-->inc creat, k+     Lisinopril      lisinopril + hctz --> inc creat, k+     " Moxifloxacin Hydrochloride      clostridium diffile colitis     Penicillins      gen swelling     Pravastatin Cramps     Muscle cramps/spasm.  Stopped 2017     Vioxx      edema       PAST MEDICAL HISTORY:  Past Medical History:   Diagnosis Date     Acute myocardial infarction, unspecified site, episode of care unspecified      Allergic rhinitis, cause unspecified      Anemia 3/6/2014     CAD (coronary artery disease)     1996 CABG - LIMA to LAD, SVG to OM, 2007 Cath - KANU to Left main and ostial/prox LAD, 2012 Cath - 80-90% stenosis SVG to OM - KANU placed, EF 50%     CKD (chronic kidney disease) stage 3, GFR 30-59 ml/min (H) 3/9/2014     CTS (carpal tunnel syndrome)     bilateral     Degeneration of cervical intervertebral disc      Diaphragmatic hernia without mention of obstruction or gangrene      Esophageal reflux      Essential hypertension, benign      Hyperlipidemia LDL goal <70      Hypothyroidism      Hypothyroidism, unspecified hypothyroidism type 1/14/2016     IDIOPATHIC CYCLIC EDEMA      Mild persistent asthma      Osteoarthrosis, unspecified whether generalized or localized, unspecified site      Pneumonia, organism unspecified(486)      Proteinuria 5/17/2014     PVD (peripheral vascular disease) (H)      Sensorineural hearing loss, unspecified      Subarachnoid bleed (H)      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, with long-term current use of insulin (H) 6/30/2019     Unspecified hereditary and idiopathic peripheral neuropathy      Venous insufficiency        PAST SURGICAL HISTORY:  Past Surgical History:   Procedure Laterality Date     C NONSPECIFIC PROCEDURE      appendectomy     C NONSPECIFIC PROCEDURE      hemorrhoids     C NONSPECIFIC PROCEDURE      cervical strain     C NONSPECIFIC PROCEDURE      rotator cuff surgery, right shoulder     C NONSPECIFIC PROCEDURE  2008    iol os     CORONARY ARTERY BYPASS  1996    LIMA to LAD, SVG to OM     HEART CATH  STENT COR W/WO PTCA      lad, left main cor artery stents/drug eluting     HEART CATH STENT COR W/WO PTCA  2012    80-90% stenosis SVG to OM - KANU placed, EF 50%     NONSPECIFIC PROCEDURE      iol od       FAMILY HISTORY:  Family History   Problem Relation Age of Onset     Lung Cancer Daughter      Family History Negative Mother         broken hip     Jaundice Father      Alcohol/Drug Father      Ovarian Cancer Daughter      Family History Negative Daughter      Family History Negative Son        SOCIAL HISTORY:  Social History     Socioeconomic History     Marital status:      Spouse name: None     Number of children: None     Years of education: None     Highest education level: None   Occupational History     None   Social Needs     Financial resource strain: None     Food insecurity:     Worry: None     Inability: None     Transportation needs:     Medical: None     Non-medical: None   Tobacco Use     Smoking status: Former Smoker     Packs/day: 1.00     Years: 14.00     Pack years: 14.00     Types: Cigarettes     Start date:      Last attempt to quit:      Years since quittin.9     Smokeless tobacco: Never Used   Substance and Sexual Activity     Alcohol use: No     Drug use: No     Sexual activity: Not Currently   Lifestyle     Physical activity:     Days per week: None     Minutes per session: None     Stress: None   Relationships     Social connections:     Talks on phone: None     Gets together: None     Attends [employer] service: None     Active member of club or organization: None     Attends meetings of clubs or organizations: None     Relationship status: None     Intimate partner violence:     Fear of current or ex partner: None     Emotionally abused: None     Physically abused: None     Forced sexual activity: None   Other Topics Concern     Parent/sibling w/ CABG, MI or angioplasty before 65F 55M? Not Asked      Service Not Asked     Blood Transfusions Not Asked      Caffeine Concern No     Occupational Exposure Not Asked     Hobby Hazards Not Asked     Sleep Concern Yes     Stress Concern No     Weight Concern No     Special Diet No     Back Care Not Asked     Exercise Yes     Comment: bowling, 5 days week. yard work     Bike Helmet Not Asked     Seat Belt Not Asked     Self-Exams Not Asked   Social History Narrative     None       Review of Systems:  Skin:  Negative       Eyes:  Positive for glasses reading  ENT:  Negative      Respiratory:  Positive for dyspnea on exertion(Stops activity before becomming SOB) occ   Cardiovascular:    Positive for;edema;fatigue(if he over does) compression socks on both legs  Gastroenterology: Positive for constipation    Genitourinary:  Negative      Musculoskeletal:  Positive for arthritis;joint pain    Neurologic:  Positive for numbness or tingling of hands carpal tunnel  Psychiatric:  Negative      Heme/Lymph/Imm:  Positive for allergies    Endocrine:  Positive for thyroid disorder;diabetes      Physical Exam:  Vitals: /60   Pulse 80   Ht 1.829 m (6')   Wt 90.4 kg (199 lb 6.4 oz)   BMI 27.04 kg/m       Constitutional:  cooperative, alert and oriented, well developed, well nourished, in no acute distress        Skin:  warm and dry to the touch, no apparent skin lesions or masses noted          Head:  normocephalic, no masses or lesions        Eyes:  pupils equal and round;sclera white;EOMS intact        Lymph:      ENT:  no pallor or cyanosis        Neck:  JVP normal;carotid pulses are full and equal bilaterally        Respiratory:  healed median sternotomy scar fine rales;basal rales       Cardiac: normal S1 and S2;apical impulse not displaced irregularly irregular rhythm distant heart sounds            not assessed this visit                                        GI:  abdomen soft surgical scars      Extremities and Muscular Skeletal:    stasis pigmentation   RLE edema;trace;pitting LLE edema;pitting;2+ pressure stckings in  place    Neurological:  no gross motor deficits        Psych:  affect appropriate, oriented to time, person and place        CC  Marie Gil PA-C  [address]  [place] [id number]                Thank you for allowing me to participate in the care of your patient.      Sincerely,     Sebastien Gibson MD     [facility]    cc:   Marie Gil PA-C  [address]  [place] [id number]

## 2019-11-25 NOTE — TELEPHONE ENCOUNTER
"Requested Prescriptions   Pending Prescriptions Disp Refills     allopurinol (ZYLOPRIM) 100 MG tablet [Pharmacy Med Name: ALLOPURINOL 100MG TABLETS] 180 tablet 0     Sig: TAKE 2 TABLETS(200 MG) BY MOUTH DAILY   Last Written Prescription Date:  6/12/2019  Last Fill Quantity: 180,  # refills: 1   Last Office Visit: 9/23/2019   Future Office Visit:         Gout Agents Protocol Failed - 11/24/2019 10:27 AM        Failed - Has Uric Acid on file in past 12 months and value is less than 6     Recent Labs   Lab Test 02/27/19  0818   URIC 6.1     If level is 6mg/dL or greater, ok to refill one time and refer to provider.           Failed - Normal serum creatinine on file in the past 12 months     Recent Labs   Lab Test 11/21/19  1223   CR 3.39*             Passed - CBC on file in past 12 months     Recent Labs   Lab Test 09/24/19  1015 09/23/19  1446   WBC  --  11.8*   RBC  --  3.07*   HGB 9.2* 9.3*   HCT  --  29.3*   PLT  --  284                 Passed - ALT on file in past 12 months     Recent Labs   Lab Test 09/16/19  0534   ALT 20             Passed - Recent (12 mo) or future (30 days) visit within the authorizing provider's specialty     Patient has had an office visit with the authorizing provider or a provider within the authorizing providers department within the previous 12 mos or has a future within next 30 days. See \"Patient Info\" tab in inbasket, or \"Choose Columns\" in Meds & Orders section of the refill encounter.              Passed - Medication is active on med list        Passed - Patient is age 18 or older          "

## 2019-11-29 NOTE — PROGRESS NOTES
ANTICOAGULATION FOLLOW-UP CLINIC VISIT    Patient Name:  Oscar Terrazas  Date:  2019  Contact Type:  Face to Face    SUBJECTIVE:  Patient Findings     Comments:   The patient was assessed for diet, medication, and activity level changes, missed or extra doses, bruising or bleeding, with no problem findings.        Clinical Outcomes     Comments:   The patient was assessed for diet, medication, and activity level changes, missed or extra doses, bruising or bleeding, with no problem findings.           OBJECTIVE    INR Protime   Date Value Ref Range Status   2019 2.1 (A) 0.86 - 1.14 Final       ASSESSMENT / PLAN  INR assessment THER    Recheck INR In: 4 WEEKS    INR Location Clinic      Anticoagulation Summary  As of 2019    INR goal:   2.0-3.0   TTR:   72.4 % (2 mo)   INR used for dosin.1 (2019)   Warfarin maintenance plan:   1 mg (2 mg x 0.5) every Mon; 2 mg (2 mg x 1) all other days   Full warfarin instructions:   1 mg every Mon; 2 mg all other days   Weekly warfarin total:   13 mg   Plan last modified:   Nisreen Jay RN (10/25/2019)   Next INR check:   2019   Target end date:   Indefinite    Indications    Atrial fibrillation (H) [I48.91]             Anticoagulation Episode Summary     INR check location:       Preferred lab:       Send INR reminders to:   [facility]    Comments:         Anticoagulation Care Providers     Provider Role Specialty Phone number    Jorge Hillman MD [facility] [phone]            See the Encounter Report to view Anticoagulation Flowsheet and Dosing Calendar (Go to Encounters tab in chart review, and find the Anticoagulation Therapy Visit)        Nilda Duran RN

## 2019-12-19 NOTE — TELEPHONE ENCOUNTER
[facility] Prior Authorization Team   Phone: [phone]  Fax: [phone]    PA Initiation    Medication: Repatha - initiated  Insurance Company: [employer] - Phone [phone] Fax [phone]  Pharmacy Filling the Rx: [facility] - [place], [place] - [facility] [address]  Filling Pharmacy Phone: [phone]  Filling Pharmacy Fax:    Start Date: 12/19/2019

## 2019-12-23 NOTE — TELEPHONE ENCOUNTER
Prior Authorization Approval    Authorization Effective Date: 12/20/2019  Authorization Expiration Date: 12/31/2020  Medication: Repatha - initiated  Approved Dose/Quantity: ud  Reference #:     Insurance Company: [employer] - Phone [phone] Fax [phone]  Expected CoPay: $0     CoPay Card Available: No    Foundation Assistance Needed:    Which Pharmacy is filling the prescription (Not needed for infusion/clinic administered): [facility] - [place], [place] - [address]  Pharmacy Notified: Yes  Patient Notified: Yes

## 2019-12-27 NOTE — TELEPHONE ENCOUNTER
Requested Prescriptions   Pending Prescriptions Disp Refills     theophylline (UNIPHYL) 400 MG 24 hr tablet [Pharmacy Med Name: THEOPHYLLINE 400MG ER TABLETS] 90 tablet 3     Sig: TAKE 1 TABLET BY MOUTH EVERY DAY       There is no refill protocol information for this order          Routing refill request to provider for review/approval because:  Drug not on the [facility] [place] protocol     Cheyanne CORREIAN, RN, PHN

## 2019-12-27 NOTE — TELEPHONE ENCOUNTER
Requested Prescriptions   Pending Prescriptions Disp Refills     theophylline (UNIPHYL) 400 MG 24 hr tablet [Pharmacy Med Name: THEOPHYLLINE 400MG ER TABLETS] 90 tablet 3     Sig: TAKE 1 TABLET BY MOUTH EVERY DAY       There is no refill protocol information for this order        Last Written Prescription Date:  2/27/2019  Last Fill Quantity: 90,  # refills: 3   Last Office Visit: 9/23/2019   Future Office Visit:

## 2019-12-27 NOTE — PROGRESS NOTES
ANTICOAGULATION FOLLOW-UP CLINIC VISIT    Patient Name:  Oscar Terrazas  Date:  2019  Contact Type:  Face to Face    SUBJECTIVE:  Patient Findings     Comments:   The patient was assessed for diet, medication, and activity level changes, missed or extra doses, bruising or bleeding, with no problem findings.          Clinical Outcomes     Comments:   The patient was assessed for diet, medication, and activity level changes, missed or extra doses, bruising or bleeding, with no problem findings.             OBJECTIVE    INR Protime   Date Value Ref Range Status   2019 1.8 (A) 0.86 - 1.14 Final       ASSESSMENT / PLAN  INR assessment SUB    Recheck INR In: 2 WEEKS    INR Location Clinic      Anticoagulation Summary  As of 2019    INR goal:   2.0-3.0   TTR:   60.0 % (2.9 mo)   INR used for dosin.8! (2019)   Warfarin maintenance plan:   1 mg (2 mg x 0.5) every Mon; 2 mg (2 mg x 1) all other days   Full warfarin instructions:   : 3 mg; Otherwise 1 mg every Mon; 2 mg all other days   Weekly warfarin total:   13 mg   Plan last modified:   Nisreen Jay RN (10/25/2019)   Next INR check:   1/10/2020   Target end date:   Indefinite    Indications    Atrial fibrillation (H) [I48.91]             Anticoagulation Episode Summary     INR check location:       Preferred lab:       Send INR reminders to:   [facility]    Comments:         Anticoagulation Care Providers     Provider Role Specialty Phone number    Jorge Hillman MD [facility] Internal Medicine [phone]            See the Encounter Report to view Anticoagulation Flowsheet and Dosing Calendar (Go to Encounters tab in chart review, and find the Anticoagulation Therapy Visit)        Nilda Duran RN

## 2020-01-01 ENCOUNTER — MYC MEDICAL ADVICE (OUTPATIENT)
Dept: INTERNAL MEDICINE | Facility: CLINIC | Age: 85
End: 2020-01-01

## 2020-01-01 ENCOUNTER — ANTICOAGULATION THERAPY VISIT (OUTPATIENT)
Dept: ANTICOAGULATION | Facility: CLINIC | Age: 85
End: 2020-01-01
Payer: COMMERCIAL

## 2020-01-01 ENCOUNTER — APPOINTMENT (OUTPATIENT)
Dept: GENERAL RADIOLOGY | Facility: CLINIC | Age: 85
DRG: 291 | End: 2020-01-01
Attending: NURSE PRACTITIONER
Payer: COMMERCIAL

## 2020-01-01 ENCOUNTER — HOSPITAL ENCOUNTER (INPATIENT)
Facility: CLINIC | Age: 85
LOS: 2 days | DRG: 291 | End: 2020-06-24
Attending: EMERGENCY MEDICINE | Admitting: INTERNAL MEDICINE
Payer: COMMERCIAL

## 2020-01-01 ENCOUNTER — ANTICOAGULATION THERAPY VISIT (OUTPATIENT)
Dept: ANTICOAGULATION | Facility: CLINIC | Age: 85
End: 2020-01-01

## 2020-01-01 ENCOUNTER — ANESTHESIA (OUTPATIENT)
Dept: CARDIOLOGY | Facility: CLINIC | Age: 85
DRG: 291 | End: 2020-01-01
Payer: COMMERCIAL

## 2020-01-01 ENCOUNTER — ANESTHESIA EVENT (OUTPATIENT)
Dept: CARDIOLOGY | Facility: CLINIC | Age: 85
DRG: 291 | End: 2020-01-01
Payer: COMMERCIAL

## 2020-01-01 ENCOUNTER — TRANSFERRED RECORDS (OUTPATIENT)
Dept: HEALTH INFORMATION MANAGEMENT | Facility: CLINIC | Age: 85
End: 2020-01-01

## 2020-01-01 ENCOUNTER — TELEPHONE (OUTPATIENT)
Dept: CARDIOLOGY | Facility: CLINIC | Age: 85
End: 2020-01-01

## 2020-01-01 ENCOUNTER — DOCUMENTATION ONLY (OUTPATIENT)
Dept: OTHER | Facility: CLINIC | Age: 85
End: 2020-01-01

## 2020-01-01 ENCOUNTER — TELEPHONE (OUTPATIENT)
Dept: INTERNAL MEDICINE | Facility: CLINIC | Age: 85
End: 2020-01-01

## 2020-01-01 ENCOUNTER — HOSPITAL ENCOUNTER (EMERGENCY)
Facility: CLINIC | Age: 85
Discharge: HOME | End: 2020-01-03
Attending: EMERGENCY MEDICINE | Admitting: EMERGENCY MEDICINE
Payer: COMMERCIAL

## 2020-01-01 ENCOUNTER — APPOINTMENT (OUTPATIENT)
Dept: GENERAL RADIOLOGY | Facility: CLINIC | Age: 85
DRG: 291 | End: 2020-01-01
Attending: EMERGENCY MEDICINE
Payer: COMMERCIAL

## 2020-01-01 ENCOUNTER — OFFICE VISIT (OUTPATIENT)
Dept: INTERNAL MEDICINE | Facility: CLINIC | Age: 85
End: 2020-01-01
Payer: COMMERCIAL

## 2020-01-01 ENCOUNTER — ANTICOAGULATION THERAPY VISIT (OUTPATIENT)
Dept: INTERNAL MEDICINE | Facility: CLINIC | Age: 85
End: 2020-01-01

## 2020-01-01 ENCOUNTER — APPOINTMENT (OUTPATIENT)
Dept: CT IMAGING | Facility: CLINIC | Age: 85
End: 2020-01-01
Attending: EMERGENCY MEDICINE
Payer: COMMERCIAL

## 2020-01-01 ENCOUNTER — VIRTUAL VISIT (OUTPATIENT)
Dept: CARDIOLOGY | Facility: CLINIC | Age: 85
End: 2020-01-01
Attending: INTERNAL MEDICINE
Payer: COMMERCIAL

## 2020-01-01 ENCOUNTER — VIRTUAL VISIT (OUTPATIENT)
Dept: CARDIOLOGY | Facility: CLINIC | Age: 85
End: 2020-01-01
Payer: COMMERCIAL

## 2020-01-01 ENCOUNTER — HOSPITAL ENCOUNTER (OUTPATIENT)
Facility: CLINIC | Age: 85
End: 2020-01-01
Attending: UROLOGY | Admitting: UROLOGY
Payer: COMMERCIAL

## 2020-01-01 VITALS
RESPIRATION RATE: 16 BRPM | OXYGEN SATURATION: 97 % | WEIGHT: 190 LBS | DIASTOLIC BLOOD PRESSURE: 96 MMHG | SYSTOLIC BLOOD PRESSURE: 146 MMHG | HEIGHT: 72 IN | TEMPERATURE: 97.9 F | BODY MASS INDEX: 25.73 KG/M2 | HEART RATE: 73 BPM

## 2020-01-01 VITALS
DIASTOLIC BLOOD PRESSURE: 60 MMHG | HEIGHT: 72 IN | BODY MASS INDEX: 27.56 KG/M2 | WEIGHT: 203.5 LBS | OXYGEN SATURATION: 93 % | SYSTOLIC BLOOD PRESSURE: 94 MMHG | HEART RATE: 125 BPM | TEMPERATURE: 96.4 F | RESPIRATION RATE: 18 BRPM

## 2020-01-01 VITALS
RESPIRATION RATE: 16 BRPM | SYSTOLIC BLOOD PRESSURE: 128 MMHG | HEART RATE: 77 BPM | BODY MASS INDEX: 25.9 KG/M2 | DIASTOLIC BLOOD PRESSURE: 74 MMHG | WEIGHT: 191 LBS | TEMPERATURE: 98.5 F | OXYGEN SATURATION: 98 %

## 2020-01-01 DIAGNOSIS — R91.8 INFILTRATE OF LEFT LUNG PRESENT ON CHEST X-RAY: ICD-10-CM

## 2020-01-01 DIAGNOSIS — I42.9 CARDIOMYOPATHY, UNSPECIFIED TYPE (H): Primary | ICD-10-CM

## 2020-01-01 DIAGNOSIS — I48.0 PAROXYSMAL ATRIAL FIBRILLATION (H): ICD-10-CM

## 2020-01-01 DIAGNOSIS — I48.0 PAROXYSMAL ATRIAL FIBRILLATION (H): Primary | ICD-10-CM

## 2020-01-01 DIAGNOSIS — I10 ESSENTIAL HYPERTENSION, BENIGN: ICD-10-CM

## 2020-01-01 DIAGNOSIS — I50.9 ACUTE ON CHRONIC CONGESTIVE HEART FAILURE, UNSPECIFIED HEART FAILURE TYPE (H): ICD-10-CM

## 2020-01-01 DIAGNOSIS — N18.4 ANEMIA IN STAGE 4 CHRONIC KIDNEY DISEASE (H): ICD-10-CM

## 2020-01-01 DIAGNOSIS — L29.9 ITCHING: ICD-10-CM

## 2020-01-01 DIAGNOSIS — E78.5 HYPERLIPIDEMIA LDL GOAL <70: ICD-10-CM

## 2020-01-01 DIAGNOSIS — E11.22 TYPE 2 DIABETES MELLITUS WITH STAGE 5 CHRONIC KIDNEY DISEASE NOT ON CHRONIC DIALYSIS, WITH LONG-TERM CURRENT USE OF INSULIN (H): ICD-10-CM

## 2020-01-01 DIAGNOSIS — D49.4 BLADDER TUMOR: ICD-10-CM

## 2020-01-01 DIAGNOSIS — E03.9 HYPOTHYROIDISM, UNSPECIFIED TYPE: ICD-10-CM

## 2020-01-01 DIAGNOSIS — M1A.9XX0 CHRONIC GOUT WITHOUT TOPHUS, UNSPECIFIED CAUSE, UNSPECIFIED SITE: ICD-10-CM

## 2020-01-01 DIAGNOSIS — Z79.4 TYPE 2 DIABETES MELLITUS WITH STAGE 5 CHRONIC KIDNEY DISEASE NOT ON CHRONIC DIALYSIS, WITH LONG-TERM CURRENT USE OF INSULIN (H): ICD-10-CM

## 2020-01-01 DIAGNOSIS — N18.5 TYPE 2 DIABETES MELLITUS WITH STAGE 5 CHRONIC KIDNEY DISEASE NOT ON CHRONIC DIALYSIS, WITH LONG-TERM CURRENT USE OF INSULIN (H): ICD-10-CM

## 2020-01-01 DIAGNOSIS — N18.5 CHRONIC KIDNEY DISEASE, STAGE V (H): Primary | ICD-10-CM

## 2020-01-01 DIAGNOSIS — I25.118 CORONARY ARTERY DISEASE OF NATIVE ARTERY OF NATIVE HEART WITH STABLE ANGINA PECTORIS (H): ICD-10-CM

## 2020-01-01 DIAGNOSIS — I50.32 CHRONIC DIASTOLIC CONGESTIVE HEART FAILURE (H): ICD-10-CM

## 2020-01-01 DIAGNOSIS — D63.8 ANEMIA IN OTHER CHRONIC DISEASES CLASSIFIED ELSEWHERE: ICD-10-CM

## 2020-01-01 DIAGNOSIS — N18.9 CHRONIC RENAL IMPAIRMENT, UNSPECIFIED CKD STAGE: ICD-10-CM

## 2020-01-01 DIAGNOSIS — K80.20 GALLSTONES: ICD-10-CM

## 2020-01-01 DIAGNOSIS — I10 ESSENTIAL HYPERTENSION, BENIGN: Primary | ICD-10-CM

## 2020-01-01 DIAGNOSIS — I48.91 ATRIAL FIBRILLATION, UNSPECIFIED TYPE (H): ICD-10-CM

## 2020-01-01 DIAGNOSIS — L20.9 ATOPIC DERMATITIS, UNSPECIFIED TYPE: ICD-10-CM

## 2020-01-01 DIAGNOSIS — R82.81 PYURIA: ICD-10-CM

## 2020-01-01 DIAGNOSIS — D63.1 ANEMIA IN STAGE 4 CHRONIC KIDNEY DISEASE (H): ICD-10-CM

## 2020-01-01 DIAGNOSIS — R31.9 HEMATURIA, UNSPECIFIED TYPE: ICD-10-CM

## 2020-01-01 DIAGNOSIS — J96.01 ACUTE RESPIRATORY FAILURE WITH HYPOXIA (H): ICD-10-CM

## 2020-01-01 DIAGNOSIS — I21.4 NSTEMI (NON-ST ELEVATED MYOCARDIAL INFARCTION) (H): ICD-10-CM

## 2020-01-01 DIAGNOSIS — N28.9 RENAL INSUFFICIENCY: ICD-10-CM

## 2020-01-01 DIAGNOSIS — I50.32 CHRONIC DIASTOLIC CONGESTIVE HEART FAILURE (H): Primary | ICD-10-CM

## 2020-01-01 LAB
ALBUMIN UR-MCNC: 30 MG/DL
ANION GAP SERPL CALCULATED.3IONS-SCNC: 4 MMOL/L (ref 3–14)
ANION GAP SERPL CALCULATED.3IONS-SCNC: 5 MMOL/L (ref 3–14)
ANION GAP SERPL CALCULATED.3IONS-SCNC: 6 MMOL/L (ref 3–14)
ANION GAP SERPL CALCULATED.3IONS-SCNC: 6 MMOL/L (ref 3–14)
APPEARANCE UR: ABNORMAL
BASE DEFICIT BLDV-SCNC: 0.9 MMOL/L
BASOPHILS # BLD AUTO: 0 10E9/L (ref 0–0.2)
BASOPHILS # BLD AUTO: 0 10E9/L (ref 0–0.2)
BASOPHILS NFR BLD AUTO: 0.3 %
BASOPHILS NFR BLD AUTO: 0.5 %
BILIRUB UR QL STRIP: NEGATIVE
BUN SERPL-MCNC: 65 MG/DL (ref 7–30)
BUN SERPL-MCNC: 65 MG/DL (ref 7–30)
BUN SERPL-MCNC: 71 MG/DL (ref 7–30)
BUN SERPL-MCNC: 72 MG/DL (ref 7–30)
CALCIUM SERPL-MCNC: 8.9 MG/DL (ref 8.5–10.1)
CALCIUM SERPL-MCNC: 9 MG/DL (ref 8.5–10.1)
CALCIUM SERPL-MCNC: 9 MG/DL (ref 8.5–10.1)
CALCIUM SERPL-MCNC: 9.1 MG/DL (ref 8.5–10.1)
CAPILLARY BLOOD COLLECTION: NORMAL
CHLORIDE SERPL-SCNC: 110 MMOL/L (ref 94–109)
CHLORIDE SERPL-SCNC: 110 MMOL/L (ref 94–109)
CHLORIDE SERPL-SCNC: 111 MMOL/L (ref 94–109)
CHLORIDE SERPL-SCNC: 112 MMOL/L (ref 94–109)
CO2 SERPL-SCNC: 23 MMOL/L (ref 20–32)
CO2 SERPL-SCNC: 24 MMOL/L (ref 20–32)
CO2 SERPL-SCNC: 24 MMOL/L (ref 20–32)
CO2 SERPL-SCNC: 25 MMOL/L (ref 20–32)
COLOR UR AUTO: ABNORMAL
CREAT SERPL-MCNC: 3.36 MG/DL (ref 0.66–1.25)
CREAT SERPL-MCNC: 3.43 MG/DL (ref 0.66–1.25)
CREAT SERPL-MCNC: 3.46 MG/DL (ref 0.66–1.25)
CREAT SERPL-MCNC: 3.51 MG/DL (ref 0.66–1.25)
DIFFERENTIAL METHOD BLD: ABNORMAL
DIFFERENTIAL METHOD BLD: ABNORMAL
EOSINOPHIL # BLD AUTO: 0.1 10E9/L (ref 0–0.7)
EOSINOPHIL # BLD AUTO: 0.3 10E9/L (ref 0–0.7)
EOSINOPHIL NFR BLD AUTO: 1.8 %
EOSINOPHIL NFR BLD AUTO: 2.9 %
ERYTHROCYTE [DISTWIDTH] IN BLOOD BY AUTOMATED COUNT: 15.8 % (ref 10–15)
ERYTHROCYTE [DISTWIDTH] IN BLOOD BY AUTOMATED COUNT: 16.6 % (ref 10–15)
ERYTHROCYTE [DISTWIDTH] IN BLOOD BY AUTOMATED COUNT: 16.6 % (ref 10–15)
FERRITIN SERPL-MCNC: 80 NG/ML (ref 26–388)
GFR SERPL CREATININE-BSD FRML MDRD: 15 ML/MIN/{1.73_M2}
GFR SERPL CREATININE-BSD FRML MDRD: 16 ML/MIN/{1.73_M2}
GLUCOSE BLDC GLUCOMTR-MCNC: 181 MG/DL (ref 70–99)
GLUCOSE SERPL-MCNC: 130 MG/DL (ref 70–99)
GLUCOSE SERPL-MCNC: 155 MG/DL (ref 70–99)
GLUCOSE SERPL-MCNC: 199 MG/DL (ref 70–99)
GLUCOSE SERPL-MCNC: 231 MG/DL (ref 70–99)
GLUCOSE UR STRIP-MCNC: NEGATIVE MG/DL
HBA1C MFR BLD: 5.8 % (ref 0–5.6)
HCO3 BLDV-SCNC: 25 MMOL/L (ref 21–28)
HCT VFR BLD AUTO: 29.1 % (ref 40–53)
HCT VFR BLD AUTO: 31.3 % (ref 40–53)
HCT VFR BLD AUTO: 32.3 % (ref 40–53)
HGB BLD-MCNC: 10.4 G/DL (ref 13.3–17.7)
HGB BLD-MCNC: 8.9 G/DL (ref 13.3–17.7)
HGB BLD-MCNC: 9.9 G/DL (ref 13.3–17.7)
HGB UR QL STRIP: ABNORMAL
IMM GRANULOCYTES # BLD: 0 10E9/L (ref 0–0.4)
IMM GRANULOCYTES # BLD: 0 10E9/L (ref 0–0.4)
IMM GRANULOCYTES NFR BLD: 0.2 %
IMM GRANULOCYTES NFR BLD: 0.2 %
INR POINT OF CARE: 1.4 (ref 0.86–1.14)
INR POINT OF CARE: 1.5 (ref 0.86–1.14)
INR POINT OF CARE: 1.5 (ref 0.86–1.14)
INR POINT OF CARE: 1.6 (ref 0.86–1.14)
INR POINT OF CARE: 1.7 (ref 0.86–1.14)
INR POINT OF CARE: 2 (ref 0.86–1.14)
INR POINT OF CARE: 2 (ref 0.86–1.14)
INR PPP: 1.8 (ref 0.86–1.14)
INR PPP: 2 (ref 0.86–1.14)
INR PPP: 2.1 (ref 0.86–1.14)
INR PPP: 2.5 (ref 0.86–1.14)
IRON SATN MFR SERPL: 15 % (ref 15–46)
IRON SERPL-MCNC: 38 UG/DL (ref 35–180)
KETONES UR STRIP-MCNC: NEGATIVE MG/DL
LEUKOCYTE ESTERASE UR QL STRIP: ABNORMAL
LYMPHOCYTES # BLD AUTO: 1 10E9/L (ref 0.8–5.3)
LYMPHOCYTES # BLD AUTO: 1.1 10E9/L (ref 0.8–5.3)
LYMPHOCYTES NFR BLD AUTO: 17.5 %
LYMPHOCYTES NFR BLD AUTO: 8.5 %
MCH RBC QN AUTO: 28.7 PG (ref 26.5–33)
MCH RBC QN AUTO: 29.3 PG (ref 26.5–33)
MCH RBC QN AUTO: 29.4 PG (ref 26.5–33)
MCHC RBC AUTO-ENTMCNC: 30.6 G/DL (ref 31.5–36.5)
MCHC RBC AUTO-ENTMCNC: 31.6 G/DL (ref 31.5–36.5)
MCHC RBC AUTO-ENTMCNC: 32.2 G/DL (ref 31.5–36.5)
MCV RBC AUTO: 89 FL (ref 78–100)
MCV RBC AUTO: 93 FL (ref 78–100)
MCV RBC AUTO: 96 FL (ref 78–100)
MONOCYTES # BLD AUTO: 0.3 10E9/L (ref 0–1.3)
MONOCYTES # BLD AUTO: 0.8 10E9/L (ref 0–1.3)
MONOCYTES NFR BLD AUTO: 5.1 %
MONOCYTES NFR BLD AUTO: 6.9 %
MUCOUS THREADS #/AREA URNS LPF: PRESENT /LPF
NEUTROPHILS # BLD AUTO: 4.9 10E9/L (ref 1.6–8.3)
NEUTROPHILS # BLD AUTO: 9.7 10E9/L (ref 1.6–8.3)
NEUTROPHILS NFR BLD AUTO: 74.9 %
NEUTROPHILS NFR BLD AUTO: 81.2 %
NITRATE UR QL: NEGATIVE
NRBC # BLD AUTO: 0 10*3/UL
NRBC # BLD AUTO: 0 10*3/UL
NRBC BLD AUTO-RTO: 0 /100
NRBC BLD AUTO-RTO: 0 /100
NT-PROBNP SERPL-MCNC: ABNORMAL PG/ML (ref 0–1800)
NT-PROBNP SERPL-MCNC: ABNORMAL PG/ML (ref 0–1800)
PCO2 BLDV: 45 MM HG (ref 40–50)
PH BLDV: 7.35 PH (ref 7.32–7.43)
PH UR STRIP: 5.5 PH (ref 5–7)
PLATELET # BLD AUTO: 195 10E9/L (ref 150–450)
PLATELET # BLD AUTO: 209 10E9/L (ref 150–450)
PLATELET # BLD AUTO: 248 10E9/L (ref 150–450)
PO2 BLDV: 38 MM HG (ref 25–47)
POTASSIUM SERPL-SCNC: 4.1 MMOL/L (ref 3.4–5.3)
POTASSIUM SERPL-SCNC: 4.3 MMOL/L (ref 3.4–5.3)
POTASSIUM SERPL-SCNC: 4.4 MMOL/L (ref 3.4–5.3)
POTASSIUM SERPL-SCNC: 4.6 MMOL/L (ref 3.4–5.3)
PROCALCITONIN SERPL-MCNC: 0.1 NG/ML
RBC # BLD AUTO: 3.03 10E12/L (ref 4.4–5.9)
RBC # BLD AUTO: 3.38 10E12/L (ref 4.4–5.9)
RBC # BLD AUTO: 3.63 10E12/L (ref 4.4–5.9)
RBC #/AREA URNS AUTO: >182 /HPF (ref 0–2)
SARS-COV-2 PCR COMMENT: NORMAL
SARS-COV-2 RNA SPEC QL NAA+PROBE: NEGATIVE
SARS-COV-2 RNA SPEC QL NAA+PROBE: NORMAL
SODIUM SERPL-SCNC: 138 MMOL/L (ref 133–144)
SODIUM SERPL-SCNC: 139 MMOL/L (ref 133–144)
SODIUM SERPL-SCNC: 141 MMOL/L (ref 133–144)
SODIUM SERPL-SCNC: 142 MMOL/L (ref 133–144)
SOURCE: ABNORMAL
SP GR UR STRIP: 1.01 (ref 1–1.03)
SPECIMEN SOURCE: NORMAL
SPECIMEN SOURCE: NORMAL
TIBC SERPL-MCNC: 246 UG/DL (ref 240–430)
TROPONIN I SERPL-MCNC: 0.02 UG/L (ref 0–0.04)
TROPONIN I SERPL-MCNC: 0.02 UG/L (ref 0–0.04)
TSH SERPL DL<=0.005 MIU/L-ACNC: 1.64 MU/L (ref 0.4–4)
UROBILINOGEN UR STRIP-MCNC: NORMAL MG/DL (ref 0–2)
WBC # BLD AUTO: 10 10E9/L (ref 4–11)
WBC # BLD AUTO: 11.9 10E9/L (ref 4–11)
WBC # BLD AUTO: 6.5 10E9/L (ref 4–11)
WBC #/AREA URNS AUTO: 23 /HPF (ref 0–5)

## 2020-01-01 PROCEDURE — 25000128 H RX IP 250 OP 636: Performed by: INTERNAL MEDICINE

## 2020-01-01 PROCEDURE — 31500 INSERT EMERGENCY AIRWAY: CPT | Performed by: REGISTERED NURSE

## 2020-01-01 PROCEDURE — 83550 IRON BINDING TEST: CPT | Performed by: INTERNAL MEDICINE

## 2020-01-01 PROCEDURE — 25000125 ZZHC RX 250

## 2020-01-01 PROCEDURE — 99214 OFFICE O/P EST MOD 30 MIN: CPT | Performed by: INTERNAL MEDICINE

## 2020-01-01 PROCEDURE — 99214 OFFICE O/P EST MOD 30 MIN: CPT | Mod: 95 | Performed by: PHYSICIAN ASSISTANT

## 2020-01-01 PROCEDURE — 74176 CT ABD & PELVIS W/O CONTRAST: CPT

## 2020-01-01 PROCEDURE — 84145 PROCALCITONIN (PCT): CPT | Performed by: INTERNAL MEDICINE

## 2020-01-01 PROCEDURE — 25000132 ZZH RX MED GY IP 250 OP 250 PS 637: Performed by: HOSPITALIST

## 2020-01-01 PROCEDURE — 99207 ZZC NO CHARGE NURSE ONLY: CPT

## 2020-01-01 PROCEDURE — 99291 CRITICAL CARE FIRST HOUR: CPT | Performed by: NURSE PRACTITIONER

## 2020-01-01 PROCEDURE — 36416 COLLJ CAPILLARY BLOOD SPEC: CPT | Performed by: INTERNAL MEDICINE

## 2020-01-01 PROCEDURE — 83036 HEMOGLOBIN GLYCOSYLATED A1C: CPT | Performed by: INTERNAL MEDICINE

## 2020-01-01 PROCEDURE — 21000001 ZZH R&B HEART CARE

## 2020-01-01 PROCEDURE — 84484 ASSAY OF TROPONIN QUANT: CPT | Performed by: INTERNAL MEDICINE

## 2020-01-01 PROCEDURE — 83880 ASSAY OF NATRIURETIC PEPTIDE: CPT | Performed by: INTERNAL MEDICINE

## 2020-01-01 PROCEDURE — 84484 ASSAY OF TROPONIN QUANT: CPT | Performed by: EMERGENCY MEDICINE

## 2020-01-01 PROCEDURE — 25000125 ZZHC RX 250: Performed by: NURSE PRACTITIONER

## 2020-01-01 PROCEDURE — 96375 TX/PRO/DX INJ NEW DRUG ADDON: CPT

## 2020-01-01 PROCEDURE — 93010 ELECTROCARDIOGRAM REPORT: CPT | Performed by: INTERNAL MEDICINE

## 2020-01-01 PROCEDURE — 71045 X-RAY EXAM CHEST 1 VIEW: CPT

## 2020-01-01 PROCEDURE — 80048 BASIC METABOLIC PNL TOTAL CA: CPT | Performed by: EMERGENCY MEDICINE

## 2020-01-01 PROCEDURE — 36416 COLLJ CAPILLARY BLOOD SPEC: CPT

## 2020-01-01 PROCEDURE — 85610 PROTHROMBIN TIME: CPT | Mod: QW

## 2020-01-01 PROCEDURE — 80048 BASIC METABOLIC PNL TOTAL CA: CPT | Performed by: INTERNAL MEDICINE

## 2020-01-01 PROCEDURE — 93005 ELECTROCARDIOGRAM TRACING: CPT

## 2020-01-01 PROCEDURE — 82803 BLOOD GASES ANY COMBINATION: CPT | Performed by: EMERGENCY MEDICINE

## 2020-01-01 PROCEDURE — 25000128 H RX IP 250 OP 636

## 2020-01-01 PROCEDURE — 99212 OFFICE O/P EST SF 10 MIN: CPT | Mod: TEL | Performed by: PHYSICIAN ASSISTANT

## 2020-01-01 PROCEDURE — 85610 PROTHROMBIN TIME: CPT | Performed by: EMERGENCY MEDICINE

## 2020-01-01 PROCEDURE — 99238 HOSP IP/OBS DSCHRG MGMT 30/<: CPT | Performed by: NURSE PRACTITIONER

## 2020-01-01 PROCEDURE — 99207 ZZC CDG-CHARGE REQUIRED MANUAL ENTRY: CPT | Performed by: NURSE PRACTITIONER

## 2020-01-01 PROCEDURE — U0003 INFECTIOUS AGENT DETECTION BY NUCLEIC ACID (DNA OR RNA); SEVERE ACUTE RESPIRATORY SYNDROME CORONAVIRUS 2 (SARS-COV-2) (CORONAVIRUS DISEASE [COVID-19]), AMPLIFIED PROBE TECHNIQUE, MAKING USE OF HIGH THROUGHPUT TECHNOLOGIES AS DESCRIBED BY CMS-2020-01-R: HCPCS | Performed by: EMERGENCY MEDICINE

## 2020-01-01 PROCEDURE — 12000000 ZZH R&B MED SURG/OB

## 2020-01-01 PROCEDURE — 00000146 ZZHCL STATISTIC GLUCOSE BY METER IP

## 2020-01-01 PROCEDURE — 96365 THER/PROPH/DIAG IV INF INIT: CPT

## 2020-01-01 PROCEDURE — 99207 ZZC MOONLIGHTING INDICATOR: CPT | Performed by: INTERNAL MEDICINE

## 2020-01-01 PROCEDURE — 83540 ASSAY OF IRON: CPT | Performed by: INTERNAL MEDICINE

## 2020-01-01 PROCEDURE — 83880 ASSAY OF NATRIURETIC PEPTIDE: CPT | Performed by: EMERGENCY MEDICINE

## 2020-01-01 PROCEDURE — 82728 ASSAY OF FERRITIN: CPT | Performed by: INTERNAL MEDICINE

## 2020-01-01 PROCEDURE — 36415 COLL VENOUS BLD VENIPUNCTURE: CPT | Performed by: INTERNAL MEDICINE

## 2020-01-01 PROCEDURE — 85025 COMPLETE CBC W/AUTO DIFF WBC: CPT | Performed by: EMERGENCY MEDICINE

## 2020-01-01 PROCEDURE — 25000132 ZZH RX MED GY IP 250 OP 250 PS 637: Performed by: INTERNAL MEDICINE

## 2020-01-01 PROCEDURE — 85610 PROTHROMBIN TIME: CPT | Performed by: INTERNAL MEDICINE

## 2020-01-01 PROCEDURE — 99285 EMERGENCY DEPT VISIT HI MDM: CPT | Mod: 25

## 2020-01-01 PROCEDURE — 84443 ASSAY THYROID STIM HORMONE: CPT | Performed by: INTERNAL MEDICINE

## 2020-01-01 PROCEDURE — 99231 SBSQ HOSP IP/OBS SF/LOW 25: CPT | Performed by: HOSPITALIST

## 2020-01-01 PROCEDURE — 87040 BLOOD CULTURE FOR BACTERIA: CPT | Performed by: EMERGENCY MEDICINE

## 2020-01-01 PROCEDURE — C9803 HOPD COVID-19 SPEC COLLECT: HCPCS

## 2020-01-01 PROCEDURE — 99207 ZZC APP CREDIT; MD BILLING SHARED VISIT: CPT | Performed by: INTERNAL MEDICINE

## 2020-01-01 PROCEDURE — 99223 1ST HOSP IP/OBS HIGH 75: CPT | Mod: AI | Performed by: INTERNAL MEDICINE

## 2020-01-01 PROCEDURE — 25000128 H RX IP 250 OP 636: Performed by: EMERGENCY MEDICINE

## 2020-01-01 PROCEDURE — 85027 COMPLETE CBC AUTOMATED: CPT | Performed by: NURSE PRACTITIONER

## 2020-01-01 PROCEDURE — 31500 INSERT EMERGENCY AIRWAY: CPT

## 2020-01-01 PROCEDURE — 81001 URINALYSIS AUTO W/SCOPE: CPT | Performed by: EMERGENCY MEDICINE

## 2020-01-01 PROCEDURE — 99207 ZZC NON-BILLABLE SERV PER CHARTING: CPT | Performed by: HOSPITALIST

## 2020-01-01 PROCEDURE — 40000671 ZZH STATISTIC ANESTHESIA CASE

## 2020-01-01 PROCEDURE — 25000125 ZZHC RX 250: Performed by: EMERGENCY MEDICINE

## 2020-01-01 PROCEDURE — 85027 COMPLETE CBC AUTOMATED: CPT | Performed by: INTERNAL MEDICINE

## 2020-01-01 PROCEDURE — 84484 ASSAY OF TROPONIN QUANT: CPT | Performed by: NURSE PRACTITIONER

## 2020-01-01 RX ORDER — ONDANSETRON 2 MG/ML
4 INJECTION INTRAMUSCULAR; INTRAVENOUS EVERY 6 HOURS PRN
Status: DISCONTINUED | OUTPATIENT
Start: 2020-01-01 | End: 2020-01-01 | Stop reason: HOSPADM

## 2020-01-01 RX ORDER — CEFEPIME HYDROCHLORIDE 1 G/1
1 INJECTION, POWDER, FOR SOLUTION INTRAMUSCULAR; INTRAVENOUS EVERY 8 HOURS
Status: DISCONTINUED | OUTPATIENT
Start: 2020-01-01 | End: 2020-01-01

## 2020-01-01 RX ORDER — ALLOPURINOL 100 MG/1
TABLET ORAL
Qty: 180 TABLET | Refills: 1 | Status: SHIPPED | OUTPATIENT
Start: 2020-01-01

## 2020-01-01 RX ORDER — LORAZEPAM 2 MG/ML
.5-1 INJECTION INTRAMUSCULAR
Status: DISCONTINUED | OUTPATIENT
Start: 2020-01-01 | End: 2020-01-01 | Stop reason: HOSPADM

## 2020-01-01 RX ORDER — CEFTRIAXONE 1 G/1
1 INJECTION, POWDER, FOR SOLUTION INTRAMUSCULAR; INTRAVENOUS ONCE
Status: COMPLETED | OUTPATIENT
Start: 2020-01-01 | End: 2020-01-01

## 2020-01-01 RX ORDER — FLUMAZENIL 0.1 MG/ML
0.2 INJECTION, SOLUTION INTRAVENOUS ONCE
Status: COMPLETED | OUTPATIENT
Start: 2020-01-01 | End: 2020-01-01

## 2020-01-01 RX ORDER — NITROGLYCERIN 0.4 MG/1
0.4 TABLET SUBLINGUAL EVERY 5 MIN PRN
Status: DISCONTINUED | OUTPATIENT
Start: 2020-01-01 | End: 2020-01-01

## 2020-01-01 RX ORDER — CEFUROXIME AXETIL 250 MG/1
250 TABLET ORAL 2 TIMES DAILY
Qty: 14 TABLET | Refills: 0 | Status: SHIPPED | OUTPATIENT
Start: 2020-01-01 | End: 2020-01-01

## 2020-01-01 RX ORDER — CARBOXYMETHYLCELLULOSE SODIUM 5 MG/ML
1-2 SOLUTION/ DROPS OPHTHALMIC EVERY 8 HOURS PRN
Status: DISCONTINUED | OUTPATIENT
Start: 2020-01-01 | End: 2020-01-01 | Stop reason: HOSPADM

## 2020-01-01 RX ORDER — MORPHINE SULFATE 100 MG/5ML
5-10 SOLUTION ORAL
Status: DISCONTINUED | OUTPATIENT
Start: 2020-01-01 | End: 2020-01-01 | Stop reason: HOSPADM

## 2020-01-01 RX ORDER — ONDANSETRON 4 MG/1
4 TABLET, ORALLY DISINTEGRATING ORAL EVERY 6 HOURS PRN
Status: DISCONTINUED | OUTPATIENT
Start: 2020-01-01 | End: 2020-01-01 | Stop reason: HOSPADM

## 2020-01-01 RX ORDER — MORPHINE SULFATE 2 MG/ML
2 INJECTION, SOLUTION INTRAMUSCULAR; INTRAVENOUS
Status: DISCONTINUED | OUTPATIENT
Start: 2020-01-01 | End: 2020-01-01

## 2020-01-01 RX ORDER — TORSEMIDE 20 MG/1
TABLET ORAL
Qty: 45 TABLET | OUTPATIENT
Start: 2020-01-01

## 2020-01-01 RX ORDER — BISACODYL 5 MG
5 TABLET, DELAYED RELEASE (ENTERIC COATED) ORAL DAILY PRN
Status: DISCONTINUED | OUTPATIENT
Start: 2020-01-01 | End: 2020-01-01 | Stop reason: HOSPADM

## 2020-01-01 RX ORDER — MORPHINE SULFATE 10 MG/5ML
5-10 SOLUTION ORAL
Status: DISCONTINUED | OUTPATIENT
Start: 2020-01-01 | End: 2020-01-01 | Stop reason: HOSPADM

## 2020-01-01 RX ORDER — ISOSORBIDE MONONITRATE 60 MG/1
120 TABLET, EXTENDED RELEASE ORAL DAILY
Status: DISCONTINUED | OUTPATIENT
Start: 2020-01-01 | End: 2020-01-01

## 2020-01-01 RX ORDER — LORAZEPAM 0.5 MG/1
.5-1 TABLET ORAL
Status: DISCONTINUED | OUTPATIENT
Start: 2020-01-01 | End: 2020-01-01 | Stop reason: HOSPADM

## 2020-01-01 RX ORDER — HYDRALAZINE HYDROCHLORIDE 25 MG/1
50 TABLET, FILM COATED ORAL 3 TIMES DAILY
Qty: 270 TABLET | Refills: 3 | COMMUNITY
Start: 2020-01-01 | End: 2020-01-01

## 2020-01-01 RX ORDER — LIDOCAINE 40 MG/G
CREAM TOPICAL
Status: DISCONTINUED | OUTPATIENT
Start: 2020-01-01 | End: 2020-01-01

## 2020-01-01 RX ORDER — HYDRALAZINE HYDROCHLORIDE 25 MG/1
25 TABLET, FILM COATED ORAL 3 TIMES DAILY
Qty: 270 TABLET | Refills: 3 | Status: SHIPPED | OUTPATIENT
Start: 2020-01-01 | End: 2020-01-01

## 2020-01-01 RX ORDER — AMOXICILLIN 250 MG
1 CAPSULE ORAL 2 TIMES DAILY
Status: DISCONTINUED | OUTPATIENT
Start: 2020-01-01 | End: 2020-01-01 | Stop reason: HOSPADM

## 2020-01-01 RX ORDER — ISOSORBIDE MONONITRATE 120 MG/1
TABLET, EXTENDED RELEASE ORAL
Qty: 90 TABLET | Refills: 1 | Status: SHIPPED | OUTPATIENT
Start: 2020-01-01

## 2020-01-01 RX ORDER — FUROSEMIDE 10 MG/ML
40 INJECTION INTRAMUSCULAR; INTRAVENOUS
Status: DISCONTINUED | OUTPATIENT
Start: 2020-01-01 | End: 2020-01-01

## 2020-01-01 RX ORDER — AMOXICILLIN 250 MG
2 CAPSULE ORAL 2 TIMES DAILY
Status: DISCONTINUED | OUTPATIENT
Start: 2020-01-01 | End: 2020-01-01 | Stop reason: HOSPADM

## 2020-01-01 RX ORDER — FUROSEMIDE 10 MG/ML
60 INJECTION INTRAMUSCULAR; INTRAVENOUS ONCE
Status: COMPLETED | OUTPATIENT
Start: 2020-01-01 | End: 2020-01-01

## 2020-01-01 RX ORDER — FLUMAZENIL 0.1 MG/ML
INJECTION, SOLUTION INTRAVENOUS
Status: COMPLETED
Start: 2020-01-01 | End: 2020-01-01

## 2020-01-01 RX ORDER — DOXYCYCLINE 100 MG/10ML
100 INJECTION, POWDER, LYOPHILIZED, FOR SOLUTION INTRAVENOUS ONCE
Status: COMPLETED | OUTPATIENT
Start: 2020-01-01 | End: 2020-01-01

## 2020-01-01 RX ORDER — HYDRALAZINE HYDROCHLORIDE 50 MG/1
50 TABLET, FILM COATED ORAL 3 TIMES DAILY
Status: DISCONTINUED | OUTPATIENT
Start: 2020-01-01 | End: 2020-01-01

## 2020-01-01 RX ORDER — CLOPIDOGREL BISULFATE 75 MG/1
75 TABLET ORAL DAILY
Status: DISCONTINUED | OUTPATIENT
Start: 2020-01-01 | End: 2020-01-01

## 2020-01-01 RX ORDER — ASPIRIN 81 MG/1
324 TABLET, CHEWABLE ORAL ONCE
Status: COMPLETED | OUTPATIENT
Start: 2020-01-01 | End: 2020-01-01

## 2020-01-01 RX ORDER — ACETAMINOPHEN 650 MG/1
650 SUPPOSITORY RECTAL EVERY 4 HOURS PRN
Status: DISCONTINUED | OUTPATIENT
Start: 2020-01-01 | End: 2020-01-01 | Stop reason: HOSPADM

## 2020-01-01 RX ORDER — TORSEMIDE 20 MG/1
20 TABLET ORAL EVERY OTHER DAY
Qty: 45 TABLET | Refills: 3 | Status: SHIPPED | OUTPATIENT
Start: 2020-01-01

## 2020-01-01 RX ORDER — LEVOTHYROXINE SODIUM 75 UG/1
75 TABLET ORAL DAILY
Status: DISCONTINUED | OUTPATIENT
Start: 2020-01-01 | End: 2020-01-01

## 2020-01-01 RX ORDER — TORSEMIDE 10 MG/1
10 TABLET ORAL EVERY OTHER DAY
Qty: 45 TABLET | Refills: 3 | Status: SHIPPED | OUTPATIENT
Start: 2020-01-01

## 2020-01-01 RX ORDER — BISACODYL 5 MG
10 TABLET, DELAYED RELEASE (ENTERIC COATED) ORAL DAILY PRN
Status: DISCONTINUED | OUTPATIENT
Start: 2020-01-01 | End: 2020-01-01 | Stop reason: HOSPADM

## 2020-01-01 RX ORDER — HYDRALAZINE HYDROCHLORIDE 50 MG/1
50 TABLET, FILM COATED ORAL 3 TIMES DAILY
Qty: 270 TABLET | Refills: 3 | Status: SHIPPED | OUTPATIENT
Start: 2020-01-01

## 2020-01-01 RX ORDER — LORAZEPAM 2 MG/ML
.5-1 INJECTION INTRAMUSCULAR EVERY 4 HOURS PRN
Status: DISCONTINUED | OUTPATIENT
Start: 2020-01-01 | End: 2020-01-01

## 2020-01-01 RX ORDER — LEVOTHYROXINE SODIUM 75 UG/1
TABLET ORAL
Qty: 90 TABLET | Refills: 1 | Status: SHIPPED | OUTPATIENT
Start: 2020-01-01

## 2020-01-01 RX ORDER — OXYCODONE HYDROCHLORIDE 5 MG/1
5-10 TABLET ORAL
Status: DISCONTINUED | OUTPATIENT
Start: 2020-01-01 | End: 2020-01-01 | Stop reason: HOSPADM

## 2020-01-01 RX ORDER — NALOXONE HYDROCHLORIDE 0.4 MG/ML
.1-.4 INJECTION, SOLUTION INTRAMUSCULAR; INTRAVENOUS; SUBCUTANEOUS
Status: DISCONTINUED | OUTPATIENT
Start: 2020-01-01 | End: 2020-01-01 | Stop reason: HOSPADM

## 2020-01-01 RX ORDER — HYDRALAZINE HYDROCHLORIDE 50 MG/1
50 TABLET, FILM COATED ORAL 3 TIMES DAILY
Qty: 270 TABLET | Refills: 3 | COMMUNITY
Start: 2020-01-01 | End: 2020-01-01

## 2020-01-01 RX ORDER — BISACODYL 5 MG
15 TABLET, DELAYED RELEASE (ENTERIC COATED) ORAL DAILY PRN
Status: DISCONTINUED | OUTPATIENT
Start: 2020-01-01 | End: 2020-01-01 | Stop reason: HOSPADM

## 2020-01-01 RX ORDER — LIDOCAINE 40 MG/G
CREAM TOPICAL
Status: DISCONTINUED | OUTPATIENT
Start: 2020-01-01 | End: 2020-01-01 | Stop reason: HOSPADM

## 2020-01-01 RX ORDER — ACETAMINOPHEN 325 MG/1
650 TABLET ORAL EVERY 4 HOURS PRN
Status: DISCONTINUED | OUTPATIENT
Start: 2020-01-01 | End: 2020-01-01 | Stop reason: HOSPADM

## 2020-01-01 RX ORDER — MORPHINE SULFATE 100 MG/5ML
5-10 SOLUTION ORAL
Status: DISCONTINUED | OUTPATIENT
Start: 2020-01-01 | End: 2020-01-01

## 2020-01-01 RX ORDER — CETIRIZINE HYDROCHLORIDE 10 MG/1
TABLET ORAL
Qty: 90 TABLET | Refills: 3 | Status: SHIPPED | OUTPATIENT
Start: 2020-01-01

## 2020-01-01 RX ORDER — ALUMINA, MAGNESIA, AND SIMETHICONE 2400; 2400; 240 MG/30ML; MG/30ML; MG/30ML
30 SUSPENSION ORAL EVERY 4 HOURS PRN
Status: DISCONTINUED | OUTPATIENT
Start: 2020-01-01 | End: 2020-01-01 | Stop reason: HOSPADM

## 2020-01-01 RX ORDER — FUROSEMIDE 10 MG/ML
80 INJECTION INTRAMUSCULAR; INTRAVENOUS 2 TIMES DAILY
Status: DISCONTINUED | OUTPATIENT
Start: 2020-01-01 | End: 2020-01-01

## 2020-01-01 RX ORDER — MORPHINE SULFATE 10 MG/5ML
5-10 SOLUTION ORAL
Status: DISCONTINUED | OUTPATIENT
Start: 2020-01-01 | End: 2020-01-01

## 2020-01-01 RX ADMIN — LORAZEPAM 0.5 MG: 0.5 TABLET ORAL at 20:57

## 2020-01-01 RX ADMIN — MORPHINE SULFATE 2 MG: 2 INJECTION, SOLUTION INTRAMUSCULAR; INTRAVENOUS at 09:52

## 2020-01-01 RX ADMIN — MORPHINE SULFATE 2 MG: 2 INJECTION, SOLUTION INTRAMUSCULAR; INTRAVENOUS at 15:41

## 2020-01-01 RX ADMIN — FLUMAZENIL 0.2 MG: 0.1 INJECTION, SOLUTION INTRAVENOUS at 09:14

## 2020-01-01 RX ADMIN — MORPHINE SULFATE 5 MG: 100 SOLUTION ORAL at 09:44

## 2020-01-01 RX ADMIN — MORPHINE SULFATE 2 MG: 2 INJECTION, SOLUTION INTRAMUSCULAR; INTRAVENOUS at 08:56

## 2020-01-01 RX ADMIN — MORPHINE SULFATE 10 MG: 10 SOLUTION ORAL at 22:11

## 2020-01-01 RX ADMIN — LORAZEPAM 0.5 MG: 0.5 TABLET ORAL at 13:32

## 2020-01-01 RX ADMIN — DOXYCYCLINE 100 MG: 100 INJECTION, POWDER, LYOPHILIZED, FOR SOLUTION INTRAVENOUS at 01:50

## 2020-01-01 RX ADMIN — CEFTRIAXONE SODIUM 1 G: 1 INJECTION, POWDER, FOR SOLUTION INTRAMUSCULAR; INTRAVENOUS at 21:12

## 2020-01-01 RX ADMIN — MORPHINE SULFATE 10 MG: 100 SOLUTION ORAL at 19:53

## 2020-01-01 RX ADMIN — MORPHINE SULFATE 2 MG: 2 INJECTION, SOLUTION INTRAMUSCULAR; INTRAVENOUS at 17:46

## 2020-01-01 RX ADMIN — MORPHINE SULFATE 2 MG: 2 INJECTION, SOLUTION INTRAMUSCULAR; INTRAVENOUS at 11:16

## 2020-01-01 RX ADMIN — MORPHINE SULFATE 10 MG: 100 SOLUTION ORAL at 15:14

## 2020-01-01 RX ADMIN — LORAZEPAM 1 MG: 0.5 TABLET ORAL at 09:23

## 2020-01-01 RX ADMIN — CEFEPIME HYDROCHLORIDE 2 G: 2 INJECTION, POWDER, FOR SOLUTION INTRAVENOUS at 01:10

## 2020-01-01 RX ADMIN — CEFEPIME HYDROCHLORIDE 1 G: 1 INJECTION, POWDER, FOR SOLUTION INTRAMUSCULAR; INTRAVENOUS at 02:50

## 2020-01-01 RX ADMIN — LORAZEPAM 1 MG: 2 INJECTION INTRAMUSCULAR; INTRAVENOUS at 03:43

## 2020-01-01 RX ADMIN — MORPHINE SULFATE 10 MG: 100 SOLUTION ORAL at 10:39

## 2020-01-01 RX ADMIN — MORPHINE SULFATE 2 MG: 2 INJECTION, SOLUTION INTRAMUSCULAR; INTRAVENOUS at 05:22

## 2020-01-01 RX ADMIN — LORAZEPAM 0.5 MG: 0.5 TABLET ORAL at 16:50

## 2020-01-01 RX ADMIN — MORPHINE SULFATE 2 MG: 2 INJECTION, SOLUTION INTRAMUSCULAR; INTRAVENOUS at 20:38

## 2020-01-01 RX ADMIN — LORAZEPAM 0.5 MG: 2 INJECTION INTRAMUSCULAR; INTRAVENOUS at 04:09

## 2020-01-01 RX ADMIN — FUROSEMIDE 60 MG: 10 INJECTION, SOLUTION INTRAVENOUS at 01:07

## 2020-01-01 RX ADMIN — ASPIRIN 81 MG 324 MG: 81 TABLET ORAL at 02:50

## 2020-01-01 RX ADMIN — MORPHINE SULFATE 10 MG: 100 SOLUTION ORAL at 13:37

## 2020-01-01 RX ADMIN — MORPHINE SULFATE 2 MG: 2 INJECTION, SOLUTION INTRAMUSCULAR; INTRAVENOUS at 12:50

## 2020-01-01 RX ADMIN — LORAZEPAM 1 MG: 2 INJECTION INTRAMUSCULAR; INTRAVENOUS at 23:29

## 2020-01-01 ASSESSMENT — MIFFLIN-ST. JEOR
SCORE: 1584.83
SCORE: 1641.07

## 2020-01-01 ASSESSMENT — ACTIVITIES OF DAILY LIVING (ADL)
ADLS_ACUITY_SCORE: 27
ADLS_ACUITY_SCORE: 22
ADLS_ACUITY_SCORE: 16
ADLS_ACUITY_SCORE: 24
ADLS_ACUITY_SCORE: 24
ADLS_ACUITY_SCORE: 22
ADLS_ACUITY_SCORE: 22
ADLS_ACUITY_SCORE: 16
ADLS_ACUITY_SCORE: 24
ADLS_ACUITY_SCORE: 24
ADLS_ACUITY_SCORE: 27

## 2020-01-01 ASSESSMENT — ENCOUNTER SYMPTOMS
COUGH: 0
FEVER: 0
CONSTIPATION: 1
HEMATURIA: 1
DYSURIA: 0
SHORTNESS OF BREATH: 1
DIFFICULTY URINATING: 0
NAUSEA: 0
DIARRHEA: 0
VOMITING: 0

## 2020-01-03 NOTE — ED TRIAGE NOTES
Pt reports a few episodes of pink then dark rust colored urine. Denies bleeding elsewhere. On 2x blood thinners.

## 2020-01-03 NOTE — TELEPHONE ENCOUNTER
Attempt to complete Repatha PA shows Repatha auth already in place. Repatha rx at [facility].

## 2020-01-03 NOTE — TELEPHONE ENCOUNTER
PRIOR AUTHORIZATION DENIED    Medication: Praluent 75mg/ mL pens - NEW INS, DENIED    Denial Date: 1/3/2020    Denial Rationale: Repatha is preferred    Appeal Information: N/A - will not appeal - changing to Repatha

## 2020-01-03 NOTE — TELEPHONE ENCOUNTER
PA Initiation    Medication: Praluent 75mg/ mL pens - NEW INS  Insurance Company: [employer] - Phone [phone] Fax [phone]  Pharmacy Filling the Rx: [facility] - [place], [place] - [facility] [address]  Filling Pharmacy Phone: [phone]  Filling Pharmacy Fax:    Start Date: 1/3/2020    **Current Praluent pt; rec'd letter in 2019 that ins will prefer Repatha for 2020; Repatha rx profiled at [facility] in case it is needed

## 2020-01-03 NOTE — ED AVS SNAPSHOT
Emergency Department  [address] [place] [address]  Phone:  [phone]  Fax:  [phone]                                    Oscar Terrazas   MRN: [id number]    Department:   Emergency Department   Date of Visit:  1/3/2020           After Visit Summary Signature Page    I have received my discharge instructions, and my questions have been answered. I have discussed any challenges I see with this plan with the nurse or doctor.    ..........................................................................................................................................  Patient/Patient Representative Signature      ..........................................................................................................................................  Patient Representative Print Name and Relationship to Patient    ..................................................               ................................................  Date                                   Time    ..........................................................................................................................................  Reviewed by Signature/Title    ...................................................              ..............................................  Date                                               Time          22EPIC Rev 08/18

## 2020-01-10 NOTE — PROGRESS NOTES
ANTICOAGULATION FOLLOW-UP CLINIC VISIT    Patient Name:  Oscar Terrazas  Date:  1/10/2020  Contact Type:  Face to Face    SUBJECTIVE:  Patient Findings     Comments:   The patient was assessed for diet, medication, and activity level changes, missed or extra doses, bruising or bleeding, with no problem findings.  1/3/2020 was in ER for blood in urine        Clinical Outcomes     Comments:   The patient was assessed for diet, medication, and activity level changes, missed or extra doses, bruising or bleeding, with no problem findings.  1/3/2020 was in ER for blood in urine           OBJECTIVE    INR Protime   Date Value Ref Range Status   01/10/2020 1.5 (A) 0.86 - 1.14 Final       ASSESSMENT / PLAN  INR assessment SUB    Recheck INR In: 2 WEEKS    INR Location Clinic      Anticoagulation Summary  As of 1/10/2020    INR goal:   2.0-3.0   TTR:   51.8 % (3.4 mo)   INR used for dosin.5! (1/10/2020)   Warfarin maintenance plan:   1 mg (2 mg x 0.5) every Mon; 2 mg (2 mg x 1) all other days   Full warfarin instructions:   1/10: 3 mg; : 2 mg; : 2 mg; Otherwise 1 mg every Mon; 2 mg all other days   Weekly warfarin total:   13 mg   Plan last modified:   Nisreen Jay RN (10/25/2019)   Next INR check:   2020   Target end date:   Indefinite    Indications    Atrial fibrillation (H) [I48.91]             Anticoagulation Episode Summary     INR check location:       Preferred lab:       Send [place] reminders [place]   [facility]    Comments:         Anticoagulation Care Providers     Provider Role Specialty Phone number    Jorge Hillman MD [facility] Internal Medicine [phone]            See the Encounter Report to view Anticoagulation Flowsheet and Dosing Calendar (Go to Encounters tab in chart review, and find the Anticoagulation Therapy Visit)        Nilda Duran RN

## 2020-01-24 NOTE — TELEPHONE ENCOUNTER
No benefit for daughter being off work and staying with pt currently as cannot say if or when would have rebleeding  If occurred.   received report from Urology. Pt found to have a bladder cancer covering 50% of the right bladder wall and high risk for recurrent bleeding if not treated. Urology would need pt to be off of Plavix and Coumadin for 5 days prior to any procedure to reduce risk bleeding during procedure.  Risk of heart blood vessel complication off of Plavix and risk stroke off Warfarin (though stroke risk small with that short window off of Warfarin).  Per Urology note, plan is for pt to talk with Dr Gibson re: issue to get his opinion of risk going off of Plavix (this MD aware of previous MI when went off Plavix for tooth procedure but have to balance that risk with risk of bleeding and then trying to emergently do something while having the effects of Plavix still in the body preventing clotting). Suggest pt speak with Dr Gibson to get opinion. Unclear if preop needed since procedure would be done outpatient per Dr Mayer note. If needed, then pt to see me or partner for prior prior to  Urology procedure and would need to be off both Warfarin and Plavix 5 days prior to the procedure date

## 2020-01-24 NOTE — PROGRESS NOTES
ANTICOAGULATION FOLLOW-UP CLINIC VISIT    Patient Name:  Oscar Terrazas  Date:  2020  Contact Type:  Face to Face    SUBJECTIVE:  Patient Findings     Comments:   Has bladder CA and waiting to find out how he wants to do the hold for surgery. Triage has sent a message to Dr Hillman.         Clinical Outcomes     Comments:   Has bladder CA and waiting to find out how he wants to do the hold for surgery. Triage has sent a message to Dr Hillman.            OBJECTIVE    INR Protime   Date Value Ref Range Status   2020 1.4 (A) 0.86 - 1.14 Final       ASSESSMENT / PLAN  INR assessment SUB    Recheck INR In: 1 WEEK    INR Location Clinic      Anticoagulation Summary  As of 2020    INR goal:   2.0-3.0   TTR:   45.5 % (3.9 mo)   INR used for dosin.4! (2020)   Warfarin maintenance plan:   1 mg (2 mg x 0.5) every Mon; 2 mg (2 mg x 1) all other days   Full warfarin instructions:   : 4 mg; Otherwise 1 mg every Mon; 2 mg all other days   Weekly warfarin total:   13 mg   Plan last modified:   Nisreen Jay RN (10/25/2019)   Next INR check:   2020   Target end date:   Indefinite    Indications    Atrial fibrillation (H) [I48.91]             Anticoagulation Episode Summary     INR check location:       Preferred lab:       Send INR reminders to:   [facility]    Comments:         Anticoagulation Care Providers     Provider Role Specialty Phone number    Jorge Hillman MD [facility] Internal Medicine [phone]            See the Encounter Report to view Anticoagulation Flowsheet and Dosing Calendar (Go to Encounters tab in chart review, and find the Anticoagulation Therapy Visit)        Nilda Duran RN

## 2020-01-24 NOTE — TELEPHONE ENCOUNTER
Oscar Crawford's INR was 1.4 today.  He said he did not forget and warfarin. IT was 1.5 2 week ago. I increased the coumadin today and asked him to cut back on the greens.

## 2020-01-24 NOTE — TELEPHONE ENCOUNTER
Daughter states Dr. Yoon was going to consult with Dr. Rand to make a decision.Samantha Stack RN

## 2020-01-24 NOTE — TELEPHONE ENCOUNTER
Recently diagnosed bladder cancer urology stating that pt needs surgery soon as possible that if something tears per the daughter with the bladder he can bleed to death . Has to be off blood thinner for five days prior surgery last time he did this for  teeth extraction he had a heart attack .  Daughter wanting MD opinion if she should be at home with him in case  She has FMLA for him . Urology is at  [address]- Records are being sent over now . Will place on MD desk .Samantha Stack RN  [phone] is daughter number .

## 2020-01-25 NOTE — TELEPHONE ENCOUNTER
"Requested Prescriptions   Pending Prescriptions Disp Refills     cetirizine (ZYRTEC) 10 MG tablet [Pharmacy Med Name: CETIRIZINE 10MG TABLETS]  Last Written Prescription Date:  01/14/2019  Last Fill Quantity: 90,  # refills: 03   Last Office Visit: 9/23/2019   Future Office Visit:      90 tablet 3     Sig: TAKE 1 TABLET(10 MG) BY MOUTH EVERY EVENING       Antihistamines Protocol Failed - 1/24/2020  7:14 PM        Failed - Patient is 3-64 years of age     Apply weight-based dosing for peds patients age 3 - 12 years of age.    Forward request to provider for patients under the age of 3 or over the age of 64.          Passed - Recent (12 mo) or future (30 days) visit within the authorizing provider's specialty     Patient has had an office visit with the authorizing provider or a provider within the authorizing providers department within the previous 12 mos or has a future within next 30 days. See \"Patient Info\" tab in inbasket, or \"Choose Columns\" in Meds & Orders section of the refill encounter.              Passed - Medication is active on med list          "

## 2020-01-27 NOTE — TELEPHONE ENCOUNTER
Received call from pt's daughter Yasmin asking if it is OK for pt to hold Plavix and warfarin for 5 days prior to bladder surgery. Pt is scheduled for cystoscopy and resection of bladder tumor on 2/5/20. Pt is scheduled for pre-op evaluation at his PCP's office tomorrow, 1/28/20. Yasmin stated Dr. Yoon had planned on calling Dr. Gibson to discuss but she's not sure if they have spoken. Advised will update Dr. Gibson and call back with recommendations.

## 2020-01-28 NOTE — TELEPHONE ENCOUNTER
----- Message from Candice Martinez sent at 1/28/2020 10:09 AM CST -----  Regarding: update on patient  Good morning -- I called to try to schedule patients follow up in March.  Spoke to daughter and she states he has bladder cancer and he's not sure he wants to do anything.   She is talking with his cancer team regarding hospice care.  She will call us if he wants to continue seeing us or what the update is.        Candice

## 2020-01-28 NOTE — TELEPHONE ENCOUNTER
Received response below from Dr. Gibson:     Sebastien Gibson MD Hair, Ashley W RN   Caller: Unspecified (Yesterday,  4:17 PM)             Yes OK to hold plavix and warfarin prior to surgery. As always there is a very small risk of stroke with transient warfarin interruption but is < 1%.      Called back to Yasmin, no answer. Left  requesting call back to Team 1.

## 2020-01-31 NOTE — PROGRESS NOTES
ANTICOAGULATION FOLLOW-UP CLINIC VISIT    Patient Name:  Oscar Terrazas  Date:  2020  Contact Type:  Face to Face    SUBJECTIVE:  Patient Findings     Comments:   The patient was assessed for diet, medication, and activity level changes, missed or extra doses, bruising or bleeding, with no problem findings.          Clinical Outcomes     Comments:   The patient was assessed for diet, medication, and activity level changes, missed or extra doses, bruising or bleeding, with no problem findings.             OBJECTIVE    INR Protime   Date Value Ref Range Status   2020 1.7 (A) 0.86 - 1.14 Final       ASSESSMENT / PLAN  INR assessment SUB    Recheck INR In: 1 WEEK    INR Location Clinic      Anticoagulation Summary  As of 2020    INR goal:   2.0-3.0   TTR:   42.9 % (4.1 mo)   INR used for dosin.7! (2020)   Warfarin maintenance plan:   1 mg (2 mg x 0.5) every Mon; 2 mg (2 mg x 1) all other days   Full warfarin instructions:   : 4 mg; 2/3: 2 mg; : 4 mg; Otherwise 1 mg every Mon; 2 mg all other days   Weekly warfarin total:   13 mg   Plan last modified:   Nisreen Jay RN (10/25/2019)   Next INR check:   2020   Target end date:   Indefinite    Indications    Atrial fibrillation (H) [I48.91]             Anticoagulation Episode Summary     INR check location:       Preferred lab:       Send INR reminders to:   [facility] [place]    Comments:         Anticoagulation Care Providers     Provider Role Specialty Phone number    Jorge Hillman MD [facility] Internal Medicine [phone]            See the Encounter Report to view Anticoagulation Flowsheet and Dosing Calendar (Go to Encounters tab in chart review, and find the Anticoagulation Therapy Visit)        Nilda Duran RN

## 2020-02-10 NOTE — PROGRESS NOTES
Subjective     Oscar Terrazas is a 86 year old male who presents to clinic today for the following health issues:    HPI   Chief Complaint   Patient presents with     Discuss     Patient's choice to not go through with Bladder Procedure. Would like to discuss options and to complete POLST      Pt's past medical history, family history, habits, medications and allergies were reviewed with the patient today.  See snap shot for  HCM status. Most recent lab results reviewed with pt. Problem list and histories reviewed & adjusted, as indicated.  Additional history as below:    Was seen in the ER on 1/3/20 for gross hematuria.  At that time, CT scan was performed and urinalysis with results as below:    CT Abdomen Pelvis w/o Contrast:  1.  Hematoma in the urinary bladder. Possible wall thickening or mass at the posterior right aspect of the urinary bladder.  2.  No hydronephrosis.  3.  The gallbladder is distended and contains multiple small stones. There are probable tiny stones in the common bile duct.  4.  Colonic diverticula without acute diverticulitis.  5.  Prostate gland enlargement.  6.  Small amount of ascites.  7.  Bilateral pleural effusions and associated atelectasis.  As per radiology.     Laboratory:  Laboratory findings were communicated with the patient who voiced understanding of the findings.     CBC: WBC 6.5, HGB 10.4,   BMP: Chloride 110, Glucose 231, BUN 72, Creatinine 3.343, GFR 15 o/w WNL      INR 2.00      UA with micro: Blood Large, Protein Albumin 30, Leukocyte Esterase Small, RBC/HPF  >182, WBC/HPF 23, Mucous Present o/w negative      Patient subsequently saw urology on 1/22/20.  Underwent cystoscopy which showed a large papillary tumor taking up 50% of the right bladder wall with right ureter obstruction consistent with probable bladder cancer.  Patient was instructed that he would have to be off of Plavix and warfarin for about 5 days prior to any procedure on this.  Patient states he  has decided that he does not wish to undergo any of further evaluation or treatment for this tumor.  He has a history of chronic kidney disease and has been near points of needing dialysis but has also made a decision that he does not wish to have a proceed with dialysis.  He states that he has led a good life and if this bladder tumor or kidney disease or other cause him to pass away, he would be comfortable with this and wishes to enjoy quality of life as it is right now.  Patient wishes to complete POLST form and requests that comfort care measures be done if needed and wishes to be DNR/DNI.  Patient states hematuria has been only intermittent now and minimal.  Denies dysuria.  No fevers or chills.  Denies acute back pain. Wishes to manage other ongoing medical issues that can be addressed with medications. Not checking sugars at home recently  Additional ROS:   Constitutional, HEENT, Cardiovascular, Pulmonary, GI and , Neuro, MSK and Psych review of systems/symptoms are otherwise negative or unchanged from previous, except as noted above.      OBJECTIVE:  /74   Pulse 77   Temp 98.5  F (36.9  C) (Temporal)   Resp 16   Wt 86.6 kg (191 lb)   SpO2 98%   BMI 25.90 kg/m     Estimated body mass index is 25.9 kg/m  as calculated from the following:    Height as of 1/3/20: 1.829 m (6').    Weight as of this encounter: 86.6 kg (191 lb).     Neck: no adenopathy. Thyroid normal to palpation. No bruits  Pulm: Lungs clear to auscultation   CV: Regular rates and rhythm. 2/6 MARICRUZ  GI: Soft, nontender, Normal active bowel sounds, No hepatosplenomegaly or masses palpable  Ext: Peripheral pulses intact. No edema.  Neuro: Normal strength and tone, sensory exam grossly normal  Back: No CVAT    Assessment/Plan: (See plan discussion below for further details)  1. Bladder tumor  Discussed that tumor is blocking ureter and contributing to worsening kidney function.  Patient states he is aware this and declines any  treatment    2. Chronic kidney disease, stage V (H)  Related to diabetes and some because of bladder tumor as above.  Declines any form of dialysis.  Labs ordered for monitoring  - Basic metabolic panel    3. 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  A1c 4 follow-up of current control status.  - Hemoglobin A1c    4. Anemia in stage 4 chronic kidney disease (H)  Related to #1 and #2.  Labs as ordered  - CBC with platelets  - Iron and iron binding capacity  - Ferritin    5. Hypothyroidism, unspecified type  Clinically euthyroid.  Due for thyroid recheck with use of levothyroxine  - TSH with free T4 reflex    Plan discussion:    POLST form completed  Labs as ordered  Continue current medications  Patient declines any further work-up for bladder tumor and will respect his wishes       Jorge Hillman MD  Internal Medicine Department  [facility]    (Chart documentation was completed, in part, with [employer] voice-recognition software. Even though reviewed, some grammatical, spelling, and word errors may remain.)

## 2020-02-10 NOTE — PROGRESS NOTES
ANTICOAGULATION FOLLOW-UP CLINIC VISIT    Patient Name:  Oscar Terrazas  Date:  2/10/2020  Contact Type:  Face to Face    SUBJECTIVE:  Patient Findings     Comments:   The patient was assessed for diet, medication, and activity level changes, missed or extra doses, bruising or bleeding, with no problem findings.          Clinical Outcomes     Comments:   The patient was assessed for diet, medication, and activity level changes, missed or extra doses, bruising or bleeding, with no problem findings.             OBJECTIVE    INR Protime   Date Value Ref Range Status   02/10/2020 1.6 (A) 0.86 - 1.14 Final       ASSESSMENT / PLAN  INR assessment SUB    Recheck INR In: 4 DAYS    INR Location Clinic      Anticoagulation Summary  As of 2/10/2020    INR goal:   2.0-3.0   TTR:   39.7 % (4.4 mo)   INR used for dosin.6! (2/10/2020)   Warfarin maintenance plan:   1 mg (2 mg x 0.5) every Mon; 2 mg (2 mg x 1) all other days   Full warfarin instructions:   2/10: 4 mg; Otherwise 1 mg every Mon; 2 mg all other days   Weekly warfarin total:   13 mg   Plan last modified:   Nisreen Jay RN (10/25/2019)   Next INR check:   2020   Target end date:   Indefinite    Indications    Atrial fibrillation (H) [I48.91]             Anticoagulation Episode Summary     INR check location:       Preferred lab:       Send INR reminders to:   [facility]    Comments:         Anticoagulation Care Providers     Provider Role Specialty Phone number    Jorge Hillman MD [facility] Internal Medicine [phone]            See the Encounter Report to view Anticoagulation Flowsheet and Dosing Calendar (Go to Encounters tab in chart review, and find the Anticoagulation Therapy Visit)        Nilda Duran RN

## 2020-02-14 NOTE — PROGRESS NOTES
ANTICOAGULATION FOLLOW-UP CLINIC VISIT    Patient Name:  Oscar Terrazas  Date:  2/14/2020  Contact Type:  Face to Face    SUBJECTIVE:  Patient Findings     Positives:   Missed doses (missed wednesday but took .5 tab extra last nighth)    Comments:   The patient was assessed for diet, medication, and activity level changes, missed or extra doses, bruising or bleeding,         Clinical Outcomes     Comments:   The patient was assessed for diet, medication, and activity level changes, missed or extra doses, bruising or bleeding,            OBJECTIVE    INR Protime   Date Value Ref Range Status   02/10/2020 1.6 (A) 0.86 - 1.14 Final       ASSESSMENT / PLAN  INR assessment THER    Recheck INR In: 1 WEEK    INR Location Clinic      Anticoagulation Summary  As of 2/14/2020    INR goal:   2.0-3.0   TTR:   39.7 % (4.4 mo)   INR used for dosing:      Warfarin maintenance plan:   1 mg (2 mg x 0.5) every Mon; 2 mg (2 mg x 1) all other days   Full warfarin instructions:   2/14: 4 mg; 2/17: 4 mg; Otherwise 1 mg every Mon; 2 mg all other days   Weekly warfarin total:   13 mg   Plan last modified:   Nisreen Jay RN (10/25/2019)   Next INR check:   2/21/2020   Target end date:   Indefinite    Indications    Atrial fibrillation (H) [I48.91]             Anticoagulation Episode Summary     INR check location:       Preferred lab:       Send INR reminders to:   [facility]    Comments:         Anticoagulation Care Providers     Provider Role Specialty Phone number    Jorge Hillman MD [facility] Internal Medicine [phone]            See the Encounter Report to view Anticoagulation Flowsheet and Dosing Calendar (Go to Encounters tab in chart review, and find the Anticoagulation Therapy Visit)        Nilda Duran RN

## 2020-02-21 NOTE — PROGRESS NOTES
ANTICOAGULATION FOLLOW-UP CLINIC VISIT    Patient Name:  Oscar Terrazas  Date:  2020  Contact Type:  Face to Face    SUBJECTIVE:         OBJECTIVE    INR Protime   Date Value Ref Range Status   2020 2.0 (A) 0.86 - 1.14 Final       ASSESSMENT / PLAN  INR assessment THER    Recheck INR In: 2 WEEKS    INR Location Clinic      Anticoagulation Summary  As of 2020    INR goal:   2.0-3.0   TTR:   36.7 % (4.8 mo)   INR used for dosin.0 (2020)   Warfarin maintenance plan:   4 mg (2 mg x 2) every Mon, Fri; 2 mg (2 mg x 1) all other days   Full warfarin instructions:   4 mg every Mon, Fri; 2 mg all other days   Weekly warfarin total:   18 mg   Plan last modified:   Nilda Duran RN (2020)   Next INR check:   3/6/2020   Target end date:   Indefinite    Indications    Atrial fibrillation (H) [I48.91]             Anticoagulation Episode Summary     INR check location:       Preferred lab:       Send INR reminders to:   [facility]    Comments:         Anticoagulation Care Providers     Provider Role Specialty Phone number    Jorge Hillman MD [facility] Internal Medicine [phone]            See the Encounter Report to view Anticoagulation Flowsheet and Dosing Calendar (Go to Encounters tab in chart review, and find the Anticoagulation Therapy Visit)        Nilda Duran RN

## 2020-03-04 NOTE — TELEPHONE ENCOUNTER
RN returned call to [facility] and notified pharmacy staff of provider note below.   Pharmacy staff confirmed they will put a note on pt's file that the correct dose is now from the 19 torsemide 10mg tablet order, and they will notify pt too.    torsemide (DEMADEX) 10 MG tablet 90 tablet 3 2019  No   Si tab daily in AM  on odd days and 1/2 tab daily in AM on even days     [id number] - [facility] [address] AT [facility] & [facility]    Teresita Melara, BRENN, RN

## 2020-03-04 NOTE — TELEPHONE ENCOUNTER
It appears his torsemide dose was reduced to using 10 mg tabs instead of 20 mg tabs, as of cardiology follow-up on 11/21/19.  At that time, script for 10 mg tabs was written, with refills for the year, so should still have access to that.

## 2020-03-04 NOTE — TELEPHONE ENCOUNTER
"Requested Prescriptions   Pending Prescriptions Disp Refills     torsemide (DEMADEX) 20 MG tablet [Pharmacy Med Name: TORSEMIDE 20MG TABLETS] 45 tablet      Sig: TAKE 1 TABLET BY MOUTH EVERY OTHER DAY       Diuretics (Including Combos) Protocol Failed - 3/4/2020 11:36 AM        Failed - Normal serum creatinine on file in past 12 months     Recent Labs   Lab Test 02/10/20  1552   CR 3.36*              Passed - Blood pressure under 140/90 in past 12 months     BP Readings from Last 3 Encounters:   02/10/20 128/74   01/03/20 (!) 146/96   11/21/19 136/60                 Passed - Recent (12 mo) or future (30 days) visit within the authorizing provider's specialty     Patient has had an office visit with the authorizing provider or a provider within the authorizing providers department within the previous 12 mos or has a future within next 30 days. See \"Patient Info\" tab in inbasket, or \"Choose Columns\" in Meds & Orders section of the refill encounter.              Passed - Medication is active on med list        Passed - Patient is age 18 or older        Passed - Normal serum potassium on file in past 12 months     Recent Labs   Lab Test 02/10/20  1552   POTASSIUM 4.4                    Passed - Normal serum sodium on file in past 12 months     Recent Labs   Lab Test 02/10/20  1552                 Routing refill request to provider for review/approval because:  Labs out of range:  creat        "

## 2020-03-06 NOTE — PROGRESS NOTES
ANTICOAGULATION FOLLOW-UP CLINIC VISIT    Patient Name:  Oscar Terrazas  Date:  3/6/2020  Contact Type:  Face to Face    SUBJECTIVE:  Patient Findings     Comments:   The patient was assessed for diet, medication, and activity level changes, missed or extra doses, bruising or bleeding, with no problem findings.          Clinical Outcomes     Comments:   The patient was assessed for diet, medication, and activity level changes, missed or extra doses, bruising or bleeding, with no problem findings.             OBJECTIVE    INR Protime   Date Value Ref Range Status   2020 2.0 (A) 0.86 - 1.14 Final       ASSESSMENT / PLAN  INR assessment THER    Recheck INR In: 3 WEEKS    INR Location Clinic      Anticoagulation Summary  As of 3/6/2020    INR goal:   2.0-3.0   TTR:   42.3 % (5.3 mo)   INR used for dosin.0 (3/6/2020)   Warfarin maintenance plan:   4 mg (2 mg x 2) every Mon, Fri; 2 mg (2 mg x 1) all other days   Full warfarin instructions:   4 mg every Mon, Fri; 2 mg all other days   Weekly warfarin total:   18 mg   Plan last modified:   Nilda Duran RN (2020)   Next INR check:   3/27/2020   Target end date:   Indefinite    Indications    Atrial fibrillation (H) [I48.91]             Anticoagulation Episode Summary     INR check location:       Preferred lab:       Send INR reminders to:   [facility]    Comments:         Anticoagulation Care Providers     Provider Role Specialty Phone number    BhartiJorge puckett MD [facility] Internal Medicine [phone]            See the Encounter Report to view Anticoagulation Flowsheet and Dosing Calendar (Go to Encounters tab in chart review, and find the Anticoagulation Therapy Visit)        Nilda Duran RN

## 2020-03-06 NOTE — TELEPHONE ENCOUNTER
Received call from pt's daughter (Yasmin) regarding pt's torsemide medication.   Yasmin reports that pt has been alternating each day between 20mg and 10mg of torsemide.   When pt last saw Dr. Gibson on 11/21/19, Yasmin said there was no change to pt's torsemide.   After that OV, Dr. Gibson placed an order for torsemide 10mg with instructions to alternate between 1 tablet and half a tablet each day.     Per Dr. Gibson's note from 11/21/19:  Recently his diuretics were decreased because of exacerbation of renal function and today's creatinine is returned back to his baseline of 3.4    Per Dr. Vines's (PCP) note from 9/23/19:   Reduce Torsemide to 1 tab on odd days and 1/2 tab on even days    Seems like pt never decreased the torsemide dose to 10mg one day and then 5mg one day. Yasmin did not seem aware of that change.   Yasmin would like to keep pt on dose he has been taking and then address this at the office visit with Scarlet on 3/17/20.   Pt has enough medication until his follow up with Scarlet and pt will have a BMP at that time. Previous BMP was done on 2/10/20.

## 2020-03-16 NOTE — TELEPHONE ENCOUNTER
Will get labs at [facility] today. Agreed to Telephone visit for appointment tomorrow with Scarlet.     Spoke with Daughter, Yasmin Terrazas at [phone]. Please call this phone number for appointment.

## 2020-03-17 NOTE — PROGRESS NOTES
"Oscar Terrazas is a 86 year old male who is being evaluated via a billable telephone visit.      The patient has been notified of following:     \"This telephone visit will be conducted via a call between you and your physician/provider. We have found that certain health care needs can be provided without the need for a physical exam.  This service lets us provide the care you need with a short phone conversation.  If a prescription is necessary we can send it directly to your pharmacy.  If lab work is needed we can place an order for that and you can then stop by our lab to have the test done at a later time.    If during the course of the call the physician/provider feels a telephone visit is not appropriate, you will not be charged for this service.\"       Oscar Terrazas complains of    Chief Complaint   Patient presents with     CORE       I have reviewed and updated the patient's Past Medical History, Social History, Family History and Medication List.    ALLERGIES  Advair [fluticasone-salmeterol]; Amlodipine besylate; Azithromycin; Clarithromycin; Hydrochlorothiazide; Lisinopril; Moxifloxacin hydrochloride; Penicillins; Pravastatin; and Vioxx    SDavis, CMA 03/17/20     Additional provider notes:   Oscar Terrazas is a very pleasant 86 year old male with a history of coronary artery disease with prior CABG and multivessel stenting, HFpEF, atrial fibrillation, advanced renal insufficiency, hypertension, DM, and venous insufficiency. I talked with patient and his daughter today.     He notes he has been doing well since his visit with Dr. Gibson four months ago. He denies any increasing dyspnea on exertion. No orthopnea or PND. His home weight has been stable at 190 lbs. He has some chronic peripheral edema, primarily in his feet. He wears compression stockings daily.     Assessment/Plan:  1.  Chronic diastolic congestive heart failure. Complicated by significant renal insufficiency. He continues on a regimen " of Torsemide 20 mg alternating with 10 mg daily. Symptoms and weight appear stable at this point. Will continue this regimen.   2.  Stage V CKD. Renal function stable today with a creatinine of 3.5. He does not want to pursue dialysis if indicated.   3.  Coronary artery disease. With chronic stable angina symptoms. Unchanged. Continue beta blocker, imdur, and repatha. He is not on aspirin as he is on warfarin. LDL was well controlled on last labs.   4.  Paroxsymal atrial fibrillation. Maintained on warfarin and Metoprolol XL.  5.  Mild cardiomyopathy. LVEF was 40 - 45% on last evaluation in September of 2019. ? Ischemic. Continue medical management. This has not been re-evaluated since then. If he has worsening issues with volume overload, would consider repeating.   6.  Hypertension. He monitors his blood pressure at home with consistent readings in the 120 - 130s systolic.    Will plan for routine [facility] clinic follow up in 3 - 4 months. I asked them to to contact us sooner with any questions.     I have reviewed the note as documented above.  This accurately captures the substance of my conversation with the patient      Phone call contact time  Call Started at 10:25  Call Ended at 10:32    Marie Gil PA-C

## 2020-04-13 NOTE — LETTER
April 13, 2020      Oscar Terrazas  [address] [place] [id number]        Dear Oscar    We are contacting you because our records show you are due for an INR.     There are potentially serious risks when taking warfarin without careful monitoring, and we want to make sure you are safely managed.     Please call the INR clinic appointment desk and we will be happy to schedule an appointment.  If there has been a change in your care, or other concerns, please let us know so we can help and/or update or records.     Sincerely,      [facility]  Anticoagulation Clinic

## 2020-04-17 NOTE — PROGRESS NOTES
ANTICOAGULATION FOLLOW-UP CLINIC VISIT    Patient Name:  Oscar Terrazas  Date:  2020  Contact Type:  Telephone    SUBJECTIVE:  Patient Findings     Comments:   The patient was assessed for diet, medication, and activity level changes, missed or extra doses, bruising or bleeding, with no problem findings.          Clinical Outcomes     Comments:   The patient was assessed for diet, medication, and activity level changes, missed or extra doses, bruising or bleeding, with no problem findings.             OBJECTIVE    INR   Date Value Ref Range Status   2020 1.80 (H) 0.86 - 1.14 Final     Comment:     This test is intended for monitoring Coumadin therapy.  Results are not   accurate in patients with prolonged INR due to factor deficiency.         ASSESSMENT / PLAN  INR assessment SUB    Recheck INR In: 3 WEEKS    INR Location Clinic      Anticoagulation Summary  As of 2020    INR goal:   2.0-3.0   TTR:   33.3 % (6.7 mo)   INR used for dosin.80! (2020)   Warfarin maintenance plan:   4 mg (2 mg x 2) every Mon, Fri; 2 mg (2 mg x 1) all other days   Full warfarin instructions:   4 mg every Mon, Fri; 2 mg all other days   Weekly warfarin total:   18 mg   Plan last modified:   Nilda Duran RN (2020)   Next INR check:   2020   Priority:   High   Target end date:   Indefinite    Indications    Atrial fibrillation (H) [I48.91]             Anticoagulation Episode Summary     INR check location:       Preferred lab:       Send INR reminders to:   [facility]    Comments:         Anticoagulation Care Providers     Provider Role Specialty Phone number    Jorge Hillman MD [facility] Internal Medicine [phone]            See the Encounter Report to view Anticoagulation Flowsheet and Dosing Calendar (Go to Encounters tab in chart review, and find the Anticoagulation Therapy Visit)        Nilda Duran, RN

## 2020-05-08 NOTE — PROGRESS NOTES
ANTICOAGULATION FOLLOW-UP CLINIC VISIT    Patient Name:  Oscar Terrazas  Date:  2020  Contact Type:  Telephone    SUBJECTIVE:  Patient Findings     Comments:   The patient was assessed for diet, medication, and activity level changes, missed or extra doses, bruising or bleeding, with no problem findings.          Clinical Outcomes     Comments:   The patient was assessed for diet, medication, and activity level changes, missed or extra doses, bruising or bleeding, with no problem findings.             OBJECTIVE    INR   Date Value Ref Range Status   2020 2.50 (H) 0.86 - 1.14 Final     Comment:     This test is intended for monitoring Coumadin therapy.  Results are not   accurate in patients with prolonged INR due to factor deficiency.         ASSESSMENT / PLAN  INR assessment THER    Recheck INR In: 4 WEEKS    INR Location Clinic      Anticoagulation Summary  As of 2020    INR goal:   2.0-3.0   TTR:   36.9 % (7.4 mo)   INR used for dosin.50 (2020)   Warfarin maintenance plan:   4 mg (2 mg x 2) every Mon, Fri; 2 mg (2 mg x 1) all other days   Full warfarin instructions:   4 mg every Mon, Fri; 2 mg all other days   Weekly warfarin total:   18 mg   No change documented:   Nilda Duran RN   Plan last modified:   Nilda Duran RN (2020)   Next INR check:   2020   Priority:   Maintenance   Target end date:   Indefinite    Indications    Atrial fibrillation (H) [I48.91]             Anticoagulation Episode Summary     INR check location:       Preferred lab:       Send INR reminders to:   [facility]    Comments:         Anticoagulation Care Providers     Provider Role Specialty Phone number    Jorge Hillman MD [facility] Internal Medicine [phone]            See the Encounter Report to view Anticoagulation Flowsheet and Dosing Calendar (Go to Encounters tab in chart review, and find the Anticoagulation Therapy Visit)        Nilda Duran RN

## 2020-05-20 NOTE — TELEPHONE ENCOUNTER
Patient's daughter called. She noticed while setting up his meds that his last refill of Hydralazine was for 25 mg but he has always taken 50 mg. Back on 11/21/19 Dr. Gibson went to change the Hydralazine dose but the order is for 50 mg tablet, 25 mg TID. I sent a message to Team Nurse to clarify and let patient know the correct dose.

## 2020-06-12 NOTE — PROGRESS NOTES
ANTICOAGULATION MANAGEMENT     Patient Name:  Oscar Terrazas  Date:  2020    ASSESSMENT /SUBJECTIVE:    Today's INR result of 2.1 is therapeutic. Goal INR of 2.0-3.0      Warfarin dose taken: Warfarin taken as previously instructed    Diet: No new diet changes affecting INR    Medication changes/ interactions: No new medications/supplements affecting INR    Previous INR: Therapeutic     S/S of bleeding or thromboembolism: No    New injury or illness: No    Upcoming surgery, procedure or cardioversion: No    Additional findings: None      PLAN:    Spoke with Yasmin regarding INR result and instructed:     Warfarin Dosing Instructions: Continue your current warfarin dose 4 mg MF, 2 mg ROW    Instructed patient to follow up no later than: 4 weeks  Lab visit scheduled    Education provided: None required      Yasmin verbalizes understanding and agrees to warfarin dosing plan.    Instructed to call the Anticoagulation Clinic for any changes, questions or concerns. (#[phone])        OBJECTIVE:  INR   Date Value Ref Range Status   2020 2.10 (H) 0.86 - 1.14 Final     Comment:     This test is intended for monitoring Coumadin therapy.  Results are not   accurate in patients with prolonged INR due to factor deficiency.           INR assessment THER    Recheck INR In: 4 WEEKS    INR Location Clinic      Anticoagulation Summary  As of 2020    INR goal:   2.0-3.0   TTR:   45.6 % (8.5 mo)   INR used for dosin.10 (2020)   Warfarin maintenance plan:   4 mg (2 mg x 2) every Mon, Fri; 2 mg (2 mg x 1) all other days   Full warfarin instructions:   4 mg every Mon, Fri; 2 mg all other days   Weekly warfarin total:   18 mg   No change documented:   Salina Cat RN   Plan last modified:   Nilda Duran RN (2020)   Next INR check:   7/10/2020   Priority:   Maintenance   Target end date:   Indefinite    Indications    Atrial fibrillation (H) [I48.91]             Anticoagulation Episode  Summary     INR check location:       Preferred lab:       Send INR reminders to:   [facility]    Comments:         Anticoagulation Care Providers     Provider Role Specialty Phone number    Jorge Hillman MD [facility] Internal Medicine [phone]

## 2020-06-16 NOTE — LETTER
6/16/2020    Jorge Hillman MD  [address] [facility] [address]    RE: Oscar Terrazas       Dear Colleague,    I had the pleasure of seeing Oscar Terrazas in the [facility].    Oscar Terrazas is a 86 year old male who is being evaluated via a billable telephone visit.      Oscar Terrazas is a very pleasant 86 year old male with a history of coronary artery disease with prior CABG and multivessel stenting, HFpEF, atrial fibrillation, advanced renal insufficiency, hypertension, DM, and venous insufficiency. I talked with patient and his daughter today.     In 2996, he underwent CABG with a LIMA to LAD and SVG to OM. In followup in 2007. He had recurrent angina and underwent multivessel stenting. He underwent multivessel drug stenting for recurrent angina. He then presented in 2012 with an acute coronary syndrome after his Plavix was stopped for a dental procedure.  At that time his LAD and left main coronary stents were widely patent.  There was one lesion at the end of the stent and the vein graft of the OM that was severe and possibly acutely thrombotic. This was successful stented with a drug eluting stent.  He has remained on Plavix since.     In June of 2018 he was admitted with shortness of chest pain. Given his renal dysfunction, medical management was recommended.     He was then admitted in September of 2019 with atrial fibrillation with RVR, congestive heart failure, and worsening renal insufficiency. An echocardiogram showed a mild decrease in his LVEF to 40 - 45%. There was moderate hypokinesis of the basal inferior and inferolateral walls. His LV function previously had been normal. He was diuresed with a lasix drip and discharged on Torsemide 20 mg daily. His atrial fibrillation was managed with a rate control strategy.     His Torsemide was cut back due to worsening renal insufficiency and he has been maintained on Torsemide 10 mg alternating with 20 mg daily for  some time. His weight has stabilized near 190 lbs.    I spoke with Oscar and his daughter today for re-evaluation. Fortunately, he continues to do fairly well. He denies any significant dyspnea on exertion at this point with his daily activity. He sometimes is fatigued if he does a lot of activity. No orthopnea or PND. He has had some mild stable chronic anginal symptoms with more moderate exertion. This has not changed. His daughter has noted his leg swelling seems to be somewhat worse recently. He enjoys coloring thus spends much of his time with his legs down. He has been trying to elevate his legs more and watching his sodium intake.     Assessment/plan:  Oscar Terrazas is a pleasant 86 year old male with a history of coronary artery disease with prior CABG and multiple prior interventions as noted above, paroxsymal atrial fibrillation, a mild cardiomyopathy with a LVEF of 40 - 45% by echocardiogram September, hypertension, and advanced chronic kidney disease with a creatinine running in the mid 3s.    Forutnately, despite his complex medical issues he appears to be relatively stable. As noted, he has some mild chronic anginal symptoms which are unchanged. He remains on a beta blocker, imdur, and is also on Repatha. He is not on aspirin as he is maintained on warfarin for his atrial fibrillation.    His blood pressures appear to be well controlled on his current regimen. There was some confusion about his hydralazine dose. I recommended he continue 50 mg three times daily.    Last fall he was noted to have some mild LV dysfunction in the setting of atrial fibrillation and a NSTEMI. ? Related to ischemia vs arrhythmias. He has not had re-evaluation of his LV function since that time. He continues on Metoprolol XL 75 mg daily as well as hydralazine and imdur.     His volume status has been managed with Torsemide 10 mg alternating with 20 mg daily. His last labs in March showed a stable creatinine of 3.5. I  recommended continuing his current regimen unchanged. He has had some increasing peripheral edema. I suggested continuing compression stockings, elevating his legs and watching his sodium. His daughter asked if he could take 20 mg of Torsemide for a few days if needed which I think would be reasonable. Of note, he previously followed with nephrology but has declined further follow up with them. He does not wish to pursue dialysis.     I recommended a return visit with Dr. Gibson in 4 - 5 months. At that time, will plan to repeat an echocardiogram to re-evaluate his LV function. I have also ordered a follow up BMP to be done. In the meantime, he'll contact us with any concerns.     Phone call duration: 13 minutes    Thank you for allowing me to participate in the care of your patient.    Sincerely,     Marie Gil PA-C     [facility]

## 2020-06-16 NOTE — PROGRESS NOTES
"Oscar Terrazas is a 86 year old male who is being evaluated via a billable telephone visit.      The patient has been notified of following:     \"This telephone visit will be conducted via a call between you and your physician/provider. We have found that certain health care needs can be provided without the need for a physical exam.  This service lets us provide the care you need with a short phone conversation.  If a prescription is necessary we can send it directly to your pharmacy.  If lab work is needed we can place an order for that and you can then stop by our lab to have the test done at a later time.    Telephone visits are billed at different rates depending on your insurance coverage. During this emergency period, for some insurers they may be billed the same as an in-person visit.  Please reach out to your insurance provider with any questions.    If during the course of the call the physician/provider feels a telephone visit is not appropriate, you will not be charged for this service.\"    Patient has given verbal consent for Telephone visit?  Yes    What phone number would you like to be contacted at? [phone]    How would you like to obtain your AVS? MyChart     Review Of Systems  Skin: NEGATIVE  Eyes:Ears/Nose/Throat: NEGATIVE  Respiratory: SOB with activity  Cardiovascular:LLEdema, fatigue  Gastrointestinal: constipation  Genitourinary:bladder cancer  Musculoskeletal: NEGATIVE  Neurologic: numbness in hands from carpal tunnel, feet are always cold  Psychiatric: NEGATIVE  Hematologic/Lymphatic/Immunologic: NEGATIVE  Endocrine:  Thyroid disorder  .Vitals today are:  /58  Pulse 82  Weight   187.0lb  O2   96%  Please review Hydralazine-patient received a bottle of 25 mg with take one tab TID instructions again instead of 50 mg take one tab TID.  He has been taking two tabs TID.  Dr. Rand did a refill on 5/11/2020 for 25 mg TID (verified with pharmacy)  but patient unaware of any dose " changes verbalized.  Please rewrite Rx and submit to pharmacy if needed.    Vania Pennington, [facility]    Provider notes:  Oscar Terrazas is a very pleasant 86 year old male with a history of coronary artery disease with prior CABG and multivessel stenting, HFpEF, atrial fibrillation, advanced renal insufficiency, hypertension, DM, and venous insufficiency. I talked with patient and his daughter today.     In 2996, he underwent CABG with a LIMA to LAD and SVG to OM. In followup in 2007. He had recurrent angina and underwent multivessel stenting. He underwent multivessel drug stenting for recurrent angina. He then presented in 2012 with an acute coronary syndrome after his Plavix was stopped for a dental procedure.  At that time his LAD and left main coronary stents were widely patent.  There was one lesion at the end of the stent and the vein graft of the OM that was severe and possibly acutely thrombotic. This was successful stented with a drug eluting stent.  He has remained on Plavix since.     In June of 2018 he was admitted with shortness of chest pain. Given his renal dysfunction, medical management was recommended.     He was then admitted in September of 2019 with atrial fibrillation with RVR, congestive heart failure, and worsening renal insufficiency. An echocardiogram showed a mild decrease in his LVEF to 40 - 45%. There was moderate hypokinesis of the basal inferior and inferolateral walls. His LV function previously had been normal. He was diuresed with a lasix drip and discharged on Torsemide 20 mg daily. His atrial fibrillation was managed with a rate control strategy.     His Torsemide was cut back due to worsening renal insufficiency and he has been maintained on Torsemide 10 mg alternating with 20 mg daily for some time. His weight has stabilized near 190 lbs.    I spoke with Oscar and his daughter today for re-evaluation. Fortunately, he continues to do fairly well. He denies any significant dyspnea  on exertion at this point with his daily activity. He sometimes is fatigued if he does a lot of activity. No orthopnea or PND. He has had some mild stable chronic anginal symptoms with more moderate exertion. This has not changed. His daughter has noted his leg swelling seems to be somewhat worse recently. He enjoys coloring thus spends much of his time with his legs down. He has been trying to elevate his legs more and watching his sodium intake.     Assessment/plan:  Oscar Terrazas is a pleasant 86 year old male with a history of coronary artery disease with prior CABG and multiple prior interventions as noted above, paroxsymal atrial fibrillation, a mild cardiomyopathy with a LVEF of 40 - 45% by echocardiogram September, hypertension, and advanced chronic kidney disease with a creatinine running in the mid 3s.    Forutnately, despite his complex medical issues he appears to be relatively stable. As noted, he has some mild chronic anginal symptoms which are unchanged. He remains on a beta blocker, imdur, and is also on Repatha. He is not on aspirin as he is maintained on warfarin for his atrial fibrillation.    His blood pressures appear to be well controlled on his current regimen. There was some confusion about his hydralazine dose. I recommended he continue 50 mg three times daily.    Last fall he was noted to have some mild LV dysfunction in the setting of atrial fibrillation and a NSTEMI. ? Related to ischemia vs arrhythmias. He has not had re-evaluation of his LV function since that time. He continues on Metoprolol XL 75 mg daily as well as hydralazine and imdur.     His volume status has been managed with Torsemide 10 mg alternating with 20 mg daily. His last labs in March showed a stable creatinine of 3.5. I recommended continuing his current regimen unchanged. He has had some increasing peripheral edema. I suggested continuing compression stockings, elevating his legs and watching his sodium. His daughter  asked if he could take 20 mg of Torsemide for a few days if needed which I think would be reasonable. Of note, he previously followed with nephrology but has declined further follow up with them. He does not wish to pursue dialysis.     I recommended a return visit with Dr. Gibson in 4 - 5 months. At that time, will plan to repeat an echocardiogram to re-evaluate his LV function. I have also ordered a follow up BMP to be done. In the meantime, he'll contact us with any concerns.     Phone call duration: 13 minutes    Marie Gil PA-C

## 2020-06-22 NOTE — ED NOTES
[facility]  ED Nurse Handoff Report    ED Chief complaint: Shortness of Breath      ED Diagnosis:   Final diagnoses:   None       Code Status: To be addressed by admitting provider.    Allergies:   Allergies   Allergen Reactions     Advair [Fluticasone-Salmeterol]      cough;  insomnia, nightmares     Amlodipine Besylate      edema       Azithromycin GI Disturbance     Clarithromycin      gi     Hydrochlorothiazide      hctz + lisinopril-->inc creat, k+     Lisinopril      lisinopril + hctz --> inc creat, k+     Moxifloxacin Hydrochloride      clostridium diffile colitis     Penicillins      gen swelling     Pravastatin Cramps     Muscle cramps/spasm.  Stopped 2017     Vioxx      edema       Patient Story: Pt was brought in by his daughter for lower extremities edema and shortness of breath.    Focused Assessment:  Pt is having shortness of breath, oxygen via NC applied at 2.5 L. Pt is sitting up right at 90 degree jaimee due to shortness of breath.     Treatments and/or interventions provided: Antibiotics and lasix. Chest xray.   Patient's response to treatments and/or interventions: Tolerating well.     To be done/followed up on inpatient unit:  UA    Does this patient have any cognitive concerns?: Pt is awake, alert and orientated     Activity level - Baseline/Home:  Stand with Assist and Cane  Activity Level - Current:   Stand with Assist and Cane    Patient's Preferred language: English   Needed?: No    Isolation:  Covid precaution   Infection: Covid precaution   Bariatric?: No    Vital Signs:   Vitals:    06/21/20 2345 06/22/20 0000 06/22/20 0015 06/22/20 0030   BP: (!) 145/84 (!) 154/86 (!) 149/85 (!) 156/85   Pulse: 85 101 99 90   Resp: 15 20 24 23   Temp:       TempSrc:       SpO2: 96% 93% 95% 93%       Cardiac Rhythm:     Was the PSS-3 completed:   Yes  What interventions are required if any?               Family Comments:   OBS brochure/video discussed/provided to  patient/family: No              Name of person given brochure if not patient:               Relationship to patient:     For the majority of the shift this patient's behavior was Green.   Behavioral interventions performed were None .    ED NURSE PHONE NUMBER: [phone]

## 2020-06-22 NOTE — PROGRESS NOTES
SPIRITUAL HEALTH SERVICES  SPIRITUAL ASSESSMENT Progress Note  [facility]     REFERRAL SOURCE: Code    I responded to a code called for patient and learned that patient's daughter requested patient be made DNR/DNI. I called patient's daughter, Yasmin and offered emotional and grief support. Yasmin shares her Judaism adrian is meaningful source of coping for her right now. She welcomed prayer over the phone. Later, I responded to page from nursing supervisor regarding additional support. When patient's daughter arrived, I offered emotional support and let her know about shs availability. She was supporting her father at bedside.     PLAN: SHS remains available for any additional needed support.     Margarita Campbell  Associate    Phone: [phone]  Pager: [phone]

## 2020-06-22 NOTE — PROGRESS NOTES
Brief House Officer Note:  Spoke with daughter at bedside. I offered brief timeline of her father's hospital course. She is understanding. I described comfort care, acknowledging he will likely die in the next few hours, versus continued restorative cares including echocardiogram and she is opting for COMFORT CARE only. Orders entered. Dr. Sr updated.    AIDA Live, CNP  Hospitalist Service, House Officer  [facility]     Text Page  Pager: [phone]

## 2020-06-22 NOTE — H&P
[facility]    History and Physical  Hospitalist       Date of Admission:  6/21/2020    Assessment & Plan   86 year old male who presents to the emergency department with complaints of progressive shortness of breath over the preceding weeks, orthopnea.    Volume overload secondary to renal insufficiency, stage V chronic kidney disease: Creatinine currently in the 3.5 range.  -Nephrology consulted.    -Pending response to diuretics and cardiology/nephrology consultations, consider family meeting with transition to a more palliative goal of care, potentially even hospice given chronic renal failure with difficulty controlling volume.  Patient was unable to fully articulate his code status given how sleepy he was during my examination.  -Intake and output  -Daily weights  -Rule out COVID  -Continue allopurinol 100 mg daily  -IV Lasix given in the emergency department.  Continue 80 mg IV twice daily while assessing urinary response. Will need higher doses of loop diuretics to facilitate diuresis in the setting of chronic kidney disease, though will also need to monitor to ensure not worsening patient's already limited GFR with overdiuresis.  -Continue to elevate lower extremities as able  -We will need to trend potassium, replace as needed given chronic renal disease with diuresis.  No protocol in place.     Acute on chronic diastolic heart failure exacerbation: History of grade 2 diastolic dysfunction with mild ischemic cardiomyopathy with moderate hypokinesis of basal inferior and inferior lateral walls. Last echocardiogram with ejection fraction of 40-45% in September 2019.  Suspect primary  of diastolic heart failure exacerbation is actually patient's chronic renal disease.  -IV diuresis as above  -Cardiology consulted  -Core clinic consultation  -TTE pending  -Hydralazine, Imdur. Held metoprolol.      Troponin elevation: Patient has known coronary artery disease with history of coronary  artery bypass grafting to LAD, OM in 2006 as well as left main and LAD stenting in 2007, OM graft stent in 2012, as well as more recent non-ST elevation myocardial infarction which has been medically managed given chronic kidney disease.   -Cardiac monitoring   -TTE pending as above  -Cardiology consulted as above  -Continue aspirin 81 mg daily  -Continue Plavix 75 mg daily  -Continue Imdur 120 mg daily  -Holding metoprolol tartrate 50 mg twice daily  -Continue hydralazine 50 mg 3 times daily  -Patient is intolerant to statins     Hypertension:  -Continuing Imdur, hydralazine.      Chronic anemia: Suspect secondary to chronic kidney disease.  -Nephrology consulted as above; could potentially have some oncotic benefit to increased hemoglobin, though not necessarily in need of Epogen currently with hemoglobin persistently stable in the 9 range..     Hypothyroidism:  -Continue prior to admission levothyroxine 75 mcg daily     Type 2 diabetes: Last hemoglobin A1c 6.3.  -held insulin on admission.        Diet: Renal diet as tolerated.  DVT Prophylaxis: Pneumatic Compression Devices  Chicas Catheter: not present  Code Status: DNR/DNI.  Some confusion about this at admission, as patient was sleepy during my questioning and could not confirm clearly his code status (ED was not sure as well). For this reason, he was made full code for now until we attained clarity (he did not have a designated decision maker on file). However, later his advanced directive information was found in his chart, and he was converted from full code to DNR/DNI.      Disposition Plan     Expected discharge: 2 - 3 days, recommended to prior living arrangement once Hypoxia resolved, diuresing with improvement in his objective shortness of breath..    Amish Jessica MD    Primary Care Physician   Jorge Hillman    Chief Complaint   SOB    History is obtained from the patient    History of Present Illness   86 year old male with a history of afib on coumadin  who presents with shortness of breath. The patient reports that he is experiencing difficulty breathing that started a few days ago, has progressively worsened, and is exacerbated with laying flat, as well as nasal and chest congestion, leg swelling, foot soreness, and constipation. He currently lives with his daughter, who works outside the home at a blood bank. The patient denies fever, cough, chest pain, nausea, vomiting, diarrhea, difficulty urinating, known exposure to COVID-19, and history of asthma, chronic obstructive pulmonary disease, heart failure, and home oxygen use.     In the ED, patient had a chest x-ray which showed left lower lung consolidation. His NT proBNP was elevated to 21,000.  His creatinine was elevated to 3.5.  He was grossly volume edematous, and admitted for likely CHF exacerbation as has happened to him previously before.  His last echo showed an EF of 40 to 45% with grade 2 diastolic function    Past Medical History    I have reviewed this patient's medical history and updated it with pertinent information if needed.   Past Medical History:   Diagnosis Date     Acute myocardial infarction, unspecified site, episode of care unspecified      Allergic rhinitis, cause unspecified      Anemia 3/6/2014     CAD (coronary artery disease)     1996 CABG - LIMA to LAD, SVG to OM, 2007 Cath - KANU to Left main and ostial/prox LAD, 2012 Cath - 80-90% stenosis SVG to OM - KANU placed, EF 50%     CKD (chronic kidney disease) stage 3, GFR 30-59 ml/min (H) 3/9/2014     CTS (carpal tunnel syndrome)     bilateral     Degeneration of cervical intervertebral disc      Diaphragmatic hernia without mention of obstruction or gangrene      Esophageal reflux      Essential hypertension, benign      Hyperlipidemia LDL goal <70      Hypothyroidism      Hypothyroidism, unspecified hypothyroidism type 1/14/2016     IDIOPATHIC CYCLIC EDEMA      Mild persistent asthma      Osteoarthrosis, unspecified whether  generalized or localized, unspecified site      Pneumonia, organism unspecified(486)      Proteinuria 5/17/2014     PVD (peripheral vascular disease) (H)      Sensorineural hearing loss, unspecified      Subarachnoid bleed (H)      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, with long-term current use of insulin (H) 6/30/2019     Unspecified hereditary and idiopathic peripheral neuropathy      Venous insufficiency        Past Surgical History   I have reviewed this patient's surgical history and updated it with pertinent information if needed.  Past Surgical History:   Procedure Laterality Date     C NONSPECIFIC PROCEDURE      appendectomy     C NONSPECIFIC PROCEDURE      hemorrhoids     C NONSPECIFIC PROCEDURE      cervical strain     C NONSPECIFIC PROCEDURE      rotator cuff surgery, right shoulder     C NONSPECIFIC PROCEDURE  2008    iol os     CORONARY ARTERY BYPASS  1996    LIMA to LAD, SVG to OM     HEART CATH STENT COR W/WO PTCA  2007    lad, left main cor artery stents/drug eluting     HEART CATH STENT COR W/WO PTCA  2012    80-90% stenosis SVG to OM - KANU placed, EF 50%     NONSPECIFIC PROCEDURE      iol od       Prior to Admission Medications   Prior to Admission Medications   Prescriptions Last Dose Informant Patient Reported? Taking?   Cod Liver Oil 1000 MG CAPS  Daughter Yes No   Sig: Take 1 capsule by mouth daily    Cyanocobalamin (VITAMIN B 12) 250 MCG LOZG  Daughter Yes No   Sig: Take 500 mcg by mouth 2 times daily    DILT- MG 24 hr capsule   No No   Sig: TAKE ONE CAPSULE BY MOUTH EVERY DAY   allopurinol (ZYLOPRIM) 100 MG tablet   No No   Sig: TAKE 2 TABLETS(200 MG) BY MOUTH DAILY   blood glucose (NO BRAND SPECIFIED) test strip   No No   Sig: Use to test blood sugar 3 times daily or as directed.   Patient not taking: Reported on 6/16/2020   blood glucose monitoring (NO BRAND SPECIFIED) meter device kit   No No   Sig: Use to test blood sugar 3 times  "daily or as directed.   Patient not taking: Reported on 2020   cetirizine (ZYRTEC) 10 MG tablet   No No   Sig: TAKE 1 TABLET(10 MG) BY MOUTH EVERY EVENING   clopidogrel (PLAVIX) 75 MG tablet  Daughter No No   Sig: TAKE 1 TABLET(75 MG) BY MOUTH DAILY   diphenhydrAMINE-acetaminophen (TYLENOL PM)  MG tablet  Daughter Yes No   Sig: Take 1 tablet by mouth nightly as needed for sleep   evolocumab (REPATHA) 140 MG/ML prefilled autoinjector   No No   Sig: Inject 1 mL (140 mg) Subcutaneous every 14 days   hydrALAZINE (APRESOLINE) 50 MG tablet   No No   Sig: Take 1 tablet (50 mg) by mouth 3 times daily   insulin NPH (HUMULIN N/NOVOLIN N VIAL) 100 UNIT/ML vial   No No   Si units at  suppertime   Patient not taking: Reported on 3/17/2020   insulin syringe-needle U-100 (BD INSULIN SYRINGE) 29G X 1/2\" 0.5 ML  Daughter No No   Sig: Use one syringe 2 daily or as directed.   Patient not taking: Reported on 2020   isosorbide mononitrate CR (IMDUR) 120 MG 24 HR ER tablet   No No   Sig: TAKE 1 TABLET BY MOUTH DAILY   levothyroxine (SYNTHROID/LEVOTHROID) 75 MCG tablet   No No   Sig: TAKE 1 TABLET BY MOUTH DAILY   metoprolol succinate ER (TOPROL-XL) 25 MG 24 hr tablet   No No   Si tab daily in AM (in addition to metoprolol XL 50mg tab daily) so total  Dose = 75mg daily   metoprolol succinate ER (TOPROL-XL) 50 MG 24 hr tablet   No No   Si tab daily in AM (in addition to metoprolol XL 25mg tab daily) so total  Dose = 75mg daily   nitroGLYcerin (NITROSTAT) 0.4 MG sublingual tablet  Daughter No No   Sig: For chest pain place 1 tablet under the tongue every 5 minutes for 3 doses. If symptoms persist 5 minutes after 1st dose call 911.   order for DME   No No   Sig: Equipment being ordered: Cane ()  Treatment Diagnosis: Impaired balance, impaired activity tolerance   theophylline (UNIPHYL) 400 MG 24 hr tablet   No No   Sig: TAKE 1 TABLET BY MOUTH EVERY DAY   thin (NO BRAND SPECIFIED) lancets   No No   Sig: Use " to test blood sugar 3 times daily or as directed.   Patient not taking: Reported on 2020   torsemide (DEMADEX) 10 MG tablet   No No   Sig: Take 1 tablet (10 mg) by mouth every other day Alternating with 20 mg every other day   torsemide (DEMADEX) 20 MG tablet   No No   Sig: Take 1 tablet (20 mg) by mouth every other day Alternating with 10 mg every other day   warfarin ANTICOAGULANT (COUMADIN) 2 MG tablet   No No   Si mg (2 mg x 2) every Fri; 2 mg (2 mg x 1) all other days unless directed otherwise by ACC   Patient taking differently: 4 mg mon, Friday and 2 mg row unless directed otherwise by ACC      Facility-Administered Medications: None     Allergies   Allergies   Allergen Reactions     Advair [Fluticasone-Salmeterol]      cough;  insomnia, nightmares     Amlodipine Besylate      edema       Azithromycin GI Disturbance     Clarithromycin      gi     Hydrochlorothiazide      hctz + lisinopril-->inc creat, k+     Lisinopril      lisinopril + hctz --> inc creat, k+     Moxifloxacin Hydrochloride      clostridium diffile colitis     Penicillins      gen swelling     Pravastatin Cramps     Muscle cramps/spasm.  Stopped 2017     Vioxx      edema       Social History   I have reviewed this patient's social history and updated it with pertinent information if needed. Oscar Terrazas  reports that he quit smoking about 54 years ago. His smoking use included cigarettes. He started smoking about 68 years ago. He has a 14.00 pack-year smoking history. He has never used smokeless tobacco. He reports that he does not drink alcohol or use drugs.    Family History   I have reviewed this patient's family history and updated it with pertinent information if needed.   Family History   Problem Relation Age of Onset     Lung Cancer Daughter      Family History Negative Mother         broken hip     Jaundice Father      Alcohol/Drug Father      Ovarian Cancer Daughter      Family History Negative Daughter      Family  History Negative Son        Review of Systems   The 10 point Review of Systems is negative other than noted in the HPI or here.     Physical Exam   Temp: 99.3  F (37.4  C) Temp src: Oral BP: (!) 156/85 Pulse: 90 Heart Rate: 100 Resp: 23 SpO2: 93 %      Vital Signs with Ranges  Temp:  [99.3  F (37.4  C)] 99.3  F (37.4  C)  Pulse:  [] 90  Heart Rate:  [] 100  Resp:  [15-24] 23  BP: (138-156)/(67-86) 156/85  SpO2:  [92 %-98 %] 93 %  0 lbs 0 oz    General Appearance: Sleepy on examination  Eyes: No scleral icterus or injection.  HEENT: Normocephalic  Respiratory: Breath sounds with crackles throughout lung fields bilaterally, no wheezes.  Cardiovascular: Regular rate and rhythm with heart rate in the 80s.  2/6 systolic murmur best appreciated at apex and left upper sternal border.  GI: Abdomen obese, soft, no palpable mass.  Lower abdomen pitting edema  Lymph/Hematologic: Patient with compression stockings on, and lower extremity edema is minimal secondary to this.    Skin: No rash appreciated  Musculoskeletal: Muscular tone largely intact  Neurologic: Sleepy on examination  Psychiatric: Blunted affect.     Data   Data reviewed today:  I personally reviewed the EKG tracing showing NSR.  Recent Labs   Lab 06/21/20  2254   WBC 11.9*   HGB 8.9*   MCV 96         POTASSIUM 4.6   CHLORIDE 111*   CO2 23   BUN 71*   CR 3.46*   ANIONGAP 5   LEV 8.9   *   TROPI 0.021       Imaging:  Recent Results (from the past 24 hour(s))   XR Chest Port 1 View    Narrative    EXAM: XR CHEST PORT 1 VW  LOCATION: [facility]  DATE/TIME: 6/21/2020 11:50 PM    INDICATION: Shortness of breath.  COMPARISON: 09/13/2019.      Impression    IMPRESSION: Heart is enlarged. Median sternotomy. No pneumothorax. Interstitial prominence. Small bilateral pleural effusions left greater than right. Left lower lung consolidation. Findings may represent CHF however superimposed inflammatory or   infectious process may  be present.

## 2020-06-22 NOTE — PROGRESS NOTES
Brief hospitalist note  Seen and evaluated  Coded this AM, PEA of unclear cause, but code status confirmed to be DNR/DNI and compassionately extubated  Daughter at the bedside  Now on comfort cares as per House provider discussion  Transfer to [facility] bed perhaps if daughter leaves for peace of mind.    No charge for visit, please see this mornings history and physical

## 2020-06-22 NOTE — CODE/RAPID RESPONSE
[facility]    CODE BLUE Note  2020   Time Called: 8: 54 AM    RRT called at 8: 51 AM for decreased responsiveness.    Assessment & Plan   Cardiac arrest, unclear etiology but likely multifactorial   COVID negative   RRT called for decreased responsiveness. Upon Critical Care Flyer arrival patient noted to be very poorly perfused without definitely palpable pulse. Code Called. Upon my arrival chest compressions in progress, BVM initiated. Epinephrine 1- mg IV given. Intubated by CRNA and he did not require sedation or paralytic agent prior to intubation. Pulse palpable 2- minutes after epinephrine given. Dr. Sr arrived noting Mr. 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 Dr. Sr called Mr. Terrazas's daughter, Yasmin, who states he would not want aggressive measures. Dr. Sr reiterated to Yasmin that if we take the ETT out her dad will  and she is understanding of the information. Mr. Terrazas was compassionately extubated. I spoke with Yasmin and she will attempt to come to the hospital prior to her dad's death. I have let her know I expect her dad will die within the next 1- to 2- hours.     INTERVENTIONS:  - CPR, please see code blue sheet   - Flumazenil IV- he received IV lorazepam for anxiety at 0400, no response to flumazenil   - compassionate extubation  - morphine 2 - mg IV every 1- hour PRN  -      Discussed with and defer further cares to Dr. Sr, Hospitalist.     Code Status: DNR/DNI     AIDA Live, CNP  Hospitalist Service, House Officer  [facility]     Text Page  Pager: [phone]    Allergies   Allergies   Allergen Reactions     Advair [Fluticasone-Salmeterol]      cough;  insomnia, nightmares     Amlodipine Besylate      edema       Azithromycin GI Disturbance     Clarithromycin      gi     Hydrochlorothiazide       hctz + lisinopril-->inc creat, k+     Lisinopril      lisinopril + hctz --> inc creat, k+     Moxifloxacin Hydrochloride      clostridium diffile colitis     Penicillins      gen swelling     Pravastatin Cramps     Muscle cramps/spasm.  Stopped 2017     Vioxx      edema       Physical Exam   Vital Signs with Ranges:  Temp:  [95.9  F (35.5  C)-99.3  F (37.4  C)] 95.9  F (35.5  C)  Pulse:  [] 90  Heart Rate:  [] 94  Resp:  [15-24] 18  BP: (105-156)/(60-86) 105/60  SpO2:  [92 %-98 %] 92 %  No intake/output data recorded.    Constitutional: 86- year laying in bed, appears poorly perfused.   Pulmonary: Agonal breathing with very poor effort.   Cardiovascular: Poor perfusion. CPR in progress.   Skin/Integumen: Cool, pale.   Neuro: No obvious response to intubation.   Psych:  Calm.   Extremities: No movement.     Troponin:    Recent Labs   Lab Test 06/22/20  0340   TROPI 0.020       IMAGING: (X-ray/CT/MRI)   Recent Results (from the past 24 hour(s))   XR Chest Port 1 View    Narrative    EXAM: XR CHEST PORT 1 VW  LOCATION: [facility]  DATE/TIME: 6/21/2020 11:50 PM    INDICATION: Shortness of breath.  COMPARISON: 09/13/2019.      Impression    IMPRESSION: Heart is enlarged. Median sternotomy. No pneumothorax. Interstitial prominence. Small bilateral pleural effusions left greater than right. Left lower lung consolidation. Findings may represent CHF however superimposed inflammatory or   infectious process may be present.       CBC with Diff:  Recent Labs   Lab Test 06/21/20  2254 06/12/20  1604   WBC 11.9*  --    HGB 8.9*  --    MCV 96  --      --    INR  --  2.10*          Comprehensive Metabolic Panel:  Recent Labs   Lab 06/21/20  2254      POTASSIUM 4.6   CHLORIDE 111*   CO2 23   ANIONGAP 5   *   BUN 71*   CR 3.46*   GFRESTIMATED 15*   GFRESTBLACK 17*   LEV 8.9       INR:    Recent Labs   Lab Test 06/12/20  1604   INR 2.10*     Time Spent on this Encounter   I spent 60  minutes of critical care time on the unit/floor managing the care of Oscar Terrazas. Upon evaluation, this patient had a high probability of imminent or life-threatening deterioration due to cardiac arrest, which required my direct attention, intervention, and personal management. 100% of my time was spent at the bedside counseling the patient and/or coordinating care regarding services listed in this note.

## 2020-06-22 NOTE — PLAN OF CARE
Pt is an admission of the shift around 0230 from ED, presented with SOB, admitted with possible COPD exacerbation.Tachy otherwise VSS on 4l.Up with AX1-2 GB/walker,BLE edema noted.Reg diet, no caffeine,prn ativan given for anxiety.Tested neg for covid, plan is to transfer to heart center(Rm 251) after change of shift this morning.Echocardiogram.nephro and cardio consults pending.

## 2020-06-22 NOTE — PROVIDER NOTIFICATION
MD Notification    Notified Person: MD    Notified Person Name:Dr Jessica    Notification Date/Time:6/22/20@0355    Notification Interaction:phone    Purpose of Notification:COVID result came back neg, also pt requesting for medication for anxiety    Orders Received:Ativan and transfer to heart center    Comments:

## 2020-06-22 NOTE — PROGRESS NOTES
Assumed care at 0730. Report received from night RN. Pt very sleepy from ativan he has received from 0400 this am. Checked in at 0800. VSS. Refusing to eat. Wants to only sleep for now. [place] [facility] will be transferred on a cart.   Addendum 0830: pt got very restless on sliding to the cart. Drowsy. Oxygen at 85% on RA. Put on 3L oxygen.

## 2020-06-22 NOTE — TELEPHONE ENCOUNTER
Prescription approved per [facility] Refill Protocol.    Cheyanne CORREIAN, RN, PHN

## 2020-06-22 NOTE — SIGNIFICANT EVENT
Significant Event Note  Patient coded this morning of unclear cause, likely PEA arrest.  Code team responded  I discussed with daughter given the question in regards to code status.  She confirmed that he was DNR/DNI and did not want aggressive measures  I called her back and reiterated that if the code team did stop their interventions Oscar would die and she stated she acknowledged this, stating that it is not what she wanted, but she respects his wishes.     Code team informed and CPR stopped    Hira Sr, DO

## 2020-06-22 NOTE — ED PROVIDER NOTES
History     Chief Complaint:  Shortness of breath    HPI   Oscar Terrazas is a 86 year old male with a complex past medical history including hypertension, hyperlipidemia, type II diabetes, atrial fibrillation, congestive heart failure, coronary artery disease, myocardial infarction  and chronic kidney disease on coumadin who presents with shortness of breath. The patient reports that he is experiencing difficulty breathing that started a few days ago, has progressively worsened, and is exacerbated with laying flat, as well as nasal and chest congestion, leg swelling, foot soreness, and constipation. He currently lives with his daughter, who works outside the home at a blood bank. The patient denies fever, cough, chest pain, nausea, vomiting, diarrhea, difficulty urinating, known exposure to COVID-19, and history of asthma, chronic obstructive pulmonary disease, heart failure, and home oxygen use.     Allergies:  Advair  Amlodipine Besylate  Azithromycin  Clarithromycin  Hydrochlorothiazide  Lisinopril  Moxifloxacin Hydrochloride  Penicillins  Pravastatin  Vioxx     Medications:    Allopurinol  Cetrizine  Plavix  Dilt-XR  Repatha  Apresoline  Insulin NPH  Insulin syringe-needle  Imdur  Levothyroxine  Toprol-XL  Nitrostat   Uniphyl  Torsemide  Coumadin     Past Medical History:    Atrial fibrillation   Hypervolemia associated with renal insufficiency   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   Anemia in CKD   Congestive heart failure   Chronic gout without tophus, unspecified cause, unspecified site   Neuropathy   Proteinuria   Coronary artery disease   Chronic kidney disease  Hyperlipidemia LDL   Traumatic brain injury   Peripheral vascular disease  Carpal tunnel syndrom  Venous insufficiency   Hypothyroidism   Cardiovascular disease   Mild persistent asthma   Allergic rhinitis   Osteoarthritis    Essential hypertension, benign   Degeneration of cervical  intervertebral disc   Diaphragmatic hernia   Esophageal reflux   Sensorineural hearing loss   Acute myocardial infarction  Allergic rhinitis   Degeneration of cervical intervertebral   Diaphragmatic hernia without mental of obstruction or gangrene  Esophageal reflux  Idiopathic cyclic edema   Mild persistent asthma   Osteoarthrosis  Pneumonia   Sensorineural hearing loss  Subarachnoid bleed    Past Surgical History:    Appendectomy   Hemorrhoids   Rotator cuff surgery, right shoulder  Nonspecific procedure x2  Coronary artery bypass  Heart cath stent cor w/wo ptca x2    Family History:    Father: jaundice. Alcohol/drug  Daughter: Lung cancer, ovarian cancer    Social History:  The patient presented alone  Smoking Status: Former smoker   Type: cigarettes   Years since quittin.5   Smokeless Tobacco: Never Used  Alcohol Use: Negative  Drug Use: Negative  PCP: Jorge Hillman  Marital Status:       Review of Systems   Constitutional: Negative for fever.   Respiratory: Positive for shortness of breath. Negative for cough.         Chest congestion   Cardiovascular: Positive for leg swelling. Negative for chest pain.   Gastrointestinal: Positive for constipation. Negative for diarrhea, nausea and vomiting.   Genitourinary: Negative for difficulty urinating.   Musculoskeletal:        Foot soreness   All other systems reviewed and are negative.    Physical Exam     Patient Vitals for the past 24 hrs:   BP Temp Temp src Pulse Heart Rate Resp SpO2 Height Weight   20 0233 131/68 97  F (36.1  C) Oral -- 100 18 92 % 1.829 m (6') 92.3 kg (203 lb 8 oz)   20 0030 (!) 156/85 -- -- 90 100 23 93 % -- --   20 0015 (!) 149/85 -- -- 99 98 24 95 % -- --   20 0000 (!) 154/86 -- -- 101 93 20 93 % -- --   20 2345 (!) 145/84 -- -- 85 87 15 96 % -- --   20 2330 (!) 142/78 -- -- 97 87 19 96 % -- --   20 2315 138/67 -- -- 86 89 18 98 % -- --   20 2300 (!) 151/73 -- -- 90 92 21 94 % -- --    06/21/20 2251 -- 99.3  F (37.4  C) Oral -- -- -- -- -- --   06/21/20 2247 (!) 150/76 -- -- 86 -- -- 92 % -- --   06/21/20 2245 (!) 150/76 -- -- 86 -- -- 92 % -- --     Physical Exam  General: Well-nourished,appears to be short of breath and hypoxic  Eyes: PERRL, conjunctivae pink no scleral icterus or conjunctival injection  ENT:  Moist mucus membranes, posterior oropharynx clear without erythema or exudates  Respiratory:  Lungs diminished throughout with bibasilar crackles. No wheezes. Exam limited by quality of isolation stethoscope  CV: Normal rate and irregular rhythm, no murmurs/rubs/gallops  GI:  Abdomen soft and non-distended.  Normoactive BS.  No tenderness, guarding or rebound  Skin: Warm, dry.  No rashes or petechiae  Musculoskeletal: 2+ pitting edema to thighs   Neuro: Alert and oriented to person/place/time  Psychiatric: Anxious affect    Emergency Department Course     ECG:  ECG taken at 2246 ECG read at 2250  Atrial fibrillation  Left axis deviation  Septal infarct, age underdetermined  Abnormal ECG  No significant change compared to EKG dated 9/16/19  Rate 93 bpm. WY interval * ms. QRS duration 124 ms. QT/QTc 398/494 ms. P-R-T axes * -41 158.     Imaging:  Radiology findings were communicated with the patient who voiced understanding of the findings.    XR Chest Port 1 View  Heart is enlarged. Median sternotomy. No pneumothorax. Interstitial prominence. Small bilateral pleural effusions left greater than right. Left lower lung consolidation. Findings may represent CHF however superimposed inflammatory or   infectious process may be present.  Reading per radiology    Echocardiogram Complete   Pending    Laboratory:  Laboratory findings were communicated with the patient who voiced understanding of the findings.    CBC: WBC 11.9 (L), HGB 8.9 (L),   BMP: Chloride 111 (H), Glucose 199 (H), BUN 71 (H), Creatinine 3.46 (H), GFR Estimate 15 (L) o/w WNL     Blood culture: pending x2  Blood Gas  Venous: pH 7.35, PCO2 45, PO2 38, Bicarbonate 25, Base deficit venous 0.9  Troponin (Collected 2254): 0.021  Nt probnp inpatient (BNP): 20,053 (H)    SARS-CoV-2 COVID-19: pending    Interventions:  0107 lasix 60 mg IV  0110 maxipime 2 g IV  0150 doxycycline 100 mg IV     Emergency Department Course:    Nursing notes and vitals reviewed. I performed an exam of the patient as documented above.    2246 EKG obtained as noted above.  2254 IV was inserted and blood was drawn for laboratory testing, results above.  2255 A nasopharyngeal sample was obtained for laboratory testing as documented above.  2350 Portable chest xray obtained as noted above.   2357 Blood was drawn for laboratory testing, results above.  0106  Blood was drawn for laboratory testing, results above.    I spoke with Dr. Jessica of the hospitalist service from [facility] regarding patient's presentation, findings, and plan of care.    Prior to admit, I personally reviewed the results with the patient and all related questions were answered. The patient verbalized understanding and is amenable to plan.     Findings and plan explained to the patient who consents to admission. Discussed the patient with Dr. Jessica, who will admit the patient to a Card/tele bed for further monitoring, evaluation, and treatment.    Impression & Plan      COVID-19  Oscar Terrazas was evaluated during a global COVID-19 pandemic, which necessitated consideration that the patient might be at risk for infection with the SARS-Chris-2 virus that causes COVID-19.   Applicable protocols for evaluation were followed during the patient's care.   COVID-19 was considered as part of the patient's evaluation. The plan for testing is:  a test was obtained during this visit.    Medical Decision Making:  Oscar Terrazas is a 86 year old male with a complicated PMH including chronic afib and CHF who presents for evaluation of shortness of breath.  I considered a broad  differential of their dyspnea including CHF exacerbation, PE, dissection, hemothorax, pleural effusion, pneumonia, acute coronary syndrome, reactive airway disease, bronchitis, upper airway obstruction, foreign body, etc.  Given the history and exam plus laboratory findings I feel congestive heart failure is the most likely etiology and would not do extensive workup for other causes as mentioned above at this time.  The patient received IV diuretics in ED, we will start monitoring I/O's here in ED.  Given the worsening pulse ox and decreased functional status, we will admit at this time for further inpatient treatment.  At this time, the patient is stable from a respiratory standpoint and does not require Bipap or intubation and I feel stable for the telemetry floor.    Diagnosis:    ICD-10-CM    1. Acute respiratory failure with hypoxia (H)  J96.01 Symptomatic COVID-19 Virus (Coronavirus) by PCR     Blood culture     Blood culture     SARS-CoV-2 COVID-19 Virus (Coronavirus) RT-PCR     SARS-CoV-2 COVID-19 Virus (Coronavirus) RT-PCR   2. Acute on chronic congestive heart failure, unspecified heart failure type (H)  I50.9    3. Chronic renal impairment, unspecified CKD stage  N18.9    4. Infiltrate of left lung present on chest x-ray  R91.8    5. Anemia in other chronic diseases classified elsewhere  D63.8    6. 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, with long-term current use of insulin (H)  E11.22     N18.5     Z79.4    7. Paroxysmal atrial fibrillation (H)  I48.0      Disposition:   The patient is admitted into the care of Dr. Jessica.    Scribe Disclosure:  DAVID, Hanny Nieves, am serving as a scribe at 1:35 AM on 6/22/2020 to document services personally performed by Nohemi Arias MD based on my observations and the provider's statements to me.         Nohemi Arias MD  06/23/20 [id number]

## 2020-06-22 NOTE — PROGRESS NOTES
RECEIVING UNIT ED HANDOFF REVIEW    ED Nurse Handoff Report was reviewed by: Elliott Thomson RN on June 22, 2020 at 1:23 AM

## 2020-06-23 NOTE — PLAN OF CARE
Assessment and VS deferred. O2 at 3L/NC. Pt. Is lethargic. Pt. Transferred to [facility] per cart. Continue comfort cares. Report called to receiving nurse Deidre.

## 2020-06-23 NOTE — PROGRESS NOTES
[facility]    Medicine Progress Note - Hospitalist Service       Date of Admission:  2020  Assessment & Plan     Shortness of breath  CKD IV   Acute on chronic biventricular congestive heart failure  Atrial fibrillation  Possible fluid overload  Patient admitted with shortness of breath and orthopnea, imaging noted bilateral pleural effusions and raised possibility of pneumonia and CHF  He was admitted and placed on diuretics and antibiotics for pneumonia  He did suffer a PEA arrest CPR was started and he was intubated.  Family contacted and daughter stated he would not want aggressive measures, so the patient was compassionately extubated  Comfort cares was started  Plan  - continues on comfort cares, IV was lost, so sublingual medications are ordered today.  - discussed days likely with daughter       Diet: Combination Diet Regular Diet Adult; No Caffeine Diet; No Caffeine for 24 hours (once tests completed, may have caffeine)    DVT Prophylaxis: Pneumatic Compression Devices  Chicas Catheter: not present  Code Status: DNR/DNI           Disposition Plan   Expected discharge: 2 - 3 days, recommended to  or to hospice  Entered: Hira Sr DO 2020, 12:32 PM       The patient's care was discussed with the Patient and Patient's Family.    Hira Sr DO  Hospitalist Service  [facility]    ______________________________________________________________________    Interval History   Some pain during transport    Data reviewed today: I reviewed all medications, new labs and imaging results over the last 24 hours. I personally reviewed no images or EKG's today.    Physical Exam   Vital Signs:   Temp src: Oral      Resp: 20       Weight: 203 lbs 8 oz  Deferred given comfort care status, and resting peacefully after given medications for pain and agitation    Data   Recent Labs   Lab 20  0340 20  2254   WBC  --  11.9*   HGB  --  8.9*   MCV  --   96   PLT  --  248   NA  --  139   POTASSIUM  --  4.6   CHLORIDE  --  111*   CO2  --  23   BUN  --  71*   CR  --  3.46*   ANIONGAP  --  5   LEV  --  8.9   GLC  --  199*   TROPI 0.020 0.021     No results found for this or any previous visit (from the past 24 hour(s)).  Medications     - MEDICATION INSTRUCTIONS -         senna-docusate  1 tablet Oral BID    Or     senna-docusate  2 tablet Oral BID     sodium chloride (PF)  3 mL Intravenous Q8H

## 2020-06-23 NOTE — PLAN OF CARE
[place] [facility] Complains of pain, unable to say more than 1-2 words, moaning. Comfort care. IV Morphine, IV infiltrated. Received orders for SL meds. Ativan 1 mg, Morphine concentrate 10 mg and finally started to calm down. family at bedside. Ativan again this afternoon and Morphine x 2 for pain restlessness, settles after meds.O2 3 L NC left on. Continue comfort measures, discharge pending condition

## 2020-06-23 NOTE — PLAN OF CARE
Lethargic & slept most of shift. VS & assessment deferred. RR even & non-labored. 3L oxygen for comfort. Ativan & morphine given x1 for agitation & restlessness, effective. NOOB this shift, T/Rq2 hours. Incontinent of B&B. Continue comfort cares.

## 2020-06-24 NOTE — PROGRESS NOTES
Pt somnolent, does not appear to be in pain. O2 for comfort. Comfort cares. Turn/repo. SL Ativan and Morphine given for breathing. No family present this tru. No IV access. Continue to monitor.

## 2020-06-24 NOTE — DEATH PRONOUNCEMENT
MD DEATH PRONOUNCEMENT    Called to pronounce Oscar Terrazas dead.    Physical Exam: Unresponsive to noxious stimuli, Spontaneous respirations absent, Breath sounds absent, Carotid pulse absent and Heart sounds absent    Patient was pronounced dead at 01:00 AM, 2020.    Preliminary Cause of Death: Respiratory failure leading to cardiopulmonary arrest    Active Problems:    CHF (congestive heart failure) (H)       Infectious disease present?: NO    Communicable disease present? (examples: HIV, chicken pox, TB, Ebola, CJD) :  NO    Multi-drug resistant organism present? (example: MRSA): NO    Please consider an autopsy if any of the following exist:  NO Unexpected or unexplained death during or following any dental, medical, or surgical diagnostic treatment procedures.   NO Death of mother at or up to seven days after delivery.     NO All  and pediatric deaths.     NO Death where the cause is sufficiently obscure to delay completion of the death certificate.   NO Deaths in which autopsy would confirm a suspected illness/condition that would affect surviving family members or recipients of transplanted organs.     The following deaths must be reported to the 's Office:  NO A death that may be due entirely or in part to any factors other than natural disease (recent surgery, recent trauma, suspected abuse/neglect).   NO A death that may be an accident, suicide, or homicide.     NO Any sudden, unexpected death in which there is no prior history of significant heart disease or any other condition associated with sudden death.   NO A death under suspicious, unusual, or unexpected circumstances.    NO Any death which is apparently due to natural causes but in which the  does not have a personal physician familiar with the patient s medical history, social, or environmental situation or the circumstances of the terminal event.   NO Any death apparently due to Sudden Infant Death Syndrome.      NO Deaths that occur during, in association with, or as consequences of a diagnostic, therapeutic, or anesthetic procedure.   NO Any death in which a fracture of a major bone has occurred within the past (6) six months.   NO A death of persons note seen by their physician within 120 days of demise.     NO Any death in which the  was an inmate of a public institution or was in the custody of Law Enforcement personnel.   NO  All unexpected deaths of children   NO Solid organ donors   NO Unidentified bodies   YES Deaths of persons whose bodies are to be cremated or otherwise disposed of so that the bodies will later be unavailable for examination;   NO Deaths unattended by a physician outside of a licensed healthcare facility or licensed residential hospice program   NO Deaths occurring within 24 hours of arrival to a health care facility if death is unexpected.    NO Deaths associated with the decedent s employment.   NO Deaths attributed to acts of terrorism.   YES Any death in which there is uncertainty as to whether it is a medical examiner s care should be discussed with the medical investigator.        Body disposition: Autopsy was discussed with family member:  Daughter by phone.  Permission for autopsy was declined.    AIDA Saunders CNP  Text Page

## 2020-06-24 NOTE — PLAN OF CARE
Patient somnolent for most of shift. Responds to soft touch; slept most of shift. Seems distressed and anxious during turns/repo's. Brief checked and repositioned x2 to maintain skin integrity but to limit distress for patient. Given SL Ativan and Morphine x1 this shift. Remains on 3L O2 for comfort. Plan to discharge on hospice. Continue with comfort cares.

## 2020-06-25 LAB — INTERPRETATION ECG - MUSE: NORMAL

## 2020-06-28 LAB
BACTERIA SPEC CULT: NO GROWTH
BACTERIA SPEC CULT: NO GROWTH
SPECIMEN SOURCE: NORMAL
SPECIMEN SOURCE: NORMAL

## 2020-07-04 NOTE — DISCHARGE SUMMARY
[facility]    Discharge Summary  Hospitalist    Date of Admission:  6/21/2020  Date of Discharge:  6/24/2020  3:45 AM  Discharging Provider: Amish Jessica MD    Discharge Diagnoses   Volume overload secondary to renal insufficiency, stage V chronic kidney disease  Acute on chronic diastolic heart failure exacerbation:    History of Present Illness   86 year old male with a history of afib on coumadin who presents with shortness of breath. The patient reports that he is experiencing difficulty breathing that started a few days ago, has progressively worsened, and is exacerbated with laying flat, as well as nasal and chest congestion, leg swelling, foot soreness, and constipation. He currently lives with his daughter, who works outside the home at a blood bank. The patient denies fever, cough, chest pain, nausea, vomiting, diarrhea, difficulty urinating, known exposure to COVID-19, and history of asthma, chronic obstructive pulmonary disease, heart failure, and home oxygen use.      In the ED, patient had chest x-ray which showed left lower lung consolidation. Patient's NT proBNP was quite elevated to 21,000.  Patient's creatinine was 3.46.  Given that the patient was grossly volume edematous, patient was admitted for likely CHF exacerbation.  Patient's last echo in September showed an EF of 40 to 45% with grade 2 diastolic dysfunction.    Hospital Course   Oscar Terrazas was admitted on 6/21/2020.  The following problems were addressed during his hospitalization:    Active Problems:    CHF (congestive heart failure) (H)    86 year old male who presents to the emergency department with complaints of progressive shortness of breath over the preceding weeks, orthopnea.     Volume overload secondary to renal insufficiency, stage V chronic kidney disease: Creatinine currently in the 3.5 range.  Acute on chronic diastolic heart failure exacerbation: History of grade 2 diastolic dysfunction with mild  ischemic cardiomyopathy with moderate hypokinesis of basal inferior and inferior lateral walls. Last echocardiogram with ejection fraction of 40-45% in 2019.  Patient however deteriorated early on the floor and a rapid was called early in the morning.  Patient initially was resuscitated with a rapid response team, however once his DNR/DNI status was confirmed all attempts at resuscitation were stopped.  Patient then passed away.  For more information on the rapid response, please see note from Susan Thurman, nurse practitioner.  Patient was compassionately extubated.  Was then put on comfort care.    There was initially some question with his CODE STATUS, as holding orders were placed for the floor as full code given that patient was unable to fully articulate his code status when questioned.  This was planned to be re-discussed with the patient in the am, as his chart showed he did not have a designated medical decision maker on file. His holding orders were released, which converted him to full code. However, later his advanced directive was found and he was converted to DNR/DNI (however this occurred after he had lost a pulse, and patient received some CPR during this confusion). Later, his DNR/DNI status was confirmed after discussion with his family.  He was compassionately extubated.    Amish Jessica MD, MD    Significant Results and Procedures   None    Pending Results   These results will be followed up by PCP  Unresulted Labs Ordered in the Past 30 Days of this Admission     No orders found from 2020 to 2020.          Code Status   Comfort Care       Primary Care Physician   Jorge Hillman    Physical Exam                      Vitals:    20 0233   Weight: 92.3 kg (203 lb 8 oz)     Vital Signs with Ranges     No intake/output data recorded.    No heart or lung sounds on expiration.     Discharge Disposition   Patient  during this admission  Condition at discharge:      Consultations This Hospital Stay   NEPHROLOGY IP CONSULT  CARDIOLOGY IP CONSULT  SMOKING CESSATION PROGRAM IP CONSULT    Time Spent on this Encounter   I, Amish Jessica MD, personally saw the patient today and spent greater than 30 minutes discharging this patient.    Discharge Orders   No discharge procedures on file.  Discharge Medications   Discharge Medication List as of 2020  4:13 AM      START taking these medications    Details   levothyroxine (SYNTHROID/LEVOTHROID) 75 MCG tablet TAKE 1 TABLET BY MOUTH DAILY, Disp-90 tablet,R-1, E-Prescribe**Patient requests 90 days supply**         CONTINUE these medications which have NOT CHANGED    Details   allopurinol (ZYLOPRIM) 100 MG tablet TAKE 2 TABLETS(200 MG) BY MOUTH DAILY, Disp-180 tablet,R-1, E-Prescribe      blood glucose (NO BRAND SPECIFIED) test strip Use to test blood sugar 3 times daily or as directed., Disp-300 strip, R-3, E-PrescribeTo match glucometer brand given per insurance coverage      blood glucose monitoring (NO BRAND SPECIFIED) meter device kit Use to test blood sugar 3 times daily or as directed.Disp-1 kit, [id number] Glucose Monitor brand  per insurance coverage.      cetirizine (ZYRTEC) 10 MG tablet TAKE 1 TABLET(10 MG) BY MOUTH EVERY EVENING, Disp-90 tablet, R-3, E-Prescribe      clopidogrel (PLAVIX) 75 MG tablet TAKE 1 TABLET(75 MG) BY MOUTH DAILY, Disp-90 tablet, R-3, E-Prescribe      Cod Liver Oil 1000 MG CAPS Take 1 capsule by mouth daily , Historical      Cyanocobalamin (VITAMIN B 12) 250 MCG LOZG Take 500 mcg by mouth 2 times daily , Historical      DILT- MG 24 hr capsule TAKE ONE CAPSULE BY MOUTH EVERY DAY, Disp-90 capsule, R-3, E-Prescribe      diphenhydrAMINE-acetaminophen (TYLENOL PM)  MG tablet Take 1 tablet by mouth nightly as needed for sleep, Historical      evolocumab (REPATHA) 140 MG/ML prefilled autoinjector Inject 1 mL (140 mg) Subcutaneous every 14 days, Disp-4 each, R-6, E-Prescribe     "  hydrALAZINE (APRESOLINE) 50 MG tablet Take 1 tablet (50 mg) by mouth 3 times daily, Disp-270 tablet,R-3, E-Prescribe      insulin NPH (HUMULIN N/NOVOLIN N VIAL) 100 UNIT/ML vial 14 units at  suppertime, Disp-2 vial, R-11, E-Prescribe      insulin syringe-needle U-100 (BD INSULIN SYRINGE) 29G X 1/2\" 0.5 ML Use one syringe 2 daily or as directed.Disp-200 each, [id number]      isosorbide mononitrate CR (IMDUR) 120 MG 24 HR ER tablet TAKE 1 TABLET BY MOUTH DAILY, Disp-90 tablet,R-1, E-Prescribe      !! metoprolol succinate ER (TOPROL-XL) 25 MG 24 hr tablet 1 tab daily in AM (in addition to metoprolol XL 50mg tab daily) so total  Dose = 75mg daily, Disp-90 tablet, R-3, E-PrescribeNote med change.  Cancel refills of the tartrate metoprolol version      !! metoprolol succinate ER (TOPROL-XL) 50 MG 24 hr tablet 1 tab daily in AM (in addition to metoprolol XL 25mg tab daily) so total  Dose = 75mg daily, Disp-90 tablet, R-3, E-PrescribeNote med change.  Cancel refills of the tartrate metoprolol version      nitroGLYcerin (NITROSTAT) 0.4 MG sublingual tablet For chest pain place 1 tablet under the tongue every 5 minutes for 3 doses. If symptoms persist 5 minutes after 1st dose call 911., Disp-25 tablet, R-1, E-Prescribe      order for DME Equipment being ordered: Cane ()  Treatment Diagnosis: Impaired balance, impaired activity toleranceDisp-1 each, R-0, Local Print      theophylline (UNIPHYL) 400 MG 24 hr tablet TAKE 1 TABLET BY MOUTH EVERY DAY, Disp-90 tablet, R-3, E-Prescribe      thin (NO BRAND SPECIFIED) lancets Use to test blood sugar 3 times daily or as directed., Disp-300 each, R-3, E-PrescribeTo match glucometer brand given per insurance coverage      !! torsemide (DEMADEX) 10 MG tablet Take 1 tablet (10 mg) by mouth every other day Alternating with 20 mg every other day, Disp-45 tablet,R-3, E-Prescribe      !! torsemide (DEMADEX) 20 MG tablet Take 1 tablet (20 mg) by mouth every other day Alternating with " 10 mg every other day, Disp-45 tablet,R-3, E-Prescribe      warfarin ANTICOAGULANT (COUMADIN) 2 MG tablet 4 mg (2 mg x 2) every Fri; 2 mg (2 mg x 1) all other days unless directed otherwise by [facility], Disp-120 tablet, R-3, E-Prescribe       !! - Potential duplicate medications found. Please discuss with provider.        Allergies   Allergies   Allergen Reactions     Advair [Fluticasone-Salmeterol]      cough;  insomnia, nightmares     Amlodipine Besylate      edema       Azithromycin GI Disturbance     Clarithromycin      gi     Hydrochlorothiazide      hctz + lisinopril-->inc creat, k+     Lisinopril      lisinopril + hctz --> inc creat, k+     Moxifloxacin Hydrochloride      clostridium diffile colitis     Penicillins      gen swelling     Pravastatin Cramps     Muscle cramps/spasm.  Stopped 2017     Vioxx      edema     Data   Results for orders placed or performed during the hospital encounter of 06/21/20   XR Chest Port 1 View    Narrative    EXAM: XR CHEST PORT 1 VW  LOCATION: [facility]  DATE/TIME: 6/21/2020 11:50 PM    INDICATION: Shortness of breath.  COMPARISON: 09/13/2019.      Impression    IMPRESSION: Heart is enlarged. Median sternotomy. No pneumothorax. Interstitial prominence. Small bilateral pleural effusions left greater than right. Left lower lung consolidation. Findings may represent CHF however superimposed inflammatory or   infectious process may be present.   XR Chest Port 1 View    Narrative    CHEST ONE VIEW  6/22/2020 10:49 AM     HISTORY: Post CPR.    COMPARISON: 6/22/2020      Impression    IMPRESSION: No gross pneumothorax evident in supine position.  Extensive hazy densities bilaterally may be acute pulmonary edema.  Cardiac silhouette similar to previous.    LAYA JORGENSEN MD

## 2020-07-26 PROBLEM — D49.4 BLADDER TUMOR: Status: ACTIVE | Noted: 2020-07-26

## 2020-07-27 NOTE — PATIENT INSTRUCTIONS
POLST form completed  Labs as ordered  Continue current medications  Patient declines any further work-up for bladder tumor and will respect his wishes

## 2022-10-31 NOTE — PROGRESS NOTES
ANTICOAGULATION FOLLOW-UP CLINIC VISIT    Patient Name:  Oscar Terrazas  Date:  10/1/2019  Contact Type:  Face to Face    SUBJECTIVE:  Patient Findings         Clinical Outcomes     Negatives:   Major bleeding event, Thromboembolic event, Anticoagulation-related hospital admission, Anticoagulation-related ED visit, Anticoagulation-related fatality           OBJECTIVE    INR Protime   Date Value Ref Range Status   10/01/2019 5.2 (A) 0.86 - 1.14 Final       ASSESSMENT / PLAN  INR assessment SUPRA    Recheck INR In: 3 DAYS    INR Location Clinic      Anticoagulation Summary  As of 10/1/2019    INR goal:   2.0-3.0   TTR:   0.0 % (1 d)   INR used for dosin.2! (10/1/2019)   Warfarin maintenance plan:   No maintenance plan   Full warfarin instructions:   10/1: Hold; 10/2: Hold; 10/3: 2 mg   Plan last modified:   Nilda Duran RN (2019)   Next INR check:   10/4/2019   Target end date:   Indefinite    Indications    Atrial fibrillation (H) [I48.91]             Anticoagulation Episode Summary     INR check location:       Preferred lab:       Send INR reminders to:   [facility]    Comments:         Anticoagulation Care Providers     Provider Role Specialty Phone number    Jorge Hillman MD [facility] [phone]            See the Encounter Report to view Anticoagulation Flowsheet and Dosing Calendar (Go to Encounters tab in chart review, and find the Anticoagulation Therapy Visit)    Dosage adjustment made based on physician directed care plan.    INR today 5.2, repeat INR 5.1. Pt to hold warfarin x 2 days, 10/3/19-2mg, have INR check 19. Pt given written information s/s of bleeding due to elevated INR, and seek Medical help if problems. Did leave Yasmin Daughter a VM informing of dosage and phone number to [facility] if questions. She does set up meds for pt.    Lilibeth Bear RN                  No

## 2023-03-13 NOTE — PATIENT INSTRUCTIONS
Call [facility] nurse for any questions or concerns Mon-Fri 8am-4pm:                                                 #[phone]                                       For concerns after hours:                                               #[phone], option 2     1: Medication changes: No medication changes today. Continue hydralazine 50 mg THREE times daily. You can take 20 mg of Torsemide for a few days if your leg swelling isn't getting better.   2: Plan from today: keep elevating your legs and watching your sodium intake.   see Dr. Gibson in October of November     24

## 2025-04-16 NOTE — MR AVS SNAPSHOT
After Visit Summary   6/6/2018    Oscar Terrazas    MRN: [id number]           Patient Information     Date Of Birth          2/2/1934        Visit Information        Provider Department      6/6/2018 1:10 PM Preethi Smiley, AIDA AB[facility]        Today's Diagnoses     Cardiovascular disease    -  1    Acute diastolic congestive heart failure (H)        Essential hypertension, benign        PVD (peripheral vascular disease) (H)        Coronary artery disease involving native coronary artery of native heart without angina pectoris          Care Instructions    Increase the hydralazine to 75 mg three times per day    Monitor your blood pressure once a week  Restart pravastatin 20 mg once a week  Continue with daily weights  Follow-up with Dr. Hillman about your kidney function    I will see you back in 6 weeks            Follow-ups after your visit        Additional Services     Follow-Up with Cardiac Advanced Practice Provider                 Future tests that were ordered for you today     Open Future Orders        Priority Expected Expires Ordered    Follow-Up with Cardiac Advanced Practice Provider Routine 7/20/2018 6/6/2019 6/6/2018            Who to contact     If you have questions or need follow up information about today's clinic visit or your schedule please contact [facility] directly at [phone].  Normal or non-critical lab and imaging results will be communicated to you by MyChart, letter or phone within 4 business days after the clinic has received the results. If you do not hear from us within 7 days, please contact the clinic through MyChart or phone. If you have a critical or abnormal lab result, we will notify you by phone as soon as possible.  Submit refill requests through expressor software or call your pharmacy and they will forward the refill request to us. Please allow 3 business days for your refill to be  completed.          Additional Information About Your Visit        MyChart Information     [employer] gives you secure access to your electronic health record. If you see a primary care provider, you can also send messages to your care team and make appointments. If you have questions, please call your primary care clinic.  If you do not have a primary care provider, please call [phone] and they will assist you.        Care EveryWhere ID     This is your Care EveryWhere ID. This could be used by other organizations to access your [facility] medical records  [id number]        Your Vitals Were     Pulse Height BMI (Body Mass Index)             56 1.829 m (6') 28.4 kg/m2          Blood Pressure from Last 3 Encounters:   06/06/18 164/68   05/03/18 150/56   03/29/18 144/58    Weight from Last 3 Encounters:   06/06/18 95 kg (209 lb 6.4 oz)   05/03/18 95.8 kg (211 lb 1.6 oz)   03/29/18 95.8 kg (211 lb 3.2 oz)              We Performed the Following     Follow-Up with Cardiac Advanced Practice Provider        Primary Care Provider Office Phone # Fax #    Jorge Hillman [phone]       [address] [facility] [address]        Equal Access to Services     RANJAN JONES : Hadii narayan tuttle hadasho Soomaali, waaxda luqadaha, qaybta kaalmada adeegyada, nicole lord. So [facility] [phone].    ATENCIÓN: Si habla español, tiene a fernandez disposición servicios gratuitos de asistencia lingüística. Llame al [phone].    We comply with applicable federal civil rights laws and [place] laws. We do not discriminate on the basis of race, color, national origin, age, disability, sex, sexual orientation, or gender identity.            Thank you!     Thank you for choosing [facility]  for your care. Our goal is always to provide you with excellent care. Hearing back from our patients is one way we can continue to improve our services. Please take a few minutes  "to complete the written survey that you may receive in the mail after your visit with us. Thank you!             Your Updated Medication List - Protect others around you: Learn how to safely use, store and throw away your medicines at www.disposemymeds.org.          This list is accurate as of 6/6/18  1:37 PM.  Always use your most recent med list.                   Brand Name Dispense Instructions for use Diagnosis    allopurinol 100 MG tablet    ZYLOPRIM    180 tablet    TAKE 2 TABLETS(200 MG) BY MOUTH DAILY    Chronic gout without tophus, unspecified cause, unspecified site       aspirin 81 MG tablet      Take 1 tablet by mouth daily. with food        blood glucose monitoring test strip    ACCU-CHEK COMPACT PLUS    200 strip    Check blood sugar 2-3 times a day    Type 2 diabetes mellitus with stage 3 chronic kidney disease, with long-term current use of insulin (H)       cetirizine 10 MG tablet    zyrTEC    30 tablet    TAKE 1 TABLET(10 MG) BY MOUTH EVERY EVENING    Atopic dermatitis, unspecified type, Itching       clopidogrel 75 MG tablet    PLAVIX    90 tablet    TAKE 1 TABLET(75 MG) BY MOUTH DAILY    CKD (chronic kidney disease) stage 3, GFR 30-59 ml/min, Cardiovascular disease       COD LIVER OIL PO      Take 1 capsule by mouth daily        diphenhydrAMINE-acetaminophen  MG tablet    TYLENOL PM     Take 1 tablet by mouth nightly as needed for sleep        hydrALAZINE 25 MG tablet    APRESOLINE    360 tablet    Take 75 mg by mouth 3 times daily    Essential hypertension, benign       insulin  UNIT/ML injection    HumuLIN N/NovoLIN N    3 Month    18 units at bedtime    Type 2 diabetes mellitus with stage 3 chronic kidney disease, with long-term current use of insulin (H)       insulin syringe-needle U-100 29G X 1/2\" 0.5 ML    BD insulin syringe    200 each    Use one syringe 2 daily or as directed.    Type 2 diabetes mellitus with stage 3 chronic kidney disease, with long-term current use of " insulin (H)       isosorbide mononitrate 60 MG 24 hr tablet    IMDUR    90 tablet    Take 1 tablet (60 mg) by mouth daily    NSTEMI (non-ST elevated myocardial infarction) (H)       levothyroxine 50 MCG tablet    SYNTHROID/LEVOTHROID    30 tablet    TAKE 1 TABLET BY MOUTH DAILY    Hypothyroidism, unspecified type       metoprolol tartrate 50 MG tablet    LOPRESSOR    60 tablet    Take 1 tablet (50 mg) by mouth 2 times daily    NSTEMI (non-ST elevated myocardial infarction) (H)       pravastatin 20 MG tablet    PRAVACHOL    30 tablet    Take 1 tablet (20 mg) by mouth once a week    Essential hypertension, benign, Cardiovascular disease       theophylline 400 MG 24 hr tablet    UNIPHYL    90 tablet    TAKE 1 TABLET BY MOUTH EVERY DAY    Mild persistent asthma without complication       triamcinolone 0.5 % cream    KENALOG    45 g    Apply sparingly to affected area two  times daily as needed for rash.    Itching       VITAMIN B 12 PO      Take 500 mcg by mouth 2 times daily.           no